# Patient Record
Sex: MALE | Race: WHITE | NOT HISPANIC OR LATINO | Employment: OTHER | URBAN - METROPOLITAN AREA
[De-identification: names, ages, dates, MRNs, and addresses within clinical notes are randomized per-mention and may not be internally consistent; named-entity substitution may affect disease eponyms.]

---

## 2017-01-31 ENCOUNTER — ALLSCRIPTS OFFICE VISIT (OUTPATIENT)
Dept: OTHER | Facility: OTHER | Age: 72
End: 2017-01-31

## 2017-01-31 DIAGNOSIS — I10 ESSENTIAL (PRIMARY) HYPERTENSION: ICD-10-CM

## 2017-01-31 DIAGNOSIS — R00.2 PALPITATIONS: ICD-10-CM

## 2017-02-01 ENCOUNTER — GENERIC CONVERSION - ENCOUNTER (OUTPATIENT)
Dept: OTHER | Facility: OTHER | Age: 72
End: 2017-02-01

## 2017-02-02 ENCOUNTER — APPOINTMENT (OUTPATIENT)
Dept: LAB | Facility: CLINIC | Age: 72
End: 2017-02-02
Payer: MEDICARE

## 2017-02-02 ENCOUNTER — ALLSCRIPTS OFFICE VISIT (OUTPATIENT)
Dept: OTHER | Facility: OTHER | Age: 72
End: 2017-02-02

## 2017-02-02 ENCOUNTER — TRANSCRIBE ORDERS (OUTPATIENT)
Dept: ADMINISTRATIVE | Facility: HOSPITAL | Age: 72
End: 2017-02-02

## 2017-02-02 DIAGNOSIS — R00.2 PALPITATIONS: ICD-10-CM

## 2017-02-02 DIAGNOSIS — I10 ESSENTIAL HYPERTENSION, MALIGNANT: Primary | ICD-10-CM

## 2017-02-02 DIAGNOSIS — I10 ESSENTIAL (PRIMARY) HYPERTENSION: ICD-10-CM

## 2017-02-02 LAB
ANION GAP SERPL CALCULATED.3IONS-SCNC: 9 MMOL/L (ref 4–13)
BUN SERPL-MCNC: 26 MG/DL (ref 5–25)
CALCIUM SERPL-MCNC: 9.2 MG/DL (ref 8.3–10.1)
CHLORIDE SERPL-SCNC: 106 MMOL/L (ref 100–108)
CO2 SERPL-SCNC: 25 MMOL/L (ref 21–32)
CREAT SERPL-MCNC: 1.05 MG/DL (ref 0.6–1.3)
GFR SERPL CREATININE-BSD FRML MDRD: >60 ML/MIN/1.73SQ M
GLUCOSE SERPL-MCNC: 83 MG/DL (ref 65–140)
POTASSIUM SERPL-SCNC: 3.7 MMOL/L (ref 3.5–5.3)
SODIUM SERPL-SCNC: 140 MMOL/L (ref 136–145)
TSH SERPL DL<=0.05 MIU/L-ACNC: 0.54 UIU/ML (ref 0.36–3.74)

## 2017-02-02 PROCEDURE — 84443 ASSAY THYROID STIM HORMONE: CPT

## 2017-02-02 PROCEDURE — 80048 BASIC METABOLIC PNL TOTAL CA: CPT

## 2017-02-02 PROCEDURE — 36415 COLL VENOUS BLD VENIPUNCTURE: CPT

## 2017-02-07 ENCOUNTER — HOSPITAL ENCOUNTER (OUTPATIENT)
Dept: NON INVASIVE DIAGNOSTICS | Facility: HOSPITAL | Age: 72
Discharge: HOME | End: 2017-02-07
Payer: MEDICARE

## 2017-02-07 DIAGNOSIS — I10 ESSENTIAL (PRIMARY) HYPERTENSION: ICD-10-CM

## 2017-02-07 PROCEDURE — 93226 XTRNL ECG REC<48 HR SCAN A/R: CPT

## 2017-02-07 PROCEDURE — 93225 XTRNL ECG REC<48 HRS REC: CPT

## 2017-02-17 ENCOUNTER — GENERIC CONVERSION - ENCOUNTER (OUTPATIENT)
Dept: OTHER | Facility: OTHER | Age: 72
End: 2017-02-17

## 2017-02-20 ENCOUNTER — ALLSCRIPTS OFFICE VISIT (OUTPATIENT)
Dept: OTHER | Facility: OTHER | Age: 72
End: 2017-02-20

## 2017-03-03 ENCOUNTER — ALLSCRIPTS OFFICE VISIT (OUTPATIENT)
Dept: OTHER | Facility: OTHER | Age: 72
End: 2017-03-03

## 2017-05-25 ENCOUNTER — ALLSCRIPTS OFFICE VISIT (OUTPATIENT)
Dept: OTHER | Facility: OTHER | Age: 72
End: 2017-05-25

## 2017-06-23 ENCOUNTER — ALLSCRIPTS OFFICE VISIT (OUTPATIENT)
Dept: OTHER | Facility: OTHER | Age: 72
End: 2017-06-23

## 2017-06-26 ENCOUNTER — ALLSCRIPTS OFFICE VISIT (OUTPATIENT)
Dept: OTHER | Facility: OTHER | Age: 72
End: 2017-06-26

## 2017-06-26 ENCOUNTER — GENERIC CONVERSION - ENCOUNTER (OUTPATIENT)
Dept: OTHER | Facility: OTHER | Age: 72
End: 2017-06-26

## 2017-06-26 ENCOUNTER — LAB REQUISITION (OUTPATIENT)
Dept: LAB | Facility: HOSPITAL | Age: 72
End: 2017-06-26
Payer: MEDICARE

## 2017-06-26 DIAGNOSIS — B07.9 VIRAL WART: ICD-10-CM

## 2017-06-26 PROCEDURE — 88305 TISSUE EXAM BY PATHOLOGIST: CPT | Performed by: SURGERY

## 2017-07-10 ENCOUNTER — ALLSCRIPTS OFFICE VISIT (OUTPATIENT)
Dept: OTHER | Facility: OTHER | Age: 72
End: 2017-07-10

## 2017-09-11 ENCOUNTER — GENERIC CONVERSION - ENCOUNTER (OUTPATIENT)
Dept: OTHER | Facility: OTHER | Age: 72
End: 2017-09-11

## 2017-09-25 ENCOUNTER — ALLSCRIPTS OFFICE VISIT (OUTPATIENT)
Dept: OTHER | Facility: OTHER | Age: 72
End: 2017-09-25

## 2017-09-25 ENCOUNTER — APPOINTMENT (OUTPATIENT)
Dept: RADIOLOGY | Facility: OTHER | Age: 72
End: 2017-09-25
Payer: MEDICARE

## 2017-09-25 DIAGNOSIS — M75.52 BURSITIS OF LEFT SHOULDER: ICD-10-CM

## 2017-09-25 DIAGNOSIS — M25.512 PAIN IN LEFT SHOULDER: ICD-10-CM

## 2017-09-25 PROCEDURE — 73030 X-RAY EXAM OF SHOULDER: CPT

## 2017-12-01 ENCOUNTER — GENERIC CONVERSION - ENCOUNTER (OUTPATIENT)
Dept: OTHER | Facility: OTHER | Age: 72
End: 2017-12-01

## 2017-12-04 ENCOUNTER — GENERIC CONVERSION - ENCOUNTER (OUTPATIENT)
Dept: OTHER | Facility: OTHER | Age: 72
End: 2017-12-04

## 2017-12-20 ENCOUNTER — GENERIC CONVERSION - ENCOUNTER (OUTPATIENT)
Dept: OTHER | Facility: OTHER | Age: 72
End: 2017-12-20

## 2017-12-20 ENCOUNTER — HOSPITAL ENCOUNTER (OUTPATIENT)
Dept: NON INVASIVE DIAGNOSTICS | Facility: CLINIC | Age: 72
Discharge: HOME | End: 2017-12-20
Payer: MEDICARE

## 2017-12-20 DIAGNOSIS — I71.4 ANEURYSM OF ABDOMINAL VESSEL (HCC): ICD-10-CM

## 2017-12-20 DIAGNOSIS — I74.09 AORTOILIAC OCCLUSIVE DISEASE (HCC): ICD-10-CM

## 2017-12-20 PROCEDURE — 93978 VASCULAR STUDY: CPT

## 2017-12-20 PROCEDURE — 93923 UPR/LXTR ART STDY 3+ LVLS: CPT

## 2018-01-10 NOTE — MISCELLANEOUS
Physical Exam    Wound #1 Assessment wound #1 Location:, right groin, started on unknown, Care for this wound started on 5/19/2014  Wound Status: not healed  Non Healing Surgical: Full Thickness  Length: 1 4cm x Width: 1cm x Depth: 0 2cm   Total: 1 4sq cm   Wound Volume: 0 28cm3           Tissue type: Granulation and Slough   Color of Wound: Red - 20%, Yellow - 10% and Pink - 70%   Exudate Amount: Scant   Exudate Type: Serosangiunous   Odor: None   Exudate Color: Yellow   Wound Edges: Intact   Periwound Skin Condition: Fungal rash        Physician/Provider Wound #1 Exam   I agree with the nursing assessment and documentation  Right groin wound is superficial with pink tissue present  Periwound is clean with no erythema  Wound Drsg  Orders/Instructions  Wound Identification Dressing Orders--Instructions:   Wound Identification and Instructions   Wound #1: right groin    Wound Care Instructions  Discussed with Patient/Caregiver  Dressing Type: Alginate  Wash with mild soap and water, normal saline, wound cleanser or as specified  Apply 4% Topical Lidocaine anesthetic solution PRN to wound/ulcer prior to debridement for pain control  Apply specified dressing to wound base/bed  Secondary dressing apply: Gauze, 4x4 folded  Secure with: medfix tape  Dressing change frequency: Daily      Wound Goals  Wound Goals:   Healing Goals:   Fair healing potential secondary to moderate comorbid conditions     Wound will be free of infection   Patient will achieve a maintenance program to promote optimum patient quality of life       Future Appointments    Date/Time Provider Specialty Site   02/08/2016 01:15 PM Ryley Pulido MD General Surgery [place] [address]   02/16/2016 10:10 AM Specialty Clinic, Podiatry  [address]   05/13/2016 09:45 AM Katharina Mata MD Cardiology [address]     Shonda Cespedes 1: Wound Nursing Care Plan   Impaired Tissue Integrity related to: right groin   Risk for Infection related to open wound: right groin   Goals   Patient will achieve 100% epithelialization:  Patient will demonstrate and verbalize knowledge of their disease process and management:  Patient will verbalize signs and symptoms of infection and when to notify physician or FIELD [facility]:    Patient will demonstrate and verbalize infection control and preventative measures:  Wound Nursing Care Interventions:   Provide moist wound healing:  Other:  Care plan reviewed and updated 3/23/15, 4/27/2015, 6/1/2015, 7/20/15, 8/3/15  Reviewed 10/5/15, Reviewed 12/14/15      Signatures   Electronically signed by : Namita Hernandez MD; Jan 11 2016  2:02PM EST                       (Author)

## 2018-01-12 NOTE — RESULT NOTES
Verified Results  (1) COMPREHENSIVE METABOLIC PANEL [id number] 39:48LC Fransisco Driscoll Order Number: [id number]      National Kidney Disease Education Program recommendations are as follows:  GFR calculation is accurate only with a steady state creatinine  Chronic Kidney disease less than 60 ml/min/1 73 sq  meters  Kidney failure less than 15 ml/min/1 73 sq  meters       Test Name Result Flag Reference   GLUCOSE,RANDM 109 mg/dL     SODIUM 140 mmol/L  136-145   POTASSIUM 4 0 mmol/L  3 5-5 3   CHLORIDE 108 mmol/L  100-108   CARBON DIOXIDE 28 mmol/L  21-32   ANION GAP (CALC) 4 mmol/L  4-13   BLOOD UREA NITROGEN 21 mg/dL  5-25   CREATININE 1 03 mg/dL  0 60-1 30   Standardized to IDMS reference method   CALCIUM 8 8 mg/dL  8 3-10 1   BILI, TOTAL 0 60 mg/dL  0 20-1 00   ALK PHOSPHATAS 75 U/L     ALT (SGPT) 25 U/L  12-78   AST(SGOT) 14 U/L  5-45   ALBUMIN 3 6 g/dL  3 5-5 0   TOTAL PROTEIN 7 4 g/dL  6 4-8 2   eGFR Non-African American      >60 0 ml/min/1 73sq m

## 2018-01-13 VITALS — TEMPERATURE: 97.7 F | DIASTOLIC BLOOD PRESSURE: 52 MMHG | HEART RATE: 60 BPM | SYSTOLIC BLOOD PRESSURE: 110 MMHG

## 2018-01-13 VITALS — TEMPERATURE: 97.1 F | DIASTOLIC BLOOD PRESSURE: 66 MMHG | SYSTOLIC BLOOD PRESSURE: 130 MMHG | HEART RATE: 60 BPM

## 2018-01-13 VITALS
BODY MASS INDEX: 24.75 KG/M2 | HEIGHT: 66 IN | WEIGHT: 154 LBS | DIASTOLIC BLOOD PRESSURE: 70 MMHG | HEART RATE: 69 BPM | SYSTOLIC BLOOD PRESSURE: 135 MMHG

## 2018-01-13 VITALS
BODY MASS INDEX: 28.6 KG/M2 | SYSTOLIC BLOOD PRESSURE: 136 MMHG | WEIGHT: 167.55 LBS | DIASTOLIC BLOOD PRESSURE: 60 MMHG | HEART RATE: 74 BPM | TEMPERATURE: 98.3 F | HEIGHT: 64 IN

## 2018-01-13 VITALS
TEMPERATURE: 97.2 F | HEART RATE: 72 BPM | DIASTOLIC BLOOD PRESSURE: 60 MMHG | HEIGHT: 64 IN | SYSTOLIC BLOOD PRESSURE: 110 MMHG | WEIGHT: 168.43 LBS | BODY MASS INDEX: 28.76 KG/M2

## 2018-01-13 VITALS — HEART RATE: 68 BPM | TEMPERATURE: 97.3 F | SYSTOLIC BLOOD PRESSURE: 142 MMHG | DIASTOLIC BLOOD PRESSURE: 78 MMHG

## 2018-01-13 VITALS
SYSTOLIC BLOOD PRESSURE: 122 MMHG | HEIGHT: 64 IN | BODY MASS INDEX: 28.76 KG/M2 | HEART RATE: 64 BPM | WEIGHT: 168.43 LBS | TEMPERATURE: 97.4 F | DIASTOLIC BLOOD PRESSURE: 62 MMHG

## 2018-01-14 VITALS — SYSTOLIC BLOOD PRESSURE: 110 MMHG | TEMPERATURE: 97.6 F | DIASTOLIC BLOOD PRESSURE: 60 MMHG | HEART RATE: 68 BPM

## 2018-01-15 NOTE — RESULT NOTES
Verified Results  HOLTER MONITOR - 24 HOUR [id number] 10:03AM Ganesh Hobbs Order Number: [id number]   Performing Comments: 48 hours Holter please   - Patient Instructions: To schedule this appointment, please contact Central Scheduling at [phone]  For Shyam Mujica, please call [phone]  Test Name Result Flag Reference   HOLTER MONITOR - 24 HOUR (Report)     PT NAME: Jovani Monroy   : 1945 AGE: 66 y o  GENDER: male   MRN: [id number]  PROCEDURE: Holter monitor - 24 hour         INDICATIONS: Palpitations       FINDINGS:     Holter monitoring revealed predominant sinus rhythm with an average heart rate of 64 BPM, a minimum heart rate of 41 BPM, and a maximum heart rate of 103 BPM      There were about 309 ventricular ectopic beats, all occurring as single PVC's with no evidence of ventricular tachycardia  There were about 116 supraventricular ectopic beats, mostly occurring as single PAC's and late beats with no evidence of supraventricular tachycardia, atrial fibrillation, or atrial flutter  There was no evidence of significant bradyarrhythmia or advanced heart block  The longest R-R interval was 1 8 seconds  The patient's symptom diary reported an episode of fludder, corresponding with sinus rhythm at 65 BPM without ectopy  Another episode of heart pounding, corresponded with sinu rhythm at 74 BPM without ectopy

## 2018-01-15 NOTE — PROGRESS NOTES
Chief Complaint  Chief Complaint Free Text Note Form: Follow up for right groin      History of Present Illness  Wound Identification HPI:   Wound Identification HPI   Wound #1: right groin        Visit Information:   The patient came to Wound Care and was ambulatory  The patient is being seen for follow-up with RN at the [facility]  The patient's identification was verified  A secondary verification process was completed  Orientation: oriented to person, oriented to place and oriented to time  Blood Glucose:  Not applicable  Drain intact proximal to groin wound with malodorous drainage  Dressing intact to groin wound  Patient having a colonoscopy April 16th, 2015 4/27/2015: Arrived with drawtex, 4x4, tape dressing intact  DILLON intact with large amount of malodorous drainage, reports drains about 50-60ml in 24 hours  Patient reports his colonoscopy has been rescheduled for 5/26/205  Patient reports 5/5/2015 he will return to his eye Dr to have his eye examine s/p a torn retina  6/1/2015: Arrived with dressing intact to right groin  7/20/2015 The patient arrived with dressing intact to right groin wound  Since last visit Dr Barbie Osman took the patient to the OR on July 2nd for groin exploration and removal of mesh  The patient has a new DILLON drain intact along with well approximated incision line with sutures from surgery  The patient relates that the home care nurses were concerned last week as a white portion of the DILLON tubing is now visible  The patient states that after surgery drainage was less with little malodor but has increased in the last week  Reola Hones tube and sutures removed today  Dry dressing applied to area  [facility]   [facility] to see patient this Wednesday and see if patient can do dressing change for possible discharge from  [facility]NA services  8/3/15: Patient arrived with dry gauze and tape to wounds   States he has not been using nystatin at home  Pt was discharged from [facility] services on 7/23/15  8/31/15: The patient arrived with dressing intact to wound, denies any issues since last visit  Follow up in 3 weeks  10/5/2015: Patient arrived with dressing intact to right groin  Denies changes since last visit  10/26/2015: Arrived with 4x4 intact to right groin  11/1615: Patient arrived with dressing intact  12/14/15: Patient arrived with 4x4 and tape intact to right groin  Patient reports on 12/3/15 he had cataract removed to left eye  On 12/1/15 he developed a cornea ulceration to his right eye which he is using eye drops and oral medication  1/11/2016: Arrived with alginate and tape intact to right groin  2/8/16: The patient arrived with dressing intact to wound  3/7/2016: Patient arrived with 2x2 gauze and tape intact to right groin  3/14/16: Patient arrived with mepilex xt to wound  Denies changes since last visit  4/4/16: Denies changes since last visit  Arrived with gauze to wound  At the last visit the [facility] ordered supplies, however the patient cancelled the order as he is obtaining his own supplies "at a better price"  4/11/16: Arrived with Calmoseptine and gauze intact  5/9/2016: Patient arrived with Calmoseptine and gauze to wound  Denies changes since last visit  5/16/16: Arrived with felt pad to yana wound, Calmoseptine, folded 2x2 intact to right groin  5/23/16: Patient arrived with felt pad and dressing intact to wound  Denies changes since last visit  5/31/16: The patient arrived with felt pad intact to groin  Wound is greatly improved  Carvajal Fall Scale:     History of Falling: No = 0   Secondary Diagnosis: Yes = 15   Ambulatory Aid None, Bedrest, Nurse Assist = 0   No = 0   Gait: Weak = 10 -- Short steps (may shuffle), stooped but able to lift head while walking, may seek support from furniture while walking, but with light touch (for reassurance)     Mental Status: Oriented to own ability = 0   Total Score: 25    25 - 45 = Moderate Risk        Fall, Nutrition, Mobility, Neuropsychological Assessment: The most recent fall occurred 2/6/16  Number of falls in the last year were 1  The fall resulted from slipping  Nutrition Assessment Screening: Food intake over the last 3 months due to the loss of appetite, digestive problems, chewing or swallowing difficulties is graded as: 2 = no decrease in food intake   Weight loss during the last 3 months: 3 = no weight loss   Mobility scored as: 2 = goes out  Psychological Stress and Acute Disease Scored as: 2 = The patient has not experienced psychological stress or acute disease in the last 3 months  Neuropsychological problems scored as: 2 = no psychological problems  Body Mass Index (BMI) scored as: 3 = BMI 23 or greater  Nutritional Assessment Screening Score: 12 - 14 points - Normal nutritional status  Hospital Based Practices Required Assessment:   Pain Assessment   the patient states they do not have pain  (on a scale of 0 to 10, the patient rates the pain at 0 )   Abuse And Domestic Violence Screen    Yes, the patient is safe at home  The patient states no one is hurting them  Depression And Suicide Screen  No, the patient has not had thoughts of hurting themself  No, the patient has not felt depressed in the past 7 days  Prefered Language is  [place]  Primary Language is  English  Readiness To Learn: Receptive  Barriers To Learning: none  Preferred Learning: demonstration   Education Completed: further treatment/follow-up and treatment/procedure   Teaching Method: verbal   Person Taught: patient   Evaluation Of Learning: verbalized/demonstrated understanding      Active Problems    1  Arteriosclerotic coronary artery disease (414 00) (I25 10)   2  Callus of foot (700) (L84)   3  Chest pain (786 50) (R07 9)   4  Chronic Reflux Esophagitis   5  Dizziness (780 4) (R42)   6  Foreign bodies   7  Hyperlipidemia (272 4) (E78 5)   8  Hypertension (401 9) (I10)   9   Need for pneumococcal vaccine (V03 82) (Z23)   10  Need for prophylactic vaccination and inoculation against influenza (V04 81) (Z23)   11  Non-healing open wound of right groin (879 5) (S31 103A)   12  Non-healing open wound of right groin, subsequent encounter (V58 89,879 5)    (S31 103D)   13  Onychomycosis (110 1) (B35 1)   14  Pain in eye, unspecified laterality (379 91) (H57 10)   15  Pain in limb (729 5) (M79 609)   16  Peripheral vascular disease (443 9) (I73 9)   17  Postoperative examination (V67 00) (Z09)   18  Preop general physical exam (V72 83) (Z01 818)   19  Screening for thyroid disorder (V77 0) (Z13 29)   20  Serrated adenoma of colon (211 3) (D12 6)   21  Tubular adenoma of colon (211 3) (D12 6)   22  Ventral hernia (553 20) (K43 9)   23  Zoster ophthalmicus (053 20) (B02 30)    Past Medical History    1  History of Abscess of groin (682 2) (L02 214)   2  History of Candidiasis, cutaneous (112 3) (B37 2)   3  History of Coronary Artery Disease (V12 59)   4  History of Dyslipidemia (272 4) (E78 5)   5  History of Esophageal ulcer without bleeding (530 20) (K22 10)   6  History of abdominal aortic aneurysm (V12 59) (Z86 79)   7  History of gastritis (V12 79) (Z87 19)   8  History of hiatal hernia (V12 79) (Z87 19)   9  History of left cataract surgery (V45 61) (Z98 42)   10  Need for prophylactic vaccination and inoculation against influenza (V04 81) (Z23)   11  Old myocardial infarction (412) (I25 2)   12  History of Recurrent Right Inguinal Hernia (550 91)   13  History of Recurrent Right Inguinal Hernia (550 91)   14  History of Right inguinal hernia (550 90) (K40 90)   15  History of Skin Wound In The Groin Right    Surgical History    1  History of Appendectomy   2  History of Cath Placement Of Stent 2   3  History of Diagnostic Cystoscopy   4  History of Exploratory Laparotomy   5  History of Hernia Repair Inguinal Unilateral   6  History of Hip Surgery Right   7  History of Orchiectomy Right   8  History of Surgery For Abdominal Aortic Aneurysm    Family History    1  Family history of cardiac disorder (V17 49) (Z82 49)   2  Family history of diabetes mellitus (V18 0) (Z83 3)    3  Family history of cardiac disorder (V17 49) (Z82 49)    Social History    · Former smoker (G11 99) (V57 607)    Current Meds   1  Aspirin 81 MG TABS; TAKE 1 TABLET DAILY; Therapy: 77Tdb2675 to (Michael Burgos)  Requested for: 07MKQ9785; Last   Rx:08Oct2015 Ordered   2  Atorvastatin Calcium 40 MG Oral Tablet; TAKE 1 TABLET Bedtime; Therapy: 11AYZ8453 to (Evaluate:12Mar2017)  Requested for: 30UPO0226; Last   Rx:17Mar2016 Ordered   3  Hydrochlorothiazide 12 5 MG Oral Capsule; take 1 capsule by mouth once daily; Therapy: 86EQZ4072 to (Evaluate:13Mar2017)  Requested for: 10XGB2344; Last   Rx:18Mar2016 Ordered   4  Hydrocodone-Acetaminophen 5-325 MG Oral Tablet; Therapy: (Madai Plunkett) to Recorded   5  Lidocaine HCl - 4 % External Solution; APPLY 2  [facility] External; To Be Done: 55BQZ9843;   Status: HOLD FOR - Administration Ordered   6  Lidocaine HCl (Local Anesth ) 4 % SOLN; Apply prn to wound prior to debridment for pain   control; Therapy: (Scottie Carey) to Recorded   7  Lisinopril 20 MG Oral Tablet; TAKE 1 TABLET DAILY AS DIRECTED; Therapy: 42HYV9665 to (26 105755)  Requested for: 09CQY7034; Last   Rx:04Mar2016 Ordered   8  Metoprolol Succinate ER 50 MG Oral Tablet Extended Release 24 Hour; TAKE 1 TABLET   DAILY; Therapy: 69UVZ8741 to (Evaluate:94Ovy0609)  Requested for: 60IIU3495; Last   Rx:04Mar2016 Ordered   9  Nystatin 058706 UNIT/GM External Powder; applied to affected area daily; Therapy: 63XDJ9832 to (Last Rx:27Oct2014)  Requested for: 27Oct2014 Ordered   10  Ofloxacin 0 3 % Ophthalmic Solution; Therapy: (Recorded:19Pno9535) to Recorded   11   PEG-3350/Electrolytes 236 GM Oral Solution Reconstituted; MIX AS DIRECTED AND    DRINK OVER 4 HOURS, START AT 4PM;    Therapy: 85QFO3454 to (Last Rx:14Jan2015)  Requested for: 91ARD3478 Ordered   12  PrednisoLONE Acetate 1 % Ophthalmic Suspension; Therapy: (Recorded:21Jul2014) to Recorded   13  Ranitidine HCl - 150 MG Oral Capsule; Therapy: (Recorded:07Mar2016) to Recorded   14  ValACYclovir HCl - 500 MG Oral Tablet; Therapy: (Recorded:07Mar2016) to Recorded   15  Valtrex 500 MG Oral Tablet; Therapy: 80WUT0951 to Recorded   16  Vicodin 5-300 MG Oral Tablet; 1-2 tabs by mouth every 4 hours when necessary pain; Therapy: 01PWI1108 to (Last Rx:28Hhh5680) Ordered    Allergies    1  No Known Drug Allergies    Vitals  Signs [Data Includes: Current Encounter]   Recorded: 19WJL0586 01:33PM   Temperature: 98 3 F, Tympanic  Heart Rate: 72  Respiration: 18  Systolic: 099  Diastolic: 76  Height: 5 ft 4 in  Weight: 171 lb   BMI Calculated: 29 35  BSA Calculated: 1 83  Pain Scale: 0    Physical Exam    Wound #1 Assessment wound #1 Location:, right groin, started on unknown, Care for this wound started on 5/19/2014  Wound Status: not healed  Non Healing Surgical: Full Thickness  Length: 0 3cm x Width: 0 2cm x Depth: 0 1cm   Total: 0 06sq cm   Wound Volume: 0 006cm3           Tissue type: Granulation and Slough   Color of Wound: Red - 99% and Yellow - 1%   Exudate Amount: Scant   Exudate Type: Serosangiunous   Odor: None   Wound Edges: Intact   Periwound Skin Condition: Intact, Erythematous, Fungal rash, slight erythema           Procedure      Wound #1: right groin     Nurse Dressing Change:   Wound #1 The wound located on the right groin  Wound care rendered as per Physician/Advanced Practitioner order/plan  Return to [address] [place] [address] 1 week  Comments:      Wound Drsg  Orders/Instructions  Wound Identification Dressing Orders--Instructions:   Wound Identification and Instructions   Wound #1: right groin    Wound Care Instructions  Discussed with Patient/Caregiver     Dressing Type: Plain gauze  Wash with mild soap and water, normal saline, wound cleanser  Apply specified dressing to wound base/bed  Secondary dressing apply: Gauze  Secure with: Tape  Offloading: Felt pad to periwoundto-- right groin  Comments/Other:   Felt pad to stay intact, patient to change gauze daily  Wound Goals  Wound Goals:   Healing Goals:   Fair healing potential secondary to moderate comorbid conditions  Wound will be free of infection   Patient will achieve a maintenance program to promote optimum patient quality of life       Impression  [address] [place] [address]: Wound Nursing Care Plan   Impaired Tissue Integrity related to: right groin   Risk for Infection related to open wound: right groin   Goals   Patient will achieve 100% epithelialization:  Patient will demonstrate and verbalize knowledge of their disease process and management:  Patient will verbalize signs and symptoms of infection and when to notify physician or FIELD [facility]:    Patient will demonstrate and verbalize infection control and preventative measures:  Wound Nursing Care Interventions:   Provide moist wound healing:  Other:  Care plan reviewed and updated 3/23/15, 4/27/2015, 6/1/2015, 7/20/15, 8/3/15  Reviewed 10/5/15, Reviewed 12/14/15, 2/8/16, 3/14/16, 4/4/16, 5/9/16      Future Appointments    Date/Time Provider Specialty Site   06/06/2016 01:30 PM Stephanie, [address] [place] [address]   06/17/2016 11:15 AM Ellery Bence, MD Cardiology [place] [address]   09/02/2016 02:00 PM Specialty Clinic, [address] [place] [address]     Signatures   Electronically signed by : Mariajose Jackson RN; May 31 2016  1:38PM EST                       (Author)

## 2018-01-16 NOTE — PROGRESS NOTES
Chief Complaint  PT  CAME INTO THE OFFICE TODAY FOR A NURSE VISIT FOR A SPIROMETRY WITHOUT BRONCHO ORDERED BY DR [address] ON 6/23/17  PT UNDERSTOOD INSTRUCTIONS BUT DID NOT GIVE GOOD EFFORT  I ADVISED PT  WE WILL CALL HIM WITH RESULTS      Active Problems    1  Arteriosclerotic coronary artery disease (414 00) (I25 10)   2  Callus of foot (700) (L84)   3  Chest pain (786 50) (R07 9)   4  Chronic Reflux Esophagitis   5  Degenerative arthritis of left knee (715 96) (M17 12)   6  Degenerative joint disease of left hip (715 95) (M16 12)   7  Dizziness (780 4) (R42)   8  Exertional shortness of breath (786 05) (R06 02)   9  Foreign bodies   10  Hyperlipidemia (272 4) (E78 5)   11  Hypertension (401 9) (I10)   12  Musculoskeletal thigh pain, left (729 5) (M79 652)   13  Need for pneumococcal vaccine (V03 82) (Z23)   14  Need for prophylactic vaccination and inoculation against influenza (V04 81) (Z23)   15  Non-healing open wound of right groin (879 5) (S31 103A)   16  Non-healing open wound of right groin, subsequent encounter (V58 89,879 5)    (S31 103D)   17  Onychomycosis (110 1) (B35 1)   18  Osteoarthritis (715 90) (M19 90)   19  Pain in eye, unspecified laterality (379 91) (H57 10)   20  Pain in limb (729 5) (M79 609)   21  Palpitations (785 1) (R00 2)   22  Peripheral vascular disease (443 9) (I73 9)   23  Postoperative examination (V67 00) (Z09)   24  Preop general physical exam (V72 83) (Z01 818)   25  Screening for thyroid disorder (V77 0) (Z13 29)   26  Serrated adenoma of colon (211 3) (D12 6)   27  Skin nodule (782 2) (R22 9)   28  Tubular adenoma of colon (211 3) (D12 6)   29  Ventral hernia (553 20) (K43 9)   30  Verrucous skin lesion (078 10) (B07 9)   31  Vertigo (780 4) (R42)   32  Zoster ophthalmicus (053 20) (B02 30)    Current Meds   1  Aspirin 81 MG Oral Tablet Delayed Release; TAKE 1 TABLET DAILY;    Therapy: 87Hfc7737 to (Evaluate:09Mxg6096)  Requested for: 52KQE0935; Last   ZV:71CXX2029 Ordered   2  Atorvastatin Calcium 40 MG Oral Tablet; TAKE 1 TABLET Bedtime; Therapy: 11WUR0089 to (Evaluate:18Jun2018)  Requested for: 26IYP6375; Last   PT:13EOT6309 Ordered   3  HydroCHLOROthiazide 12 5 MG Oral Capsule; take 2 capsule by mouth once daily; Therapy: 70YCZ2713 to (Evaluate:18Jun2018)  Requested for: 82GJZ5047; Last   FV:54TAZ0680 Ordered   4  Lisinopril 20 MG Oral Tablet; TAKE 1 TABLET DAILY AS DIRECTED; Therapy: 86SUO9638 to (Evaluate:18Jun2018)  Requested for: 64NCW0294; Last   WA:75YDD5946 Ordered   5  Metoprolol Succinate ER 50 MG Oral Tablet Extended Release 24 Hour; TAKE 1 TABLET   DAILY; Therapy: 61IPI5156 to (Evaluate:18Jun2018)  Requested for: 13DSY4187; Last   JF:19GBK3032 Ordered   6  RaNITidine HCl - 150 MG Oral Capsule; Therapy: (Recorded:07Mar2016) to Recorded    Allergies    1   No Known Drug Allergies    Future Appointments    Date/Time Provider Specialty Site   12/01/2017 11:00 AM Heaven Wick MD Cardiology [address]   09/11/2017 01:10 PM Specialty Clinic, [address] [place] [address]     Signatures   Electronically signed by : Ben Bartlett, ; Jul 10 2017 11:30AM EST                       (Author)    Electronically signed by : Winnie Brennan DO; Jul 10 2017  3:52PM EST                       (Author)    Electronically signed by : Luis Felipe Nicole DO; Jul 10 2017  3:59PM EST                       (Review)

## 2018-01-17 ENCOUNTER — GENERIC CONVERSION - ENCOUNTER (OUTPATIENT)
Dept: OTHER | Facility: OTHER | Age: 73
End: 2018-01-17

## 2018-01-18 NOTE — MISCELLANEOUS
Physical Exam    Wound #1 Assessment wound #1 Location:, right groin, started on unknown, Care for this wound started on 5/19/2014  Wound Status: not healed  Non Healing Surgical: Full Thickness  Length: 0 3cm x Width: 0 2cm x Depth: 0 1cm   Total: 0 06sq cm   Wound Volume: 0 006cm3           Tissue type: Granulation and Slough   Color of Wound: Red - 99% and Yellow - 1%   Exudate Amount: Scant   Exudate Type: Serosangiunous   Odor: None   Wound Edges: Intact   Periwound Skin Condition: Intact, Erythematous, Fungal rash, slight erythema           Wound Drsg  Orders/Instructions  Wound Identification Dressing Orders--Instructions:   Wound Identification and Instructions   Wound #1: right groin    Wound Care Instructions  Discussed with Patient/Caregiver  Dressing Type: Plain gauze  Wash with mild soap and water, normal saline, wound cleanser  Apply specified dressing to wound base/bed  Secondary dressing apply: Gauze  Secure with: Tape  Offloading: Felt pad to periwoundto-- right groin  Comments/Other:   Felt pad to stay intact, patient to change gauze daily  Future Appointments    Date/Time Provider Specialty Site   06/06/2016 01:30 PM [facility]   06/17/2016 11:15 AM Chacho Lenz MD Cardiology [facility]   09/02/2016 02:00 PM Specialty Clinic, [address] [place] [address] 656 Plan  Wound Nursing Care Plan [facility]: Wound Nursing Care Plan   Impaired Tissue Integrity related to: right groin   Risk for Infection related to open wound: right groin   Goals   Patient will achieve 100% epithelialization:  Patient will demonstrate and verbalize knowledge of their disease process and management:  Patient will verbalize signs and symptoms of infection and when to notify physician or FIELD [facility]:    Patient will demonstrate and verbalize infection control and preventative measures:     Wound Nursing Care Interventions:   Provide moist wound healing:  Other:  Care plan reviewed and updated 3/23/15, 4/27/2015, 6/1/2015, 7/20/15, 8/3/15  Reviewed 10/5/15, Reviewed 12/14/15, 2/8/16, 3/14/16, 4/4/16, 5/9/16      Signatures   Electronically signed by : Peggy Sims RN; May 31 2016  1:38PM EST                       (Author)

## 2018-01-22 VITALS
HEIGHT: 66 IN | DIASTOLIC BLOOD PRESSURE: 60 MMHG | TEMPERATURE: 97.9 F | HEART RATE: 64 BPM | BODY MASS INDEX: 26.5 KG/M2 | SYSTOLIC BLOOD PRESSURE: 120 MMHG | WEIGHT: 164.9 LBS

## 2018-01-22 VITALS — HEART RATE: 84 BPM | DIASTOLIC BLOOD PRESSURE: 76 MMHG | TEMPERATURE: 97.4 F | SYSTOLIC BLOOD PRESSURE: 120 MMHG

## 2018-01-23 NOTE — PROGRESS NOTES
Preliminary Nursing Report           Patient Information   Initial Encounter Entry Date:   2018 2:44 PM EST (Automated Transmission Automated Transmission)     Last Modified:   {Sg Villatoro}          Legal Name: Rae Chirinos      Social  Number:      YOB: 1945      Age (years): 67      Gender: M      Body Mass Index (BMI): 28 kg/m2      Height: 65 in  Weight: 167 lbs (76 kgs)        Address:   [address] [place] [address] [place]           Phone: [phone]   (consent to leave messages)      Email:      Ethnicity: Decline to State      [place]:      Marital Status:      Preferred Language: English      Race: Other Race              Patient Insurance Information      Primary Insurance InformationCarrier Name: {Primary  CarrierName}        Carrier Address:   {Primary  CarrierAddress}           Carrier Phone: {Primary  CarrierPhone}        Group Number: {Primary  GroupNumber}        Policy Number: {Primary  PolicyNumber}        Insured Name: {Primary  InsuredName}        Insured : {Primary  InsuredDOB}        Relationship to Insured: {Primary  RelationshiptoInsured}          Secondary Insurance InformationCarrier Name: {Secondary  CarrierName}        Carrier Address:   {Secondary  CarrierAddress}           Carrier Phone: {Secondary  CarrierPhone}        Group Number: {Secondary  GroupNumber}        Policy Number: {Secondary  PolicyNumber}        Insured Name: {Secondary  InsuredName}        Insured : {Secondary  InsuredDOB}        Relationship to Insured: {Secondary  RelationshiptoInsured}                Health Profile   Booking #:   Jonny Emery #: [id number]           DOS: 2018    Surgery :  HERNIA REPAIR W/MESH    Add'l Procedures/Notes:     Surgery  Risk: Intermediate       Precautions   Arteriosclerotic coronary artery disease     Chest pain     Palpitations     Peripheral vascular disease               Allergies   No Known Drug Allergies Medications   Aspirin 81 MG Oral Tablet Delayed Release     Atorvastatin Calcium 40 MG Oral Tablet     HydroCHLOROthiazide 12 5 MG Oral Capsule     Lisinopril 20 MG Oral Tablet     Metoprolol Succinate ER 50 MG Oral Tablet Extended Release 24 Hour     RaNITidine HCl - 150 MG Oral Capsule            Conditions   Arteriosclerotic coronary artery disease     Bursitis of left shoulder     Callus of foot     Chest pain     Chronic Reflux Esophagitis     Degenerative arthritis of left knee     Degenerative joint disease of left hip     Dizziness     Exertional shortness of breath     Foreign bodies     Hyperlipidemia     Hypertension     Musculoskeletal thigh pain, left     Need for pneumococcal vaccine     Need for prophylactic vaccination and inoculation against influenza     Non-healing open wound of right groin     Non-healing open wound of right groin, subsequent encounter     Onychomycosis     Osteoarthritis     Pain in eye, unspecified laterality     Pain in limb     Palpitations     Peripheral vascular disease     Postoperative examination     Preop general physical exam     Screening for thyroid disorder     Serrated adenoma of colon     Skin nodule     Tubular adenoma of colon     Ventral hernia     Verrucous skin lesion     Vertigo     Zoster ophthalmicus            Family History   None          Surgical History   None          Social History   Former smoker                       Patient Instructions     Medical Procedure Risk  NPO Instructions   The day before surgery it is recommended to have a light dinner at your usual time and you are allowed a light snack  early in the evening  Do not eat anything heavy or eat a big meal after 7pm  Do not eat or drink anything after midnight prior to your surgery  If you are supposed to take any of your medications, do so with a sip of water   Failure to follow these instructions  can lead to an increased risk of lung complications and may result in a delay or cancellation of your procedure  If you have any questions, contact your institution for further instructions  No candy, no gum, no mints, no chewing tobacco        Lisinopril 20 MG Oral Tablet  Medication Instruction (ACE/ARB - Blood Pressure Medication)  1  Please continue the following medications up to the evening before surgery, but do not take it on the day of surgery  Please restart your medications as soon as clinically feasible  Aspirin 81 MG Oral Tablet Delayed Release  Medication Instruction (Aspirin - Blood Thinners)  112, 104,  105, 114,  113, 103,  110, 107,  108, 109,  111, 106  Please continue to take this medication on your normal schedule  If this is an  oral medication and you take in the morning, you may do so with a sip of water  However, your surgeon may have you stop aspirin up to a week early if you are having intracranial, middle ear, posterior eye, spine surgery or prostate surgery  Metoprolol Succinate ER 50 MG Oral Tablet Extended Release 24 Hour  Medication Instruction (Beta Blocker)  3, 2,  c, 4,  6, 5  Please continue to take this medication on your normal schedule  If this is an oral  medication and you take in the morning, you may do so with a sip of water  HydroCHLOROthiazide 12 5 MG Oral Capsule  Medication Instruction (Diuretics - Water Pills)  28, 29  Please continue the following medications up to the evening before surgery, but do  not take it on the day of surgery  RaNITidine HCl - 150 MG Oral Capsule  Medication Instruction (Antacids)  34, 35  Please continue to take this medication on your normal schedule  If this is an oral  medication and you take in the morning, you may do so with a sip of water  Testing Considerations      Chest X-ray (CXR) t    Electronically signed Steve PANDEY    Jan 18 2018  9:37AM EST Review

## 2018-01-24 VITALS
WEIGHT: 167.55 LBS | SYSTOLIC BLOOD PRESSURE: 140 MMHG | DIASTOLIC BLOOD PRESSURE: 66 MMHG | HEART RATE: 88 BPM | TEMPERATURE: 97.4 F | HEIGHT: 65 IN | BODY MASS INDEX: 27.92 KG/M2

## 2018-01-24 VITALS — DIASTOLIC BLOOD PRESSURE: 66 MMHG | SYSTOLIC BLOOD PRESSURE: 130 MMHG | TEMPERATURE: 97.3 F | HEART RATE: 72 BPM

## 2018-01-24 NOTE — PROGRESS NOTES
Assessment    1  Degenerative joint disease of left hip (515 95) (M16 12)   2  Degenerative arthritis of left knee (927 96) (M17 9)    Plan    · Follow-up visit in 2 months Evaluation and Treatment  Follow-up  Status: Hold For -  Scheduling  Requested for: 35HNB7990    Discussion/Summary    Chronic left knee and hip pain with advanced degenerative joint disease  3 months ago patient had left knee joint and left hip joint steroid injections which were quite helpful  Patient tells me that the effect is still lasting and he does not need any repeat injections at this time  She will continue Aleve on as needed basis  He does not wish for any stronger medications at this time  Followup in 2 months for reevaluation, sooner if needed  Patient will call us sooner with any questions or concerns  History of Present Illness  HPI: Patient is here for followup of left knee and hip pain with significant degenerative arthritis  Due to last visit he received a left knee steroid injection and also was sent for x-ray guided left hip joint injection both of which worked quite well  He tells me that overall he notes much improvement and affect is still ongoing  He has difficulty with movement of the hip especially with hip flexion due to the arthritis  He denies any swelling or redness of the knee or hip  He denies any locking episodes  He has chronic right lower extremity deformity with leg length discrepancy and ambulates with a cane  He denies any right-sided leg pain  He takes 1 or 2 Aleve a day a few times a week on as needed basis  Hospital Based Practices Required Assessment:   Pain Assessment   the patient states they do not have pain  (on a scale of 0 to 10, the patient rates the pain at 0 )    Prefered Language is  english  Primary Language is  english  Review of Systems    Constitutional: No fever or chills, feels well, no tiredness, no recent weight loss or weight gain     Musculoskeletal: as noted in HPI    Endocrine: muscle weakness  ROS reviewed  Active Problems    1  Arteriosclerotic coronary artery disease (414 00) (I25 10)   2  Callus of foot (700) (L84)   3  Chest pain (786 50) (R07 9)   4  Chronic Reflux Esophagitis   5  Degenerative arthritis of left knee (715 96) (M17 9)   6  Degenerative joint disease of left hip (715 95) (M16 12)   7  Dizziness (780 4) (R42)   8  Foreign bodies   9  Hyperlipidemia (272 4) (E78 5)   10  Hypertension (401 9) (I10)   11  Musculoskeletal thigh pain, left (729 5) (M79 652)   12  Need for pneumococcal vaccine (V03 82) (Z23)   13  Need for prophylactic vaccination and inoculation against influenza (V04 81) (Z23)   14  Non-healing open wound of right groin (879 5) (S31 103A)   15  Non-healing open wound of right groin, subsequent encounter (V58 89,879 5)    (S31 103D)   16  Onychomycosis (110 1) (B35 1)   17  Osteoarthritis (715 90) (M19 90)   18  Pain in eye, unspecified laterality (379 91) (H57 10)   19  Pain in limb (729 5) (M79 609)   20  Peripheral vascular disease (443 9) (I73 9)   21  Postoperative examination (V67 00) (Z09)   22  Preop general physical exam (V72 83) (Z01 818)   23  Screening for thyroid disorder (V77 0) (Z13 29)   24  Serrated adenoma of colon (211 3) (D12 6)   25  Tubular adenoma of colon (211 3) (D12 6)   26  Ventral hernia (553 20) (K43 9)   27  Vertigo (780 4) (R42)   28   Zoster ophthalmicus (053 20) (B02 30)    Past Medical History    · History of Abscess of groin (682 2) (L02 214)   · History of Candidiasis, cutaneous (112 3) (B37 2)   · History of Coronary Artery Disease (V12 59)   · History of Dyslipidemia (272 4) (E78 5)   · History of Esophageal ulcer without bleeding (530 20) (K22 10)   · History of abdominal aortic aneurysm (V12 59) (Z86 79)   · History of gastritis (V12 79) (Z87 19)   · History of hiatal hernia (V12 79) (Z87 19)   · History of left cataract surgery (V45 61) (Z98 42)   · Need for prophylactic vaccination and inoculation against influenza (V04 81) (Z23)   · Old myocardial infarction (412) (I25 2)   · History of Recurrent Right Inguinal Hernia (550 91)   · History of Recurrent Right Inguinal Hernia (550 91)   · History of Right inguinal hernia (550 90) (K40 90)   · History of Skin Wound In The Groin Right    The active problems and past medical history were reviewed and updated today  Surgical History    · History of Appendectomy   · History of Cath Placement Of Stent 2   · History of Diagnostic Cystoscopy   · History of Exploratory Laparotomy   · History of Hernia Repair Inguinal Unilateral   · History of Hip Surgery Right   · History of Orchiectomy Right   · History of Surgery For Abdominal Aortic Aneurysm    Family History  Mother    · Family history of cardiac disorder (V17 49) (Z82 49)   · Family history of diabetes mellitus (V18 0) (Z83 3)  Father    · Family history of cardiac disorder (V17 49) (Z82 49)    Social History    · Former smoker (V15 82) (I21 233)    Current Meds   1  Aspirin 81 MG Oral Tablet Delayed Release; TAKE 1 TABLET DAILY; Therapy: 54Iwg8601 to (Evaluate:18Jan2017)  Requested for: 79Tgk6943; Last   Rx:36Ttr6331 Ordered   2  Atorvastatin Calcium 40 MG Oral Tablet; TAKE 1 TABLET Bedtime; Therapy: 85GCJ7374 to (Evaluate:12Mar2017)  Requested for: 45BDR8283; Last   Rx:17Mar2016 Ordered   3  HydroCHLOROthiazide 12 5 MG Oral Capsule; take 2 capsule by mouth once daily; Therapy: 72VNP1084 to (Evaluate:63Tnr3879)  Requested for: 63Ojw6202; Last   Rx:59Cit5561 Ordered   4  Lidocaine HCl - 4 % External Solution; APPLY 2  [facility] External; To Be Done:   66VFV8772; Status: HOLD FOR - Administration Ordered   5  Lidocaine HCl (Local Anesth ) 4 % SOLN; Apply prn to wound prior to debridment for   pain control; Therapy: (Ban Salas to Recorded   6  Lisinopril 20 MG Oral Tablet; TAKE 1 TABLET DAILY AS DIRECTED;    Therapy: 03QPR1385 to [phone])  Requested for: 60NAP5652; Last TR:69PIK3571 Ordered   7  Metoprolol Succinate ER 50 MG Oral Tablet Extended Release 24 Hour; TAKE 1 TABLET   DAILY; Therapy: 36ZWO5371 to (Evaluate:36Gfx9057)  Requested for: 76SUV1040; Last   Rx:04Mar2016 Ordered   8  Nystatin 866225 UNIT/GM External Powder; applied to affected area daily; Therapy: 99XNT6366 to (Last Rx:27Oct2014)  Requested for: 27Oct2014 Ordered   9  Ofloxacin 0 3 % Ophthalmic Solution; Therapy: (Recorded:08Uev7907) to Recorded   10  PEG-3350/Electrolytes 236 GM Oral Solution Reconstituted; MIX AS DIRECTED AND    DRINK OVER 4 HOURS, START AT 4PM;    Therapy: 28PDI4911 to (Last Rx:14Jan2015)  Requested for: 12NVR5745 Ordered   11  PrednisoLONE Acetate 1 % Ophthalmic Suspension; Therapy: (Yaz Chen) to Recorded   12  RaNITidine HCl - 150 MG Oral Capsule; Therapy: (Recorded:07Mar2016) to Recorded   13  Valtrex 500 MG Oral Tablet; Therapy: 14ZCX4372 to Recorded    The medication list was reviewed and updated today  Allergies    1  No Known Drug Allergies    Vitals   Recorded: 97MJQ7079 12:41PM   Temperature 97 2 F    Heart Rate 64    Systolic 025    Diastolic 76    Patient Refused Height No No   Patient Refused Weight No No   Height Unobtainable Yes    Weight Unobtainable Yes      Physical Exam    Left Knee: Appearance: no ecchymosis, no effusion, no erythema, no joint hypertrophy and no swelling  Tenderness: not the lateral femoral condyle, not the medial femoral condyle, not over the lateral joint line, not over the medial joint line, not the undersurface of the patella, not the popliteal fossa and no popliteal mass  Palpatory findings include crepitus and no warmth  No significant tenderness  Flexion: painless restricted AROM 110 degrees  Extension: painless normal AROM  Flexion was 5/5  Extension was 4/5   Special Test: equivocal patellar grind, but negative medial Zonia test, negative lateral Zonia test, negative Anterior Drawer sign, negative Posterior Drawer sign, no laxity on Valgus Stress and no laxity on Varus Stress  Left Hip: Tenderness: anterior aspect  Palpatory findings include crepitus, but no warmth  Flexion: painful restricted AROM 80 degrees  Internal rotation: painful restricted AROM  External rotation: painful restricted AROM  Motor: Normal except as noted: Flexion was 4/5  Special Tests: negative Straight Leg Raise  End of Encounter Meds    1  PEG-3350/Electrolytes 236 GM Oral Solution Reconstituted (Golytely); MIX AS   DIRECTED AND DRINK OVER 4 HOURS, START AT 4PM;   Therapy: 42GFZ5970 to (Last Rx:14Jan2015)  Requested for: 20JZK0789 Ordered    2  Atorvastatin Calcium 40 MG Oral Tablet; TAKE 1 TABLET Bedtime; Therapy: 78WVW5072 to (Evaluate:12Mar2017)  Requested for: 61BHK9833; Last   Rx:17Mar2016 Ordered    3  HydroCHLOROthiazide 12 5 MG Oral Capsule; take 2 capsule by mouth once daily; Therapy: 98KFS5316 to (Evaluate:30Jul2017)  Requested for: 60Mrz9329; Last   Rx:34Qyj5317 Ordered   4  Lisinopril 20 MG Oral Tablet; TAKE 1 TABLET DAILY AS DIRECTED; Therapy: 29ASW2366 to (Jb De Santiago)  Requested for: 89HTX3790; Last   Rx:04Mar2016 Ordered   5  Metoprolol Succinate ER 50 MG Oral Tablet Extended Release 24 Hour; TAKE 1 TABLET   DAILY; Therapy: 93NMJ5481 to (Evaluate:21Nzo8757)  Requested for: 10ZDB9710; Last   Rx:04Mar2016 Ordered    6  Lidocaine HCl - 4 % External Solution; APPLY 2  [facility] External; To Be Done:   50MKD3991; Status: HOLD FOR - Administration Ordered   7  Nystatin 703304 UNIT/GM External Powder; applied to affected area daily; Therapy: 47JAR5333 to (Last [place]:89QLT6436)  Requested for: 27Oct2014 Ordered    8  Aspirin 81 MG Oral Tablet Delayed Release; TAKE 1 TABLET DAILY; Therapy: 54Gro2334 to (Evaluate:18Jan2017)  Requested for: 04Toc6817; Last   Rx:61Oej0849 Ordered    9  Valtrex 500 MG Oral Tablet (ValACYclovir HCl); Therapy: 50QVF7080 to Recorded    10   Lidocaine HCl (Local Anesth ) 4 % SOLN; Apply prn to wound prior to debridment for    pain control; Therapy: (Bebo Holder) to Recorded   11  Ofloxacin 0 3 % Ophthalmic Solution; Therapy: (99 264119) to Recorded   12  PrednisoLONE Acetate 1 % Ophthalmic Suspension; Therapy: (Recorded:28Ldq0266) to Recorded   13  RaNITidine HCl - 150 MG Oral Capsule;     Therapy: (Recorded:14Dvt4551) to Recorded    Future Appointments    Date/Time Provider Specialty Site   01/31/2017 01:50 PM Demetris Huang MD Internal Medicine [address] PCP   02/02/2017 11:15 AM Esvin Cope MD Cardiology [address]     Signatures   Electronically signed by : Bella Quevedo MD; Dec 19 2016  1:36PM EST                       (Author)

## 2018-01-24 NOTE — CONSULTS
Assessment    1  Verrucous skin lesion (078 10) (B07 9)    71yoM with verrucous growth behind Right pinna  first noticed 3 months ago  not growing  no history of skin cancer  non melanoma appearing     Plan  Verrucous skin lesion    · (Q) TISSUE PATHOLOGY; Status:Active; Requested AED:[id number];    Perform:[facility]; LCU:[id number]; Ordered; For:Verrucous skin lesion; Ordered By:Lamberto Redmond;    1  f/u in 2 weeks  2  f/u results of shave biopsy     Chief Complaint  Chief Complaint Free Text Note Form: skin lesion      History of Present Illness  HPI: 70yoM with skin lesion behind R pinna for last 3 months  hasn't noticed growth  itchy  no history of skin cancer  Hospital Based Practices Required Assessment:   Pain Assessment   the patient states they do not have pain  (on a scale of 0 to 10, the patient rates the pain at 0 )    Prefered Language is  ENGLISH  Primary Language is  ENGLISH  Active Problems    1  Arteriosclerotic coronary artery disease (414 00) (I25 10)   2  Callus of foot (700) (L84)   3  Chest pain (786 50) (R07 9)   4  Chronic Reflux Esophagitis   5  Degenerative arthritis of left knee (715 96) (M17 12)   6  Degenerative joint disease of left hip (715 95) (M16 12)   7  Dizziness (780 4) (R42)   8  Exertional shortness of breath (786 05) (R06 02)   9  Foreign bodies   10  Hyperlipidemia (272 4) (E78 5)   11  Hypertension (401 9) (I10)   12  Musculoskeletal thigh pain, left (729 5) (M79 652)   13  Need for pneumococcal vaccine (V03 82) (Z23)   14  Need for prophylactic vaccination and inoculation against influenza (V04 81) (Z23)   15  Non-healing open wound of right groin (879 5) (S31 103A)   16  Non-healing open wound of right groin, subsequent encounter (V58 89,879 5)    (S31 103D)   17  Onychomycosis (110 1) (B35 1)   18  Osteoarthritis (715 90) (M19 90)   19  Pain in eye, unspecified laterality (379 91) (H57 10)   20  Pain in limb (729 5) (M79 609)   21   Palpitations (785 1) (R00 2) 22  Peripheral vascular disease (443 9) (I73 9)   23  Postoperative examination (V67 00) (Z09)   24  Preop general physical exam (V72 83) (Z01 818)   25  Screening for thyroid disorder (V77 0) (Z13 29)   26  Serrated adenoma of colon (211 3) (D12 6)   27  Skin nodule (782 2) (R22 9)   28  Tubular adenoma of colon (211 3) (D12 6)   29  Ventral hernia (553 20) (K43 9)   30  Vertigo (780 4) (R42)   31  Zoster ophthalmicus (053 20) (B02 30)    Past Medical History    1  History of Abscess of groin (682 2) (L02 214)   2  History of Candidiasis, cutaneous (112 3) (B37 2)   3  History of Coronary Artery Disease (V12 59)   4  History of Dyslipidemia (272 4) (E78 5)   5  History of Esophageal ulcer without bleeding (530 20) (K22 10)   6  History of abdominal aortic aneurysm (V12 59) (Z86 79)   7  History of gastritis (V12 79) (Z87 19)   8  History of hiatal hernia (V12 79) (Z87 19)   9  History of left cataract surgery (V45 61) (Z98 42)   10  Need for prophylactic vaccination and inoculation against influenza (V04 81) (Z23)   11  Old myocardial infarction (412) (I25 2)   12  History of Recurrent Right Inguinal Hernia (550 91)   13  History of Recurrent Right Inguinal Hernia (550 91)   14  History of Right inguinal hernia (550 90) (K40 90)   15  History of Skin Wound In The Groin Right  Active Problems And Past Medical History Reviewed: The active problems and past medical history were reviewed and updated today  Surgical History    1  History of Appendectomy   2  History of Cath Placement Of Stent 2   3  History of Diagnostic Cystoscopy   4  History of Exploratory Laparotomy   5  History of Hernia Repair Inguinal Unilateral   6  History of Hip Surgery Right   7  History of Orchiectomy Right   8  History of Surgery For Abdominal Aortic Aneurysm    Family History  Mother    1  Family history of cardiac disorder (V17 49) (Z82 49)   2  Family history of diabetes mellitus (V18 0) (Z83 3)  Father    3   Family history of cardiac disorder (V17 49) (Z82 49)    Social History    · Former smoker (A78 34) (L87 291)    Current Meds   1  Aspirin 81 MG Oral Tablet Delayed Release; TAKE 1 TABLET DAILY; Therapy: 30Dva7670 to (Evaluate:42Cec8873)  Requested for: 48SFU1758; Last   [id number] Ordered   2  Atorvastatin Calcium 40 MG Oral Tablet; TAKE 1 TABLET Bedtime; Therapy: 75PFR1478 to (Evaluate:18Jun2018)  Requested for: 34RHK5477; Last   [id number] Ordered   3  HydroCHLOROthiazide 12 5 MG Oral Capsule; take 2 capsule by mouth once daily; Therapy: 43LEA6829 to (Evaluate:18Jun2018)  Requested for: 42ZAC1254; Last   [id number] Ordered   4  Lisinopril 20 MG Oral Tablet; TAKE 1 TABLET DAILY AS DIRECTED; Therapy: 14DNR9010 to (Evaluate:18Jun2018)  Requested for: 66TBP8758; Last   [id number] Ordered   5  Metoprolol Succinate ER 50 MG Oral Tablet Extended Release 24 Hour; TAKE 1 TABLET   DAILY; Therapy: 31TKT5021 to (Evaluate:18Jun2018)  Requested for: 02XMN0782; Last   [id number] Ordered   6  RaNITidine HCl - 150 MG Oral Capsule; Therapy: (Recorded:07Mar2016) to Recorded    Allergies    1  No Known Drug Allergies    Vitals  Vital Signs    Recorded: 66DHA1925 09:30AM   Temperature 97 9 F   Heart Rate 64   Systolic 404   Diastolic 60   Height 5 ft 5 5 in   Weight 164 lb 14 45 oz   BMI Calculated 27 02   BSA Calculated 1 83     Physical Exam    Constitutional   General appearance: No acute distress, well appearing and well nourished  Abdomen hernia  Skin 7mm skin lesion bethind R pinna  flesh colored  verrucal in appearance  Procedure    Procedure: skin biopsy  Indications for the procedure include verrucous growth of skin  Risks, infection risk and bleeding risk were discussed with the patient  Procedure Note:   Anesthesia: 5ml  Of lidocaine 1% with epinephrine  The patient was prepped and draped in the usual sterile fashion using Betadine  a 4mm mm shave biopsy of the lesion was taken     Closure: simple sutures were used for the skin closure  The cutaneous layer was closed with 1, interrupted 4-0  Destruction Technique: monocryl  Specimen: the excised lesion was place in buffered formalin and sent for pathology and Right posterior scalp skin lesion  Post-Procedure:   Patient Status: the patient tolerated the procedure well  Complications: there were no complications  Follow-up in the office in 2 week(s)  Attending Note  Attending Note: Attending Note: I interviewed and examined the patient, the staff discussed the patient on the day of the visit, I discussed the case with the Resident and reviewed the Resident's note, I supervised the Resident, I supervised the procedure performed by the Resident and I agree with the Resident management plan as it was presented to me  Level of Participation: I was present in clinic and examined the patient  Patient's History: VERRUCOUS LESION RIGHT POST AURICULAR SCALP  Key Parts of the Exam: DIEGO  Diagnosis and Plan: SHAVE BIOPSY  Comments/Additional Findings: F/U AFTER PATH FOR COUNSELING    I WAS PRESENT AND SUPERVISED THE RESIDENT FOR THE ENTIRE PROCEDURE  I agree with the Resident's note  Future Appointments    Date/Time Provider Specialty Site   12/01/2017 11:00 AM Concetta Sarkar MD Cardiology [address]   07/10/2017 10:30 AM [facility], PCP Nurse Schedule  [address] PCP   07/10/2017 10:00 AM Specialty Clinic, General Surgery  [address]   09/11/2017 01:10 PM Specialty Clinic, Podiatry  [address]     Signatures   Electronically signed by :  Geraldine Huynh MD; Jun 26 2017 10:28AM EST                       (Author)    Electronically signed by : Yanely Haile MD; Jun 26 2017 10:34AM EST                       (Author)    Electronically signed by : Yanely Haile MD; Jun 26 2017 10:35AM EST                       (Author)

## 2018-02-08 RX ORDER — METOPROLOL SUCCINATE 50 MG/1
50 TABLET, EXTENDED RELEASE ORAL DAILY
COMMUNITY
Start: 2013-05-09 | End: 2019-02-19 | Stop reason: SDUPTHER

## 2018-02-08 RX ORDER — ATORVASTATIN CALCIUM 40 MG/1
40 TABLET, FILM COATED ORAL
COMMUNITY
Start: 2015-05-29 | End: 2019-02-19 | Stop reason: SDUPTHER

## 2018-02-08 RX ORDER — RANITIDINE 150 MG/1
150 CAPSULE ORAL AS NEEDED
COMMUNITY
End: 2020-03-03 | Stop reason: ALTCHOICE

## 2018-02-08 RX ORDER — HYDROCHLOROTHIAZIDE 12.5 MG/1
CAPSULE, GELATIN COATED ORAL EVERY MORNING
Status: ON HOLD | COMMUNITY
Start: 2015-10-08 | End: 2018-03-09 | Stop reason: CLARIF

## 2018-02-08 RX ORDER — LISINOPRIL 20 MG/1
1 TABLET ORAL DAILY
Status: ON HOLD | COMMUNITY
Start: 2011-11-08 | End: 2018-03-09

## 2018-02-09 ENCOUNTER — OFFICE VISIT (OUTPATIENT)
Dept: MULTI SPECIALTY CLINIC | Facility: CLINIC | Age: 73
End: 2018-02-09
Payer: MEDICARE

## 2018-02-09 VITALS — DIASTOLIC BLOOD PRESSURE: 60 MMHG | SYSTOLIC BLOOD PRESSURE: 122 MMHG | HEART RATE: 56 BPM | TEMPERATURE: 97.6 F

## 2018-02-09 DIAGNOSIS — Z01.818 PREOPERATIVE CLEARANCE: Primary | ICD-10-CM

## 2018-02-09 DIAGNOSIS — I25.10 CORONARY ARTERY DISEASE: ICD-10-CM

## 2018-02-09 DIAGNOSIS — I10 ESSENTIAL HYPERTENSION: ICD-10-CM

## 2018-02-09 PROCEDURE — 99213 OFFICE O/P EST LOW 20 MIN: CPT | Performed by: INTERNAL MEDICINE

## 2018-02-09 NOTE — PROGRESS NOTES
Cardiology Follow Up    Oumar Maharaj  1945  [id number]  [address] [facility] [address]    Discussion/Summary:  1  Preoperative clearance for a ventral hernia repair  2  History of CAD with PCI to RCA in 2000  3  HTN  4  HLD    This is a 67year old man with PMH of CAD s/p PCI in 2000, HTN, HLD, PVD who presents for preoperative risk assessment for a ventral hernia repair  He is doing well otherwise  Able to walk more than two blocks  METS > 4  Euvolemic on exam  No murmurs  No clinical sign of ACS, decompensated CHF, uncontrolled arrhythmia, or valvular heart disease  No history of CHF, renal problems or diabetes  Has a history of CAD  He also had a pharmacological stress test in Jan 2015 which was normal with artifact in inferior wall  Had a Holter monitor in 2/7/17 whoch showed no arrhythmias  Given his age, comorobidities and activity level, patient would be a moderate risk for perioperative cardiovascular events  Continue metoprolol succinate perioperatively  History of Present Illness: This is a 67year old man with history of CAD s/p PCI RCA in 2000, presents for preoperative risk assessment  Activity level: Can walk two blocks with no issues  No SOB, CP, lightheadedness, dizziness, lower limb edema, orthopnea or PND  Denies any angina  Had CP when he had his PCI  No events since  Social history: Ex smoker, quit 18 years ago  Denies excess alcohol intake or IVDA  Compliant with his medications  There is no problem list on file for this patient      Past Medical History:   Diagnosis Date    Abdominal aortic aneurysm (AAA) ([facility] [address] )     last assessed nov 6 2015    Abscess of groin     last assessed oct 6 2014    Candidiasis, cutaneous     last assessed march 19 2014    Coronary artery disease     Dyslipidemia     Esophageal ulcer without bleeding  Gastritis     Hiatal hernia     Hx of cataract surgery, left     last assessed july 21 2014    Old myocardial infarction     Recurrent right inguinal hernia     s/p right orchioectomy, mesh removal, and sinus trac removal patient being followed by surgery next appt 4/28/14 patient s/p keflex x 7 days for MSSA Cx repeat wound cx grew MSSA cx repeat wound cx grew MSSA and other aromnd (mixed) no MRSA identified conitunue close monitoring and local wound care, last assessed april 15 2014    Wound of skin     in the groin, right     Social History     Social History    Marital status: Single     Spouse name: N/A    Number of children: N/A    Years of education: N/A     Occupational History    Not on file       Social History Main Topics    Smoking status: Former Smoker    Smokeless tobacco: Not on file    Alcohol use Yes      Comment: occasionally    Drug use: No    Sexual activity: Not on file     Other Topics Concern    Not on file     Social History Narrative    No narrative on file      Family History   Problem Relation Age of Onset    Heart disease Mother     Diabetes Mother     Heart disease Father      Past Surgical History:   Procedure Laterality Date    ABDOMINAL AORTIC ANEURYSM REPAIR  2009    aortobi-iliac, dacron graft    APPENDECTOMY      open    CORONARY ANGIOPLASTY WITH STENT PLACEMENT      stent 2    CYSTOSCOPY      diagnostic onset nov 13 2014    EXPLORATORY LAPAROTOMY  12/09/2009    HIP SURGERY Right     INGUINAL HERNIA REPAIR Right     unilateral x2    ORCHIECTOMY Right        Current Outpatient Prescriptions:     aspirin 81 MG tablet, Take 1 tablet by mouth daily, Disp: , Rfl:     atorvastatin (LIPITOR) 40 mg tablet, Take 1 tablet by mouth, Disp: , Rfl:     hydrochlorothiazide (MICROZIDE) 12 5 mg capsule, Take by mouth, Disp: , Rfl:     lisinopril (ZESTRIL) 20 mg tablet, Take 1 tablet by mouth daily, Disp: , Rfl:     metoprolol succinate (TOPROL-XL) 50 mg 24 hr tablet, Take 1 tablet by mouth daily, Disp: , Rfl:     ranitidine (ZANTAC) 150 MG capsule, Take by mouth, Disp: , Rfl:   No Known Allergies    Labs:  Lab Results   Component Value Date    WBC 8 14 06/18/2015    HGB 12 7 06/18/2015    HCT 38 7 06/18/2015    MCV 83 06/18/2015     06/18/2015     Lab Results   Component Value Date    GLUCOSE 83 02/02/2017    CALCIUM 9 2 02/02/2017     02/02/2017    K 3 7 02/02/2017    CO2 25 02/02/2017     02/02/2017    BUN 26 (H) 02/02/2017    CREATININE 1 05 02/02/2017     No results found for: HGBA1C  Lab Results   Component Value Date    CHOL 119 06/28/2016     Lab Results   Component Value Date    HDL 34 (L) 06/28/2016     Lab Results   Component Value Date    LDLCALC 58 06/28/2016     Lab Results   Component Value Date    TRIG 135 06/28/2016     No components found for: CHOLHDL    Imaging:       Review of Systems:  Review of Systems   Constitutional: Negative  Negative for activity change, diaphoresis, fatigue and unexpected weight change  HENT: Negative  Eyes: Negative  Respiratory: Negative  Negative for cough, shortness of breath, wheezing and stridor  Cardiovascular: Negative  Negative for chest pain and palpitations  Gastrointestinal: Negative  Endocrine: Negative  Genitourinary: Negative  Musculoskeletal: Negative  Skin: Negative  Allergic/Immunologic: Negative  Neurological: Negative  Hematological: Negative  Psychiatric/Behavioral: Negative  Physical Exam:  Physical Exam   Constitutional: He is oriented to person, place, and time  He appears well-developed and well-nourished  HENT:   Head: Normocephalic  Eyes: Pupils are equal, round, and reactive to light  Neck: Normal range of motion  No JVD present  Cardiovascular: Normal rate and regular rhythm  No murmur heard  Pulmonary/Chest: Effort normal and breath sounds normal  No respiratory distress  He has no wheezes  He has no rales     Abdominal: Soft  He exhibits no distension  There is no tenderness  Musculoskeletal: Normal range of motion  He exhibits no edema  Neurological: He is alert and oriented to person, place, and time  No cranial nerve deficit  Skin: Skin is warm and dry  He is not diaphoretic  Psychiatric: He has a normal mood and affect   His behavior is normal

## 2018-02-16 NOTE — PRE-PROCEDURE INSTRUCTIONS
Pre-Surgery Instructions:   Medication Instructions    aspirin 81 MG tablet Instructed patient per Anesthesia Guidelines   atorvastatin (LIPITOR) 40 mg tablet Instructed patient per Anesthesia Guidelines   hydrochlorothiazide (MICROZIDE) 12 5 mg capsule Instructed patient per Anesthesia Guidelines   lisinopril (ZESTRIL) 20 mg tablet Instructed patient per Anesthesia Guidelines   metoprolol succinate (TOPROL-XL) 50 mg 24 hr tablet Instructed patient per Anesthesia Guidelines   ranitidine (ZANTAC) 150 MG capsule Instructed patient per Anesthesia Guidelines  Spoke to patient, medication list reviewed & instructed  As of 2 17 18 pt to stop aspirin  Instructed on tylenol only  Last dose of lisinopril: 2 21 18  On am DOS pt to take metoprolol  Pt reports he may have difficulty getting pill down with 1-2 sips of water  Instructed him to notify staff when he arrives at hospital if he was unable to take pill  Showering instructions reviewed at time of call  All questions answered, all instructions verbally understood by patient  Call back number given  ACE/ARB Med Class     Do not take this medication the day before and the morning of the day of surgery/procedure  Diuretic Med Class     Continue this medication up to the evening before surgery/procedure, but do not take the morning of the day of surgery  ASA Med Class: Aspirin     Should be discontinued at least one week prior to planned operation, unless specifically stated otherwise by surgical service  Your Surgeon may have patient stop taking aspirin up to a week before surgery if having intracranial, middle ear, posterior eye, spine surgery or prostate surgery  [Patients taking aspirin for coronary stents should be reviewed by an anesthesiologist in the optimization clinic    Please do not discontinue aspirin in patients with coronary stents unless given specific permission to do so by the cardiologist who prescribed medication ] If your surgeon approves please continue to take this medication on your normal schedule  You may take this medication on the morning of your surgery with a sip of water  Beta blocker Med Class     Continue to take this heart medication on your normal schedule  If this is an oral medication and you take it in the morning, then you may take this medicine with a sip of water  H2 Blocker Med Class     Continue to take this medication on your normal schedule  If this is an oral medication and you take it in the morning, then you may take this medicine with a sip of water  Statin Med Class     Continue to take this medication on your normal schedule  If this is an oral medication and you take it in the morning, then you may take this medicine with a sip of water

## 2018-02-23 ENCOUNTER — ANESTHESIA EVENT (OUTPATIENT)
Dept: PERIOP | Facility: HOSPITAL | Age: 73
DRG: 355 | End: 2018-02-23
Payer: MEDICARE

## 2018-02-23 ENCOUNTER — ANESTHESIA (OUTPATIENT)
Dept: PERIOP | Facility: HOSPITAL | Age: 73
DRG: 355 | End: 2018-02-23
Payer: MEDICARE

## 2018-02-23 ENCOUNTER — HOSPITAL ENCOUNTER (INPATIENT)
Facility: HOSPITAL | Age: 73
LOS: 4 days | DRG: 355 | End: 2018-02-28
Attending: SURGERY | Admitting: SURGERY
Payer: MEDICARE

## 2018-02-23 PROCEDURE — 49568 PR IMPLANT MESH HERNIA REPAIR/DEBRIDEMENT CLOSURE: CPT | Performed by: SURGERY

## 2018-02-23 PROCEDURE — C1781 MESH (IMPLANTABLE): HCPCS | Performed by: SURGERY

## 2018-02-23 PROCEDURE — 99024 POSTOP FOLLOW-UP VISIT: CPT | Performed by: SURGERY

## 2018-02-23 PROCEDURE — 49560 PR REPAIR INCISIONAL HERNIA,REDUCIBLE: CPT | Performed by: SURGERY

## 2018-02-23 PROCEDURE — 0WUF0JZ SUPPLEMENT ABDOMINAL WALL WITH SYNTHETIC SUBSTITUTE, OPEN APPROACH: ICD-10-PCS | Performed by: SURGERY

## 2018-02-23 DEVICE — VENTRIO HERNIA PATCH OVAL
Type: IMPLANTABLE DEVICE | Site: ABDOMEN | Status: FUNCTIONAL
Brand: VENTRIO HERNIA PATCH

## 2018-02-23 RX ORDER — GLYCOPYRROLATE 0.2 MG/ML
INJECTION INTRAMUSCULAR; INTRAVENOUS AS NEEDED
Status: DISCONTINUED | OUTPATIENT
Start: 2018-02-23 | End: 2018-02-23 | Stop reason: SURG

## 2018-02-23 RX ORDER — SODIUM CHLORIDE, SODIUM LACTATE, POTASSIUM CHLORIDE, CALCIUM CHLORIDE 600; 310; 30; 20 MG/100ML; MG/100ML; MG/100ML; MG/100ML
125 INJECTION, SOLUTION INTRAVENOUS CONTINUOUS
Status: DISCONTINUED | OUTPATIENT
Start: 2018-02-23 | End: 2018-02-25

## 2018-02-23 RX ORDER — FAMOTIDINE 20 MG/1
20 TABLET, FILM COATED ORAL DAILY
Status: DISCONTINUED | OUTPATIENT
Start: 2018-02-24 | End: 2018-02-28 | Stop reason: HOSPADM

## 2018-02-23 RX ORDER — MAGNESIUM HYDROXIDE 1200 MG/15ML
LIQUID ORAL AS NEEDED
Status: DISCONTINUED | OUTPATIENT
Start: 2018-02-23 | End: 2018-02-23 | Stop reason: HOSPADM

## 2018-02-23 RX ORDER — METOPROLOL SUCCINATE 50 MG/1
50 TABLET, EXTENDED RELEASE ORAL DAILY
Status: DISCONTINUED | OUTPATIENT
Start: 2018-02-24 | End: 2018-02-28 | Stop reason: HOSPADM

## 2018-02-23 RX ORDER — ONDANSETRON 2 MG/ML
INJECTION INTRAMUSCULAR; INTRAVENOUS AS NEEDED
Status: DISCONTINUED | OUTPATIENT
Start: 2018-02-23 | End: 2018-02-23 | Stop reason: SURG

## 2018-02-23 RX ORDER — OXYCODONE HYDROCHLORIDE AND ACETAMINOPHEN 5; 325 MG/1; MG/1
1 TABLET ORAL EVERY 4 HOURS PRN
Status: DISCONTINUED | OUTPATIENT
Start: 2018-02-23 | End: 2018-02-27

## 2018-02-23 RX ORDER — OXYCODONE HYDROCHLORIDE 10 MG/1
10 TABLET ORAL EVERY 4 HOURS PRN
Status: DISCONTINUED | OUTPATIENT
Start: 2018-02-23 | End: 2018-02-28 | Stop reason: HOSPADM

## 2018-02-23 RX ORDER — PROPOFOL 10 MG/ML
INJECTION, EMULSION INTRAVENOUS AS NEEDED
Status: DISCONTINUED | OUTPATIENT
Start: 2018-02-23 | End: 2018-02-23 | Stop reason: SURG

## 2018-02-23 RX ORDER — SENNOSIDES 8.6 MG
1 TABLET ORAL DAILY
Status: DISCONTINUED | OUTPATIENT
Start: 2018-02-24 | End: 2018-02-28 | Stop reason: HOSPADM

## 2018-02-23 RX ORDER — FENTANYL CITRATE 50 UG/ML
INJECTION, SOLUTION INTRAMUSCULAR; INTRAVENOUS AS NEEDED
Status: DISCONTINUED | OUTPATIENT
Start: 2018-02-23 | End: 2018-02-23 | Stop reason: SURG

## 2018-02-23 RX ORDER — ONDANSETRON 2 MG/ML
4 INJECTION INTRAMUSCULAR; INTRAVENOUS ONCE AS NEEDED
Status: DISCONTINUED | OUTPATIENT
Start: 2018-02-23 | End: 2018-02-23 | Stop reason: HOSPADM

## 2018-02-23 RX ORDER — ACETAMINOPHEN 325 MG/1
650 TABLET ORAL EVERY 6 HOURS PRN
Status: DISCONTINUED | OUTPATIENT
Start: 2018-02-23 | End: 2018-02-28 | Stop reason: HOSPADM

## 2018-02-23 RX ORDER — BUPIVACAINE HYDROCHLORIDE 5 MG/ML
INJECTION, SOLUTION EPIDURAL; INTRACAUDAL AS NEEDED
Status: DISCONTINUED | OUTPATIENT
Start: 2018-02-23 | End: 2018-02-23 | Stop reason: HOSPADM

## 2018-02-23 RX ORDER — HYDROCHLOROTHIAZIDE 12.5 MG/1
12.5 TABLET ORAL DAILY
Status: DISCONTINUED | OUTPATIENT
Start: 2018-02-24 | End: 2018-02-28 | Stop reason: HOSPADM

## 2018-02-23 RX ORDER — METOPROLOL TARTRATE 5 MG/5ML
INJECTION INTRAVENOUS AS NEEDED
Status: DISCONTINUED | OUTPATIENT
Start: 2018-02-23 | End: 2018-02-23 | Stop reason: SURG

## 2018-02-23 RX ORDER — ROCURONIUM BROMIDE 10 MG/ML
INJECTION, SOLUTION INTRAVENOUS AS NEEDED
Status: DISCONTINUED | OUTPATIENT
Start: 2018-02-23 | End: 2018-02-23 | Stop reason: SURG

## 2018-02-23 RX ORDER — ATORVASTATIN CALCIUM 40 MG/1
40 TABLET, FILM COATED ORAL EVERY EVENING
Status: DISCONTINUED | OUTPATIENT
Start: 2018-02-23 | End: 2018-02-28 | Stop reason: HOSPADM

## 2018-02-23 RX ORDER — LIDOCAINE HYDROCHLORIDE 10 MG/ML
INJECTION, SOLUTION INFILTRATION; PERINEURAL AS NEEDED
Status: DISCONTINUED | OUTPATIENT
Start: 2018-02-23 | End: 2018-02-23 | Stop reason: SURG

## 2018-02-23 RX ORDER — ONDANSETRON 2 MG/ML
4 INJECTION INTRAMUSCULAR; INTRAVENOUS EVERY 6 HOURS PRN
Status: DISCONTINUED | OUTPATIENT
Start: 2018-02-23 | End: 2018-02-28 | Stop reason: HOSPADM

## 2018-02-23 RX ORDER — DEXMEDETOMIDINE HYDROCHLORIDE 100 UG/ML
INJECTION, SOLUTION INTRAVENOUS AS NEEDED
Status: DISCONTINUED | OUTPATIENT
Start: 2018-02-23 | End: 2018-02-23 | Stop reason: SURG

## 2018-02-23 RX ORDER — LISINOPRIL 20 MG/1
20 TABLET ORAL DAILY
Status: DISCONTINUED | OUTPATIENT
Start: 2018-02-24 | End: 2018-02-28 | Stop reason: HOSPADM

## 2018-02-23 RX ORDER — HEPARIN SODIUM 5000 [USP'U]/ML
5000 INJECTION, SOLUTION INTRAVENOUS; SUBCUTANEOUS ONCE
Status: COMPLETED | OUTPATIENT
Start: 2018-02-23 | End: 2018-02-23

## 2018-02-23 RX ORDER — ASPIRIN 81 MG/1
81 TABLET, CHEWABLE ORAL DAILY
Status: DISCONTINUED | OUTPATIENT
Start: 2018-02-24 | End: 2018-02-28 | Stop reason: HOSPADM

## 2018-02-23 RX ORDER — SODIUM CHLORIDE 9 MG/ML
100 INJECTION, SOLUTION INTRAVENOUS CONTINUOUS
Status: DISCONTINUED | OUTPATIENT
Start: 2018-02-23 | End: 2018-02-25

## 2018-02-23 RX ORDER — ALBUMIN, HUMAN INJ 5% 5 %
SOLUTION INTRAVENOUS CONTINUOUS PRN
Status: DISCONTINUED | OUTPATIENT
Start: 2018-02-23 | End: 2018-02-23 | Stop reason: SURG

## 2018-02-23 RX ORDER — HYDROMORPHONE HYDROCHLORIDE 2 MG/ML
INJECTION, SOLUTION INTRAMUSCULAR; INTRAVENOUS; SUBCUTANEOUS AS NEEDED
Status: DISCONTINUED | OUTPATIENT
Start: 2018-02-23 | End: 2018-02-23 | Stop reason: SURG

## 2018-02-23 RX ORDER — DOCUSATE SODIUM 100 MG/1
100 CAPSULE, LIQUID FILLED ORAL 2 TIMES DAILY
Status: DISCONTINUED | OUTPATIENT
Start: 2018-02-23 | End: 2018-02-28 | Stop reason: HOSPADM

## 2018-02-23 RX ORDER — SODIUM CHLORIDE, SODIUM LACTATE, POTASSIUM CHLORIDE, CALCIUM CHLORIDE 600; 310; 30; 20 MG/100ML; MG/100ML; MG/100ML; MG/100ML
50 INJECTION, SOLUTION INTRAVENOUS CONTINUOUS
Status: DISCONTINUED | OUTPATIENT
Start: 2018-02-23 | End: 2018-02-23

## 2018-02-23 RX ADMIN — ROCURONIUM BROMIDE 10 MG: 10 INJECTION INTRAVENOUS at 14:03

## 2018-02-23 RX ADMIN — HYDROMORPHONE HYDROCHLORIDE 0.5 MG: 2 INJECTION, SOLUTION INTRAMUSCULAR; INTRAVENOUS; SUBCUTANEOUS at 13:06

## 2018-02-23 RX ADMIN — NEOSTIGMINE METHYLSULFATE 3 MG: 1 INJECTION, SOLUTION INTRAMUSCULAR; INTRAVENOUS; SUBCUTANEOUS at 15:01

## 2018-02-23 RX ADMIN — SODIUM CHLORIDE, POTASSIUM CHLORIDE, SODIUM LACTATE AND CALCIUM CHLORIDE 125 ML/HR: 600; 310; 30; 20 INJECTION, SOLUTION INTRAVENOUS at 11:50

## 2018-02-23 RX ADMIN — CEFAZOLIN SODIUM 1000 MG: 1 SOLUTION INTRAVENOUS at 12:40

## 2018-02-23 RX ADMIN — DEXMEDETOMIDINE HYDROCHLORIDE 8 MCG: 100 INJECTION, SOLUTION INTRAVENOUS at 13:59

## 2018-02-23 RX ADMIN — HYDROMORPHONE HYDROCHLORIDE 0.5 MG: 2 INJECTION, SOLUTION INTRAMUSCULAR; INTRAVENOUS; SUBCUTANEOUS at 14:51

## 2018-02-23 RX ADMIN — SODIUM CHLORIDE 100 ML/HR: 0.9 INJECTION, SOLUTION INTRAVENOUS at 17:37

## 2018-02-23 RX ADMIN — HYDROMORPHONE HYDROCHLORIDE 0.2 MG: 1 INJECTION, SOLUTION INTRAMUSCULAR; INTRAVENOUS; SUBCUTANEOUS at 16:13

## 2018-02-23 RX ADMIN — ONDANSETRON 4 MG: 2 INJECTION INTRAMUSCULAR; INTRAVENOUS at 14:04

## 2018-02-23 RX ADMIN — METOPROLOL TARTRATE 1 MG: 5 INJECTION INTRAVENOUS at 14:07

## 2018-02-23 RX ADMIN — FENTANYL CITRATE 50 MCG: 50 INJECTION, SOLUTION INTRAMUSCULAR; INTRAVENOUS at 13:01

## 2018-02-23 RX ADMIN — DEXMEDETOMIDINE HYDROCHLORIDE 8 MCG: 100 INJECTION, SOLUTION INTRAVENOUS at 13:47

## 2018-02-23 RX ADMIN — HYDROMORPHONE HYDROCHLORIDE 0.2 MG: 1 INJECTION, SOLUTION INTRAMUSCULAR; INTRAVENOUS; SUBCUTANEOUS at 16:21

## 2018-02-23 RX ADMIN — DEXMEDETOMIDINE HYDROCHLORIDE 8 MCG: 100 INJECTION, SOLUTION INTRAVENOUS at 13:53

## 2018-02-23 RX ADMIN — METOPROLOL TARTRATE 1 MG: 5 INJECTION INTRAVENOUS at 14:04

## 2018-02-23 RX ADMIN — HYDROMORPHONE HYDROCHLORIDE 0.5 MG: 2 INJECTION, SOLUTION INTRAMUSCULAR; INTRAVENOUS; SUBCUTANEOUS at 15:07

## 2018-02-23 RX ADMIN — DOCUSATE SODIUM 100 MG: 100 CAPSULE, LIQUID FILLED ORAL at 20:43

## 2018-02-23 RX ADMIN — LIDOCAINE HYDROCHLORIDE 50 MG: 10 INJECTION, SOLUTION INFILTRATION; PERINEURAL at 12:37

## 2018-02-23 RX ADMIN — SODIUM CHLORIDE, POTASSIUM CHLORIDE, SODIUM LACTATE AND CALCIUM CHLORIDE: 600; 310; 30; 20 INJECTION, SOLUTION INTRAVENOUS at 12:33

## 2018-02-23 RX ADMIN — DEXAMETHASONE SODIUM PHOSPHATE 10 MG: 10 INJECTION INTRAMUSCULAR; INTRAVENOUS at 12:55

## 2018-02-23 RX ADMIN — FENTANYL CITRATE 50 MCG: 50 INJECTION, SOLUTION INTRAMUSCULAR; INTRAVENOUS at 12:37

## 2018-02-23 RX ADMIN — HYDROMORPHONE HYDROCHLORIDE 0.4 MG: 1 INJECTION, SOLUTION INTRAMUSCULAR; INTRAVENOUS; SUBCUTANEOUS at 17:28

## 2018-02-23 RX ADMIN — PROPOFOL 150 MG: 10 INJECTION, EMULSION INTRAVENOUS at 12:37

## 2018-02-23 RX ADMIN — GLYCOPYRROLATE 0.5 MG: 0.2 INJECTION, SOLUTION INTRAMUSCULAR; INTRAVENOUS at 15:01

## 2018-02-23 RX ADMIN — GLYCOPYRROLATE 0.2 MG: 0.2 INJECTION, SOLUTION INTRAMUSCULAR; INTRAVENOUS at 13:28

## 2018-02-23 RX ADMIN — HYDROMORPHONE HYDROCHLORIDE 0.5 MG: 2 INJECTION, SOLUTION INTRAMUSCULAR; INTRAVENOUS; SUBCUTANEOUS at 15:03

## 2018-02-23 RX ADMIN — ROCURONIUM BROMIDE 40 MG: 10 INJECTION INTRAVENOUS at 12:37

## 2018-02-23 RX ADMIN — HYDROMORPHONE HYDROCHLORIDE 0.4 MG: 1 INJECTION, SOLUTION INTRAMUSCULAR; INTRAVENOUS; SUBCUTANEOUS at 17:50

## 2018-02-23 RX ADMIN — ALBUMIN HUMAN: 0.05 INJECTION, SOLUTION INTRAVENOUS at 13:24

## 2018-02-23 RX ADMIN — HYDROMORPHONE HYDROCHLORIDE 0.2 MG: 1 INJECTION, SOLUTION INTRAMUSCULAR; INTRAVENOUS; SUBCUTANEOUS at 17:00

## 2018-02-23 RX ADMIN — DEXMEDETOMIDINE HYDROCHLORIDE 8 MCG: 100 INJECTION, SOLUTION INTRAVENOUS at 14:03

## 2018-02-23 RX ADMIN — SODIUM CHLORIDE, SODIUM LACTATE, POTASSIUM CHLORIDE, AND CALCIUM CHLORIDE 50 ML/HR: .6; .31; .03; .02 INJECTION, SOLUTION INTRAVENOUS at 16:55

## 2018-02-23 RX ADMIN — HYDROMORPHONE HYDROCHLORIDE 0.2 MG: 1 INJECTION, SOLUTION INTRAMUSCULAR; INTRAVENOUS; SUBCUTANEOUS at 15:58

## 2018-02-23 RX ADMIN — ATORVASTATIN CALCIUM 40 MG: 40 TABLET, FILM COATED ORAL at 20:43

## 2018-02-23 RX ADMIN — HEPARIN SODIUM 5000 UNITS: 5000 INJECTION, SOLUTION INTRAVENOUS; SUBCUTANEOUS at 12:40

## 2018-02-23 RX ADMIN — ROCURONIUM BROMIDE 10 MG: 10 INJECTION INTRAVENOUS at 13:26

## 2018-02-23 RX ADMIN — FENTANYL CITRATE 50 MCG: 50 INJECTION, SOLUTION INTRAMUSCULAR; INTRAVENOUS at 15:29

## 2018-02-23 NOTE — OP NOTE
OPERATIVE REPORT  PATIENT NAME: Sondra Chandler    :  1987  MRN: [id number]  Pt Location: [facility] OR ROOM 10    SURGERY DATE: 2018    Surgeon(s) and Role:     * Shelley Lama MD - Primary     * Mi Marroquin MD - Assisting     * Niles Lawson MD - Assisting    Preop Diagnosis:  Ventral hernia [K43 9]    Post-Op Diagnosis Codes: * Ventral hernia [K43 9]    Procedure(s) (LRB):  lysis of adhesions; INCISIONAL HERNIA REPAIR WITH MESH (N/A)    Specimen(s):  * No specimens in log *    Estimated Blood Loss:   Minimal    Drains: DILLON x1 above fascia       Anesthesia Type:   General    Operative Indications:  Ventral hernia [K43 9]      Operative Findings:  Multiple incisional herniae along midline; "swiss cheese" fascia  Total hernia size measuring 18 cm x 15 cm  Complications:   None    Procedure and Technique: This is a 67year old male with a history of multiple abdominal surgeries with a large, symptomatic incisional hernia who presents for open repair  All risks, benefits and alternatives were explained to the pt and she agreed with the plan of care  Pt was identified by armband and verbal communication and brought back to the OR  He was induced by general anesthesia and intubated via endotracheal intubation  His abdomen was prepped with chloroprep and draped in the usual sterile fashion  A timeout was called to review the procedure and details  Antibiotics were administered  A vertical midline incision was made with a 15 blade scalpel in the upper abdomen  The subcutaneous tissues were care fully divided sharply with a knife  Small bowel was noted just below the skin and carefully taken down sharply with Metzenbaum scissors  There were multiple loose adhesions between the small bowel and the fascia  These were taken down using combination sharp dissection with Metzenbaum scissors and Bovie electric cautery  The fascia was inspected and there were multiple herniae throughout    The fascia was freed up 360° all the way around  The intra-abdominal contents were freed up off the overlying fascia using combination of sharp and electrocautery methods  Once the intra-abdominal contents were freed up off the fascia below, we proceeded to free the fascia off of the subcutaneous tissues  This is performed using Bovie electrocautery  At this point, the hernia was measured including the smaller hernias above and below for a total distance of 18 cm x 15 cm  The abdomen was irrigated  Hemostasis was achieved  At this point, decision was made to place a mesh  A Ventrio hernia patch measuring 19 6 x 24 6 cm was used  This was sutured into place using interrupted 0 Prolene sutures U stitches  Once the hernia mesh was sutured into place, the overlying fascia was closed over the patch  This was performed using 0 Prolene sutures interrupted figure-of-eight fashion  A 10 Mongolian flat Osvaldo-Evans drain was then placed in the subcutaneous space coming out on the left side  The skin was then cut back and subsequently closed with staples  Pt was then awoken from general anesthesia and brought to PACU in stable condition  All instrument and sponge counts were correct at the conclusion of the case  RF scanning was negative  Dr Melissa Mesa was present for the entire operation         Patient Disposition:  PACU     SIGNATURE: Mino Ledbetter MD  DATE: February 23, 2018  TIME: 3:10 PM

## 2018-02-23 NOTE — ANESTHESIA PREPROCEDURE EVALUATION
Review of Systems/Medical History          Cardiovascular  Hyperlipidemia, Hypertension , Past MI , CAD, , Cardiac stents    Comment: MI in 2000 with follow up cath in 2005 which showed a patent stent with nonobstructive disease  He also had a pharmacological stress test in Jan 2015 which was normal with artifact in inferior wall      Palpitations- Resolved  Normal Holter  symptoms of heart flutter correlated with sinus rhythm      Hx AAA repair          ,  Pulmonary  Smoker ex-smoker  ,        GI/Hepatic    GERD ,  Hiatal hernia,   Comment:   Large ventral hernia , hiatal hernia          Endo/Other     GYN       Hematology   Musculoskeletal       Neurology      Comment: congenital limb defect Psychology           Physical Exam    Airway    Mallampati score: II         Dental   No notable dental hx     Cardiovascular      Pulmonary      Other Findings        Anesthesia Plan  ASA Score- 3     Anesthesia Type- general with ASA Monitors  Additional Monitors:   Airway Plan: ETT  Comment: I, Dr Marlin Qiu, the attending physician, have personally seen and evaluated the patient prior to anesthetic care  I have reviewed the pre-anesthetic record, and other medical records if appropriate to the anesthetic care  If a CRNA is involved in the case, I have reviewed the CRNA assessment, if present, and agree  The patient is in a suitable condition to proceed with my formulated anesthetic plan        Plan Factors-    Induction- intravenous  Postoperative Plan-     Informed Consent- Anesthetic plan and risks discussed with patient  I personally reviewed this patient with the CRNA  Discussed and agreed on the Anesthesia Plan with the CRNA  Rj Treviño

## 2018-02-23 NOTE — H&P
[facility]      Katie Santana MD   General Surgery    Consults        Assessment     1  Ventral hernia (863 20) (K43 9)     Plan  Ventral hernia    · General Surgery Follow Up Evaluation and Treatment  Follow-up  Status: Hold For -Scheduling  Requested for: [id number]   Ordered;: Ventral hernia; Ordered By: Linda Sterling Performed:  Due: [id number]     Discussion/Summary  Discussion Summary:   68 y/o M with a hx of a AAA roughly 8 years ago complicated by an abdominal fascial dehiscence  Has had a chronic ventral hernia for several years which never bothered him much however lately his hernia has been causing pain and discomfort an negatively impacting his life  He is presenting for ventral hernia repair  He understands the risks and benefits of surgery and would like to proceed with surgical repair of his ventral hernia  Chief Complaint  Chief Complaint Free Text Note Form: I am here for my hernia my baby      History of Present Illness  HPI: Pt has a hx of a AAA roughly 8 years ago preformed by Dr Blaine Costello, post operatively he developed an abdominal dehiscence with subsequent return to the operating room for repair  Since then he has been doing well  He has had an abd ventral hernia for several years which never bothered him much  Lately however he has been experiencing abd pain at his hernia site  In addition he is unable to completely bend over due to the pain, discomfort and physical hindrance  In the past the hernia would completely reduce into his abdomen however now when he lies on his back the hernia no longer completely goes back into his abd  He denies any skin changes or ulceration  he is presenting today inquiring about his options for repair  He was recently seen by his cardiologist and was told there are no active issues but he has a family hx of cardiac disease  Hospital Based Practices Required Assessment:  Pain Assessment  the patient states they do not have pain   (on a scale of 0 to 10, the patient rates the pain at 0 )   Prefered Language is  english  Primary Language is  english  Review of Systems  ROS Reviewed:   ROS reviewed  Active Problems     1  Arteriosclerotic coronary artery disease (414 00) (I25 10)   2  Bursitis of left shoulder (726 10) (M75 52)   3  Callus of foot (700) (L84)   4  Chest pain (786 50) (R07 9)   5  Chronic Reflux Esophagitis   6  Degenerative arthritis of left knee (715 96) (M17 12)   7  Degenerative joint disease of left hip (715 95) (M16 12)   8  Dizziness (780 4) (R42)   9  Exertional shortness of breath (786 05) (R06 02)   10  Foreign bodies   6  Hyperlipidemia (272 4) (E78 5)   12  Hypertension (401 9) (I10)   13  Musculoskeletal thigh pain, left (729 5) (M79 652)   14  Need for pneumococcal vaccine (V03 82) (Z23)   15  Need for prophylactic vaccination and inoculation against influenza (V04 81) (Z23)   16  Non-healing open wound of right groin (879 5) (S31 103A)   17  Non-healing open wound of right groin, subsequent encounter (V58 89,879 5)  (S31 103D)   18  Onychomycosis (110 1) (B35 1)   19  Osteoarthritis (715 90) (M19 90)   20  Pain in eye, unspecified laterality (379 91) (H57 10)   21  Pain in limb (729 5) (M79 609)   22  Palpitations (785 1) (R00 2)   23  Peripheral vascular disease (443 9) (I73 9)   24  Postoperative examination (V67 00) (Z09)   25  Preop general physical exam (V72 83) (Z01 818)   26  Screening for thyroid disorder (V77 0) (Z13 29)   27  Serrated adenoma of colon (211 3) (D12 6)   28  Skin nodule (782 2) (R22 9)   29  Tubular adenoma of colon (211 3) (D12 6)   30  Ventral hernia (553 20) (K43 9)   31  Verrucous skin lesion (078 10) (B07 9)   32  Vertigo (780 4) (R42)   33  Zoster ophthalmicus (053 20) (B02 30)     Past Medical History  1  History of Abscess of groin (682 2) (L02 214)   2  History of Candidiasis, cutaneous (112 3) (B37 2)   3  History of Coronary Artery Disease (V12 59)   4   History of Dyslipidemia (272 4) (E78 5)   5  History of Esophageal ulcer without bleeding (530 20) (K22 10)   6  History of abdominal aortic aneurysm (V12 59) (Z86 79)   7  History of gastritis (V12 79) (Z87 19)   8  History of hiatal hernia (V12 79) (Z87 19)   9  History of left cataract surgery (V45 61) (Z98 42)   10  Need for prophylactic vaccination and inoculation against influenza (V04 81) (Z23)   11  Old myocardial infarction (412) (I25 2)   12  History of Recurrent Right Inguinal Hernia (550 91)   13  History of Recurrent Right Inguinal Hernia (550 91)   14  History of Right inguinal hernia (550 90) (K40 90)   15  History of Skin Wound In The Groin Right     Surgical History     1  History of Appendectomy   2  History of Cath Placement Of Stent 2   3  History of Diagnostic Cystoscopy   4  History of Exploratory Laparotomy   5  History of Hernia Repair Inguinal Unilateral   6  History of Hip Surgery Right   7  History of Orchiectomy Right   8  History of Surgery For Abdominal Aortic Aneurysm  Surgical History Reviewed: The surgical history was reviewed and updated today  Family History  Mother    1  Family history of cardiac disorder (V17 49) (Z82 49)   2  Family history of diabetes mellitus (V18 0) (Z83 3)  Father    3  Family history of cardiac disorder (V17 49) (Z82 49)  Family History Reviewed: The family history was reviewed and updated today  Social History      · Former smoker (V05 65) (O13 206)  Social History Reviewed: The social history was reviewed and updated today  The social history was reviewed and is unchanged  Current Meds   1  Aspirin 81 MG Oral Tablet Delayed Release; TAKE 1 TABLET DAILY; Therapy: 09Suf0522 to (Evaluate:60Men5914)  Requested for: 80DLY7116; Last MX:29WZH4677 Ordered   2  Atorvastatin Calcium 40 MG Oral Tablet; TAKE 1 TABLET Bedtime; Therapy: 44NAO0699 to (Evaluate:00Cyo4064)  Requested for: 14LOJ6322; Last LH:58TZG3922 Ordered   3   HydroCHLOROthiazide 12 5 MG Oral Capsule; take 2 capsule by mouth once daily; Therapy: 66AYS1512 to (Evaluate:18Jun2018)  Requested for: 43FJE2090; Last RV:82BLN7382 Ordered   4  Lisinopril 20 MG Oral Tablet; TAKE 1 TABLET DAILY AS DIRECTED; Therapy: 47YCR0007 to (Evaluate:18Jun2018)  Requested for: 01UMT0170; Last QI:28XFV7054 Ordered   5  Metoprolol Succinate ER 50 MG Oral Tablet Extended Release 24 Hour; TAKE 1 TABLET DAILY; Therapy: 59XSA3577 to (Evaluate:18Jun2018)  Requested for: 40PFU6500; Last DQ:93JJW7969 Ordered   6  RaNITidine HCl - 150 MG Oral Capsule; Therapy: (Recorded:07Mar2016) to Recorded  Medication List Reviewed: The medication list was reviewed and updated today  Allergies  1  No Known Drug Allergies     Vitals  Vital Signs         Physical Exam   Constitutional  General appearance: No acute distress, well appearing and well nourished  Pulmonary  Respiratory effort: No increased work of breathing or signs of respiratory distress  Auscultation of lungs: Clear to auscultation, equal breath sounds bilaterally, no wheezes, no rales, no rhonci  Cardiovascular  Auscultation of heart: Normal rate and rhythm, normal S1 and S2, without murmurs  Examination of extremities for edema and/or varicosities: Normal    Abdomen Very large ventral hernia, fully reduced at bedside  There is no overlying skin changes, no ulceration  Defect borders are palpable  Skin  Skin and subcutaneous tissue: Normal without rashes or lesions

## 2018-02-23 NOTE — ANESTHESIA POSTPROCEDURE EVALUATION
Post-Op Assessment Note      CV Status:  Stable    Mental Status:  Alert and awake    Hydration Status:  Euvolemic    PONV Controlled:  Controlled    Airway Patency:  Patent    Post Op Vitals Reviewed: Yes          Staff: CRNA, Anesthesiologist           BP (!) 171/68 (02/23/18 1527)    Temp (!) 97 3 °F (36 3 °C) (02/23/18 1527)    Pulse 85 (02/23/18 1527)   Resp 14 (02/23/18 1527)    SpO2 97 % (02/23/18 1527)

## 2018-02-23 NOTE — PROGRESS NOTES
Progress Note - General Surgey  Rc Nolan 67 y o  male MRN: [id number]  Unit/Bed#: PACU 05 Encounter: [id number]    Assessment:  67y o -year-old male with incisional hernia    Plan:  1  Incisional hernia repair of abdomen repair w/ mesh and lysis of adhesions 2/23 ([place])   - clear liquids   - abdominal binder   - pain control   - nausea control   - IS   - OOB/ambulate   - shower in AM OK    2  Stage 1 sacral decubitus ulcer   - present on admission   - OOB/ambulate early   - offload as able    3  Coronary artery disease   - continue home aspirin    4  Hyperlipidemia   - continue home statin    5  Hypertension   - continue home HCTZ, lisinopril, metoprolol    6  GERD    - continue home ranitidine as pepcid    7  DVT Prophylaxis   - Lovenox tomorrow AM   - SCDs    8  Disposition   - this operation was much more extensive than predicted as the abdominal hernia defect turned out to be much larger than estimated pre-operatively; this defect ultimately required a large midline abdominal incision with a large mesh    - given the extensive nature of this operation and high likelihood for postoperative pain, will have patent stay in hospital as an outpatient to observe for adequate pain control and to advance diet slowly   - PT/OT   - CM    Mikala Siddiqui MD PGY-4  3:33 PM  02/23/18      Subjective:  Patient s/p repair of abdominal midline incisional hernia  No complaints      Objective:  Patient Vitals for the past 24 hrs:   BP Temp Temp src Pulse Resp SpO2 Height Weight   02/23/18 1527 (!) 171/68 (!) 97 3 °F (36 3 °C) - 85 14 97 % - -   02/23/18 1155 - - - - - - 5' 5" (1 651 m) 70 3 kg (155 lb)   02/23/18 1136 121/65 99 5 °F (37 5 °C) Tymp Core 61 16 95 % - -     Intake/Output Summary (Last 24 hours) at 02/23/18 1533  Last data filed at 02/23/18 1509   Gross per 24 hour   Intake             1300 ml   Output                0 ml   Net             1300 ml        Physical Exam:  General: NAD  Cardiovascular: RRR  Respiratory: breath sounds b/l  Abdomen: soft, NT, midline incision w/ mepilex  Extremities: no edema, stage 1 sacral decubitus ulcer    Medications:    Current Facility-Administered Medications:  HYDROmorphone 0 2 mg Intravenous Q5 Min PRN Elex Denisa, CRNA    lactated ringers 125 mL/hr Intravenous Continuous Adrienne Cazares MD Last Rate: 125 mL/hr (02/23/18 1150)   lactated ringers 50 mL/hr Intravenous Continuous Radha Donna [place], CRNA    ondansetron 4 mg Intravenous Once PRN Elex Dneisa, CRNA      Facility-Administered Medications Ordered in Other Encounters:  albumin human   Continuous PRN Elex Denisa, CRNA Last Rate: Stopped (02/23/18 1351)   dexamethasone   PRN Elex Readlyn, CRNA    dexmedetomidine   PRN Elex Readlyn, CRNA    fentanyl citrate (PF)  Intravenous PRN Elex Readlyn, CRNA    glycopyrrolate  Intravenous PRN Elex Denisa, CRNA    HYDROmorphone (PF)   PRN Elex Readlyn, CRNA    lidocaine   PRN Elex Readlyn, CRNA    metoprolol   PRN Elex Denisa, CRNA    neostigmine  Intravenous PRN Elex Readlyn, CRNA    ondansetron   PRN Elex Readlyn, CRNA    propofol  Intravenous PRN Elex Denisa, CRNA    rocuronium   PRN Elex Readlyn, CRNA      lactated ringers 125 mL/hr Last Rate: 125 mL/hr (02/23/18 1150)   lactated ringers 50 mL/hr      HYDROmorphone 0 2 mg Q5 Min PRN   ondansetron 4 mg Once PRN     Laboratory results:   CBC: No results found for: WBC, HGB, HCT, MCV, PLT, ADJUSTEDWBC, MCH, MCHC, RDW, MPV, NRBC, CMP: No results found for: NA, K, CL, CO2, ANIONGAP, BUN, CREATININE, GLUCOSE, CALCIUM, AST, ALT, ALKPHOS, PROT, ALBUMIN, BILITOT, EGFR, Coagulation: No results found for: PT, INR, APTT, Urinalysis: No results found for: Sherrie Cheese, SPECGRAV, PHUR, LEUKOCYTESUR, NITRITE, PROTEINUA, GLUCOSEU, KETONESU, BILIRUBINUR, BLOODU, Amylase: No results found for: AMYLASE, Lipase: No results found for: LIPASE    VTE Pharmacologic Prophylaxis: Enoxaparin (Lovenox)  VTE Mechanical Prophylaxis: sequential compression device

## 2018-02-24 LAB
ANION GAP SERPL CALCULATED.3IONS-SCNC: 9 MMOL/L (ref 4–13)
BASOPHILS # BLD MANUAL: 0 THOUSAND/UL (ref 0–0.1)
BASOPHILS NFR MAR MANUAL: 0 % (ref 0–1)
BUN SERPL-MCNC: 30 MG/DL (ref 5–25)
CALCIUM SERPL-MCNC: 8.4 MG/DL (ref 8.3–10.1)
CHLORIDE SERPL-SCNC: 103 MMOL/L (ref 100–108)
CO2 SERPL-SCNC: 26 MMOL/L (ref 21–32)
CREAT SERPL-MCNC: 1.04 MG/DL (ref 0.6–1.3)
EOSINOPHIL # BLD MANUAL: 0 THOUSAND/UL (ref 0–0.4)
EOSINOPHIL NFR BLD MANUAL: 0 % (ref 0–6)
ERYTHROCYTE [DISTWIDTH] IN BLOOD BY AUTOMATED COUNT: 13.1 % (ref 11.6–15.1)
GFR SERPL CREATININE-BSD FRML MDRD: 71 ML/MIN/1.73SQ M
GLUCOSE SERPL-MCNC: 127 MG/DL (ref 65–140)
GLUCOSE SERPL-MCNC: 97 MG/DL (ref 65–140)
HCT VFR BLD AUTO: 37.5 % (ref 36.5–49.3)
HGB BLD-MCNC: 12.9 G/DL (ref 12–17)
LYMPHOCYTES # BLD AUTO: 0.23 THOUSAND/UL (ref 0.6–4.47)
LYMPHOCYTES # BLD AUTO: 2 % (ref 14–44)
MCH RBC QN AUTO: 30 PG (ref 26.8–34.3)
MCHC RBC AUTO-ENTMCNC: 34.4 G/DL (ref 31.4–37.4)
MCV RBC AUTO: 87 FL (ref 82–98)
MONOCYTES # BLD AUTO: 0.35 THOUSAND/UL (ref 0–1.22)
MONOCYTES NFR BLD: 3 % (ref 4–12)
NEUTROPHILS # BLD MANUAL: 11.03 THOUSAND/UL (ref 1.85–7.62)
NEUTS BAND NFR BLD MANUAL: 1 % (ref 0–8)
NEUTS SEG NFR BLD AUTO: 94 % (ref 43–75)
NRBC BLD AUTO-RTO: 0 /100 WBCS
PLATELET # BLD AUTO: 216 THOUSANDS/UL (ref 149–390)
PLATELET BLD QL SMEAR: ADEQUATE
PMV BLD AUTO: 9.7 FL (ref 8.9–12.7)
POTASSIUM SERPL-SCNC: 3.7 MMOL/L (ref 3.5–5.3)
RBC # BLD AUTO: 4.3 MILLION/UL (ref 3.88–5.62)
SODIUM SERPL-SCNC: 138 MMOL/L (ref 136–145)
WBC # BLD AUTO: 11.61 THOUSAND/UL (ref 4.31–10.16)

## 2018-02-24 PROCEDURE — 85027 COMPLETE CBC AUTOMATED: CPT | Performed by: SURGERY

## 2018-02-24 PROCEDURE — 99024 POSTOP FOLLOW-UP VISIT: CPT | Performed by: SURGERY

## 2018-02-24 PROCEDURE — 82948 REAGENT STRIP/BLOOD GLUCOSE: CPT

## 2018-02-24 PROCEDURE — 85007 BL SMEAR W/DIFF WBC COUNT: CPT | Performed by: SURGERY

## 2018-02-24 PROCEDURE — 80048 BASIC METABOLIC PNL TOTAL CA: CPT | Performed by: SURGERY

## 2018-02-24 RX ADMIN — DOCUSATE SODIUM 100 MG: 100 CAPSULE, LIQUID FILLED ORAL at 08:40

## 2018-02-24 RX ADMIN — SODIUM CHLORIDE, SODIUM LACTATE, POTASSIUM CHLORIDE, AND CALCIUM CHLORIDE 1000 ML: .6; .31; .03; .02 INJECTION, SOLUTION INTRAVENOUS at 08:55

## 2018-02-24 RX ADMIN — LISINOPRIL 20 MG: 20 TABLET ORAL at 08:41

## 2018-02-24 RX ADMIN — SENNOSIDES 8.6 MG: 8.6 TABLET, FILM COATED ORAL at 08:41

## 2018-02-24 RX ADMIN — OXYCODONE HYDROCHLORIDE 10 MG: 10 TABLET ORAL at 04:08

## 2018-02-24 RX ADMIN — HYDROCHLOROTHIAZIDE 12.5 MG: 12.5 TABLET ORAL at 08:40

## 2018-02-24 RX ADMIN — SODIUM CHLORIDE 100 ML/HR: 0.9 INJECTION, SOLUTION INTRAVENOUS at 17:53

## 2018-02-24 RX ADMIN — ENOXAPARIN SODIUM 40 MG: 40 INJECTION SUBCUTANEOUS at 12:58

## 2018-02-24 RX ADMIN — OXYCODONE HYDROCHLORIDE AND ACETAMINOPHEN 1 TABLET: 5; 325 TABLET ORAL at 20:22

## 2018-02-24 RX ADMIN — SODIUM CHLORIDE 100 ML/HR: 0.9 INJECTION, SOLUTION INTRAVENOUS at 04:47

## 2018-02-24 RX ADMIN — OXYCODONE HYDROCHLORIDE 10 MG: 10 TABLET ORAL at 14:43

## 2018-02-24 RX ADMIN — DOCUSATE SODIUM 100 MG: 100 CAPSULE, LIQUID FILLED ORAL at 17:53

## 2018-02-24 RX ADMIN — ASPIRIN 81 MG 81 MG: 81 TABLET ORAL at 08:41

## 2018-02-24 RX ADMIN — ATORVASTATIN CALCIUM 40 MG: 40 TABLET, FILM COATED ORAL at 17:53

## 2018-02-24 RX ADMIN — METOPROLOL SUCCINATE 50 MG: 50 TABLET, EXTENDED RELEASE ORAL at 08:40

## 2018-02-24 RX ADMIN — FAMOTIDINE 20 MG: 20 TABLET, FILM COATED ORAL at 08:41

## 2018-02-24 NOTE — PROGRESS NOTES
[facility]   Post-Op Check Progress Note   Sylvester Pruitt 67 y o  male MRN: [id number]  Unit/Bed#: [facility] 459-30 Encounter: [id number]    Assessment and Plan:    16-year-old M with a ventral hernia status post ventral hernia repair with mesh on 2/23/18  Plan:   Clear liquid diet   Eupolemus@[email or web address] ml/hr   Document accurate I&Os   Oxycodone 5mg / 10mg & dilaudid P R N  for Analgesia  Encourage deep breathing and incentive spirometry use  PT/OT - out of bed / ambulate   SQH and Venodynes for DVT prophylaxis       Subjective/Objective     Subjective: Pt feels ok - c/o bursitis pain  Not passing gas yet and no BMs  Tolerating clears and stating he is very hungry  Currently on 3L nasal canula Pulling >1250 on IS     Objective:     Blood pressure 91/55, pulse 82, temperature 97 6 °F (36 4 °C), resp  rate 12, height 5' 5" (1 651 m), weight 70 3 kg (155 lb), SpO2 94 %  ,Body mass index is 25 79 kg/m²  Intake/Output Summary (Last 24 hours) at 02/1944  Last data filed at 02/23/18 1737   Gross per 24 hour   Intake             1550 ml   Output              150 ml   Net             1400 ml       Invasive Devices     Peripheral Intravenous Line            Peripheral IV 02/23/18 Left Hand less than 1 day          Drain            Closed/Suction Drain Anterior LLQ Bulb less than 1 day                Physical Exam:    GENERAL APPEARANCE: Patient in no acute distress  HEENT: NCAT; PERRL, EOMs intact; Mucous membranes moist  CV: Regular rate and rhythm; + S1, S2; no murmur/gallops/rubs appreciated  LUNGS: Clear to auscultation; no wheezes/rales/rhonci  On 3 L nasal canula   ABD: NABS; soft; non-distended; approprately -tender  Incision sites are CDI   DILLON drain with sanguinous fluid   SKIN: Warm, dry and well perfused; no rash; no jaundice       Goodell  2/23/2018

## 2018-02-24 NOTE — CASE MANAGEMENT
Initial Clinical Review    Admission: Date/Time/Statement: OP ELECTIVE SURGERY -- 2/23   CURRENTLY IN OP / NO CHARGE BED    History of Illness: Pt has a hx of a AAA roughly 8 years ago preformed by Dr Blaine Costello, post operatively he developed an abdominal dehiscence with subsequent return to the operating room for repair  Since then he has been doing well  He has had an abd ventral hernia for several years which never bothered him much  Lately however he has been experiencing abd pain at his hernia site  In addition he is unable to completely bend over due to the pain, discomfort and physical hindrance  In the past the hernia would completely reduce into his abdomen however now when he lies on his back the hernia no longer completely goes back into his abd  He denies any skin changes or ulceration  he is presenting today inquiring about his options for repair  He was recently seen by his cardiologist and was told there are no active issues but he has a family hx of cardiac disease         OPERATIVE REPORT     SURGERY DATE: 2/23/2018    Procedure(s) (LRB):  lysis of adhesions; INCISIONAL HERNIA REPAIR WITH MESH (N/A)      Drains: DILLON x1 above fascia      Anesthesia Type:   General     Operative Indications:  Ventral hernia [K43 9]      Operative Findings:  Multiple incisional herniae along midline; "swiss cheese" fascia  Total hernia size measuring 18 cm x 15 cm      Complications:   None      Vital Signs:  02/23 0701  02/24 0700 02/24 0701  02/24 1243  Most Recent     Temperature (°F) 97 399 5    98 2 (36 8)    Pulse 6192    92    Respirations 1220    20    Blood Pressure 91/55179/72    137/60    SpO2 (%) 9198    91        Abnormal Labs/Diagnostic Test Results:  2/24  Lab Units 02/24/18  0558   WBC Thousand/uL 11 61*   HEMOGLOBIN g/dL 12 9   HEMATOCRIT % 37 5   PLATELETS Thousands/uL 216         Past Medical/Surgical History:    Active Ambulatory Problems     Diagnosis Date Noted    Preoperative clearance 02/09/2018    Coronary artery disease 02/09/2018    Hypertension 02/09/2018     Past Medical History:   Diagnosis Date    Abdominal aortic aneurysm (AAA) (HCC)     Abscess of groin     Candidiasis, cutaneous     Coronary artery disease     Dyslipidemia     Esophageal ulcer without bleeding     Gastritis     Hiatal hernia     Hx of cataract surgery, left     Hyperlipidemia     Hypertension     Old myocardial infarction     Recurrent right inguinal hernia     Wound of skin        Admitting Diagnosis: Ventral hernia [K43 9]    Age/Sex: 67 y o  male    Assessment/Plan:   Assessment:  67y o -year-old male with incisional hernia     Plan:  1  Incisional hernia repair of abdomen repair w/ mesh and lysis of adhesions 2/23 ([place])              - clear liquids              - abdominal binder              - pain control              - nausea control              - IS              - OOB/ambulate              - shower in AM OK     2  Stage 1 sacral decubitus ulcer              - present on admission              - OOB/ambulate early              - offload as able     3  Coronary artery disease              - continue home aspirin     4  Hyperlipidemia              - continue home statin     5  Hypertension              - continue home HCTZ, lisinopril, metoprolol     6  GERD              - continue home ranitidine as pepcid     7  DVT Prophylaxis              - Lovenox tomorrow AM              - SCDs     8   Disposition              - this operation was much more extensive than predicted as the abdominal hernia defect turned out to be much larger than estimated pre-operatively; this defect ultimately required a large midline abdominal incision with a large mesh                       - given the extensive nature of this operation and high likelihood for postoperative pain, will have patent stay in hospital as an outpatient to observe for adequate pain control and to advance diet slowly              - PT/OT - CM    Admission Orders:  M/S unit  Up as tolerated  venodynes b/l le  Monitor I&O's  Incentive spirometry q1h wa  WBAT   NPO  PT/OT evals      Scheduled Meds:   Current Facility-Administered Medications:  acetaminophen 650 mg Oral Q6H PRN Miguelina Johnson MD    aspirin 81 mg Oral Daily Miguelina Johnson MD    atorvastatin 40 mg Oral QPM Miguelina Johnson MD    docusate sodium 100 mg Oral BID Miguelina Johnson MD    enoxaparin 40 mg Subcutaneous Daily Miguelina Johnson MD    famotidine 20 mg Oral Daily Miguelina Johnson MD    hydrochlorothiazide 12 5 mg Oral Daily Miguelina Johnson MD    HYDROmorphone 0 5 mg Intravenous Q3H PRN Miguelina Johnson MD    lactated ringers 125 mL/hr Intravenous Continuous Jones Soto MD Last Rate: Stopped (02/23/18 1653)   lisinopril 20 mg Oral Daily Miguelina Johnson MD    metoprolol succinate 50 mg Oral Daily Miguelina Johnson MD    ondansetron 4 mg Intravenous Q6H PRN Miguelina Johnson MD    oxyCODONE 10 mg Oral Q4H PRN Miguelina Johnson MD    oxyCODONE-acetaminophen 1 tablet Oral Q4H PRN Miguelina Johnson MD    senna 1 tablet Oral Daily Miguelina Johnson MD    sodium chloride 100 mL/hr Intravenous Continuous Miguelina Johnson MD Last Rate: 100 mL/hr (02/24/18 0447)     Continuous Infusions:   lactated ringers 125 mL/hr Last Rate: Stopped (02/23/18 1653)   sodium chloride 100 mL/hr Last Rate: 100 mL/hr (02/24/18 0447)     PRN Meds:   acetaminophen    HYDROmorphone    ondansetron    oxyCODONE    oxyCODONE-acetaminophen      2/24 --   Subjective: Pt states he is very hungry but he is belching  Has not passes any flatus  Didn't sleep much last night  Denies N/V but is distended   DILOLN drain with 424cc of sanguinous output over past 24 hours     Assessment and Plan:      Patient Active Problem List   Diagnosis    Preoperative clearance    Coronary artery disease    Hypertension         Assessment:   18-year-old M with a ventral hernia status post ventral hernia repair with mesh on 2/23/18      Plan:   Back diet down to sips of clears  due to distension  1L bolus for low urine output    Continue Gregg@[employer] ml/hr   Document accurate I&Os   Keep DILLON drain to bulb suction   Oxycodone 5mg / 10mg & dilaudid P R N  for Analgesia    Encourage deep breathing and incentive spirometry use  PT/OT - out of bed / ambulate   Continue Abd Anna Rideau for DVT prophylaxis     Scheduled Meds:  Current Facility-Administered Medications:  acetaminophen 650 mg Oral Q6H PRN Juan Miguel Koehler MD    aspirin 81 mg Oral Daily Juan Miguel Koehler MD    atorvastatin 40 mg Oral QPM Juan Miguel Koehler MD    docusate sodium 100 mg Oral BID Juan Miguel Koehler MD    enoxaparin 40 mg Subcutaneous Daily Juan Migule Koehler MD    famotidine 20 mg Oral Daily Juan Miguel Koehler MD    hydrochlorothiazide 12 5 mg Oral Daily Juan Miguel Koehler MD    HYDROmorphone 0 5 mg Intravenous Q3H PRN Juan Miguel Koehler MD    lactated ringers 125 mL/hr Intravenous Continuous Luis Fernando Glasgow MD Last Rate: Stopped (02/23/18 1653)   lisinopril 20 mg Oral Daily Juan Miguel Koehler MD    metoprolol succinate 50 mg Oral Daily Juan Miguel Koehler MD    ondansetron 4 mg Intravenous Q6H PRN Juan Miguel Koehler MD    oxyCODONE 10 mg Oral Q4H PRN Juan Miguel Koehler MD    oxyCODONE-acetaminophen 1 tablet Oral Q4H PRN Juan Miguel Koehler MD    senna 1 tablet Oral Daily Juan Miguel Koehler MD    sodium chloride 100 mL/hr Intravenous Continuous Juan Miguel Koehler MD Last Rate: 100 mL/hr (02/24/18 0447)     Continuous Infusions:  lactated ringers 125 mL/hr Last Rate: Stopped (02/23/18 1653)   sodium chloride 100 mL/hr Last Rate: 100 mL/hr (02/24/18 0447)     PRN Meds:   acetaminophen    HYDROmorphone    ondansetron    oxyCODONE 10 mg po x1    oxyCODONE-acetaminophen

## 2018-02-24 NOTE — PROGRESS NOTES
Progress Note - General Surgery   [facility] 67 y o  male MRN: [id number]  Unit/Bed#: [facility] 616-01 Encounter: [id number]    Assessment and Plan:  Patient Active Problem List   Diagnosis    Preoperative clearance    Coronary artery disease    Hypertension       Assessment:     69-year-old M with a ventral hernia status post ventral hernia repair with mesh on 2/23/18      Plan:   Back diet down to sips of clears  due to distension  1L bolus for low urine output    Continue [email or web address] ml/hr   Document accurate I&Os   Keep DILLON drain to bulb suction   Oxycodone 5mg / 10mg & dilaudid P R N  for Analgesia  Encourage deep breathing and incentive spirometry use  PT/OT - out of bed / ambulate   Continue Abd Binder   Lovenox and Venodynes for DVT prophylaxis     Subjective: Pt states he is very hungry but he is belching  Has not passes any flatus  Didn't sleep much last night  Denies N/V but is distended  DILLON drain with 424cc of sanguinous output over past 24 hours     Objective:       Vitals   Vitals:    02/23/18 1815 02/23/18 1952 02/23/18 2300 02/24/18 0641   BP: 91/55 140/63 167/81 137/60   BP Location:  Right arm  Right arm   Pulse: 82 90 90 92   Resp: 12 18 20 20   Temp:  97 5 °F (36 4 °C) 97 5 °F (36 4 °C) 98 2 °F (36 8 °C)   TempSrc:  Oral Oral Oral   SpO2: 94% 93% 93% 91%   Weight:  73 8 kg (162 lb 11 2 oz)     Height:  5' 8" (1 727 m)       Temp  Min: 97 3 °F (36 3 °C)  Max: 99 5 °F (37 5 °C)  IBW: 68 4 kg   Body mass index is 24 74 kg/m²      I/O   I/O       02/22 0701 - 02/23 0700 02/23 0701 - 02/24 0700 02/24 0701 - 02/25 0700    I V  (mL/kg)  2050 (27 8)     IV Piggyback  500     Total Intake(mL/kg)  2550 (34 6)     Urine (mL/kg/hr)  450     Drains  242     Total Output   692      Net   +1858                   Intake/Output Summary (Last 24 hours) at 02/24/18 0721  Last data filed at 02/24/18 0501   Gross per 24 hour   Intake             2550 ml   Output              692 ml   Net             1858 ml Invasive  Devices  Invasive Devices     Peripheral Intravenous Line            Peripheral IV 02/23/18 Left Hand less than 1 day          Drain            Closed/Suction Drain Anterior LLQ Bulb less than 1 day                Active medications  Scheduled Meds:    Current Facility-Administered Medications:  acetaminophen 650 mg Oral Q6H PRN Juan Miguel Koehler MD    aspirin 81 mg Oral Daily Juan Miguel Koehler MD    atorvastatin 40 mg Oral QPM Juan Miguel Koehler MD    docusate sodium 100 mg Oral BID Juan Miguel Koehler MD    enoxaparin 40 mg Subcutaneous Daily Juan Miguel Koehler MD    famotidine 20 mg Oral Daily Juan Miguel Koehler MD    hydrochlorothiazide 12 5 mg Oral Daily Juan Miguel Koehler MD    HYDROmorphone 0 5 mg Intravenous Q3H PRN Juan Miguel Koehler MD    lactated ringers 125 mL/hr Intravenous Continuous Luis Fernando Glasgow MD Last Rate: Stopped (02/23/18 1653)   lisinopril 20 mg Oral Daily Juan Miguel Koehler MD    metoprolol succinate 50 mg Oral Daily Juan Miguel Koehler MD    ondansetron 4 mg Intravenous Q6H PRN Juan Miguel Koehler MD    oxyCODONE 10 mg Oral Q4H PRN Juan Miguel Koehler MD    oxyCODONE-acetaminophen 1 tablet Oral Q4H PRN Juan Miguel Koehler MD    senna 1 tablet Oral Daily Juan Miguel Koehler MD    sodium chloride 100 mL/hr Intravenous Continuous Juan Miguel Koehler MD Last Rate: 100 mL/hr (02/24/18 0447)     Continuous Infusions:    lactated ringers 125 mL/hr Last Rate: Stopped (02/23/18 1653)   sodium chloride 100 mL/hr Last Rate: 100 mL/hr (02/24/18 0447)     PRN Meds:     acetaminophen 650 mg Q6H PRN   HYDROmorphone 0 5 mg Q3H PRN   ondansetron 4 mg Q6H PRN   oxyCODONE 10 mg Q4H PRN   oxyCODONE-acetaminophen 1 tablet Q4H PRN       Physical Exam: /60 (BP Location: Right arm)   Pulse 92   Temp 98 2 °F (36 8 °C) (Oral)   Resp 20   Ht 5' 8" (1 727 m)   Wt 73 8 kg (162 lb 11 2 oz)   SpO2 91%   BMI 24 74 kg/m²     Physical Exam:  General Appearance: Alert, cooperative, no distress, appears stated age  Lungs: Clear to auscultation bilaterally, respirations unlablored   Heart: Regular rate and rhythm, S1 and S2 normal, no murmur, rub or gallop  Abdomen: Soft, distended; approprately -tender  Incision sites are CDI   DILLON drain with sanguinous fluid     Laboratory and Diagnostics:    Results from last 7 days  Lab Units 02/24/18  0558   WBC Thousand/uL 11 61*   HEMOGLOBIN g/dL 12 9   HEMATOCRIT % 37 5   PLATELETS Thousands/uL 216           Invalid input(s): LABALBU      No results found for: PHOS       No results found for: TROPONINT  ABG:No results found for: PHART, BXY4IRS, PO2ART, BEM6UIH, Y0IQFSHV, BEART, SOURCE    Blood Culture: No results found for: BLOODCX  Urine Culture: No results found for: URINECX  Sputum Culture: No components found for: SPUTUMCX    Imaging: I have personally reviewed pertinent reports     and I have personally reviewed pertinent films in PACS    VTE Pharmacologic Prophylaxis: Enoxaparin (Lovenox)  VTE Mechanical Prophylaxis: sequential compression device

## 2018-02-25 ENCOUNTER — APPOINTMENT (INPATIENT)
Dept: RADIOLOGY | Facility: HOSPITAL | Age: 73
DRG: 355 | End: 2018-02-25
Payer: MEDICARE

## 2018-02-25 LAB
ANION GAP SERPL CALCULATED.3IONS-SCNC: 8 MMOL/L (ref 4–13)
BASOPHILS # BLD AUTO: 0.01 THOUSANDS/ΜL (ref 0–0.1)
BASOPHILS NFR BLD AUTO: 0 % (ref 0–1)
BUN SERPL-MCNC: 25 MG/DL (ref 5–25)
CALCIUM SERPL-MCNC: 8.7 MG/DL (ref 8.3–10.1)
CHLORIDE SERPL-SCNC: 108 MMOL/L (ref 100–108)
CO2 SERPL-SCNC: 24 MMOL/L (ref 21–32)
CREAT SERPL-MCNC: 0.78 MG/DL (ref 0.6–1.3)
EOSINOPHIL # BLD AUTO: 0.01 THOUSAND/ΜL (ref 0–0.61)
EOSINOPHIL NFR BLD AUTO: 0 % (ref 0–6)
ERYTHROCYTE [DISTWIDTH] IN BLOOD BY AUTOMATED COUNT: 13.2 % (ref 11.6–15.1)
GFR SERPL CREATININE-BSD FRML MDRD: 90 ML/MIN/1.73SQ M
GLUCOSE SERPL-MCNC: 107 MG/DL (ref 65–140)
GLUCOSE SERPL-MCNC: 79 MG/DL (ref 65–140)
GLUCOSE SERPL-MCNC: 94 MG/DL (ref 65–140)
HCT VFR BLD AUTO: 33.1 % (ref 36.5–49.3)
HGB BLD-MCNC: 11.2 G/DL (ref 12–17)
LYMPHOCYTES # BLD AUTO: 0.85 THOUSANDS/ΜL (ref 0.6–4.47)
LYMPHOCYTES NFR BLD AUTO: 9 % (ref 14–44)
MAGNESIUM SERPL-MCNC: 2 MG/DL (ref 1.6–2.6)
MCH RBC QN AUTO: 29.9 PG (ref 26.8–34.3)
MCHC RBC AUTO-ENTMCNC: 33.8 G/DL (ref 31.4–37.4)
MCV RBC AUTO: 88 FL (ref 82–98)
MONOCYTES # BLD AUTO: 0.71 THOUSAND/ΜL (ref 0.17–1.22)
MONOCYTES NFR BLD AUTO: 7 % (ref 4–12)
NEUTROPHILS # BLD AUTO: 8.11 THOUSANDS/ΜL (ref 1.85–7.62)
NEUTS SEG NFR BLD AUTO: 84 % (ref 43–75)
NRBC BLD AUTO-RTO: 0 /100 WBCS
PLATELET # BLD AUTO: 194 THOUSANDS/UL (ref 149–390)
PMV BLD AUTO: 9.6 FL (ref 8.9–12.7)
POTASSIUM SERPL-SCNC: 3.3 MMOL/L (ref 3.5–5.3)
RBC # BLD AUTO: 3.75 MILLION/UL (ref 3.88–5.62)
SODIUM SERPL-SCNC: 140 MMOL/L (ref 136–145)
WBC # BLD AUTO: 9.72 THOUSAND/UL (ref 4.31–10.16)

## 2018-02-25 PROCEDURE — 85025 COMPLETE CBC W/AUTO DIFF WBC: CPT | Performed by: STUDENT IN AN ORGANIZED HEALTH CARE EDUCATION/TRAINING PROGRAM

## 2018-02-25 PROCEDURE — 82948 REAGENT STRIP/BLOOD GLUCOSE: CPT

## 2018-02-25 PROCEDURE — 83735 ASSAY OF MAGNESIUM: CPT | Performed by: STUDENT IN AN ORGANIZED HEALTH CARE EDUCATION/TRAINING PROGRAM

## 2018-02-25 PROCEDURE — 80048 BASIC METABOLIC PNL TOTAL CA: CPT | Performed by: STUDENT IN AN ORGANIZED HEALTH CARE EDUCATION/TRAINING PROGRAM

## 2018-02-25 PROCEDURE — 74018 RADEX ABDOMEN 1 VIEW: CPT

## 2018-02-25 RX ORDER — DEXTROSE, SODIUM CHLORIDE, AND POTASSIUM CHLORIDE 5; .45; .15 G/100ML; G/100ML; G/100ML
115 INJECTION INTRAVENOUS CONTINUOUS
Status: DISCONTINUED | OUTPATIENT
Start: 2018-02-25 | End: 2018-02-27

## 2018-02-25 RX ORDER — POTASSIUM CHLORIDE 14.9 MG/ML
60 INJECTION INTRAVENOUS ONCE
Status: DISCONTINUED | OUTPATIENT
Start: 2018-02-25 | End: 2018-02-25

## 2018-02-25 RX ADMIN — METOPROLOL SUCCINATE 50 MG: 50 TABLET, EXTENDED RELEASE ORAL at 09:40

## 2018-02-25 RX ADMIN — SODIUM CHLORIDE 100 ML/HR: 0.9 INJECTION, SOLUTION INTRAVENOUS at 07:06

## 2018-02-25 RX ADMIN — SODIUM CHLORIDE, POTASSIUM CHLORIDE, SODIUM LACTATE AND CALCIUM CHLORIDE 125 ML/HR: 600; 310; 30; 20 INJECTION, SOLUTION INTRAVENOUS at 05:04

## 2018-02-25 RX ADMIN — DOCUSATE SODIUM 100 MG: 100 CAPSULE, LIQUID FILLED ORAL at 17:13

## 2018-02-25 RX ADMIN — POTASSIUM CHLORIDE 20 MEQ: 2 INJECTION, SOLUTION, CONCENTRATE INTRAVENOUS at 11:43

## 2018-02-25 RX ADMIN — POTASSIUM CHLORIDE 20 MEQ: 2 INJECTION, SOLUTION, CONCENTRATE INTRAVENOUS at 09:39

## 2018-02-25 RX ADMIN — LISINOPRIL 20 MG: 20 TABLET ORAL at 09:40

## 2018-02-25 RX ADMIN — DOCUSATE SODIUM 100 MG: 100 CAPSULE, LIQUID FILLED ORAL at 09:40

## 2018-02-25 RX ADMIN — ATORVASTATIN CALCIUM 40 MG: 40 TABLET, FILM COATED ORAL at 17:14

## 2018-02-25 RX ADMIN — ASPIRIN 81 MG 81 MG: 81 TABLET ORAL at 09:40

## 2018-02-25 RX ADMIN — SENNOSIDES 8.6 MG: 8.6 TABLET, FILM COATED ORAL at 09:40

## 2018-02-25 RX ADMIN — HYDROCHLOROTHIAZIDE 12.5 MG: 12.5 TABLET ORAL at 09:40

## 2018-02-25 RX ADMIN — DEXTROSE, SODIUM CHLORIDE, AND POTASSIUM CHLORIDE 115 ML/HR: 5; .45; .15 INJECTION INTRAVENOUS at 09:39

## 2018-02-25 RX ADMIN — FAMOTIDINE 20 MG: 20 TABLET, FILM COATED ORAL at 09:40

## 2018-02-25 RX ADMIN — DEXTROSE, SODIUM CHLORIDE, AND POTASSIUM CHLORIDE 115 ML/HR: 5; .45; .15 INJECTION INTRAVENOUS at 17:14

## 2018-02-25 RX ADMIN — OXYCODONE HYDROCHLORIDE AND ACETAMINOPHEN 1 TABLET: 5; 325 TABLET ORAL at 03:27

## 2018-02-25 RX ADMIN — ENOXAPARIN SODIUM 40 MG: 40 INJECTION SUBCUTANEOUS at 09:41

## 2018-02-25 NOTE — PROGRESS NOTES
Progress Note - General Surgery   Camilla Harris 67 y o  male MRN: [id number]  Unit/Bed#: [facility] 616-01 Encounter: [id number]    Assessment:     72yM s/p IHR w/ mesh (2/23)    Distended, burping, hiccuping, w/o any bowel function     Plan:   Cont NPO  Obtain KUB  Awaiting return of bowel function  F/u AM labs  Needs assistance - needs to walk  Requiring 2L nasal cannula  Out of bed, IS  Subjective:   Remains hungry  Distended, burping, hiccuping, w/o any bowel function    Objective:       Vitals   Vitals:    02/24/18 0641 02/24/18 1536 02/24/18 2251 02/25/18 0646   BP: 137/60 132/58 127/68 145/57   BP Location: Right arm   Left arm   Pulse: 92 87 80 89   Resp: 20 20 21 22   Temp: 98 2 °F (36 8 °C) 97 9 °F (36 6 °C) 98 °F (36 7 °C) 97 9 °F (36 6 °C)   TempSrc: Oral Oral Oral Oral   SpO2: 91% 93% 93% 92%   Weight:       Height:         Temp  Min: 97 3 °F (36 3 °C)  Max: 99 5 °F (37 5 °C)  IBW: 68 4 kg   Body mass index is 24 74 kg/m²      I/O   I/O       02/22 0701 - 02/23 0700 02/23 0701 - 02/24 0700 02/24 0701 - 02/25 0700    I V  (mL/kg)  2050 (27 8)     IV Piggyback  500     Total Intake(mL/kg)  2550 (34 6)     Urine (mL/kg/hr)  450     Drains  242     Total Output   692      Net   +1858                   Intake/Output Summary (Last 24 hours) at 02/25/18 0756  Last data filed at 02/25/18 1391   Gross per 24 hour   Intake           252 08 ml   Output             2040 ml   Net         -1787 92 ml       Invasive  Devices  Invasive Devices     Peripheral Intravenous Line            Peripheral IV 02/23/18 Left Hand 1 day          Drain            Closed/Suction Drain Anterior LLQ Bulb 1 day                Active medications  Scheduled Meds:    Current Facility-Administered Medications:  acetaminophen 650 mg Oral Q6H PRN Micah Soto MD   aspirin 81 mg Oral Daily Micah Soto MD   atorvastatin 40 mg Oral QPM Micah Soto MD   dextrose 5 % and sodium chloride 0 45 % with KCl 20 mEq/L 115 mL/hr Intravenous Continuous Viktor Sun MD   docusate sodium 100 mg Oral BID Bernie Casanova MD   enoxaparin 40 mg Subcutaneous Daily Bernie Casanova MD   famotidine 20 mg Oral Daily Bernie Casanova MD   hydrochlorothiazide 12 5 mg Oral Daily Bernie Casanova MD   HYDROmorphone 0 5 mg Intravenous Q3H PRN Bernie Casanova MD   lisinopril 20 mg Oral Daily Bernie Casanova MD   metoprolol succinate 50 mg Oral Daily Bernie Casanova MD   ondansetron 4 mg Intravenous Q6H PRN Bernie Casanova MD   oxyCODONE 10 mg Oral Q4H PRN Bernie Casanova MD   oxyCODONE-acetaminophen 1 tablet Oral Q4H PRN Bernie Casanova MD   senna 1 tablet Oral Daily Bernie Casanova MD     Continuous Infusions:    dextrose 5 % and sodium chloride 0 45 % with KCl 20 mEq/L 115 mL/hr     PRN Meds:     acetaminophen 650 mg Q6H PRN   HYDROmorphone 0 5 mg Q3H PRN   ondansetron 4 mg Q6H PRN   oxyCODONE 10 mg Q4H PRN   oxyCODONE-acetaminophen 1 tablet Q4H PRN       Physical Exam: /57 (BP Location: Left arm)   Pulse 89   Temp 97 9 °F (36 6 °C) (Oral)   Resp 22   Ht 5' 8" (1 727 m)   Wt 73 8 kg (162 lb 11 2 oz)   SpO2 92%   BMI 24 74 kg/m²     Physical Exam:  Gen: NAD, AAOx3  CV: RRR  Pulm: no resp distress on 2L  Abd: Soft, distended, appropriately tender      Laboratory and Diagnostics:    Results from last 7 days  Lab Units 02/25/18  0620 02/24/18  0558   WBC Thousand/uL 9 72 11 61*   HEMOGLOBIN g/dL 11 2* 12 9   HEMATOCRIT % 33 1* 37 5   PLATELETS Thousands/uL 194 216   NEUTROS PCT % 84*  --    MONOS PCT % 7  --    MONO PCT MAN %  --  3*       Results from last 7 days  Lab Units 02/25/18  0620 02/24/18  0558   SODIUM mmol/L 140 138   POTASSIUM mmol/L 3 3* 3 7   CHLORIDE mmol/L 108 103   CO2 mmol/L 24 26   BUN mg/dL 25 30*   CREATININE mg/dL 0 78 1 04   CALCIUM mg/dL 8 7 8 4   GLUCOSE RANDOM mg/dL 79 127       Results from last 7 days  Lab Units 02/25/18  0620   MAGNESIUM mg/dL 2 0     No results found for: PHOS       No results found for: TROPONINT  ABG:No results found for: PHART, LOA1YVQ, PO2ART, FDN4MCD, L4NGLYSO, BEART, SOURCE    Blood Culture: No results found for: BLOODCX  Urine Culture: No results found for: URINECX  Sputum Culture: No components found for: SPUTUMCX    Imaging: I have personally reviewed pertinent reports     and I have personally reviewed pertinent films in PACS    VTE Pharmacologic Prophylaxis: Enoxaparin (Lovenox)  VTE Mechanical Prophylaxis: sequential compression device

## 2018-02-25 NOTE — PHYSICIAN ADVISOR
Current patient class: Outpatient Procedure  The patient is currently on Hospital Day: 2      The patient was admitted to the hospital at N/A on N/A for the following diagnosis:  Ventral hernia [K43 9]       There is documentation in the medical record of an expected length of stay of at least 2 midnights  The patient is therefore expected to satisfy the 2 midnight benchmark and given the 2 midnight presumption is appropriate for INPATIENT ADMISSION  Given this expectation of a satisfying stay, CMS instructs us that the patient is most often appropriate for inpatient admission under part A provided medical necessity is documented in the chart  After review of the relevant documentation, labs, vital signs and test results, the patient is appropriate for INPATIENT ADMISSION  Admission to the hospital as an inpatient is a complex decision making process which requires the practitioner to consider the patients presenting complaint, history and physical examination and all relevant testing  With this in mind, in this case, the patient was deemed appropriate for INPATIENT ADMISSION  After review of the documentation and testing available at the time of the admission I concur with this clinical determination of medical necessity  Rationale is as follows: The patient is a 67 yrs old Male who presented to the ED at 2/23/2018 10:35 AM with a chief complaint of ventral hernia repair  Pt now with abdominal distension preventing advancement of diet and will require a 2nd midnight in the hospital  The patient now is appropriate for IP admission given satisfaction of the 2MN benchmark      The patients vitals on arrival were ED Triage Vitals   Temperature Pulse Respirations Blood Pressure SpO2   02/23/18 1136 02/23/18 1136 02/23/18 1136 02/23/18 1136 02/23/18 1136   99 5 °F (37 5 °C) 61 16 121/65 95 %      Temp Source Heart Rate Source Patient Position - Orthostatic VS BP Location FiO2 (%)   02/23/18 1136 02/23/18 1136 02/23/18 1952 02/23/18 1952 --   Tymp Core Monitor Lying Right arm       Pain Score       02/23/18 1558       8           Past Medical History:   Diagnosis Date    Abdominal aortic aneurysm (AAA) ([address] )     last assessed nov 6 2015    Abscess of groin     last assessed oct 6 2014    Candidiasis, cutaneous     last assessed march 19 2014    Coronary artery disease     Dyslipidemia     Esophageal ulcer without bleeding     Gastritis     Hiatal hernia     Hx of cataract surgery, left     last assessed july 21 2014    Hyperlipidemia     Hypertension     Old myocardial infarction     Recurrent right inguinal hernia     s/p right orchioectomy, mesh removal, and sinus trac removal patient being followed by surgery next appt 4/28/14 patient s/p keflex x 7 days for MSSA Cx repeat wound cx grew MSSA cx repeat wound cx grew MSSA and other armond (mixed) no MRSA identified conitunue close monitoring and local wound care, last assessed april 15 2014    Wound of skin     in the groin, right     Past Surgical History:   Procedure Laterality Date    ABDOMINAL AORTIC ANEURYSM REPAIR  2009    aortobi-iliac, dacron graft    APPENDECTOMY      open    CORONARY ANGIOPLASTY WITH STENT PLACEMENT      stent 2    CYSTOSCOPY      diagnostic onset nov 13 2014    EXPLORATORY LAPAROTOMY  12/09/2009    HIP SURGERY Right     INGUINAL HERNIA REPAIR Right     unilateral x2    ORCHIECTOMY Right            Consults have been placed to:   IP CONSULT TO CASE MANAGEMENT    Vitals:    02/23/18 2300 02/24/18 0641 02/24/18 1536 02/24/18 2251   BP: 167/81 137/60 132/58 127/68   BP Location:  Right arm     Pulse: 90 92 87 80   Resp: 20 20 20 21   Temp: 97 5 °F (36 4 °C) 98 2 °F (36 8 °C) 97 9 °F (36 6 °C) 98 °F (36 7 °C)   TempSrc: Oral Oral Oral Oral   SpO2: 93% 91% 93% 93%   Weight:       Height:           Most recent labs:    Recent Labs      02/24/18   0558   WBC  11 61*   HGB  12 9   HCT  37 5   PLT  216   K  3 7   NA 138   CALCIUM  8 4   BUN  30*   CREATININE  1 04       Scheduled Meds:  Current Facility-Administered Medications:  acetaminophen 650 mg Oral Q6H PRN Juan Miguel Koehler MD    aspirin 81 mg Oral Daily Juan Miguel Koehler MD    atorvastatin 40 mg Oral QPM Juan Miguel Koehler MD    docusate sodium 100 mg Oral BID Juan Miguel Koehler MD    enoxaparin 40 mg Subcutaneous Daily Juan Miguel Koehler MD    famotidine 20 mg Oral Daily Juan Miguel Koehler MD    hydrochlorothiazide 12 5 mg Oral Daily Juan Miguel Koehler MD    HYDROmorphone 0 5 mg Intravenous Q3H PRN Juan Miguel Koehler MD    lactated ringers 125 mL/hr Intravenous Continuous Luis Fernando Glasgow MD Last Rate: Stopped (02/23/18 1653)   lisinopril 20 mg Oral Daily Juan Miguel Koehler MD    metoprolol succinate 50 mg Oral Daily Juan Miguel Koehler MD    ondansetron 4 mg Intravenous Q6H PRN Juan Miguel Koehler MD    oxyCODONE 10 mg Oral Q4H PRN Juan Miguel Koehler MD    oxyCODONE-acetaminophen 1 tablet Oral Q4H PRN Juan Miguel Koehler MD    senna 1 tablet Oral Daily Juan Miguel Koehler MD    sodium chloride 100 mL/hr Intravenous Continuous Juan Miguel Koehler MD Last Rate: 100 mL/hr (02/24/18 1753)     Continuous Infusions:  lactated ringers 125 mL/hr Last Rate: Stopped (02/23/18 1653)   sodium chloride 100 mL/hr Last Rate: 100 mL/hr (02/24/18 1753)     PRN Meds:   acetaminophen    HYDROmorphone    ondansetron    oxyCODONE    oxyCODONE-acetaminophen    Surgical procedures (if appropriate):  Procedure(s):  lysis of adhesions; INCISIONAL HERNIA REPAIR WITH MESH

## 2018-02-26 LAB
ANION GAP SERPL CALCULATED.3IONS-SCNC: 7 MMOL/L (ref 4–13)
BASOPHILS # BLD AUTO: 0.02 THOUSANDS/ΜL (ref 0–0.1)
BASOPHILS NFR BLD AUTO: 0 % (ref 0–1)
BUN SERPL-MCNC: 13 MG/DL (ref 5–25)
CALCIUM SERPL-MCNC: 8.7 MG/DL (ref 8.3–10.1)
CHLORIDE SERPL-SCNC: 112 MMOL/L (ref 100–108)
CO2 SERPL-SCNC: 23 MMOL/L (ref 21–32)
CREAT SERPL-MCNC: 0.71 MG/DL (ref 0.6–1.3)
EOSINOPHIL # BLD AUTO: 0.09 THOUSAND/ΜL (ref 0–0.61)
EOSINOPHIL NFR BLD AUTO: 1 % (ref 0–6)
ERYTHROCYTE [DISTWIDTH] IN BLOOD BY AUTOMATED COUNT: 13 % (ref 11.6–15.1)
GFR SERPL CREATININE-BSD FRML MDRD: 93 ML/MIN/1.73SQ M
GLUCOSE SERPL-MCNC: 111 MG/DL (ref 65–140)
HCT VFR BLD AUTO: 31.1 % (ref 36.5–49.3)
HGB BLD-MCNC: 10.8 G/DL (ref 12–17)
LYMPHOCYTES # BLD AUTO: 1.11 THOUSANDS/ΜL (ref 0.6–4.47)
LYMPHOCYTES NFR BLD AUTO: 16 % (ref 14–44)
MCH RBC QN AUTO: 30.2 PG (ref 26.8–34.3)
MCHC RBC AUTO-ENTMCNC: 34.7 G/DL (ref 31.4–37.4)
MCV RBC AUTO: 87 FL (ref 82–98)
MONOCYTES # BLD AUTO: 0.47 THOUSAND/ΜL (ref 0.17–1.22)
MONOCYTES NFR BLD AUTO: 7 % (ref 4–12)
NEUTROPHILS # BLD AUTO: 5.4 THOUSANDS/ΜL (ref 1.85–7.62)
NEUTS SEG NFR BLD AUTO: 76 % (ref 43–75)
NRBC BLD AUTO-RTO: 0 /100 WBCS
PLATELET # BLD AUTO: 207 THOUSANDS/UL (ref 149–390)
PMV BLD AUTO: 9.4 FL (ref 8.9–12.7)
POTASSIUM SERPL-SCNC: 3.8 MMOL/L (ref 3.5–5.3)
RBC # BLD AUTO: 3.58 MILLION/UL (ref 3.88–5.62)
SODIUM SERPL-SCNC: 142 MMOL/L (ref 136–145)
WBC # BLD AUTO: 7.11 THOUSAND/UL (ref 4.31–10.16)

## 2018-02-26 PROCEDURE — 80048 BASIC METABOLIC PNL TOTAL CA: CPT | Performed by: SURGERY

## 2018-02-26 PROCEDURE — 97163 PT EVAL HIGH COMPLEX 45 MIN: CPT

## 2018-02-26 PROCEDURE — 99024 POSTOP FOLLOW-UP VISIT: CPT | Performed by: SURGERY

## 2018-02-26 PROCEDURE — G8987 SELF CARE CURRENT STATUS: HCPCS

## 2018-02-26 PROCEDURE — G8988 SELF CARE GOAL STATUS: HCPCS

## 2018-02-26 PROCEDURE — G8978 MOBILITY CURRENT STATUS: HCPCS

## 2018-02-26 PROCEDURE — 97167 OT EVAL HIGH COMPLEX 60 MIN: CPT

## 2018-02-26 PROCEDURE — 85025 COMPLETE CBC W/AUTO DIFF WBC: CPT | Performed by: SURGERY

## 2018-02-26 PROCEDURE — G8979 MOBILITY GOAL STATUS: HCPCS

## 2018-02-26 RX ADMIN — ATORVASTATIN CALCIUM 40 MG: 40 TABLET, FILM COATED ORAL at 18:13

## 2018-02-26 RX ADMIN — METOPROLOL SUCCINATE 50 MG: 50 TABLET, EXTENDED RELEASE ORAL at 08:28

## 2018-02-26 RX ADMIN — ENOXAPARIN SODIUM 40 MG: 40 INJECTION SUBCUTANEOUS at 08:29

## 2018-02-26 RX ADMIN — SENNOSIDES 8.6 MG: 8.6 TABLET, FILM COATED ORAL at 08:28

## 2018-02-26 RX ADMIN — FAMOTIDINE 20 MG: 20 TABLET, FILM COATED ORAL at 08:29

## 2018-02-26 RX ADMIN — DEXTROSE, SODIUM CHLORIDE, AND POTASSIUM CHLORIDE 115 ML/HR: 5; .45; .15 INJECTION INTRAVENOUS at 01:31

## 2018-02-26 RX ADMIN — ASPIRIN 81 MG 81 MG: 81 TABLET ORAL at 08:28

## 2018-02-26 RX ADMIN — DOCUSATE SODIUM 100 MG: 100 CAPSULE, LIQUID FILLED ORAL at 18:13

## 2018-02-26 RX ADMIN — DEXTROSE, SODIUM CHLORIDE, AND POTASSIUM CHLORIDE 115 ML/HR: 5; .45; .15 INJECTION INTRAVENOUS at 19:40

## 2018-02-26 RX ADMIN — LISINOPRIL 20 MG: 20 TABLET ORAL at 08:29

## 2018-02-26 RX ADMIN — DOCUSATE SODIUM 100 MG: 100 CAPSULE, LIQUID FILLED ORAL at 08:28

## 2018-02-26 RX ADMIN — HYDROCHLOROTHIAZIDE 12.5 MG: 12.5 TABLET ORAL at 08:28

## 2018-02-26 NOTE — PLAN OF CARE
Problem: OCCUPATIONAL THERAPY ADULT  Goal: Performs self-care activities at highest level of function for planned discharge setting  See evaluation for individualized goals  Treatment Interventions: ADL retraining, Functional transfer training, UE strengthening/ROM, Endurance training, Patient/family training, Equipment evaluation/education, Compensatory technique education, Energy conservation          See flowsheet documentation for full assessment, interventions and recommendations  Limitation: Decreased ADL status, Decreased Safe judgement during ADL, Decreased endurance, Decreased UE ROM, Decreased self-care trans, Decreased high-level ADLs  Prognosis: Good  Assessment: Pt is a 67 y o  male who was admitted to [address] [place] [address] on 2/23/2018 with ventral hernia  Pt has an extensive active problem list including but not limited to coronary artery disease, bursitis of L shoulder, callus foot, chest pain, degenerative arthritis of L knee, SOB, hyperlipidemia, and hypertension  Pt's problem list also includes PMH of abdominal aortic aneurysm, hx of gastritis, and hx of hernia  See H&P for more extensive list  At baseline pt reports I with ADLs and uses a cane at baseline  Pt lives alone in one level apartment  Currently pt requires min A for UB ADLs, max A for LB ADLs, and mod A x1 for functional mobility/transfers w/ SPC  Pt currently presents with impairments in the following categories -limited home support, difficulty performing ADLS, difficulty performing IADLS  and limited insight into deficits activity tolerance, endurance, standing balance/tolerance, sitting balance/tolerance and UE ROM   These impairments, as well as pt's fatigue, pain, decreased caregiver support, risk for falls and weakness  limit pt's ability to safely engage in all baseline areas of occupation, includinggrooming, bathing, dressing, toileting, functional mobility/transfers and leisure activities  From OT standpoint, recommend inpatient rehab upon D/C  OT will continue to follow to address the below stated goals        OT Discharge Recommendation: Short Term Rehab  OT - OK to Discharge: Yes (when medically clear to STR)

## 2018-02-26 NOTE — PHYSICAL THERAPY NOTE
Physical Therapy Evaluation:    2 forms of pt ID verified:name,birthdate and pt ID cassandra    Patient's Name: Talisha Harris    Admitting Diagnosis  Ventral hernia [K43 9]    Problem List  Patient Active Problem List   Diagnosis    Preoperative clearance    Coronary artery disease    Hypertension       Past Medical History  Past Medical History:   Diagnosis Date    Abdominal aortic aneurysm (AAA) ([address] )     last assessed nov 6 2015    Abscess of groin     last assessed oct 6 2014    Candidiasis, cutaneous     last assessed march 19 2014    Coronary artery disease     Dyslipidemia     Esophageal ulcer without bleeding     Gastritis     Hiatal hernia     Hx of cataract surgery, left     last assessed july 21 2014    Hyperlipidemia     Hypertension     Old myocardial infarction     Recurrent right inguinal hernia     s/p right orchioectomy, mesh removal, and sinus trac removal patient being followed by surgery next appt 4/28/14 patient s/p keflex x 7 days for MSSA Cx repeat wound cx grew MSSA cx repeat wound cx grew MSSA and other armond (mixed) no MRSA identified conitunue close monitoring and local wound care, last assessed april 15 2014    Wound of skin     in the groin, right       Past Surgical History  Past Surgical History:   Procedure Laterality Date    ABDOMINAL AORTIC ANEURYSM REPAIR  2009    aortobi-iliac, dacron graft    APPENDECTOMY      open    CORONARY ANGIOPLASTY WITH STENT PLACEMENT      stent 2    CYSTOSCOPY      diagnostic onset nov 13 2014    EXPLORATORY LAPAROTOMY  12/09/2009    HIP SURGERY Right     INGUINAL HERNIA REPAIR Right     unilateral x2    ORCHIECTOMY Right     NC REPAIR INCISIONAL HERNIA,REDUCIBLE N/A 2/23/2018    Procedure: lysis of adhesions; INCISIONAL HERNIA REPAIR WITH MESH;  Surgeon: Salena Coello MD;  [facility];  Service: General        02/26/18 0940   Note Type   Note type Eval only   Pain Assessment   Pain Assessment 0-10   Pain Score 6   Pain Type Acute pain;Surgical pain   Pain Location Abdomen   Pain Orientation Bilateral   Hospital Pain Intervention(s) Repositioned; Ambulation/increased activity; Emotional support; Environmental changes; Rest   Home Living   Type of Home Apartment  (pt reports "sunken basement apartment")   Home Layout One level  (2 AIDE)   Home Equipment Cane  (use of [address] for community mobility)   Additional Comments pt reports being completely I PTA,use of [facility] for community mobility,no recent falls;GF lives close by;pt lives alone   Prior Function   Level of [place] Independent with ADLs and functional mobility  (per pt PTA)   Lives With Alone   Receives Help From Friend(s)  (GF as needed)   ADL Assistance Independent   IADLs Needs assistance   Falls in the last 6 months 0   Restrictions/Precautions   Other Precautions Impulsive;Multiple lines; Fall Risk;Pain;Hard of hearing   General   Additional Pertinent History ventral hernia;s/p incisional hernia repair with MESH and MARK (02/23/18)   Family/Caregiver Present No   Cognition   Overall Cognitive Status Impaired   Arousal/Participation Alert   Orientation Level Oriented to person;Oriented to place;Oriented to time;Oriented to situation   Following Commands Follows one step commands with increased time or repetition  (2* inc pain,impulsive at times,dec safety awareness)   RLE Assessment   RLE Assessment (3/5 grossly throughout)   LLE Assessment   LLE Assessment (4/5 grossly throughout)   Coordination   Movements are Fluid and Coordinated 0   Coordination and Movement Description ataxic and unsteady gait pattern,dec BLE step length,slow lorena and mobility   Sensation WFL   Light Touch   RLE Light Touch Grossly intact   LLE Light Touch Grossly intact   Bed Mobility   Supine to Sit 2  Maximal assistance   Additional items Assist x 1;HOB elevated; Bedrails; Impulsive;Verbal cues;LE management  (2*previous PMH->pt reports bursitis L shoulder)   Transfers   Sit to Stand 3 Moderate assistance   Additional items Assist x 1;Bedrails; Impulsive;Verbal cues  (B special shoes donned dependently at EOB prior to mobility)   Stand to Sit 3  Moderate assistance   Additional items Assist x 1; Armrests; Impulsive;Verbal cues  (for safety,education and control descent)   Stand pivot 3  Moderate assistance   Additional items Assist x 1; Impulsive;Verbal cues   Ambulation/Elevation   Gait pattern Poor UE support; Improper Weight shift; Antalgic;Narrow CHANDLER; Inconsistent lorena; Short stride; Ataxia; Excessively slow  (pt reports SOB following)   Gait Assistance 3  Moderate assist   Additional items Assist x 1;Verbal cues; Tactile cues   Assistive Device Rolling walker   Distance 5 steps bed->recliner with B special shoes and RW on tile surface;limited mobility and gait distance 2* pt reports fatigue and SOB throughout,labored breathing   Balance   Static Sitting Fair  (in chair and EOB)   Dynamic Sitting Poor   Static Standing Poor +   Dynamic Standing Poor   Ambulatory Poor   Endurance Deficit   Endurance Deficit Yes   Endurance Deficit Description reports of fatigue and SOB following mobility,deconditioning,recent surgical procedure   Activity Tolerance   Activity Tolerance Patient limited by fatigue;Patient limited by pain  (fair)   Medical Staff Made Aware SALUD Quarles and Terri Lee)   Nurse Made Aware yes   Assessment   Prognosis Good   Problem List Decreased strength;Decreased endurance; Impaired balance;Decreased mobility; Decreased coordination;Decreased safety awareness;Decreased skin integrity;Pain   Assessment Pt is a 68 y/o male admitted to [facility] 2* ventral hernia with surgical repair  Pt lives alone in 1 story apt and 2 AIDE,use of [facility] for mobility and reports being completely I PTA  Pt reports having a GF who provided A PTA,no recent falls   Pt currently is not at functional mobility baseline,reports ABD pain throughout,dec safety awareness,use of new DME (RW),dependent for B shoe donning,IV medicine management,ongoing medical care and needs Ax1 for mobility  Pt demonstrates moderate deficits during functional mobility and gait including dec endurance,dec balance,dec BLE strength,inc ABD pain,ataxic and unsteady gait,dec safety awareness and needs modAX1 for transfers,BM and gait with RW  Pt would cont to benefit from skilled inpt PT services to maximize functional independence   Barriers to Discharge Decreased caregiver support; Inaccessible home environment  (AIDE and lives alone)   Goals   Patient Goals to go to rehab   STG Expiration Date 03/08/18   Short Term Goal #1 in 7-10 days:pt will be able to ambulate >150 feet with use of RW and special B shoes on various surfaces minAx1->S level of A,activity tolerance:45mins/45mins,inc balance 1 grade,BM and transfers  minAx1 to and from various surfaces consistently,minAx1 for BLE ther ex   Treatment Day 0   Plan   Treatment/Interventions Functional transfer training; Endurance training;Patient/family training;Equipment eval/education; Bed mobility;Gait training;Spoke to nursing;Spoke to case management   PT Frequency 5x/wk   Recommendation   Recommendation Other (Comment)  (inpt rhb)   Equipment Recommended Walker  (RW)   Barthel Index   Feeding 10   Bathing 0   Grooming Score 0   Dressing Score 0   Bladder Score 10   Bowels Score 10   Toilet Use Score 5   Transfers (Bed/Chair) Score 5   Mobility (Level Surface) Score 0   Stairs Score 0   Barthel Index Score 40   Skilled PT recommended while in hospital and upon DC to progress pt toward treatment goals         Emigdio Westbrook, PT, DPT@

## 2018-02-26 NOTE — PLAN OF CARE
DISCHARGE PLANNING     Discharge to home or other facility with appropriate resources Progressing        DISCHARGE PLANNING - CARE MANAGEMENT     Discharge to post-acute care or home with appropriate resources Progressing        INFECTION - ADULT     Absence or prevention of progression during hospitalization Progressing        Knowledge Deficit     Patient/family/caregiver demonstrates understanding of disease process, treatment plan, medications, and discharge instructions Progressing        Potential for Falls     Patient will remain free of falls Progressing        Prexisting or High Potential for Compromised Skin Integrity     Skin integrity is maintained or improved Progressing

## 2018-02-26 NOTE — PROGRESS NOTES
Progress Note - Acute Care Surgery   Marcia Conrad 67 y o  male MRN: [id number]  Unit/Bed#: [facility] 814-31 Encounter: [id number]    Assessment:  67 M with incisional hernia s/p repair with mesh    Plan:  Continue sips, awaiting ROBF  May advance to clears later  IV fluid hydration  KUB yesterday unremarkable  OOB and ambulate  Incentive spirometry  Continue abdominal binder  PT/OT to evaluate  Lovenox    Subjective/Objective     Subjective: No acute events  Tolerating sips, no nausea or vomiting  Feels hungry  No flatus or bowel movements yet  Complaining of some midline abdominal pain  Objective:    Blood pressure 140/76, pulse 81, temperature 98 3 °F (36 8 °C), temperature source Oral, resp  rate 20, height 5' 8" (1 727 m), weight 73 8 kg (162 lb 11 2 oz), SpO2 94 %  ,Body mass index is 24 74 kg/m²  Intake/Output Summary (Last 24 hours) at 02/26/18 0601  Last data filed at 02/26/18 0130   Gross per 24 hour   Intake          5019 08 ml   Output             1680 ml   Net          3339 08 ml       Invasive Devices     Peripheral Intravenous Line            Peripheral IV 02/23/18 Left Hand 2 days          Drain            Closed/Suction Drain Anterior LLQ Bulb 2 days                Physical Exam:   General: NAD, AAOx3  CV: RRR +S1/S2  Chest: breath sounds bilaterally  Abdomen: soft but distended, moderately tender   Incision c/d/i with staples  Extremities: atraumatic, no edema        Results from last 7 days  Lab Units 02/25/18  0620 02/24/18  0558   WBC Thousand/uL 9 72 11 61*   HEMOGLOBIN g/dL 11 2* 12 9   HEMATOCRIT % 33 1* 37 5   PLATELETS Thousands/uL 194 216       Results from last 7 days  Lab Units 02/25/18  0620 02/24/18  0558   SODIUM mmol/L 140 138   POTASSIUM mmol/L 3 3* 3 7   CHLORIDE mmol/L 108 103   CO2 mmol/L 24 26   BUN mg/dL 25 30*   CREATININE mg/dL 0 78 1 04   GLUCOSE RANDOM mg/dL 79 127   CALCIUM mg/dL 8 7 8 4

## 2018-02-26 NOTE — PLAN OF CARE
Problem: DISCHARGE PLANNING - CARE MANAGEMENT  Goal: Discharge to post-acute care or home with appropriate resources  INTERVENTIONS:  - Conduct assessment to determine patient/family and health care team treatment goals, and need for post-acute services based on payer coverage, community resources, and patient preferences, and barriers to discharge  - Address psychosocial, clinical, and financial barriers to discharge as identified in assessment in conjunction with the patient/family and health care team  - Arrange appropriate level of post-acute services according to patient's   needs and preference and payer coverage in collaboration with the physician and health care team  - Communicate with and update the patient/family, physician, and health care team regarding progress on the discharge plan  - Arrange appropriate transportation to post-acute venues  -Anticipate d/c to inpt rehab  Outcome: Progressing

## 2018-02-26 NOTE — OCCUPATIONAL THERAPY NOTE
[address]  Evaluation     Patient Name: Camilla Harris  HOKWR'C Date: 2/26/2018  Problem List  Patient Active Problem List   Diagnosis    Preoperative clearance    Coronary artery disease    Hypertension     Past Medical History  Past Medical History:   Diagnosis Date    Abdominal aortic aneurysm (AAA) ([address] )     last assessed nov 6 2015    Abscess of groin     last assessed oct 6 2014    Candidiasis, cutaneous     last assessed march 19 2014    Coronary artery disease     Dyslipidemia     Esophageal ulcer without bleeding     Gastritis     Hiatal hernia     Hx of cataract surgery, left     last assessed july 21 2014    Hyperlipidemia     Hypertension     Old myocardial infarction     Recurrent right inguinal hernia     s/p right orchioectomy, mesh removal, and sinus trac removal patient being followed by surgery next appt 4/28/14 patient s/p keflex x 7 days for MSSA Cx repeat wound cx grew MSSA cx repeat wound cx grew MSSA and other armond (mixed) no MRSA identified conitunue close monitoring and local wound care, last assessed april 15 2014    Wound of skin     in the groin, right     Past Surgical History  Past Surgical History:   Procedure Laterality Date    ABDOMINAL AORTIC ANEURYSM REPAIR  2009    aortobi-iliac, dacron graft    APPENDECTOMY      open    CORONARY ANGIOPLASTY WITH STENT PLACEMENT      stent 2    CYSTOSCOPY      diagnostic onset nov 13 2014    EXPLORATORY LAPAROTOMY  12/09/2009    HIP SURGERY Right     INGUINAL HERNIA REPAIR Right     unilateral x2    ORCHIECTOMY Right     MD REPAIR INCISIONAL HERNIA,REDUCIBLE N/A 2/23/2018    Procedure: lysis of adhesions; INCISIONAL HERNIA REPAIR WITH MESH;  Surgeon: Susan Shook MD;  [facility];  Service: General      02/26/18 1600   Note Type   Note type Eval/Treat   Restrictions/Precautions   Weight Bearing Precautions Per Order No   Braces or Orthoses (Platform shoe (R))   Other Precautions Hard of hearing;Multiple lines; Fall Risk   Pain Assessment   Pain Assessment 0-10   Pain Score 6   Pain Type Acute pain;Surgical pain   Pain Location Abdomen   Hospital Pain Intervention(s) Repositioned; Emotional support   Response to Interventions tolerated    Home Living   Type of [address] One level   Bathroom Shower/Tub Tub/shower unit   Bathroom Toilet Standard   Bathroom Accessibility Accessible   Home Equipment Cane;Long-handled shoehorn   Prior Function   Level of [place] Independent with ADLs and functional mobility   Lives With Alone   Receives Help From Friend(s)   ADL Assistance Independent   IADLs Needs assistance   Falls in the last 6 months 0   Vocational Retired   Lifestyle   Autonomy Pt reports being I with ADLs at baseline   Uses a cane   Reciprocal Relationships Pt lives alone but has family and friends nearby   Service to Others Retired   Intrinsic Gratification Waking up    Psychosocial   Psychosocial (WDL) WDL   ADL   Where Assessed Edge of bed   Eating Assistance 7  Independent   Grooming Assistance 4  Minimal Assistance   [address] 4  Minimal Assistance   [facility] [address] 2  Maximal Assistance   [address] 4  Minimal Assistance    [facility] 2  Maximal [address]  3  Moderate Assistance   Functional Assistance 3  Moderate Assistance   Bed Mobility   Sit to Supine 3  Moderate assistance   Additional items Assist x 1;LE management   Transfers   Sit to Stand 3  Moderate assistance   Additional items Assist x 1   Stand to Sit 3  Moderate assistance   Additional items Assist x 1   Stand pivot 3  Moderate assistance   Additional items Assist x 1   Functional Mobility   Additional items SPC   Balance   Static Sitting Fair   Dynamic Sitting Fair -   Static Standing Poor +   Dynamic Standing Poor   Activity Tolerance   Activity Tolerance Patient limited by fatigue   Nurse Made Aware Okay to see per RN   VITA Assessment   RUE Assessment WFL   LUE Assessment   LUE Assessment X  (1/2 AROM)   Hand Function   Gross Motor Coordination Functional   Fine Motor Coordination Functional   Cognition   Overall Cognitive Status WFL   Arousal/Participation Alert; Responsive; Cooperative   Attention Attends with cues to redirect   Orientation Level Oriented X4   Memory Within functional limits   Following Commands Follows one step commands without difficulty   Comments Pt is pleasant and cooperative  has decreased safety awareness at times  Assessment   Limitation Decreased ADL status; Decreased Safe judgement during ADL;Decreased endurance;Decreased UE ROM; Decreased self-care trans;Decreased high-level ADLs   Prognosis Good   Assessment Pt is a 67 y o  male who was admitted to [facility] on 2/23/2018 with ventral hernia  Pt has an extensive active problem list including but not limited to coronary artery disease, bursitis of L shoulder, callus foot, chest pain, degenerative arthritis of L knee, SOB, hyperlipidemia, and hypertension  Pt's problem list also includes PMH of abdominal aortic aneurysm, hx of gastritis, and hx of hernia  See H&P for more extensive list  At baseline pt reports I with ADLs and uses a cane at baseline  Pt lives alone in one level apartment  Currently pt requires min A for UB ADLs, max A for LB ADLs, and mod A x1 for functional mobility/transfers w/ SPC  Pt currently presents with impairments in the following categories -limited home support, difficulty performing ADLS, difficulty performing IADLS, limited insight into deficits, activity tolerance, endurance, standing balance/tolerance, sitting balance/tolerance and LUE ROM  These impairments, as well as pt's fatigue, pain, decreased caregiver support, risk for falls, and weakness limit pt's ability to safely engage in all baseline areas of occupation, including grooming, bathing, dressing, toileting, functional mobility/transfers and leisure activities    From OT standpoint, recommend inpatient rehab upon D/C  OT will continue to follow to address the below stated goals  Goals   Patient Goals to go to rehab    LTG Time Frame 7-10   Long Term Goal see below goals    Plan   Treatment Interventions ADL retraining;Functional transfer training;UE strengthening/ROM; Endurance training;Patient/family training;Equipment evaluation/education; Compensatory technique education; Energy conservation   Goal Expiration Date 03/08/18   OT Frequency 3-5x/wk   Recommendation   OT Discharge Recommendation Short Term Rehab   OT - OK to Discharge Yes  (when medically clear to STR)   Barthel Index   Feeding 10   Bathing 0   Grooming Score 0   Dressing Score 5   Bladder Score 10   Bowels Score 10   Toilet Use Score 5   Transfers (Bed/Chair) Score 5   Mobility (Level Surface) Score 0   Stairs Score 0   Barthel Index Score 45   Modified Summit Scale   Modified [place] Scale 4      Pt will perform all ADL's at mod I level with G balance and DME/AE as needed      Pt will perform functional transfers, including toilet transfer, at mod I level w/ use of DME/AE as needed       Pt will perform functional mobility at a mod I level w/ use of DME and G balance      Pt will demonstrate good carry over of DME safety and energy conservation techniques      Pt will demonstrate good carry over of pt/family education and training w/ 100% attention to task to assist w/ safe d/c planning      Pt will improve activity tolerance to G for 30 min treatment session      Pt will tolerate standing at sink for 8-10 minutes w/ G balance and endurance for grooming activity        Patel Hernandez OTR/L

## 2018-02-26 NOTE — SOCIAL WORK
Met with pt and discussed role of CM  Pt lives alone in a basement apt with 2 steps at entrance  Pt is independent in ADLs  Pt has DME including: cane  Pt has hx [facility]  Preference for pharmacy is [facility] on [address]  Pt denies having hx MH/D&A tx  Pt denies having POA  CM discussed inpt rehab recommendations with pt  Pt requesting referral to [facility]  Referral made via [facility]  SNF list provided to pt for additional choices  Main contact: Nadia Samaniego ([phone])

## 2018-02-26 NOTE — PLAN OF CARE
Problem: PHYSICAL THERAPY ADULT  Goal: Performs mobility at highest level of function for planned discharge setting  See evaluation for individualized goals  Treatment/Interventions: Functional transfer training, Endurance training, Patient/family training, Equipment eval/education, Bed mobility, Gait training, Spoke to nursing, Spoke to case management  Equipment Recommended: Wilmer Canales (RW)       See flowsheet documentation for full assessment, interventions and recommendations  Prognosis: Good  Problem List: Decreased strength, Decreased endurance, Impaired balance, Decreased mobility, Decreased coordination, Decreased safety awareness, Decreased skin integrity, Pain  Assessment: Pt is a 68 y/o male admitted to [facility] 2* ventral hernia with surgical repair  Pt lives alone in 1 story apt and 2 AIDE,use of [facility] for mobility and reports being completely I PTA  Pt reports having a GF who provided A PTA,no recent falls  Pt currently is not at functional mobility baseline,reports ABD pain throughout,dec safety awareness,use of new DME (RW),dependent for B shoe donning,IV medicine management,ongoing medical care and needs Ax1 for mobility  Pt demonstrates moderate deficits during functional mobility and gait including dec endurance,dec balance,dec BLE strength,inc ABD pain,ataxic and unsteady gait,dec safety awareness and needs modAX1 for transfers,BM and gait with RW  Pt would cont to benefit from skilled inpt PT services to maximize functional independence  Barriers to Discharge: Decreased caregiver support, Inaccessible home environment (AIDE and lives alone)     Recommendation: Other (Comment) (inpt rhb)          See flowsheet documentation for full assessment

## 2018-02-26 NOTE — RESTORATIVE TECHNICIAN NOTE
Restorative Specialist Mobility Note       Activity: Ambulate in cortes, Ambulate in room, Bathroom privileges, Chair, Dangle, Stand at bedside (Educated/encouraged pt to ambulate with assistance 3-4 x's/day   Pt callbell, phone/tray within reach )     Assistive Device: Cane (Shoes on from home)          Jose PELAYO, Restorative Technician, [employer]

## 2018-02-27 PROBLEM — K43.2 INCISIONAL HERNIA: Status: ACTIVE | Noted: 2018-02-27

## 2018-02-27 PROCEDURE — 97530 THERAPEUTIC ACTIVITIES: CPT

## 2018-02-27 PROCEDURE — 97116 GAIT TRAINING THERAPY: CPT

## 2018-02-27 PROCEDURE — 97110 THERAPEUTIC EXERCISES: CPT

## 2018-02-27 PROCEDURE — 99024 POSTOP FOLLOW-UP VISIT: CPT | Performed by: SURGERY

## 2018-02-27 RX ORDER — OXYCODONE HYDROCHLORIDE AND ACETAMINOPHEN 5; 325 MG/1; MG/1
TABLET ORAL
Status: COMPLETED
Start: 2018-02-27 | End: 2018-02-27

## 2018-02-27 RX ORDER — OXYCODONE HYDROCHLORIDE 5 MG/1
5 TABLET ORAL EVERY 4 HOURS PRN
Status: DISCONTINUED | OUTPATIENT
Start: 2018-02-27 | End: 2018-02-28 | Stop reason: HOSPADM

## 2018-02-27 RX ADMIN — FAMOTIDINE 20 MG: 20 TABLET, FILM COATED ORAL at 08:22

## 2018-02-27 RX ADMIN — METOPROLOL SUCCINATE 50 MG: 50 TABLET, EXTENDED RELEASE ORAL at 08:22

## 2018-02-27 RX ADMIN — ENOXAPARIN SODIUM 40 MG: 40 INJECTION SUBCUTANEOUS at 08:22

## 2018-02-27 RX ADMIN — LISINOPRIL 20 MG: 20 TABLET ORAL at 08:22

## 2018-02-27 RX ADMIN — DOCUSATE SODIUM 100 MG: 100 CAPSULE, LIQUID FILLED ORAL at 08:22

## 2018-02-27 RX ADMIN — OXYCODONE HYDROCHLORIDE AND ACETAMINOPHEN 1 TABLET: 5; 325 TABLET ORAL at 01:42

## 2018-02-27 RX ADMIN — OXYCODONE HYDROCHLORIDE 10 MG: 10 TABLET ORAL at 22:47

## 2018-02-27 RX ADMIN — ATORVASTATIN CALCIUM 40 MG: 40 TABLET, FILM COATED ORAL at 17:29

## 2018-02-27 RX ADMIN — ASPIRIN 81 MG 81 MG: 81 TABLET ORAL at 08:22

## 2018-02-27 RX ADMIN — HYDROCHLOROTHIAZIDE 12.5 MG: 12.5 TABLET ORAL at 08:23

## 2018-02-27 RX ADMIN — SENNOSIDES 8.6 MG: 8.6 TABLET, FILM COATED ORAL at 08:22

## 2018-02-27 RX ADMIN — DOCUSATE SODIUM 100 MG: 100 CAPSULE, LIQUID FILLED ORAL at 17:29

## 2018-02-27 NOTE — PROGRESS NOTES
Progress Note - General Surgery   Lawence Kasi 67 y o  male MRN: [id number]  Unit/Bed#: [facility] 616-01 Encounter: [id number]    Assessment:  67 M with incisional hernia s/p repair with mesh    Plan:  Continue clears, awaiting ROBF  Continue maintenance IVF  OOB and ambulate  Incentive spirometry  Continue abdominal binder  Lovenox  CM - referral made to [facility]    Subjective/Objective     Subjective:   Passing gas but no BMs  Tolerating clears  Objective:    Blood pressure 136/72, pulse 72, temperature 98 9 °F (37 2 °C), temperature source Oral, resp  rate 20, height 5' 8" (1 727 m), weight 73 8 kg (162 lb 11 2 oz), SpO2 99 %  ,Body mass index is 24 74 kg/m²  Intake/Output Summary (Last 24 hours) at 02/27/18 0556  Last data filed at 02/27/18 0247   Gross per 24 hour   Intake          3188 42 ml   Output             2595 ml   Net           593 42 ml       Invasive Devices     Peripheral Intravenous Line            Peripheral IV 02/23/18 Left Hand 3 days          Drain            Closed/Suction Drain Anterior LLQ Bulb 3 days                Physical Exam:   Gen: NAD, AAOx3  CV: RRR  Pulm: no resp distress  Abd: Soft, mildly distended, non-tender  Incision w/ staples c/d/i  Abdominal binder in place         Results from last 7 days  Lab Units 02/26/18  0626 02/25/18  0620 02/24/18  0558   WBC Thousand/uL 7 11 9 72 11 61*   HEMOGLOBIN g/dL 10 8* 11 2* 12 9   HEMATOCRIT % 31 1* 33 1* 37 5   PLATELETS Thousands/uL 207 194 216       Results from last 7 days  Lab Units 02/26/18  0626 02/25/18  0620 02/24/18  0558   SODIUM mmol/L 142 140 138   POTASSIUM mmol/L 3 8 3 3* 3 7   CHLORIDE mmol/L 112* 108 103   CO2 mmol/L 23 24 26   BUN mg/dL 13 25 30*   CREATININE mg/dL 0 71 0 78 1 04   GLUCOSE RANDOM mg/dL 111 79 127   CALCIUM mg/dL 8 7 8 7 8 4

## 2018-02-27 NOTE — PROGRESS NOTES
Patient care rounds were completed with the patient's nurse today, Ruth Cook  We discussed the plan is to advance him to a regular diet and saline lock his IV  We are awaiting skilled rehab facility placement  We reviewed all of the invasive devices/lines/telemetry orders   - None  Pain Assessment / Plan:  - Continue current analgesic regimen  Mobility Assessment / Plan:  - Activity as tolerated with PT and OT evaluation and treatment as indicated  Goals / Barriers for discharge:  - Toleration of regular diet and rehab placement  - Case management following  All questions and concerns were addressed  I spent greater than 15 minutes reviewing the plan with the patient and the nurse, and coordinating his care for the day      Biju Elena PA-C  2/27/2018 1:30 PM

## 2018-02-27 NOTE — SOCIAL WORK
Met with pt to f/u for additional SNF choices  Pt requesting additional referrals to [facility] and [facility]  Referrals made via [facility]

## 2018-02-27 NOTE — PHYSICAL THERAPY NOTE
Physical Therapy Progress Note     02/27/18 1440   Pain Assessment   Pain Assessment No/denies pain   Pain Score No Pain   Restrictions/Precautions   Weight Bearing Precautions Per Order No   Other Precautions Hard of hearing; Fall Risk;Bed Alarm   General   Family/Caregiver Present No   Cognition   Overall Cognitive Status WFL   Subjective   Subjective "I'm ready to take a walk "   Bed Mobility   Sit to Supine 4  Minimal assistance   Additional items Assist x 1   Transfers   Sit to Stand 4  Minimal assistance   Additional items Assist x 1; Armrests; Verbal cues   Stand to Sit 5  Supervision   Additional items Assist x 1; Armrests; Verbal cues   Stand pivot 4  Minimal assistance   Additional items Assist x 1;Verbal cues   Ambulation/Elevation   Gait pattern Improper Weight shift;Narrow CHANDLER; Antalgic; Short stride; Excessively slow   Gait Assistance 4  Minimal assist   Additional items Assist x 1;Verbal cues   Assistive Device SPC   Distance 180 feet and 320 feet   Balance   Static Sitting Fair +   Dynamic Sitting Fair   Static Standing Fair -   Ambulatory Fair -   Endurance Deficit   Endurance Deficit Yes   Endurance Deficit Description (SOB)   Activity Tolerance   Activity Tolerance Patient limited by fatigue   Nurse [address] to see per YARI Ponce   Exercises   THR Sitting;Left;AROM;15 reps; Supine   Assessment   Prognosis Good   Problem List Decreased strength;Decreased range of motion;Decreased endurance; Impaired balance;Decreased skin integrity   Assessment Pt is progressing well with use of straight cane  Mild loss of balance requiring min assist for correction  SOB today however O2 room air 93%  Educated to take frequent rest breaks when appropriate  Pt demos improvements with transfers as well  Pt would benefit from continued physical therapy to maximize functional mobility and safety     Goals   Patient Goals Pt is hopeful to go to rehab   STG Expiration Date 03/08/18   Treatment Day 1   Plan Treatment/Interventions Functional transfer training;LE strengthening/ROM; Therapeutic exercise; Endurance training;Patient/family training;Bed mobility;Gait training   Progress Progressing toward goals   PT Frequency 5x/wk   Recommendation   Recommendation Other (Comment)  (inpt rehab)   Equipment Recommended Walker;Maco Elliott, PTA

## 2018-02-27 NOTE — PLAN OF CARE
Problem: PHYSICAL THERAPY ADULT  Goal: Performs mobility at highest level of function for planned discharge setting  See evaluation for individualized goals  Treatment/Interventions: Functional transfer training, Endurance training, Patient/family training, Equipment eval/education, Bed mobility, Gait training, Spoke to nursing, Spoke to case management  Equipment Recommended: Wilmer Canales (RW)       See flowsheet documentation for full assessment, interventions and recommendations  Outcome: Progressing  Prognosis: Good  Problem List: Decreased strength, Decreased range of motion, Decreased endurance, Impaired balance, Decreased skin integrity  Assessment: Pt is progressing well with use of straight cane  Mild loss of balance requiring min assist for correction  SOB today however O2 room air 93%  Educated to take frequent rest breaks when appropriate  Pt demos improvements with transfers as well  Pt would benefit from continued physical therapy to maximize functional mobility and safety  Barriers to Discharge: Decreased caregiver support, Inaccessible home environment (AIDE and lives alone)     Recommendation: Other (Comment) (inpt rehab)          See flowsheet documentation for full assessment

## 2018-02-28 ENCOUNTER — HOSPITAL ENCOUNTER (INPATIENT)
Facility: HOSPITAL | Age: 73
LOS: 10 days | Discharge: HOME | DRG: 948 | End: 2018-03-10
Attending: PHYSICAL MEDICINE & REHABILITATION | Admitting: PHYSICAL MEDICINE & REHABILITATION
Payer: MEDICARE

## 2018-02-28 VITALS
HEART RATE: 80 BPM | HEIGHT: 68 IN | BODY MASS INDEX: 24.66 KG/M2 | TEMPERATURE: 98.2 F | DIASTOLIC BLOOD PRESSURE: 70 MMHG | SYSTOLIC BLOOD PRESSURE: 142 MMHG | OXYGEN SATURATION: 93 % | RESPIRATION RATE: 18 BRPM | WEIGHT: 162.7 LBS

## 2018-02-28 DIAGNOSIS — D64.9 ANEMIA: ICD-10-CM

## 2018-02-28 DIAGNOSIS — K43.2 INCISIONAL HERNIA: ICD-10-CM

## 2018-02-28 DIAGNOSIS — I10 HYPERTENSION: Primary | ICD-10-CM

## 2018-02-28 PROCEDURE — 97535 SELF CARE MNGMENT TRAINING: CPT

## 2018-02-28 PROCEDURE — 99024 POSTOP FOLLOW-UP VISIT: CPT | Performed by: SURGERY

## 2018-02-28 PROCEDURE — 99223 1ST HOSP IP/OBS HIGH 75: CPT | Performed by: PHYSICAL MEDICINE & REHABILITATION

## 2018-02-28 RX ORDER — ASPIRIN 81 MG/1
81 TABLET, CHEWABLE ORAL DAILY
Status: DISCONTINUED | OUTPATIENT
Start: 2018-03-01 | End: 2018-03-10 | Stop reason: HOSPADM

## 2018-02-28 RX ORDER — ONDANSETRON 2 MG/ML
4 INJECTION INTRAMUSCULAR; INTRAVENOUS EVERY 6 HOURS PRN
Status: CANCELLED | OUTPATIENT
Start: 2018-02-28

## 2018-02-28 RX ORDER — ACETAMINOPHEN 325 MG/1
650 TABLET ORAL EVERY 6 HOURS PRN
Status: DISCONTINUED | OUTPATIENT
Start: 2018-02-28 | End: 2018-03-05

## 2018-02-28 RX ORDER — OXYCODONE HYDROCHLORIDE 10 MG/1
10 TABLET ORAL EVERY 4 HOURS PRN
Status: DISCONTINUED | OUTPATIENT
Start: 2018-02-28 | End: 2018-03-09

## 2018-02-28 RX ORDER — ATORVASTATIN CALCIUM 40 MG/1
40 TABLET, FILM COATED ORAL
Status: DISCONTINUED | OUTPATIENT
Start: 2018-02-28 | End: 2018-03-10 | Stop reason: HOSPADM

## 2018-02-28 RX ORDER — OXYCODONE HYDROCHLORIDE 10 MG/1
10 TABLET ORAL EVERY 4 HOURS PRN
Status: CANCELLED | OUTPATIENT
Start: 2018-02-28

## 2018-02-28 RX ORDER — SENNOSIDES 8.6 MG
1 TABLET ORAL DAILY
Status: CANCELLED | OUTPATIENT
Start: 2018-03-01

## 2018-02-28 RX ORDER — OXYCODONE HYDROCHLORIDE 5 MG/1
5 TABLET ORAL EVERY 4 HOURS PRN
Status: DISCONTINUED | OUTPATIENT
Start: 2018-02-28 | End: 2018-03-09

## 2018-02-28 RX ORDER — METOPROLOL SUCCINATE 50 MG/1
50 TABLET, EXTENDED RELEASE ORAL DAILY
Status: CANCELLED | OUTPATIENT
Start: 2018-03-01

## 2018-02-28 RX ORDER — METOPROLOL SUCCINATE 50 MG/1
50 TABLET, EXTENDED RELEASE ORAL DAILY
Status: DISCONTINUED | OUTPATIENT
Start: 2018-03-01 | End: 2018-03-10 | Stop reason: HOSPADM

## 2018-02-28 RX ORDER — DOCUSATE SODIUM 100 MG/1
100 CAPSULE, LIQUID FILLED ORAL 2 TIMES DAILY
Status: CANCELLED | OUTPATIENT
Start: 2018-02-28

## 2018-02-28 RX ORDER — MINERAL OIL 100 G/100G
1 OIL RECTAL ONCE AS NEEDED
Status: DISCONTINUED | OUTPATIENT
Start: 2018-02-28 | End: 2018-02-28

## 2018-02-28 RX ORDER — BISACODYL 10 MG
10 SUPPOSITORY, RECTAL RECTAL DAILY PRN
Status: DISCONTINUED | OUTPATIENT
Start: 2018-02-28 | End: 2018-02-28 | Stop reason: HOSPADM

## 2018-02-28 RX ORDER — ACETAMINOPHEN 325 MG/1
650 TABLET ORAL EVERY 6 HOURS PRN
Status: CANCELLED | OUTPATIENT
Start: 2018-02-28

## 2018-02-28 RX ORDER — HYDROCHLOROTHIAZIDE 12.5 MG/1
12.5 TABLET ORAL DAILY
Status: DISCONTINUED | OUTPATIENT
Start: 2018-03-01 | End: 2018-03-01

## 2018-02-28 RX ORDER — ATORVASTATIN CALCIUM 40 MG/1
40 TABLET, FILM COATED ORAL EVERY EVENING
Status: CANCELLED | OUTPATIENT
Start: 2018-02-28

## 2018-02-28 RX ORDER — MINERAL OIL 100 G/100G
1 OIL RECTAL ONCE AS NEEDED
Status: DISCONTINUED | OUTPATIENT
Start: 2018-03-01 | End: 2018-03-09

## 2018-02-28 RX ORDER — HYDROCHLOROTHIAZIDE 12.5 MG/1
12.5 TABLET ORAL DAILY
Status: CANCELLED | OUTPATIENT
Start: 2018-03-01

## 2018-02-28 RX ORDER — LISINOPRIL 20 MG/1
20 TABLET ORAL DAILY
Status: DISCONTINUED | OUTPATIENT
Start: 2018-03-01 | End: 2018-03-01

## 2018-02-28 RX ORDER — LISINOPRIL 20 MG/1
20 TABLET ORAL DAILY
Status: CANCELLED | OUTPATIENT
Start: 2018-03-01

## 2018-02-28 RX ORDER — LACTULOSE 20 G/30ML
20 SOLUTION ORAL 2 TIMES DAILY
Status: DISCONTINUED | OUTPATIENT
Start: 2018-02-28 | End: 2018-03-01

## 2018-02-28 RX ORDER — OXYCODONE HYDROCHLORIDE 5 MG/1
5 TABLET ORAL EVERY 4 HOURS PRN
Status: CANCELLED | OUTPATIENT
Start: 2018-02-28

## 2018-02-28 RX ORDER — FAMOTIDINE 20 MG/1
20 TABLET, FILM COATED ORAL DAILY
Status: CANCELLED | OUTPATIENT
Start: 2018-03-01

## 2018-02-28 RX ORDER — ASPIRIN 81 MG/1
81 TABLET, CHEWABLE ORAL DAILY
Status: CANCELLED | OUTPATIENT
Start: 2018-03-01

## 2018-02-28 RX ORDER — ONDANSETRON 4 MG/1
4 TABLET, ORALLY DISINTEGRATING ORAL EVERY 8 HOURS PRN
Status: DISCONTINUED | OUTPATIENT
Start: 2018-02-28 | End: 2018-03-01

## 2018-02-28 RX ORDER — BISACODYL 10 MG
10 SUPPOSITORY, RECTAL RECTAL DAILY PRN
Status: DISCONTINUED | OUTPATIENT
Start: 2018-03-01 | End: 2018-03-09

## 2018-02-28 RX ORDER — BISACODYL 10 MG
10 SUPPOSITORY, RECTAL RECTAL DAILY PRN
Status: CANCELLED | OUTPATIENT
Start: 2018-02-28

## 2018-02-28 RX ORDER — FAMOTIDINE 20 MG/1
20 TABLET, FILM COATED ORAL DAILY
Status: DISCONTINUED | OUTPATIENT
Start: 2018-03-01 | End: 2018-03-01

## 2018-02-28 RX ADMIN — LACTULOSE 20 G: 20 SOLUTION ORAL at 18:24

## 2018-02-28 RX ADMIN — HYDROCHLOROTHIAZIDE 12.5 MG: 12.5 TABLET ORAL at 08:46

## 2018-02-28 RX ADMIN — OXYCODONE HYDROCHLORIDE 10 MG: 10 TABLET ORAL at 19:24

## 2018-02-28 RX ADMIN — OXYCODONE HYDROCHLORIDE 10 MG: 10 TABLET ORAL at 12:02

## 2018-02-28 RX ADMIN — DOCUSATE SODIUM 100 MG: 100 CAPSULE, LIQUID FILLED ORAL at 08:46

## 2018-02-28 RX ADMIN — SENNOSIDES 8.6 MG: 8.6 TABLET, FILM COATED ORAL at 08:46

## 2018-02-28 RX ADMIN — BISACODYL 10 MG: 10 SUPPOSITORY RECTAL at 10:30

## 2018-02-28 RX ADMIN — METOPROLOL SUCCINATE 50 MG: 50 TABLET, EXTENDED RELEASE ORAL at 08:46

## 2018-02-28 RX ADMIN — ASPIRIN 81 MG 81 MG: 81 TABLET ORAL at 08:46

## 2018-02-28 RX ADMIN — FAMOTIDINE 20 MG: 20 TABLET, FILM COATED ORAL at 08:46

## 2018-02-28 RX ADMIN — ONDANSETRON 4 MG: 4 TABLET, ORALLY DISINTEGRATING ORAL at 23:33

## 2018-02-28 RX ADMIN — LISINOPRIL 20 MG: 20 TABLET ORAL at 08:46

## 2018-02-28 RX ADMIN — ENOXAPARIN SODIUM 40 MG: 40 INJECTION SUBCUTANEOUS at 08:46

## 2018-02-28 RX ADMIN — ATORVASTATIN CALCIUM 40 MG: 40 TABLET, FILM COATED ORAL at 17:05

## 2018-02-28 NOTE — PROGRESS NOTES
Progress Note - Acute Care Surgery   Miguel Gucci 67 y o  male MRN: [id number]  Unit/Bed#: [facility] 616-01 Encounter: [id number]    Assessment:  67 M with incisional hernia s/p mesh repair POD 5    Plan:  Regular diet as tolerated  OOB and ambulate  Placement pending  Will discontinue DILLON prior to discharge  Lovenox    Subjective/Objective     Subjective: No acute events  Tolerating diet  Passing gas, no bowel movement yet  Pain is controlled  Objective:    Blood pressure 135/74, pulse 78, temperature 98 2 °F (36 8 °C), resp  rate 20, height 5' 8" (1 727 m), weight 73 8 kg (162 lb 11 2 oz), SpO2 94 %  ,Body mass index is 24 74 kg/m²  Intake/Output Summary (Last 24 hours) at 02/28/18 7223  Last data filed at 02/27/18 2144   Gross per 24 hour   Intake          2505 75 ml   Output              910 ml   Net          1595 75 ml       Invasive Devices     Drain            Closed/Suction Drain Anterior LLQ Bulb 4 days                Physical Exam:   General: NAD, AAOx3  CV: RRR +S1/S2  Chest: breath sounds bilaterally  Abdomen: soft, non-distended, mildly tender   Incision c/d/i with staples  Extremities: atraumatic, no edema        Results from last 7 days  Lab Units 02/26/18  0626 02/25/18  0620 02/24/18  0558   WBC Thousand/uL 7 11 9 72 11 61*   HEMOGLOBIN g/dL 10 8* 11 2* 12 9   HEMATOCRIT % 31 1* 33 1* 37 5   PLATELETS Thousands/uL 207 194 216       Results from last 7 days  Lab Units 02/26/18  0626 02/25/18  0620 02/24/18  0558   SODIUM mmol/L 142 140 138   POTASSIUM mmol/L 3 8 3 3* 3 7   CHLORIDE mmol/L 112* 108 103   CO2 mmol/L 23 24 26   BUN mg/dL 13 25 30*   CREATININE mg/dL 0 71 0 78 1 04   GLUCOSE RANDOM mg/dL 111 79 127   CALCIUM mg/dL 8 7 8 7 8 4

## 2018-02-28 NOTE — CONSULTS
Consultation - Fiorella Burger 67 y o  male MRN: [id number]    Unit/Bed#: -01 Encounter: [id number]        History of Present Illness     HPI: Fiorella Burger is a 67y o  year old male with a history of HTN, esophageal ulcer/GERD, CAD/stent RCA, [id number] BPG, HLD, AAA repair with subsequent abdominal fascial dehiscence who underwent an elective incisional hernia repair with MESH/MARK with Dr Martin Found on 2/23/18  He experienced some mild acute blood loss anemia but did not require transfusion  He had some slow return of bowel function which eventually resolved  Currently, patient is not complaining of CP, dizziness, N/V/D  Says incisional pain is controlled  Has some mild DC when first up and moving he says  Last BM was today      ROS:  As in HPI, otherwise negative 12 point ROS       Historical Information   Past Medical History:   Diagnosis Date    Abdominal aortic aneurysm (AAA) ([facility] [address] )     last assessed nov 6 2015    Abscess of groin     last assessed oct 6 2014    Candidiasis, cutaneous     last assessed march 19 2014    Coronary artery disease     Dyslipidemia     Esophageal ulcer without bleeding     Gastritis     Hiatal hernia     Hx of cataract surgery, left     last assessed july 21 2014    Hyperlipidemia     Hypertension     Old myocardial infarction     Recurrent right inguinal hernia     s/p right orchioectomy, mesh removal, and sinus trac removal patient being followed by surgery next appt 4/28/14 patient s/p keflex x 7 days for MSSA Cx repeat wound cx grew MSSA cx repeat wound cx grew MSSA and other armond (mixed) no MRSA identified conitunue close monitoring and local wound care, last assessed april 15 2014    Wound of skin     in the groin, right     Past Surgical History:   Procedure Laterality Date    ABDOMINAL AORTIC ANEURYSM REPAIR  2009    aortobi-iliac, dacron graft    APPENDECTOMY      open    CORONARY ANGIOPLASTY WITH STENT PLACEMENT      stent 2    CYSTOSCOPY      diagnostic onset nov 13 2014    EXPLORATORY LAPAROTOMY  12/09/2009    HIP SURGERY Right     INGUINAL HERNIA REPAIR Right     unilateral x2    ORCHIECTOMY Right     NH REPAIR INCISIONAL HERNIA,REDUCIBLE N/A 2/23/2018    Procedure: lysis of adhesions; INCISIONAL HERNIA REPAIR WITH MESH;  Surgeon: Damaris Palacios MD;  Location: [facility];  Service: General     Social History   History   Alcohol Use    Yes     Comment: occasionally     History   Drug Use No     History   Smoking Status    Former Smoker   Smokeless Tobacco    Never Used     Family History   Problem Relation Age of Onset    Heart disease Mother     Diabetes Mother     Heart disease Father        Meds/Allergies   current meds:  Current Facility-Administered Medications   Medication Dose Route Frequency    acetaminophen (TYLENOL) tablet 650 mg  650 mg Oral Q6H PRN    [START ON 3/1/2018] aspirin chewable tablet 81 mg  81 mg Oral Daily    atorvastatin (LIPITOR) tablet 40 mg  40 mg Oral Daily With Dinner    [START ON 3/1/2018] bisacodyl (DULCOLAX) rectal suppository 10 mg  10 mg Rectal Daily PRN    [START ON 3/1/2018] enoxaparin (LOVENOX) subcutaneous injection 40 mg  40 mg Subcutaneous Daily    [START ON 3/1/2018] famotidine (PEPCID) tablet 20 mg  20 mg Oral Daily    [START ON 3/1/2018] hydrochlorothiazide (HYDRODIURIL) tablet 12 5 mg  12 5 mg Oral Daily    lactulose 20 g/30 mL oral solution 20 g  20 g Oral BID    [START ON 3/1/2018] lisinopril (ZESTRIL) tablet 20 mg  20 mg Oral Daily    [START ON 3/1/2018] metoprolol succinate (TOPROL-XL) 24 hr tablet 50 mg  50 mg Oral Daily    [START ON 3/1/2018] mineral oil enema 1 enema  1 enema Rectal Once PRN    oxyCODONE (ROXICODONE) immediate release tablet 10 mg  10 mg Oral Q4H PRN    oxyCODONE (ROXICODONE) IR tablet 5 mg  5 mg Oral Q4H PRN       PTA meds:   Prescriptions Prior to Admission   Medication    aspirin 81 MG tablet    atorvastatin (LIPITOR) 40 mg tablet    hydrochlorothiazide (MICROZIDE) 12 5 mg capsule    lisinopril (ZESTRIL) 20 mg tablet    metoprolol succinate (TOPROL-XL) 50 mg 24 hr tablet    ranitidine (ZANTAC) 150 MG capsule     No Known Allergies    Objective   Vitals: Blood pressure 115/59, pulse 93, temperature 97 7 °F (36 5 °C), temperature source Oral, resp  rate 22, SpO2 93 %  Physical Exam     Constitutional:  NAD; pleasant; nontoxic  HEENT:  AT/NC; oropharynx negative for thrush  Neck: negative for JVD/lymphadenopathy   CV:  +S1, S2;  RRR; no rub/murmur  Pulmonary:  BBS without crackles/wheeze/rhonci; resp are unlabored  Abdominal:  soft, +BS, ND/NT; no mass; Left DILLON draining light bloody drainage  Abd incision is covered by Mepilex dressing  Musculoskeletal:  trace edema of RLE and none of LLE  :  no centeno  Skin:  no rashes  Neuro AAO; SHEPHERD 5/5      Lab Results:   Results from last 7 days  Lab Units 02/26/18  0626 02/25/18  0620   WBC Thousand/uL 7 11 9 72   HEMOGLOBIN g/dL 10 8* 11 2*   HEMATOCRIT % 31 1* 33 1*   PLATELETS Thousands/uL 207 194       Results from last 7 days  Lab Units 02/26/18  0626 02/25/18  0620   SODIUM mmol/L 142 140   POTASSIUM mmol/L 3 8 3 3*   CHLORIDE mmol/L 112* 108   CO2 mmol/L 23 24   BUN mg/dL 13 25   CREATININE mg/dL 0 71 0 78   GLUCOSE RANDOM mg/dL 111 79   CALCIUM mg/dL 8 7 8 7               Glucose (mg/dL)   Date Value   02/26/2018 111   02/25/2018 79   02/24/2018 127   02/02/2017 83   12/10/2015 93   06/18/2015 103   05/07/2014 102   11/25/2013 87       Labs reviewed    Imaging: reviewed  EKG, Pathology, and Other Studies: I have personally reviewed pertinent reports  VTE Prophylaxis: Enoxaparin (Lovenox)    Code Status: Level 1 - Full Code     Assessment/Plan      1  Incisional hernia repair with MESH and MARK with Dr Basilio Samuel 2/23/18:  will watch incision  Will check with surgery to see when removing the DILLON and when can remove Mepilex dressing  2   ABLA:  mild, not requiring transfusion    Will watch    3  HTN: stable  At home he takes Lisinopril 20mg qd, HCTZ 25mg qd and Toprol 50mg qd  Currently, he is on HCTZ 12 5mg qd, Lisinopril 20mg qd and Toprol 50mg qd  Will watch    4  HLD:  continue Lipitor    5  CAD with hx IWMI/BMS RCA 00:  last stress test 2015  Continue ASA/Lipitor    6  Hx PAD with [id number] BPG    7  Hx GERD:  continue Pepcid = at home he takes Zantac prn      Counseling / Coordination of Care  Total floor / unit time spent today 60 minutes  Greater than 50% of total time was spent with the patient and / or family counseling and / or coordination of care        ROOSEVELT Coleman

## 2018-02-28 NOTE — PLAN OF CARE
Problem: OCCUPATIONAL THERAPY ADULT  Goal: Performs self-care activities at highest level of function for planned discharge setting  See evaluation for individualized goals  Treatment Interventions: ADL retraining, Functional transfer training, UE strengthening/ROM, Endurance training, Patient/family training, Equipment evaluation/education, Compensatory technique education, Energy conservation          See flowsheet documentation for full assessment, interventions and recommendations  Outcome: Progressing  Limitation: Decreased ADL status, Decreased endurance, Decreased self-care trans, Decreased high-level ADLs, Decreased UE ROM  Prognosis: Good  Assessment: Pt seen for OT treatment session focusing on self care  Pt sat in chair for bathing ADL  Pt was able to perform UB washing w/ S for safety and s/u, requiring assist to wash/dry back  Pt has limited ROM in LUE 2* bursitis, however he had enough range to wash under his arm  Pt demonstrated good insight and safety awareness while washing around incision area  Pt stood to wash perineal area with L sided support from bed  Pt dependent to wash lower legs and feet 2* decreased forward flexion and LE ROM 2* incision/surgical pain  Pt amb to closet w/ cane and close S to retrieve clothing items for dressing  Pt able to don underwear and pants while seated EOB and required steadying assist during pull up  Pt dependent to don socks and shoes  Pt requires min A for sit <->stand transfers from chair and bed w/ vc for proper and safe technique  Pt required rest breaks during both bathing and dressing 2* SOB  Pt demonstrated good insight into knowing when to take rest breaks  Educated pt on breathing techniques for SOB  Pt amb within room w/ cane and close S for safety w/ balance to retrieve items to take with him upon d/c  Pt overall tolerated treatment well with increased time to complete tasks and rest breaks between activity   Pt is progressing towards therapy goals but still presents w/ limitations from his baseline  Rec inpatient rehab upon d/c  Will cont to follow to address previously determined goals        OT Discharge Recommendation: Short Term Rehab  OT - OK to Discharge: Yes (when medically clear to STR)

## 2018-02-28 NOTE — H&P
H&P Exam - Skinny Bach 67 y o  male MRN: [id number]    Unit/Bed#: -01 Encounter: [id number]      Primary Rehab dx:  Debility     History of Present Illness   Pt is 68 yo admitted for elective incisional hernia repair and MARK by Dr Mayank Kenney on 2/23/18  Issue #1: per patient had BM today   Issute #2: per patient pain currently controlled         PMHx:  HTN, HL, AAA s/p repair, PVD s/p bypass graft, CAD s/p PCI, GERD/hiatal hernia, constipation, anemia    Incidental findings of CT AP of 9/19/14: cholelithiasis, diverticulosis       Family History: Non contributory    ROS:  Constitutional: denies fevers, chills  HEENT: denies tinnitus  Pulm: denies SOB  CV: denies CP  GI: denies abdominal pain  Neuro: denies headache  Skin: denies rashes  Psych: denies depression  Extremities: denies edema    No Known Allergies      Funtional status on admission: min-sup tx, min amb  Functional status prior to admission: mod I PTA  Social history: lives alone in apt, 2STE    Objective     Current Vitals:   T: 97 7  HR: 93  BP: 115/59  POx: 93%  RR: 16 (not 22)    Gen: NAD   HEENT: No swelling of the tongue  Pulm: respirations unlabored  Card: +S1/S2   Abd: + inguinal drain   Neuro: CN grossly intact, lt touch grossly intact, finger to nose without gross dysmetria b/l  MSK: 5/5 throughout  Skin: no rashes  Psych: mood/affect stable               Comorbidities:   HTN, HL, AAA s/p repair, PVD s/p bypass graft, CAD s/p PCI, GERD/hiatal hernia, constipation, anemia    Incidental findings of CT AP of 9/19/14: cholelithiasis, diverticulosis     Assessment:  Pt is 68 yo admitted for elective incisional hernia repair and MARK by Dr Mayank Kenney on 2/23/18    Plan:    Rehabilitation Necessity:   Medical impact on function as a result of impaired functional mobility, bed mobility, transfers, self-care/ADL's, endurance, range of motion/flexibility, and impaired coordination   Treatment plan will include a comprehensive rehabilitation program with intense therapies for 3 hours/day, 5 days/week  1  24-hour availability of a physician specializing in rehabilitation who will coordinate the rehabilitation disciplines, manage the comorbid conditions, monitor the patient's functional improvement, and maximize the rehabilitation outcome  2  Physical therapy to address bed mobidility, car and mat transfer, functional mobility with use of least restrictive assistive device, truncal strengthening, coordination, range of motion/flexibility, durable medical equipment evaluation, patient and family instruction and endurance training  3  Occupational therapy to address feeding, grooming, upper and lower body dressing and bathing, toileting, tub/toilet/bed transfers, durable medical equipment evaluation, range of motion/flexibility, dexterity, coordination and patient and family instruction  4  Rehabilitation nursing 24 hours per day to monitor bowel and bladder function, work on bowel routine, assess falls risk upon admission and periodically thereafter, implement and revise falls prevention strategies, maintain skin integrity through initial and daily pressure sore risk assessment (Paramjit scale), implement and revise pressure sore prevention strategies, educate patient and family members regarding medication administration, ADL's, transfers, and mobility and continue therapy carryover with ADL's, transfers, and mobility  5  Social work and case management consults for discharge planning/disposition issues, as well as coordination and communication of patient progress between family and providers    6  Rehab psychology- as needed for adjustment, coping      s/p elective incisional hernia repair and MARK :  Dr Gurpreet Snow on 2/23/18, general surgery consulted for possible removal of drain      HTN: on home toprol XL 50 mg qd, lisinopril 20 mg qd, however home HCTZ reduced from 25 mg to 12 5 QD     HL: on home atorvastatin 40 mg qpm     AAA: s/p repair, by   Paul     PVD: s/p bypass graft     CAD: s/p PCI, stress test of 1/8/2015 w/o perfusion abnormality; on home ASA 81 mg qd, atorvastatin 40 mg qpm, toprol XL 50 mg qd (additionally holter monitor of 2/15/17 revealed PAC/PVCs but no VT/SVT/A-fib/A-flutter and was without bradyarrhythmia or advanced heart block); OP FU with Dr Nicole Magana    GERD/hiatal hernia: home zantac 150 mg qd non-formulary, therapeutic substitution       Constipation: abd XR of 2/25 negative for obstruction, per patient had BM today (2/28)     Anemia: ABLA, Hg currently 10 8, transfuse for Hg<8 0 rather than 7 0 due to CAD history    Incidental findings of CT AP of 9/19/14: cholelithiasis, diverticulosis     DVT ppx: scd's, lovenox       III  Estimated length of stay:  Weeks: 1-2    IV  Goals  Maximize functional transfers, ADLs, and mobility to decrease caregiver burden  V  Anticipated discharge setting  Home    VI  Prognosis:  Fair     CMS Required Post-Admission Physician Evaluation Elements  History and Physical, including medical history, functional history and active comorbidities as in above text  Post -Admission Physician Evaluation:  The patient has the potential to make improvement and is in need of at least 2 of the following multidisciplinary therapies including, but not limited to physical, occupational, speech and respiratory  The patient may also need nutritional services, wound care and prosthetics/orthotics prescription  Given the patient's complex medical condition and risk of further medical complications, rehabilitative services cannot be safely provided at a lower level of care, such as a skilled nursing facility  I have reviewed the patient's functional and medical status at the time of the preadmission screening and they are the same as on the day of this admission  I acknowledge that I have personally performed a full physical examination on this patient within 24 hours of admission   I have determined that the patient is able to tolerate the above course of treatment, at the appropriate level of intensity, for a reasonable period of time  The patient demonstrated understanding the rehabilitation program and the discharge process after we discussed them    Agree in entirety: yes  Minor adaptions: none   Major changes: none    Note:    In addition to rehab needs this patient requires acute inpatient rehabilitation for monitoring of Hg and transfuse if needed, monitor surgical drain output

## 2018-02-28 NOTE — CASE MANAGEMENT
Continued Stay Review    Date: 2/28/2018    Vital Signs: /70 (BP Location: Right arm)   Pulse 80   Temp 98 2 °F (36 8 °C) (Oral)   Resp 18   Ht 5' 8" (1 727 m)   Wt 73 8 kg (162 lb 11 2 oz)   SpO2 93%   BMI 24 74 kg/m²     Medications:   Scheduled Meds:   Current Facility-Administered Medications:  aspirin 81 mg Oral Daily   atorvastatin 40 mg Oral QPM   docusate sodium 100 mg Oral BID   enoxaparin 40 mg Subcutaneous Daily   famotidine 20 mg Oral Daily   hydrochlorothiazide 12 5 mg Oral Daily   lisinopril 20 mg Oral Daily   metoprolol succinate 50 mg Oral Daily   senna 1 tablet Oral Daily     Continuous Infusions:   D5 0 45NS w/ 20 meq KCL @ 115 ml/hr - d/c'd on 2/27 @ 14:41  PRN Meds:   acetaminophen    Bisacodyl supp - 2/28 x 1    HYDROmorphone    ondansetron    oxyCODONE 10 mg po 2/27 x 1    oxyCODONE   Percocet po 2/27 x 1    Nursing orders - VS q 4 - Up as tolerated - I & O q shift - Incentive spirometry - SCD's to le's- Weight bearing as tolerated - PT/OT treatment - Diet regular House     Abnormal Labs/Diagnostic Results:   Lab Units 02/26/18  0626 02/25/18  0620 02/24/18  0558   WBC Thousand/uL 7 11 9 72 11 61*   HEMOGLOBIN g/dL 10 8* 11 2* 12 9   HEMATOCRIT % 31 1* 33 1* 37 5   PLATELETS Thousands/uL 207 194 216      Lab Units 02/26/18  0626 02/25/18  0620 02/24/18  0558   SODIUM mmol/L 142 140 138   POTASSIUM mmol/L 3 8 3 3* 3 7   CHLORIDE mmol/L 112* 108 103   CO2 mmol/L 23 24 26   BUN mg/dL 13 25 30*   CREATININE mg/dL 0 71 0 78 1 04   GLUCOSE RANDOM mg/dL 111 79 127   CALCIUM mg/dL 8 7 8 7 8 4         Age/Sex: 67 y o  male     Assessment/Plan:  Progress note  2/28  Assessment:  67 M with incisional hernia s/p mesh repair POD 5     Plan:  Regular diet as tolerated  OOB and ambulate  Placement pending  Will discontinue DILLON prior to discharge  Lovenox       Discharge Plan:  Referral to [facility]  On 2/26 - SNF referrals made 2/27

## 2018-02-28 NOTE — SOCIAL WORK
Spoke to Ya from [facility] and they will be able to take pt today around 230 as long as he tolerates his lunch  Page to [facility] surgery and spoke to Dr Ishan Campos and she is aware

## 2018-02-28 NOTE — OCCUPATIONAL THERAPY NOTE
[address] Progress Note     Patient Name: Ayla Heard  [id number]'[id number] Date: 2/28/2018  Problem List  Patient Active Problem List   Diagnosis    Preoperative clearance    Coronary artery disease    Hypertension    Incisional hernia           02/28/18 0813   Note Type   Note type Progress   Restrictions/Precautions   Weight Bearing Precautions Per Order No   Braces or Orthoses (Platform shoe (R))   Other Precautions Fall Risk   Pain Assessment   Pain Assessment No/denies pain   Pain Score No Pain   ADL   Where Assessed Chair   UB Bathing Assistance 5  Supervision/Setup   UB Bathing Deficit Increased time to complete   LB Bathing Assistance 3  Moderate Assistance   LB Bathing Deficit Right lower leg including foot; Left lower leg including foot   UB Dressing Assistance 5  Supervision/Setup   UB Dressing Deficit Increased time to complete;Supervision/safety   LB Dressing Assistance 2  Maximal Assistance   LB Dressing Deficit Don/doff R sock; Don/doff L sock; Don/doff R shoe;Don/doff L shoe;Steadying   Bed Mobility   Supine to Sit 4  Minimal assistance   Additional items Assist x 1   Transfers   Sit to Stand 4  Minimal assistance   Additional items Assist x 1; Increased time required   Stand to Sit 4  Minimal assistance   Additional items Assist x 1; Increased time required   Functional Mobility   Functional Mobility 5  Supervision  (CGA)   Additional items SPC   Balance   Static Sitting Fair +   Dynamic Sitting Fair   Static Standing Poor +   Dynamic Standing Poor +   Activity Tolerance   Activity Tolerance Patient tolerated treatment well   Nurse Made Aware Okay to see per RNSulma   Overall Cognitive Status [facility]   Arousal/Participation Alert; Responsive; Cooperative   Attention Within functional limits   Orientation Level Oriented X4   Memory Within functional limits   Following Commands Follows one step commands without difficulty   Comments Pt is pleasant and has a good sense of humor   He requires min vc for safety w/ transfers and activity modifications, however he is motivated to participate in therapy   Assessment   Limitation Decreased ADL status; Decreased endurance;Decreased self-care trans;Decreased high-level ADLs; Decreased UE ROM   Prognosis Good   Assessment Pt seen for OT treatment session focusing on self care  Pt sat in chair for bathing ADL  Pt was able to perform UB washing w/ S for safety and s/u, requiring assist to wash/dry back  Pt has limited ROM in LUE 2* bursitis, however he had enough range to wash under his arm  Pt demonstrated good insight and safety awareness while washing around incision area  Pt stood to wash perineal area with L sided support from bed  Pt dependent to wash lower legs and feet 2* decreased forward flexion and LE ROM 2* incision/surgical pain  Pt amb to closet w/ cane and close S to retrieve clothing items for dressing  Pt able to don underwear and pants while seated EOB and required steadying assist during pull up  Pt dependent to don socks and shoes  Pt requires min A for sit <->stand transfers from chair and bed w/ vc for proper and safe technique  Pt required rest breaks during both bathing and dressing 2* SOB  Pt demonstrated good insight into knowing when to take rest breaks  Educated pt on breathing techniques for SOB  Pt amb within room w/ cane and close S for safety w/ balance to retrieve items to take with him upon d/c  Pt overall tolerated treatment well with increased time to complete tasks and rest breaks between activity  Pt is progressing towards therapy goals but still presents w/ limitations from his baseline  Rec inpatient rehab upon d/c  Will cont to follow to address previously determined goals  Goals   Patient Goals to go to rehab   Plan   Treatment Interventions ADL retraining;Functional transfer training;UE strengthening/ROM; Endurance training;Patient/family training;Equipment evaluation/education; Compensatory technique education; Energy conservation   Goal Expiration Date 03/08/18   Treatment Day 1   OT Frequency 3-5x/wk   Recommendation   OT Discharge Recommendation Short Term Rehab   OT - OK to Discharge Yes  (when medically clear to STR)   Barthel Index   Feeding 10   Bathing 0   Grooming Score 5   Dressing Score 5   Bladder Score 10   Bowels Score 10   Toilet Use Score 5   Transfers (Bed/Chair) Score 10   Mobility (Level Surface) Score 0   Stairs Score 0   Barthel Index Score 55   Modified [place] Scale   Modified Baylee Scale 4     Beatriz Merida, OTR/L

## 2018-02-28 NOTE — PROGRESS NOTES
PHYSICAL MEDICINE AND REHABILITATION   PREADMISSION ASSESSMENT     Projected [facility] Diagnoses:  Impairment of mobility, safety and Activities of Daily Living (ADLs) due to Debility:  16  Debility (Non-cardiac/Non-pulmonary)   Etiology: Symptomatic Incisional Hernia s/p Repair  Date of Onset:2/23/18   Date of surgery: 2/23/18: lysis of adhesions; incisional hernia repair with mesh       PATIENT INFORMATION  Name: Kathia Washburn Phone #: [phone] (home)   Address: [place] [address]  YOB: 1945 Age: 67 y o  SS#   Marital Status: Single  Ethnicity:   Employment Status: retired  Extended Emergency Contact Information  Primary Emergency Contact: Shonda Cespedes [address] [place] [address] Phone: [phone]  Relation: Son  Advance Directive: Level 1 Full Code, AD Unknown    INSURANCE/COVERAGE:     Primary Payor: MEDICARE / Plan: MEDICARE A AND B / Product Type: Medicare A & B Fee for Service /   Secondary Payer: Private Pay   Payer Contact:  Payer Contact:   Contact Phone:  Contact Phone:   Authorization #:   Coverage Dates:  LCD:   Medicare Days:  Medicare ID #: [id number]  Medical Record #: [id number]    REFERRAL SOURCE:   Referring provider: Leanne Delgado MD  Referring facility: [address] [place] [address]  Room: [place] [address]/[place] [address]  PCP: Augustine oMrris DO PCP phone number: [phone]    MEDICAL INFORMATION  HPI:Patient is a 67year old male with a history of AAA with repair complicated by abdominal fascial dehiscence, as well as multiple other abdominal surgeries  Patient had a chronic ventral hernia that was asymptomatic until recently  He was experiencing abdominal pain at hernia site causing inability to bed over as well as hernia not reducing back inside his abdomen  Patient presented to [facility] on [facility]/23/18 for a scheduled ventral hernia repair    During his procedure it was found that the hernia was much larger than anticipated with multiple incisional herniae along  midline with "swiss cheese facia" requiring a large midline incision with a large mesh and DILLON drain insertion  Patient was admitted under general surgery's service to be monitored for pain and to advance his diet slowly  On 2/25/18 patient was distended and did not have return of bowel function  Abdominal x-ray was completed that was negative for obstruction or ileus  He was made NPO at that time but since has progressed to a regular diet that he is tolerating without complications  He is passing flatus and his pain is controlled  DILLON drain to be discharged before transfer  Patient is medically cleared by general surgery for transfer and therapy is recommending rehab  Past Medical History:   Past Surgical History:    Allergies:     Past Medical History:   Diagnosis Date    Abdominal aortic aneurysm (AAA) ([facility] [address] [address] )     last assessed nov 6 2015    Abscess of groin     last assessed oct 6 2014    Candidiasis, cutaneous     last assessed march 19 2014    Coronary artery disease     Dyslipidemia     Esophageal ulcer without bleeding     Gastritis     Hiatal hernia     Hx of cataract surgery, left     last assessed july 21 2014    Hyperlipidemia     Hypertension     Old myocardial infarction     Recurrent right inguinal hernia     s/p right orchioectomy, mesh removal, and sinus trac removal patient being followed by surgery next appt 4/28/14 patient s/p keflex x 7 days for MSSA Cx repeat wound cx grew MSSA cx repeat wound cx grew MSSA and other armond (mixed) no MRSA identified conitunue close monitoring and local wound care, last assessed april 15 2014    Wound of skin     in the groin, right    Past Surgical History:   Procedure Laterality Date    ABDOMINAL AORTIC ANEURYSM REPAIR  2009    aortobi-iliac, dacron graft    APPENDECTOMY      open    CORONARY ANGIOPLASTY WITH STENT PLACEMENT      stent 2    CYSTOSCOPY      diagnostic onset nov 13 2014    EXPLORATORY LAPAROTOMY  12/09/2009    HIP SURGERY Right     INGUINAL HERNIA REPAIR Right     unilateral x2    ORCHIECTOMY Right     FL REPAIR INCISIONAL HERNIA,REDUCIBLE N/A 2/23/2018    Procedure: lysis of adhesions; INCISIONAL HERNIA REPAIR WITH MESH;  Surgeon: Patsy Napier MD;  Location: [facility];  Service: General     No Known Allergies      Comorbidities: Stage I Sacral Decubitus Ulcer (present on admission), CAD, HLD, HTN, Hard of Hearing and GERD    CURRENT VITAL SIGNS:   Temp:  [97 7 °F (36 5 °C)-98 2 °F (36 8 °C)] 98 2 °F (36 8 °C)  HR:  [78-80] 80  Resp:  [18-20] 18  BP: (131-142)/(59-74) 142/70   Intake/Output Summary (Last 24 hours) at 02/28/18 1104  Last data filed at 02/28/18 1031   Gross per 24 hour   Intake          2385 75 ml   Output             1260 ml   Net          1125 75 ml        LABORATORY RESULTS:      Lab Results   Component Value Date    HGB 10 8 (L) 02/26/2018    HGB 12 7 06/18/2015    HCT 31 1 (L) 02/26/2018    HCT 38 7 06/18/2015    WBC 7 11 02/26/2018    WBC 8 14 06/18/2015     Lab Results   Component Value Date    BUN 13 02/26/2018    BUN 16 12/10/2015     02/26/2018     12/10/2015    K 3 8 02/26/2018    K 3 8 12/10/2015     (H) 02/26/2018     12/10/2015    GLUCOSE 111 02/26/2018    GLUCOSE 93 12/10/2015    CREATININE 0 71 02/26/2018    CREATININE 1 08 12/10/2015     Lab Results   Component Value Date    PROTIME 13 5 11/26/2014    INR 1 05 11/26/2014        DIAGNOSTIC STUDIES:  Xr Abdomen 1 View Kub    Result Date: 2/25/2018  Impression: 1  Status post abdominal surgery with unremarkable bowel gas pattern  No obstruction or ileus  No gross evidence of free air  2   Stable right hip effusion   Workstation performed: [id number]       PRECAUTIONS/SPECIAL NEEDS:   Abdominal binder, Hard of Hearing, Anticoagulation: Lovenox 40mg Daily, Safety Concerns, Pain Management, Regular Diet with Ensure at meals, Right Platform Shoe and Fall Precautions     MEDICATIONS:     Current Facility-Administered Medications:     acetaminophen (TYLENOL) tablet 650 mg, 650 mg, Oral, Q6H PRN, Nakul John MD    aspirin chewable tablet 81 mg, 81 mg, Oral, Daily, Nakul John MD, 81 mg at 02/28/18 0846    atorvastatin (LIPITOR) tablet 40 mg, 40 mg, Oral, QPM, Nakul John MD, 40 mg at 02/27/18 1729    bisacodyl (DULCOLAX) rectal suppository 10 mg, 10 mg, Rectal, Daily PRN, Kusum Tang MD, 10 mg at 02/28/18 1030    docusate sodium (COLACE) capsule 100 mg, 100 mg, Oral, BID, Nakul John MD, 100 mg at 02/28/18 0846    enoxaparin (LOVENOX) subcutaneous injection 40 mg, 40 mg, Subcutaneous, Daily, Nakul John MD, 40 mg at 02/28/18 0846    famotidine (PEPCID) tablet 20 mg, 20 mg, Oral, Daily, Nakul John MD, 20 mg at 02/28/18 0846    hydrochlorothiazide (HYDRODIURIL) tablet 12 5 mg, 12 5 mg, Oral, Daily, Nakul John MD, 12 5 mg at 02/28/18 0846    HYDROmorphone (DILAUDID) injection 0 5 mg, 0 5 mg, Intravenous, Q3H PRN, Nakul John MD    lisinopril (ZESTRIL) tablet 20 mg, 20 mg, Oral, Daily, Nakul John MD, 20 mg at 02/28/18 0846    metoprolol succinate (TOPROL-XL) 24 hr tablet 50 mg, 50 mg, Oral, Daily, Nakul John MD, 50 mg at 02/28/18 0846    ondansetron [facility]) injection 4 mg, 4 mg, Intravenous, Q6H PRN, Nakul John MD    oxyCODONE (ROXICODONE) immediate release tablet 10 mg, 10 mg, Oral, Q4H PRN, Nakul John MD, 10 mg at 02/27/18 2247    oxyCODONE (ROXICODONE) IR tablet 5 mg, 5 mg, Oral, Q4H PRN, Gaby [place], PA-C    senna (SENOKOT) tablet 8 6 mg, 1 tablet, Oral, Daily, Nakul John MD, 8 6 mg at 02/28/18 0846    SKIN INTEGRITY:   Abdominal incision with staples and dry dressing C/D/I ,stage I sacral decubitus ulcer     PRIOR LEVEL OF FUNCTION:  He lives in [facility] [place] apartment  Toby Burkitt is single and lives alone  Self Care:  Independent, Indoor Mobility: Independent, Stairs (in/outdoor): Mod I with single point cane and Cognition: Independent    HOME ENVIRONMENT:  The living area: can live on one level  There 2 steps to enter the home  The patient will not have 24 hour supervision/physical assistance available upon discharge  Patient has a significant other and family who can assist as needed  PREVIOUS DME:  Equipment in home (previous DME): [employer] and [employer]    FUNCTIONAL STATUS:  Physical Therapy Occupational Therapy Speech Therapy   Per PTA:    02/27/18 1440   Pain Assessment   Pain Assessment No/denies pain   Pain Score No Pain   Restrictions/Precautions   Weight Bearing Precautions Per Order No   Other Precautions Hard of hearing; Fall Risk;Bed Alarm   General   Family/Caregiver Present No   Cognition   Overall Cognitive Status WFL   Subjective   Subjective "I'm ready to take a walk "   Bed Mobility   Sit to Supine 4  Minimal assistance   Additional items Assist x 1   Transfers   Sit to Stand 4  Minimal assistance   Additional items Assist x 1; Armrests; Verbal cues   Stand to Sit 5  Supervision   Additional items Assist x 1; Armrests; Verbal cues   Stand pivot 4  Minimal assistance   Additional items Assist x 1;Verbal cues   Ambulation/Elevation   Gait pattern Improper Weight shift;Narrow CHANDLER; Antalgic; Short stride; Excessively slow   Gait Assistance 4  Minimal assist   Additional items Assist x 1;Verbal cues   Assistive Device SPC   Distance 180 feet and 320 feet   Balance   Static Sitting Fair +   Dynamic Sitting Fair   Static Standing Fair -   Ambulatory Fair -   Endurance Deficit   Endurance Deficit Yes   Endurance Deficit Description (SOB)   Activity Tolerance   Activity Tolerance Patient limited by fatigue   Nurse [address] to see per RN Los jessie   Exercises   THR Sitting;Left;AROM;15 reps; Supine   Assessment   Prognosis Good   Problem List Decreased strength;Decreased range of motion;Decreased endurance; Impaired balance;Decreased skin integrity   Assessment Pt is progressing well with use of straight cane  Mild loss of balance requiring min assist for correction  SOB today however O2 room air 93%  Educated to take frequent rest breaks when appropriate  Pt demos improvements with transfers as well  Pt would benefit from continued physical therapy to maximize functional mobility and safety  As per verbal report from [facility] with Ot:     Patient is supervision with upper body ADL's and a mod-max with lower body ADLs  N/A     CURRENT GAP IN FUNCTION  Prior to admission patient was independent with transfers and home ambulation with no assistive device and mod I with SPC for community distances  He was independent with ADLs aside from donning shoes which he was Mod I with a long handled shoe horn  Patient did receive help with IADLs from his family and significant other  At this time he is min assist with transfers and ambulation with SPC needing frequent rest breaks due to balance and SOB  With ADLs patient is supervision with upper body ADLs and mod-max with lower body ADLs  Estimated length of stay: 7 to 10 days    Anticipated Post-Discharge Disposition/Treatment  Disposition: Return to previous home/apartment  Outpatient Services: Physical Therapy (PT) and Occupational Therapy (OT)    BARRIERS TO DISCHARGE  Weakness, Balance Difficulty, Fatigue, Home Accessibility, Caregiver Accessibility, Financial Resources, Equipment Needs, Resource Availability and Lives Alone    INTERVENTIONS FOR DISCHARGE  Adaptive equipment, Patient/Family/Caregiver Education, Community Resources, Financial Assistance, Arrange DME needs, Medication Changes as per MD and Therapy exercises    REQUIRED THERAPY:  Patient will require PT and OT 90 minutes each per day, five days per week to achieve rehab goals       REQUIRED FUNCTIONAL AND MEDICAL MANAGEMENT FOR INPATIENT REHABILITATION:  Skin:  Abdominal incision with staples and dry dressing and a Stage I decubitis ulcer (present on admission), Deep Vein Thrombosis (DVT) Prophylaxis:  SCD's while in bed and lovenox 40mg daily, nursing management, internal medicine, PMR, PT/OT intervention, patient and family education and training, and any additional consults as needed  RECOMMENDED LEVEL OF CARE:  Patient presented to [facility] for a scheduled ventral hernia repair  Intraoperatively it was found that patient had multiple herniae that had to be repaired with a large incision and mesh placement  Post operatively patient had abdominal distention and slow return of bowel function  Abdominal x-ray was completed that showed no ileus or obstruction  Patient is passing flatus and his diet has been advanced to regular  Prior to admission patient was independent with transfers and home ambulation with no assistive device and mod I with SPC for community distances  He was independent with ADLs aside from donning shoes which he was Mod I with a long handled shoe horn  Patient did receive help with IADLs from his family and significant other  At this time he is min assist with transfers and ambulation with SPC needing frequent rest breaks due to balance and SOB  With ADLs patient is supervision with upper body ADLs and mod-max assist with lower body ADLs  At this time nursing management is being recommended for education on medication changes, Internal Medicine to monitor and manage medical conditions,  PMR management to maximize overall functional abilities, and inpatient rehab to maximize overall strength, endurance and self care upon discharge to home at a Mod I level with the support of his significant other

## 2018-02-28 NOTE — DISCHARGE INSTRUCTIONS
Activity:    Do not lift more than 10 pounds (a gallon of milk) for 1-2 weeks post-operatively                 Walking is encouraged  Normal daily activities including climbing steps are okay  Do not engage in strenuous activity or contact sports for 4-6 weeks post-operatively    Return to work:   Return to work to be discussed at first post-operative visit    Diet:   You may return to your normal heart healthy diet    Wound Care: Your wound is closed with staples which will be removed in the office or in the hospital in two weeks if you are still here  It is okay to shower  Wash incision gently with soap and water and pat dry  Do not soak incisions in bath water or swim for two weeks  Do not apply any creams or ointments  Pain Medication:   Please take as directed  No driving while taking narcotic pain medications    Other:  If you have questions after discharge please call the office      If you have increased pain, fever >101 5, increased drainage, redness or a bad smell at your surgery site, please call us immediately or come directly to the Emergency Room

## 2018-03-01 ENCOUNTER — TELEPHONE (OUTPATIENT)
Dept: INTERNAL MEDICINE CLINIC | Facility: CLINIC | Age: 73
End: 2018-03-01

## 2018-03-01 ENCOUNTER — TRANSITIONAL CARE MANAGEMENT (OUTPATIENT)
Dept: INTERNAL MEDICINE CLINIC | Facility: CLINIC | Age: 73
End: 2018-03-01

## 2018-03-01 LAB
ANION GAP SERPL CALCULATED.3IONS-SCNC: 10 MMOL/L (ref 4–13)
BASOPHILS # BLD AUTO: 0.04 THOUSANDS/ΜL (ref 0–0.1)
BASOPHILS NFR BLD AUTO: 0 % (ref 0–1)
BUN SERPL-MCNC: 35 MG/DL (ref 5–25)
CALCIUM SERPL-MCNC: 10.1 MG/DL (ref 8.3–10.1)
CHLORIDE SERPL-SCNC: 103 MMOL/L (ref 100–108)
CO2 SERPL-SCNC: 25 MMOL/L (ref 21–32)
CREAT SERPL-MCNC: 1.61 MG/DL (ref 0.6–1.3)
EOSINOPHIL # BLD AUTO: 0.12 THOUSAND/ΜL (ref 0–0.61)
EOSINOPHIL NFR BLD AUTO: 1 % (ref 0–6)
ERYTHROCYTE [DISTWIDTH] IN BLOOD BY AUTOMATED COUNT: 13.3 % (ref 11.6–15.1)
GFR SERPL CREATININE-BSD FRML MDRD: 42 ML/MIN/1.73SQ M
GLUCOSE SERPL-MCNC: 106 MG/DL (ref 65–140)
HCT VFR BLD AUTO: 36.8 % (ref 36.5–49.3)
HGB BLD-MCNC: 12.5 G/DL (ref 12–17)
LYMPHOCYTES # BLD AUTO: 1.01 THOUSANDS/ΜL (ref 0.6–4.47)
LYMPHOCYTES NFR BLD AUTO: 7 % (ref 14–44)
MCH RBC QN AUTO: 29.8 PG (ref 26.8–34.3)
MCHC RBC AUTO-ENTMCNC: 34 G/DL (ref 31.4–37.4)
MCV RBC AUTO: 88 FL (ref 82–98)
MONOCYTES # BLD AUTO: 1.06 THOUSAND/ΜL (ref 0.17–1.22)
MONOCYTES NFR BLD AUTO: 7 % (ref 4–12)
NEUTROPHILS # BLD AUTO: 13.18 THOUSANDS/ΜL (ref 1.85–7.62)
NEUTS SEG NFR BLD AUTO: 85 % (ref 43–75)
NRBC BLD AUTO-RTO: 0 /100 WBCS
PLATELET # BLD AUTO: 367 THOUSANDS/UL (ref 149–390)
PMV BLD AUTO: 9.2 FL (ref 8.9–12.7)
POTASSIUM SERPL-SCNC: 3.5 MMOL/L (ref 3.5–5.3)
RBC # BLD AUTO: 4.19 MILLION/UL (ref 3.88–5.62)
SODIUM SERPL-SCNC: 138 MMOL/L (ref 136–145)
WBC # BLD AUTO: 15.46 THOUSAND/UL (ref 4.31–10.16)

## 2018-03-01 PROCEDURE — 85025 COMPLETE CBC W/AUTO DIFF WBC: CPT | Performed by: PHYSICAL MEDICINE & REHABILITATION

## 2018-03-01 PROCEDURE — 97163 PT EVAL HIGH COMPLEX 45 MIN: CPT

## 2018-03-01 PROCEDURE — TCMNV

## 2018-03-01 PROCEDURE — 97530 THERAPEUTIC ACTIVITIES: CPT

## 2018-03-01 PROCEDURE — 97110 THERAPEUTIC EXERCISES: CPT

## 2018-03-01 PROCEDURE — 80048 BASIC METABOLIC PNL TOTAL CA: CPT | Performed by: PHYSICAL MEDICINE & REHABILITATION

## 2018-03-01 PROCEDURE — 99232 SBSQ HOSP IP/OBS MODERATE 35: CPT | Performed by: PHYSICAL MEDICINE & REHABILITATION

## 2018-03-01 PROCEDURE — 97535 SELF CARE MNGMENT TRAINING: CPT

## 2018-03-01 PROCEDURE — 97166 OT EVAL MOD COMPLEX 45 MIN: CPT

## 2018-03-01 RX ORDER — ONDANSETRON 4 MG/1
4 TABLET, ORALLY DISINTEGRATING ORAL EVERY 6 HOURS SCHEDULED
Status: DISCONTINUED | OUTPATIENT
Start: 2018-03-01 | End: 2018-03-03

## 2018-03-01 RX ORDER — HYDRALAZINE HYDROCHLORIDE 25 MG/1
25 TABLET, FILM COATED ORAL EVERY 8 HOURS PRN
Status: DISCONTINUED | OUTPATIENT
Start: 2018-03-01 | End: 2018-03-10 | Stop reason: HOSPADM

## 2018-03-01 RX ORDER — LACTULOSE 20 G/30ML
20 SOLUTION ORAL DAILY PRN
Status: DISCONTINUED | OUTPATIENT
Start: 2018-03-01 | End: 2018-03-09

## 2018-03-01 RX ORDER — SODIUM CHLORIDE 9 MG/ML
70 INJECTION, SOLUTION INTRAVENOUS ONCE
Status: COMPLETED | OUTPATIENT
Start: 2018-03-01 | End: 2018-03-02

## 2018-03-01 RX ORDER — PANTOPRAZOLE SODIUM 40 MG/1
40 TABLET, DELAYED RELEASE ORAL
Status: DISCONTINUED | OUTPATIENT
Start: 2018-03-01 | End: 2018-03-10 | Stop reason: HOSPADM

## 2018-03-01 RX ADMIN — PANTOPRAZOLE SODIUM 40 MG: 40 TABLET, DELAYED RELEASE ORAL at 16:50

## 2018-03-01 RX ADMIN — ENOXAPARIN SODIUM 40 MG: 40 INJECTION SUBCUTANEOUS at 09:59

## 2018-03-01 RX ADMIN — ATORVASTATIN CALCIUM 40 MG: 40 TABLET, FILM COATED ORAL at 16:50

## 2018-03-01 RX ADMIN — ASPIRIN 81 MG 81 MG: 81 TABLET ORAL at 10:03

## 2018-03-01 RX ADMIN — METOPROLOL SUCCINATE 50 MG: 50 TABLET, EXTENDED RELEASE ORAL at 10:03

## 2018-03-01 RX ADMIN — ONDANSETRON 4 MG: 4 TABLET, ORALLY DISINTEGRATING ORAL at 07:07

## 2018-03-01 RX ADMIN — ONDANSETRON 4 MG: 4 TABLET, ORALLY DISINTEGRATING ORAL at 23:57

## 2018-03-01 RX ADMIN — SODIUM CHLORIDE 70 ML/HR: 0.9 INJECTION, SOLUTION INTRAVENOUS at 10:34

## 2018-03-01 RX ADMIN — PANTOPRAZOLE SODIUM 40 MG: 40 TABLET, DELAYED RELEASE ORAL at 10:03

## 2018-03-01 RX ADMIN — ONDANSETRON 4 MG: 4 TABLET, ORALLY DISINTEGRATING ORAL at 18:00

## 2018-03-01 NOTE — TREATMENT PLAN
Individualized Plan of [address] 67 y o  male MRN: [id number]  Unit/Bed#: -01 Encounter: [id number]     PATIENT INFORMATION  ADMISSION DATE: 2/28/2018  3:29 PM NEENA CATEGORY: debility    ADMISSION DIAGNOSIS: Ventral hernia without obstruction or gangrene [K43 9]  EXPECTED LOS: 1-2 wks     MEDICAL/FUNCTIONAL PROGNOSIS  Based on my assessment of the patient's medical conditions and current functional status, the prognosis for attaining medical and functional goals or the IRF stay is:  Fair     Medical Goals: pain control     ANTICIPATED DISCHARGE DISPOSITION AND SERVICES  COMMUNITY SETTING: home       ANTICIPATED FOLLOW-UP SERVICE:   Outpatient Therapy Services: PT/OT    DISCIPLINE SPECIFIC PLANS:  Required Disciplines & Services: PT/OT    REQUIRED THERAPY:  Therapy Hours per Day Days per Week Total Days   Physical Therapy 1 5 5 10   Occupational Therapy 1 5 5 10   Speech/Language Therapy 0 0 0   NOTE: Additional therapy time(s) may be added as appropriate to meet patient needs and to achieve functional goals        ANTICIPATED FUNCTIONAL OUTCOMES:  ADL: Patient will be independent with ADLs with least restrictive device upon completion of rehab program   Bladder/Bowel: Patient will be independent with bladder/bowel management with least restrictive device upon completion of rehab program   Transfers:   Patient will be independent with transfers with least restrictive device upon completion of rehab program   Locomotion:   Patient will be independent with locomotion with least restrictive device upon completion of rehab program   Cognitive: Patient will be independent for basic and complex tasks upon completion of rehab program     DISCHARGE PLANNING NEEDS  Equipment needs: tbd       REHAB ANTICIPATED PARTICIPATION RESTRICTIONS:  None

## 2018-03-01 NOTE — PROGRESS NOTES
Internal Medicine Progress Note  Patient: Beth Flores  Age/sex: 67 y o  male  Medical Record #: [id number]      ASSESSMENT/PLAN:  Beth Flores is seen and examined and mangement for following issues:    1  Incisional hernia repair with MESH and MARK with Dr Melissa Mesa 2/23/18:  will watch incision  Checked with surgery today to see when removing the DILLON and they will get back to me today  They said can remove Mepilex dressing  Abd binder to be worn, acc to surgery, when OOB/activity and can be removed when in bed unless uncomfortable w/o it  The current binder is way too tight and causing gastric reflux and he is regurgitating bile, mostly when in bed  Will d/c Pepcid and start Protonix 40mg BID for the time being and give ATC Zofran since occ nausea  Will wean Zofran back     2  ABLA:  mild, not requiring transfusion  Will watch     3  HTN: stable  At home he takes Lisinopril 20mg qd, HCTZ 25mg qd and Toprol 50mg qd  Currently, he is on HCTZ 12 5mg qd, Lisinopril 20mg qd and Toprol 50mg qd  Will stop the HCTZ for the time being since dry  Has prn Hydralazine     4  HLD:  continue Lipitor     5  CAD with hx IWMI/BMS RCA 00:  last stress test 2015  Continue ASA/Lipitor     6  Hx PAD with ihphp-tv-wldhd BPG     7   Diarrhea stools:  will change Lactulose to prn    8  Leukocytosis:  Jump to 15 WBC = Tmax was 99 7; chest is clear; pt very dry so some may be hemoconcentration;  incision is w/o erythema/drainage; no c/o dysuria/cough or any other sx to explain white count jump  Will watch for now and recheck CBC in AM    9  JOVANNI: ?etio meds vs volume depletion:  Will give 1 liter NSS at 70ml/hr and recheck in AM   Stopping HCTZ and Lisinopril for now (did not get this AM)      Subjective: Patient seen and examined  Spontaneous regurgitation of bile/emesis during night w/o nausea although he says he is getting occ nausea    Had diarrhea this AM    ROS:   GI: denies abdominal pain, change bowel habits or reflux symptoms  Neuro: No new neurologic changes  Respiratory: No Cough, SOB  Cardiovascular: No CP, palpitations     Scheduled Meds:    Current Facility-Administered Medications:  acetaminophen 650 mg Oral Q6H PRN Erick Guadarrama MD   aspirin 81 mg Oral Daily Erick Guadarrama MD   atorvastatin 40 mg Oral Daily With Catie Thomson MD   bisacodyl 10 mg Rectal Daily PRN Erick Guadarrama MD   enoxaparin 40 mg Subcutaneous Daily Erick Guadarrama MD   hydrochlorothiazide 12 5 mg Oral Daily Erick Guadarrama MD   lactulose 20 g Oral Daily PRN ROOSEVELT Morse   lisinopril 20 mg Oral Daily Erick Guadarrama MD   metoprolol succinate 50 mg Oral Daily Erick Guadarrama MD   mineral oil 1 enema Rectal Once PRN Erick Guadarrama MD   ondansetron 4 mg Oral Q6H [address] ROOSEVELT Garcia   oxyCODONE 10 mg Oral Q4H PRN Erick Guadarrama MD   oxyCODONE 5 mg Oral Q4H PRN Erick Guadarrama MD   pantoprazole 40 mg Oral BID AC ROOSEVELT Morse       Labs:       Results from last 7 days  Lab Units 03/01/18  0514 02/26/18  0626   WBC Thousand/uL 15 46* 7 11   HEMOGLOBIN g/dL 12 5 10 8*   HEMATOCRIT % 36 8 31 1*   PLATELETS Thousands/uL 367 207       Results from last 7 days  Lab Units 03/01/18  0514 02/26/18  0626   SODIUM mmol/L 138 142   POTASSIUM mmol/L 3 5 3 8   CHLORIDE mmol/L 103 112*   CO2 mmol/L 25 23   BUN mg/dL 35* 13   CREATININE mg/dL 1 61* 0 71   GLUCOSE RANDOM mg/dL 106 111   CALCIUM mg/dL 10 1 8 7                Glucose (mg/dL)   Date Value   03/01/2018 106   02/26/2018 111   02/25/2018 79   02/24/2018 127   12/10/2015 93   06/18/2015 103   05/07/2014 102   11/25/2013 87       Labs reviewed    Physical Examination:  Vitals:   Vitals:    02/28/18 1530 02/28/18 2043 02/28/18 2045 03/01/18 0504   BP: 115/59 125/58  107/51   BP Location: Right arm Right arm  Right arm   Pulse: 93 88  93   Resp: 22 20  20   Temp: 97 7 °F (36 5 °C) 99 7 °F (37 6 °C)  97 9 °F (36 6 °C)   TempSrc: Oral Tympanic  Oral   SpO2: 93% 90% 93% 92% Weight:  71 kg (156 lb 8 4 oz)     Height:  5' 8" (1 727 m)         GEN: NAD  HEENT: NC/AT  RESP: BBS w/o crackles/wheeze/rhonci; resp unlabored  CV: +S1 S2, regular rate, no rubs/murmurs  ABD:   + BS; abd is large but soft; midline abdominal incision and DILLON site is w/o erythema/drainage; DILLON with tiny amt bloody drainage  : no centeno  EXT: no edema  Skin: no rashes  Neuro: AAO; SHEPHERD 5/5      [x ] Total time spent: 30 Mins and greater than 50% of this time was spent counseling/coordinating care        Jose Castillo, [address]   Internal Medicine

## 2018-03-01 NOTE — PROGRESS NOTES
Physical Therapy Initial Evaluation   03/01/18 1000   Patient Data   Rehab Impairment Decline in functional Mobility due to Debility   Etiologic Diagnosis Hernia repair   Date of Onset 02/23/18   Home Setup   Type of Home Apartment   Method of Entry Stairs   Number of Stairs 0   Number of Stairs in Home 2   Home Modifications Necessary? No   Available Equipment Single [employer]   Prior Level of Function   Self-Care 3  Independent - Patient completed the activities by him/herself, with or without an assistive device, with no assistance from a helper  Indoor-Mobility (Ambulation) 3  Independent - Patient completed the activities by him/herself, with or without an assistive device, with no assistance from a helper  Stairs 3  Independent - Patient completed the activities by him/herself, with or without an assistive device, with no assistance from a helper  Functional Cognition 3  Independent - Patient completed the activities by him/herself, with or without an assistive device, with no assistance from a helper  Psychosocial   Psychosocial (WDL) WDL   Restrictions/Precautions   Precautions Fall Risk;Multiple lines  (IV, Abdominal drain)   Braces or Orthoses Other (Comment)  (Abdominal Binder)   Pain Assessment   Pain Assessment 0-10   Pain Score No Pain   Pain Type Surgical pain   Pain Location Abdomen   Pain Orientation Bilateral   Effect of Pain on Daily Activities increased pain with movement   Hospital Pain Intervention(s) Distraction;Repositioned   Eating Assessment   Eating (FIM) 6 - Patient requires assistive device or dentures   Toileting   Toileting (FIM) 4 - Patient requires steadying assist or light touching   Bowel/Bladder Management   Bladder Management (FIM) 7 - No helper, safely, timely and completes all tasks independently   Bowel Management (FIM) 5 - Lewis empties any equipment   QI: Roll Left and Right   Assistance Needed Physical assistance;Verbal cues; Adaptive equipment   Assistance Provided by [place] 25%-49%   Comment Log Roll to right, attempting to minimize useof abdominals and L UE  Pt reporting Left shoulder bursitis from chronic overuse   Roll Left and Right CARE Score 3   Bed Mobility   Findings Mod A   QI: Sit to Lying   Assistance Needed Physical assistance;Verbal cues; Adaptive equipment   Assistance Provided by [place] 25%-49%   Comment sergio OLIVA bnefit form continued practice of log rolling   Sit to Lying CARE Score 3   QI: Lying to Sitting on Side of Bed   Assistance Needed Physical assistance;Verbal cues; Adaptive equipment   Assistance Provided by [place] 50%-74%   Comment using bedrail   Lying to Sitting on Side of Bed CARE Score 2   QI: Sit to Stand   Assistance Needed Physical assistance   Assistance Provided by [place] Less than 25%   Sit to Stand CARE Score 3   QI: Chair/Bed-to-Chair Transfer   Assistance Needed Incidental touching; Adaptive equipment   Assistance Provided by [place] Less than 25%   Comment using SPC   Chair/Bed-to-Chair Transfer CARE Score 3   QI: Car Transfer   Assistance Needed Physical assistance;Verbal cues; Adaptive equipment   Assistance Provided by [place] 50%-74%   Car Transfer CARE Score 2   Transfer Bed/Chair/Wheelchair   Limitations Noted In Other  (limited by abdominal pain and ability to wt shift fwd)   Adaptive Equipment Cane   Stand Pivot Minimal Assist   Sit to Stand Minimal   Stand to Sit Supervision   Supine to Sit Maximum Assist   Sit to Supine Moderate Assist   Bed, Chair, Wheelchair Transfer (FIM) 2 - [place] needs to lift or boost to rise AND assist to sit   Toilet Transfer   Toilet Transfer (FIM) 4 - Patient requires steadying assist or light touching   QI: Walk 10 Feet   Assistance Needed Incidental touching; Adaptive equipment   Assistance Provided by [place] No physical assistance   Walk 10 Feet CARE Score 4   QI: Walk 50 Feet with Two Turns   Assistance Needed Incidental touching; Adaptive equipment   Assistance Provided by [place] Less than 25%   Walk 50 Feet with Two Turns CARE Score 3   QI: Walk 150 Feet   Assistance Needed Incidental touching; Adaptive equipment   Assistance Provided by [place] Less than 25%   Walk 150 Feet CARE Score 3   QI: Walking 10 Feet on Uneven Surfaces   Reason if not Attempted Activity not applicable   Walking 10 Feet on Uneven Surfaces CARE Score 9   Ambulation   Does the patient walk? 2  Yes   Primary Discharge Mode of Locomotion Walk   Walk Assist Level Contact Guard   Gait Pattern Slow Darlene   Assist Device Cane   Distance Walked (feet) 150 ft   Limitations Noted In Strength;Speed; Endurance;Balance   Findings Pt fatigues and becomes SOB, reporting abdomen affecting his ability to take deep breathes at this time   Wheelchair mobility   QI: Does the patient use a wheelchair? 0   No   QI: 1 Step (Curb)   Comment TBA   Reason if not Attempted Activity not applicable   1 Step (Curb) CARE Score 9   QI: 4 Steps   Comment TBA   Reason if not Attempted Activity not applicable   4 Steps CARE Score 9   QI: 12 Steps   Reason if not Attempted Activity not applicable   12 Steps CARE Score 9   Stairs   Findings Not attempted on Eval at pt request, would like to get his legs stronger first and improve his activity tolerance   Comprehension   Comprehension (FIM) 6 - Understands complex/abstract but requires more time   Expression   Expression (FIM) 6 - Expresses complex/abstract but requires:  more time   Social Interaction   Social Interaction (FIM) 6 - Interacts appropriately with others BUT requires extra  time   Problem Solving   Problem solving (FIM) 4 - Solves basic problems 75-89% of time   Memory   Memory (FIM) 5 - Needs cueing reminders <10%   RLE Assessment   RLE Assessment X  (Shoe Lift, Limited hip/Knee flex(chronic ))   LLE Assessment   LLE Assessment WFL   Cognition   Overall Cognitive Status WFL   Orientation Level Oriented X4   Objective Measure   PT Measure(s) Practiced bed mobility using log roll technique and step by step cueing  Supine<> sit transfers x 5 getting out of bed to the right trying to avoi ahving to overuse L shoulder  Worked on fnctional transfers from w/c, arm chair and recliner, repeated short distance ambulation witrh standing or seated rest breaks  Abdominal binder donned with cutout for JPdrain on left side of abdomen   Discharge Information   Patient's Discharge Plan To return home alone   Patient's Rehab Expectations To get stronger, have less abdominal pain and be Independent   Barriers to Discharge Home Limited Family Support;Decreased Strength;Decreased Endurance;Pain   Impressions Pt is a 68 y/o male s/p Abdominal surgery for Hernia repair that presnets with Good rehab potential to return home at Mod I level usinG SPC (PLOF)  Pt hax Hx of R LE problems since childhood  Has limited hip & knee function, shoe lift in place and has learned to compensate and function Independently  Pt has relied heavily on L UE for support with transfers an now is dealing with Left shoulder Bursitis per his report  Will benefit form continued practice of new bed mobility and transfer techniques to minimize strain on L UE and abdomen      PT Therapy Minutes   PT Time In 1000   PT Time Out 1130   PT Total Time (minutes) 90   PT Mode of treatment - Individual (minutes) 90   PT Mode of treatment - Concurrent (minutes) 0   PT Mode of treatment - Group (minutes) 0   PT Mode of treatment - Co-treat (minutes) 0   PT Mode of Teatment - Total time(minutes) 90 minutes

## 2018-03-01 NOTE — PROGRESS NOTES
Pt with frequent regurgitation of green, bile-like liquid  No nausea or retching, Min relief from zofran

## 2018-03-01 NOTE — PROGRESS NOTES
Progress Note - Rai Keith 67 y o  male MRN: [id number]    Unit/Bed#: -01 Encounter: [id number]            Subjective:   Per patient had 2 large BMs last night/this morning     Objective:     ROS  Gen: denies recent wt loss   Psych: denies mood change    Vitals: Blood pressure 122/55, pulse 90, temperature 97 9 °F (36 6 °C), temperature source Oral, resp  rate 20, height 5' 8" (1 727 m), weight 71 kg (156 lb 8 4 oz), SpO2 92 %      Physical Exam:     Gen:        NAD   Neck:   trachea midline  Lungs:  respirations unlabored   Heart:    + S1 and S2   Abdomen:  + surgical drain in place  Psych: mood/affect appropriate  Neurologic: awake, alert, appropriately answering questions     Functional :  Mobility: PENDING  Tx: PENDING  ADLs: PENDING         Current Facility-Administered Medications:  acetaminophen 650 mg Oral Q6H PRN Ivy Hein MD   aspirin 81 mg Oral Daily Ivy Hein MD   atorvastatin 40 mg Oral Daily With David Farley MD   bisacodyl 10 mg Rectal Daily PRN Ivy Hein MD   enoxaparin 40 mg Subcutaneous Daily Ivy Hein MD   hydrALAZINE 25 mg Oral Q8H PRN ROOSEVELT Ledbetter   lactulose 20 g Oral Daily PRN ROOSEVELT Ledbetter   metoprolol succinate 50 mg Oral Daily Ivy Hein MD   mineral oil 1 enema Rectal Once PRN Ivy Hein MD   ondansetron 4 mg Oral Q6H [address] ROOSEVELT Garcia   oxyCODONE 10 mg Oral Q4H PRN Ivy Hein MD   oxyCODONE 5 mg Oral Q4H PRN Ivy Hein MD   pantoprazole 40 mg Oral BID AC ROOSEVELT Ledbetter         acetaminophen    bisacodyl    hydrALAZINE    lactulose    mineral oil    oxyCODONE    oxyCODONE      Assessment:  Pt is 68 yo admitted for elective incisional hernia repair and MARK by Dr Oli Dick on 2/23/18    Plan:    Rehabilitation: cont PT/OT for ambulatory/ADL dysfxn    s/p elective incisional hernia repair and MARK :  Dr Oli Dick on 2/23/18, general surgery consulted for possible removal of drain        HTN: on home toprol XL 50 mg qd, lisinopril 20 mg qd, however home HCTZ reduced from 25 mg to 12 5 QD      HL: on home atorvastatin 40 mg qpm      AAA: s/p repair, by Dr Idalia Botello      PVD: s/p bypass graft      CAD: s/p PCI, stress test of 1/8/2015 w/o perfusion abnormality; on home ASA 81 mg qd, atorvastatin 40 mg qpm, toprol XL 50 mg qd (additionally holter monitor of 2/15/17 revealed PAC/PVCs but no VT/SVT/A-fib/A-flutter and was without bradyarrhythmia or advanced heart block); OP FU with Dr Raymundo Paul     GERD/hiatal hernia: home zantac 150 mg qd non-formulary, therapeutic substitution        Constipation: abd XR of 2/25 negative for obstruction, pt now having regular BMs     Anemia: ABLA, currently WNL (may be due to hemoconcentration as BUN is also elevated)     Leukocytosis: may be due to hemoconcentration as BUN is also elevated)     JOVANNI: likely due to poor PO as patient was not taking much PO in acute care; started on IVF     Incidental findings of CT AP of 9/19/14: cholelithiasis, diverticulosis      DVT ppx: scd's, lovenox

## 2018-03-01 NOTE — PROGRESS NOTES
OT EVALUATION   03/01/18 0821   Patient Data   Rehab Impairment Debility (Non cardiac/ non-pulmonary)   Etiologic Diagnosis Symptomatic Inscisional Hernia s/p Repair   Date of Onset 02/23/18   Home Setup   Type of Home Apartment   Method of Entry Stairs   Number of Stairs 2   First Floor Bathroom Full;Combo;Curtain;Grab Bars   First Floor Bathroom Accessibility Grab bars in tub/shower   First Floor Setup Available Yes   Home Modifications Necessary? Yes   Home Modification Comment Recommend Commode, shower chair with back, and grab bar in shower   [facility])   Baseline Information   Vocation Other  (Retired)   Transportation    Prior Device(s) [address]   Prior IADL Participation   Money Management Identify Money;Estimate Costs;Estimate Change;Combine Bills;Manage Checkbook   Meal Preparation Full Participation   Laundry Full Participation   Home Cleaning Full Participation   Prior Level of Function   Self-Care 3  Independent - Patient completed the activities by him/herself, with or without an assistive device, with no assistance from a helper  Indoor-Mobility (Ambulation) 3  Independent - Patient completed the activities by him/herself, with or without an assistive device, with no assistance from a helper  Stairs 3  Independent - Patient completed the activities by him/herself, with or without an assistive device, with no assistance from a helper  Functional Cognition 3  Independent - Patient completed the activities by him/herself, with or without an assistive device, with no assistance from a helper  Prior Device Used Other (comments)  [facility])   Psychosocial   Psychosocial (WDL) WDL   Restrictions/Precautions   Precautions Bed/chair alarms; Fall Risk  (DILLON drain, R shoe lift, abdominal binder)   Weight Bearing Restrictions No   ROM Restrictions No   Braces or Orthoses Other (Comment)  (Platform shoe RLE)   Pain Assessment   Pain Assessment 0-10   Pain Score 7   Pain Type Surgical pain   Pain Location Abdomen   Effect of Pain on Daily Activities increased pain with movement    Hospital Pain Intervention(s) Repositioned;Distraction   Response to Interventions tolerated repositioning   QI: Eating   Reason if not Attempted Activity not applicable   Eating CARE Score 9   Eating Assessment   Eating (FIM) (Not applicable)   QI: Oral Hygiene   Reason if not Attempted Activity not applicable   Oral Hygiene CARE Score 9   Grooming   Able To Initiate Tasks; Wash/Dry Hands; Wash/Dry Face   Limitation Noted In Safety;Strength;Timeliness   Findings Pt completed grooming tasks while seated in recliner and while standing at bathroom sink with use of SPC  Pt demo ability to wash face while seated in recliner with S and s/u  Pt demo ability to wash hands with CGA while standing at sink with use of SPC and sink for support  Grooming (FIM) 4 - Patient requires steadying assist or light touching   QI: Shower/Bathe Self   Assistance Needed Physical assistance   Assistance Provided by [place] 25%-49%   Shower/Bathe Self CARE Score 3   Bathing   Assessed Bath Style Sponge Bath   Anticipated D/C Bath Style Tub   Able to Gather/Transport No   Able to [employer] Temperature No   Able to Wash/Rinse/Dry (body part) Left Arm;Right Arm;L Upper Leg;R Upper Leg;Chest;Abdomen;Perineal Area; Buttocks   Limitations Noted in Balance; Endurance; Safety;Strength;Timeliness   Positioning Seated;Standing   Findings  Pt participated in bathing while seated in recliner, requiring s/u and S for safety  Pt demo ability to wash 6/10 parts, requiring A for B lower leg/foot 2* limited ROM LUE and  Increased abdominal pain with movement/bending  Pt bathed UB/ upper LE, and yana area while seated in recliner  Pt bathed buttocks while standing with use of SPC, requiring CGA 2* L lateral leaning, deconditioning, LE weakness     Bathing (FIM) 3 - Patient completes 5/10  6/10 or 7/10 parts   QI: Upper Body Dressing   Assistance Needed Physical assistance   Assistance Provided by [place] 25%-49%   Upper Body Dressing CARE Score 3   QI: Lower Body Dressing   Assistance Needed Physical assistance   Assistance Provided by [place] Less than 25%   Lower Body Dressing CARE Score 3   QI: Putting On/Taking Off Footwear   Assistance Needed Physical assistance   Assistance Provided by [place] Total assistance   Putting On/Taking Off Footwear CARE Score 1   QI: Picking Up Object   Comment due to adominal pain   Reason if not Attempted Refused to perform   Picking Up Object CARE Score 7   Dressing/Undressing Clothing   Remove UB Clothes Jocelyne Bush)   Citlaly Everest [facility] LB Clothes Undergarment;Socks; Shoes;Pants   Limitations Noted In Balance; Endurance; Safety;Strength;ROM; Timeliness  (pain)   Adaptive Equipment LH [place]   Positioning Supported Sit;Standing   Findings Pt participated in dressing while seated in recliner  Pt demo ability to [facility] gown over head, but required A to remove gown from arms 2* bursitis and limited ROM of LUE  Pt demo ability to dress Ub, requiring a to thread L arm 2* bursitis  Pt presents with decreased sitting balance and  L lateral lean leading to decreased safety during thread LB clothes while seated, standing with use of SPC and CGA to don pants/undergarments over hips  Pt required total A to don/doff socks and shoes 2* increasedabdominal pain with forward bending   UB Dressing (FIM) 4 - Patient completes 75% of all tasks   LB Dressing (FIM) 2 - Patient completes 25-49% of all tasks   QI: [address] Needed Incidental touching   Assistance Provided by Thurmond Less than 25%   Roel Ryan 83 Score 3   Toileting   Able to [address] down yes, up yes  Able to Manage Clothing Closures Yes   Manage Hygiene Bowel   Limitations Noted In Balance; Safety;UE Strength;LE Strength   Adaptive Equipment Grab Bar   Findings Pt participated in toileting with CGA  Pt had a BM during session  Pt prefers to squat over toilet 2* uneven LE and increased discomfort when sitting calvin toilet  Pt presents with impulsivity and decreased safety awareness  2* to urgency of Bm  Pt demo ability to complete CM with CGA  Pt demo good hygiene with CGA for balance  Toileting (FIM) 4 - Patient requires steadying assist or light touching   QI: Roll Left and Right   Reason if not Attempted Activity not applicable   Roll Left and Right CARE Score 9   QI: Sit to Lying   Reason if not Attempted Activity not applicable   Sit to Lying CARE Score 9   QI: Lying to Sitting on Side of Bed   Comment pt seated in recliner upon arrival   Reason if not Attempted Activity not applicable   Lying to Sitting on Side of Bed CARE Score 9   QI: Sit to Stand   Assistance Needed Incidental touching   Assistance Provided by [place] No physical assistance   Sit to Stand CARE Score 4   QI: Chair/Bed-to-Chair Transfer   Assistance Needed Incidental touching   Assistance Provided by [place] No physical assistance   Chair/Bed-to-Chair Transfer CARE Score 4   Transfer Bed/Chair/Wheelchair   Limitations Noted In Balance; Endurance;Pain Management;UE Strength;LE Strength   Adaptive Equipment Cane  (SPC)   Sit to Stand Other  (CGA)   Stand to Sit Other  (CGA)   Findings Pt completed multiple sit to stands during bathing, dressing, and toileting tasks requiring CGA  Bed, Chair, Wheelchair Transfer (FIM) 4 - Patient requires steadying assist or light touching   QI: Toilet Transfer   Assistance Needed Incidental touching   Assistance Provided by [place] No physical assistance   Toilet Transfer CARE Score 4   Toilet Transfer   Surface Assessed Raised Toilet   Transfer Technique Standard   Limitations Noted In Balance; Endurance;ROM;Safety;UE Strength;LE Strength   Adaptive Equipment Grab Bar   Positioning Concerns Safety   Findings Pt completed toilet transfer with CGA with use of SPC     Toilet Transfer (FIM) 4 - Patient requires steadying assist or light touching   Tub/Shower Transfer   Not Assessed Medical  (abdominal dressing to remain in place for 7 days)   Shower Transfer (FIM) 0 - Activity does not occur   Comprehension   Auditory Complex   Visual Complex   QI: Comprehension 4  Undestands: Clear comprehension without cues or repetitions   Comprehension (FIM) 6 - Understands complex/abstract but requires more time   Expression   Verbal Complex   Non-Verbal Complex   Intelligibility Sentence   QI: Expression 4  Express complex messages without difficulty and with speech that is clear and easy to Corinna   Expression (FIM) 6 - Expresses complex/abstract but requires:  more time   Social Interaction   Cooperation with staff   Participation Individual   Medications needed to control mood/behavior? No   Behaviors observed Appropriate   Social Interaction (FIM) 6 - Interacts appropriately with others BUT requires extra  time   Problem Solving   Complex Manages discharge planning;Manages finances;Manages medications   Routine Manages call bell;Manges precautions;Manages ADL   Problem solving (FIM) 4 - Solves basic problems 75-89% of time   Memory   Recognize People Yes   Remember Routine Yes   Initiates Tasks Yes   Short-Term Intact   Long Term Intact   Recalls Precaution Yes   Memory (FIM) 5 - Needs cueing reminders <10%   RUE Assessment   RUE Assessment WFL   LUE Assessment   LUE Assessment X   AROM - LUE   L Shoulder Flexion Limited 3/4 ROM 2* to bursitis   Coordination   Movements are Fluid and Coordinated 0   Coordination and Movement Description Pt presents with ataxic movements during functional mobility  Cognition   Overall Cognitive Status WFL   Arousal/Participation Alert; Responsive; Cooperative   Attention Within functional limits   Orientation Level Oriented X4   Memory Within functional limits   Following Commands Follows multistep commands without difficulty   Comments Pt presents with decreased safety awareness and impulsivity   Discharge Information   Vocational Plan Retired/not working   Patient's Discharge Plan To Return to home    Patient's Rehab Expectations Get better to return home   Barriers to Discharge Home Limited Family Support;Decreased Strength;Decreased Endurance;Pain; Safety Considerations   Impressions Pt is a 67year old, male, admitted with debility, etiology symptomatic incisional hernia s/p repair  With a PMH of AAA, hiatal hernia, HTN, old myocardial infarction, Recurrent R inguinal hernia, and GERD  Pt reported PLOF was I for all ADLs and IADLs, mod I with SPC for community mobility, no device in the house  Pt reports he has a SPC, [address], and LH sponge at home  Pt lives alone in a single level apartment, and son checks on patient daily  Pt reports he has help from son, significant other, and neighbor  Pt presents with decreased endurance, decreased activity tolerance, decreased standing balance, L lateral lean, decreased ROM of L Ue, decreased safety awareness, impulsivity  and pain   Pt would benefit from skilled Ot services per POC, with an ELOS of 2 weeks with LTG for mod I     OT Therapy Minutes   OT Time In 0730   OT Time Out 0900   OT Total Time (minutes) 90   OT Mode of treatment - Individual (minutes) 90   OT Mode of treatment - Concurrent (minutes) 0   OT Mode of treatment - Group (minutes) 0   OT Mode of treatment - Co-treat (minutes) 0   OT Mode of Teatment - Total time(minutes) 90 minutes

## 2018-03-02 LAB
ANION GAP SERPL CALCULATED.3IONS-SCNC: 8 MMOL/L (ref 4–13)
BASOPHILS # BLD AUTO: 0.04 THOUSANDS/ΜL (ref 0–0.1)
BASOPHILS NFR BLD AUTO: 1 % (ref 0–1)
BUN SERPL-MCNC: 45 MG/DL (ref 5–25)
CALCIUM SERPL-MCNC: 8.8 MG/DL (ref 8.3–10.1)
CHLORIDE SERPL-SCNC: 107 MMOL/L (ref 100–108)
CO2 SERPL-SCNC: 25 MMOL/L (ref 21–32)
CREAT SERPL-MCNC: 1.2 MG/DL (ref 0.6–1.3)
EOSINOPHIL # BLD AUTO: 0.31 THOUSAND/ΜL (ref 0–0.61)
EOSINOPHIL NFR BLD AUTO: 4 % (ref 0–6)
ERYTHROCYTE [DISTWIDTH] IN BLOOD BY AUTOMATED COUNT: 13.1 % (ref 11.6–15.1)
GFR SERPL CREATININE-BSD FRML MDRD: 60 ML/MIN/1.73SQ M
GLUCOSE SERPL-MCNC: 72 MG/DL (ref 65–140)
HCT VFR BLD AUTO: 31.7 % (ref 36.5–49.3)
HGB BLD-MCNC: 10.7 G/DL (ref 12–17)
LYMPHOCYTES # BLD AUTO: 1.62 THOUSANDS/ΜL (ref 0.6–4.47)
LYMPHOCYTES NFR BLD AUTO: 22 % (ref 14–44)
MCH RBC QN AUTO: 29.8 PG (ref 26.8–34.3)
MCHC RBC AUTO-ENTMCNC: 33.8 G/DL (ref 31.4–37.4)
MCV RBC AUTO: 88 FL (ref 82–98)
MONOCYTES # BLD AUTO: 0.45 THOUSAND/ΜL (ref 0.17–1.22)
MONOCYTES NFR BLD AUTO: 6 % (ref 4–12)
NEUTROPHILS # BLD AUTO: 4.82 THOUSANDS/ΜL (ref 1.85–7.62)
NEUTS SEG NFR BLD AUTO: 67 % (ref 43–75)
NRBC BLD AUTO-RTO: 0 /100 WBCS
PLATELET # BLD AUTO: 269 THOUSANDS/UL (ref 149–390)
PMV BLD AUTO: 9 FL (ref 8.9–12.7)
POTASSIUM SERPL-SCNC: 3.8 MMOL/L (ref 3.5–5.3)
RBC # BLD AUTO: 3.59 MILLION/UL (ref 3.88–5.62)
SODIUM SERPL-SCNC: 140 MMOL/L (ref 136–145)
WBC # BLD AUTO: 7.26 THOUSAND/UL (ref 4.31–10.16)

## 2018-03-02 PROCEDURE — 97110 THERAPEUTIC EXERCISES: CPT

## 2018-03-02 PROCEDURE — 97530 THERAPEUTIC ACTIVITIES: CPT

## 2018-03-02 PROCEDURE — 97535 SELF CARE MNGMENT TRAINING: CPT

## 2018-03-02 PROCEDURE — 99232 SBSQ HOSP IP/OBS MODERATE 35: CPT | Performed by: PHYSICAL MEDICINE & REHABILITATION

## 2018-03-02 PROCEDURE — 80048 BASIC METABOLIC PNL TOTAL CA: CPT | Performed by: NURSE PRACTITIONER

## 2018-03-02 PROCEDURE — 85025 COMPLETE CBC W/AUTO DIFF WBC: CPT | Performed by: NURSE PRACTITIONER

## 2018-03-02 PROCEDURE — 97116 GAIT TRAINING THERAPY: CPT

## 2018-03-02 RX ADMIN — ONDANSETRON 4 MG: 4 TABLET, ORALLY DISINTEGRATING ORAL at 11:39

## 2018-03-02 RX ADMIN — OXYCODONE HYDROCHLORIDE 10 MG: 10 TABLET ORAL at 20:51

## 2018-03-02 RX ADMIN — ONDANSETRON 4 MG: 4 TABLET, ORALLY DISINTEGRATING ORAL at 05:35

## 2018-03-02 RX ADMIN — ASPIRIN 81 MG 81 MG: 81 TABLET ORAL at 09:47

## 2018-03-02 RX ADMIN — METOPROLOL SUCCINATE 50 MG: 50 TABLET, EXTENDED RELEASE ORAL at 09:52

## 2018-03-02 RX ADMIN — PANTOPRAZOLE SODIUM 40 MG: 40 TABLET, DELAYED RELEASE ORAL at 17:39

## 2018-03-02 RX ADMIN — ATORVASTATIN CALCIUM 40 MG: 40 TABLET, FILM COATED ORAL at 17:39

## 2018-03-02 RX ADMIN — OXYCODONE HYDROCHLORIDE 10 MG: 10 TABLET ORAL at 09:46

## 2018-03-02 RX ADMIN — PANTOPRAZOLE SODIUM 40 MG: 40 TABLET, DELAYED RELEASE ORAL at 05:35

## 2018-03-02 RX ADMIN — ONDANSETRON 4 MG: 4 TABLET, ORALLY DISINTEGRATING ORAL at 17:39

## 2018-03-02 RX ADMIN — ENOXAPARIN SODIUM 40 MG: 40 INJECTION SUBCUTANEOUS at 09:47

## 2018-03-02 NOTE — PROGRESS NOTES
Internal Medicine Progress Note  Patient: Semaj Escobar  Age/sex: 67 y o  male  Medical Record #: [id number]      ASSESSMENT/PLAN:  Semaj Escobar is seen and examined and mangement for following issues:    1  Incisional hernia repair with MESH and MARK with Dr Ezekiel Aggarwal 2/23/18:  will watch incision  Checked with surgery yesterday to see when removing the DILLON and they will get back to me today  They said ok remove Mepilex dressing  Abd binder to be worn, acc to surgery, when OOB/activity and can be removed when in bed unless uncomfortable w/o it  Yesterday, binder was way too tight and causing gastric reflux with regurgitation of bile, mostly when in bed (gave larger binder)  D/C'd Pepcid and started Protonix 40mg BID for the time being and started ATC Zofran since occ nausea  Will wean Zofran back when able      2  ABLA:  mild, not requiring transfusion  Will watch     3  HTN: stable  At home he takes Lisinopril 20mg qd, HCTZ 25mg qd and Toprol 50mg qd  Yesterday, was on HCTZ 12 5mg qd, Lisinopril 20mg qd and Toprol 50mg qd and stopped the HCTZ for the time being since dry and stopped the Lisinopril  Has prn Hydralazine     4  HLD:  continue Lipitor     5  CAD with hx IWMI/BMS RCA 00:  last stress test 2015  Continue ASA/Lipitor     6  Hx PAD with [id number] BPG     7   Diarrhea stools:  changed Lactulose to prn    8  Leukocytosis:  Yesterday jumped to 15 WBC = Tmax was 99 7; chest is clear; pt very dry so some may be hemoconcentration;  incision is w/o erythema/drainage; no c/o dysuria/cough or any other sx to explain white count jump  Back to normal now  9   JOVANNI: ?etio meds vs volume depletion:  Gave 1 liter NSS yesterday and stopped HCTZ and Lisinopril  Today improved creat down to 1 21 = no further IVF but will recheck in AM to make sure still dropping      Subjective: Patient seen and examined       ROS:   GI: denies abdominal pain, change bowel habits or reflux symptoms  Neuro: No new neurologic changes  Respiratory: No Cough, SOB  Cardiovascular: No CP, palpitations     Scheduled Meds:    Current Facility-Administered Medications:  acetaminophen 650 mg Oral Q6H PRN Alanna Ortega MD   aspirin 81 mg Oral Daily Alanna Ortega MD   atorvastatin 40 mg Oral Daily With Damir Cr MD   bisacodyl 10 mg Rectal Daily PRN Alanna Ortega MD   enoxaparin 40 mg Subcutaneous Daily Alanna Ortega MD   hydrALAZINE 25 mg Oral Q8H PRN ROOSEVELT May   lactulose 20 g Oral Daily PRN ROOSEVELT May   metoprolol succinate 50 mg Oral Daily Alanna Ortega MD   mineral oil 1 enema Rectal Once PRN Alanna Ortega MD   ondansetron 4 mg Oral Q6H [facility] ROOSEVELT May   oxyCODONE 10 mg Oral Q4H PRN Alanna Ortega MD   oxyCODONE 5 mg Oral Q4H PRN Alanna Ortega MD   pantoprazole 40 mg Oral BID  ROOSEVELT May       Labs:       Results from last 7 days  Lab Units 03/02/18  0535 03/01/18  0514   WBC Thousand/uL 7 26 15 46*   HEMOGLOBIN g/dL 10 7* 12 5   HEMATOCRIT % 31 7* 36 8   PLATELETS Thousands/uL 269 367       Results from last 7 days  Lab Units 03/02/18  0535 03/01/18  0514   SODIUM mmol/L 140 138   POTASSIUM mmol/L 3 8 3 5   CHLORIDE mmol/L 107 103   CO2 mmol/L 25 25   BUN mg/dL 45* 35*   CREATININE mg/dL 1 20 1 61*   GLUCOSE RANDOM mg/dL 72 106   CALCIUM mg/dL 8 8 10 1                  Glucose (mg/dL)   Date Value   03/02/2018 72   03/01/2018 106   02/26/2018 111   02/25/2018 79   12/10/2015 93   06/18/2015 103   05/07/2014 102   11/25/2013 87       Labs reviewed    Physical Examination:  Vitals:   Vitals:    03/01/18 1326 03/01/18 2020 03/02/18 0525 03/02/18 0952   BP: 155/68 118/56 128/58 135/60   BP Location: Left arm Right arm Right arm    Pulse: 73 84 87 89   Resp: 21 16 16    Temp: 98 °F (36 7 °C) 98 1 °F (36 7 °C) 97 9 °F (36 6 °C)    TempSrc: Oral Oral Oral    SpO2: 94% 90% 93%    Weight:       Height:           GEN: NAD  HEENT: NC/AT  RESP: BBS w/o crackles/wheeze/rhonci; resp unlabored  CV: +S1 S2, regular rate, no rubs/murmurs  ABD:   + BS; abd is large but soft; midline abdominal incision and DILLON site is w/o erythema/drainage; DILLON with tiny amt bloody drainage  : no centeno  EXT: no edema  Skin: no rashes  Neuro: AAO; SHEPHERD 5/5      [x ] Total time spent: 30 Mins and greater than 50% of this time was spent counseling/coordinating care        Adilia Rubi, [address] [place] [address]  Internal Medicine

## 2018-03-02 NOTE — PROGRESS NOTES
03/02/18 1430   Pain Assessment   Pain Assessment 0-10   Pain Score 6   Pain Type Acute pain   Pain Location Abdomen   QI: Oral Hygiene   Assistance Needed Supervision   Oral Hygiene CARE Score 4   Grooming   Able To Initiate Tasks; Acquire Items; Shave;Wash/Dry Face;Brush/Clean Teeth;Wash/Dry Hands   Limitation Noted In Safety   Findings CS in stance at sink  pt completes all grooming tasks  Pt requires incidental set up for items  Pt able to complete static stance at sink w/ no UE support and intermittant UE support on sink for 25 minutes      Grooming (FIM) 5 - Patient requires supervision/monitoring   Transfer Bed/Chair/Wheelchair   Stand Pivot Contact Guard   Sit to Stand Minimal   Stand to Sit Supervision   Bed, Chair, Wheelchair Transfer (FIM) 3 - Ephraim needs to lift, boost or assist to stand OR sit   Cognition   Overall Cognitive Status WFL   Arousal/Participation Alert   Attention Within functional limits   Orientation Level Oriented X4   Memory Within functional limits   Following Commands Follows all commands and directions without difficulty   Assessment   Prognosis Good   Problem List Decreased strength;Decreased range of motion   Plan   Treatment/Interventions ADL retraining;Functional transfer training   Recommendation   OT Discharge Recommendation Home with family support   OT Therapy Minutes   OT Time In 1430   OT Time Out 1500   OT Total Time (minutes) 30   OT Mode of treatment - Individual (minutes) 30   OT Mode of treatment - Concurrent (minutes) 0   OT Mode of treatment - Group (minutes) 0   OT Mode of treatment - Co-treat (minutes) 0   OT Mode of Teatment - Total time(minutes) 30 minutes

## 2018-03-02 NOTE — SOCIAL WORK
CM Evaluation  CM met with patient to review rehab routine and CM role  Patient lives alone in 1st floor apartment with 2 AIDE down and then has 1 floor living with full bath and bedroom  He has 2 very supportive sons, one living in the area that checks in on him frequently and assists when needed  He is [facility] to [facility] from the past  He has a SPC  He uses [address] [employer] Informed of weekly team meeting and potential dc needs  Discussed insurance coverage and LOS  IMM explained and signed with copy to patient and chart  CM will follow to assist with dc needs

## 2018-03-02 NOTE — PROGRESS NOTES
03/02/18 0830   Pain Assessment   Pain Assessment 0-10   Pain Score 5   Pain Type Surgical pain   Pain Location Abdomen   Restrictions/Precautions   Precautions Bed/chair alarms; Fall Risk;Multiple lines;Pain;Supervision on toilet/commode   Weight Bearing Restrictions No   ROM Restrictions No   Braces or Orthoses Other (Comment)  (Abdominal binder; R shoe lift)   Cognition   Overall Cognitive Status WFL   Arousal/Participation Alert; Responsive; Cooperative   Attention Within functional limits   Orientation Level Oriented X4   Memory Within functional limits   Following Commands Follows multistep commands without difficulty   Subjective   Subjective States he slept well, no difficulties overnight   QI: Roll Left and Right   Assistance Needed Physical assistance   Assistance Provided by [place] 25%-49%   Comment Log roll supine > R   Roll Left and Right CARE Score 3   QI: Sit to Lying   Assistance Needed Physical assistance   Assistance Provided by [place] 50%-74%   Sit to Lying CARE Score 2   QI: Lying to Sitting on Side of Bed   Assistance Needed Physical assistance;Verbal cues   Assistance Provided by [place] 50%-74%   Lying to Sitting on Side of Bed CARE Score 2   QI: Sit to Stand   Assistance Needed Verbal cues; Supervision   Assistance Provided by [place] No physical assistance   Sit to Stand CARE Score 4   Bed Mobility   Able to Roll Left to Right;Scoot Up   Adaptive Equipment Side Rails   Positioning Concerns Other  (DILLON drain L side)   Findings Bedmobility trials x 3 using HOB elevated to get into bed due to pt unable to suffienctly lay on R side due to 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; pt able to perfrom log roll to R side however required mod A to get OOB   QI: Chair/Bed-to-Chair Transfer   Assistance Needed Supervision   Assistance Provided by [place] No physical assistance   Chair/Bed-to-Chair Transfer CARE Score 4   Transfer Bed/Chair/Wheelchair   Limitations Noted In Balance;Confidence; Endurance;LE Strength   Adaptive Equipment Cane;None   Stand Pivot Supervision   Sit to Stand Supervision   Stand to Sit Supervision   Supine to Sit Moderate Assist   Sit to Supine Moderate Assist   Bed, Chair, Wheelchair Transfer (FIM) 2 - [place] needs to lift or boost to rise AND assist to sit   QI: Car Transfer   Reason if not Attempted Activity not applicable   Car Transfer CARE Score 9   QI: Walk 10 [address] Provided by [place] No physical assistance   Walk 10 Feet CARE Score 4   QI: Walk 50 Feet with Two [address] Provided by [place] No physical assistance   Walk 50 Feet with Two Turns CARE Score 4   QI: Walk 150 [address] Provided by [place] No physical assistance   Walk 150 Feet CARE Score 4   QI: Walking 10 Feet on Uneven Surfaces   Reason if not Attempted Activity not applicable   Walking 10 Feet on Uneven Surfaces CARE Score 9   Ambulation   Does the patient walk? 2  Yes   Primary Discharge Mode of Locomotion Walk   Walk Assist Level Close Supervision   Gait Pattern Inconsistant Darlene; Slow Darlene; Lateral deviation; Improper weight shift   Assist Device [address] Walked (feet) 150 ft  (x 2; x 350 x 1; 75 'x 1 w/o AD)   Limitations Noted In Balance;Device Management; Endurance;Speed;Strength   Findings CS for ambulation trials   Walking (FIM) 5 - Patient requires supervision/monitoring AND distance 150 feet or more, no rest   QI: Wheel 50 Feet with Two Turns   Reason if not Attempted Activity not applicable   Wheel 50 Feet with Two Turns CARE Score 9   QI: Wheel 150 Feet   Reason if not Attempted Activity not applicable   Wheel 094 Feet CARE Score 9   Wheelchair mobility   QI: Does the patient use a wheelchair? 0   No   QI: 1 Step (Curb)   Assistance Needed Incidental touching;Verbal cues   Assistance Provided by [place] Less than 25%   1 Step (Curb) CARE Score 3 QI: 4 Steps   Assistance Needed Incidental touching;Verbal cues   Assistance Provided by [place] Less than 25%   4 Steps CARE Score 3   QI: 12 Steps   Reason if not Attempted Activity not applicable   12 Steps CARE Score 9   Stairs   Type Stairs  ( 6")   # of Steps 6  (x 3 trials)   Weight Bearing Precautions Fall Risk   Assist Devices Cane;Single Rail   Findings pt performed 3 trials w simulating in doorway to mock at home environment w pt only using cane CGA   Stairs (FIM) 2 - Patient goes up and down 4 - 11 stairs regardless of assist/device/setup   QI: Picking Up Object   Reason if not Attempted Activity not applicable   Picking Up Object CARE Score 9   QI: Toilet Transfer   Reason if not Attempted Activity not applicable   Toilet Transfer CARE Score 9   Therapeutic Interventions   Strengthening L LE 2# LAQs, marching seated x 20ea; heel curls green band x 20   Flexibility gentle HS, hip Add x 2-3 ea bedside treatment   Other Comments   Comments Pt able to don/doff socks using personal device   Assessment   Treatment Assessment Endurance, balance and coordination focused on through ambulation trials, bed mobility and ther ex   Pt able to perform transitions S level with cues for evenly distributing between B UEs  Pt biggest barrier is bed mobility with trials log rolling from R<>L  Pt presents w difficulty getting intio bed and requires HOB elevated w mod A into/OOB  Pt was able to scoot himself up to [facility] w R siderail; and L LE as well as R logroll however is unable to perform lateral trunk flexion to sitting due to priemorbid anatomical anomalies and surgical incisions  Pt will cont to benefit from PT services for contd strengtheing, inc actvity tolerance and balance coordination to meet functional goals  Problem List Decreased strength;Decreased range of motion;Decreased endurance; Impaired balance;Decreased skin integrity   Barriers to Discharge Decreased caregiver support; Inaccessible home environment   PT Barriers   Physical Impairment Decreased strength;Decreased range of motion;Decreased endurance; Impaired balance;Decreased coordination   Functional Limitation Walking  (bed mobility)   Plan   Treatment/Interventions Functional transfer training;LE strengthening/ROM; Elevations; Therapeutic exercise; Endurance training;Bed mobility;Gait training   Progress Progressing toward goals   Recommendation   Recommendation Home PT; Home with family support   PT Therapy Minutes   PT Time In 0830   PT Time Out 1000   PT Total Time (minutes) 90   PT Mode of treatment - Individual (minutes) 90   PT Mode of treatment - Concurrent (minutes) 0   PT Mode of treatment - Group (minutes) 0   PT Mode of treatment - Co-treat (minutes) 0   PT Mode of Teatment - Total time(minutes) 90 minutes   Therapy Time missed   Time missed?  No

## 2018-03-02 NOTE — PROGRESS NOTES
Progress Note - Magda Dixon 67 y o  male MRN: [id number]    Unit/Bed#: -01 Encounter: [id number]            Subjective:   Pt w/o complaint currently     Objective:     ROS  Gen: denies recent wt loss   Psych: denies mood change    Vitals: Blood pressure 121/57, pulse 77, temperature 98 2 °F (36 8 °C), temperature source Oral, resp  rate 16, height 5' 8" (1 727 m), weight 71 kg (156 lb 8 4 oz), SpO2 92 %      Physical Exam:     Gen:        NAD   Neck:   trachea midline  Lungs:  respirations unlabored   Heart:    + S1 and S2   Abdomen:  + surgical drain in place  Psych: mood/affect appropriate  Neurologic: awake, alert, appropriately answering questions     Functional :  Mobility: sup  Tx: min  ADLs: mod         Current Facility-Administered Medications:  acetaminophen 650 mg Oral Q6H PRN Erick Guadarrama MD   aspirin 81 mg Oral Daily Erick Guadarrama MD   atorvastatin 40 mg Oral Daily With Catie Thomson MD   bisacodyl 10 mg Rectal Daily PRN Erick Guadarrama MD   enoxaparin 40 mg Subcutaneous Daily Erick Guadarrama MD   hydrALAZINE 25 mg Oral Q8H PRN ROOSEVELT Morse   lactulose 20 g Oral Daily PRN ROOSEVELT Morse   metoprolol succinate 50 mg Oral Daily Erick Guadarrama MD   mineral oil 1 enema Rectal Once PRN Erick Guadarrama MD   ondansetron 4 mg Oral Q6H [address] ROOSEVELT Garcia   oxyCODONE 10 mg Oral Q4H PRN Erick Guadarrama MD   oxyCODONE 5 mg Oral Q4H PRN Erick Guadarrama MD   pantoprazole 40 mg Oral BID AC ROOSEVELT Morse         acetaminophen    bisacodyl    hydrALAZINE    lactulose    mineral oil    oxyCODONE    oxyCODONE      Assessment:  Pt is 66 yo admitted for elective incisional hernia repair and MARK by Dr Chandra Ortiz on 2/23/18    Plan:    Rehabilitation: cont PT/OT for ambulatory/ADL dysfxn    s/p elective incisional hernia repair and MARK :  Dr Chandra Ortiz on 2/23/18, general surgery consulted for possible removal of drain        HTN: on home toprol XL 50 mg qd, lisinopril 20 mg qd, however home HCTZ reduced from 25 mg to 12 5 QD      HL: on home atorvastatin 40 mg qpm      AAA: s/p repair, by Dr Edna Couch      PVD: s/p bypass graft      CAD: s/p PCI, stress test of 1/8/2015 w/o perfusion abnormality; on home ASA 81 mg qd, atorvastatin 40 mg qpm, toprol XL 50 mg qd (additionally holter monitor of 2/15/17 revealed PAC/PVCs but no VT/SVT/A-fib/A-flutter and was without bradyarrhythmia or advanced heart block); OP FU with Dr Charles Koch     GERD/hiatal hernia: home zantac 150 mg qd non-formulary, therapeutic substitution          Anemia: ABLA, Hg currently 10 7, IM monitoring     Leukocytosis: currently WNL     JOVANNI: Cr now WNL      Incidental findings of CT AP of 9/19/14: cholelithiasis, diverticulosis      DVT ppx: scd's, lovenox

## 2018-03-03 LAB
ANION GAP SERPL CALCULATED.3IONS-SCNC: 4 MMOL/L (ref 4–13)
BUN SERPL-MCNC: 33 MG/DL (ref 5–25)
CALCIUM SERPL-MCNC: 8.9 MG/DL (ref 8.3–10.1)
CHLORIDE SERPL-SCNC: 105 MMOL/L (ref 100–108)
CO2 SERPL-SCNC: 29 MMOL/L (ref 21–32)
CREAT SERPL-MCNC: 0.99 MG/DL (ref 0.6–1.3)
GFR SERPL CREATININE-BSD FRML MDRD: 75 ML/MIN/1.73SQ M
GLUCOSE SERPL-MCNC: 91 MG/DL (ref 65–140)
POTASSIUM SERPL-SCNC: 3.6 MMOL/L (ref 3.5–5.3)
SODIUM SERPL-SCNC: 138 MMOL/L (ref 136–145)

## 2018-03-03 PROCEDURE — 97116 GAIT TRAINING THERAPY: CPT

## 2018-03-03 PROCEDURE — 80048 BASIC METABOLIC PNL TOTAL CA: CPT | Performed by: NURSE PRACTITIONER

## 2018-03-03 PROCEDURE — 97530 THERAPEUTIC ACTIVITIES: CPT

## 2018-03-03 PROCEDURE — 97110 THERAPEUTIC EXERCISES: CPT

## 2018-03-03 RX ORDER — ONDANSETRON 4 MG/1
4 TABLET, ORALLY DISINTEGRATING ORAL EVERY 8 HOURS SCHEDULED
Status: DISCONTINUED | OUTPATIENT
Start: 2018-03-03 | End: 2018-03-05

## 2018-03-03 RX ADMIN — ASPIRIN 81 MG 81 MG: 81 TABLET ORAL at 09:27

## 2018-03-03 RX ADMIN — ATORVASTATIN CALCIUM 40 MG: 40 TABLET, FILM COATED ORAL at 16:58

## 2018-03-03 RX ADMIN — ONDANSETRON 4 MG: 4 TABLET, ORALLY DISINTEGRATING ORAL at 00:32

## 2018-03-03 RX ADMIN — ONDANSETRON 4 MG: 4 TABLET, ORALLY DISINTEGRATING ORAL at 11:28

## 2018-03-03 RX ADMIN — ONDANSETRON 4 MG: 4 TABLET, ORALLY DISINTEGRATING ORAL at 06:13

## 2018-03-03 RX ADMIN — ENOXAPARIN SODIUM 40 MG: 40 INJECTION SUBCUTANEOUS at 09:27

## 2018-03-03 RX ADMIN — OXYCODONE HYDROCHLORIDE 5 MG: 5 TABLET ORAL at 00:33

## 2018-03-03 RX ADMIN — PANTOPRAZOLE SODIUM 40 MG: 40 TABLET, DELAYED RELEASE ORAL at 16:58

## 2018-03-03 RX ADMIN — METOPROLOL SUCCINATE 50 MG: 50 TABLET, EXTENDED RELEASE ORAL at 09:27

## 2018-03-03 RX ADMIN — ONDANSETRON 4 MG: 4 TABLET, ORALLY DISINTEGRATING ORAL at 22:06

## 2018-03-03 RX ADMIN — PANTOPRAZOLE SODIUM 40 MG: 40 TABLET, DELAYED RELEASE ORAL at 06:13

## 2018-03-03 RX ADMIN — OXYCODONE HYDROCHLORIDE 5 MG: 5 TABLET ORAL at 20:21

## 2018-03-03 NOTE — PROGRESS NOTES
03/03/18 0900   Pain Assessment   Pain Assessment 0-10   Pain Score 3   Pain Type Acute pain   Pain Location Abdomen   Pain Orientation Mid   QI: Chair/Bed-to-Chair Transfer   Assistance Needed Supervision   Chair/Bed-to-Chair Transfer CARE Score 4   Transfer Bed/Chair/Wheelchair   Adaptive Equipment Cane   Stand Pivot Supervision   Sit to Stand Supervision   Stand to Sit Supervision   Bed, Chair, Wheelchair Transfer (FIM) 5 - Patient requires supervision/monitoring   Cognition   Overall Cognitive Status [facility]   Arousal/Participation Alert; Cooperative   Attention Within functional limits   Orientation Level Oriented X4   Following Commands Follows one step commands without difficulty   Comments Pt  had difficulty carrying over new learned information  with repetition increased carryover  Assessment   Treatment Assessment Pt  Participated in OT tx session focusing on endurance, standing balance, BUE ROM/strengthening  Pt  Tolerated 2 sets 10 LUE isometric strengthening (abd, and add) Trialed isometric shoulder flexion, but discontinued 2* pt  c/o discomfort with shoulder flexion  Pt  Reports he has had bursitis L shoulder since last year  Pt  Also tolerated 2 sets 10 RUE 1-2#'s shoulder flexion/ext, abd/add, int  Ext  Rotation  Pt  Requesting information to modify ability to complete sit to stand from recliner to prevent further pain/irritation LUE and 2* abdomen pain since surgery  Pt  Son to f/u with bathroom measurement  Pt  Engaged in standing activity of guess again game  Pt  Required A to carryover rules/procedures of game  Pt  Was able to tolerate standing up to 2 minutes at a time  Pt  Limited by abdomen discomfort  Discontinued game after 1 round 2* disinterest in activity  Pt  Also tolerated 4 reps of standing balance activity involving weight-shifting laterally to increase endurance and standing balance   To continue OT services per POC with a focus on endurance, joint protection techniques, standing balance, RUE strengthening, isometrics LUE  Prognosis Good   Problem List Decreased strength;Decreased range of motion;Decreased endurance; Impaired balance;Decreased mobility   Plan   Treatment/Interventions ADL retraining;Functional transfer training; Therapeutic exercise;Patient/family training;Equipment eval/education   Progress Progressing toward goals   OT Therapy Minutes   OT Time In 0900   OT Time Out 1000   OT Total Time (minutes) 90   OT Mode of treatment - Individual (minutes) 30   OT Mode of treatment - Concurrent (minutes) 60   OT Mode of treatment - Group (minutes) 0   OT Mode of treatment - Co-treat (minutes) 0   OT Mode of Teatment - Total time(minutes) 90 minutes   Therapy Time missed   Time missed?  No

## 2018-03-03 NOTE — PROGRESS NOTES
Internal Medicine Progress Note  Patient: Fiorella Burger  Age/sex: 67 y o  male  Medical Record #: [id number]      ASSESSMENT/PLAN:  Fiorella Burger is seen and examined and mangement for following issues:    1  Incisional hernia repair with MESH and MARK with Dr Martin Found 2/23/18:  will watch incision  Checked with surgery yesterday to see when removing the DILLON and they will get back to me today  They said ok remove Mepilex dressing  Abd binder to be worn, acc to surgery, when OOB/activity and can be removed when in bed unless uncomfortable w/o it  On 3/1/18, binder was way too tight and causing gastric reflux with regurgitation of bile, mostly when in bed (gave larger binder) = resolved  D/C'd Pepcid and started Protonix 40mg BID for the time being and started ATC Zofran since occ nausea  Will wean Zofran back today to 4mg q8hrs      2  ABLA:  mild, not requiring transfusion  Will watch     3  HTN: stable  At home he takes Lisinopril 20mg qd, HCTZ 25mg qd and Toprol 50mg qd  Was on HCTZ 12 5mg qd, Lisinopril 20mg qd and Toprol 50mg qd and stopped the HCTZ and Lisinopril 2/2 JOVANNI  Has prn Hydralazine  BP stable     4  HLD:  continue Lipitor     5  CAD with hx IWMI/BMS RCA 00:  last stress test 2015  Continue ASA/Lipitor     6  Hx PAD with [id number] BPG     7   Diarrhea stools:  changed Lactulose to prn    8  Leukocytosis:  On 3/1/18 jumped to 15 WBC = Tmax was 99 7; normal now  9   JOVANNI: ?etio meds vs volume depletion:  Gave 1 liter NSS on 3/1/18 and stopped the HCTZ and Lisinopril  Today is creat is back to 0 99    Subjective: Patient seen and examined   Offers no complaints    ROS:   GI: denies abdominal pain, change bowel habits or reflux symptoms  Neuro: No new neurologic changes  Respiratory: No Cough, SOB  Cardiovascular: No CP, palpitations     Scheduled Meds:    Current Facility-Administered Medications:  acetaminophen 650 mg Oral Q6H PRN Vish Og MD   aspirin 81 mg Oral Daily Romy Aragon MD   atorvastatin 40 mg Oral Daily With Cecelia Vergara MD   bisacodyl 10 mg Rectal Daily PRN Romy Aragon MD   enoxaparin 40 mg Subcutaneous Daily Romy Aragon MD   hydrALAZINE 25 mg Oral Q8H PRN ROOSEVELT Vidal   lactulose 20 g Oral Daily PRN ROOSEVELT Vidal   metoprolol succinate 50 mg Oral Daily Romy Aragon MD   mineral oil 1 enema Rectal Once PRN Romy Aragon MD   ondansetron 4 mg Oral Q6H [facility] ROOSEVELT Vidal   oxyCODONE 10 mg Oral Q4H PRN Romy Aragon MD   oxyCODONE 5 mg Oral Q4H PRN Romy Aragon MD   pantoprazole 40 mg Oral BID AC ROOSEVELT Vidal       Labs:       Results from last 7 days  Lab Units 03/02/18  0535 03/01/18  0514   WBC [place]/uL 7 26 15 46*   HEMOGLOBIN g/dL 10 7* 12 5   HEMATOCRIT % 31 7* 36 8   PLATELETS [place]/uL 269 367       Results from last 7 days  Lab Units 03/03/18  0642 03/02/18  0535   SODIUM mmol/L 138 140   POTASSIUM mmol/L 3 6 3 8   CHLORIDE mmol/L 105 107   CO2 mmol/L 29 25   BUN mg/dL 33* 45*   CREATININE mg/dL 0 99 1 20   GLUCOSE RANDOM mg/dL 91 72   CALCIUM mg/dL 8 9 8 8                  Glucose (mg/dL)   Date Value   03/03/2018 91   03/02/2018 72   03/01/2018 106   02/26/2018 111   12/10/2015 93   06/18/2015 103   05/07/2014 102   11/25/2013 87       Labs reviewed    Physical Examination:  Vitals:   Vitals:    03/02/18 0952 03/02/18 1341 03/02/18 2034 03/03/18 0635   BP: 135/60 121/57 114/58 122/61   BP Location:  Right arm Right arm Right arm   Pulse: 89 77 76 69   Resp:  16 16 20   Temp:  98 2 °F (36 8 °C) 98 3 °F (36 8 °C) 98 1 °F (36 7 °C)   TempSrc:  Oral Oral Oral   SpO2:  92% 91% 94%   Weight:       Height:           GEN: NAD  HEENT: NC/AT  RESP: BBS w/o crackles/wheeze/rhonci; resp unlabored  CV: +S1 S2, regular rate, no rubs/murmurs  ABD:   + BS; abd is large but soft; midline abdominal incision and DILLON site is w/o erythema/drainage; DILLON with tiny amt bloody drainage    : no centeno  EXT: no edema  Skin: no rashes  Neuro: AAO; JOAO 5/5      [x ] Total time spent: 30 Mins and greater than 50% of this time was spent counseling/coordinating care        Anuj Jessie, [address]   Internal Medicine

## 2018-03-03 NOTE — PROGRESS NOTES
03/03/18 1330   Pain Assessment   Pain Assessment No/denies pain   Restrictions/Precautions   Precautions Bed/chair alarms; Fall Risk;Pain   Subjective   Subjective pt ready for therapy; no complaitns   QI: Roll Left and Right   Reason if not Attempted Activity not applicable   Roll Left and Right CARE Score 9   QI: Sit to Lying   Reason if not Attempted Activity not applicable   Sit to Lying CARE Score 9   QI: Lying to Sitting on Side of Bed   Reason if not Attempted Activity not applicable   Lying to Sitting on Side of Bed CARE Score 9   QI: Sit to Stand   Assistance Needed Supervision   Sit to Stand CARE Score 4   QI: Chair/Bed-to-Chair Transfer   Assistance Needed Supervision; Adaptive equipment   Chair/Bed-to-Chair Transfer CARE Score 4   Transfer Bed/Chair/Wheelchair   Limitations Noted In Endurance;UE Strength;LE Strength   Adaptive Equipment Cane   Stand Pivot Supervision   Sit to Stand Supervision   Stand to Sit Supervision   Bed, Chair, Wheelchair Transfer (FIM) 5 - Patient requires supervision/monitoring   QI: Car Transfer   Reason if not Attempted Activity not applicable   Car Transfer CARE Score 9   QI: Walk 10 Feet   Assistance Needed Supervision   Walk 10 Feet CARE Score 4   QI: Walk 50 Feet with Two Turns   Assistance Needed Supervision   Walk 50 Feet with Two Turns CARE Score 4   QI: Walk 150 Feet   Assistance Needed Supervision   Walk 150 Feet CARE Score 4   QI: Walking 10 Feet on Uneven Surfaces   Reason if not Attempted Activity not applicable   Walking 10 Feet on Uneven Surfaces CARE Score 9   Ambulation   Does the patient walk? 2  Yes   Primary Discharge Mode of Locomotion Walk   Walk Assist Level Close Supervision   Gait Pattern Inconsistant Darlene; Slow Darlene;Decreased foot clearance; Improper weight shift   Assist Device [address] Walked (feet) 200 ft  (x2)   Limitations Noted In Balance; Endurance;Speed;Strength   Findings decrease endurance and SOB   Walking (FIM) 5 - Patient requires supervision/monitoring AND distance 150 feet or more, no rest   Wheelchair mobility   QI: Does the patient use a wheelchair? 0  No   QI: 1 Step (Curb)   Reason if not Attempted Activity not applicable   1 Step (Curb) CARE Score 9   QI: 4 Steps   Reason if not Attempted Activity not applicable   4 Steps CARE Score 9   QI: 12 Steps   Reason if not Attempted Activity not applicable   12 Steps CARE Score 9   QI: Picking Up Object   Reason if not Attempted Activity not applicable   Picking Up Object CARE Score 9   QI: Toilet Transfer   Reason if not Attempted Activity not applicable   Toilet Transfer CARE Score 9   Therapeutic Interventions   Balance moving objects one end of counter to shelf; no AD used, counter to balance if neeeded   Assessment   Treatment Assessment pt cont to improve gait distance but limited by fatigue and decrease endurance  pt with no LOB while performing tasks at counter and has objects at home at eye level or sligthly above  pt to cont focus on improving endurance and overall safety to progress with functional mobility and Ind    PT Therapy Minutes   PT Time In 1330   PT Time Out 1400   PT Total Time (minutes) 30   PT Mode of treatment - Individual (minutes) 30   PT Mode of treatment - Concurrent (minutes) 0   PT Mode of treatment - Group (minutes) 0   PT Mode of treatment - Co-treat (minutes) 0   PT Mode of Teatment - Total time(minutes) 30 minutes   Therapy Time missed   Time missed?  No

## 2018-03-03 NOTE — PROGRESS NOTES
03/03/18 1030   Pain Assessment   Pain Assessment No/denies pain   Pain Descriptors Discomfort  (with bed mobility)   Restrictions/Precautions   Precautions Fall Risk;Bed/chair alarms   Braces or Orthoses (abdominal binder)   Subjective   Subjective no complaints this morning; pt ready for therapy   QI: Roll Left and Right   Assistance Needed Physical assistance   Assistance Provided by [place] 50%-74%   Comment practiced log rolling and getting in and out on R and L   Roll Left and Right CARE Score 2   QI: Sit to Lying   Assistance Needed Physical assistance   Assistance Provided by [place] 50%-74%   Comment A with B LEs; has difficulty with Log rolling; trialed leg  but pt has difficulty due to pain   Sit to Lying CARE Score 2   QI: Lying to Sitting on Side of Bed   Assistance Needed Physical assistance   Assistance Provided by [place] 50%-74%   Lying to Sitting on Side of Bed CARE Score 2   QI: Sit to Stand   Assistance Needed Supervision   Sit to Stand CARE Score 4   Bed Mobility   Findings min/modA poor ability to log roll   QI: Chair/Bed-to-Chair Transfer   Assistance Needed Supervision; Adaptive equipment   Comment SPC   Chair/Bed-to-Chair Transfer CARE Score 4   Transfer Bed/Chair/Wheelchair   Limitations Noted In Endurance;LE Strength   Adaptive Equipment Cane   Stand Pivot Supervision   Sit to Stand Supervision   Stand to Sit Supervision   Supine to Sit Moderate Assist   Findings see above for details   Bed, Chair, Wheelchair Transfer (FIM) 5 - Patient requires supervision/monitoring   QI: Car Transfer   Reason if not Attempted Activity not applicable   Car Transfer CARE Score 9   QI: Walk 10 Feet   Assistance Needed Supervision   Walk 10 Feet CARE Score 4   QI: Walk 50 Feet with Two Turns   Assistance Needed Supervision   Walk 50 Feet with Two Turns CARE Score 4   QI: Walk 150 Feet   Assistance Needed Supervision   Walk 150 Feet CARE Score 4   QI: Walking 10 Feet on Uneven Surfaces   Reason if not Attempted Activity not applicable   Walking 10 Feet on Uneven Surfaces CARE Score 9   Ambulation   Does the patient walk? 2  Yes   Primary Discharge Mode of Locomotion Walk   Walk Assist Level Close Supervision   Gait Pattern Inconsistant [place]; Slow Lorena;Decreased foot clearance; Improper weight shift   Assist Device [address] Walked (feet) 150 ft  (x3 )   Limitations Noted In Balance; Endurance;Speed;Strength   Findings no LOB   Walking (FIM) 5 - Patient requires supervision/monitoring AND distance 150 feet or more, no rest   QI: Wheel 50 Feet with Two Turns   Reason if not Attempted Activity not applicable   Wheel 50 Feet with Two Turns CARE Score 9   QI: Wheel 150 Feet   Reason if not Attempted Activity not applicable   Wheel 823 Feet CARE Score 9   Wheelchair mobility   QI: Does the patient use a wheelchair? 0  No   QI: 1 Step (Curb)   Reason if not Attempted Activity not applicable   1 Step (Curb) CARE Score 9   QI: 4 Steps   Assistance Needed Supervision; Adaptive equipment   Assistance Provided by [place] No physical assistance   Comment Cane and HR; uses door frame at home due to no handrail   4 Steps CARE Score 4   QI: 12 Steps   Comment completed 10   Stairs   Type Stairs   # of Steps 10   Weight Bearing Precautions Fall Risk   Assist Devices Cane;Single Rail   Findings see above for details   Stairs (FIM) 2 - Patient goes up and down 4 - 11 stairs regardless of assist/device/setup   QI: Picking Up Object   Reason if not Attempted Activity not applicable   Picking Up Object CARE Score 9   QI: Toilet Transfer   Reason if not Attempted Activity not applicable   Toilet Transfer CARE Score 9   Assessment   Treatment Assessment pt cont to have difficulty with bed mobility and practiced log rolling and trialed leg , but pt cont to have increase pain and needs A with LEs and does not roll well to log roll    pt with no decrease balance or safety concerns with amb with SPC; pt's lorena inconsistent due to shoe lift and decrease hip flex on R due to PMH  will cont to practice bed txs and overall endurance and activity tolerance to progress with functional mobility and Ind  Problem List Decreased strength;Decreased endurance;Decreased mobility; Impaired balance;Pain   Barriers to Discharge Decreased caregiver support   PT Barriers   Physical Impairment Decreased strength;Decreased endurance; Impaired balance;Decreased mobility; Decreased safety awareness   Functional Limitation Walking;Stair negotiation   Plan   Treatment/Interventions Functional transfer training;LE strengthening/ROM; Endurance training;Gait training;Patient/family training   Progress Progressing toward goals   Recommendation   Recommendation Home with family support;Home PT   PT Therapy Minutes   PT Time In 1030   PT Time Out 1130   PT Total Time (minutes) 60   PT Mode of treatment - Individual (minutes) 60   PT Mode of treatment - Concurrent (minutes) 0   PT Mode of treatment - Group (minutes) 0   PT Mode of treatment - Co-treat (minutes) 0   PT Mode of Teatment - Total time(minutes) 60 minutes   Therapy Time missed   Time missed?  No

## 2018-03-04 PROCEDURE — 97110 THERAPEUTIC EXERCISES: CPT | Performed by: STUDENT IN AN ORGANIZED HEALTH CARE EDUCATION/TRAINING PROGRAM

## 2018-03-04 PROCEDURE — 97116 GAIT TRAINING THERAPY: CPT

## 2018-03-04 PROCEDURE — 97110 THERAPEUTIC EXERCISES: CPT

## 2018-03-04 PROCEDURE — 97530 THERAPEUTIC ACTIVITIES: CPT | Performed by: STUDENT IN AN ORGANIZED HEALTH CARE EDUCATION/TRAINING PROGRAM

## 2018-03-04 RX ORDER — LACTULOSE 20 G/30ML
20 SOLUTION ORAL ONCE
Status: COMPLETED | OUTPATIENT
Start: 2018-03-04 | End: 2018-03-04

## 2018-03-04 RX ADMIN — ASPIRIN 81 MG 81 MG: 81 TABLET ORAL at 09:03

## 2018-03-04 RX ADMIN — ONDANSETRON 4 MG: 4 TABLET, ORALLY DISINTEGRATING ORAL at 05:34

## 2018-03-04 RX ADMIN — ATORVASTATIN CALCIUM 40 MG: 40 TABLET, FILM COATED ORAL at 15:51

## 2018-03-04 RX ADMIN — ONDANSETRON 4 MG: 4 TABLET, ORALLY DISINTEGRATING ORAL at 21:43

## 2018-03-04 RX ADMIN — OXYCODONE HYDROCHLORIDE 10 MG: 10 TABLET ORAL at 02:32

## 2018-03-04 RX ADMIN — PANTOPRAZOLE SODIUM 40 MG: 40 TABLET, DELAYED RELEASE ORAL at 15:51

## 2018-03-04 RX ADMIN — METOPROLOL SUCCINATE 50 MG: 50 TABLET, EXTENDED RELEASE ORAL at 09:02

## 2018-03-04 RX ADMIN — ENOXAPARIN SODIUM 40 MG: 40 INJECTION SUBCUTANEOUS at 09:03

## 2018-03-04 RX ADMIN — LACTULOSE 20 G: 20 SOLUTION ORAL at 13:36

## 2018-03-04 RX ADMIN — ONDANSETRON 4 MG: 4 TABLET, ORALLY DISINTEGRATING ORAL at 13:36

## 2018-03-04 RX ADMIN — PANTOPRAZOLE SODIUM 40 MG: 40 TABLET, DELAYED RELEASE ORAL at 06:05

## 2018-03-04 NOTE — PROGRESS NOTES
Physical Therapy Progress Note   03/04/18 1230   Pain Assessment   Pain Assessment No/denies pain   Pain Score No Pain   Restrictions/Precautions   Precautions Bed/chair alarms; Fall Risk   Weight Bearing Restrictions No   ROM Restrictions No   Cognition   Overall Cognitive Status WFL   Arousal/Participation Alert; Cooperative   Attention Within functional limits   Orientation Level Oriented X4   Memory Within functional limits   Following Commands Follows one step commands without difficulty   Subjective   Subjective denies pain; no c/o   QI: Roll Left and Right   Assistance Needed Physical assistance   Assistance Provided by [place] 25%-49%   Roll Left and Right CARE Score 3   QI: Sit to Lying   Assistance Needed Physical assistance   Assistance Provided by [place] 25%-49%   Sit to Lying CARE Score 3   QI: Lying to Sitting on Side of Bed   Assistance Needed Physical assistance   Assistance Provided by [place] 50%-74%   Lying to Sitting on Side of Bed CARE Score 2   QI: Sit to [address] Provided by [place] No physical assistance   Sit to Stand CARE Score 4   Bed Mobility   Able to Roll Left to Right;Right to Left;Scoot Up   Findings min/modA   QI: Chair/Bed-to-Chair Transfer   Assistance Needed Supervision   Assistance Provided by [place] No physical assistance   Chair/Bed-to-Chair Transfer CARE Score 4   Transfer Bed/Chair/Wheelchair   Limitations Noted In Endurance;UE Strength;LE Strength   Adaptive Equipment Cane   Stand Pivot Supervision   Sit to Stand Supervision   Stand to Sit Supervision   Supine to Sit Moderate Assist   Sit to Supine Moderate Assist   Car Transfer Supervision   Bed, Chair, Wheelchair Transfer (FIM) 2 - [place] needs to lift or boost to rise AND assist to sit   QI: [address] Provided by [place] No physical assistance   Car Transfer CARE Score 4   QI: [address] [facility] Provided by [place] No physical assistance   Walk 10 Feet CARE Score 4   QI: Walk 50 Feet with Two [address] Provided by [place] No physical assistance   Walk 50 Feet with Two Turns CARE Score 4   QI: [address] [facility] Provided by [place] No physical assistance   Walk 150 Feet CARE Score 4   QI: Walking 10 Feet on Uneven Surfaces   Reason if not Attempted Activity not applicable   Walking 10 Feet on Uneven Surfaces CARE Score 9   Ambulation   Does the patient walk? 2  Yes   Primary Discharge Mode of Locomotion Walk   Walk Assist Level Close Supervision   Gait Pattern Inconsistant Darlene   Assist Device Cane   Distance Walked (feet) 150 ft   Limitations Noted In Balance; Endurance   Findings decreased endurance   Walking (FIM) 5 - Patient requires supervision/monitoring AND distance 150 feet or more, no rest   QI: Wheel 50 Feet with Two Turns   Reason if not Attempted Activity not applicable   Wheel 50 Feet with Two Turns CARE Score 9   QI: Wheel 150 Feet   Reason if not Attempted Activity not applicable   Wheel 085 Feet CARE Score 9   Wheelchair mobility   QI: Does the patient use a wheelchair? 0   No   Findings NT   Wheelchair (FIM) 0 - Activity does not occur   QI: 1 Step (Curb)   Assistance Needed Supervision   Assistance Provided by [place] No physical assistance   1 Step (Curb) CARE Score 4   QI: 4 Steps   Assistance Needed Supervision   Assistance Provided by [place] No physical assistance   4 Steps CARE Score 4   QI: 12 Steps   Reason if not Attempted Activity not applicable   12 Steps CARE Score 9   Stairs   Findings assessed in AM   Stairs (FIM) 2 - Patient goes up and down 4 - 11 stairs regardless of assist/device/setup   QI: Picking Up Object   Reason if not Attempted Activity not applicable   Picking Up Object CARE Score 9   QI: Toilet Transfer   Reason if not Attempted Activity not applicable   Toilet Transfer CARE Score 9 Toilet Transfer   Positioning Concerns Safety   Toilet Transfer (FIM) 5 - Patient requires supervision/monitoring   Therapeutic Interventions   Strengthening standing and seated TE  in all planes, with alterations made to accomodate lack of R hip ROM (marches, LAQ, standing hip flex/ext/abd/add, HS curls, mini squats, calf raises) reps until fatigued   Flexibility manual stretch B HS and gastroc; Assessment   Treatment Assessment Pt seated in bedside recliner prior to session; able to STS/SPT with S and cane; amb 150' (x2) with CS and SPC  to/from PT gym and pt room; pt instructed in TE (as above) in various positions (sitting/standing) to accomodate lack of R hip mobility/ROM; finished session in room with alarms active and all needs in reacgh; recommend cont PT POC;    Problem List Decreased strength;Decreased endurance;Decreased mobility; Impaired balance;Pain   Barriers to Discharge Decreased caregiver support   PT Barriers   Physical Impairment Decreased strength;Decreased endurance; Impaired balance;Decreased mobility; Decreased safety awareness   Functional Limitation Walking;Stair negotiation   Plan   Treatment/Interventions ADL retraining;Functional transfer training;LE strengthening/ROM; Elevations; Therapeutic exercise; Endurance training;Cognitive reorientation;Patient/family training;Equipment eval/education; Bed mobility;Gait training   Progress Progressing toward goals   Recommendation   Recommendation Home with family support;Home PT   Equipment Recommended Cane   PT Therapy Minutes   PT Time In 1230   PT Time Out 1330   PT Total Time (minutes) 60   PT Mode of treatment - Individual (minutes) 0   PT Mode of treatment - Concurrent (minutes) 60   PT Mode of treatment - Group (minutes) 0   PT Mode of treatment - Co-treat (minutes) 0   PT Mode of Teatment - Total time(minutes) 60 minutes   Therapy Time missed   Time missed?  No     Carlus Dancer, PTA

## 2018-03-04 NOTE — PROGRESS NOTES
Physical Therapy Progress Note   03/04/18 1000   Pain Assessment   Pain Assessment No/denies pain   Pain Score No Pain   Restrictions/Precautions   Precautions Bed/chair alarms; Fall Risk;Pain   Weight Bearing Restrictions No   ROM Restrictions No   Cognition   Overall Cognitive Status WFL   Arousal/Participation Alert; Cooperative   Attention Within functional limits   Orientation Level Oriented X4   Memory Within functional limits   Following Commands Follows one step commands without difficulty   Subjective   Subjective denies pain at this time but stated he was in intense pain that kept him awake most of the night; no other c/o   QI: Roll Left and Right   Assistance Needed Physical assistance   Assistance Provided by [place] 25%-49%   Roll Left and Right CARE Score 3   QI: Sit to Lying   Assistance Needed Physical assistance   Assistance Provided by [place] 25%-49%   Sit to Lying CARE Score 3   QI: Lying to Sitting on Side of Bed   Assistance Needed Physical assistance   Assistance Provided by [place] 50%-74%   Lying to Sitting on Side of Bed CARE Score 2   QI: Sit to Stand   Assistance Needed Physical assistance   Assistance Provided by [place] 50%-74%   Sit to Stand CARE Score 2   Bed Mobility   Able to Roll Left to Right;Right to Left;Scoot Up   Findings min/modA   QI: Chair/Bed-to-Chair Transfer   Assistance Needed Supervision   Assistance Provided by [place] No physical assistance   Chair/Bed-to-Chair Transfer CARE Score 4   Transfer Bed/Chair/Wheelchair   Limitations Noted In Endurance;UE Strength;LE Strength   Adaptive Equipment Cane   Stand Pivot Supervision   Sit to Stand Supervision   Stand to Sit Supervision   Supine to Sit Moderate Assist   Sit to Supine Moderate Assist   Car Transfer Supervision   Bed, Chair, Wheelchair Transfer (FIM) 2 - [place] needs to lift or boost to rise AND assist to sit   QI: [address] Provided by [place] No physical assistance   Car Transfer CARE Score 4   QI: Walk 10 Feet   Assistance Needed Supervision   Assistance Provided by [place] No physical assistance   Walk 10 Feet CARE Score 4   QI: Walk 50 Feet with Two [address] Provided by [place] No physical assistance   Walk 50 Feet with Two Turns CARE Score 4   QI: [address] Provided by [place] No physical assistance   Walk 150 Feet CARE Score 4   QI: Walking 10 Feet on Uneven Surfaces   Reason if not Attempted Activity not applicable   Walking 10 Feet on Uneven Surfaces CARE Score 9   Ambulation   Does the patient walk? 2  Yes   Primary Discharge Mode of Locomotion Walk   Walk Assist Level Close Supervision   Gait Pattern Inconsistant Darlene   Assist Device Cane   Distance Walked (feet) 150 ft  (x2)   Limitations Noted In Balance; Endurance   Walking (FIM) 5 - Patient requires verbal cues AND distance 150 feet or more, no rest   QI: Wheel 50 Feet with Two Turns   Reason if not Attempted Activity not applicable   Wheel 50 Feet with Two Turns CARE Score 9   QI: Wheel 150 Feet   Reason if not Attempted Activity not applicable   Wheel 466 Feet CARE Score 9   Wheelchair mobility   QI: Does the patient use a wheelchair? 0   No   Findings NT   Wheelchair (FIM) 0 - Activity does not occur   QI: 1 Step (Curb)   Assistance Needed Supervision   Assistance Provided by [place] No physical assistance   1 Step (Curb) CARE Score 4   QI: 4 Steps   Assistance Needed Supervision   Assistance Provided by [place] No physical assistance   4 Steps CARE Score 4   QI: 12 Steps   Reason if not Attempted Activity not applicable   12 Steps CARE Score 9   Stairs   Type Curb;Ramp;Stairs   # of Steps 4   Weight Bearing Precautions Fall Risk   Assist Devices Cane   Findings stairs x4 SPC and CS; curb and ramp with cane and CS   Stairs (FIM) 2 - Patient goes up and down 4 - 11 stairs regardless of assist/device/setup   QI: Picking Up Object Reason if not Attempted Activity not applicable   Picking Up Object CARE Score 9   QI: Toilet Transfer   Reason if not Attempted Activity not applicable   Toilet Transfer CARE Score 9   Toilet Transfer   Positioning Concerns Safety   Toilet Transfer (FIM) 5 - Patient requires supervision/monitoring   Assessment   Treatment Assessment Pt supine in bed prior to session; assistance ([place]) to roll L/R and modA for supine <> sit EOB; STS/SPT with S and cane; able to amb 150' (x2) with CS and cane; assessed in stair climbing (x4) with SPC only; curb and ramp with CS and cane only; amb 150' with SPC And CS to OT gym in prep for OT session; finished session seated in chair at OT table with all needs in reach and supervision of therapist and rehab aide; recommend cont PT POC;    Problem List Decreased strength;Decreased endurance;Decreased mobility; Impaired balance;Pain   Barriers to Discharge Decreased caregiver support   PT Barriers   Physical Impairment Decreased strength;Decreased endurance; Impaired balance;Decreased mobility; Decreased safety awareness   Functional Limitation Walking;Stair negotiation   Plan   Treatment/Interventions ADL retraining;Functional transfer training;LE strengthening/ROM; Elevations; Therapeutic exercise; Endurance training;Cognitive reorientation;Patient/family training;Equipment eval/education; Bed mobility;Gait training   Progress Progressing toward goals   Recommendation   Recommendation Home with family support;Home PT   Equipment Recommended Cane   PT Therapy Minutes   PT Time In 1000   PT Time Out 1030   PT Total Time (minutes) 30   PT Mode of treatment - Individual (minutes) 25   PT Mode of treatment - Concurrent (minutes) 5   PT Mode of treatment - Group (minutes) 0   PT Mode of treatment - Co-treat (minutes) 0   PT Mode of Teatment - Total time(minutes) 30 minutes   Therapy Time missed   Time missed?  No     David Dong, PTA

## 2018-03-04 NOTE — PROGRESS NOTES
03/04/18 1030   Pain Assessment   Pain Assessment No/denies pain   Pain Score No Pain   Restrictions/Precautions   Precautions Bed/chair alarms; Fall Risk;Pain   Weight Bearing Restrictions No   ROM Restrictions No   QI: Sit to Stand   Assistance Needed Supervision   Assistance Provided by [place] No physical assistance   Sit to Stand CARE Score 4   QI: Chair/Bed-to-Chair Transfer   Assistance Needed Supervision   Assistance Provided by [place] No physical assistance   Chair/Bed-to-Chair Transfer CARE Score 4   Transfer Bed/Chair/Wheelchair   Limitations Noted In Balance; Endurance;UE Strength;LE Strength   Adaptive Equipment Cane   Stand Pivot Supervision   Sit to Stand Supervision   Stand to [address]  [place], Chair, Wheelchair Transfer (FIM) 5 - Patient requires supervision/monitoring   Functional Standing Tolerance   Time 5 min; 2 min   Activity static standing at table   Therapeutic Excerise-Strength   UE Strength Yes   Right Upper Extremity- Strength   R Shoulder Flexion;ABduction   R Elbow Elbow flexion;Elbow extension   R Weight/Reps/Sets 3 sets 10 resp with 2# dumbell for various arm exercises such as bicep curls, shoulder raises, rotations, and shoulder flexion   Cognition   Overall Cognitive Status WFL   Arousal/Participation Alert   Attention Within functional limits   Orientation Level Oriented X4   Memory Within functional limits   Following Commands Follows one step commands without difficulty   Activity Tolerance   Activity Tolerance Patient tolerated treatment well   Assessment   Treatment Assessment Pt participates in OT session with focus on UE strengthening, activity tolerance, standing tolerance, and transfers to increase I with adls  Pt engaged in UE strengthening with 2# dumbell for RUE for bicep curls, rotations, shoulder raises, and chest presses to increase UE strength  Pt engaged in isometric exercise for LUE with prayer pose and self arm exercise for 3x10   Pt engaged in static standing at the table for parquetry block activity for 2 separate designs and stood for times stated above  Pt supervision functional mobility with cane from chair to room  Pt will continue to benefit from activity tolerance, UE strengthening, and adls  Prognosis Good   Problem List Decreased endurance; Impaired balance;Decreased mobility; Decreased strength;Pain   Plan   Treatment/Interventions Functional transfer training;LE strengthening/ROM; Therapeutic exercise; Endurance training   Progress Progressing toward goals   OT Therapy Minutes   OT Time In 1030   OT Time Out 1130   OT Total Time (minutes) 60   OT Mode of treatment - Individual (minutes) 60   OT Mode of treatment - Concurrent (minutes) 0   OT Mode of treatment - Group (minutes) 0   OT Mode of treatment - Co-treat (minutes) 0   OT Mode of Teatment - Total time(minutes) 60 minutes   Therapy Time missed   Time missed?  No

## 2018-03-04 NOTE — PROGRESS NOTES
Internal Medicine Progress Note  Patient: Toby Burkitt  Age/sex: 67 y o  male  Medical Record #: [id number]      ASSESSMENT/PLAN:  Toby Burkitt is seen and examined and mangement for following issues:    1  Incisional hernia repair with MESH and MARK with Dr Hu Wilson 2/23/18:  will watch incision  Checked with surgery yesterday to see when removing the DILLON and they will get back to me today  They said ok remove Mepilex dressing  Abd binder to be worn, acc to surgery, when OOB/activity and can be removed when in bed unless uncomfortable w/o it  On 3/1/18, binder was way too tight and causing gastric reflux with regurgitation of bile, mostly when in bed (gave larger binder) = resolved  D/C'd Pepcid and started Protonix 40mg BID for the time being and started ATC Zofran since occ nausea  Yesterday, weaned Zofran back to 4mg q8hrs      2  ABLA:  mild, not requiring transfusion  Will watch     3  HTN: stable  At home he takes Lisinopril 20mg qd, HCTZ 25mg qd and Toprol 50mg qd  Was on HCTZ 12 5mg qd, Lisinopril 20mg qd and Toprol 50mg qd and stopped the HCTZ and Lisinopril 2/2 JOVANNI  Has prn Hydralazine  BP stable     4  HLD:  continue Lipitor     5  CAD with hx IWMI/BMS RCA 00:  last stress test 2015  Continue ASA/Lipitor     6  Hx PAD with [id number] BPG     7   Diarrhea stools:  changed Lactulose to prn but now says he thinks he is having some abd pain at night 2/2 constipation so will give dose lactulose now    8  Leukocytosis:  On 3/1/18 jumped to 15 WBC = Tmax was 99 7; normal now  9   JOVANNI: ?etio meds vs volume depletion or combo  Gave 1 liter NSS on 3/1/18 and stopped the HCTZ and Lisinopril  Yesterday creat was back to 0 99 from a high of 1 61      Subjective: Patient seen and examined   Says abd pain last 2 nights when supine he feels 2/2 constipation = none currently when OOB    ROS:   Neuro: No new neurologic changes  Respiratory: No Cough, SOB  Cardiovascular: No CP, palpitations     Scheduled Meds:    Current Facility-Administered Medications:  acetaminophen 650 mg Oral Q6H PRN Griselda Jewell MD   aspirin 81 mg Oral Daily Griselda Jewell MD   atorvastatin 40 mg Oral Daily With Mary Flavors, MD   bisacodyl 10 mg Rectal Daily PRN Griselda Jewell MD   enoxaparin 40 mg Subcutaneous Daily Griselda Jewell MD   hydrALAZINE 25 mg Oral Q8H PRN ROOSEVELT Mccray   lactulose 20 g Oral Daily PRN ROOSEVELT Mccray   metoprolol succinate 50 mg Oral Daily Griselda Jewell MD   mineral oil 1 enema Rectal Once PRN Griselda Jewell MD   ondansetron 4 mg Oral [facility] ROOSEVELT Mccray   oxyCODONE 10 mg Oral Q4H PRN Griselda Jewell MD   oxyCODONE 5 mg Oral Q4H PRN Griselda Jewell MD   pantoprazole 40 mg Oral BID AC ROOSEVELT Mccray       Labs:       Results from last 7 days  Lab Units 03/02/18  0535 03/01/18  0514   WBC Thousand/uL 7 26 15 46*   HEMOGLOBIN g/dL 10 7* 12 5   HEMATOCRIT % 31 7* 36 8   PLATELETS Thousands/uL 269 367       Results from last 7 days  Lab Units 03/03/18  0642 03/02/18  0535   SODIUM mmol/L 138 140   POTASSIUM mmol/L 3 6 3 8   CHLORIDE mmol/L 105 107   CO2 mmol/L 29 25   BUN mg/dL 33* 45*   CREATININE mg/dL 0 99 1 20   GLUCOSE RANDOM mg/dL 91 72   CALCIUM mg/dL 8 9 8 8                  Glucose (mg/dL)   Date Value   03/03/2018 91   03/02/2018 72   03/01/2018 106   02/26/2018 111   12/10/2015 93   06/18/2015 103   05/07/2014 102   11/25/2013 87       Labs reviewed    Physical Examination:  Vitals:   Vitals:    03/03/18 0635 03/03/18 1309 03/03/18 2017 03/04/18 0524   BP: 122/61 126/61 133/63 151/69   BP Location: Right arm Right arm Right arm Left arm   Pulse: 69 69 79 76   Resp: 20 20 16 16   Temp: 98 1 °F (36 7 °C) 97 8 °F (36 6 °C) 98 °F (36 7 °C) 98 2 °F (36 8 °C)   TempSrc: Oral Oral Oral Oral   SpO2: 94% 98% 92% 91%   Weight:       Height:           GEN: NAD  HEENT: NC/AT  RESP: BBS w/o crackles/wheeze/rhonci; resp unlabored  CV: +S1 S2, regular rate, no rubs/murmurs  ABD:   + BS; abd is large but soft; midline abdominal incision and DILLON site is reportedly w/o erythema/drainage; DILLON with tiny amt bloody drainage  : no centeno  EXT: no edema  Skin: no rashes  Neuro: AAO; SHEPHERD 5/5      [x ] Total time spent: 30 Mins and greater than 50% of this time was spent counseling/coordinating care        Nissa Mortensen, [address] [facility]  Internal Medicine

## 2018-03-05 LAB
ANION GAP SERPL CALCULATED.3IONS-SCNC: 6 MMOL/L (ref 4–13)
BASOPHILS # BLD AUTO: 0.03 THOUSANDS/ΜL (ref 0–0.1)
BASOPHILS NFR BLD AUTO: 0 % (ref 0–1)
BUN SERPL-MCNC: 23 MG/DL (ref 5–25)
CALCIUM SERPL-MCNC: 8.1 MG/DL (ref 8.3–10.1)
CHLORIDE SERPL-SCNC: 107 MMOL/L (ref 100–108)
CO2 SERPL-SCNC: 27 MMOL/L (ref 21–32)
CREAT SERPL-MCNC: 0.93 MG/DL (ref 0.6–1.3)
EOSINOPHIL # BLD AUTO: 0.17 THOUSAND/ΜL (ref 0–0.61)
EOSINOPHIL NFR BLD AUTO: 3 % (ref 0–6)
ERYTHROCYTE [DISTWIDTH] IN BLOOD BY AUTOMATED COUNT: 13.5 % (ref 11.6–15.1)
GFR SERPL CREATININE-BSD FRML MDRD: 81 ML/MIN/1.73SQ M
GLUCOSE SERPL-MCNC: 74 MG/DL (ref 65–140)
HCT VFR BLD AUTO: 31.4 % (ref 36.5–49.3)
HGB BLD-MCNC: 10.6 G/DL (ref 12–17)
LYMPHOCYTES # BLD AUTO: 1.71 THOUSANDS/ΜL (ref 0.6–4.47)
LYMPHOCYTES NFR BLD AUTO: 25 % (ref 14–44)
MCH RBC QN AUTO: 29.8 PG (ref 26.8–34.3)
MCHC RBC AUTO-ENTMCNC: 33.8 G/DL (ref 31.4–37.4)
MCV RBC AUTO: 88 FL (ref 82–98)
MONOCYTES # BLD AUTO: 0.43 THOUSAND/ΜL (ref 0.17–1.22)
MONOCYTES NFR BLD AUTO: 6 % (ref 4–12)
NEUTROPHILS # BLD AUTO: 4.51 THOUSANDS/ΜL (ref 1.85–7.62)
NEUTS SEG NFR BLD AUTO: 66 % (ref 43–75)
NRBC BLD AUTO-RTO: 0 /100 WBCS
PLATELET # BLD AUTO: 309 THOUSANDS/UL (ref 149–390)
PMV BLD AUTO: 9 FL (ref 8.9–12.7)
POTASSIUM SERPL-SCNC: 3.4 MMOL/L (ref 3.5–5.3)
RBC # BLD AUTO: 3.56 MILLION/UL (ref 3.88–5.62)
SODIUM SERPL-SCNC: 140 MMOL/L (ref 136–145)
WBC # BLD AUTO: 6.87 THOUSAND/UL (ref 4.31–10.16)

## 2018-03-05 PROCEDURE — 99024 POSTOP FOLLOW-UP VISIT: CPT | Performed by: SURGERY

## 2018-03-05 PROCEDURE — 97535 SELF CARE MNGMENT TRAINING: CPT

## 2018-03-05 PROCEDURE — 97110 THERAPEUTIC EXERCISES: CPT

## 2018-03-05 PROCEDURE — 97530 THERAPEUTIC ACTIVITIES: CPT

## 2018-03-05 PROCEDURE — 99232 SBSQ HOSP IP/OBS MODERATE 35: CPT | Performed by: PHYSICAL MEDICINE & REHABILITATION

## 2018-03-05 PROCEDURE — 85025 COMPLETE CBC W/AUTO DIFF WBC: CPT | Performed by: NURSE PRACTITIONER

## 2018-03-05 PROCEDURE — 80048 BASIC METABOLIC PNL TOTAL CA: CPT | Performed by: NURSE PRACTITIONER

## 2018-03-05 RX ORDER — ACETAMINOPHEN 325 MG/1
975 TABLET ORAL EVERY 8 HOURS SCHEDULED
Status: DISCONTINUED | OUTPATIENT
Start: 2018-03-05 | End: 2018-03-10 | Stop reason: HOSPADM

## 2018-03-05 RX ORDER — ONDANSETRON 4 MG/1
4 TABLET, ORALLY DISINTEGRATING ORAL EVERY 12 HOURS
Status: DISCONTINUED | OUTPATIENT
Start: 2018-03-05 | End: 2018-03-07

## 2018-03-05 RX ORDER — POTASSIUM CHLORIDE 20 MEQ/1
40 TABLET, EXTENDED RELEASE ORAL ONCE
Status: COMPLETED | OUTPATIENT
Start: 2018-03-05 | End: 2018-03-05

## 2018-03-05 RX ADMIN — ASPIRIN 81 MG 81 MG: 81 TABLET ORAL at 09:13

## 2018-03-05 RX ADMIN — PANTOPRAZOLE SODIUM 40 MG: 40 TABLET, DELAYED RELEASE ORAL at 16:24

## 2018-03-05 RX ADMIN — PANTOPRAZOLE SODIUM 40 MG: 40 TABLET, DELAYED RELEASE ORAL at 06:21

## 2018-03-05 RX ADMIN — ACETAMINOPHEN 975 MG: 325 TABLET, FILM COATED ORAL at 21:18

## 2018-03-05 RX ADMIN — ONDANSETRON 4 MG: 4 TABLET, ORALLY DISINTEGRATING ORAL at 17:48

## 2018-03-05 RX ADMIN — ATORVASTATIN CALCIUM 40 MG: 40 TABLET, FILM COATED ORAL at 16:24

## 2018-03-05 RX ADMIN — ONDANSETRON 4 MG: 4 TABLET, ORALLY DISINTEGRATING ORAL at 06:18

## 2018-03-05 RX ADMIN — ENOXAPARIN SODIUM 40 MG: 40 INJECTION SUBCUTANEOUS at 09:13

## 2018-03-05 RX ADMIN — METOPROLOL SUCCINATE 50 MG: 50 TABLET, EXTENDED RELEASE ORAL at 09:13

## 2018-03-05 RX ADMIN — POTASSIUM CHLORIDE 40 MEQ: 1500 TABLET, EXTENDED RELEASE ORAL at 10:10

## 2018-03-05 NOTE — PROGRESS NOTES
03/05/18 1101   Pain Assessment   Pain Assessment 0-10   Pain Score No Pain   Restrictions/Precautions   Precautions Bed/chair alarms; Fall Risk   Cognition   Overall Cognitive Status Impaired   Arousal/Participation Alert; Cooperative   Attention Within functional limits   Orientation Level Oriented X4   Memory Decreased recall of recent events   Following Commands Follows one step commands without difficulty   Subjective   Subjective pt sates he only wants to use a cane and not a walker   QI: Sit to Stand   Assistance Needed Supervision   Sit to Stand CARE Score 4   QI: Chair/Bed-to-Chair Transfer   Assistance Needed Supervision   Comment [address]   Chair/Bed-to-Chair Transfer CARE Score 4   Transfer Bed/Chair/Wheelchair   Limitations Noted In Endurance;UE Strength;LE Strength   Adaptive Equipment Cane   Stand Pivot Supervision   Sit to Stand Supervision   Stand to Sit Supervision   Bed, Chair, Wheelchair Transfer (FIM) 5 - Patient requires supervision/monitoring   QI: Walk 10 Feet   Assistance Needed Supervision   Comment CS   Walk 10 Feet CARE Score 4   QI: Walk 50 Feet with Two Turns   Assistance Needed Supervision   Comment CS   Walk 50 Feet with Two Turns CARE Score 4   QI: Walk 150 Feet   Assistance Needed Supervision   Comment CS   Walk 150 Feet CARE Score 4   Ambulation   Does the patient walk? 2  Yes   Primary Discharge Mode of Locomotion Walk   Walk Assist Level Close Supervision   Gait Pattern Inconsistant Darlene; Slow Darlene;Narrow CHANDLER;Step through; Improper weight shift   Assist Device [address] Walked (feet) 150 ft   Limitations Noted In Endurance; Heel Strike;Posture;Speed;Strength;Swing   Walking (FIM) 5 - Patient requires supervision/monitoring AND distance 150 feet or more, no rest   Therapeutic Interventions   Strengthening harjit SCHROEDER 10x2   Flexibility hamstring and gastroc 60"x2   Neuromuscular Re-Education amb with cane in cortes   Assessment   Treatment Assessment session focused on increasing strength and functional mobility with SPC; pt has poor posture during amb but has no LOB and requires no assistance; pt has good foot clearance and obstacle negotiation; pt does not want to use a RW and states he only uses a cane when he leaves his home, not while inside; pt has decreased ROM in RLE which limits lorena and affects posture during amb; pt educated about safety at home and that a RW may be safer for balance for fucntional mobility; continue POC as per PT   Problem List Decreased strength;Decreased range of motion;Decreased endurance; Impaired balance;Decreased safety awareness   Barriers to Discharge Decreased caregiver support   PT Barriers   Physical Impairment Decreased strength;Decreased range of motion;Decreased endurance; Impaired balance;Decreased safety awareness   Plan   Treatment/Interventions ADL retraining;Functional transfer training;LE strengthening/ROM; Elevations; Therapeutic exercise; Endurance training;Equipment eval/education; Bed mobility;Gait training   Progress Progressing toward goals   Recommendation   Recommendation Home with family support;Home PT   PT Therapy Minutes   PT Time In 1101   PT Time Out 1131   PT Total Time (minutes) 30   PT Mode of treatment - Individual (minutes) 30   PT Mode of treatment - Concurrent (minutes) 0   PT Mode of treatment - Group (minutes) 0   PT Mode of treatment - Co-treat (minutes) 0   PT Mode of Teatment - Total time(minutes) 30 minutes   Therapy Time missed   Time missed?  No

## 2018-03-05 NOTE — PROGRESS NOTES
Progress Note - Skinny Bach 67 y o  male MRN: [id number]    Unit/Bed#: -01 Encounter: [id number]            Subjective:   Pt is pleased that surgical drain has been removed      Objective:     ROS  Gen: denies recent wt loss   Psych: denies mood change    Vitals: Blood pressure 133/64, pulse 71, temperature 97 5 °F (36 4 °C), temperature source Oral, resp  rate 16, height 5' 8" (1 727 m), weight 71 kg (156 lb 8 4 oz), SpO2 96 %      Physical Exam:     Gen:        NAD   Neck:   trachea midline  Lungs:  respirations unlabored   Heart:    + S1 and S2   Abdomen:  Surgical drain removed   Psych: mood/affect appropriate  Neurologic: awake, alert, appropriately answering questions     Functional :  Mobility: sup  Tx: min  ADLs: mod         Current Facility-Administered Medications:  acetaminophen 975 mg Oral Q8H Viviane Kern MD   aspirin 81 mg Oral Daily Maria M Verdin MD   atorvastatin 40 mg Oral Daily With Royce Mckeon MD   bisacodyl 10 mg Rectal Daily PRN Maria M Verdin MD   enoxaparin 40 mg Subcutaneous Daily Maria M Verdin MD   hydrALAZINE 25 mg Oral Q8H PRN [place], CRNP   lactulose 20 g Oral Daily PRN [place], CRNP   metoprolol succinate 50 mg Oral Daily Maria M Verdin MD   mineral oil 1 enema Rectal Once PRN Maria M Verdin MD   ondansetron 4 mg Oral Q12H [place], CRNP   oxyCODONE 10 mg Oral Q4H PRN Maria M Verdin MD   oxyCODONE 5 mg Oral Q4H PRN Maria M Verdin MD   pantoprazole 40 mg Oral BID AC ROOSEVELT Garcia       bisacodyl    hydrALAZINE    lactulose    mineral oil    oxyCODONE    oxyCODONE      Assessment:  Pt is 66 yo admitted for elective incisional hernia repair and MARK by Dr Mayank Kenney on 2/23/18    Plan:    Rehabilitation: cont PT/OT for ambulatory/ADL dysfxn    s/p elective incisional hernia repair and MARK :  Dr Mayank Kenney on 2/23/18, surgical drain removed, per surgery service note of 3/5/18 no lifting greater than 20 lbs or strenuous core exercises for 4 weeks         HTN: on home toprol XL 50 mg qd, lisinopril 20 mg qd, however home HCTZ reduced from 25 mg to 12 5 QD      HL: on home atorvastatin 40 mg qpm      AAA: s/p repair, by Dr Queta Durham      PVD: s/p bypass graft      CAD: s/p PCI, stress test of 1/8/2015 w/o perfusion abnormality; on home ASA 81 mg qd, atorvastatin 40 mg qpm, toprol XL 50 mg qd (additionally holter monitor of 2/15/17 revealed PAC/PVCs but no VT/SVT/A-fib/A-flutter and was without bradyarrhythmia or advanced heart block); OP FU with Dr Nicole Magana     GERD/hiatal hernia: home zantac 150 mg qd non-formulary, therapeutic substitution          Anemia: ABLA, Hg currently stable at 10 6     Hypokalemia: s/p repletion          Incidental findings of CT AP of 9/19/14: cholelithiasis, diverticulosis      DVT ppx: scd's, lovenox

## 2018-03-05 NOTE — PROGRESS NOTES
Internal Medicine Progress Note  Patient: Inna Dawkins  Age/sex: 67 y o  male  Medical Record #: [id number]      ASSESSMENT/PLAN:  Inna Dawkins is seen and examined and mangement for following issues:    1  Incisional hernia repair with MESH and MARK with Dr Ventura Lane 2/23/18:  will watch incision  Checked with surgery yesterday to see when removing the DILLON and they will get back to me today  They said ok remove Mepilex dressing  Abd binder to be worn, acc to surgery, when OOB/activity and can be removed when in bed unless uncomfortable w/o it  On 3/1/18, binder was way too tight and causing gastric reflux with regurgitation of bile, mostly when in bed (gave larger binder) = resolved  D/C'd Pepcid and started Protonix 40mg BID for the time being and started ATC Zofran since occ nausea  On 3/3/18, weaned Zofran back to 4mg q8hrs = today will wean back to 4mg q12hrs  Surgery in today and d/c'd staples/DILLON drain     2  ABLA:  mild, not requiring transfusion  Will watch     3  HTN: stable  At home he takes Lisinopril 20mg qd, HCTZ 25mg qd and Toprol 50mg qd  Was on HCTZ 12 5mg qd, Lisinopril 20mg qd and Toprol 50mg qd and stopped the HCTZ and Lisinopril 2/2 JOVANNI  Has prn Hydralazine  BP stable     4  HLD:  continue Lipitor     5  CAD with hx IWMI/BMS RCA 00:  last stress test 2015  Continue ASA/Lipitor     6  Hx PAD with [id number] BPG     7   Constipation:  changed Lactulose to prn but yesterday said he thought he was having some abd pain at night 2/2 constipation so gave dose lactulose = + 2 BMs    8   Leukocytosis:  resolved  9   JOVANNI: ?etio meds vs volume depletion or combo  Gave 1 liter NSS on 3/1/18 and stopped the HCTZ and Lisinopril  Creat was back to 0 93 from a high of 1 61    10  Hypokalemia:  Will give KCL 40 meq x 1 now    11  Mild bilateral ankle edema: Will add TEDS      Subjective: Patient seen and examined   Says night time abd discomfort gone since had 2 BMs yesterday    ROS:   Neuro: No new neurologic changes  Respiratory: No Cough, SOB  Cardiovascular: No CP, palpitations     Scheduled Meds:    Current Facility-Administered Medications:  acetaminophen 650 mg Oral Q6H PRN Kusum Mann MD   aspirin 81 mg Oral Daily Kusum Mann MD   atorvastatin 40 mg Oral Daily With Rama Culver MD   bisacodyl 10 mg Rectal Daily PRN Kusum Mann MD   enoxaparin 40 mg Subcutaneous Daily Kusum Mann MD   hydrALAZINE 25 mg Oral Q8H PRN ROOSEVELT Nick   lactulose 20 g Oral Daily PRN ROOSEVELT Nick   metoprolol succinate 50 mg Oral Daily Kusum Mann MD   mineral oil 1 enema Rectal Once PRN Kusum Mann MD   ondansetron 4 mg Oral [facility] ROOSEVELT Nick   oxyCODONE 10 mg Oral Q4H PRN Kusum Mann MD   oxyCODONE 5 mg Oral Q4H PRN Kusum Mann MD   pantoprazole 40 mg Oral BID AC ROOSEVELT Nick       Labs:       Results from last 7 days  Lab Units 03/05/18  0615 03/02/18  0535   WBC Thousand/uL 6 87 7 26   HEMOGLOBIN g/dL 10 6* 10 7*   HEMATOCRIT % 31 4* 31 7*   PLATELETS Thousands/uL 309 269       Results from last 7 days  Lab Units 03/05/18  0615 03/03/18  0642   SODIUM mmol/L 140 138   POTASSIUM mmol/L 3 4* 3 6   CHLORIDE mmol/L 107 105   CO2 mmol/L 27 29   BUN mg/dL 23 33*   CREATININE mg/dL 0 93 0 99   GLUCOSE RANDOM mg/dL 74 91   CALCIUM mg/dL 8 1* 8 9                  Glucose (mg/dL)   Date Value   03/05/2018 74   03/03/2018 91   03/02/2018 72   03/01/2018 106   12/10/2015 93   06/18/2015 103   05/07/2014 102   11/25/2013 87       Labs reviewed    Physical Examination:  Vitals:   Vitals:    03/04/18 0524 03/04/18 1344 03/04/18 2120 03/05/18 0612   BP: 151/69 143/63 126/57 128/60   BP Location: Left arm Right arm Left arm Right arm   Pulse: 76 75 69 71   Resp: 16 20 20 20   Temp: 98 2 °F (36 8 °C) 98 6 °F (37 °C) 98 3 °F (36 8 °C) 97 8 °F (36 6 °C)   TempSrc: Oral Oral Oral Oral   SpO2: 91% 92% 95% 94%   Weight:       Height: GEN: NAD  HEENT: NC/AT  RESP: BBS w/o crackles/wheeze/rhonci; resp unlabored  CV: +S1 S2, regular rate, no rubs/murmurs  ABD:   + BS; abd is soft; midline abdominal incision healing well = DILLON/staples are out  : no centeno  EXT: mild ankle edema  Skin: no rashes  Neuro: AAO; SHEPHERD 5/5      [x ] Total time spent: 30 Mins and greater than 50% of this time was spent counseling/coordinating care        Venessa Meng, [facility]  Internal Medicine

## 2018-03-05 NOTE — ASSESSMENT & PLAN NOTE
- Status post incisional hernia repair with mesh on 2/23/2018   - Continue diet as tolerated  - Recommend initiating scheduled Tylenol at 975mg every 8 hours and Ibuprofen 600mg every 6 hours as needed  Attempt to wean off of narcotics once above pain medication regimen initiated  - Bowel regimen as needed  - DILLON drain and staples discontinued  - Outpatient follow-up with the [facility] as needed  - Activity as tolerated with the following restrictions: No lifting greater than 20 pounds or strenuous core activities for additional 4 weeks  - Please call with any questions or concerns

## 2018-03-05 NOTE — PROGRESS NOTES
03/05/18 1300   Pain Assessment   Pain Assessment 0-10   Pain Score No Pain   Restrictions/Precautions   Precautions Bed/chair alarms; Fall Risk   Cognition   Overall Cognitive Status Impaired   Arousal/Participation Alert; Cooperative   Attention Within functional limits   Orientation Level Oriented X4   Memory Decreased recall of recent events   Following Commands Follows one step commands without difficulty   Subjective   Subjective states he is ready to get moving and no pain   QI: Roll Left and Right   Assistance Needed Supervision   Roll Left and Right CARE Score 4   QI: Sit to Lying   Assistance Needed Physical assistance   Assistance Provided by [place] 50%-74%   Comment lift BLE   Sit to Lying CARE Score 2   QI: Sit to Stand   Assistance Needed Supervision   Sit to Stand CARE Score 4   QI: Chair/Bed-to-Chair Transfer   Assistance Needed Supervision   Comment [facility]   Chair/Bed-to-Chair Transfer CARE Score 4   Transfer Bed/Chair/Wheelchair   Limitations Noted In Endurance;UE Strength;LE Strength   Adaptive Equipment Cane   Stand Pivot Supervision   Sit to Stand Supervision   Stand to Sit Supervision   Sit to Supine Moderate Assist   Car Transfer Supervision   Bed, Chair, Wheelchair Transfer (FIM) 3 - [place] lifts two limbs during transfer   QI: Car Transfer   Assistance Needed Supervision   Car Transfer CARE Score 4   QI: Walk 10 Feet   Assistance Needed Supervision   Walk 10 Feet CARE Score 4   QI: Walk 50 Feet with Two Turns   Assistance Needed Supervision   Walk 50 Feet with Two Turns CARE Score 4   QI: Walk 150 Feet   Assistance Needed Supervision   Walk 150 Feet CARE Score 4   Ambulation   Does the patient walk? 2  Yes   Primary Discharge Mode of Locomotion Walk   Walk Assist Level Supervision   Gait Pattern Inconsistant Darlene; Slow Darlene;Narrow CHANDLER;WNL; Step through   Assist Device [employer] Walked (feet) 150 ft  (x2)   Limitations Noted In Endurance; Heel Strike;Posture;Speed;Strength;Swing Walking (FIM) 5 - Patient requires supervision/monitoring AND distance 150 feet or more, no rest   QI: 1 Step (Curb)   Assistance Needed Supervision   1 Step (Curb) CARE Score 4   QI: 4 Steps   Assistance Needed Supervision   4 Steps CARE Score 4   QI: 12 Steps   Assistance Needed Supervision   12 Steps CARE Score 4   Stairs   Type Stairs;Curb;Ramp   # of Steps 4   Weight Bearing Precautions Fall Risk   Assist Devices Cane   Stairs (FIM) 2 - Patient goes up and down 4 - 11 stairs regardless of assist/device/setup   Toilet Transfer   Toilet Transfer (FIM) 5 - Patient requires supervision/monitoring   Therapeutic Interventions   Strengthening adduction yellow ball 10x2; abduction green TB 10x2; STS x10   Other Comments   Comments UBE for 4 mins forward and 4 mins backward   Assessment   Treatment Assessment session focused on increasing strength, mobility, and safety during functional activities; pt has good sequencing for xfers and during amb, RLE limits increased lorena and affects posture during amb; no LOB noted during session; pt needs to lean across car seate to get his RLE in due to decreased ROM; up and down one stair at a time with SPC and S; pt has good sequencign and balance for curb and ramp; pt needs BLE lifted into bed at end of session; continue POC as per PT   Problem List Decreased strength;Decreased range of motion;Decreased endurance; Impaired balance;Decreased safety awareness   Barriers to Discharge Decreased caregiver support   PT Barriers   Physical Impairment Decreased strength;Decreased range of motion;Decreased endurance; Impaired balance;Decreased safety awareness   Functional Limitation Walking   Plan   Treatment/Interventions ADL retraining;Functional transfer training;LE strengthening/ROM; Elevations; Therapeutic exercise; Endurance training;Bed mobility;Gait training   Progress Progressing toward goals   Recommendation   Recommendation Home with family support;Home PT   PT Therapy Minutes PT Time In 1300   PT Time Out 1400   PT Total Time (minutes) 60   PT Mode of treatment - Individual (minutes) 60   PT Mode of treatment - Concurrent (minutes) 0   PT Mode of treatment - Group (minutes) 0   PT Mode of treatment - Co-treat (minutes) 0   PT Mode of Teatment - Total time(minutes) 60 minutes   Therapy Time missed   Time missed?  No

## 2018-03-05 NOTE — PROGRESS NOTES
Acute Care Surgery Progress Note - Toby Burkitt 1372, 67 y o  male MRN: [id number]    Unit/Bed#: -01 Encounter: [id number]    Primary Care Provider: Preet Villavicencio DO   Date and time admitted to hospital: 2018  3:29 PM        * Incisional hernia   Assessment & Plan    - Status post incisional hernia repair with mesh on 2018   - Continue diet as tolerated  - Recommend initiating scheduled Tylenol at 975mg every 8 hours and Ibuprofen 600mg every 6 hours as needed  Attempt to wean off of narcotics once above pain medication regimen initiated  - Bowel regimen as needed  - DILLON drain and staples discontinued  - Outpatient follow-up with the [facility] as needed  - Activity as tolerated with the following restrictions: No lifting greater than 20 pounds or strenuous core activities for additional 4 weeks  - Please call with any questions or concerns  Subjective/Objective     Subjective: Patient feeling much better today than over the weekend  He had been having continued abdominal pain and some nausea, but these issues have resolved after having a BM  No fever, chills  Objective:     Blood pressure 128/60, pulse 71, temperature 97 8 °F (36 6 °C), temperature source Oral, resp  rate 20, height 5' 8" (1 727 m), weight 71 kg (156 lb 8 4 oz), SpO2 94 %  ,Body mass index is 23 8 kg/m²      Temp (24hrs), Av 2 °F (36 8 °C), Min:97 8 °F (36 6 °C), Max:98 6 °F (37 °C)  Current: Temperature: 97 8 °F (36 6 °C)      Intake/Output Summary (Last 24 hours) at 18 0943  Last data filed at 18 0700   Gross per 24 hour   Intake              360 ml   Output              600 ml   Net             -240 ml       Invasive Devices     Peripheral Intravenous Line            Peripheral IV 18 Left Wrist 3 days          Drain            Closed/Suction Drain Anterior LLQ Bulb 9 days                Physical Exam:    GENERAL APPEARANCE: Patient in no acute distress  HEENT: NCAT; PERRL, EOMs intact; Mucous membranes moist  CV: Regular rate and rhythm; + S1, S2; no murmur/gallops/rubs appreciated  LUNGS: Clear to auscultation; no wheezes/rales/rhonci  ABD: NABS; soft; non-distended; minimal tenderness; incision is clean, dry, and intact; minimal serosanguinous output from DILLON  EXT: +2 pulses bilaterally upper & lower extremities; no clubbing/cyanosis/edema  NEURO: GCS 15; no focal neurologic deficits; neurovascularly intact  SKIN: Warm, dry and well perfused; no rash; no jaundice  Lab, Imaging and other studies:  I have personally reviewed pertinent lab results    , CBC:   Lab Results   Component Value Date    WBC 6 87 03/05/2018    HGB 10 6 (L) 03/05/2018    HCT 31 4 (L) 03/05/2018    MCV 88 03/05/2018     03/05/2018    MCH 29 8 03/05/2018    MCHC 33 8 03/05/2018    RDW 13 5 03/05/2018    MPV 9 0 03/05/2018    NRBC 0 03/05/2018   , CMP:   Lab Results   Component Value Date     03/05/2018    K 3 4 (L) 03/05/2018     03/05/2018    CO2 27 03/05/2018    ANIONGAP 6 03/05/2018    BUN 23 03/05/2018    CREATININE 0 93 03/05/2018    GLUCOSE 74 03/05/2018    CALCIUM 8 1 (L) 03/05/2018    EGFR 81 03/05/2018     VTE Pharmacologic Prophylaxis: Sequential compression device (Venodyne)   VTE Mechanical Prophylaxis: sequential compression device    Aureliano Sweet PA-C  3/5/2018 10:03 AM

## 2018-03-06 PROCEDURE — 97530 THERAPEUTIC ACTIVITIES: CPT

## 2018-03-06 PROCEDURE — 97116 GAIT TRAINING THERAPY: CPT

## 2018-03-06 PROCEDURE — 97110 THERAPEUTIC EXERCISES: CPT | Performed by: OCCUPATIONAL THERAPY ASSISTANT

## 2018-03-06 PROCEDURE — 99232 SBSQ HOSP IP/OBS MODERATE 35: CPT | Performed by: PHYSICAL MEDICINE & REHABILITATION

## 2018-03-06 PROCEDURE — 97530 THERAPEUTIC ACTIVITIES: CPT | Performed by: OCCUPATIONAL THERAPY ASSISTANT

## 2018-03-06 RX ADMIN — ENOXAPARIN SODIUM 40 MG: 40 INJECTION SUBCUTANEOUS at 07:45

## 2018-03-06 RX ADMIN — METOPROLOL SUCCINATE 50 MG: 50 TABLET, EXTENDED RELEASE ORAL at 07:44

## 2018-03-06 RX ADMIN — PANTOPRAZOLE SODIUM 40 MG: 40 TABLET, DELAYED RELEASE ORAL at 06:05

## 2018-03-06 RX ADMIN — ATORVASTATIN CALCIUM 40 MG: 40 TABLET, FILM COATED ORAL at 16:55

## 2018-03-06 RX ADMIN — ONDANSETRON 4 MG: 4 TABLET, ORALLY DISINTEGRATING ORAL at 18:01

## 2018-03-06 RX ADMIN — ASPIRIN 81 MG 81 MG: 81 TABLET ORAL at 07:44

## 2018-03-06 RX ADMIN — PANTOPRAZOLE SODIUM 40 MG: 40 TABLET, DELAYED RELEASE ORAL at 16:55

## 2018-03-06 NOTE — PCC PHYSICAL THERAPY
Pt functioning at Sup level with SPC at this time  Pt with decrease balance at time due to R hip locked in ext, but pt adapts well  Pt uses shoe lift as well with occasional drag but no decrease balance or safety noted  Pt limited with bed mobility due to increase pain and pressure in abdomen  Pt will  Use recliner upon d/c but will benefit from cont practice    Pt will benefit from cont skilled PT to improve strength, balance and overall safety awareness to progress with functional mobility and Ind

## 2018-03-06 NOTE — PROGRESS NOTES
Internal Medicine Progress Note  Patient: Eduar Villaseñor  Age/sex: 67 y o  male  Medical Record #: [id number]      ASSESSMENT/PLAN:  Eduar Villaseñor is seen and examined and mangement for following issues:    1  Incisional hernia repair with MESH and MARK with Dr Basilio Samuel 2/23/18:  will watch incision  Checked with surgery yesterday to see when removing the DILLON and they will get back to me today  They said ok remove Mepilex dressing  Abd binder to be worn, acc to surgery, when OOB/activity and can be removed when in bed unless uncomfortable w/o it  On 3/1/18, binder was way too tight and causing gastric reflux with regurgitation of bile, mostly when in bed (gave larger binder) = resolved  D/C'd Pepcid and started Protonix 40mg BID for the time being and started ATC Zofran since occ nausea  On 3/3/18, weaned Zofran back to 4mg q8hrs = today will wean back to 4mg q12hrs  Surgery in yesterday and d/c'd staples/DILLON drain     2  ABLA:  mild, not requiring transfusion  Will watch     3  HTN: stable  At home he takes Lisinopril 20mg qd, HCTZ 25mg qd and Toprol 50mg qd  Was on HCTZ 12 5mg qd, Lisinopril 20mg qd and Toprol 50mg qd and stopped the HCTZ and Lisinopril 2/2 JOVANNI  Has prn Hydralazine  BP stable     4  HLD:  continue Lipitor     5  CAD with hx IWMI/BMS RCA 00:  last stress test 2015  Continue ASA/Lipitor     6  Hx PAD with [id number] BPG     7   Constipation:  changed Lactulose to prn but 3/4/18 said he thought he was having some abd pain at night 2/2 constipation so gave dose lactulose = + 2 BMs 3/5    8  Leukocytosis:  resolved  9   JOVANNI: ?etio meds vs volume depletion or combo  Gave 1 liter NSS on 3/1/18 and stopped the HCTZ and Lisinopril  Creat was back to 0 93 from a high of 1 61    10  Hypokalemia: gave KCL 40 meq x 1 yesterday    11  Bilateral ankle edema:  added TEDS yesterday = resolved      Subjective: Patient seen and examined       ROS:   Neuro: No new neurologic changes  Respiratory: No Cough, SOB  Cardiovascular: No CP, palpitations     Scheduled Meds:    Current Facility-Administered Medications:  acetaminophen 975 mg Oral Q8H [address] Ivy Hein MD   aspirin 81 mg Oral Daily Ivy Hein MD   atorvastatin 40 mg Oral Daily With David Farley MD   bisacodyl 10 mg Rectal Daily PRN Ivy Hein MD   enoxaparin 40 mg Subcutaneous Daily Ivy Hein MD   hydrALAZINE 25 mg Oral Q8H PRN ROOSEVELT Ledbetter   lactulose 20 g Oral Daily PRN ROOSEVELT Ledbetter   metoprolol succinate 50 mg Oral Daily Ivy Hein MD   mineral oil 1 enema Rectal Once PRN Ivy Hein MD   ondansetron 4 mg Oral Q12H ROOSEVELT Ledbetter   oxyCODONE 10 mg Oral Q4H PRN Ivy Hein MD   oxyCODONE 5 mg Oral Q4H PRN Ivy Hein MD   pantoprazole 40 mg Oral BID AC ROOSEVELT Ledbetter       Labs:       Results from last 7 days  Lab Units 03/05/18  0615 03/02/18  0535   WBC Thousand/uL 6 87 7 26   HEMOGLOBIN g/dL 10 6* 10 7*   HEMATOCRIT % 31 4* 31 7*   PLATELETS Thousands/uL 309 269       Results from last 7 days  Lab Units 03/05/18  0615 03/03/18  0642   SODIUM mmol/L 140 138   POTASSIUM mmol/L 3 4* 3 6   CHLORIDE mmol/L 107 105   CO2 mmol/L 27 29   BUN mg/dL 23 33*   CREATININE mg/dL 0 93 0 99   GLUCOSE RANDOM mg/dL 74 91   CALCIUM mg/dL 8 1* 8 9                  Glucose (mg/dL)   Date Value   03/05/2018 74   03/03/2018 91   03/02/2018 72   03/01/2018 106   12/10/2015 93   06/18/2015 103   05/07/2014 102   11/25/2013 87       Labs reviewed    Physical Examination:  Vitals:   Vitals:    03/05/18 0612 03/05/18 1319 03/05/18 2044 03/06/18 0610   BP: 128/60 133/64 112/55 156/69   BP Location: Right arm Left arm Right arm Right arm   Pulse: 71 71 69 73   Resp: 20 16 18 20   Temp: 97 8 °F (36 6 °C) 97 5 °F (36 4 °C) 98 6 °F (37 °C) 97 6 °F (36 4 °C)   TempSrc: Oral Oral Oral Oral   SpO2: 94% 96% 92% 93%   Weight:       Height:           GEN: NAD  HEENT: NC/AT  RESP: BBS w/o crackles/wheeze/rhonci; resp unlabored  CV: +S1 S2, regular rate, no rubs/murmurs  ABD:   + BS; abd is soft; midline abdominal incision healing well = DILLON/staples are out  : no centeno  EXT: no ankle edema  Skin: no rashes  Neuro: AAO; SHEPHERD 5/5      [x ] Total time spent: 30 Mins and greater than 50% of this time was spent counseling/coordinating care        Susanne Mcallister, [address] [place] [address]  Internal Medicine

## 2018-03-06 NOTE — PROGRESS NOTES
03/06/18 1000   Pain Assessment   Pain Assessment No/denies pain   Pain Score No Pain   QI: Sit to Stand   Assistance Needed Supervision   Assistance Provided by [place] No physical assistance   Comment DS   Sit to Stand CARE Score 4   QI: Chair/Bed-to-Chair Transfer   Assistance Needed Supervision   Assistance Provided by [place] No physical assistance   Comment DS with [facility]   Chair/Bed-to-Chair Transfer CARE Score 4   Transfer Bed/Chair/Wheelchair   Limitations Noted In Balance; Endurance;Pain Management;UE Strength;LE Strength   Adaptive Equipment Cane   Findings DS   Bed, Chair, Wheelchair Transfer (FIM) 5 - Patient requires supervision/monitoring   QI: [address] Provided by [place] No physical assistance   Comment DS   Toileting Hygiene CARE Score 4   Toileting   Able to [address] down yes, up yes  Able to Manage Clothing Closures Yes   Manage Hygiene Bladder   Limitations Noted In Balance; Coordination; Safety;LE Strength   Adaptive Equipment Grab Bar   Findings DS   Toileting (FIM) 5 - Patient requires supervision/monitoring   QI: Toilet Transfer   Assistance Needed Supervision   Assistance Provided by [place] No physical assistance   Comment DS using [facility]   Toilet Transfer CARE Score 4   Toilet Transfer   Surface Assessed Raised Toilet   Transfer Technique Standard   Limitations Noted In Balance; Endurance;ROM;UE Strength;LE Strength   Adaptive Equipment Grab Bar   Findings DS   Toilet Transfer (FIM) 5 - Patient requires supervision/monitoring   Therapeutic Excerise-Strength   UE Strength Yes   Right Upper Extremity- Strength   R Shoulder Flexion;ABduction; Extension   R Elbow Elbow flexion;Elbow extension   R Position Seated   Equipment Theraband   R Weight/Reps/Sets 10x3 reps    Left Upper Extremity-Strength   L Shoulder Flexion;ABduction; Extension   L Elbow Elbow flexion;Elbow extension   L Position Seated   Equipment Theraband   L Weights/Reps/Sets 10x3 reps    Cognition   Overall Cognitive Status Impaired   Arousal/Participation Alert; Cooperative   Attention Within functional limits   Orientation Level Oriented X4   Memory Within functional limits   Following Commands Follows one step commands with increased time or repetition   Activity Tolerance   Activity Tolerance Patient tolerated treatment well   Assessment   Treatment Assessment Pt session focused on standing tolerance and endurance with static standing at tabletop including functional reaching to maximize overall I and safety  Pt toelrated standing lasting 15min with one seated rest break  Pt completed seated UE exericses using orange theraband with light resistance  Pt was educated on safety with distand supervision using SPC  Pt was progressed to distant supervision with demonstration of good safety with functional mobility  Pt toelrated session well without any LOB and proper hand positioning  Pt would benefit from continued skilled OT with focus on maximizing functional abilities  Prognosis Good   Problem List Decreased strength;Decreased endurance; Impaired balance;Decreased safety awareness;Pain   Barriers to Discharge Decreased caregiver support   Plan   Treatment/Interventions ADL retraining;Functional transfer training;LE strengthening/ROM; Therapeutic exercise; Endurance training;Bed mobility   Progress Progressing toward goals   Recommendation   OT Discharge Recommendation Home OT   OT Therapy Minutes   OT Time In 1000   OT Time Out 1130   OT Total Time (minutes) 90   OT Mode of treatment - Individual (minutes) 90   OT Mode of treatment - Concurrent (minutes) 0   OT Mode of treatment - Group (minutes) 0   OT Mode of treatment - Co-treat (minutes) 0   OT Mode of Teatment - Total time(minutes) 90 minutes   Therapy Time missed   Time missed?  No

## 2018-03-06 NOTE — PROGRESS NOTES
03/06/18 1230   Pain Assessment   Pain Assessment No/denies pain   Restrictions/Precautions   Precautions Pain   General   Change In Medical/Functional Status pt progressed to DS    Subjective   Subjective no complaints or changes from previous session   QI: Roll Left and Right   Reason if not Attempted Activity not applicable   Roll Left and Right CARE Score 9   QI: Sit to Lying   Reason if not Attempted Activity not applicable   Sit to Lying CARE Score 9   QI: Lying to Sitting on Side of Bed   Reason if not Attempted Activity not applicable   Lying to Sitting on Side of Bed CARE Score 9   QI: Sit to Stand   Assistance Needed Set-up / clean-up   Sit to Stand CARE Score 5   QI: Chair/Bed-to-Chair Transfer   Assistance Needed Set-up / clean-up; Adaptive equipment   Comment DS with [facility]   Chair/Bed-to-Chair Transfer CARE Score 5   Transfer Bed/Chair/Wheelchair   Limitations Noted In Endurance   Findings DS with [facility]   Bed, Chair, Wheelchair Transfer (FIM) 5 - Patient requires supervision/monitoring   QI: Car Transfer   Assistance Needed Set-up / clean-up   Car Transfer CARE Score 5   QI: Walk 10 Feet   Assistance Needed Supervision   Walk 10 Feet CARE Score 4   QI: Walk 50 Feet with Two Turns   Assistance Needed Supervision   Walk 50 Feet with Two Turns CARE Score 4   QI: Walk 150 Feet   Assistance Needed Supervision   Walk 150 Feet CARE Score 4   QI: Walking 10 Feet on Uneven Surfaces   Reason if not Attempted Activity not applicable   Walking 10 Feet on Uneven Surfaces CARE Score 9   Ambulation   Does the patient walk? 2  Yes   Primary Discharge Mode of Locomotion Walk   Walk Assist Level Supervision   Gait Pattern Inconsistant Darlene;Decreased foot clearance; Improper weight shift   Assist Device [employer] Walked (feet) 200 ft   Limitations Noted In Endurance;Balance;Strength   Walking (FIM) 5 - Patient requires supervision/monitoring AND distance 150 feet or more, no rest   QI: Wheel 50 Feet with Two Turns Reason if not Attempted Activity not applicable   Wheel 50 Feet with Two Turns CARE Score 9   QI: Wheel 150 Feet   Reason if not Attempted Activity not applicable   Wheel 618 Feet CARE Score 9   Wheelchair mobility   QI: Does the patient use a wheelchair? 0  No   QI: 1 Step (Curb)   Reason if not Attempted Activity not applicable   1 Step (Curb) CARE Score 9   QI: 4 Steps   Reason if not Attempted Activity not applicable   4 Steps CARE Score 9   QI: 12 Steps   Reason if not Attempted Activity not applicable   12 Steps CARE Score 9   QI: Picking Up Object   Reason if not Attempted Activity not applicable   Picking Up Object CARE Score 9   QI: Toilet Transfer   Reason if not Attempted Activity not applicable   Toilet Transfer CARE Score 9   Therapeutic Interventions   Other worked on functional tasks in kitchen, carrying full glass of water, a plate and bowl and then a heavier serving plate  also had pt carrying laundry as well with and without [facility]   Assessment   Treatment Assessment session focused on functional tasks carrying kitchen items with and without SPC  pt had no LOB while carrying objects and only has to amb up to 10-15' in home from kitchen to table to eat  pt was progressed to DS with OT this morning and able to reiterate privledges in this session  pt to cont focus on functional txs, balance nad overall activity tolerance to progress with functional mobility and safety for d/c home  Problem List Decreased strength;Decreased endurance; Impaired balance;Decreased safety awareness;Pain   Barriers to Discharge Inaccessible home environment;Decreased caregiver support   PT Barriers   Physical Impairment Decreased strength;Decreased endurance; Impaired balance;Decreased safety awareness;Pain   Functional Limitation Walking   Plan   Treatment/Interventions Functional transfer training;LE strengthening/ROM; Endurance training;Gait training   Progress Progressing toward goals   Recommendation Recommendation Home PT; Home with family support   PT Therapy Minutes   PT Time In 1230   PT Time Out 1300   PT Total Time (minutes) 30   PT Mode of treatment - Individual (minutes) 30   PT Mode of treatment - Concurrent (minutes) 0   PT Mode of treatment - Group (minutes) 0   PT Mode of treatment - Co-treat (minutes) 0   PT Mode of Teatment - Total time(minutes) 30 minutes   Therapy Time missed   Time missed?  No

## 2018-03-06 NOTE — PCC CARE MANAGEMENT
Pt is participating well with therapy and expects to return home  Pt has been educated on potential dc needs  Following to assist w/dc planning

## 2018-03-06 NOTE — PROGRESS NOTES
03/06/18 0830   Pain Assessment   Pain Assessment No/denies pain   Restrictions/Precautions   Precautions Pain   Braces or Orthoses (abdominal binder)   Subjective   Subjective pt states doing ok today, no c/o this morning   QI: Roll Left and Right   Assistance Needed Supervision   Roll Left and Right CARE Score 4   QI: Sit to Lying   Assistance Needed Verbal cues; Physical assistance   Assistance Provided by [place] 50%-74%   Comment start of session pt needed A with B/L LEs, by end of session pt Sup with leg  and with bed rails in room at Sup   Sit to Lying CARE Score 2   QI: Lying to Sitting on Side of Bed   Assistance Needed Supervision   Lying to Sitting on Side of Bed CARE Score 4   QI: Sit to Stand   Assistance Needed Supervision   Sit to Stand CARE Score 4   Bed Mobility   Findings Sup   QI: Chair/Bed-to-Chair Transfer   Assistance Needed Supervision; Adaptive equipment   Comment SPC   Chair/Bed-to-Chair Transfer CARE Score 4   Transfer Bed/Chair/Wheelchair   Limitations Noted In Endurance;LE Strength   Adaptive Equipment Cane   Stand Pivot Supervision   Sit to Stand Supervision   Stand to Sit Supervision   Supine to Sit Supervision   Sit to Supine Moderate Assist   Car Transfer Supervision   Findings by end of session pt Sup for bed txs, practiced with leg  and to R side in room    pt stated he will be sleeping in recliner upon d/c for a few days or weeks until incision heals   Bed, Chair, Wheelchair Transfer (FIM) 5 - Patient requires supervision/monitoring   QI: Car Transfer   Reason if not Attempted Activity not applicable   Car Transfer CARE Score 9   QI: Walk 10 Feet   Assistance Needed Supervision   Walk 10 Feet CARE Score 4   QI: Walk 50 Feet with Two [address] 50 Feet with Two Turns CARE Score 4   QI: Walk 150 Feet   Assistance Needed Supervision   Walk 150 Feet CARE Score 4   QI: Walking 10 Feet on Uneven Surfaces   Reason if not Attempted Activity not applicable   Walking 10 Feet on Uneven Surfaces CARE Score 9   Ambulation   Does the patient walk? 2  Yes   Primary Discharge Mode of Locomotion Walk   Walk Assist Level Supervision   Gait Pattern Inconsistant Darlene; Slow Darlene; Improper weight shift   Assist Device [employer] Walked (feet) 300 ft   Limitations Noted In Endurance;Strength   Walking (FIM) 5 - Patient requires supervision/monitoring AND distance 150 feet or more, no rest   QI: Wheel 50 Feet with Two Turns   Reason if not Attempted Activity not applicable   Wheel 50 Feet with Two Turns CARE Score 9   QI: Wheel 150 Feet   Reason if not Attempted Activity not applicable   Wheel 478 Feet CARE Score 9   Wheelchair mobility   QI: Does the patient use a wheelchair? 0  No   Wheelchair (FIM) 0 - Activity does not occur   QI: 1 Step (Curb)   Reason if not Attempted Activity not applicable   1 Step (Curb) CARE Score 9   QI: 4 Steps   Assistance Needed Supervision   4 Steps CARE Score 4   QI: 12 Steps   Assistance Needed Supervision   12 Steps CARE Score 4   Stairs   Type Stairs   # of Steps 12   Weight Bearing Precautions Fall Risk   Assist Devices Cane   Stairs (FIM) 5 - Patient requires supervision/monitoring AND goes up and down full flight (12- 14 stairs)   QI: Picking Up Object   Reason if not Attempted Activity not applicable   Picking Up Object CARE Score 9   QI: Toilet Transfer   Assistance Needed Supervision   Toilet Transfer CARE Score 4   Toilet Transfer   Toilet Transfer (FIM) 5 - Patient requires supervision/monitoring   Therapeutic Interventions   Balance looking through dresser drawers and reaching down to last drawer; no LOB noted   Assessment   Treatment Assessment reviewed d/c planning with pt start of session discussing support at home and concerns with bed txs due to increase pain  pt able to demonstrate bed txs to R and L by end of session and back in room at Sup level and practiced with leg     pt stated he will sleep in recliner upon d/c   pt trialed for DS in room with review of safety concerns with tray placement from recliner, but able to perform safely from recliner and from the bed   spoke to OT to review clothing management and txs again to see carryover of safety to progress  pt will benefit from cont therapy to improve balance, safety and overall activity tolerance to progress with functional mobility and Ind  Problem List Decreased strength;Decreased endurance; Impaired balance;Decreased safety awareness;Pain   Barriers to Discharge Decreased caregiver support   PT Barriers   Physical Impairment Decreased strength;Decreased endurance; Impaired balance;Decreased safety awareness;Pain   Functional Limitation Walking   Plan   Treatment/Interventions Functional transfer training;LE strengthening/ROM; Gait training   Progress Progressing toward goals   Recommendation   Recommendation Home with family support   Equipment Recommended Cane   PT Therapy Minutes   PT Time In 0830   PT Time Out 0930   PT Total Time (minutes) 60   PT Mode of treatment - Individual (minutes) 60   PT Mode of treatment - Concurrent (minutes) 0   PT Mode of treatment - Group (minutes) 0   PT Mode of treatment - Co-treat (minutes) 0   PT Mode of Teatment - Total time(minutes) 60 minutes   Therapy Time missed   Time missed?  No

## 2018-03-07 PROCEDURE — 97110 THERAPEUTIC EXERCISES: CPT

## 2018-03-07 PROCEDURE — 97530 THERAPEUTIC ACTIVITIES: CPT

## 2018-03-07 PROCEDURE — 97535 SELF CARE MNGMENT TRAINING: CPT

## 2018-03-07 PROCEDURE — 99233 SBSQ HOSP IP/OBS HIGH 50: CPT | Performed by: PHYSICAL MEDICINE & REHABILITATION

## 2018-03-07 PROCEDURE — 97116 GAIT TRAINING THERAPY: CPT

## 2018-03-07 RX ORDER — ONDANSETRON 4 MG/1
4 TABLET, ORALLY DISINTEGRATING ORAL EVERY 6 HOURS PRN
Status: DISCONTINUED | OUTPATIENT
Start: 2018-03-07 | End: 2018-03-09

## 2018-03-07 RX ADMIN — ENOXAPARIN SODIUM 40 MG: 40 INJECTION SUBCUTANEOUS at 08:41

## 2018-03-07 RX ADMIN — METOPROLOL SUCCINATE 50 MG: 50 TABLET, EXTENDED RELEASE ORAL at 08:41

## 2018-03-07 RX ADMIN — PANTOPRAZOLE SODIUM 40 MG: 40 TABLET, DELAYED RELEASE ORAL at 05:48

## 2018-03-07 RX ADMIN — ONDANSETRON 4 MG: 4 TABLET, ORALLY DISINTEGRATING ORAL at 05:48

## 2018-03-07 RX ADMIN — ACETAMINOPHEN 975 MG: 325 TABLET, FILM COATED ORAL at 21:07

## 2018-03-07 RX ADMIN — OXYCODONE HYDROCHLORIDE 5 MG: 5 TABLET ORAL at 15:58

## 2018-03-07 RX ADMIN — ATORVASTATIN CALCIUM 40 MG: 40 TABLET, FILM COATED ORAL at 15:58

## 2018-03-07 RX ADMIN — PANTOPRAZOLE SODIUM 40 MG: 40 TABLET, DELAYED RELEASE ORAL at 15:58

## 2018-03-07 RX ADMIN — ASPIRIN 81 MG 81 MG: 81 TABLET ORAL at 08:41

## 2018-03-07 NOTE — PROGRESS NOTES
03/07/18 0800   Pain Assessment   Pain Assessment No/denies pain   Pain Score No Pain   Restrictions/Precautions   Precautions Fall Risk   QI: [address] Provided by [place] No physical assistance   Comment pt utlized reacher and retrieved items from ground during fxnl mobility with SPC  Pt with supervision for safety but no LOB noted   Picking Up Object CARE Score 4   QI: Sit to Stand   Assistance Needed Supervision   Assistance Provided by [place] No physical assistance   Comment ds   Sit to Stand CARE Score 4   Bed Mobility   Supine to Sit 5  Supervision   QI: Chair/Bed-to-Chair Transfer   Assistance Needed Supervision   Assistance Provided by [place] No physical assistance   Comment ds   Chair/Bed-to-Chair Transfer CARE Score 4   Transfer Bed/Chair/Wheelchair   Stand Pivot Supervision   Supine to Sit Supervision   Findings ds   Bed, Chair, Wheelchair Transfer (FIM) 5 - Patient requires supervision/monitoring   Kitchen Mobility   Kitchen-Mobility Level Cane   Kitchen Activity Retrieve items;Transport items   Kitchen Mobility Comments pt retrieved coffee cup from cabinet and accessed refriegerator to get creamer  Pt able to pour coffee and transport coffee back to table at DS level with use of SPC  Pt with Good safety awareness and balance during task  Health Management   Health Management Level of Assistance Minimum assistance   Health Management pt engaged in medication management task  Pt required verbal cueing 25% of time due to confusion with medications in evening vs at bedtime  Pt utlizes a pill box at home and only demonstrated difficulty with new medications  Pt wolencho beenfit from reeducation on new medications in order to progress to mod I level  pT was able to identify how to re-order medication and reports he orders 3 month supply and reorders when he ahs about 2 weeks left of medication   Pt able to problem solve medication for "every 8 hours" correctly but reuqired assist on where to place in pill box  Right Upper Extremity- Strength   R Shoulder Flexion;ABduction   R Elbow Elbow flexion;Elbow extension   RUE Strength Comment pt engaged in free weight exercise 2# dumbbell for bicep curl tricep press, shouuler press and lateral raises with 3 second hold for 2 sets 15 reps to improve fxnl endurance and UE strengthening  Left Upper Extremity-Strength   L Shoulder ABduction   L Elbow Elbow flexion;Elbow extension   Equipment Theraband   L Weights/Reps/Sets 2 sets 15 reps    LUE Strength Comment for chest press, bicep curl and tricep press to imrpove UE endurance and fxnl endurance for ADL tasks  Pt with no OH due to h/o rotator cuff injury   Cognition   Overall Cognitive Status Impaired   Arousal/Participation Alert; Cooperative   Attention Within functional limits   Orientation Level Oriented X4   Memory Within functional limits   Following Commands Follows multistep commands with increased time or repetition   Assessment   Treatment Assessment Pt engaged in 90 minute OT session wtih focus on UE strengthening, item retrieval and medication management  Pt is DS with fxnl mobility with SPC  Pt educated on using SPC during kitchen mobility and retrieval tasks as, pt did not use the device prior  Pt with overall fair safety during task  See above for details on medication managemnet and kitchen mobility  Pt tolerated session well  Recommend repeating medication management task for improved carryover with new medication  Prognosis Good   Problem List Decreased endurance; Impaired balance;Decreased mobility   Plan   Treatment/Interventions ADL retraining;Functional transfer training;LE strengthening/ROM; Patient/family training; Compensatory technique education   Progress Progressing toward goals   OT Therapy Minutes   OT Time In 0800   OT Time Out 0930   OT Total Time (minutes) 90   OT Mode of treatment - Individual (minutes) 0   OT Mode of treatment - Concurrent (minutes) 90   OT Mode of treatment - Group (minutes) 0   OT Mode of treatment - Co-treat (minutes) 0   OT Mode of Teatment - Total time(minutes) 90 minutes   Therapy Time missed   Time missed?  No

## 2018-03-07 NOTE — TEAM CONFERENCE
Acute RehabilitationTeam Conference Note  Date: 3/7/2018   Time: 10:38 AM       Patient Name:  Sondra Chandler       Medical Record Number: [id number]   YOB: 1945  Sex: Male          Room/Bed:  [facility] 971/[facility]1-01  Payor Info:  Payor: MEDICARE / Plan: MEDICARE A AND B / Product Type: Medicare A & B Fee for Service /      Admitting Diagnosis: Ventral hernia without obstruction or gangrene [K43 9]   Admit Date/Time:  2/28/2018  3:29 PM  Admission Comments: No comment available     Primary Diagnosis:  Incisional hernia  Principal Problem: Incisional hernia    Patient Active Problem List    Diagnosis Date Noted    Incisional hernia 02/27/2018    Preoperative clearance 02/09/2018    Coronary artery disease 02/09/2018    Hypertension 02/09/2018       Physical Therapy:    Weight Bearing Status: Full Weight Bearing  Transfers: Supervision  Bed Mobility: Supervision  Amulation Distance (ft): 300 feet  Ambulation: Supervision  Assistive Device for Ambulation: [place]  Number of Stairs: 12  Assistive Device for Stairs: [address] Assistance: Supervision  Discharge Recommendations: Home with:  [address] [facility] with[de-identified] Family Support, Home Physical Therapy    Pt functioning at Sup level with SPC at this time  Pt with decrease balance at time due to R hip locked in ext, but pt adapts well  Pt uses shoe lift as well with occasional drag but no decrease balance or safety noted  Pt limited with bed mobility due to increase pain and pressure in abdomen  Pt will  Use recliner upon d/c but will benefit from cont practice  Pt will benefit from cont skilled PT to improve strength, balance and overall safety awareness to progress with functional mobility and Ind      Occupational Therapy:          No notes on file    Speech Therapy:           No notes on file    Nursing Notes:  Appetite: Fair  Diet Type: Regular/House                      Diet Patient/Family Education Complete: Yes    Type of Wound (LDA): Incision           Incision 02/23/18 Abdomen Other (Comment) (Active)   Incision Description Clean;Dry; Intact 3/7/2018 12:00 AM   Bailee-wound Assessment Clean;Dry; Intact 3/7/2018 12:00 AM   Closure Adhesive closure strips 3/7/2018 12:00 AM   Drainage Amount None 3/7/2018 12:00 AM   Drainage Description Serosanguineous 3/2/2018  9:45 AM   Dressing Open to air 3/7/2018 12:00 AM   Dressing Changed Changed 3/4/2018  8:10 PM   Patient Tolerance Tolerated well 3/6/2018  3:33 PM   Dressing Status Other (Comment) 3/7/2018 12:00 AM           Type of Wound Patient/Family Education: Yes  Bladder: 7 - Complete [place]     Bladder Patient/Family Education: Yes  Bowel: 6 - Modified [place]     Bowel Patient/Family Education: Yes  Pain Location: Abdomen  Pain Orientation: Lower, Mid  Pain Score: 0                       Hospital Pain Intervention(s): Medication (See MAR)  Pain Patient/Family Education: Yes  Medication Management/Safety  Injectable: Lovenox  Safe Administration: Yes  Medication Patient/Family Education Complete: Yes    Pt s/p hernia repair with mesh 2/23  Nausea and vomiting post op- resolved  Gastric reflux improved with changing to larger abd binder, protonix and zofran scheduled  Tapering doses of zofran  Abd binder with activity and for comfort  Pt prefers to wear at all times  Staples removed and DILLON drain removed  H/O HTN--adjusting meds and BP stable  Constipation--lactulose decreased to daily prn for mult BMs  Pain control w tylenol atc and oxy prn (which he has not used)  Teds during the day for mild peripheral edema  Pt uses a shoe with lift since R leg is shorter than L  This week we will cont to encourage independence with ADL's  We will monitor vital signs and lab results  We will monitor for GI symptoms and monitor for adequate pain control  We will increase safety awareness with transfers and keep pt free from falls        Case Management:     Discharge Planning  Goal Length of Stay: 5  Living Arrangements: Alone  Support Systems: Children  Assistance Needed: To be determined  Type of Current Residence: Private residence  [address]: No  Pt is participating well with therapy and expects to return home  Pt has been educated on potential dc needs  Following to assist w/dc planning  Is the patient actively participating in therapies? yes  List any modifications to the treatment plan:     Barriers Interventions   balance PT/OT exercise   recall OT exercise   Lives alone Minimal support             Is the patient making expected progress toward goals? yes  List any update or changes to goals:     Medical Goals: Patient will be medically stable for discharge to [facility] upon completion of rehab program and Patient will be able to manage medical conditions and comorbid conditions with medications and follow up upon completion of rehab program    Weekly Team Goals:   Rehab Team Goals  ADL Team Goal: Patient will be independent with ADLs with least restrictive device upon completion of rehab program  Bowel/Bladder Team Goal: Patient will be independent with bladder/bowel management with least restrictive device upon completion of rehab program  Transfer Team Goal: Patient will be independent with transfers with least restrictive device upon completion of rehab program  Locomotion Team Goal: Patient will be independent with locomotion with least restrictive device upon completion of rehab program  Cognitive Team Goal: Patient will be independent for basic and complex tasks upon completion of rehab program    Discussion: In attendance MD, RN PT OT Functioning at [facility]  Making good rehab progress  Benefiting from cont'd PT/OT    Anticipated Discharge Date:  3/10 with outpatient PT

## 2018-03-07 NOTE — PCC NURSING
Pt s/p hernia repair with mesh 2/23  Nausea and vomiting post op- resolved  Gastric reflux improved with changing to larger abd binder, protonix and zofran scheduled  Tapering doses of zofran  Abd binder with activity and for comfort  Pt prefers to wear at all times  Staples removed and DILLON drain removed  H/O HTN--adjusting meds and BP stable  Constipation--lactulose decreased to daily prn for mult BMs  Pain control w tylenol atc and oxy prn (which he has not used)  Teds during the day for mild peripheral edema  Pt uses a shoe with lift since R leg is shorter than L  This week we will cont to encourage independence with ADL's  We will monitor vital signs and lab results  We will monitor for GI symptoms and monitor for adequate pain control  We will increase safety awareness with transfers and keep pt free from falls

## 2018-03-07 NOTE — PROGRESS NOTES
Progress Note - Beth Flores 67 y o  male MRN: [id number]    Unit/Bed#: -01 Encounter: [id number]            Subjective:   Pt requesting to have SCDs removed, d/w patient risks/benefits of doing this (patient ambulating adequate distances) and patient would like to proceed with dc of SCDs    Objective:     ROS  Gen: denies recent wt loss   Psych: denies mood change    Vitals:    03/07/18 0839   BP: 133/62   Pulse: 64   Resp:    Temp:    SpO2:    T: 98 6  RR: 18  POx: 93%       Physical Exam:     Gen:        NAD   Neck:   trachea midline  Lungs:  respirations unlabored   Heart:    + S1 and S2   Abdomen:  Surgical drain removed   Psych: mood/affect appropriate  Neurologic: awake, alert, appropriately answering questions     Functional :  Mobility: sup  Tx: sup  ADLs: sup         Current Facility-Administered Medications:  acetaminophen 975 mg Oral Q8H Jarrod Stoll MD   aspirin 81 mg Oral Daily Romy Aragon MD   atorvastatin 40 mg Oral Daily With Cecelia Vergara MD   bisacodyl 10 mg Rectal Daily PRN Romy Aragon MD   enoxaparin 40 mg Subcutaneous Daily Romy Aragon MD   hydrALAZINE 25 mg Oral Q8H PRN RUBY VidalNP   lactulose 20 g Oral Daily PRN ROOSEVELT Vidal   metoprolol succinate 50 mg Oral Daily Romy Aragon MD   mineral oil 1 enema Rectal Once PRN Romy Aragon MD   ondansetron 4 mg Oral Q6H PRN ROOSEVELT Vidal   oxyCODONE 10 mg Oral Q4H PRN Romy Aragon MD   oxyCODONE 5 mg Oral Q4H PRN Romy Aragon MD   pantoprazole 40 mg Oral BID AC ROOSEVELT Garcia       bisacodyl    hydrALAZINE    lactulose    mineral oil    ondansetron    oxyCODONE    oxyCODONE      Assessment:  Pt is 68 yo admitted for elective incisional hernia repair and MARK by Dr Melissa Mesa on 2/23/18    Plan:    Rehabilitation: cont PT/OT for ambulatory/ADL dysfxn    s/p elective incisional hernia repair and MARK :  Dr Melissa Mesa on 2/23/18, surgical drain removed, per surgery service note of 3/5/18 no lifting greater than 20 lbs or strenuous core exercises for 4 weeks         HTN: on home toprol XL 50 mg qd, lisinopril 20 mg qd, however home HCTZ reduced from 25 mg to 12 5 QD      HL: on home atorvastatin 40 mg qpm      AAA: s/p repair, by Dr Willa Gibbs      PVD: s/p bypass graft      CAD: s/p PCI, stress test of 1/8/2015 w/o perfusion abnormality; on home ASA 81 mg qd, atorvastatin 40 mg qpm, toprol XL 50 mg qd (additionally holter monitor of 2/15/17 revealed PAC/PVCs but no VT/SVT/A-fib/A-flutter and was without bradyarrhythmia or advanced heart block); OP FU with Dr Sreedhar Tripathi     GERD/hiatal hernia: home zantac 150 mg qd non-formulary, therapeutic substitution          Anemia: ABLA, Hg currently stable at 10 6     Hypokalemia: s/p repletion          Incidental findings of CT AP of 9/19/14: cholelithiasis, diverticulosis      DVT ppx: Pt requesting to have SCDs removed, d/w patient risks/benefits of doing this (patient ambulating adequate distances) and patient would like to proceed with dc of SCDs; lovenox   Dispo: 3/10, OP PT     This patient was discussed by the Interdisciplinary Team in weekly case conference today  The care of the patient was extensively discussed with all care providers and an appropriate rehabilitation plan was formulated unique for this patient  Barriers were identified preventing progression of therapy and appropriate interventions were discussed with each discipline  Please see the team note for input from all disciplines regarding barriers, intervention, and discharge planning  [ x ] Total time spent: 45 Mins, and greater than 50% of this time was spent counseling/coordinating care

## 2018-03-07 NOTE — PROGRESS NOTES
Internal Medicine Progress Note  Patient: Hayden Aparicio  Age/sex: 67 y o  male  Medical Record #: [id number]      ASSESSMENT/PLAN:  Hayden Aparicio is seen and examined and mangement for following issues:    1  Incisional hernia repair with MESH and MARK with Dr Sharath Jj 2/23/18:  will watch incision  Abd binder to be worn, acc to surgery, when OOB/activity and can be removed when in bed unless uncomfortable w/o it  Continue Protonix 40mg BID for the time being and started ATC Zofran since occ nausea  On 3/3/18, weaned Zofran back to 4mg q8hrs = yesterday weaned back to 4mg q12hrs and will change to prn today  Surgery d/c'd staples/DILLON drain     2  ABLA:  mild, not requiring transfusion  Will watch     3  HTN: stable  At home he takes Lisinopril 20mg qd, HCTZ 25mg qd and Toprol 50mg qd  Was on HCTZ 12 5mg qd, Lisinopril 20mg qd and Toprol 50mg qd and stopped the HCTZ and Lisinopril 2/2 JOVANNI  Has prn Hydralazine  BP stable     4  HLD:  continue Lipitor     5  CAD with hx IWMI/BMS RCA 00:  last stress test 2015  Continue ASA/Lipitor     6  Hx PAD with [id number] BPG     7   Constipation:  changed Lactulose to prn but 3/4/18 said he thought he was having some abd pain at night 2/2 constipation so gave dose lactulose = + 2 BMs 3/5 and resolved the abd pain  Last BM was 3/6/18    8  Leukocytosis:  resolved  9   JOVANNI: ?etio meds vs volume depletion or combo  Gave 1 liter NSS on 3/1/18 and stopped the HCTZ and Lisinopril  Creat was back to 0 93 from a high of 1 61    10  Hypokalemia: gave KCL 40 meq x 1 3/5  BMP in AM    11  Bilateral ankle edema:  added TEDS = resolved      Subjective: Patient seen and examined       ROS:   Neuro: No new neurologic changes  Respiratory: No Cough, SOB  Cardiovascular: No CP, palpitations     Scheduled Meds:    Current Facility-Administered Medications:  acetaminophen 975 mg Oral Q8H [facility] Puja Damon MD   aspirin 81 mg Oral Daily Puja Damon MD   atorvastatin 40 mg Oral Daily With Mason Alford MD   bisacodyl 10 mg Rectal Daily PRN Vish Og, MD   enoxaparin 40 mg Subcutaneous Daily Vish Og, MD   hydrALAZINE 25 mg Oral Q8H PRN Adilia Player, ROOSEVELT   lactulose 20 g Oral Daily PRN Adilia Rubi, ROOSEVELT   metoprolol succinate 50 mg Oral Daily Vish Og, MD   mineral oil 1 enema Rectal Once PRN Vish Og, MD   ondansetron 4 mg Oral Q12H Adilia Rubi, ROOSEVELT   oxyCODONE 10 mg Oral Q4H PRN Vish Og, MD   oxyCODONE 5 mg Oral Q4H PRN Vish Og, MD   pantoprazole 40 mg Oral BID AC Adilia Rubi, ROOSEVELT       Labs:       Results from last 7 days  Lab Units 03/05/18  0615 03/02/18  0535   WBC Thousand/uL 6 87 7 26   HEMOGLOBIN g/dL 10 6* 10 7*   HEMATOCRIT % 31 4* 31 7*   PLATELETS Thousands/uL 309 269       Results from last 7 days  Lab Units 03/05/18  0615 03/03/18  0642   SODIUM mmol/L 140 138   POTASSIUM mmol/L 3 4* 3 6   CHLORIDE mmol/L 107 105   CO2 mmol/L 27 29   BUN mg/dL 23 33*   CREATININE mg/dL 0 93 0 99   GLUCOSE RANDOM mg/dL 74 91   CALCIUM mg/dL 8 1* 8 9                  Glucose (mg/dL)   Date Value   03/05/2018 74   03/03/2018 91   03/02/2018 72   03/01/2018 106   12/10/2015 93   06/18/2015 103   05/07/2014 102   11/25/2013 87       Labs reviewed    Physical Examination:  Vitals:   Vitals:    03/06/18 2038 03/07/18 0518 03/07/18 0527 03/07/18 0839   BP: 119/58 142/64  133/62   BP Location: Left arm Left arm  Right arm   Pulse: 66 70  64   Resp: 18 18     Temp: 98 4 °F (36 9 °C) 98 6 °F (37 °C)     TempSrc: Oral Oral     SpO2: 93% 93%     Weight:   72 kg (158 lb 11 7 oz)    Height:           GEN: NAD  HEENT: NC/AT  RESP: BBS w/o crackles/wheeze/rhonci; resp unlabored  CV: +S1 S2, regular rate, no rubs/murmurs  ABD:   + BS; abd is soft; midline abdominal incision healing well = DILLON/staples are out    : no centeno  EXT: no ankle edema  Skin: no rashes  Neuro: AAO; SHEPHERD 5/5      [x ] Total time spent: 30 Mins and greater than 50% of this time was spent counseling/coordinating care        Michelle Moon, [address]   Internal Medicine

## 2018-03-08 LAB
ANION GAP SERPL CALCULATED.3IONS-SCNC: 6 MMOL/L (ref 4–13)
BASOPHILS # BLD AUTO: 0.02 THOUSANDS/ΜL (ref 0–0.1)
BASOPHILS NFR BLD AUTO: 0 % (ref 0–1)
BUN SERPL-MCNC: 15 MG/DL (ref 5–25)
CALCIUM SERPL-MCNC: 7.7 MG/DL (ref 8.3–10.1)
CHLORIDE SERPL-SCNC: 112 MMOL/L (ref 100–108)
CO2 SERPL-SCNC: 26 MMOL/L (ref 21–32)
CREAT SERPL-MCNC: 0.78 MG/DL (ref 0.6–1.3)
EOSINOPHIL # BLD AUTO: 0.14 THOUSAND/ΜL (ref 0–0.61)
EOSINOPHIL NFR BLD AUTO: 2 % (ref 0–6)
ERYTHROCYTE [DISTWIDTH] IN BLOOD BY AUTOMATED COUNT: 13.7 % (ref 11.6–15.1)
GFR SERPL CREATININE-BSD FRML MDRD: 90 ML/MIN/1.73SQ M
GLUCOSE SERPL-MCNC: 76 MG/DL (ref 65–140)
HCT VFR BLD AUTO: 30.4 % (ref 36.5–49.3)
HGB BLD-MCNC: 10.2 G/DL (ref 12–17)
LYMPHOCYTES # BLD AUTO: 1.01 THOUSANDS/ΜL (ref 0.6–4.47)
LYMPHOCYTES NFR BLD AUTO: 16 % (ref 14–44)
MCH RBC QN AUTO: 29.7 PG (ref 26.8–34.3)
MCHC RBC AUTO-ENTMCNC: 33.6 G/DL (ref 31.4–37.4)
MCV RBC AUTO: 89 FL (ref 82–98)
MONOCYTES # BLD AUTO: 0.43 THOUSAND/ΜL (ref 0.17–1.22)
MONOCYTES NFR BLD AUTO: 7 % (ref 4–12)
NEUTROPHILS # BLD AUTO: 4.91 THOUSANDS/ΜL (ref 1.85–7.62)
NEUTS SEG NFR BLD AUTO: 75 % (ref 43–75)
NRBC BLD AUTO-RTO: 0 /100 WBCS
PLATELET # BLD AUTO: 314 THOUSANDS/UL (ref 149–390)
PMV BLD AUTO: 8.7 FL (ref 8.9–12.7)
POTASSIUM SERPL-SCNC: 3.5 MMOL/L (ref 3.5–5.3)
RBC # BLD AUTO: 3.43 MILLION/UL (ref 3.88–5.62)
SODIUM SERPL-SCNC: 144 MMOL/L (ref 136–145)
WBC # BLD AUTO: 6.52 THOUSAND/UL (ref 4.31–10.16)

## 2018-03-08 PROCEDURE — 97535 SELF CARE MNGMENT TRAINING: CPT

## 2018-03-08 PROCEDURE — 97110 THERAPEUTIC EXERCISES: CPT

## 2018-03-08 PROCEDURE — 97530 THERAPEUTIC ACTIVITIES: CPT

## 2018-03-08 PROCEDURE — 80048 BASIC METABOLIC PNL TOTAL CA: CPT | Performed by: NURSE PRACTITIONER

## 2018-03-08 PROCEDURE — 99232 SBSQ HOSP IP/OBS MODERATE 35: CPT | Performed by: PHYSICAL MEDICINE & REHABILITATION

## 2018-03-08 PROCEDURE — 85025 COMPLETE CBC W/AUTO DIFF WBC: CPT | Performed by: NURSE PRACTITIONER

## 2018-03-08 PROCEDURE — 97116 GAIT TRAINING THERAPY: CPT

## 2018-03-08 RX ORDER — LISINOPRIL 10 MG/1
10 TABLET ORAL DAILY
Status: DISCONTINUED | OUTPATIENT
Start: 2018-03-08 | End: 2018-03-10 | Stop reason: HOSPADM

## 2018-03-08 RX ORDER — HYDROCHLOROTHIAZIDE 12.5 MG/1
12.5 TABLET ORAL DAILY
Status: DISCONTINUED | OUTPATIENT
Start: 2018-03-08 | End: 2018-03-10 | Stop reason: HOSPADM

## 2018-03-08 RX ORDER — POTASSIUM CHLORIDE 20 MEQ/1
40 TABLET, EXTENDED RELEASE ORAL ONCE
Status: COMPLETED | OUTPATIENT
Start: 2018-03-08 | End: 2018-03-08

## 2018-03-08 RX ADMIN — ENOXAPARIN SODIUM 40 MG: 40 INJECTION SUBCUTANEOUS at 09:50

## 2018-03-08 RX ADMIN — PANTOPRAZOLE SODIUM 40 MG: 40 TABLET, DELAYED RELEASE ORAL at 05:55

## 2018-03-08 RX ADMIN — HYDROCHLOROTHIAZIDE 12.5 MG: 12.5 TABLET ORAL at 14:26

## 2018-03-08 RX ADMIN — LISINOPRIL 10 MG: 10 TABLET ORAL at 14:26

## 2018-03-08 RX ADMIN — METOPROLOL SUCCINATE 50 MG: 50 TABLET, EXTENDED RELEASE ORAL at 09:50

## 2018-03-08 RX ADMIN — ASPIRIN 81 MG 81 MG: 81 TABLET ORAL at 09:50

## 2018-03-08 RX ADMIN — POTASSIUM CHLORIDE 40 MEQ: 1500 TABLET, EXTENDED RELEASE ORAL at 14:26

## 2018-03-08 RX ADMIN — ATORVASTATIN CALCIUM 40 MG: 40 TABLET, FILM COATED ORAL at 16:16

## 2018-03-08 RX ADMIN — PANTOPRAZOLE SODIUM 40 MG: 40 TABLET, DELAYED RELEASE ORAL at 16:16

## 2018-03-08 NOTE — PROGRESS NOTES
Physical Therapy Progress Note   03/08/18 0900   Pain Assessment   Pain Assessment No/denies pain   Pain Score No Pain   Restrictions/Precautions   Precautions Fall Risk   Weight Bearing Restrictions No   ROM Restrictions No   Braces or Orthoses Other (Comment)  (platform shoe RLE)   General   Change In Medical/Functional Status BRP   Cognition   Overall Cognitive Status Impaired   Arousal/Participation Alert; Cooperative   Attention Within functional limits   Orientation Level Oriented X4   Memory Within functional limits   Following Commands Follows multistep commands with increased time or repetition   Subjective   Subjective denies pain; no c/o   QI: Roll Left and Right   Assistance Needed Independent   Assistance Provided by [place] No physical assistance   Roll Left and Right CARE Score 6   QI: Sit to [address] Provided by [place] No physical assistance   Sit to Lying CARE Score 6   QI: Lying to Sitting on Side of Bed   Assistance Needed Independent   Assistance Provided by [place] No physical assistance   Lying to Sitting on Side of Bed CARE Score 6   QI: Sit to [address] Provided by [place] No physical assistance   Sit to Stand CARE Score 6   Bed Mobility   Able to Roll Left to Right;Right to Left;Scoot Up   Findings MI   QI: Chair/Bed-to-Chair Transfer   Assistance Needed Independent   Assistance Provided by [place] No physical assistance   Comment BRP with [facility]   Chair/Bed-to-Chair Transfer CARE Score 6   Transfer Bed/Chair/Wheelchair   Limitations Noted In UE Strength;LE Strength   Adaptive Equipment Cane   Stand Pivot Modified Independent   Sit to Stand Modified Independent   Stand to Sit Modified Independent   Supine to Sit Modified Independent   Sit to Supine Modified Independent   Car Transfer Modified Independent   All Transfer Modified Independent   Findings MI with [facility]   Bed, Chair, Wheelchair Transfer (FIM) 6 - Patient requires assistive device/extra time/safety concerns but completes independently   QI: [address] Provided by [place] No physical assistance   Comment SPC   Car Transfer CARE Score 6   QI: [address] Provided by [place] No physical assistance   Comment BRP with SPC   Walk 10 Feet CARE Score 6   QI: Walk 50 Feet with Two [address] Provided by [place] No physical assistance   Comment SPC   Walk 50 Feet with Two Turns CARE Score 6   QI: Walk 150 [address] Provided by [place] No physical assistance   Comment [facility]   Walk 150 Feet CARE Score 6   QI: Walking 10 Feet on Uneven Surfaces   Reason if not Attempted Activity not applicable   Walking 10 Feet on Uneven Surfaces CARE Score 9   Ambulation   Does the patient walk? 2  Yes   Primary Discharge Mode of Locomotion Walk   Walk Assist Level Supervision;Modified Independent   Gait Pattern Slow Darlene   Assist Device Cane   Distance Walked (feet) 500 ft   Limitations Noted In Heel Strike   Walking (FIM) 5 - Patient requires supervision/monitoring AND distance 150 feet or more, no rest   QI: Wheel 50 Feet with Two Turns   Reason if not Attempted Activity not applicable   Wheel 50 Feet with Two Turns CARE Score 9   QI: Wheel 150 Feet   Reason if not Attempted Activity not applicable   Wheel 187 Feet CARE Score 9   Wheelchair mobility   QI: Does the patient use a wheelchair? 0   No   Findings NT   Wheelchair (FIM) 0 - Activity does not occur   QI: 1 Step (Curb)   Assistance Needed Supervision   Assistance Provided by [place] No physical assistance   1 Step (Curb) CARE Score 4   QI: 4 Steps   Assistance Needed Supervision   Assistance Provided by [place] No physical assistance   Comment cane   4 Steps CARE Score 4   QI: 12 Steps   Assistance Needed Supervision   Assistance Provided by [place] No physical assistance Comment cane   12 Steps CARE Score 4   Stairs   Type Stairs   # of Steps 12   Weight Bearing Precautions Fall Risk   Assist Devices Cane   Stairs (FIM) 5 - Patient requires supervision/monitoring AND goes up and down full flight (12- 14 stairs)   QI: [address] Provided by [place] No physical assistance   Picking Up Object CARE Score 4   QI: Toilet Transfer   Assistance Needed Independent   Assistance Provided by [place] No physical assistance   Comment BRP   Toilet Transfer CARE Score 6   Toilet Transfer   Positioning Concerns Safety   Toilet Transfer (FIM) 6 - Patient requires assistive device/extra time/safety concerns but completes independently   Therapeutic Interventions   Strengthening standing TE (hip flex/ext/abd/add, HS curls, mini squats) seated TE (hip abd with TB, iso hip add with ball, w/c pushups) supine TE (heel slides, SAQ, hip abd) reps until fatigued   Flexibility manual stretch B HS and gastroc;    Balance standing ball toss to L/R and high/low   Assessment   Treatment Assessment Pt BRP this session with SPC; able to xfer on/off mat table sit EOB <> supine wit1h no assist; instructed in supine TE (as above) amb 150' (x5 during session) seated TE and standing TE (as above); curb, ramp and stairs with SPC and S/MI; finished session seated in bedside recliner with all needs in reach; recommend cont PT POC; Family/Caregiver Present no   Problem List Decreased strength;Decreased range of motion   Barriers to Discharge Decreased caregiver support   PT Barriers   Physical Impairment Decreased strength;Decreased range of motion   Plan   Treatment/Interventions ADL retraining;Functional transfer training;LE strengthening/ROM; Elevations; Therapeutic exercise; Endurance training;Cognitive reorientation;Patient/family training;Equipment eval/education; Bed mobility;Gait training   Progress Progressing toward goals   Recommendation   Recommendation Home with family support;Home PT   Equipment Recommended Cane   PT Therapy Minutes   PT Time In 0900   PT Time Out 1030   PT Total Time (minutes) 90   PT Mode of treatment - Individual (minutes) 60   PT Mode of treatment - Concurrent (minutes) 30   PT Mode of treatment - Group (minutes) 0   PT Mode of treatment - Co-treat (minutes) 0   PT Mode of Teatment - Total time(minutes) 90 minutes   Therapy Time missed   Time missed?  No     Elizabeth Gutierrez, PTA

## 2018-03-08 NOTE — PROGRESS NOTES
Progress Note - Brandon Sic 67 y o  male MRN: [id number]    Unit/Bed#: -01 Encounter: [id number]            Subjective:   Pt without complaint currently     Objective:     ROS  Gen: denies recent wt loss   Psych: denies mood change    Vitals:    03/08/18 0947   BP: 132/58   Pulse: 76   Resp:    Temp:    SpO2:    T: 98 0  RR: 18  POx: 92% (not 90%)      Physical Exam:     Gen:        NAD   Neck:   trachea midline  Lungs:  respirations unlabored   Heart:    + S1 and S2   Abdomen:  Surgical drain removed   Psych: mood/affect appropriate  Neurologic: awake, alert, appropriately answering questions     Functional :  Mobility: sup  Tx: sup  ADLs: sup         Current Facility-Administered Medications:  acetaminophen 975 mg Oral Q8H Urmila Sandoval MD   aspirin 81 mg Oral Daily Daisy Jay MD   atorvastatin 40 mg Oral Daily With Lauri Rios MD   bisacodyl 10 mg Rectal Daily PRN Daisy Jay MD   enoxaparin 40 mg Subcutaneous Daily Daisy Jay MD   hydrALAZINE 25 mg Oral Q8H PRN ROOSEVELT Mustafa   lactulose 20 g Oral Daily PRN RUBY MustafaNP   metoprolol succinate 50 mg Oral Daily Daisy Jay MD   mineral oil 1 enema Rectal Once PRN Daisy Jay MD   ondansetron 4 mg Oral Q6H PRN RUBY MustafaNP   oxyCODONE 10 mg Oral Q4H PRN Daisy Jay MD   oxyCODONE 5 mg Oral Q4H PRN Daisy Jay MD   pantoprazole 40 mg Oral BID AC ROOSEVELT Garcia       bisacodyl    hydrALAZINE    lactulose    mineral oil    ondansetron    oxyCODONE    oxyCODONE      Assessment:  Pt is 68 yo admitted for elective incisional hernia repair and MARK by Dr Hollis Tan on 2/23/18    Plan:    Rehabilitation: cont PT/OT for ambulatory/ADL dysfxn    s/p elective incisional hernia repair and MARK :  Dr Hollis Tan on 2/23/18, surgical drain removed, per surgery service note of 3/5/18 no lifting greater than 20 lbs or strenuous core exercises for 4 weeks         HTN: on home toprol XL 50 mg qd, lisinopril 20 mg qd, however home HCTZ reduced from 25 mg to 12 5 QD      HL: on home atorvastatin 40 mg qpm      AAA: s/p repair, by Dr Ruben Harris      PVD: s/p bypass graft      CAD: s/p PCI, stress test of 1/8/2015 w/o perfusion abnormality; on home ASA 81 mg qd, atorvastatin 40 mg qpm, toprol XL 50 mg qd (additionally holter monitor of 2/15/17 revealed PAC/PVCs but no VT/SVT/A-fib/A-flutter and was without bradyarrhythmia or advanced heart block); OP FU with Dr Marily Ramirez     GERD/hiatal hernia: home zantac 150 mg qd non-formulary, therapeutic substitution          Anemia: ABLA, Hg currently stable at 10 2    Hypokalemia: currently WNL          Incidental findings of CT AP of 9/19/14: cholelithiasis, diverticulosis      DVT ppx: Pt requesting to have SCDs removed, d/w patient risks/benefits of doing this (patient ambulating adequate distances) and patient would like to proceed with dc of SCDs; lovenox   Dispo: 3/10, OP PT

## 2018-03-08 NOTE — PROGRESS NOTES
03/08/18 1230   Pain Assessment   Pain Assessment No/denies pain   Pain Score No Pain   Restrictions/Precautions   Precautions Fall Risk  (abdominal binder)   Weight Bearing Restrictions No   ROM Restrictions No   Braces or Orthoses Other (Comment)  (Platform shoe R)   QI: Shower/Bathe Self   Assistance Needed Adaptive equipment   Assistance Provided by [employer] No physical assistance   Shower/Bathe Self CARE Score 6   Bathing   Assessed Bath Style Sponge Bath;Tub   Anticipated D/C Bath Style Tub   Able to Gather/Transport Yes   Able to Adjust Water Temperature Yes   Able to Wash/Rinse/Dry (body part) Left Arm;Right Arm;L Upper Leg;R Upper Leg;L Lower Leg/Foot;R Lower Leg/Foot;Chest;Abdomen;Perineal Area; Buttocks   Limitations Noted in Balance; Endurance; Safety;Strength;Timeliness   Positioning Standing;Seated   Adaptive Equipment Tub Bench; Shower Bars   Findings  Pt participated in sponge bath in wet tub 2* abdominal staples recently removed and pt fearful to get wet at this time  Plan to SB upon d/c until abdominal incision heals  Pt demo ability to bathe all body parts while seated on tub bench, with use of unilateral weight shifting to bath yana and buttocks areas  Pt demo ability to wash Le with use of simulated LH sponge (reacher with washcloth)  Pt has LH sponge at home he used PTA  Bathing (FIM) 6 - Patient requires assistive device/extra time/safety concerns but completes independently   Tub/Shower Transfer   Limitations Noted In Balance; Endurance; Safety;UE Strength;LE Strength   Adaptive Equipment Grab Bars;Seat with Back   Assessed Tub/shower combo   Findings Trialed tub transfer to simulate pt home set up with out shower chair  Pt unable to complete transfer safely  Recommend tub bench at this time to increase independence and safety with tub transfers  Pt participated in tub transfer with use of SPC, grab bars and tub bench   Pt demo ability to ambulate up to tub bench with use of SPC, sit on tub bench, and then sit and unilaterally lift each leg into tub  Recommended tub bench for d/c 2* decreased balance, decreased safety awareness, and decreased standing tolerance  Tub Transfer (FIM) 5 - Patient requires supervision/monitoring   QI: Upper Body [address] Provided by [place] No physical assistance   Upper Body Dressing CARE Score 6   QI: Lower Body Dressing   Assistance Needed Adaptive equipment   Assistance Provided by [place] No physical assistance   Lower Body Dressing CARE Score 6   QI: Putting On/Taking Off [address] [facility] Provided by [place] No physical assistance   Putting On/Taking Off Footwear CARE Score 4   QI: Picking Up Object   Reason if not Attempted Refused to perform   Picking Up Object CARE Score 7   Dressing/Undressing Clothing   Able to  Obtain Clothing   Remove UB Clothes Button Shirt   Remove LB Clothes Pants;TEDs;Socks; Shoes   Don UB Clothes Button Shirt   Don LB Clothes Pants;TEDs; Shoes;Socks   Limitations Noted In Balance; Endurance; Safety;Strength;Timeliness; Other  (pain)   Adaptive Equipment LH Shoehorn;Sock Aide;Reacher   Positioning Sit Edge Of Bed   Findings Pt participated dressing seated at EOB  Pt demo ability to don UB clothing while seated at EOB for safety  Pt may require VC to sit to don UB shirt for safety  Pt demo ability to don pants while seated, with use of SPC to stand to don pants over hips  Pt demo ability to son socks with use of sock aid  Pt demo ability to don L shoe while seated with use of LH shoe horn  Pt demo ability to don R platform shoe with use of furniture for support to simulate home set up, pt hovers r foot over shoe and uses LH shoe horn to don while in stance  Pt leaves elastic laces tied      UB Dressing (FIM) 7 - No helper, safely, timely and completes all tasks independently   LB Dressing (FIM) 5 - Patient requires supervision/monitoring   QI: Sit to Stand   Assistance Needed Adaptive equipment   Assistance Provided by [place] No physical assistance   Sit to Stand CARE Score 6   QI: Chair/Bed-to-Chair Transfer   Assistance Needed Adaptive equipment   Assistance Provided by [place] No physical assistance   Chair/Bed-to-Chair Transfer CARE Score 6   Transfer Bed/Chair/Wheelchair   Limitations Noted In Balance; Endurance;UE Strength;LE Strength   Adaptive Equipment Cane   Stand Pivot Modified Independent   Sit to Stand Other  (Mod I)   Stand to Sit Other  (Mod I)   Findings Pt participated in multiple sit to stands through out ADL tasks requiring use of SPC  Bed, Chair, Wheelchair Transfer (FIM) 6 - Patient requires assistive device/extra time/safety concerns but completes independently   Cognition   Overall Cognitive Status Impaired   Arousal/Participation Alert; Cooperative   Attention Within functional limits   Orientation Level Oriented X4   Memory Within functional limits   Following Commands Follows multistep commands with increased time or repetition   Assessment   Treatment Assessment Pt participated in skilled OT session with focus on equipment evaluation and  ADLs ( bathing and dressing)  Pt tolerated session well  Pt demo ability to complete bathing and dressing task at mod I level  To simulate bedroom home set up with carpet  pt participated in ADLs in ADL suite  Pt demo ability to don shoes with use of furniture for proximal support while in stance  Recommend pt don L shoe first for safety  Recommend pt complete UB dressing while seated upon d/c 2* decreased balance and safety  Assessed if commode would fit in pt bathroom at home based on measurements provided by pt  Recommend use of commode over toilet to provide UE support while hovering over toilet 2* leg length discrepancy and environmental barriers upon d/c 2* fall risk and safety  Upgraded pt to BRP at end of session  Prognosis Good   Problem List Decreased endurance; Impaired balance;Decreased mobility   Barriers to Discharge Inaccessible home environment;Decreased caregiver support   Plan   Treatment/Interventions ADL retraining;Functional transfer training;LE strengthening/ROM; Therapeutic exercise; Endurance training;Patient/family training   Progress Progressing toward goals   Recommendation   OT Discharge Recommendation Home OT   Equipment Recommended Bedside commode;Tub seat with back   OT Equipment ordered Commode   Date ordered 03/08/18   OT Therapy Minutes   OT Time In 1230   OT Time Out 1400   OT Total Time (minutes) 90   OT Mode of treatment - Individual (minutes) 90   OT Mode of treatment - Concurrent (minutes) 0   OT Mode of treatment - Group (minutes) 0   OT Mode of treatment - Co-treat (minutes) 0   OT Mode of Teatment - Total time(minutes) 90 minutes   Therapy Time missed   Time missed?  No

## 2018-03-08 NOTE — SOCIAL WORK
In preparation for dc cm received order from therapy for a commode  [facility]   Following for assist w/dc planning needs

## 2018-03-08 NOTE — PROGRESS NOTES
03/08/18 1530   Pain Assessment   Pain Assessment 0-10   Pain Score No Pain   Restrictions/Precautions   Precautions Fall Risk   Braces or Orthoses Other (Comment)  (platform shoe RLE)   Cognition   Overall Cognitive Status Impaired   Arousal/Participation Alert; Cooperative   Attention Within functional limits   Orientation Level Oriented X4   Memory Within functional limits   Following Commands Follows multistep commands with increased time or repetition   QI: Lying to Sitting on Side of Bed   Assistance Needed Supervision   Lying to Sitting on Side of Bed CARE Score 4   QI: Sit to Stand   Assistance Needed Supervision   Sit to Stand CARE Score 4   QI: Chair/Bed-to-Chair Transfer   Assistance Needed Supervision   Chair/Bed-to-Chair Transfer CARE Score 4   Transfer Bed/Chair/Wheelchair   Limitations Noted In UE Strength;LE Strength   Adaptive Equipment [facility] [employer] Pivot Supervision   Sit to Stand Supervision   Stand to Sit Supervision   Supine to Sit Supervision   Bed, Chair, Wheelchair Transfer (FIM) 5 - Patient requires supervision/monitoring   QI: Walk 10 Feet   Assistance Needed Supervision   Walk 10 Feet CARE Score 4   QI: Walk 50 Feet with Two Turns   Assistance Needed Supervision   Walk 50 Feet with Two Turns CARE Score 4   QI: Walk 150 Feet   Assistance Needed Supervision   Walk 150 Feet CARE Score 4   Ambulation   Does the patient walk? 2  Yes   Primary Discharge Mode of Locomotion Walk   Walk Assist Level Supervision   Gait Pattern Slow Darlene; Step through   Assist Device [employer] Walked (feet) 500 ft   Limitations Noted In Heel Strike;Posture;Speed;Strength   Walking (FIM) 5 - Patient requires supervision/monitoring AND distance 150 feet or more, no rest   Therapeutic Interventions   Strengthening tricep ext blue TB and cone 10x2; bicep curls 3# dowel 10x2; shoulder flexion 3# dowel 10x2   Assessment   Treatment Assessment session focused on UE strengthening and amb in cortes with and without AD; pt has increased endurance for amb with SPC with no breaks, good obstacle negotiation and sequencing; trialed no AD for amb and pt demonstrated increased gait speed and less lateral side bend to the L where he uses the [facility]; reccommend he still uses cane for long distances as he reports he does not use the cane in his home; continue POC as per PT   Problem List Decreased strength;Decreased range of motion   Barriers to Discharge Decreased caregiver support   PT Barriers   Physical Impairment Decreased strength;Decreased range of motion   Plan   Treatment/Interventions LE strengthening/ROM; Elevations; Therapeutic exercise; Endurance training;Gait training   Progress Progressing toward goals   Recommendation   Recommendation Home PT; Home with family support   PT Therapy Minutes   PT Time In 1530   PT Time Out 1600   PT Total Time (minutes) 30   PT Mode of treatment - Individual (minutes) 30   PT Mode of treatment - Concurrent (minutes) 0   PT Mode of treatment - Group (minutes) 0   PT Mode of treatment - Co-treat (minutes) 0   PT Mode of Teatment - Total time(minutes) 30 minutes   Therapy Time missed   Time missed?  No

## 2018-03-08 NOTE — SOCIAL WORK
Met w/pt and reviewed team update and confirmed dc for 3/10  Pt in agreeemnw to go outpt for contd physical therapy but wishes to make his own appmt due to discussing with friends about transportation  Pt will go to [facility]  Address and phone number provided  Cm confirmed order for a commode   Following for additional dc needs

## 2018-03-08 NOTE — PROGRESS NOTES
Physical Therapy Progress Note     03/07/18 1000   Pain Assessment   Pain Assessment No/denies pain   Pain Score No Pain   Restrictions/Precautions   Precautions Fall Risk   Weight Bearing Restrictions No   ROM Restrictions No   Braces or Orthoses Other (Comment)  (platform shoe RLE)   Cognition   Overall Cognitive Status Impaired   Arousal/Participation Alert; Cooperative   Attention Within functional limits   Orientation Level Oriented X4   Memory Within functional limits   Following Commands Follows multistep commands with increased time or repetition   Subjective   Subjective denies pain; no c/o   Bed Mobility   Able to Roll Left to Right;Right to Left;Scoot Up   Findings S   Transfer Bed/Chair/Wheelchair   Limitations Noted In UE Strength;LE Strength   Adaptive Equipment [employer] Pivot Supervision   Sit to Stand Supervision   Stand to Sit Supervision   Supine to Sit Supervision   Sit to Supine Moderate Assist   Car Transfer Supervision   Findings BRP   Bed, Chair, Wheelchair Transfer (FIM) 6 - Patient requires assistive device/extra time/safety concerns but completes independently   Ambulation   Primary Discharge Mode of Locomotion Walk   Walk Assist Level Supervision;Modified Independent   Gait Pattern Slow Darlene   Assist Device Cane   Distance Walked (feet) 500 ft   Limitations Noted In Heel Strike   Findings BRP with SPC;   Walking (FIM) 6 - Patient requires assistive device/extra time/safety concerns but completes independently AND distance 150 feet or more, no rest   Wheelchair mobility   Findings NT   Wheelchair (FIM) 0 - Activity does not occur   Stairs   Type Stairs   # of Steps 12   Weight Bearing Precautions Fall Risk   Assist Devices Cane   Findings x6 with SPC and no HR   Stairs (FIM) 5 - Patient requires supervision/monitoring AND goes up and down full flight (12- 14 stairs)   Toilet Transfer   Toilet Transfer (FIM) 6 - Patient requires assistive device/extra time/safety concerns but completes independently   Therapeutic Interventions   Strengthening standing TE (hip flex/ext/abd/add, HS curls, mini squats) seated TE (hip abd with TB, iso hip add with ball, w/c pushups) supine TE (heel slides, SAQ, hip abd) reps until fatigued   Flexibility manual stretch B HS and gastroc stretch    Balance ball toss    Assessment   Treatment Assessment BRP this session; Pt seated in bedside recliner prior to session; BRP in ro0om; S/MI for bed mobility and supine <> sit xfer and roll L/R; BRP for amb in room with [facility]; S/MI for amb in hallways up to 350' with DS/MI and SPC; stairs x6 with cane only, no HR; curb and ramp with cane and S/MI; good balance with SPC, no LOB this session; pt continues to be limited by L hip lack of ROM; instructed in stand/sit/supine TE (as above); recommend cont PT POC; Family/Caregiver Present no   Problem List Decreased strength;Decreased range of motion   Barriers to Discharge Decreased caregiver support   PT Barriers   Physical Impairment Decreased strength;Decreased range of motion   Plan   Treatment/Interventions ADL retraining;Functional transfer training;LE strengthening/ROM; Elevations; Therapeutic exercise; Endurance training;Cognitive reorientation;Patient/family training;Equipment eval/education; Bed mobility;Gait training   Progress Progressing toward goals   Recommendation   Recommendation Home PT; Home with family support   Equipment Recommended Cane   PT Therapy Minutes   PT Time In 1000   PT Time Out 1130   PT Total Time (minutes) 90   PT Mode of treatment - Individual (minutes) 60   PT Mode of treatment - Concurrent (minutes) 30   PT Mode of treatment - Group (minutes) 0   PT Mode of treatment - Co-treat (minutes) 0   PT Mode of Teatment - Total time(minutes) 90 minutes   Therapy Time missed   Time missed?  No     Munir Walker, PTA

## 2018-03-08 NOTE — PROGRESS NOTES
Internal Medicine Progress Note  Patient: Semaj Escobar  Age/sex: 67 y o  male  Medical Record #: [id number]      ASSESSMENT/PLAN:  Semaj Escobar is seen and examined and mangement for following issues:    1  Incisional hernia repair with MESH and MARK with Dr Ezekiel Aggarwal 2/23/18:  will watch incision  Abd binder to be worn, acc to surgery, when OOB/activity and can be removed when in bed unless uncomfortable w/o it  Continue Protonix 40mg BID for the time being and started ATC Zofran since occ nausea = changed to prn yesterday  Surgery d/c'd staples/DILLON drain     2  ABLA:  mild, not requiring transfusion  Will watch     3  HTN: stable  At home he takes Lisinopril 20mg qd, HCTZ 25mg qd and Toprol 50mg qd  Was on HCTZ 12 5mg qd, Lisinopril 20mg qd and Toprol 50mg qd and stopped the HCTZ and Lisinopril 2/2 JOVANNI  Has prn Hydralazine  BP stable but he wants the HCTZ restarted 2/2 mild intermitt edema of legs = will restart 12 5mg qd and not 25mg qd for now; can start back on some Lisinopril since BP likely will tolerate but will order 10mg daily instead of 20mg qd     4  HLD:  continue Lipitor     5  CAD with hx IWMI/BMS RCA 00:  last stress test 2015  Continue ASA/Lipitor     6  Hx PAD with [id number] BPG     7   Constipation:  changed Lactulose to prn but 3/4/18 said he thought he was having some abd pain at night 2/2 constipation so gave dose lactulose = + 2 BMs 3/5 and resolved the abd pain  Last BM was 3/7/18    8  Leukocytosis:  resolved  9   JOVANNI: ?etio meds vs volume depletion or combo  Gave 1 liter NSS on 3/1/18 and stopped the HCTZ and Lisinopril  Creat was back to 0 93 from a high of 1 61    10  Hypokalemia: gave KCL 40 meq x 1 3/5  Will give 40 meq x 1 today since restarting HCTZ    11  Bilateral ankle edema:  added TEDS = resolved although he thinks feet feel somewhat swollen today and wants to restart HCTZ      Subjective: Patient seen and examined       ROS:   Neuro: No new neurologic changes  Respiratory: No Cough, SOB  Cardiovascular: No CP, palpitations     Scheduled Meds:    Current Facility-Administered Medications:  acetaminophen 975 mg Oral Q8H [address] Bea Tineo MD   aspirin 81 mg Oral Daily Bea Tineo MD   atorvastatin 40 mg Oral Daily With Saurabh Joe MD   bisacodyl 10 mg Rectal Daily PRN Bea Tineo MD   enoxaparin 40 mg Subcutaneous Daily Bea Tineo MD   hydrALAZINE 25 mg Oral Q8H PRN ROOSEVELT Damian   lactulose 20 g Oral Daily PRN ROOSEVELT Damian   metoprolol succinate 50 mg Oral Daily Bea Tineo MD   mineral oil 1 enema Rectal Once PRN Bea Tineo MD   ondansetron 4 mg Oral Q6H PRN ROOSEVELT Damian   oxyCODONE 10 mg Oral Q4H PRN Bea Tineo MD   oxyCODONE 5 mg Oral Q4H PRN Bea Tineo MD   pantoprazole 40 mg Oral BID AC ROOSEVELT Damian       Labs:       Results from last 7 days  Lab Units 03/08/18  0634 03/05/18  0615   WBC Thousand/uL 6 52 6 87   HEMOGLOBIN g/dL 10 2* 10 6*   HEMATOCRIT % 30 4* 31 4*   PLATELETS Thousands/uL 314 309       Results from last 7 days  Lab Units 03/08/18  0634 03/05/18  0615   SODIUM mmol/L 144 140   POTASSIUM mmol/L 3 5 3 4*   CHLORIDE mmol/L 112* 107   CO2 mmol/L 26 27   BUN mg/dL 15 23   CREATININE mg/dL 0 78 0 93   GLUCOSE RANDOM mg/dL 76 74   CALCIUM mg/dL 7 7* 8 1*                  Glucose (mg/dL)   Date Value   03/08/2018 76   03/05/2018 74   03/03/2018 91   03/02/2018 72   12/10/2015 93   06/18/2015 103   05/07/2014 102   11/25/2013 87       Labs reviewed    Physical Examination:  Vitals:   Vitals:    03/07/18 1326 03/07/18 2017 03/08/18 0629 03/08/18 0947   BP: 118/58 129/57 133/70 132/58   BP Location: Left arm Left arm Right arm Right arm   Pulse: 69 69 70 76   Resp: 18 16 18    Temp: 97 7 °F (36 5 °C) 98 °F (36 7 °C) 98 °F (36 7 °C)    TempSrc: Oral Oral Oral    SpO2: 93% 90% 90%    Weight:       Height:           GEN: NAD  HEENT: NC/AT  RESP: ROSE MARIE w/o crackles/wheeze/rhonci; resp unlabored  CV: +S1 S2, regular rate, no rubs/murmurs  ABD:   + BS; abd is soft; midline abdominal incision healing well = DILLON/staples are out  : no centeno  EXT: no ankle edema  Skin: no rashes  Neuro: AAO; SHEPHERD 5/5      [x ] Total time spent: 30 Mins and greater than 50% of this time was spent counseling/coordinating care        Brenda King, [facility] ([place], [place])  Internal Medicine

## 2018-03-09 PROCEDURE — 97530 THERAPEUTIC ACTIVITIES: CPT

## 2018-03-09 PROCEDURE — G0515 COGNITIVE SKILLS DEVELOPMENT: HCPCS

## 2018-03-09 PROCEDURE — 99232 SBSQ HOSP IP/OBS MODERATE 35: CPT | Performed by: PHYSICAL MEDICINE & REHABILITATION

## 2018-03-09 PROCEDURE — 97116 GAIT TRAINING THERAPY: CPT

## 2018-03-09 PROCEDURE — 97535 SELF CARE MNGMENT TRAINING: CPT

## 2018-03-09 RX ORDER — HYDROCHLOROTHIAZIDE 25 MG/1
25 TABLET ORAL DAILY
Status: ON HOLD | COMMUNITY
End: 2018-03-09

## 2018-03-09 RX ORDER — HYDROCHLOROTHIAZIDE 12.5 MG/1
12.5 TABLET ORAL DAILY
Qty: 30 TABLET | Refills: 0 | Status: SHIPPED | OUTPATIENT
Start: 2018-03-11 | End: 2019-02-19 | Stop reason: SDUPTHER

## 2018-03-09 RX ORDER — LISINOPRIL 10 MG/1
10 TABLET ORAL DAILY
Qty: 30 TABLET | Refills: 0 | Status: ON HOLD | OUTPATIENT
Start: 2018-03-11 | End: 2018-05-22 | Stop reason: SDUPTHER

## 2018-03-09 RX ADMIN — PANTOPRAZOLE SODIUM 40 MG: 40 TABLET, DELAYED RELEASE ORAL at 16:50

## 2018-03-09 RX ADMIN — HYDROCHLOROTHIAZIDE 12.5 MG: 12.5 TABLET ORAL at 08:47

## 2018-03-09 RX ADMIN — ASPIRIN 81 MG 81 MG: 81 TABLET ORAL at 08:47

## 2018-03-09 RX ADMIN — ATORVASTATIN CALCIUM 40 MG: 40 TABLET, FILM COATED ORAL at 16:50

## 2018-03-09 RX ADMIN — PANTOPRAZOLE SODIUM 40 MG: 40 TABLET, DELAYED RELEASE ORAL at 07:17

## 2018-03-09 RX ADMIN — ACETAMINOPHEN 975 MG: 325 TABLET, FILM COATED ORAL at 21:36

## 2018-03-09 RX ADMIN — METOPROLOL SUCCINATE 50 MG: 50 TABLET, EXTENDED RELEASE ORAL at 08:47

## 2018-03-09 RX ADMIN — LISINOPRIL 10 MG: 10 TABLET ORAL at 08:47

## 2018-03-09 RX ADMIN — ENOXAPARIN SODIUM 40 MG: 40 INJECTION SUBCUTANEOUS at 08:47

## 2018-03-09 NOTE — INCIDENTAL FINDINGS
1) low hemoglobin: please have your blood work drawn to monitor your hemoglobin level the results will be sent to your family doctor    2) gallstones: Please follow up your family doctor for any further testing/treatment and/or specialist referral as they deem necessary     3) diverticulosis: Please follow up your family doctor for any further testing/treatment and/or specialist referral as they deem necessary

## 2018-03-09 NOTE — PROGRESS NOTES
03/09/18 0700   Pain Assessment   Pain Assessment No/denies pain   Pain Score No Pain   QI: Eating   Assistance Needed Independent   Eating CARE Score 6   Eating Assessment   Eating (FIM) 6 - Patient requires increased time or safety concern   QI: Oral Hygiene   Assistance Needed Independent   Oral Hygiene CARE Score 6   Grooming   Able To Initiate Tasks; Acquire Items;Comb/Brush Hair;Wash/Dry Face;Brush/Clean Teeth;Wash/Dry Hands   Limitation Noted In Safety   Grooming (FIM) 7 - No helper, safely, timely and completes all tasks independently   QI: Shower/Bathe Self   Assistance Needed Independent   Shower/Bathe Self CARE Score 6   [address]; Tub   Anticipated D/C Bath Style Tub   Able to Gather/Transport Yes   Able to Adjust Water Temperature Yes   Able to Wash/Rinse/Dry (body part) Left Arm;Right Arm;L Upper Leg;R Upper Leg;L Lower Leg/Foot;R Lower Leg/Foot;Chest;Abdomen;Perineal Area; Buttocks   Limitations Noted in Balance; Endurance;ROM;Safety   Positioning Seated;Standing   Adaptive Equipment Tub Bench; Shower Seat   Bathing (FIM) 6 - Patient requires assistive device/extra time/safety concerns but completes independently   Tub/Shower Transfer   Limitations Noted In Balance; Endurance   Adaptive Equipment Transfer Bench   Assessed Tub   Tub Transfer (FIM) 6 - Patient requires assistive device/extra time/safety concerns but completes independently   QI: Upper Body Dressing   Assistance Needed Independent   Upper Body Dressing CARE Score 6   QI: Lower Body Dressing   Assistance Needed Independent   Lower Body Dressing CARE Score 6   QI: Putting On/Taking Off Footwear   Assistance Needed Independent   Putting On/Taking Off Footwear CARE Score 6   QI: Picking Up Object   Assistance Needed Independent   Comment reacher   Picking Up Object CARE Score 6   Dressing/Undressing Clothing   Remove UB Clothes (gown)   Remove LB Clothes [facility]  [facility] Pants;Undergarment;Socks; Shoes;TEDs   Limitations Noted In Balance; Endurance   Adaptive Equipment [employer] Shoehorn   Positioning Supported Sit;Standing   Findings pt utilized LH pliers at home for LB dressing  simulated with reacher   UB Dressing (FIM) 7 - No helper, safely, timely and completes all tasks independently   LB Dressing (FIM) 6 - Patient requires assistive device/extra time/safety concerns but completes independently   Transfer Bed/Chair/Wheelchair   Limitations Noted In Balance; Endurance   Adaptive Equipment Cane   Stand Pivot Modified Independent   Sit to Stand (mod I)   Bed, Chair, Wheelchair Transfer (FIM) 6 - Patient requires assistive device/extra time/safety concerns but completes independently   Cognition   Overall Cognitive Status Impaired   Arousal/Participation Alert; Cooperative   Attention Difficulty attending to directions   Orientation Level Oriented X4   Memory Within functional limits   Comments pt  requires increased time for processing/comprehension   Assessment   Treatment Assessment Pt  Participated in OT tx session focusing d/c ADL with tub shower  Edu  Pt  Recommendation purchase of tub bench to complete safe tub txfer at mod I level  Pt  Receptive  Measured BSC and it should fit over pt  Toilet at home  Order placed for [facility] yesterday  Pt  Damien NOLASCO carryover safety awareness with tub shower txfers and donning B shoes  Pt  Functioning at mod I /I level  MoCA completed as screen for cognition  Pt  Had overall score of 20/30 indicating cogn impairment  Pt  Noted increased difficulty with delayed recall, visuospatial, attention  Pt  Kiran Pearce noted to have difficulty with new learning tasks  Recommended pt  Have son review medications with him upon d/c in case of new prescription or dosage  Also recommend A with finances  Pt  Receptive  No further skilled OT services indicated at this time  Prognosis Good   Problem List Decreased strength;Decreased range of motion;Decreased endurance; Impaired balance;Decreased mobility; Decreased safety awareness   Plan   Treatment/Interventions ADL retraining;Functional transfer training; Therapeutic exercise; Endurance training;Patient/family training;Equipment eval/education   Progress Progressing toward goals   OT Therapy Minutes   OT Time In 0700   OT Time Out 0830   OT Total Time (minutes) 90   OT Mode of treatment - Individual (minutes) 90   OT Mode of treatment - Concurrent (minutes) 0   OT Mode of treatment - Group (minutes) 0   OT Mode of treatment - Co-treat (minutes) 0   OT Mode of Teatment - Total time(minutes) 90 minutes   Therapy Time missed   Time missed?  No

## 2018-03-09 NOTE — PROGRESS NOTES
Progress Note - Skinny Bach 67 y o  male MRN: [id number]    Unit/Bed#: -01 Encounter: [id number]            Subjective:   Pt states he feels well and is looking forward to dc tomorrow     Objective:     ROS  Gen: denies recent wt loss   Psych: denies mood change    Vitals:    03/09/18 1403   BP: 130/55   Pulse: 65   Resp: 20   Temp: 98 5 °F (36 9 °C)   SpO2: 95%         Physical Exam:     Gen:        NAD   Neck:   trachea midline  Lungs:  respirations unlabored   Heart:    + S1 and S2   Abdomen:  Surgical drain removed   Psych: mood/affect appropriate  Neurologic: awake, alert, appropriately answering questions     Functional :  Mobility: mod I   Tx: mod I   ADLs: mod I          Current Facility-Administered Medications:  acetaminophen 975 mg Oral Q8H Urmila Sandoval MD   aspirin 81 mg Oral Daily Maria M Verdin MD   atorvastatin 40 mg Oral Daily With Royce Mckeon MD   bisacodyl 10 mg Rectal Daily PRN Maria M Verdin MD   enoxaparin 40 mg Subcutaneous Daily Maria M Verdin MD   hydrALAZINE 25 mg Oral Q8H PRN [place] Pop[place], CRNP   hydrochlorothiazide 12 5 mg Oral Daily Dorina Bailey, CRNP   lactulose 20 g Oral Daily PRN [place] Pop[place], CRNP   lisinopril 10 mg Oral Daily [place] Pop[place], CRNP   metoprolol succinate 50 mg Oral Daily Maria M Verdin MD   mineral oil 1 enema Rectal Once PRN Maria M Verdin MD   ondansetron 4 mg Oral Q6H PRN [place] Pop[place], CRNP   oxyCODONE 10 mg Oral Q4H PRN Maria M Verdin MD   oxyCODONE 5 mg Oral Q4H PRN Maria M Verdin MD   pantoprazole 40 mg Oral BID AC Dorina Bailey, ROOSEVELT       bisacodyl    hydrALAZINE    lactulose    mineral oil    ondansetron    oxyCODONE    oxyCODONE      Assessment:  Pt is 66 yo admitted for elective incisional hernia repair and MARK by Dr Mayank Kenney on 2/23/18    Plan:    Rehabilitation: cont PT/OT for ambulatory/ADL dysfxn    s/p elective incisional hernia repair and MARK :  Dr Mayank Kenney on 2/23/18, surgical drain removed, per surgery service note of 3/5/18 no lifting greater than 20 lbs or strenuous core exercises for 4 weeks         HTN: at home on toprol XL 50 mg qd, lisinopril 20 mg qd, and HCTZ 25 mg qd, toprol XL continued at 50 mg QD however home HCTZ reduced to 12 5 QD and home lisinopril reduced to 10 mg qd     HL: on home atorvastatin 40 mg qpm      AAA: s/p repair, by Dr Bernie Vivar      PVD: s/p bypass graft      CAD: s/p PCI, stress test of 1/8/2015 w/o perfusion abnormality; on home ASA 81 mg qd, atorvastatin 40 mg qpm, toprol XL 50 mg qd (additionally holter monitor of 2/15/17 revealed PAC/PVCs but no VT/SVT/A-fib/A-flutter and was without bradyarrhythmia or advanced heart block); OP FU with Dr Gio Gerber     GERD/hiatal hernia: home zantac 150 mg qd non-formulary, therapeutic substitution          Anemia: ABLA, Hg currently stable at 10 2; scrip upon dc for CBC with results to PCP        Incidental findings of CT AP of 9/19/14: cholelithiasis, diverticulosis      DVT ppx: Pt requesting to have SCDs removed, d/w patient risks/benefits of doing this (patient ambulating adequate distances) and patient would like to proceed with dc of SCDs; lovenox   Dispo: 3/10, OP PT

## 2018-03-09 NOTE — PROGRESS NOTES
Internal Medicine Progress Note  Patient: Norma Mullen  Age/sex: 67 y o  male  Medical Record #: [id number]      ASSESSMENT/PLAN:  Norma Mullen is seen and examined and mangement for following issues:    1  Incisional hernia repair with MESH and MARK with Dr Frank Snow 2/23/18:  will watch incision  Abd binder to be worn, acc to surgery, when OOB/activity and can be removed when in bed unless uncomfortable w/o it  Surgery d/c'd staples/DILLON drain     2  ABLA:  mild, not requiring transfusion  Will watch     3  HTN: stable  At home he takes Lisinopril 20mg qd, HCTZ 25mg qd and Toprol 50mg qd  Was on HCTZ 12 5mg qd, Lisinopril 20mg qd and Toprol 50mg qd and stopped the HCTZ and Lisinopril 2/2 JOVANNI  Has prn Hydralazine  BP stable but he wants the HCTZ restarted 2/2 mild intermitt edema of legs = yesterday restarted 12 5mg qd and not 25mg qd for now; and restarted Lisinopril but 10mg daily instead of 20mg qd ---> I reviewed these changes with him today      4  HLD:  continue Lipitor     5  CAD with hx IWMI/BMS RCA 00:  last stress test 2015  Continue ASA/Lipitor     6  Hx PAD with [id number] BPG     7   Constipation:  changed Lactulose to prn but 3/4/18 said he thought he was having some abd pain at night 2/2 constipation so gave dose lactulose = + 2 BMs 3/5 and resolved the abd pain  Last BM was 3/7/18  8   Leukocytosis:  resolved  9   JOVANNI: ?etio meds vs volume depletion or combo  Gave 1 liter NSS on 3/1/18 and stopped the HCTZ and Lisinopril  Creat was back to 0 93 from a high of 1 61    10  Hypokalemia: gave KCL 40 meq x 1 3/5/18 and yesterday 40 meq x 1 for K+ of 3 5 since restarting HCTZ  11  Bilateral ankle edema:  added TEDS = resolved although he thought his feet felt a little puffy yesterday and wanted to restart some HCTZ      Subjective: Patient seen and examined   Offers no complaints    ROS:   Neuro: No new neurologic changes  Respiratory: No Cough, SOB  Cardiovascular: No CP, palpitations     Scheduled Meds:    Current Facility-Administered Medications:  acetaminophen 975 mg Oral Q8H [address] Dorothe Blizzard, MD   aspirin 81 mg Oral Daily Dorothe Blizzard, MD   atorvastatin 40 mg Oral Daily With Elsie Benton MD   bisacodyl 10 mg Rectal Daily PRN Dorothe Blizzard, MD   enoxaparin 40 mg Subcutaneous Daily Dorothe Blizzard, MD   hydrALAZINE 25 mg Oral Q8H PRN ROOSEVELT Clarke   hydrochlorothiazide 12 5 mg Oral Daily ROOSEVELT Clarke   lactulose 20 g Oral Daily PRN ROOSEVELT Clarke   lisinopril 10 mg Oral Daily ROOSEVELT Clarke   metoprolol succinate 50 mg Oral Daily Dorothe Blizzard, MD   mineral oil 1 enema Rectal Once PRN Dorothe Blizzard, MD   ondansetron 4 mg Oral Q6H PRN ROOSEVELT Clarke   oxyCODONE 10 mg Oral Q4H PRN Dorothe Blizzard, MD   oxyCODONE 5 mg Oral Q4H PRN Dorothe Blizzard, MD   pantoprazole 40 mg Oral BID AC ROOSEVELT Clarke       Labs:       Results from last 7 days  Lab Units 03/08/18  0634 03/05/18  0615   WBC Thousand/uL 6 52 6 87   HEMOGLOBIN g/dL 10 2* 10 6*   HEMATOCRIT % 30 4* 31 4*   PLATELETS Thousands/uL 314 309       Results from last 7 days  Lab Units 03/08/18  0634 03/05/18  0615   SODIUM mmol/L 144 140   POTASSIUM mmol/L 3 5 3 4*   CHLORIDE mmol/L 112* 107   CO2 mmol/L 26 27   BUN mg/dL 15 23   CREATININE mg/dL 0 78 0 93   GLUCOSE RANDOM mg/dL 76 74   CALCIUM mg/dL 7 7* 8 1*                  Glucose (mg/dL)   Date Value   03/08/2018 76   03/05/2018 74   03/03/2018 91   03/02/2018 72   12/10/2015 93   06/18/2015 103   05/07/2014 102   11/25/2013 87       Labs reviewed    Physical Examination:  Vitals:   Vitals:    03/08/18 0947 03/08/18 1345 03/08/18 2025 03/09/18 0500   BP: 132/58 127/61 129/89 135/65   BP Location: Right arm Right arm Left arm    Pulse: 76 68 72 64   Resp:  18 16 20   Temp:  98 2 °F (36 8 °C) 98 °F (36 7 °C) 98 8 °F (37 1 °C)   TempSrc:  Oral Oral Oral   SpO2:  94% 93% 92%   Weight:       Height: GEN: NAD  HEENT: NC/AT  RESP: BBS w/o crackles/wheeze/rhonci; resp unlabored  CV: +S1 S2, regular rate, no rubs/murmurs  ABD:   + BS; abd is soft; midline abdominal incision healing well = DILLON/staples are out  : no centeno  EXT: no ankle edema  Skin: no rashes  Neuro: AAO; SHEPHERD 5/5      [x ] Total time spent: 30 Mins and greater than 50% of this time was spent counseling/coordinating care        Moris Gooden, [facility]  Internal Medicine

## 2018-03-09 NOTE — DISCHARGE INSTRUCTIONS
Please have your blood work drawn the results will be sent to your family doctor    Please note you are restricted from driving/operating a motorized vehicle/operating heavy machinery/etc until you are cleared through a formal driving evaluation  This service is offered through [facility] driving evaluation program on [address] however this evaluation can be done at a site of your choosing  Please contact your family doctor for a referral when your family doctor clears you to perform this evaluation once your neurologic/physical deficits have stabilized       Please see your doctors listed in the follow up providers section of your discharge paperwork, and take the discharge paperwork with you to your appointments    Please note changes may have been made to your medications please refer to your discharge paperwork for your current medications and take this list with you to all your doctors appointments for your doctors to review    Please do not resume a home medication unless the medication reconciliation sheet indicates to do so, please do not assume that a medication that you were given a prescription for is the same as a medication you have at home based on both medications having the same name as dosages and frequency may have changed      Please check your blood pressure & heart rate/pulse prior to taking your blood pressure/heart rate medications and 1 hour after, please contact your family doctor or cardiologist immediately for a blood pressure below 100/60 and do not take the medications until speaking with them, please contact your family doctor or cardiologist immediately for blood pressure greater than 160/100; please contact your family doctor or cardiologist immediately for a heart rate/pulse lower than 60 and do not take the medications until speaking with them, please contact your family doctor or cardiologist immediately for heart rate/pulse greater than 100     Please note the following incidental findings were found during your recent hospitalization please discuss them with your doctors so that they may arrange any tests/referrals as they deem necessary:       1) low hemoglobin: please have your blood work drawn to monitor your hemoglobin level the results will be sent to your family doctor    2) gallstones: Please follow up your family doctor for any further testing/treatment and/or specialist referral as they deem necessary     3) diverticulosis: Please follow up your family doctor for any further testing/treatment and/or specialist referral as they deem necessary     Please avoid NSAID (including but not limited to advil, aleve, motrin, naproxen, ibuprofen, mobic, meloxicam, etc) medications, anti platelet medications (including but not limited to plavix and plavix containing products), and any prescription blood thinners due to your already being on aspirin and when combined with these other medications they can increase your risk of bleeding; you may be cleared to use these medications in the future but do not do so unless advised to do so by your doctors    Unless specifically noted in your medication list provided to you in your discharge paper work, please discuss with your family doctor prior to resuming any vitamins/supplements you may have been taking prior to your hospitalization     Please note a summary of your hospital stay with relevant information for your doctors has been sent to them, please confirm with your doctors at your follow up visits that they have received this summary and have them contact [address] if they have not received them along with any other medical records they may require

## 2018-03-09 NOTE — DISCHARGE SUMMARY
Discharge Summary - PMR       Admission Date:    2/28/18  Discharge Date:   3/10/18  Diagnosis:   Incisional hernia    Comorbidities:   See below for medical details     Hospital Course: The patient had an unremarkable rehab course with significant functional gains made, please see below for medical details:    Pt is 68 yo admitted for elective incisional hernia repair and MARK by Dr Melissa Msea on 2/23/18          s/p elective incisional hernia repair and MARK :  Dr Melissa Mesa on 2/23/18, surgical drain removed, per surgery service note of 3/5/18 no lifting greater than 20 lbs or strenuous core exercises for 4 weeks         HTN: at home on toprol XL 50 mg qd, lisinopril 20 mg qd, and HCTZ 25 mg qd, toprol XL continued at 50 mg QD however home HCTZ reduced to 12 5 QD and home lisinopril reduced to 10 mg qd     HL: on home atorvastatin 40 mg qpm      AAA: s/p repair, by Dr Bo Cotton      PVD: s/p bypass graft      CAD: s/p PCI, stress test of 1/8/2015 w/o perfusion abnormality; on home ASA 81 mg qd, atorvastatin 40 mg qpm, toprol XL 50 mg qd (additionally holter monitor of 2/15/17 revealed PAC/PVCs but no VT/SVT/A-fib/A-flutter and was without bradyarrhythmia or advanced heart block); OP FU with Dr Mariam Dueñas     GERD/hiatal hernia: home zantac 150 mg qd non-formulary, therapeutic substitution           Anemia: ABLA, Hg currently stable at 10 2; scrip upon dc for CBC with results to PCP         Incidental findings of CT AP of 9/19/14: cholelithiasis, diverticulosis      DVT ppx: Pt requesting to have SCDs removed, d/w patient risks/benefits of doing this (patient ambulating adequate distances) and patient would like to proceed with dc of SCDs; lovenox   Dispo: 3/10, OP PT           Functional Status Upon Discharge: Mod I amb/tx/ADLs      Condition at Discharge: stable    Discharge instructions/Information to patient and family:   See after visit summary for information provided to patient and family       Provisions for Follow-Up Care:  See after visit summary for information related to follow-up care and any pertinent home health orders  Disposition: home     Planned Readmission: no        Discharge Medications:  See after visit summary for reconciled discharge medications provided to patient and family

## 2018-03-09 NOTE — PROGRESS NOTES
03/09/18 1300   Pain Assessment   Pain Assessment No/denies pain   Restrictions/Precautions   Precautions Fall Risk   General   Change In Medical/Functional Status mod I with SPC   Subjective   Subjective pt resting start of session; pt ready for therapy, but would like a cup of coffee   QI: Roll Left and Right   Assistance Needed Independent   Roll Left and Right CARE Score 6   QI: Sit to Lying   Assistance Needed Independent   Sit to Lying CARE Score 6   QI: Lying to Sitting on Side of Bed   Assistance Needed Independent   Lying to Sitting on Side of Bed CARE Score 6   QI: Sit to Stand   Assistance Needed Independent   Sit to Stand CARE Score 6   QI: Chair/Bed-to-Chair Transfer   Assistance Needed Independent; Adaptive equipment   Chair/Bed-to-Chair Transfer CARE Score 6   Transfer Bed/Chair/Wheelchair   Limitations Noted In LE Strength;Balance   Adaptive Equipment Cane   All Transfer Modified Independent   Bed, Chair, Wheelchair Transfer (FIM) 6 - Patient requires assistive device/extra time/safety concerns but completes independently   QI: Car Transfer   Assistance Needed Independent   Car Transfer CARE Score 6   QI: [address]; Adaptive equipment   Walk 10 Feet CARE Score 6   QI: Walk 50 Feet with Two [address]; Adaptive equipment   Walk 50 Feet with Two Turns CARE Score 6   QI: Walk 150 Feet   Assistance Needed Independent; Adaptive equipment   Walk 150 Feet CARE Score 6   QI: Walking 10 Feet on Uneven Surfaces   Assistance Needed Independent; Adaptive equipment   Walking 10 Feet on Uneven Surfaces CARE Score 6   Ambulation   Does the patient walk? 2   Yes   Primary Discharge Mode of Locomotion Walk   Walk Assist Level Modified Independent   Gait Pattern Inconsistant Darlene   Assist Device Cane   Distance Walked (feet) 350 ft   Limitations Noted In Heel Strike;Balance   Findings mod I    Walking (FIM) 6 - Patient requires assistive device/extra time/safety concerns but completes independently AND distance 150 feet or more, no rest   QI: Wheel 50 Feet with Two Turns   Reason if not Attempted Activity not applicable   Wheel 50 Feet with Two Turns CARE Score 9   QI: Wheel 150 Feet   Reason if not Attempted Activity not applicable   Wheel 218 Feet CARE Score 9   Wheelchair mobility   QI: Does the patient use a wheelchair? 0  No   QI: 1 Step (Curb)   Assistance Needed Georges Mason / Negrita Wood; Adaptive equipment   1 Step (Curb) CARE Score 5   QI: 4 Steps   Assistance Needed Supervision; Adaptive equipment   4 Steps CARE Score 4   QI: 12 Steps   Assistance Needed Supervision; Adaptive equipment   12 Steps CARE Score 4   Stairs   Type Stairs;Curb;Ramp   # of Steps 12   Weight Bearing Precautions Fall Risk   Assist Devices Cane   Stairs (FIM) 5 - Patient requires supervision/monitoring AND goes up and down full flight (12- 14 stairs)   QI: Picking Up Object   Reason if not Attempted Activity not applicable   Picking Up Object CARE Score 9   QI: Toilet Transfer   Assistance Needed Independent; Adaptive equipment   Toilet Transfer CARE Score 6   Assessment   Treatment Assessment pt mod I for all functional txs and amb  pt cont to become SOB due to decrease endurance and activity tolerance  no decrease safety awareness noted  pt to d/c home with cont services to improve strength, endurance and overall activity tolerance to progress with functional mobility and safety   Problem List Decreased strength;Decreased endurance;Decreased mobility   PT Barriers   Physical Impairment Decreased strength;Decreased endurance;Decreased mobility   Functional Limitation Walking   Plan   Treatment/Interventions Functional transfer training;LE strengthening/ROM; Gait training   Progress Progressing toward goals   Recommendation   Recommendation Home PT; Home with family support   PT Therapy Minutes   PT Time In 1300   PT Time Out 1430   PT Total Time (minutes) 90   PT Mode of treatment - Individual (minutes) 60   PT Mode of treatment - Concurrent (minutes) 30   PT Mode of treatment - Group (minutes) 0   PT Mode of treatment - Co-treat (minutes) 0   PT Mode of Teatment - Total time(minutes) 90 minutes   Therapy Time missed   Time missed?  No

## 2018-03-09 NOTE — PCC OCCUPATIONAL THERAPY
Pt with noted progression toward's LTG's with current barriers including functional transfers using a spc, dynamic balance, LB dressing, limited ROM RLE, and pain  Pt requires increased time for processing information and verbal cues for standing with hand and foot placement  Pt is DS with functional transfers to bathroom using SPC  Pt will return home with local support from son and significant other  Pt would benefit from skilled OT with focus on safety and maximizing functional abilities

## 2018-03-10 VITALS
BODY MASS INDEX: 24.06 KG/M2 | DIASTOLIC BLOOD PRESSURE: 56 MMHG | OXYGEN SATURATION: 93 % | HEART RATE: 101 BPM | HEIGHT: 68 IN | SYSTOLIC BLOOD PRESSURE: 116 MMHG | RESPIRATION RATE: 20 BRPM | WEIGHT: 158.73 LBS | TEMPERATURE: 97.5 F

## 2018-03-10 PROCEDURE — 97116 GAIT TRAINING THERAPY: CPT

## 2018-03-10 PROCEDURE — 97530 THERAPEUTIC ACTIVITIES: CPT

## 2018-03-10 RX ADMIN — PANTOPRAZOLE SODIUM 40 MG: 40 TABLET, DELAYED RELEASE ORAL at 07:49

## 2018-03-10 RX ADMIN — METOPROLOL SUCCINATE 50 MG: 50 TABLET, EXTENDED RELEASE ORAL at 09:25

## 2018-03-10 RX ADMIN — HYDROCHLOROTHIAZIDE 12.5 MG: 12.5 TABLET ORAL at 09:24

## 2018-03-10 RX ADMIN — LISINOPRIL 10 MG: 10 TABLET ORAL at 09:25

## 2018-03-10 RX ADMIN — ENOXAPARIN SODIUM 40 MG: 40 INJECTION SUBCUTANEOUS at 09:24

## 2018-03-10 RX ADMIN — ASPIRIN 81 MG 81 MG: 81 TABLET ORAL at 09:25

## 2018-03-10 NOTE — PROGRESS NOTES
03/10/18 1030   Pain Assessment   Pain Assessment No/denies pain   QI: Roll Left and Right   Assistance Needed Independent   Assistance Provided by [place] No physical assistance   Roll Left and Right CARE Score 6   QI: Sit to [address] Provided by [place] No physical assistance   Sit to Lying CARE Score 6   QI: Lying to Sitting on Side of Bed   Assistance Needed Independent   Assistance Provided by [place] No physical assistance   Lying to Sitting on Side of Bed CARE Score 6   QI: Sit to [address] Provided by [place] No physical assistance   Sit to Stand CARE Score 6   QI: Chair/Bed-to-Chair Transfer   Assistance Needed Independent   Assistance Provided by [place] No physical assistance   Chair/Bed-to-Chair Transfer CARE Score 6   Transfer Bed/Chair/Wheelchair   Stand Pivot Independent   Sit to Stand Independent   Stand to Sit Independent   Supine to Sit (Plan is to sleep in a recliner upon discharge per pt  )   Sit to Supine (Plan is to sleep in a recliner upon discharge per pt  )   Bed, Chair, Wheelchair Transfer (FIM) 7 - No helper, safely, timely and completes all tasks independently   QI: Walk 150 [address] Provided by [place] No physical assistance   Comment support of SPC   Walk 150 Feet CARE Score 6   Ambulation   Does the patient walk? 2   Yes   Primary Discharge Mode of Locomotion Walk   Distance Walked (feet) 150 ft   Walking (FIM) 7 - No helper, safely, timely and completes all tasks independently AND distance 150 feet or more, no rest   QI: 1 Step (Curb)   Assistance Needed Independent   Assistance Provided by [place] No physical assistance   Comment support of SPC + unilateral rail   (2 steps into the apartment )   1 Step (Curb) CARE Score 6   Stairs   Type Stairs   # of Steps 12   Assist Devices Single Rail;Cane  (SPC)   Stairs (FIM) 5 - Patient requires supervision/monitoring AND goes up and down full flight (12- 14 stairs)   Assessment   Treatment Assessment Demonstrates independent functional mobility including basic functional transfers and ambulation including steps with support of SPC per pt's  current functional status  Presents with good safety awareness / judgment without significant signs of postural instability noted in both standing and ambulation  Further skilled PT services currently not indicated at this time  Safe for d/c to previous home setting when medically appropriate  Attending nurse made aware regarding pt's  readiness for discharge  Plan   Treatment/Interventions Functional transfer training;Elevations; Patient/family training;Equipment eval/education;Gait training; Compensatory technique education   PT Therapy Minutes   PT Time In 1030   PT Time Out 1100   PT Total Time (minutes) 30   PT Mode of treatment - Individual (minutes) 30   PT Mode of treatment - Concurrent (minutes) 0   PT Mode of treatment - Group (minutes) 0   PT Mode of treatment - Co-treat (minutes) 0   PT Mode of Teatment - Total time(minutes) 30 minutes   Therapy Time missed   Time missed?  No

## 2018-03-10 NOTE — NURSING NOTE
D/C instructions discussed with pt and son  All questions answered  Prescriptions handed to pt  Pts skin is intact  Abd incison with steri strips and dry dressing  No drainage  Pt remained free of falls during rehab stay  No c/o pain  All belongings packed and sent with pt

## 2018-03-10 NOTE — PROGRESS NOTES
03/10/18 1030   Pain Assessment   Pain Assessment No/denies pain   QI: Roll Left and Right   Assistance Needed Independent   Assistance Provided by McGrath No physical assistance   Roll Left and Right CARE Score 6   QI: Sit to [address] Provided by McGrath No physical assistance   Sit to Lying CARE Score 6   QI: Lying to Sitting on Side of Bed   Assistance Needed Independent   Assistance Provided by McGrath No physical assistance   Lying to Sitting on Side of Bed CARE Score 6   QI: Sit to [address] Provided by McGrath No physical assistance   Sit to Stand CARE Score 6   QI: Chair/Bed-to-Chair Transfer   Assistance Needed Independent   Assistance Provided by McGrath No physical assistance   Chair/Bed-to-Chair Transfer CARE Score 6   Transfer Bed/Chair/Wheelchair   Stand Pivot Independent   Sit to Stand Independent   Stand to Sit Independent   Supine to Sit (Plan is to sleep in a recliner upon discharge per pt  )   Sit to Supine (Plan is to sleep in a recliner upon discharge per pt  )   Bed, Chair, Wheelchair Transfer (FIM) 7 - No helper, safely, timely and completes all tasks independently   QI: Walk 150 [address] Provided by McGrath No physical assistance   Comment support of SPC   Walk 150 Feet CARE Score 6   Ambulation   Does the patient walk? 2   Yes   Primary Discharge Mode of Locomotion Walk   Distance Walked (feet) 150 ft   Walking (FIM) 7 - No helper, safely, timely and completes all tasks independently AND distance 150 feet or more, no rest   QI: 1 Step (Curb)   Assistance Needed Independent   Assistance Provided by McGrath No physical assistance   Comment support of SPC + unilateral rail   (2 steps into the apartment )   1 Step (Curb) CARE Score 6   Assessment   Treatment Assessment Demonstrates independent functional mobility including basic functional transfers and ambulation including steps with support of SPC per pt's  current functional status  Presents with good safety awareness / judgment without significant signs of postural instability noted in both standing and ambulation  Further skilled PT services currently not indicated at this time  Safe for d/c to previous home setting when medically appropriate  Attending nurse made aware regarding pt's  readiness for discharge  Plan   Treatment/Interventions Functional transfer training;Elevations; Patient/family training;Equipment eval/education;Gait training; Compensatory technique education   PT Therapy Minutes   PT Time In 1030   PT Time Out 1100   PT Total Time (minutes) 30   PT Mode of treatment - Individual (minutes) 30   PT Mode of treatment - Concurrent (minutes) 0   PT Mode of treatment - Group (minutes) 0   PT Mode of treatment - Co-treat (minutes) 0   PT Mode of Teatment - Total time(minutes) 30 minutes   Therapy Time missed   Time missed?  No

## 2018-03-12 NOTE — CASE MANAGEMENT
Team dc summary - pt made good progress and returned home w/contd outpt physcial therapy services  Pt wished to go to [facility] and schedule his own appmt  Pt received a commode through [facility]  Pt's friend present for dc instructions

## 2018-03-14 NOTE — OCCUPATIONAL THERAPY NOTE
OT DISCHARGE SUMMARY    Pt  D/c home with local family support on 3/10 functioning at mod I/I level with support of SPC  Recommend pt  Utilize BSC over toilet to increase I and prevent falls  Also recommend purchase of tub bench to maximize I and safety with tub txfer  Pt  Unsafe to step over tup lip and is fall risk  Pt  Receptive and plans to purchase upon d/c  Edu  Pt  Energy conservation and modification to daily routine task to maximize I and safety and prevent falls  Pt  [place] Dense  G carryover safety precautions of falls with leg length discrepancy  Pt  Has LH equipment at home to maximize I with LB dressing and object retrieval from floor  No further skilled OT services indicated upon d/c

## 2018-03-26 ENCOUNTER — OFFICE VISIT (OUTPATIENT)
Dept: INTERNAL MEDICINE CLINIC | Facility: CLINIC | Age: 73
End: 2018-03-26
Payer: MEDICARE

## 2018-03-26 ENCOUNTER — OFFICE VISIT (OUTPATIENT)
Dept: MULTI SPECIALTY CLINIC | Facility: CLINIC | Age: 73
End: 2018-03-26
Payer: MEDICARE

## 2018-03-26 ENCOUNTER — APPOINTMENT (OUTPATIENT)
Dept: LAB | Facility: CLINIC | Age: 73
End: 2018-03-26
Payer: MEDICARE

## 2018-03-26 VITALS — HEART RATE: 72 BPM | SYSTOLIC BLOOD PRESSURE: 110 MMHG | DIASTOLIC BLOOD PRESSURE: 58 MMHG | TEMPERATURE: 97.8 F

## 2018-03-26 VITALS — SYSTOLIC BLOOD PRESSURE: 100 MMHG | TEMPERATURE: 98.2 F | HEART RATE: 72 BPM | DIASTOLIC BLOOD PRESSURE: 60 MMHG

## 2018-03-26 DIAGNOSIS — Z87.19 HISTORY OF INCISIONAL HERNIA REPAIR: ICD-10-CM

## 2018-03-26 DIAGNOSIS — I10 ESSENTIAL HYPERTENSION: ICD-10-CM

## 2018-03-26 DIAGNOSIS — Z87.19 HISTORY OF INCISIONAL HERNIA REPAIR: Primary | ICD-10-CM

## 2018-03-26 DIAGNOSIS — L84 CALLUS OF FOOT: ICD-10-CM

## 2018-03-26 DIAGNOSIS — E78.5 HYPERLIPIDEMIA, UNSPECIFIED HYPERLIPIDEMIA TYPE: ICD-10-CM

## 2018-03-26 DIAGNOSIS — Z98.890 HISTORY OF INCISIONAL HERNIA REPAIR: ICD-10-CM

## 2018-03-26 DIAGNOSIS — D64.9 ANEMIA: ICD-10-CM

## 2018-03-26 DIAGNOSIS — Z98.890 HISTORY OF INCISIONAL HERNIA REPAIR: Primary | ICD-10-CM

## 2018-03-26 DIAGNOSIS — D12.6 SERRATED ADENOMA OF COLON: ICD-10-CM

## 2018-03-26 DIAGNOSIS — I25.10 CORONARY ARTERY DISEASE INVOLVING NATIVE HEART WITHOUT ANGINA PECTORIS, UNSPECIFIED VESSEL OR LESION TYPE: ICD-10-CM

## 2018-03-26 DIAGNOSIS — Z23 NEED FOR INFLUENZA VACCINATION: Primary | ICD-10-CM

## 2018-03-26 DIAGNOSIS — B35.1 ONYCHOMYCOSIS: Primary | ICD-10-CM

## 2018-03-26 PROBLEM — I73.9 PVD (PERIPHERAL VASCULAR DISEASE) (HCC): Status: ACTIVE | Noted: 2018-03-26

## 2018-03-26 PROBLEM — M75.52 BURSITIS OF LEFT SHOULDER: Status: ACTIVE | Noted: 2017-09-25

## 2018-03-26 LAB
BASOPHILS # BLD AUTO: 0.07 THOUSANDS/ΜL (ref 0–0.1)
BASOPHILS NFR BLD AUTO: 1 % (ref 0–1)
EOSINOPHIL # BLD AUTO: 0.14 THOUSAND/ΜL (ref 0–0.61)
EOSINOPHIL NFR BLD AUTO: 2 % (ref 0–6)
ERYTHROCYTE [DISTWIDTH] IN BLOOD BY AUTOMATED COUNT: 13.4 % (ref 11.6–15.1)
HCT VFR BLD AUTO: 36.3 % (ref 36.5–49.3)
HGB BLD-MCNC: 11.7 G/DL (ref 12–17)
LYMPHOCYTES # BLD AUTO: 1.78 THOUSANDS/ΜL (ref 0.6–4.47)
LYMPHOCYTES NFR BLD AUTO: 24 % (ref 14–44)
MCH RBC QN AUTO: 28.3 PG (ref 26.8–34.3)
MCHC RBC AUTO-ENTMCNC: 32.2 G/DL (ref 31.4–37.4)
MCV RBC AUTO: 88 FL (ref 82–98)
MONOCYTES # BLD AUTO: 0.47 THOUSAND/ΜL (ref 0.17–1.22)
MONOCYTES NFR BLD AUTO: 6 % (ref 4–12)
NEUTROPHILS # BLD AUTO: 4.86 THOUSANDS/ΜL (ref 1.85–7.62)
NEUTS SEG NFR BLD AUTO: 67 % (ref 43–75)
NRBC BLD AUTO-RTO: 0 /100 WBCS
PLATELET # BLD AUTO: 335 THOUSANDS/UL (ref 149–390)
PMV BLD AUTO: 9.9 FL (ref 8.9–12.7)
RBC # BLD AUTO: 4.13 MILLION/UL (ref 3.88–5.62)
WBC # BLD AUTO: 7.32 THOUSAND/UL (ref 4.31–10.16)

## 2018-03-26 PROCEDURE — 36415 COLL VENOUS BLD VENIPUNCTURE: CPT

## 2018-03-26 PROCEDURE — 85025 COMPLETE CBC W/AUTO DIFF WBC: CPT

## 2018-03-26 PROCEDURE — 99212 OFFICE O/P EST SF 10 MIN: CPT | Performed by: SURGERY

## 2018-03-26 PROCEDURE — 99213 OFFICE O/P EST LOW 20 MIN: CPT | Performed by: INTERNAL MEDICINE

## 2018-03-26 PROCEDURE — 99213 OFFICE O/P EST LOW 20 MIN: CPT | Performed by: PODIATRIST

## 2018-03-26 NOTE — PROGRESS NOTES
ASSESSMENT/PLAN:  Diagnoses and all orders for this visit:    Need for influenza vaccination    Coronary artery disease involving native heart without angina pectoris, unspecified vessel or lesion type    Essential hypertension    History of incisional hernia repair    Hyperlipidemia, unspecified hyperlipidemia type    Serrated adenoma of colon    Plan:    HTN- C/t toprol XL 50 mg, lisinopril 10 mg, hctz 12 5 mg (recently reduced)  HL- Atorvastatin  AAA- s/p repair by Dr Emily Groves  Follows with vascular surgery  PVD- s/p bypass graft- follows annual with vascular  CAD- s/p PCI in 2000, stress test 1/8/2015 was normal, on ASA, statin, toprol, holter monitor 2/2017- PVC/PACs, saw cardiology dec 2017- f/u in 6 months from then  GERD- on zantac  History of gallstones, diverticulosis- stable  Health Maintenance:  Up to date on Tdap (2012), PCV 23 (2011) and PCV 13 (2016), C-scope May 2015 serrated adenoma- due for repeat- will request scheduling, last saw Dr Maureen Dailey 6/2017  Follow-up:  6 months    CHIEF COMPLAINT: Follow-up    HISTORY OF PRESENT ILLNESS:  Was admitted under PM&R service from 2/28-3/10 after elective repair of incisional hernia by Dr Simone Henning 2/23/2018  His surgical scar is healing well, he saw surgery clinic earlier today  He does suffer from bursitis of the left shoulder with left shoulder pain intermittently  He has no complaints today  He is doing well post surgery  Review of Systems  Constitutional: Negative for appetite change  Negative for activity change, fatigue and unexpected weight change  Respiratory: Negative for shortness of breath, wheezing, +cough productive of clear sputum  Cardiovascular: Negative for chest pain and palpitations  Gastrointestinal: Negative for abdominal pain, nausea and vomiting  Negative for diarrhea or constipation  Negative for blood in stool  Musculoskeletal: Negative for arthralgias     Neurological: Negative for dizziness, light-headedness and headaches  OBJECTIVE:  Vitals:    03/26/18 1356   BP: 110/58   BP Location: Right arm   Patient Position: Sitting   Cuff Size: Adult   Pulse: 72   Temp: 97 8 °F (36 6 °C)   TempSrc: Oral       Physical Exam  GEN: AAOx3, NAD  HEENT: PEERLA, EOMI, MM moist  No scleral icterus  CV: RRR, nml S1/S2, no murmur appreciated  Lungs: CTA bilaterally, no w/r/r  Abd: Soft, mildly tender at incision site, incision site c/d/i  +NABS   No rebound/guarding  Skin: No rashes or lesions noted  Psych: Normal mood and affect      Current Outpatient Prescriptions:     aspirin 81 MG tablet, Take 81 mg by mouth daily Resume on 3/11/18 , Disp: , Rfl:     atorvastatin (LIPITOR) 40 mg tablet, Take 40 mg by mouth daily with dinner Resume next scheduled dose upon discharge from the hospital , Disp: , Rfl:     hydrochlorothiazide (HYDRODIURIL) 12 5 mg tablet, Take 1 tablet (12 5 mg total) by mouth daily, Disp: 30 tablet, Rfl: 0    lisinopril (ZESTRIL) 10 mg tablet, Take 1 tablet (10 mg total) by mouth daily, Disp: 30 tablet, Rfl: 0    metoprolol succinate (TOPROL-XL) 50 mg 24 hr tablet, Take 50 mg by mouth daily Resume on 3/11/18 , Disp: , Rfl:     ranitidine (ZANTAC) 150 MG capsule, Take 150 mg by mouth Resume on 3/11/18 as prior to hospitalization , Disp: , Rfl:     Past Medical History:   Diagnosis Date    Abdominal aortic aneurysm (AAA) ([facility] [address] )     last assessed nov 6 2015    Abscess of groin     last assessed oct 6 2014    Candidiasis, cutaneous     last assessed march 19 2014    Coronary artery disease     Dyslipidemia     Esophageal ulcer without bleeding     Gastritis     Hiatal hernia     Hx of cataract surgery, left     last assessed july 21 2014    Hyperlipidemia     Hypertension     Old myocardial infarction     Recurrent right inguinal hernia     s/p right orchioectomy, mesh removal, and sinus trac removal patient being followed by surgery next appt 4/28/14 patient s/p keflex x 7 days for MSSA Cx repeat wound cx grew MSSA cx repeat wound cx grew MSSA and other armond (mixed) no MRSA identified conitunue close monitoring and local wound care, last assessed april 15 2014    Wound of skin     in the groin, right     Past Surgical History:   Procedure Laterality Date    ABDOMINAL AORTIC ANEURYSM REPAIR  2009    aortobi-iliac, dacron graft    APPENDECTOMY      open    CORONARY ANGIOPLASTY WITH STENT PLACEMENT      stent 2    CYSTOSCOPY      diagnostic onset nov 13 2014    EXPLORATORY LAPAROTOMY  12/09/2009    HIP SURGERY Right     INGUINAL HERNIA REPAIR Right     unilateral x2    ORCHIECTOMY Right     [place] REPAIR INCISIONAL HERNIA,REDUCIBLE N/A 2/23/2018    Procedure: lysis of adhesions; INCISIONAL HERNIA REPAIR WITH MESH;  Surgeon: Nena De Santiago MD;  [facility];  Service: General     Social History     Social History    Marital status: Single     Spouse name: N/A    Number of children: N/A    Years of education: N/A     Occupational History    Not on file  Social History Main Topics    Smoking status: Former Smoker    Smokeless tobacco: Never Used    Alcohol use Yes      Comment: occasionally    Drug use: No    Sexual activity: Not on file     Other Topics Concern    Not on file     Social History Narrative    No narrative on file     Family History   Problem Relation Age of Onset    Heart disease Mother     Diabetes Mother     Heart disease Father        DO Madhu Forbes 72 Internal Medicine PGY-2    [facility]  [address]  [address] [facility]  [phone]

## 2018-03-26 NOTE — PROGRESS NOTES
Routine Foot Care- Podiatry   Oumar Maharaj 67 y o  male MRN: [id number]  Encounter: [id number]    Assessment/Plan     Assessment:  1  Onychomycosis  2  callus    Plan:  - Patient was seen/examined  All questions and concerns addressed  - Nails x10 were sharply trimmed to normal length and thickness with a large nail nipper without incident  -calluses trimmed using tissue nipper ad 15 blade without inciden  - RTC in 6 months      History of Present Illness     HPI:  Oumar Maharaj is a 67 y o  male who presents with elongated toenails and calluses  States that their nails are painful, elongated  They have difficulty applying their socks and shoes  The pressure within their shoe gear is painful and they have been unable to cut their nails adequately  He ambulates in custom-molded shoes with R shoe lift and a cane  The patient denies any nausea, vomiting, fever, chills, shortness of breath, or chest pains  Consults  Review of Systems   Constitutional: Negative  HENT: Negative  Eyes: Negative  Respiratory: Negative  Cardiovascular: Negative  Gastrointestinal: Negative  Musculoskeletal: Negative   Skin: elongated thickened toenails and calluses   Neurological: Negative          Historical Information   Past Medical History:   Diagnosis Date    Abdominal aortic aneurysm (AAA) ([facility] [address] )     last assessed nov 6 2015    Abscess of groin     last assessed oct 6 2014    Candidiasis, cutaneous     last assessed march 19 2014    Coronary artery disease     Dyslipidemia     Esophageal ulcer without bleeding     Gastritis     Hiatal hernia     Hx of cataract surgery, left     last assessed july 21 2014    Hyperlipidemia     Hypertension     Old myocardial infarction     Recurrent right inguinal hernia     s/p right orchioectomy, mesh removal, and sinus trac removal patient being followed by surgery next appt 4/28/14 patient s/p keflex x 7 days for MSSA Cx repeat wound cx grew MSSA cx repeat wound cx grew MSSA and other armond (mixed) no MRSA identified conitunue close monitoring and local wound care, last assessed april 15 2014    Wound of skin     in the groin, right     Past Surgical History:   Procedure Laterality Date    ABDOMINAL AORTIC ANEURYSM REPAIR  2009    aortobi-iliac, dacron graft    APPENDECTOMY      open    CORONARY ANGIOPLASTY WITH STENT PLACEMENT      stent 2    CYSTOSCOPY      diagnostic onset nov 13 2014    EXPLORATORY LAPAROTOMY  12/09/2009    HIP SURGERY Right     INGUINAL HERNIA REPAIR Right     unilateral x2    ORCHIECTOMY Right     [place] REPAIR INCISIONAL HERNIA,REDUCIBLE N/A 2/23/2018    Procedure: lysis of adhesions; INCISIONAL HERNIA REPAIR WITH MESH;  Surgeon: Jennifer Mann MD;  [facility];  Service: General     Social History   History   Alcohol Use    Yes     Comment: occasionally     History   Drug Use No     History   Smoking Status    Former Smoker   Smokeless Tobacco    Never Used     Family History:   Family History   Problem Relation Age of Onset    Heart disease Mother     Diabetes Mother     Heart disease Father        Meds/Allergies     (Not in a hospital admission)  No Known Allergies    Objective     Current Vitals:   Blood Pressure: 100/60 (03/26/18 1306)  Pulse: 72 (03/26/18 1306)  Temperature: 98 2 °F (36 8 °C) (03/26/18 1306)  Temp Source: Oral (03/26/18 1306)        /60 (BP Location: Right arm, Patient Position: Sitting, Cuff Size: Standard)   Pulse 72   Temp 98 2 °F (36 8 °C) (Oral)       Lower Extremity Exam:    Musculoskeletal:  MMT is 5/5 to all compartments of the LE, +0/4 edema B/L, Digital ROM is intact,      Pulses:   R DP is +1/4, R PT is +1/4, L DP is +1/4, L PT is +1/4, CFT< 3sec to all digits  Pedal hair is Absent     Skin:   Nails are thickened, elongated, TTP with notable subungual debris  No open Lesions  Skin of the LE is normal texture, turgor   Callus present R dorsal 2nd and 5th toes, right plantar 2nd MPJ, left medial 1st MPJ, dorsal left 5th toe  Neurologic:  Gross sensation is intact   Protective sensation is Intact

## 2018-03-26 NOTE — PROGRESS NOTES
Office Visit - General Surgery  Kinga Fan MRN: [id number]  Encounter: [id number]    Assessment and Plan    Problem List Items Addressed This Visit        Other    History of incisional hernia repair - Primary     S/p incisional hernia repair and MARK 2/23/2018  Diet as tolerated  Abdominal binder for comfort  May drive as long as not taking narcotic pain medications               Chief Complaint:  Kinga Fan is a 67 y o  male who presents for Post-op (Hernia repair)    Subjective  66 yo M with hx open AAA with fascial dehiscence 2010 s/p open ventral hernia repair with mesh and extensive MARK 2/23/2018 presents for follow up appointment  Patient reports that pain is well controlled without narcotics  He is tolerating a diet and having bowel function  He was recently discharged from [facility] and has follow up appointment with PCP  He has been ambulating and performing ADLs without difficulty  Patient has maintained abdominal binder  Neno and DILLON were d/c'd while that patient was in rehab      Past Medical History  Past Medical History:   Diagnosis Date    Abdominal aortic aneurysm (AAA) ([address] )     last assessed nov 6 2015    Abscess of groin     last assessed oct 6 2014    Candidiasis, cutaneous     last assessed march 19 2014    Coronary artery disease     Dyslipidemia     Esophageal ulcer without bleeding     Gastritis     Hiatal hernia     Hx of cataract surgery, left     last assessed july 21 2014    Hyperlipidemia     Hypertension     Old myocardial infarction     Recurrent right inguinal hernia     s/p right orchioectomy, mesh removal, and sinus trac removal patient being followed by surgery next appt 4/28/14 patient s/p keflex x 7 days for MSSA Cx repeat wound cx grew MSSA cx repeat wound cx grew MSSA and other armond (mixed) no MRSA identified conitunue close monitoring and local wound care, last assessed april 15 2014    Wound of skin     in the groin, right       Past Surgical History  Past Surgical History:   Procedure Laterality Date    ABDOMINAL AORTIC ANEURYSM REPAIR  2009    aortobi-iliac, dacron graft    APPENDECTOMY      open    CORONARY ANGIOPLASTY WITH STENT PLACEMENT      stent 2    CYSTOSCOPY      diagnostic onset nov 13 2014    EXPLORATORY LAPAROTOMY  12/09/2009    HIP SURGERY Right     INGUINAL HERNIA REPAIR Right     unilateral x2    ORCHIECTOMY Right     [place] REPAIR INCISIONAL HERNIA,REDUCIBLE N/A 2/23/2018    Procedure: lysis of adhesions; INCISIONAL HERNIA REPAIR WITH MESH;  Surgeon: Allen Gross MD;  [facility];  Service: General       Family History  Family History   Problem Relation Age of Onset    Heart disease Mother     Diabetes Mother     Heart disease Father        Medications  Current Outpatient Prescriptions on File Prior to Visit   Medication Sig Dispense Refill    aspirin 81 MG tablet Take 81 mg by mouth daily Resume on 3/11/18       atorvastatin (LIPITOR) 40 mg tablet Take 40 mg by mouth daily with dinner Resume next scheduled dose upon discharge from the hospital       hydrochlorothiazide (HYDRODIURIL) 12 5 mg tablet Take 1 tablet (12 5 mg total) by mouth daily 30 tablet 0    lisinopril (ZESTRIL) 10 mg tablet Take 1 tablet (10 mg total) by mouth daily 30 tablet 0    metoprolol succinate (TOPROL-XL) 50 mg 24 hr tablet Take 50 mg by mouth daily Resume on 3/11/18       ranitidine (ZANTAC) 150 MG capsule Take 150 mg by mouth Resume on 3/11/18 as prior to hospitalization        No current facility-administered medications on file prior to visit  Allergies  No Known Allergies    Review of Systems   Constitutional: Negative  HENT: Negative  Eyes: Negative  Respiratory: Negative  Negative for apnea  Cardiovascular: Negative  Gastrointestinal: Positive for abdominal pain (transient R sided pain)  Endocrine: Negative  Genitourinary: Negative  Musculoskeletal: Negative  Skin: Negative  Allergic/Immunologic: Negative  Neurological: Negative  Hematological: Negative  Psychiatric/Behavioral: Negative  Objective  Vitals:    03/26/18 1129   BP: 100/60   Pulse: 72   Temp: 98 2 °F (36 8 °C)       Physical Exam   Constitutional: He is oriented to person, place, and time  He appears well-developed and well-nourished  HENT:   Head: Normocephalic and atraumatic  Eyes: Pupils are equal, round, and reactive to light  Neck: Normal range of motion  Cardiovascular: Normal rate and regular rhythm  Pulmonary/Chest: Effort normal    Abdominal: Soft  He exhibits no distension and no mass  There is no tenderness  There is no rebound and no guarding  Incision C/D/I without drainage, LLQ DILLON site healed   Musculoskeletal: Normal range of motion  Neurological: He is alert and oriented to person, place, and time  Skin: Skin is warm and dry

## 2018-03-26 NOTE — ASSESSMENT & PLAN NOTE
S/p incisional hernia repair and MARK 2/23/2018  Diet as tolerated  Abdominal binder for comfort  May drive as long as not taking narcotic pain medications

## 2018-04-09 PROBLEM — D12.6 BENIGN NEOPLASM OF COLON: Status: ACTIVE | Noted: 2018-04-09

## 2018-04-18 ENCOUNTER — TELEPHONE (OUTPATIENT)
Dept: INTERNAL MEDICINE CLINIC | Facility: CLINIC | Age: 73
End: 2018-04-18

## 2018-04-18 DIAGNOSIS — D12.6 SERRATED ADENOMA OF COLON: Primary | ICD-10-CM

## 2018-04-18 NOTE — TELEPHONE ENCOUNTER
This patient is scheduled on 05/22/2018 for their Colonoscopy procedure  Please send prep to the pharmacy  Does this patient need to hold any medications? I spoke with the patient and made him/her aware of their appointment date, I mailed out the instructions

## 2018-05-10 ENCOUNTER — ANESTHESIA EVENT (OUTPATIENT)
Dept: PERIOP | Facility: AMBULARY SURGERY CENTER | Age: 73
End: 2018-05-10
Payer: MEDICARE

## 2018-05-11 ENCOUNTER — APPOINTMENT (OUTPATIENT)
Dept: LAB | Facility: CLINIC | Age: 73
End: 2018-05-11
Payer: MEDICARE

## 2018-05-11 ENCOUNTER — OFFICE VISIT (OUTPATIENT)
Dept: MULTI SPECIALTY CLINIC | Facility: CLINIC | Age: 73
End: 2018-05-11
Payer: MEDICARE

## 2018-05-11 VITALS — TEMPERATURE: 97.4 F | HEART RATE: 60 BPM | DIASTOLIC BLOOD PRESSURE: 52 MMHG | SYSTOLIC BLOOD PRESSURE: 108 MMHG

## 2018-05-11 DIAGNOSIS — I25.10 CORONARY ARTERY DISEASE INVOLVING NATIVE HEART WITHOUT ANGINA PECTORIS, UNSPECIFIED VESSEL OR LESION TYPE: Primary | ICD-10-CM

## 2018-05-11 DIAGNOSIS — E78.5 HYPERLIPIDEMIA, UNSPECIFIED HYPERLIPIDEMIA TYPE: ICD-10-CM

## 2018-05-11 DIAGNOSIS — I10 ESSENTIAL HYPERTENSION: ICD-10-CM

## 2018-05-11 PROBLEM — Z01.818 PREOPERATIVE CLEARANCE: Status: RESOLVED | Noted: 2018-02-09 | Resolved: 2018-05-11

## 2018-05-11 LAB
CHOLEST SERPL-MCNC: 97 MG/DL (ref 50–200)
HDLC SERPL-MCNC: 33 MG/DL (ref 40–60)
LDLC SERPL CALC-MCNC: 43 MG/DL (ref 0–100)
NONHDLC SERPL-MCNC: 64 MG/DL
TRIGL SERPL-MCNC: 105 MG/DL

## 2018-05-11 PROCEDURE — 36415 COLL VENOUS BLD VENIPUNCTURE: CPT

## 2018-05-11 PROCEDURE — 99212 OFFICE O/P EST SF 10 MIN: CPT | Performed by: INTERNAL MEDICINE

## 2018-05-11 PROCEDURE — 80061 LIPID PANEL: CPT

## 2018-05-11 RX ORDER — LISINOPRIL 20 MG/1
TABLET ORAL
COMMUNITY
Start: 2018-04-02 | End: 2019-02-19 | Stop reason: SDUPTHER

## 2018-05-11 RX ORDER — HYDROCHLOROTHIAZIDE 12.5 MG/1
CAPSULE, GELATIN COATED ORAL
Status: ON HOLD | COMMUNITY
Start: 2018-04-02 | End: 2018-05-22 | Stop reason: SDUPTHER

## 2018-05-11 NOTE — PROGRESS NOTES
Cardiology Follow Up    Reno Bro  1945  [id number]  [facility]  [address] [address] [phone]    Discussion/Summary:  1  1  Preoperative clearance for a ventral hernia repair  2  History of CAD with PCI to RCA in 2000  3  HTN  4  HLD    This is a 67year old man with PMH of CAD s/p PCI in 2000, HTN, HLD, PVD who presented for a outpatient follow up  He is doing well  Euvolemic on exam       History of Present Illness: This is a 67year old man with history of CAD s/p PCI RCA in 2000, presents for outpatient follow up  No SOB, CP, lightheadedness, dizziness, lower limb edema, orthopnea or PND  Denies any angina  Had CP when he had his PCI  No events since  Social history: Ex smoker, quit 18 years ago  Denies excess alcohol intake or IVDA  Compliant with his medications  He also had a pharmacological stress test in Jan 2015 which was normal with artifact in inferior wall  Had a Holter monitor in 2/7/17 whoch showed no arrhythmias         Patient Active Problem List   Diagnosis    Preoperative clearance    Coronary artery disease    Hypertension    Incisional hernia    History of incisional hernia repair    GERD (gastroesophageal reflux disease)    Bursitis of left shoulder    Hyperlipidemia    PVD (peripheral vascular disease) ([address] )    Serrated adenoma of colon    Benign neoplasm of colon     Past Medical History:   Diagnosis Date    Abdominal aortic aneurysm (AAA) ([facility] [address]     last assessed nov 6 2015    Abscess of groin     last assessed oct 6 2014    Candidiasis, cutaneous     last assessed march 19 2014    Coronary artery disease     Dyslipidemia     Esophageal ulcer without bleeding     Gastritis     Hiatal hernia     Hx of cataract surgery, left     last assessed july 21 2014    Hyperlipidemia     Hypertension     Old myocardial infarction     Recurrent right inguinal hernia     s/p right orchioectomy, mesh removal, and sinus trac removal patient being followed by surgery next appt 4/28/14 patient s/p keflex x 7 days for MSSA Cx repeat wound cx grew MSSA cx repeat wound cx grew MSSA and other armond (mixed) no MRSA identified conitunue close monitoring and local wound care, last assessed april 15 2014    Wound of skin     in the groin, right     Social History     Social History    Marital status: Single     Spouse name: N/A    Number of children: N/A    Years of education: N/A     Occupational History    Not on file       Social History Main Topics    Smoking status: Former Smoker    Smokeless tobacco: Never Used    Alcohol use Yes      Comment: occasionally    Drug use: No    Sexual activity: Not on file     Other Topics Concern    Not on file     Social History Narrative    No narrative on file      Family History   Problem Relation Age of Onset    Heart disease Mother     Diabetes Mother     Heart disease Father      Past Surgical History:   Procedure Laterality Date    ABDOMINAL AORTIC ANEURYSM REPAIR  2009    aortobi-iliac, dacron graft    APPENDECTOMY      open    CORONARY ANGIOPLASTY WITH STENT PLACEMENT      stent 2    CYSTOSCOPY      diagnostic onset nov 13 2014    EXPLORATORY LAPAROTOMY  12/09/2009    HIP SURGERY Right     INGUINAL HERNIA REPAIR Right     unilateral x2    ORCHIECTOMY Right     IN REPAIR INCISIONAL HERNIA,REDUCIBLE N/A 2/23/2018    Procedure: lysis of adhesions; INCISIONAL HERNIA REPAIR WITH MESH;  Surgeon: Josué Alexandre MD;  [facility];  Service: General       Current Outpatient Prescriptions:     aspirin 81 MG tablet, Take 81 mg by mouth daily Resume on 3/11/18 , Disp: , Rfl:     atorvastatin (LIPITOR) 40 mg tablet, Take 40 mg by mouth daily with dinner Resume next scheduled dose upon discharge from the hospital , Disp: , Rfl:     hydrochlorothiazide (HYDRODIURIL) 12 5 mg tablet, Take 1 tablet (12 5 mg total) by mouth daily, Disp: 30 tablet, Rfl: 0    metoprolol succinate (TOPROL-XL) 50 mg 24 hr tablet, Take 50 mg by mouth daily Resume on 3/11/18 , Disp: , Rfl:     ranitidine (ZANTAC) 150 MG capsule, Take 150 mg by mouth Resume on 3/11/18 as prior to hospitalization , Disp: , Rfl:     bisacodyl (DULCOLAX) 5 mg EC tablet, Take 2 tablets as directed the day prior to colonoscopy, Disp: 2 tablet, Rfl: 0    hydrochlorothiazide (MICROZIDE) 12 5 mg capsule, , Disp: , Rfl:     lisinopril (ZESTRIL) 10 mg tablet, Take 1 tablet (10 mg total) by mouth daily, Disp: 30 tablet, Rfl: 0    lisinopril (ZESTRIL) 20 mg tablet, , Disp: , Rfl:     polyethylene glycol (COLYTE) 4000 mL solution, Take as directed prior to colonoscopy, Disp: 4000 mL, Rfl: 0  No Known Allergies    Labs:  Lab Results   Component Value Date    WBC 7 32 03/26/2018    HGB 11 7 (L) 03/26/2018    HCT 36 3 (L) 03/26/2018    MCV 88 03/26/2018     03/26/2018     Lab Results   Component Value Date    GLUCOSE 76 03/08/2018    CALCIUM 7 7 (L) 03/08/2018     03/08/2018    K 3 5 03/08/2018    CO2 26 03/08/2018     (H) 03/08/2018    BUN 15 03/08/2018    CREATININE 0 78 03/08/2018     No results found for: HGBA1C  Lab Results   Component Value Date    CHOL 119 06/28/2016     Lab Results   Component Value Date    HDL 34 (L) 06/28/2016     Lab Results   Component Value Date    LDLCALC 58 06/28/2016     Lab Results   Component Value Date    TRIG 135 06/28/2016     No components found for: CHOLHDL    Imaging:       Review of Systems:  Review of Systems   Constitutional: Negative  Negative for fatigue and fever  HENT: Negative  Eyes: Negative  Respiratory: Negative  Negative for apnea, shortness of breath and wheezing  Cardiovascular: Negative  Negative for chest pain, palpitations and leg swelling  Gastrointestinal: Negative  Endocrine: Negative  Genitourinary: Negative      Musculoskeletal: Negative  Skin: Negative  Allergic/Immunologic: Negative  Neurological: Negative  Hematological: Negative  Psychiatric/Behavioral: Negative  Physical Exam:  Physical Exam   Constitutional: He is oriented to person, place, and time  He appears well-developed and well-nourished  No distress  HENT:   Head: Normocephalic  Eyes: Pupils are equal, round, and reactive to light  Neck: Normal range of motion  No JVD present  Cardiovascular: Normal rate and regular rhythm  No murmur heard  Pulmonary/Chest: Effort normal and breath sounds normal  No respiratory distress  He has no wheezes  Abdominal: Soft  He exhibits no distension  There is no tenderness  Musculoskeletal: Normal range of motion  He exhibits no edema  Neurological: He is alert and oriented to person, place, and time  Skin: Skin is warm and dry  Psychiatric: He has a normal mood and affect   His behavior is normal

## 2018-05-15 ENCOUNTER — TELEPHONE (OUTPATIENT)
Dept: INTERNAL MEDICINE CLINIC | Facility: CLINIC | Age: 73
End: 2018-05-15

## 2018-05-22 ENCOUNTER — HOSPITAL ENCOUNTER (OUTPATIENT)
Facility: AMBULARY SURGERY CENTER | Age: 73
Setting detail: OUTPATIENT SURGERY
Discharge: HOME | End: 2018-05-22
Attending: INTERNAL MEDICINE | Admitting: INTERNAL MEDICINE
Payer: MEDICARE

## 2018-05-22 ENCOUNTER — ANESTHESIA (OUTPATIENT)
Dept: PERIOP | Facility: AMBULARY SURGERY CENTER | Age: 73
End: 2018-05-22
Payer: MEDICARE

## 2018-05-22 VITALS
RESPIRATION RATE: 16 BRPM | WEIGHT: 142 LBS | HEIGHT: 68 IN | HEART RATE: 24 BPM | SYSTOLIC BLOOD PRESSURE: 111 MMHG | DIASTOLIC BLOOD PRESSURE: 52 MMHG | OXYGEN SATURATION: 97 % | TEMPERATURE: 97.5 F | BODY MASS INDEX: 21.52 KG/M2

## 2018-05-22 DIAGNOSIS — D12.6 BENIGN NEOPLASM OF COLON, UNSPECIFIED PART OF COLON: ICD-10-CM

## 2018-05-22 PROCEDURE — 88305 TISSUE EXAM BY PATHOLOGIST: CPT | Performed by: PATHOLOGY

## 2018-05-22 PROCEDURE — 45380 COLONOSCOPY AND BIOPSY: CPT | Performed by: INTERNAL MEDICINE

## 2018-05-22 RX ORDER — PROPOFOL 10 MG/ML
INJECTION, EMULSION INTRAVENOUS AS NEEDED
Status: DISCONTINUED | OUTPATIENT
Start: 2018-05-22 | End: 2018-05-22 | Stop reason: SURG

## 2018-05-22 RX ORDER — SODIUM CHLORIDE 9 MG/ML
100 INJECTION, SOLUTION INTRAVENOUS CONTINUOUS
Status: DISCONTINUED | OUTPATIENT
Start: 2018-05-22 | End: 2018-05-22 | Stop reason: HOSPADM

## 2018-05-22 RX ADMIN — SODIUM CHLORIDE 100 ML/HR: 0.9 INJECTION, SOLUTION INTRAVENOUS at 10:20

## 2018-05-22 RX ADMIN — PROPOFOL 120 MG: 10 INJECTION, EMULSION INTRAVENOUS at 10:50

## 2018-05-22 RX ADMIN — PROPOFOL 20 MG: 10 INJECTION, EMULSION INTRAVENOUS at 11:00

## 2018-05-22 RX ADMIN — PROPOFOL 20 MG: 10 INJECTION, EMULSION INTRAVENOUS at 11:02

## 2018-05-22 RX ADMIN — PROPOFOL 20 MG: 10 INJECTION, EMULSION INTRAVENOUS at 10:55

## 2018-05-22 NOTE — ANESTHESIA POSTPROCEDURE EVALUATION
Post-Op Assessment Note      CV Status:  Stable    Mental Status:  Alert and awake    Hydration Status:  Stable    PONV Controlled:  None    Airway Patency:  Patent    Post Op Vitals Reviewed: Yes          Staff: CRNA         VSS post procedure, 95% O2 sats on RA

## 2018-05-22 NOTE — ANESTHESIA PREPROCEDURE EVALUATION
Review of Systems/Medical History  Patient summary reviewed  Chart reviewed      Cardiovascular  Hyperlipidemia, Hypertension controlled, Past MI Unspecified site/ episode of care, CAD ,    Pulmonary  Smoker ex-smoker  ,        GI/Hepatic    Bowel prep       Negative  ROS        Endo/Other  Negative endo/other ROS      GYN       Hematology  Negative hematology ROS      Musculoskeletal  Negative musculoskeletal ROS        Neurology  Negative neurology ROS      Psychology   Negative psychology ROS              Physical Exam    Airway    Mallampati score: II  TM Distance: >3 FB  Neck ROM: full     Dental   lower dentures and upper dentures,     Cardiovascular  Rhythm: regular, Rate: normal,     Pulmonary  Breath sounds clear to auscultation,     Other Findings        Anesthesia Plan  ASA Score- 3     Anesthesia Type- IV sedation with anesthesia with ASA Monitors  Additional Monitors:   Airway Plan:         Plan Factors-    Induction- intravenous  Postoperative Plan-     Informed Consent- Anesthetic plan and risks discussed with patient  I personally reviewed this patient with the CRNA  Discussed and agreed on the Anesthesia Plan with the CRNA  Mendy Sin

## 2018-05-22 NOTE — OP NOTE
OPERATIVE REPORT  PATIENT NAME: Radha Barrientos    :  [id number]  MRN: [id number]  Pt Location: [facility]  GI ROOM 01    SURGERY DATE: 2018    Surgeon(s) and Role:     * Cathy Ennis DO - Primary    Preop Diagnosis:  Benign neoplasm of colon, unspecified part of colon [D12 6]    Post-Op Diagnosis Codes: * Benign neoplasm of colon, unspecified part of colon [D12 6]    Procedure(s) (LRB):  COLONOSCOPY (N/A)    Specimen(s):  ID Type Source Tests Collected by Time Destination   1 : descending colon polyp cold forcep Tissue Polyp, Colorectal TISSUE EXAM Cathy Ennis DO 2018 1100        Estimated Blood Loss:   Minimal    Drains:       Anesthesia Type:   IV Sedation with Anesthesia    Operative Indications:  Benign neoplasm of colon, unspecified part of colon [D12 6]    Colonoscopy Procedure Note    Procedure: Colonoscopy    Sedation: Monitored anesthesia care, check anesthesia records      ASA Class: 2    INDICATIONS:  Personal history of polyps    POST-OP DIAGNOSIS: See the impression below    Procedure Details     Prior colonoscopy: 3 years ago  Informed consent was obtained for the procedure, including sedation  Risks of perforation, hemorrhage, adverse drug reaction and aspiration were discussed  The patient was placed in the left lateral decubitus position  Based on the pre-procedure assessment, including review of the patient's medical history, medications, allergies, and review of systems, he had been deemed to be an appropriate candidate for conscious sedation; he was therefore sedated with the medications listed below  The patient was monitored continuously with telemetry, pulse oximetry, blood pressure monitoring, and direct observations  A rectal examination was performed  The colonoscope was inserted into the rectum and advanced under direct vision to the cecum, which was identified by the ileocecal valve and appendiceal orifice  The quality of the colonic preparation was good  A careful inspection was made as the colonoscope was withdrawn, including a retroflexed view of the rectum; findings and interventions are described below  Findings:  One less than 5 mm sessile polyp found in the descending colon  Removed with cold biopsy forceps  Sigmoid diverticulosis  Otherwise normal colon  Complications:  None; patient tolerated the procedure well  Impression:    One less than 5 mm sessile polyp found in the descending colon  Removed with cold biopsy forceps  Sigmoid diverticulosis  Otherwise normal colon  Recommendations:Await polypectomy results  Repeat colonoscopy based on pathology but likely 5 years        Bubba Hopper DO  DATE: May 22, 2018  TIME: 11:12 AM

## 2018-09-19 ENCOUNTER — OFFICE VISIT (OUTPATIENT)
Dept: OBGYN CLINIC | Facility: OTHER | Age: 73
End: 2018-09-19
Payer: MEDICARE

## 2018-09-19 ENCOUNTER — APPOINTMENT (OUTPATIENT)
Dept: RADIOLOGY | Facility: OTHER | Age: 73
End: 2018-09-19
Payer: MEDICARE

## 2018-09-19 VITALS
DIASTOLIC BLOOD PRESSURE: 61 MMHG | WEIGHT: 152 LBS | BODY MASS INDEX: 23.11 KG/M2 | HEART RATE: 54 BPM | SYSTOLIC BLOOD PRESSURE: 145 MMHG

## 2018-09-19 DIAGNOSIS — M25.561 ACUTE PAIN OF RIGHT KNEE: ICD-10-CM

## 2018-09-19 DIAGNOSIS — M25.561 ACUTE PAIN OF RIGHT KNEE: Primary | ICD-10-CM

## 2018-09-19 PROBLEM — M17.11 PRIMARY OSTEOARTHRITIS OF RIGHT KNEE: Status: ACTIVE | Noted: 2018-09-19

## 2018-09-19 PROCEDURE — 99214 OFFICE O/P EST MOD 30 MIN: CPT | Performed by: ORTHOPAEDIC SURGERY

## 2018-09-19 PROCEDURE — 73562 X-RAY EXAM OF KNEE 3: CPT

## 2018-09-19 PROCEDURE — 20610 DRAIN/INJ JOINT/BURSA W/O US: CPT | Performed by: PHYSICIAN ASSISTANT

## 2018-09-19 PROCEDURE — 73564 X-RAY EXAM KNEE 4 OR MORE: CPT

## 2018-09-19 RX ORDER — BUPIVACAINE HYDROCHLORIDE 2.5 MG/ML
4 INJECTION, SOLUTION INFILTRATION; PERINEURAL
Status: COMPLETED | OUTPATIENT
Start: 2018-09-19 | End: 2018-09-19

## 2018-09-19 RX ORDER — METHYLPREDNISOLONE ACETATE 40 MG/ML
1 INJECTION, SUSPENSION INTRA-ARTICULAR; INTRALESIONAL; INTRAMUSCULAR; SOFT TISSUE
Status: COMPLETED | OUTPATIENT
Start: 2018-09-19 | End: 2018-09-19

## 2018-09-19 RX ORDER — HYDROCHLOROTHIAZIDE 12.5 MG/1
CAPSULE, GELATIN COATED ORAL
COMMUNITY
Start: 2018-06-18 | End: 2019-03-01 | Stop reason: SDUPTHER

## 2018-09-19 RX ADMIN — METHYLPREDNISOLONE ACETATE 1 ML: 40 INJECTION, SUSPENSION INTRA-ARTICULAR; INTRALESIONAL; INTRAMUSCULAR; SOFT TISSUE at 13:14

## 2018-09-19 RX ADMIN — BUPIVACAINE HYDROCHLORIDE 4 ML: 2.5 INJECTION, SOLUTION INFILTRATION; PERINEURAL at 13:14

## 2018-09-19 NOTE — PROGRESS NOTES
Orthopaedic Surgery - Office Note  Jannie Hogue (86 y o  male)   : 1945   MRN: [id number]  Encounter Date: 2018    Chief Complaint   Patient presents with    Right Knee - Pain       Assessment / Plan  Osteoarthritis right knee with large leg length discrepancy    · After discussion of risks and benefits the patient did agree to proceed with a steroid injection in his right knee this was prepared and injected sterilely and tolerated well  · Patient can use ice and analgesics as needed  · Patient was given a list of home exercises though he will be limited in what he can do due to his other orthopedic issues in his legs  Return if symptoms worsen or fail to improve  History of Present Illness  Jannie Hogue is a 75 y o  male who presents for evaluation of right knee pain which has been a gradual onset over the last 3 weeks without any specific cause  He does have a history of multiple surgeries on both legs as a child which she cannot recall what it was for because he was too young but did state he was in the hospital for 7 years with all the surgeries  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 and he has visit very limited range of motion in his right leg  He states that he really has not had any issues with the right knee much in the past but since he was even a child has been using crutches or canes at all times for ambulation  He states that he did have his left knee and left shoulder injected in the past with good relief and would like to try that if it is an option  He has been taking Advil for his knee which he states helps a little bit but the pain just comes back later  Review of Systems  Pertinent items are noted in HPI  All other systems were reviewed and are negative  Physical Exam  /61 (BP Location: Right arm, Patient Position: Sitting, Cuff Size: Adult)   Pulse (!) 54   Wt 68 9 kg (152 lb)   BMI 23 11 kg/m²   Cons: Appears well  No apparent distress  Psych: Alert  Oriented x3  Mood and affect normal   Eyes: PERRLA, EOMI  Resp: Normal effort  No audible wheezing or stridor  CV: Palpable pulse  No discernable arrhythmia  No LE edema  Lymph:  No palpable cervical, axillary, or inguinal lymphadenopathy  Skin: Warm  No palpable masses  No visible lesions  Neuro: Normal muscle tone  Normal and symmetric DTR's  Right Knee Exam  Alignment:  Leg length discrepancy Of at least 6 inches on the right  The patient is compensating with a large heel lift on the right and cane    Normal knee alignment  Inspection:  Mild right knee swelling  No erythema  No ecchymosis  Palpation:  Moderate tenderness at The medial joint line of the knee  ROM:  Knee Extension 5 degrees  Knee Flexion 70 degrees  The patient's right hip is fused and has no rotation or flexion  Strength:  Not tested  Stability:  No objective knee instability  Stable Varus / Valgus stress, Lachman, and Posterior drawer  Tests:  No pertinent positive or negative tests  Patella:  Decreased patellar mobility  Neurovascular:  Sensation intact in DP/SP/Boyer/Sa/T nerve distributions  Sensation intact in all digital nerve distributions  Toes warm and perfused  Gait:  Antalgic  With cane at this time    Studies Reviewed  XR of right knee - Shows patella baja with severe arthritic changes joint line narrowing in the patellofemoral joint  There is also moderate to severe medial joint line narrowing with spurring and subchondral sclerosis noted  There is mild narrowing in the lateral joint line  In comparison to the left knee which has mild spurring and mild narrowing in all 3 compartments  XR of right hip - From 2016 show a fused right hip joint with retained hardware      Large joint arthrocentesis  Date/Time: 9/19/2018 1:14 PM  Consent given by: patient  Supporting Documentation  Indications: pain   Procedure Details  Location: knee - R knee  Needle size: 22 G  Ultrasound guidance: no  Approach: anterolateral  Medications administered: 4 mL bupivacaine 0 25 %; 1 mL methylPREDNISolone acetate 40 mg/mL    Patient tolerance: patient tolerated the procedure well with no immediate complications  Dressing:  Sterile dressing applied           Medical, Surgical, Family, and Social History  The patient's medical history, family history, and social history, were reviewed and updated as appropriate  Past Medical History:   Diagnosis Date    Abdominal aortic aneurysm (AAA) ([address] )     last assessed nov 6 2015    Abscess of groin     last assessed oct 6 2014    Candidiasis, cutaneous     last assessed march 19 2014    Coronary artery disease     Dyslipidemia     Esophageal ulcer without bleeding     Gastritis     Hiatal hernia     Hx of cataract surgery, left     last assessed july 21 2014    Hyperlipidemia     Hypertension     Old myocardial infarction     Recurrent right inguinal hernia     s/p right orchioectomy, mesh removal, and sinus trac removal patient being followed by surgery next appt 4/28/14 patient s/p keflex x 7 days for MSSA Cx repeat wound cx grew MSSA cx repeat wound cx grew MSSA and other armond (mixed) no MRSA identified conitunue close monitoring and local wound care, last assessed april 15 2014    Wound of skin     in the groin, right       Past Surgical History:   Procedure Laterality Date    ABDOMINAL AORTIC ANEURYSM REPAIR  2009    aortobi-iliac, dacron graft    APPENDECTOMY      open    CORONARY ANGIOPLASTY WITH STENT PLACEMENT      stent 2    CYSTOSCOPY      diagnostic onset nov 13 2014    EXPLORATORY LAPAROTOMY  12/09/2009    HIP SURGERY Right     INGUINAL HERNIA REPAIR Right     unilateral x2    ORCHIECTOMY Right     MS COLONOSCOPY FLX DX W/COLLJ SPEC WHEN PFRMD N/A 5/22/2018    Procedure: COLONOSCOPY;  Surgeon: Chris Atwood DO;  [facility]  GI LAB;   Service: Gastroenterology    MS REPAIR INCISIONAL HERNIA,REDUCIBLE N/A 2/23/2018    Procedure: lysis of adhesions; INCISIONAL HERNIA REPAIR WITH MESH;  Surgeon: Aspen Morales MD;  [facility];  Service: General       Family History   Problem Relation Age of Onset    Heart disease Mother     Diabetes Mother     Heart disease Father        Social History     Occupational History    Not on file       Social History Main Topics    Smoking status: Former Smoker    Smokeless tobacco: Never Used    Alcohol use Yes      Comment: occasionally    Drug use: No    Sexual activity: Not on file       No Known Allergies      Current Outpatient Prescriptions:     aspirin 81 MG tablet, Take 81 mg by mouth daily Resume on 3/11/18 , Disp: , Rfl:     atorvastatin (LIPITOR) 40 mg tablet, Take 40 mg by mouth daily with dinner Resume next scheduled dose upon discharge from the hospital , Disp: , Rfl:     hydrochlorothiazide (HYDRODIURIL) 12 5 mg tablet, Take 1 tablet (12 5 mg total) by mouth daily, Disp: 30 tablet, Rfl: 0    hydrochlorothiazide (MICROZIDE) 12 5 mg capsule, , Disp: , Rfl:     lisinopril (ZESTRIL) 20 mg tablet, , Disp: , Rfl:     metoprolol succinate (TOPROL-XL) 50 mg 24 hr tablet, Take 50 mg by mouth daily Resume on 3/11/18 , Disp: , Rfl:     ranitidine (ZANTAC) 150 MG capsule, Take 150 mg by mouth Resume on 3/11/18 as prior to hospitalization , Disp: , Rfl:       Khang Hampton PA-C    Scribe Attestation    I,:    am acting as a scribe while in the presence of the attending physician :        I,:    personally performed the services described in this documentation    as scribed in my presence :

## 2018-09-20 ENCOUNTER — TELEPHONE (OUTPATIENT)
Dept: INTERNAL MEDICINE CLINIC | Facility: CLINIC | Age: 73
End: 2018-09-20

## 2018-09-20 DIAGNOSIS — B35.1 ONYCHOMYCOSIS OF TOENAIL: Primary | ICD-10-CM

## 2018-09-24 ENCOUNTER — OFFICE VISIT (OUTPATIENT)
Dept: MULTI SPECIALTY CLINIC | Facility: CLINIC | Age: 73
End: 2018-09-24
Payer: MEDICARE

## 2018-09-24 VITALS
HEIGHT: 68 IN | SYSTOLIC BLOOD PRESSURE: 108 MMHG | BODY MASS INDEX: 23.02 KG/M2 | TEMPERATURE: 97.6 F | DIASTOLIC BLOOD PRESSURE: 58 MMHG | HEART RATE: 72 BPM | WEIGHT: 151.9 LBS

## 2018-09-24 DIAGNOSIS — L84 CALLUS OF FOOT: Primary | ICD-10-CM

## 2018-09-24 DIAGNOSIS — B35.1 ONYCHOMYCOSIS OF TOENAIL: ICD-10-CM

## 2018-09-24 PROCEDURE — 99213 OFFICE O/P EST LOW 20 MIN: CPT | Performed by: PODIATRIST

## 2018-09-24 NOTE — PROGRESS NOTES
Podiatry Clinic Visit  Ray Wing 75 y o  male MRN: [id number]  Encounter: [id number]    Assessment/Plan     Assessment:  1  Onychomycosis  2  Callus     Plan:  - Patient was seen/examined  All questions and concerns addressed  - Nails x10 were sharply trimmed to normal length and thickness with a large nail nipper without incident  - Calluses trimmed using #15 blade without incident  - RTC in 6 months      History of Present Illness     HPI:  Ray Wing is a 75 y o  male who presents with elongated toenails x10 and calluses  Patient states that his toenails and calluses are painful with ambulation and he cannot reach them to trim and cut  He ambulates in custom moldered shoes with heel lift on right and a cane  The patient denies any nausea, vomiting, fever, chills, shortness of breath, or chest pains  Review of Systems   Constitutional: Negative  HENT: Negative  Eyes: Negative  Respiratory: Negative  Cardiovascular: Negative  Gastrointestinal: Negative  Musculoskeletal: Negative   Skin: elongated nails and calluses  Neurological: Negative          Historical Information   Past Medical History:   Diagnosis Date    Abdominal aortic aneurysm (AAA) ([address] )     last assessed nov 6 2015    Abscess of groin     last assessed oct 6 2014    Candidiasis, cutaneous     last assessed march 19 2014    Coronary artery disease     Dyslipidemia     Esophageal ulcer without bleeding     Gastritis     Hiatal hernia     Hx of cataract surgery, left     last assessed july 21 2014    Hyperlipidemia     Hypertension     Old myocardial infarction     Recurrent right inguinal hernia     s/p right orchioectomy, mesh removal, and sinus trac removal patient being followed by surgery next appt 4/28/14 patient s/p keflex x 7 days for MSSA Cx repeat wound cx grew MSSA cx repeat wound cx grew MSSA and other armond (mixed) no MRSA identified conitunue close monitoring and local wound care, last assessed april 15 2014    Wound of skin     in the groin, right     Past Surgical History:   Procedure Laterality Date    ABDOMINAL AORTIC ANEURYSM REPAIR  2009    aortobi-iliac, dacron graft    APPENDECTOMY      open    CORONARY ANGIOPLASTY WITH STENT PLACEMENT      stent 2    CYSTOSCOPY      diagnostic onset nov 13 2014    EXPLORATORY LAPAROTOMY  12/09/2009    HIP SURGERY Right     INGUINAL HERNIA REPAIR Right     unilateral x2    ORCHIECTOMY Right     RI COLONOSCOPY FLX DX W/COLLJ SPEC WHEN PFRMD N/A 5/22/2018    Procedure: COLONOSCOPY;  Surgeon: Samina Catalan DO;  Location: [facility]  GI [facility]; Service: Gastroenterology    RI REPAIR INCISIONAL HERNIA,REDUCIBLE N/A 2/23/2018    Procedure: lysis of adhesions; INCISIONAL HERNIA REPAIR WITH MESH;  Surgeon: Acosta Mckinley MD;  [facility];  Service: General     Social History   History   Alcohol Use    Yes     Comment: occasionally     History   Drug Use No     History   Smoking Status    Former Smoker   Smokeless Tobacco    Never Used     Family History:   Family History   Problem Relation Age of Onset    Heart disease Mother     Diabetes Mother     Heart disease Father        Meds/Allergies     (Not in a hospital admission)  No Known Allergies    Objective     Current Vitals:   Blood Pressure: 108/58 (09/24/18 1330)  Pulse: 72 (09/24/18 1330)  Temperature: 97 6 °F (36 4 °C) (09/24/18 1330)  Temp Source: Oral (09/24/18 1330)  Height: 5' 8" (172 7 cm) (09/24/18 1330)  Weight - Scale: 68 9 kg (151 lb 14 4 oz) (09/24/18 1330)        /58 (BP Location: Right arm, Patient Position: Sitting, Cuff Size: Adult)   Pulse 72   Temp 97 6 °F (36 4 °C) (Oral)   Ht 5' 8" (1 727 m)   Wt 68 9 kg (151 lb 14 4 oz)   BMI 23 10 kg/m²       Lower Extremity Exam:    Musculoskeletal:  MMT is 4/5 to all compartments of the LE, +0/4 edema B/L, Digital ROM is intact,      Pulses:   R DP is +1/4, R PT is +1/4, L DP is +1/4, L PT is +1/4, CFT< 3sec to all digits   Pedal hair is Absent     Skin:  Elongated, dystrophic, fungal toenails x10 with notable subungual debris and tenderness with palpation  Calluses present at; R dorsal 4, 5 toes, right plantar 2nd MTPJ, Left medial 1st MTPJ and left dorsal 5th toe  No open Lesions  Skin of the LE is normal texture, turgor  Neurologic:  Gross sensation is intact

## 2018-10-19 ENCOUNTER — OFFICE VISIT (OUTPATIENT)
Dept: INTERNAL MEDICINE CLINIC | Facility: CLINIC | Age: 73
End: 2018-10-19
Payer: MEDICARE

## 2018-10-19 VITALS — HEART RATE: 68 BPM | DIASTOLIC BLOOD PRESSURE: 60 MMHG | SYSTOLIC BLOOD PRESSURE: 100 MMHG | TEMPERATURE: 97.4 F

## 2018-10-19 DIAGNOSIS — R13.10 ODYNOPHAGIA: ICD-10-CM

## 2018-10-19 DIAGNOSIS — Z23 NEED FOR PROPHYLACTIC VACCINATION AND INOCULATION AGAINST INFLUENZA: ICD-10-CM

## 2018-10-19 DIAGNOSIS — K21.9 GASTROESOPHAGEAL REFLUX DISEASE, ESOPHAGITIS PRESENCE NOT SPECIFIED: Primary | ICD-10-CM

## 2018-10-19 DIAGNOSIS — I10 ESSENTIAL HYPERTENSION: ICD-10-CM

## 2018-10-19 PROBLEM — R13.19 ESOPHAGEAL DYSPHAGIA: Status: ACTIVE | Noted: 2018-10-19

## 2018-10-19 PROCEDURE — 90662 IIV NO PRSV INCREASED AG IM: CPT | Performed by: INTERNAL MEDICINE

## 2018-10-19 PROCEDURE — G0008 ADMIN INFLUENZA VIRUS VAC: HCPCS | Performed by: INTERNAL MEDICINE

## 2018-10-19 PROCEDURE — 99213 OFFICE O/P EST LOW 20 MIN: CPT | Performed by: INTERNAL MEDICINE

## 2018-10-19 NOTE — PROGRESS NOTES
Assessment/Plan:    No problem-specific Assessment & Plan notes found for this encounter  Diagnoses and all orders for this visit:    Gastroesophageal reflux disease, esophagitis presence not specified  -     Ambulatory Referral to GI Endoscopy; Future    Refer for EGD given history of GERD, distal esophageal ulcers in 2009 (last EGD), newer onset of pain/tight sensation with swallowing  Denies "stuck" sensation or difficulty getting food down  Need for prophylactic vaccination and inoculation against influenza  -     influenza vaccine, 7292-3703, high-dose, PF 0 5 mL, for patients 65 yr+ (FLUZONE HIGH-DOSE)    Essential hypertension  Blood pressure borderline low today with systolic 874  Can consider discontinuing HCTZ  Pt follows with cardiology  Subjective:      Patient ID: Casey Coleman is a 75 y o  male  Here for follow up chronic conditions  Reports that he is doing well  Occasionally feels "flutter" in his chest  No associated symptoms, no dizziness  Lasts a few minutes then goes away  Takes aleve as needed for arthritis  Had a cortisone shot in R knee that has helped "90%"     Occasionally has regurgitation/coughing up "white or clear" material after eating  Notices his voice gets raspy before this happens  Takes ranitidine and occasionally TUMS for this, thinks this has been an issue for at least 10-15 years  Also notices feeling of tightness and some pain with swallowing  Denies sensation of food getting stuck  Reports history of EGD  Chart review demonstrates that this was in 2009 - he was hospitalized for abdominal wound dehiscence after AAA repair and had coffee ground emesis, was found to have distal esophageal ulcers  Occasionally gets skin dryness on his face which resolves with vaseline  History of incisional hernia repair Feb 2018  Former smoker - quit in 2000, started in late teens           The following portions of the patient's history were reviewed and updated as appropriate: allergies, current medications, past family history, past medical history, past social history, past surgical history and problem list     Review of Systems   Constitutional: Negative for appetite change, chills, fatigue and fever  HENT: Negative for trouble swallowing  Eyes: Negative for visual disturbance  Respiratory: Negative for cough, chest tightness and shortness of breath  Cardiovascular: Positive for palpitations  Negative for chest pain  Gastrointestinal: Negative for abdominal pain, constipation and diarrhea  Musculoskeletal: Positive for arthralgias  Skin: Negative for color change and rash  Allergic/Immunologic: Negative  Neurological: Negative for dizziness, weakness and headaches  Hematological: Negative  Psychiatric/Behavioral: Negative  Objective:      /60   Pulse 68   Temp (!) 97 4 °F (36 3 °C)          Physical Exam   Constitutional: He is oriented to person, place, and time  He appears well-developed and well-nourished  No distress  HENT:   Head: Normocephalic  Mouth/Throat: Oropharynx is clear and moist    Eyes: Pupils are equal, round, and reactive to light  Conjunctivae and EOM are normal    Neck: Neck supple  Cardiovascular: Normal rate and regular rhythm  Pulmonary/Chest: Effort normal  He has no wheezes  Mild bibasilar crackles   Abdominal: Soft  Bowel sounds are normal  He exhibits no distension  There is no tenderness  Musculoskeletal: Normal range of motion  Neurological: He is alert and oriented to person, place, and time  Skin: Skin is warm and dry  He is not diaphoretic  Psychiatric: He has a normal mood and affect

## 2018-11-21 ENCOUNTER — OFFICE VISIT (OUTPATIENT)
Dept: GASTROENTEROLOGY | Facility: CLINIC | Age: 73
End: 2018-11-21
Payer: MEDICARE

## 2018-11-21 VITALS
HEIGHT: 68 IN | SYSTOLIC BLOOD PRESSURE: 122 MMHG | HEART RATE: 82 BPM | BODY MASS INDEX: 22.58 KG/M2 | DIASTOLIC BLOOD PRESSURE: 60 MMHG | WEIGHT: 149 LBS | TEMPERATURE: 98.9 F

## 2018-11-21 DIAGNOSIS — R13.10 ODYNOPHAGIA: ICD-10-CM

## 2018-11-21 DIAGNOSIS — K21.9 GASTROESOPHAGEAL REFLUX DISEASE, ESOPHAGITIS PRESENCE NOT SPECIFIED: Primary | ICD-10-CM

## 2018-11-21 PROCEDURE — 99214 OFFICE O/P EST MOD 30 MIN: CPT | Performed by: INTERNAL MEDICINE

## 2018-11-21 NOTE — LETTER
November 21, 2018     Rylee Mmcahon, DO  [address] [address]    Patient: eJvon Chambers   YOB: 1945   Date of Visit: 11/21/2018       Dear Dr Willson Sat:    Thank you for referring Prieto Given to me for evaluation  Below are my notes for this consultation  If you have questions, please do not hesitate to call me  I look forward to following your patient along with you  Sincerely,        Bryan Garcia MD        CC: No Recipients  Bryan Garcia MD  11/21/2018  2:30 PM  Attested  [facility] - Outpatient Consultation  Jevon Chambers 75 y o  male MRN: [id number]  Encounter: [id number]          ASSESSMENT AND PLAN:      1  Gastroesophageal reflux disease, esophagitis presence not specified  2  Odynophagia  3  History of PUD  -patient with alarm symptoms of odynophagia the setting of longstanding GERD and history of PUD  -will proceed with EGD to evaluate for Gonzalez's esophagus, stricture, ulcer, gastritis or esophagitis, malignancy  -off to escalate his acid suppressive medication but he was content with this current regimen will determine appropriate acid suppressive regimen post EGD  -will check biopsies for H pylori    4  History of tubular adenoma  -last colonoscopy 5/2018, next due in 2023  ______________________________________________________________________    HPI:  78-year-old male seen in consultation for odynophagia in the setting of GERD with history of PUD  Patient has significant past medical history for CAD, AAA repair, PVD, hypertension, hyperlipidemia, history of incisional hernia repair  Patient states he has been having painful swallowing to solids over the past 8-10 months  Denies nausea or vomiting no change in bowel habits, denies constipation or diarrhea no blood in bowel movements, unintentional weight loss    He also reports history of reflux symptoms almost on a daily basis under control with daily Tums and as needed ranitidine  He denies NSAID use, quit tobacco decades ago and only consumes alcohol socially  Patient had a previous endoscopy in 2009 which revealed history of ulcer disease  Patient previously had colonoscopy in 5/2018 which revealed tubular adenoma  Earlier this year in February he underwent incisional hernia repair  REVIEW OF SYSTEMS:    CONSTITUTIONAL: Denies any fever, chills, rigors, and weight loss  HEENT: No earache or tinnitus  Denies hearing loss or visual disturbances  CARDIOVASCULAR: No chest pain or palpitations  RESPIRATORY: Denies any cough, hemoptysis, shortness of breath or dyspnea on exertion  GASTROINTESTINAL: As noted in the History of Present Illness  GENITOURINARY: No problems with urination  Denies any hematuria or dysuria  NEUROLOGIC: No dizziness or vertigo, denies headaches  MUSCULOSKELETAL: Denies any muscle or joint pain  SKIN: Denies skin rashes or itching  ENDOCRINE: Denies excessive thirst  Denies intolerance to heat or cold  PSYCHOSOCIAL: Denies depression or anxiety  Denies any recent memory loss         Historical Information   Past Medical History:   Diagnosis Date    Abdominal aortic aneurysm (AAA) ([facility] [address] )     last assessed nov 6 2015    Abscess of groin     last assessed oct 6 2014    Candidiasis, cutaneous     last assessed march 19 2014    Coronary artery disease     Dyslipidemia     Esophageal ulcer without bleeding     Gastritis     Hiatal hernia     Hx of cataract surgery, left     last assessed july 21 2014    Hyperlipidemia     Hypertension     Old myocardial infarction     Recurrent right inguinal hernia     s/p right orchioectomy, mesh removal, and sinus trac removal patient being followed by surgery next appt 4/28/14 patient s/p keflex x 7 days for MSSA Cx repeat wound cx grew MSSA cx repeat wound cx grew MSSA and other armond (mixed) no MRSA identified conitunue close monitoring and local wound care, last assessed april 15 2014    Wound of skin     in the groin, right     Past Surgical History:   Procedure Laterality Date    ABDOMINAL AORTIC ANEURYSM REPAIR  2009    aortobi-iliac, dacron graft    APPENDECTOMY      open    COLONOSCOPY      CORONARY ANGIOPLASTY WITH STENT PLACEMENT      stent 2    CYSTOSCOPY      diagnostic onset nov 13 2014    EXPLORATORY LAPAROTOMY  12/09/2009    HIP SURGERY Right     INGUINAL HERNIA REPAIR Right     unilateral x2    ORCHIECTOMY Right     [place] COLONOSCOPY FLX DX W/COLLJ SPEC WHEN PFRMD N/A 5/22/2018    Procedure: COLONOSCOPY;  Surgeon: Geovanna Mitchell DO;  [facility]; Service: Gastroenterology    [place] REPAIR INCISIONAL HERNIA,REDUCIBLE N/A 2/23/2018    Procedure: lysis of adhesions; INCISIONAL HERNIA REPAIR WITH MESH;  Surgeon: Meggan Niño MD;  [facility];  Service: General    UPPER GASTROINTESTINAL ENDOSCOPY       Social History   History   Alcohol Use    Yes     Comment: occasionally     History   Drug Use No     History   Smoking Status    Former Smoker   Smokeless Tobacco    Never Used     Family History   Problem Relation Age of Onset    Heart disease Mother     Diabetes Mother     Heart disease Father     Diabetes Sister     Cancer Brother        Meds/Allergies       Current Outpatient Prescriptions:     aspirin 81 MG tablet    atorvastatin (LIPITOR) 40 mg tablet    hydrochlorothiazide (HYDRODIURIL) 12 5 mg tablet    hydrochlorothiazide (MICROZIDE) 12 5 mg capsule    lisinopril (ZESTRIL) 20 mg tablet    metoprolol succinate (TOPROL-XL) 50 mg 24 hr tablet    ranitidine (ZANTAC) 150 MG capsule    No Known Allergies        Objective     Blood pressure 122/60, pulse 82, temperature 98 9 °F (37 2 °C), temperature source Tympanic, height 5' 8" (1 727 m), weight 67 6 kg (149 lb)  Body mass index is 22 66 kg/m²          PHYSICAL EXAM:      General Appearance:   Alert, cooperative, no distress   HEENT:   Normocephalic, atraumatic, anicteric      Neck:  Supple, symmetrical, trachea midline   Lungs:   Clear to auscultation bilaterally; no rales, rhonchi or wheezing; respirations unlabored    Heart[de-identified]   Regular rate and rhythm; no murmur, rub, or gallop  Abdomen:   Soft, non-tender, non-distended; normal bowel sounds; no masses, no organomegaly    Genitalia:   Deferred    Rectal:   Deferred    Extremities:  No cyanosis, clubbing or edema    Pulses:  2+ and symmetric    Skin:  No jaundice, rashes, or lesions    Lymph nodes:  No palpable cervical lymphadenopathy        Lab Results:   No visits with results within 1 Day(s) from this visit  Latest known visit with results is:   Admission on 05/22/2018, Discharged on 05/22/2018   Component Date Value    Case Report 05/22/2018                      Value:Surgical Pathology Report                         Case: [id number]                                   Authorizing Provider:  Yvon Peoples DO        Collected:           05/22/2018 1100              Ordering Location:     [facility]        Received:            05/22/2018 [address]                                                      Pathologist:           Danitza Mistry MD                                                         Specimen:    Polyp, Colorectal, descending colon polyp cold forcep                                      Final Diagnosis 05/22/2018                      Value: This result contains rich text formatting which cannot be displayed here   Additional Information 05/22/2018                      Value: This result contains rich text formatting which cannot be displayed here  [place] Description 05/22/2018                      Value: This result contains rich text formatting which cannot be displayed here  Radiology Results:   No results found    Attestation signed by Celina Hatfield MD at 11/21/2018  4:54 PM:  I reviewed the care furnished by the Gastroenterology fellow Dr Jamison Abraham during the visit  I was present in the office and personally saw and examined the patient and agree with his assessment and plan  We will schedule him for an upper endoscopy given his history of peptic ulcer disease and his new odynophagia  He is due for his next colonoscopy because of his history of a tubular adenoma in 5 years      Candace Trujillo MD

## 2018-11-21 NOTE — PROGRESS NOTES
[facility] - Outpatient Consultation  Carlos Gallego 75 y o  male MRN: [id number]  Encounter: [id number]          ASSESSMENT AND PLAN:      1  Gastroesophageal reflux disease, esophagitis presence not specified  2  Odynophagia  3  History of PUD  -patient with alarm symptoms of odynophagia the setting of longstanding GERD and history of PUD  -will proceed with EGD to evaluate for Gonzalez's esophagus, stricture, ulcer, gastritis or esophagitis, malignancy  -off to escalate his acid suppressive medication but he was content with this current regimen will determine appropriate acid suppressive regimen post EGD  -will check biopsies for H pylori    4  History of tubular adenoma  -last colonoscopy 5/2018, next due in 2023  ______________________________________________________________________    HPI:  48-year-old male seen in consultation for odynophagia in the setting of GERD with history of PUD  Patient has significant past medical history for CAD, AAA repair, PVD, hypertension, hyperlipidemia, history of incisional hernia repair  Patient states he has been having painful swallowing to solids over the past 8-10 months  Denies nausea or vomiting no change in bowel habits, denies constipation or diarrhea no blood in bowel movements, unintentional weight loss  He also reports history of reflux symptoms almost on a daily basis under control with daily Tums and as needed ranitidine  He denies NSAID use, quit tobacco decades ago and only consumes alcohol socially  Patient had a previous endoscopy in 2009 which revealed history of ulcer disease  Patient previously had colonoscopy in 5/2018 which revealed tubular adenoma  Earlier this year in February he underwent incisional hernia repair  REVIEW OF SYSTEMS:    CONSTITUTIONAL: Denies any fever, chills, rigors, and weight loss  HEENT: No earache or tinnitus  Denies hearing loss or visual disturbances    CARDIOVASCULAR: No chest pain or palpitations  RESPIRATORY: Denies any cough, hemoptysis, shortness of breath or dyspnea on exertion  GASTROINTESTINAL: As noted in the History of Present Illness  GENITOURINARY: No problems with urination  Denies any hematuria or dysuria  NEUROLOGIC: No dizziness or vertigo, denies headaches  MUSCULOSKELETAL: Denies any muscle or joint pain  SKIN: Denies skin rashes or itching  ENDOCRINE: Denies excessive thirst  Denies intolerance to heat or cold  PSYCHOSOCIAL: Denies depression or anxiety  Denies any recent memory loss         Historical Information   Past Medical History:   Diagnosis Date    Abdominal aortic aneurysm (AAA) ([facility] [address] )     last assessed nov 6 2015    Abscess of groin     last assessed oct 6 2014    Candidiasis, cutaneous     last assessed march 19 2014    Coronary artery disease     Dyslipidemia     Esophageal ulcer without bleeding     Gastritis     Hiatal hernia     Hx of cataract surgery, left     last assessed july 21 2014    Hyperlipidemia     Hypertension     Old myocardial infarction     Recurrent right inguinal hernia     s/p right orchioectomy, mesh removal, and sinus trac removal patient being followed by surgery next appt 4/28/14 patient s/p keflex x 7 days for MSSA Cx repeat wound cx grew MSSA cx repeat wound cx grew MSSA and other armond (mixed) no MRSA identified conitunue close monitoring and local wound care, last assessed april 15 2014    Wound of skin     in the groin, right     Past Surgical History:   Procedure Laterality Date    ABDOMINAL AORTIC ANEURYSM REPAIR  2009    aortobi-iliac, dacron graft    APPENDECTOMY      open    COLONOSCOPY      CORONARY ANGIOPLASTY WITH STENT PLACEMENT      stent 2    CYSTOSCOPY      diagnostic onset nov 13 2014    EXPLORATORY LAPAROTOMY  12/09/2009    HIP SURGERY Right     INGUINAL HERNIA REPAIR Right     unilateral x2    ORCHIECTOMY Right     [place] COLONOSCOPY FLX DX W/COLLJ SPEC WHEN PFRMD N/A 5/22/2018 Procedure: COLONOSCOPY;  Surgeon: Suad Zamora DO;  [facility]; Service: Gastroenterology    [place] REPAIR INCISIONAL HERNIA,REDUCIBLE N/A 2/23/2018    Procedure: lysis of adhesions; INCISIONAL HERNIA REPAIR WITH MESH;  Surgeon: Fernando Dang MD;  [facility];  Service: General    UPPER GASTROINTESTINAL ENDOSCOPY       Social History   History   Alcohol Use    Yes     Comment: occasionally     History   Drug Use No     History   Smoking Status    Former Smoker   Smokeless Tobacco    Never Used     Family History   Problem Relation Age of Onset    Heart disease Mother     Diabetes Mother     Heart disease Father     Diabetes Sister     Cancer Brother        Meds/Allergies       Current Outpatient Prescriptions:     aspirin 81 MG tablet    atorvastatin (LIPITOR) 40 mg tablet    hydrochlorothiazide (HYDRODIURIL) 12 5 mg tablet    hydrochlorothiazide (MICROZIDE) 12 5 mg capsule    lisinopril (ZESTRIL) 20 mg tablet    metoprolol succinate (TOPROL-XL) 50 mg 24 hr tablet    ranitidine (ZANTAC) 150 MG capsule    No Known Allergies        Objective     Blood pressure 122/60, pulse 82, temperature 98 9 °F (37 2 °C), temperature source Tympanic, height 5' 8" (1 727 m), weight 67 6 kg (149 lb)  Body mass index is 22 66 kg/m²  PHYSICAL EXAM:      General Appearance:   Alert, cooperative, no distress   HEENT:   Normocephalic, atraumatic, anicteric      Neck:  Supple, symmetrical, trachea midline   Lungs:   Clear to auscultation bilaterally; no rales, rhonchi or wheezing; respirations unlabored    Heart[de-identified]   Regular rate and rhythm; no murmur, rub, or gallop     Abdomen:   Soft, non-tender, non-distended; normal bowel sounds; no masses, no organomegaly    Genitalia:   Deferred    Rectal:   Deferred    Extremities:  No cyanosis, clubbing or edema    Pulses:  2+ and symmetric    Skin:  No jaundice, rashes, or lesions    Lymph nodes:  No palpable cervical lymphadenopathy        Lab Results:   No visits with results within 1 Day(s) from this visit  Latest known visit with results is:   Admission on 05/22/2018, Discharged on 05/22/2018   Component Date Value    Case Report 05/22/2018                      Value:Surgical Pathology Report                         Case: [id number]                                   Authorizing Provider:  Jean Tirado DO        Collected:           05/22/2018 1100              Ordering [facility]     [facility]        Received:            05/22/2018 [address]                                                      Pathologist:           Yimi Aleman MD                                                         Specimen:    Polyp, Colorectal, descending colon polyp cold forcep                                      Final Diagnosis 05/22/2018                      Value: This result contains rich text formatting which cannot be displayed here   Additional Information 05/22/2018                      Value: This result contains rich text formatting which cannot be displayed here  [place] Description 05/22/2018                      Value: This result contains rich text formatting which cannot be displayed here  Radiology Results:   No results found

## 2018-11-21 NOTE — PATIENT INSTRUCTIONS
EGD scheduled with Dr Mann at [facility] on 2/14/2018  Elenora Elodia gave pt verbal instructions/paper work given

## 2018-12-21 ENCOUNTER — HOSPITAL ENCOUNTER (OUTPATIENT)
Dept: NON INVASIVE DIAGNOSTICS | Facility: CLINIC | Age: 73
Discharge: HOME | End: 2018-12-21
Payer: MEDICARE

## 2018-12-21 DIAGNOSIS — I74.09 AORTOILIAC OCCLUSIVE DISEASE (HCC): ICD-10-CM

## 2018-12-21 DIAGNOSIS — I71.4 ANEURYSM OF ABDOMINAL VESSEL (HCC): ICD-10-CM

## 2018-12-21 PROCEDURE — 93978 VASCULAR STUDY: CPT

## 2018-12-21 PROCEDURE — 93923 UPR/LXTR ART STDY 3+ LVLS: CPT

## 2018-12-26 PROCEDURE — 93978 VASCULAR STUDY: CPT | Performed by: SURGERY

## 2018-12-26 PROCEDURE — 93923 UPR/LXTR ART STDY 3+ LVLS: CPT | Performed by: SURGERY

## 2019-02-14 ENCOUNTER — ANESTHESIA (OUTPATIENT)
Dept: GASTROENTEROLOGY | Facility: HOSPITAL | Age: 74
End: 2019-02-14
Payer: MEDICARE

## 2019-02-14 ENCOUNTER — ANESTHESIA EVENT (OUTPATIENT)
Dept: GASTROENTEROLOGY | Facility: HOSPITAL | Age: 74
End: 2019-02-14
Payer: MEDICARE

## 2019-02-14 ENCOUNTER — HOSPITAL ENCOUNTER (OUTPATIENT)
Facility: HOSPITAL | Age: 74
Setting detail: OUTPATIENT SURGERY
Discharge: HOME | End: 2019-02-14
Attending: INTERNAL MEDICINE | Admitting: INTERNAL MEDICINE
Payer: MEDICARE

## 2019-02-14 VITALS
HEART RATE: 66 BPM | BODY MASS INDEX: 22.73 KG/M2 | SYSTOLIC BLOOD PRESSURE: 112 MMHG | RESPIRATION RATE: 16 BRPM | DIASTOLIC BLOOD PRESSURE: 52 MMHG | WEIGHT: 150 LBS | OXYGEN SATURATION: 95 % | TEMPERATURE: 97.4 F | HEIGHT: 68 IN

## 2019-02-14 DIAGNOSIS — K21.00 GASTROESOPHAGEAL REFLUX DISEASE WITH ESOPHAGITIS: Primary | ICD-10-CM

## 2019-02-14 DIAGNOSIS — R13.10 ODYNOPHAGIA: ICD-10-CM

## 2019-02-14 DIAGNOSIS — K21.9 GASTROESOPHAGEAL REFLUX DISEASE, ESOPHAGITIS PRESENCE NOT SPECIFIED: ICD-10-CM

## 2019-02-14 PROCEDURE — 88305 TISSUE EXAM BY PATHOLOGIST: CPT | Performed by: PATHOLOGY

## 2019-02-14 PROCEDURE — 43239 EGD BIOPSY SINGLE/MULTIPLE: CPT | Performed by: INTERNAL MEDICINE

## 2019-02-14 RX ORDER — PANTOPRAZOLE SODIUM 40 MG/1
40 TABLET, DELAYED RELEASE ORAL DAILY
Qty: 30 TABLET | Refills: 5 | Status: SHIPPED | OUTPATIENT
Start: 2019-02-14 | End: 2019-06-13 | Stop reason: SDUPTHER

## 2019-02-14 RX ORDER — SODIUM CHLORIDE 9 MG/ML
125 INJECTION, SOLUTION INTRAVENOUS CONTINUOUS
Status: DISCONTINUED | OUTPATIENT
Start: 2019-02-14 | End: 2019-02-14 | Stop reason: HOSPADM

## 2019-02-14 RX ORDER — PROPOFOL 10 MG/ML
INJECTION, EMULSION INTRAVENOUS AS NEEDED
Status: DISCONTINUED | OUTPATIENT
Start: 2019-02-14 | End: 2019-02-14 | Stop reason: SURG

## 2019-02-14 RX ORDER — PROPOFOL 10 MG/ML
INJECTION, EMULSION INTRAVENOUS CONTINUOUS PRN
Status: DISCONTINUED | OUTPATIENT
Start: 2019-02-14 | End: 2019-02-14 | Stop reason: SURG

## 2019-02-14 RX ADMIN — PROPOFOL 40 MG: 10 INJECTION, EMULSION INTRAVENOUS at 10:28

## 2019-02-14 RX ADMIN — PROPOFOL 100 MG: 10 INJECTION, EMULSION INTRAVENOUS at 10:24

## 2019-02-14 RX ADMIN — PROPOFOL 120 MCG/KG/MIN: 10 INJECTION, EMULSION INTRAVENOUS at 10:28

## 2019-02-14 RX ADMIN — PROPOFOL 40 MG: 10 INJECTION, EMULSION INTRAVENOUS at 10:26

## 2019-02-14 RX ADMIN — PROPOFOL 40 MG: 10 INJECTION, EMULSION INTRAVENOUS at 10:27

## 2019-02-14 RX ADMIN — SODIUM CHLORIDE 125 ML/HR: 0.9 INJECTION, SOLUTION INTRAVENOUS at 10:11

## 2019-02-14 RX ADMIN — SODIUM CHLORIDE: 0.9 INJECTION, SOLUTION INTRAVENOUS at 10:19

## 2019-02-14 NOTE — ANESTHESIA PREPROCEDURE EVALUATION
Review of Systems/Medical History  Patient summary reviewed  Chart reviewed      Cardiovascular  Hyperlipidemia, Hypertension , Past MI > 6 months, CAD , CAD status: 2VD, Cardiac stents bare metal   Comment: H/om AAA repaired 2015,  Pulmonary  Negative pulmonary ROS        GI/Hepatic  Dysphagia,   GERD ,  Hiatal hernia,        Negative  ROS        Endo/Other  Negative endo/other ROS      GYN  Negative gynecology ROS          Hematology   Musculoskeletal    Arthritis     Neurology  Negative neurology ROS      Psychology   Negative psychology ROS              Physical Exam    Airway    Mallampati score: III  TM Distance: <3 FB  Neck ROM: limited     Dental   upper dentures and lower dentures,     Cardiovascular  Rhythm: regular, Rate: normal,     Pulmonary  Breath sounds clear to auscultation, Decreased breath sounds,     Other Findings        Anesthesia Plan  ASA Score- 3     Anesthesia Type- IV sedation with anesthesia with ASA Monitors  Additional Monitors:   Airway Plan:         Plan Factors-    Induction- intravenous  Postoperative Plan- Plan for postoperative opioid use  Informed Consent- Anesthetic plan and risks discussed with patient  I personally reviewed this patient with the CRNA  Discussed and agreed on the Anesthesia Plan with the CRNA  Ben Hall

## 2019-02-14 NOTE — H&P
History and Physical - [facility]  Tru Mota 75 y o  male MRN: [id number]    HPI: Tru Mota is a 75y o  year old male who presents for evaluation of odynophagia and personal history of peptic ulcer disease      Review of Systems    Historical Information   Past Medical History:   Diagnosis Date    Abdominal aortic aneurysm (AAA) ([facility] [address] )     last assessed nov 6 2015    Abscess of groin     last assessed oct 6 2014    Candidiasis, cutaneous     last assessed march 19 2014    Coronary artery disease     Dyslipidemia     Esophageal ulcer without bleeding     Gastritis     Hiatal hernia     Hx of cataract surgery, left     last assessed july 21 2014    Hyperlipidemia     Hypertension     Old myocardial infarction     Recurrent right inguinal hernia     s/p right orchioectomy, mesh removal, and sinus trac removal patient being followed by surgery next appt 4/28/14 patient s/p keflex x 7 days for MSSA Cx repeat wound cx grew MSSA cx repeat wound cx grew MSSA and other armond (mixed) no MRSA identified conitunue close monitoring and local wound care, last assessed april 15 2014    Wound of skin     in the groin, right     Past Surgical History:   Procedure Laterality Date    ABDOMINAL AORTIC ANEURYSM REPAIR  2009    aortobi-iliac, dacron graft    APPENDECTOMY      open    COLONOSCOPY      CORONARY ANGIOPLASTY WITH STENT PLACEMENT      stent 2    CYSTOSCOPY      diagnostic onset nov 13 2014    EXPLORATORY LAPAROTOMY  12/09/2009    HIP SURGERY Right     INGUINAL HERNIA REPAIR Right     unilateral x2    ORCHIECTOMY Right     [place] COLONOSCOPY FLX DX W/COLLJ SPEC WHEN PFRMD N/A 5/22/2018    Procedure: COLONOSCOPY;  Surgeon: Evie Ramsey DO;  [facility];   Service: Gastroenterology    [place] REPAIR INCISIONAL HERNIA,REDUCIBLE N/A 2/23/2018    Procedure: lysis of adhesions; INCISIONAL HERNIA REPAIR WITH MESH;  Surgeon: Werner Hill MD;  [facility];  Service: General    UPPER GASTROINTESTINAL ENDOSCOPY       Social History   Social History     Substance and Sexual Activity   Alcohol Use Yes    Comment: occasionally     Social History     Substance and Sexual Activity   Drug Use No     Social History     Tobacco Use   Smoking Status Former Smoker   Smokeless Tobacco Never Used     Family History   Problem Relation Age of Onset    Heart disease Mother     Diabetes Mother     Heart disease Father     Diabetes Sister     Cancer Brother        Meds/Allergies     Medications Prior to Admission   Medication    aspirin 81 MG tablet    atorvastatin (LIPITOR) 40 mg tablet    hydrochlorothiazide (HYDRODIURIL) 12 5 mg tablet    lisinopril (ZESTRIL) 20 mg tablet    metoprolol succinate (TOPROL-XL) 50 mg 24 hr tablet    ranitidine (ZANTAC) 150 MG capsule    hydrochlorothiazide (MICROZIDE) 12 5 mg capsule       No Known Allergies    Objective     Blood pressure 151/64, pulse 65, temperature (!) 97 4 °F (36 3 °C), temperature source Tympanic, resp  rate 18, height 5' 8" (1 727 m), weight 68 kg (150 lb), SpO2 96 %        PHYSICAL EXAM    Gen: NAD  CV: RRR  CHEST: Clear  ABD: soft, NT/ND  EXT: no edema  Neuro: AAO      ASSESSMENT/PLAN:  This is a 75y o  year old male here for evaluation of odynophagia and personal history of peptic ulcer disease    PLAN:   Procedure:  EGD with biopsy

## 2019-02-14 NOTE — DISCHARGE INSTRUCTIONS
OPERATIVE REPORT  PATIENT NAME: Aarti Gamino    :  [id number]  MRN: [id number]  Pt Location: [facility]    SURGERY DATE: 2019    Surgeon(s) and Role:     * Carina Bob MD - Primary    ESOPHAGOGASTRODUODENOSCOPY    PROCEDURE: EGD    SEDATION: Monitored anesthesia care, check anesthesia records    ASA Class: 2    INDICATIONS:  Odynophagia and history of peptic ulcer disease    CONSENT:  Informed consent was obtained for the procedure, including sedation after explaining the risks and benefits of the procedure  Risks including but not limited to bleeding, perforation, infection, and missed lesion  PREPARATION:   Telemetry, pulse oximetry, blood pressure were monitored throughout the procedure  Patient was identified by myself both verbally and by visual inspection of ID band  DESCRIPTION:   Patient was placed in the left lateral decubitus position and was sedated with the above medication  The gastroscope was introduced in to the oropharynx and the esophagus was intubated under direct visualization  Scope was passed down the esophagus up to 2nd part of the duodenum  A careful inspection was made as the gastroscope was withdrawn, including a retroflexed view of the stomach; findings and interventions are described below  FINDINGS:    #1  Esophagus- LA grade D esophagitis with multiple linear erosions in the distal esophagus  This is likely secondary to uncontrolled reflux  A 5 mm nodule noted at the GE junction  The polyp was removed using cold biopsy forceps  Large mixed sliding and paraesophageal hernia noted  #2Loretta Railing cluster of polyps noted in the proximal esophagus predominantly and cardia  Multiple cold biopsies were taken from this area  Mild nonerosive gastritis noted in the antrum  Biopsy taken using cold biopsy forceps    #3  Duodenum- normal bulb and 2nd portion of the duodenum         IMPRESSIONS:      Severe esophagitis in the distal esophagus    A small nodule removed from GE junction  Cluster of polyps at the GE junction  Biopsies taken from these polyps  Mild nonerosive gastritis cold biopsies were taken to rule out H pylori infection  Normal duodenum  Large hiatal hernia noted  RECOMMENDATIONS:     Reflux precautions  I recommend starting pantoprazole 40 mg once daily  Follow-up biopsy result  Follow-up in the office  COMPLICATIONS:  None; patient tolerated the procedure well      SPECIMENS:    ID Type Source Tests Collected by Time Destination   1 : gastro esophageal junction nodule Tissue Esophagogastric junction TISSUE EXAM Mtat Martinez MD 2/14/2019 1036    2 : polyp in the cardia Tissue Polyp, Stomach/Small Intestine TISSUE EXAM Matt Martinez MD 2/14/2019 1043    3 : r/o h pylori Tissue Stomach TISSUE EXAM Matt Martinez MD 2/14/2019 1047        ESTIMATED BLOOD LOSS:  Minimal    SIGNATURE: Matt Martinez MD  DATE: February 14, 2019  TIME: 11:02 AM

## 2019-02-14 NOTE — ANESTHESIA POSTPROCEDURE EVALUATION
Post-Op Assessment Note    CV Status:  Stable    Pain management: adequate     Mental Status:  Sleepy   Hydration Status:  Euvolemic   PONV Controlled:  Controlled   Airway Patency:  Patent   Post Op Vitals Reviewed: Yes      Staff: CRNA, Anesthesiologist           BP (!) 79/45 (02/14/19 1058)    Temp     Pulse 66 (02/14/19 1058)   Resp 16 (02/14/19 1058)    SpO2 96 % (02/14/19 1058)

## 2019-02-19 DIAGNOSIS — I10 HYPERTENSION: ICD-10-CM

## 2019-02-19 NOTE — TELEPHONE ENCOUNTER
Patient called in requesting refills  Has an appt with you on 04/08/2019  Patient is requesting a 90 day supply with 3 refills

## 2019-02-20 RX ORDER — HYDROCHLOROTHIAZIDE 12.5 MG/1
12.5 TABLET ORAL DAILY
Qty: 90 TABLET | Refills: 3 | Status: SHIPPED | OUTPATIENT
Start: 2019-02-20 | End: 2019-02-26 | Stop reason: SDUPTHER

## 2019-02-20 RX ORDER — LISINOPRIL 20 MG/1
20 TABLET ORAL DAILY
Qty: 90 TABLET | Refills: 3 | Status: SHIPPED | OUTPATIENT
Start: 2019-02-20 | End: 2019-02-26 | Stop reason: SDUPTHER

## 2019-02-20 RX ORDER — ATORVASTATIN CALCIUM 40 MG/1
40 TABLET, FILM COATED ORAL
Qty: 90 TABLET | Refills: 3 | Status: SHIPPED | OUTPATIENT
Start: 2019-02-20 | End: 2019-02-26 | Stop reason: SDUPTHER

## 2019-02-20 RX ORDER — METOPROLOL SUCCINATE 50 MG/1
50 TABLET, EXTENDED RELEASE ORAL DAILY
Qty: 90 TABLET | Refills: 3 | Status: SHIPPED | OUTPATIENT
Start: 2019-02-20 | End: 2019-02-26 | Stop reason: SDUPTHER

## 2019-02-21 ENCOUNTER — TELEPHONE (OUTPATIENT)
Dept: GASTROENTEROLOGY | Facility: CLINIC | Age: 74
End: 2019-02-21

## 2019-02-21 ENCOUNTER — TELEPHONE (OUTPATIENT)
Dept: INTERNAL MEDICINE CLINIC | Facility: CLINIC | Age: 74
End: 2019-02-21

## 2019-02-21 NOTE — TELEPHONE ENCOUNTER
You prescribed the following medication to pt: hydrochlorothiazide (HYDRODIURIL) 12 5 mg tablet yesterday 02/20/19 but it was not sent to pharmacy because it was printed instead  Please send electronically to pharmacy  Thank you

## 2019-02-21 NOTE — TELEPHONE ENCOUNTER
Called and spoke to patient to relay results  Patient demonstrated understanding  Recall set for EGD in 6 months  Patient has follow up 2/27/19 with Dr Ruchi Ortiz

## 2019-02-21 NOTE — TELEPHONE ENCOUNTER
----- Message from Rosmery Espino MD sent at 2/19/2019 11:56 AM EST -----  Please let him know biopsies negative for malignancy  He should continue taking pantoprazole 40 mg daily  Follow-up in the office  Repeat EGD in 6-12 months

## 2019-02-22 NOTE — TELEPHONE ENCOUNTER
THESE SCRIPTS WERE SENT TO [facility]  LOCAL PHARMACY AND HE WOULD LIKE THEM SENT TO MAIL ORDER  CAN YOU PLEASE RESEND TO [facility]  IT IS LISTED IN HIS CHART  [facility] MAIL ORDER

## 2019-02-26 DIAGNOSIS — I10 HYPERTENSION: ICD-10-CM

## 2019-02-26 RX ORDER — METOPROLOL SUCCINATE 50 MG/1
50 TABLET, EXTENDED RELEASE ORAL DAILY
Qty: 90 TABLET | Refills: 3 | Status: SHIPPED | OUTPATIENT
Start: 2019-02-26 | End: 2019-04-08 | Stop reason: SDUPTHER

## 2019-02-26 RX ORDER — LISINOPRIL 20 MG/1
20 TABLET ORAL DAILY
Qty: 90 TABLET | Refills: 3 | Status: SHIPPED | OUTPATIENT
Start: 2019-02-26 | End: 2019-04-08 | Stop reason: SDUPTHER

## 2019-02-26 RX ORDER — HYDROCHLOROTHIAZIDE 12.5 MG/1
12.5 TABLET ORAL DAILY
Qty: 90 TABLET | Refills: 3 | Status: SHIPPED | OUTPATIENT
Start: 2019-02-26 | End: 2019-03-04 | Stop reason: SDUPTHER

## 2019-02-26 RX ORDER — ATORVASTATIN CALCIUM 40 MG/1
40 TABLET, FILM COATED ORAL
Qty: 90 TABLET | Refills: 3 | Status: SHIPPED | OUTPATIENT
Start: 2019-02-26 | End: 2019-02-26 | Stop reason: SDUPTHER

## 2019-02-26 RX ORDER — METOPROLOL SUCCINATE 50 MG/1
50 TABLET, EXTENDED RELEASE ORAL DAILY
Qty: 90 TABLET | Refills: 3 | Status: SHIPPED | OUTPATIENT
Start: 2019-02-26 | End: 2019-02-26 | Stop reason: SDUPTHER

## 2019-02-26 RX ORDER — ATORVASTATIN CALCIUM 40 MG/1
40 TABLET, FILM COATED ORAL
Qty: 90 TABLET | Refills: 3 | Status: SHIPPED | OUTPATIENT
Start: 2019-02-26 | End: 2019-04-08 | Stop reason: SDUPTHER

## 2019-02-26 RX ORDER — LISINOPRIL 20 MG/1
20 TABLET ORAL DAILY
Qty: 90 TABLET | Refills: 3 | Status: SHIPPED | OUTPATIENT
Start: 2019-02-26 | End: 2019-02-26 | Stop reason: SDUPTHER

## 2019-02-26 RX ORDER — HYDROCHLOROTHIAZIDE 12.5 MG/1
12.5 TABLET ORAL DAILY
Qty: 90 TABLET | Refills: 3 | Status: SHIPPED | OUTPATIENT
Start: 2019-02-26 | End: 2019-02-26 | Stop reason: SDUPTHER

## 2019-02-26 NOTE — TELEPHONE ENCOUNTER
Patient needs meds sent electronically  This was not sent out   The 4 medications you sent out on 02/20

## 2019-02-26 NOTE — TELEPHONE ENCOUNTER
CAN YOU PLEASE REFILL THESE ON BEHALF OF DR Mark Porter  PT  CALLED AGAIN     SEND TO [facility], NEED TO CHANGE IT IN CHART BEFORE YOU SEND

## 2019-02-27 ENCOUNTER — OFFICE VISIT (OUTPATIENT)
Dept: GASTROENTEROLOGY | Facility: CLINIC | Age: 74
End: 2019-02-27
Payer: MEDICARE

## 2019-02-27 ENCOUNTER — TELEPHONE (OUTPATIENT)
Dept: INTERNAL MEDICINE CLINIC | Facility: CLINIC | Age: 74
End: 2019-02-27

## 2019-02-27 VITALS
DIASTOLIC BLOOD PRESSURE: 72 MMHG | SYSTOLIC BLOOD PRESSURE: 145 MMHG | HEART RATE: 76 BPM | BODY MASS INDEX: 22.88 KG/M2 | TEMPERATURE: 97.6 F | WEIGHT: 151 LBS | HEIGHT: 68 IN

## 2019-02-27 DIAGNOSIS — K20.80 ESOPHAGITIS, LOS ANGELES GRADE D: ICD-10-CM

## 2019-02-27 DIAGNOSIS — I10 ESSENTIAL HYPERTENSION: Primary | ICD-10-CM

## 2019-02-27 DIAGNOSIS — R13.10 ODYNOPHAGIA: ICD-10-CM

## 2019-02-27 DIAGNOSIS — K21.00 GASTROESOPHAGEAL REFLUX DISEASE WITH ESOPHAGITIS: Primary | ICD-10-CM

## 2019-02-27 PROCEDURE — 99214 OFFICE O/P EST MOD 30 MIN: CPT | Performed by: INTERNAL MEDICINE

## 2019-02-27 NOTE — PATIENT INSTRUCTIONS
Take Omeprazole 40mg 30 minutes before eating or drinking in the morning  Take Ranitidine 150mg at bedtime for 7 days then as needed  Do not eat 2-3 hours prior to lying down  Continue to use wedge pillow    EGD scheduled 6/6/19 with [facility] at [address]   Instructions given in office

## 2019-02-27 NOTE — PROGRESS NOTES
[facility] - Outpatient Follow-up Note  Jevon Chambers 75 y o  male MRN: [id number]  Encounter: [id number]          ASSESSMENT AND PLAN:      1  Gastroesophageal reflux disease with esophagitis  2  Odynophagia  3  Esophagitis, [place] grade D  -status post EGD 2/14/19 which revealed LA grade D esophagitis with 5 mm nodule noted at GE junction which was removed and found to be a benign; the stomach contained a cluster of polyps in the proximal esophagus predominately and cardia multiple cold forceps biopsies were taken from this area which also revealed benign ideology  -at that time he was placed on omeprazole 40 mg once daily  -symptoms of dysphagia/odynophagia have improved, however still has intermittent symptoms of heartburn, regurgitation worse at night  -advised on omeprazole 40 mg once daily 30 minutes before breakfast, ranitidine 150 mg at bedtime for 7 days then as needed, continue with usage of wedge pillow and avoid eating for 2-3 hours prior to lying supine  -will plan on repeat EGD in the next 3-6 months to document healing and evaluate for underlying Gonzalez's esophagus  -return to clinic in 2-3 months for reassessment of symptom control    4  Tubular adenoma-descending colon  -last colonoscopy 5/22/18, repeat recommended in 2023    ______________________________________________________________________    SUBJECTIVE:  71-year-old male seen in follow-up for evaluation of GERD symptoms  Patient underwent EGD on 02/14 which revealed LA grade D esophagitis as well as distal esophageal nodule with esophageal/cardiac polyps which were benign on histology  Patient reports improvement in odynophagia and dysphagia on omeprazole 40 mg daily  He states he still gets intermittent symptoms of heartburn and regurgitation particularly at night so he started to take omeprazole in the evening  He also states that he takes Tums on average twice per day    Denies unintentional weight loss, abdominal pain, change in bowel habits, blood in stool  REVIEW OF SYSTEMS IS OTHERWISE NEGATIVE  Historical Information   Past Medical History:   Diagnosis Date    Abdominal aortic aneurysm (AAA) ([address] )     last assessed nov 6 2015    Abscess of groin     last assessed oct 6 2014    Candidiasis, cutaneous     last assessed march 19 2014    Coronary artery disease     Dyslipidemia     Esophageal ulcer without bleeding     Gastritis     Hiatal hernia     Hx of cataract surgery, left     last assessed july 21 2014    Hyperlipidemia     Hypertension     Old myocardial infarction     Recurrent right inguinal hernia     s/p right orchioectomy, mesh removal, and sinus trac removal patient being followed by surgery next appt 4/28/14 patient s/p keflex x 7 days for MSSA Cx repeat wound cx grew MSSA cx repeat wound cx grew MSSA and other armond (mixed) no MRSA identified conitunue close monitoring and local wound care, last assessed april 15 2014    Wound of skin     in the groin, right     Past Surgical History:   Procedure Laterality Date    ABDOMINAL AORTIC ANEURYSM REPAIR  2009    aortobi-iliac, dacron graft    APPENDECTOMY      open    COLONOSCOPY      CORONARY ANGIOPLASTY WITH STENT PLACEMENT      stent 2    CYSTOSCOPY      diagnostic onset nov 13 2014    EXPLORATORY LAPAROTOMY  12/09/2009    HIP SURGERY Right     INGUINAL HERNIA REPAIR Right     unilateral x2    ORCHIECTOMY Right     SD COLONOSCOPY FLX DX W/COLLJ SPEC WHEN PFRMD N/A 5/22/2018    Procedure: COLONOSCOPY;  Surgeon: Rohit Macias DO;  [facility]; Service: Gastroenterology    SD ESOPHAGOGASTRODUODENOSCOPY TRANSORAL DIAGNOSTIC N/A 2/14/2019    Procedure: ESOPHAGOGASTRODUODENOSCOPY (EGD); Surgeon: Abimael George MD;  Location[facility];   Service: Gastroenterology    SD REPAIR INCISIONAL HERNIA,REDUCIBLE N/A 2/23/2018    Procedure: lysis of adhesions; INCISIONAL HERNIA REPAIR WITH MESH;  Surgeon: Jorge Alberto Avina Jackie Kent MD;  Location: [facility];  Service: General    UPPER GASTROINTESTINAL ENDOSCOPY       Social History   Social History     Substance and Sexual Activity   Alcohol Use Yes    Comment: occasionally     Social History     Substance and Sexual Activity   Drug Use No     Social History     Tobacco Use   Smoking Status Former Smoker   Smokeless Tobacco Never Used     Family History   Problem Relation Age of Onset    Heart disease Mother     Diabetes Mother     Heart disease Father     Diabetes Sister     Cancer Brother        Meds/Allergies       Current Outpatient Medications:     aspirin 81 MG tablet    atorvastatin (LIPITOR) 40 mg tablet    hydrochlorothiazide (HYDRODIURIL) 12 5 mg tablet    hydrochlorothiazide (MICROZIDE) 12 5 mg capsule    lisinopril (ZESTRIL) 20 mg tablet    metoprolol succinate (TOPROL-XL) 50 mg 24 hr tablet    pantoprazole (PROTONIX) 40 mg tablet    ranitidine (ZANTAC) 150 MG capsule    No Known Allergies        Objective     Blood pressure 145/72, pulse 76, temperature 97 6 °F (36 4 °C), temperature source Tympanic, height 5' 8" (1 727 m), weight 68 5 kg (151 lb)  Body mass index is 22 96 kg/m²  PHYSICAL EXAM:      General Appearance:   Alert, cooperative, no distress   HEENT:   Normocephalic, atraumatic, anicteric      Neck:  Supple, symmetrical, trachea midline   Lungs:   Clear to auscultation bilaterally; no rales, rhonchi or wheezing; respirations unlabored    Heart[de-identified]   Regular rate and rhythm; no murmur, rub, or gallop  Abdomen:   Soft, non-tender, non-distended; normal bowel sounds; no masses, no organomegaly    Genitalia:   Deferred    Rectal:   Deferred    Extremities:  No cyanosis, clubbing or edema    Pulses:  2+ and symmetric    Skin:  No jaundice, rashes, or lesions    Lymph nodes:  No palpable cervical lymphadenopathy        Lab Results:   No visits with results within 1 Day(s) from this visit     Latest known visit with results is:   Admission on 02/14/2019, Discharged on 02/14/2019   Component Date Value    Case Report 02/14/2019                      Value:Surgical Pathology Report                         Case: [id number]                                   Authorizing Provider:  Thao Carr MD           Collected:           02/14/2019 1036              Ordering Location:     [address] [place] [address]      Received:            02/14/2019 [address] Endoscopy                                                           Pathologist:           Buddy Deng MD                                                          Specimens:   A) - Esophagogastric junction, gastro esophageal junction nodule                                    B) - Polyp, Stomach/Small Intestine, polyp in the cardia                                            C) - Stomach, r/o h pylori                                                                 Final Diagnosis 02/14/2019                      Value: This result contains rich text formatting which cannot be displayed here   Additional Information 02/14/2019                      Value: This result contains rich text formatting which cannot be displayed here  [employer] Description 02/14/2019                      Value: This result contains rich text formatting which cannot be displayed here  Radiology Results:   No results found

## 2019-02-27 NOTE — LETTER
February 27, 2019     Ina July, DO  [facility] [place] [address]    Patient: Aria Padron   YOB: 1945   Date of Visit: 2/27/2019       Dear Dr Virginia Runner:    Thank you for referring Kirti Daniel to me for evaluation  Below are my notes for this consultation  If you have questions, please do not hesitate to call me  I look forward to following your patient along with you  Sincerely,        Allan Rondon MD        CC: No Recipients  Allan Rondon MD  2/27/2019  1:49 PM  Attested  [facility] - Outpatient Follow-up Note  Aria Padron 75 y o  male MRN: [id number]  Encounter: [id number]          ASSESSMENT AND PLAN:      1  Gastroesophageal reflux disease with esophagitis  2  Odynophagia  3  Esophagitis, [place] grade D  -status post EGD 2/14/19 which revealed LA grade D esophagitis with 5 mm nodule noted at GE junction which was removed and found to be a benign; the stomach contained a cluster of polyps in the proximal esophagus predominately and cardia multiple cold forceps biopsies were taken from this area which also revealed benign ideology  -at that time he was placed on omeprazole 40 mg once daily  -symptoms of dysphagia/odynophagia have improved, however still has intermittent symptoms of heartburn, regurgitation worse at night  -advised on omeprazole 40 mg once daily 30 minutes before breakfast, ranitidine 150 mg at bedtime for 7 days then as needed, continue with usage of wedge pillow and avoid eating for 2-3 hours prior to lying supine  -will plan on repeat EGD in the next 3-6 months to document healing and evaluate for underlying Gonzalez's esophagus  -return to clinic in 2-3 months for reassessment of symptom control    4   Tubular adenoma-descending colon  -last colonoscopy 5/22/18, repeat recommended in 2023    ______________________________________________________________________    SUBJECTIVE:  70-year-old male seen in follow-up for evaluation of GERD symptoms  Patient underwent EGD on 02/14 which revealed LA grade D esophagitis as well as distal esophageal nodule with esophageal/cardiac polyps which were benign on histology  Patient reports improvement in odynophagia and dysphagia on omeprazole 40 mg daily  He states he still gets intermittent symptoms of heartburn and regurgitation particularly at night so he started to take omeprazole in the evening  He also states that he takes Tums on average twice per day  Denies unintentional weight loss, abdominal pain, change in bowel habits, blood in stool  REVIEW OF SYSTEMS IS OTHERWISE NEGATIVE        Historical Information   Past Medical History:   Diagnosis Date    Abdominal aortic aneurysm (AAA) [address] [address] )     last assessed nov 6 2015    Abscess of groin     last assessed oct 6 2014    Candidiasis, cutaneous     last assessed march 19 2014    Coronary artery disease     Dyslipidemia     Esophageal ulcer without bleeding     Gastritis     Hiatal hernia     Hx of cataract surgery, left     last assessed july 21 2014    Hyperlipidemia     Hypertension     Old myocardial infarction     Recurrent right inguinal hernia     s/p right orchioectomy, mesh removal, and sinus trac removal patient being followed by surgery next appt 4/28/14 patient s/p keflex x 7 days for MSSA Cx repeat wound cx grew MSSA cx repeat wound cx grew MSSA and other armond (mixed) no MRSA identified conitunue close monitoring and local wound care, last assessed april 15 2014    Wound of skin     in the groin, right     Past Surgical History:   Procedure Laterality Date    ABDOMINAL AORTIC ANEURYSM REPAIR  2009    aortobi-iliac, dacron graft    APPENDECTOMY      open    COLONOSCOPY      CORONARY ANGIOPLASTY WITH STENT PLACEMENT      stent 2    CYSTOSCOPY      diagnostic onset nov 13 2014    EXPLORATORY LAPAROTOMY  12/09/2009    HIP SURGERY Right     INGUINAL HERNIA REPAIR Right     unilateral x2    ORCHIECTOMY Right     IN COLONOSCOPY FLX DX W/COLLJ SPEC WHEN PFRMD N/A 5/22/2018    Procedure: COLONOSCOPY;  Surgeon: Jacob Huntley DO;  [facility]; Service: Gastroenterology    IN ESOPHAGOGASTRODUODENOSCOPY TRANSORAL DIAGNOSTIC N/A 2/14/2019    Procedure: ESOPHAGOGASTRODUODENOSCOPY (EGD); Surgeon: Catie Chang MD;  [facility]; Service: Gastroenterology    IN REPAIR INCISIONAL HERNIA,REDUCIBLE N/A 2/23/2018    Procedure: lysis of adhesions; INCISIONAL HERNIA REPAIR WITH MESH;  Surgeon: Álvaro Inman MD;  [facility];  Service: General    UPPER GASTROINTESTINAL ENDOSCOPY       Social History   Social History     Substance and Sexual Activity   Alcohol Use Yes    Comment: occasionally     Social History     Substance and Sexual Activity   Drug Use No     Social History     Tobacco Use   Smoking Status Former Smoker   Smokeless Tobacco Never Used     Family History   Problem Relation Age of Onset    Heart disease Mother     Diabetes Mother     Heart disease Father     Diabetes Sister     Cancer Brother        Meds/Allergies       Current Outpatient Medications:     aspirin 81 MG tablet    atorvastatin (LIPITOR) 40 mg tablet    hydrochlorothiazide (HYDRODIURIL) 12 5 mg tablet    hydrochlorothiazide (MICROZIDE) 12 5 mg capsule    lisinopril (ZESTRIL) 20 mg tablet    metoprolol succinate (TOPROL-XL) 50 mg 24 hr tablet    pantoprazole (PROTONIX) 40 mg tablet    ranitidine (ZANTAC) 150 MG capsule    No Known Allergies        Objective     Blood pressure 145/72, pulse 76, temperature 97 6 °F (36 4 °C), temperature source Tympanic, height 5' 8" (1 727 m), weight 68 5 kg (151 lb)  Body mass index is 22 96 kg/m²        PHYSICAL EXAM:      General Appearance:   Alert, cooperative, no distress   HEENT:   Normocephalic, atraumatic, anicteric      Neck:  Supple, symmetrical, trachea midline   Lungs:   Clear to auscultation bilaterally; no rales, rhonchi or wheezing; respirations unlabored    Heart[de-identified]   Regular rate and rhythm; no murmur, rub, or gallop  Abdomen:   Soft, non-tender, non-distended; normal bowel sounds; no masses, no organomegaly    Genitalia:   Deferred    Rectal:   Deferred    Extremities:  No cyanosis, clubbing or edema    Pulses:  2+ and symmetric    Skin:  No jaundice, rashes, or lesions    Lymph nodes:  No palpable cervical lymphadenopathy        Lab Results:   No visits with results within 1 Day(s) from this visit  Latest known visit with results is:   Admission on 02/14/2019, Discharged on 02/14/2019   Component Date Value    Case Report 02/14/2019                      Value:Surgical Pathology Report                         Case: [id number]                                   Authorizing Provider:  Gaby Betancur MD           Collected:           02/14/2019 1036              Ordering Location:     [address] [place] [address]      Received:            02/14/2019 [address] [facility]                                                           Pathologist:           Vinayak Betancur MD                                                          Specimens:   A) - Esophagogastric junction, gastro esophageal junction nodule                                    B) - Polyp, Stomach/Small Intestine, polyp in the cardia                                            C) - Stomach, r/o h pylori                                                                 Final Diagnosis 02/14/2019                      Value: This result contains rich text formatting which cannot be displayed here   Additional Information 02/14/2019                      Value: This result contains rich text formatting which cannot be displayed here  [employer] Description 02/14/2019                      Value: This result contains rich text formatting which cannot be displayed here  Radiology Results:   No results found      Attestation signed by Belinda Cheng MD at 2/27/2019 10:48 PM:    I reviewed the care furnished by the Gastroenterology fellow Dr Farzana Lemus during the visit  I was present in the office and personally saw and examined the patient and agree with his assessment and plan  We will repeat his upper endoscopy in 3 to 6 months to document healing of his severe reflux esophagitis on high-dose omeprazole and make sure he does not have underlying Gonzalez's esophagus  He is due for his next colonoscopy in 2023 because of a history of an adenomatous colon polyp      Kimber Cordova MD

## 2019-02-27 NOTE — TELEPHONE ENCOUNTER
Patient is being seen at our office for a follow up appt with cardiology on 3/1/19  can you please add a new order?

## 2019-03-01 ENCOUNTER — OFFICE VISIT (OUTPATIENT)
Dept: MULTI SPECIALTY CLINIC | Facility: CLINIC | Age: 74
End: 2019-03-01

## 2019-03-01 VITALS
SYSTOLIC BLOOD PRESSURE: 102 MMHG | BODY MASS INDEX: 24.86 KG/M2 | DIASTOLIC BLOOD PRESSURE: 80 MMHG | HEART RATE: 60 BPM | HEIGHT: 68 IN | WEIGHT: 164.02 LBS | TEMPERATURE: 97.3 F

## 2019-03-01 DIAGNOSIS — I25.10 CORONARY ARTERY DISEASE: Primary | ICD-10-CM

## 2019-03-01 DIAGNOSIS — I10 ESSENTIAL HYPERTENSION: ICD-10-CM

## 2019-03-01 PROCEDURE — 99213 OFFICE O/P EST LOW 20 MIN: CPT | Performed by: INTERNAL MEDICINE

## 2019-03-01 NOTE — PROGRESS NOTES
Cardiology Follow Up    Wendy Santiago  1945  [id number]  [facility] [phone]    1  Coronary artery disease     2  Essential hypertension  Ambulatory referral to Cardiology     Discussion/Summary: This is a 75year old man with PMH of CAD s/p PCI in 2000, HTN, HLD, PVD who presented for a outpatient follow up  He is doing well  Euvolemic on exam       1  History of CAD with PCI to RCA in 2000  2  HTN  3  HLD    - Euvolemic, Asymptomatic   - c/w aspirin, metoprolol, statin  - BP controlled  C/w lisinopril, HCTZ  - f/u in 6 months    Interval History: This is a 75year old man with history of CAD s/p PCI RCA in 2000, presents for outpatient follow up  No SOB, CP, lightheadedness, dizziness, lower limb edema, orthopnea or PND  Denies any angina  Had CP when he had his PCI  No events since  He has occasional flutter like sensation in his chest (once a month)  He had a Holter monitor in 2/7/17 for that which showed no arrhythmias  Rare PVCs       Social history: Ex smoker, quit 18 years ago  Denies excess alcohol intake or IVDA  Walks with a cane  Compliant with his medications       He also had a pharmacological stress test in Jan 2015 which was normal with artifact in inferior wall    He is being followed by GI for esophagitis and colon polyps        Patient Active Problem List   Diagnosis    Coronary artery disease    Hypertension    Incisional hernia    History of incisional hernia repair    Bursitis of left shoulder    Hyperlipidemia    [address] [facility]peripheral [facility]) [facility] [address]    Serrated adenoma of colon    Benign neoplasm of colon    Primary osteoarthritis of right knee    Odynophagia    Gastroesophageal reflux disease    Esophagitis, Emanuel grade D    Gastroesophageal reflux disease with esophagitis     Past Medical History:   Diagnosis Date    Abdominal aortic aneurysm (AAA) ([facility] [address] )     last assessed nov 6 2015    Abscess of groin     last assessed oct 6 2014    Candidiasis, cutaneous     last assessed march 19 2014    Coronary artery disease     Dyslipidemia     Esophageal ulcer without bleeding     Gastritis     Hiatal hernia     Hx of cataract surgery, left     last assessed july 21 2014    Hyperlipidemia     Hypertension     Old myocardial infarction     Recurrent right inguinal hernia     s/p right orchioectomy, mesh removal, and sinus trac removal patient being followed by surgery next appt 4/28/14 patient s/p keflex x 7 days for MSSA Cx repeat wound cx grew MSSA cx repeat wound cx grew MSSA and other armond (mixed) no MRSA identified conitunue close monitoring and local wound care, last assessed april 15 2014    Wound of skin     in the groin, right     Social History     Socioeconomic History    Marital status:      Spouse name: Not on file    Number of children: Not on file    Years of education: Not on file    Highest education level: Not on file   Occupational History    Not on file   Social Needs    Financial resource strain: Not on file    Food insecurity:     Worry: Not on file     Inability: Not on file    Transportation needs:     Medical: Not on file     Non-medical: Not on file   Tobacco Use    Smoking status: Former Smoker    Smokeless tobacco: Never Used   Substance and Sexual Activity    Alcohol use: Yes     Comment: occasionally    Drug use: No    Sexual activity: Not on file   Lifestyle    Physical activity:     Days per week: Not on file     Minutes per session: Not on file    Stress: Not on file   Relationships    Social connections:     Talks on phone: Not on file     Gets together: Not on file     Attends Spiritism service: Not on file     Active member of club or organization: Not on file     Attends meetings of clubs or organizations: Not on file     Relationship status: Not on file    Intimate partner violence:     Fear of current or ex partner: Not on file     Emotionally abused: Not on file     Physically abused: Not on file     Forced sexual activity: Not on file   Other Topics Concern    Not on file   Social History Narrative    Not on file      Family History   Problem Relation Age of Onset    Heart disease Mother     Diabetes Mother     Heart disease Father     Diabetes Sister     Cancer Brother      Past Surgical History:   Procedure Laterality Date    ABDOMINAL AORTIC ANEURYSM REPAIR  2009    aortobi-iliac, dacron graft    APPENDECTOMY      open    COLONOSCOPY      CORONARY ANGIOPLASTY WITH STENT PLACEMENT      stent 2    CYSTOSCOPY      diagnostic onset nov 13 2014    EXPLORATORY LAPAROTOMY  12/09/2009    HIP SURGERY Right     INGUINAL HERNIA REPAIR Right     unilateral x2    ORCHIECTOMY Right     CA COLONOSCOPY FLX DX W/COLLJ SPEC WHEN PFRMD N/A 5/22/2018    Procedure: COLONOSCOPY;  Surgeon: [employer], DO;  [facility]; Service: Gastroenterology    CA ESOPHAGOGASTRODUODENOSCOPY TRANSORAL DIAGNOSTIC N/A 2/14/2019    Procedure: ESOPHAGOGASTRODUODENOSCOPY (EGD); Surgeon: Molly Gonzales MD;  [facility];   Service: Gastroenterology    CA REPAIR INCISIONAL HERNIA,REDUCIBLE N/A 2/23/2018    Procedure: lysis of adhesions; INCISIONAL HERNIA REPAIR WITH MESH;  Surgeon: Joe Damon MD;  [facility];  Service: General    UPPER GASTROINTESTINAL ENDOSCOPY         Current Outpatient Medications:     aspirin 81 MG tablet, Take 81 mg by mouth daily Resume on 3/11/18 , Disp: , Rfl:     atorvastatin (LIPITOR) 40 mg tablet, Take 1 tablet (40 mg total) by mouth daily with dinner Resume next scheduled dose upon discharge from the hospital, Disp: 90 tablet, Rfl: 3    hydrochlorothiazide (HYDRODIURIL) 12 5 mg tablet, Take 1 tablet (12 5 mg total) by mouth daily, Disp: 90 tablet, Rfl: 3    lisinopril (ZESTRIL) 20 mg tablet, Take 1 tablet (20 mg total) by mouth daily, Disp: 90 tablet, Rfl: 3    metoprolol succinate (TOPROL-XL) 50 mg 24 hr tablet, Take 1 tablet (50 mg total) by mouth daily Resume on 3/11/18, Disp: 90 tablet, Rfl: 3    pantoprazole (PROTONIX) 40 mg tablet, Take 1 tablet (40 mg total) by mouth daily, Disp: 30 tablet, Rfl: 5    ranitidine (ZANTAC) 150 MG capsule, Take 150 mg by mouth Resume on 3/11/18 as prior to hospitalization , Disp: , Rfl:   No Known Allergies    Imaging: No results found  Review of Systems:  Review of Systems   Constitutional: Negative for chills and fatigue  Respiratory: Negative for chest tightness and shortness of breath  Cardiovascular: Negative for chest pain and leg swelling  Gastrointestinal: Negative for abdominal pain  Neurological: Negative for dizziness and syncope  Physical Exam:  Physical Exam   Constitutional: He appears well-developed  No distress  HENT:   Head: Normocephalic and atraumatic  Eyes: Pupils are equal, round, and reactive to light  EOM are normal    Neck: No JVD present  No thyromegaly present  Cardiovascular: Normal rate and regular rhythm  No murmur heard  Pulmonary/Chest: He has no wheezes  He has no rales  Abdominal: Soft  There is no tenderness  Musculoskeletal: He exhibits no edema or tenderness  Skin: He is not diaphoretic          /80 (BP Location: Right arm, Patient Position: Sitting, Cuff Size: Adult)   Pulse 60   Temp (!) 97 3 °F (36 3 °C) (Oral)   Ht 5' 8" (1 727 m)   Wt 74 4 kg (164 lb 0 4 oz) Comment: with shoed  BMI 24 94 kg/m²

## 2019-03-04 ENCOUNTER — TELEPHONE (OUTPATIENT)
Dept: INTERNAL MEDICINE CLINIC | Facility: CLINIC | Age: 74
End: 2019-03-04

## 2019-03-04 DIAGNOSIS — I10 HYPERTENSION: ICD-10-CM

## 2019-03-04 RX ORDER — HYDROCHLOROTHIAZIDE 12.5 MG/1
12.5 TABLET ORAL 2 TIMES DAILY
Qty: 180 TABLET | Refills: 3 | Status: SHIPPED | OUTPATIENT
Start: 2019-03-04 | End: 2019-04-08 | Stop reason: CLARIF

## 2019-03-04 NOTE — TELEPHONE ENCOUNTER
Patient called in requesting that his HCTZ be fixed , states that it is ordered incorrectly  States that he takes it twice daily not once daily, it is supposed to be a QTY of 180 tablets along with 3 refills not 90 tablets   Please inform me   Please send to [address]

## 2019-03-25 ENCOUNTER — OFFICE VISIT (OUTPATIENT)
Dept: MULTI SPECIALTY CLINIC | Facility: CLINIC | Age: 74
End: 2019-03-25

## 2019-03-25 VITALS
HEART RATE: 64 BPM | DIASTOLIC BLOOD PRESSURE: 58 MMHG | WEIGHT: 164.46 LBS | BODY MASS INDEX: 24.93 KG/M2 | TEMPERATURE: 97.6 F | HEIGHT: 68 IN | SYSTOLIC BLOOD PRESSURE: 114 MMHG

## 2019-03-25 DIAGNOSIS — M21.70 LOWER LIMB LENGTH DIFFERENCE: ICD-10-CM

## 2019-03-25 DIAGNOSIS — B35.1 ONYCHOMYCOSIS: Primary | ICD-10-CM

## 2019-03-25 DIAGNOSIS — L84 CALLUS OF FOOT: ICD-10-CM

## 2019-03-25 PROCEDURE — 11719 TRIM NAIL(S) ANY NUMBER: CPT | Performed by: PODIATRIST

## 2019-03-25 PROCEDURE — 99213 OFFICE O/P EST LOW 20 MIN: CPT | Performed by: PODIATRIST

## 2019-03-25 NOTE — PROGRESS NOTES
OPodiatry Clinic Visit  Carlos Gallego 75 y o  male MRN: [id number]  Encounter: [id number]    Assessment/Plan     Assessment:  1  Onychomycosis   2  B/l foot hyperkeratotic lesions   3  Limb length discrepancy RLE shorter       Plan:  - Patient was seen/examined  All questions and concerns addressed  - Debrided x10 nails with large nail nipper without any incidents   - Trimmed calluses to healthy epidermal layer using 10 blade without any incidents    - Recommended to clean, dry in between toes b/l after shower with dry towel   - RTC in 6 months    Nail removal  Date/Time: 3/25/2019 1:56 PM  Performed by: Luis Alfredo Francis DPM  Authorized by: Luis Alfredo Francis DPM     Patient location:  Clinic  Nails trimmed:     Number of nails trimmed:  10        History of Present Illness     HPI:  Carlos Gallego is a 75 y o  male who presents with elongated, thickened toenails and calluses b/l foot  Patient states length of the nails is painful within the shoes  He has difficulty applying socks and shoes  Patient states he is unable to cut his toenails due to back and hip issues  Patient has a right lower extremity limb discrepancy on the right  Patient states he had major surgeries done as a child on the right  The patient denies any nausea, vomiting, fever, chills, shortness of breath, or chest pains  Review of Systems   Constitutional: Negative  HENT: Negative  Eyes: Negative  Respiratory: Negative  Cardiovascular: Negative  Gastrointestinal: Negative  Musculoskeletal: Negative   Skin: elongated, thickened toenails, hyperkeratotic lesion    Neurological: Negative          Historical Information   Past Medical History:   Diagnosis Date    Abdominal aortic aneurysm (AAA) ([facility] [address] )     last assessed nov 6 2015    Abscess of groin     last assessed oct 6 2014    Candidiasis, cutaneous     last assessed march 19 2014    Coronary artery disease     Dyslipidemia     Esophageal ulcer without bleeding     Gastritis     Hiatal hernia     Hx of cataract surgery, left     last assessed july 21 2014    Hyperlipidemia     Hypertension     Old myocardial infarction     Recurrent right inguinal hernia     s/p right orchioectomy, mesh removal, and sinus trac removal patient being followed by surgery next appt 4/28/14 patient s/p keflex x 7 days for MSSA Cx repeat wound cx grew MSSA cx repeat wound cx grew MSSA and other armond (mixed) no MRSA identified conitunue close monitoring and local wound care, last assessed april 15 2014    Wound of skin     in the groin, right     Past Surgical History:   Procedure Laterality Date    ABDOMINAL AORTIC ANEURYSM REPAIR  2009    aortobi-iliac, dacron graft    APPENDECTOMY      open    COLONOSCOPY      CORONARY ANGIOPLASTY WITH STENT PLACEMENT      stent 2    CYSTOSCOPY      diagnostic onset nov 13 2014    EXPLORATORY LAPAROTOMY  12/09/2009    HIP SURGERY Right     INGUINAL HERNIA REPAIR Right     unilateral x2    ORCHIECTOMY Right     OK COLONOSCOPY FLX DX W/COLLJ SPEC WHEN PFRMD N/A 5/22/2018    Procedure: COLONOSCOPY;  Surgeon: Barry Toledo DO;  [facility]; Service: Gastroenterology    OK ESOPHAGOGASTRODUODENOSCOPY TRANSORAL DIAGNOSTIC N/A 2/14/2019    Procedure: ESOPHAGOGASTRODUODENOSCOPY (EGD); Surgeon: Levar Monahan MD;  [facility];   Service: Gastroenterology    OK REPAIR INCISIONAL HERNIA,REDUCIBLE N/A 2/23/2018    Procedure: lysis of adhesions; INCISIONAL HERNIA REPAIR WITH MESH;  Surgeon: Viridiana Dent MD;  [facility];  Service: General    UPPER GASTROINTESTINAL ENDOSCOPY       Social History   Social History     Substance and Sexual Activity   Alcohol Use Yes    Comment: occasionally     Social History     Substance and Sexual Activity   Drug Use No     Social History     Tobacco Use   Smoking Status Former Smoker   Smokeless Tobacco Never Used     Family History:   Family History   Problem Relation Age of Onset    Heart disease Mother  Diabetes Mother     Heart disease Father     Diabetes Sister     Cancer Brother        Meds/Allergies     (Not in a hospital admission)  No Known Allergies    Objective     Current Vitals:   Blood Pressure: 114/58 (03/25/19 1316)  Pulse: 64 (03/25/19 1316)  Temperature: 97 6 °F (36 4 °C) (03/25/19 1316)  Temp Source: Oral (03/25/19 1316)  Height: 5' 8" (172 7 cm) (03/25/19 1316)  Weight - Scale: 74 6 kg (164 lb 7 4 oz)(with shoes) (03/25/19 1316)        /58 (BP Location: Right arm, Patient Position: Sitting, Cuff Size: Adult)   Pulse 64   Temp 97 6 °F (36 4 °C) (Oral)   Ht 5' 8" (1 727 m)   Wt 74 6 kg (164 lb 7 4 oz) Comment: with shoes  BMI 25 01 kg/m²       Lower Extremity Exam:    Musculoskeletal:  MMT is 5/5 to all compartments of the LE, +0/4 edema B/L, Digital ROM is intact, Severe HAV b/l with hammertoes deformity noted  Pulses:   R DP is +1/4, R PT is +0/4, L DP is +1/4, L PT is +0/4, CFT< 3sec to all digits  Pedal hair is Absent  Dopplerable pedal pulses  Skin:  Thickened, elongated, discolored toenails with subungual debris noted  Right dorsal 4th and 5th digits hyperkeratotic lesions noted  Left distal tip of the 2nd toes hyperkeratotic lesion  Left medial pinch callue noted  Mild interdigital maceration noted  Moderate xerosis present b/l lower extremity  Neurologic:  Gross sensation is intact   Protective sensation is Intact    Biomechanical Exam of LE:  Hip ROM WNL Bilateral, external and internal rotation about equal at 45° b/l  Knee ROM WNL Bilateral, no hyperextension noted b/l, no genu varum/valgum noted b/l  Ankle dorsiflexion with knee extended is limited and unable to get pass vertical on right and limited and unable to get pass vertical on left  Ankle dorsiflexion with knee flexed is limited and unable to get pass vertical on right and limited and unable to get pass vertical on left  Malleolar positioning is WNL b/l  STJ ROM WNL b/l, heel inversion is approximately 15° on right and 25° on left; heel eversion is approximately 10° on right and 10° on left; neutral calcaneal stance position is valgus°   1st ray ROM WNL b/l, unable to dorsiflex/plantarflex b/l  Tibial varum is 0° b/l, Resting calcaneal stance position is valgus and approximately 5° on right and valgus and approximately 5° on left  Gait analysis: restricted ambulation  Gross deformity noted: limb length discrepancy  and pes planus, HAV b/l with hammertoes deformity 2-5 b/l

## 2019-03-27 ENCOUNTER — OFFICE VISIT (OUTPATIENT)
Dept: OBGYN CLINIC | Facility: OTHER | Age: 74
End: 2019-03-27
Payer: MEDICARE

## 2019-03-27 ENCOUNTER — APPOINTMENT (OUTPATIENT)
Dept: RADIOLOGY | Facility: OTHER | Age: 74
End: 2019-03-27
Payer: MEDICARE

## 2019-03-27 VITALS
BODY MASS INDEX: 23.34 KG/M2 | SYSTOLIC BLOOD PRESSURE: 130 MMHG | DIASTOLIC BLOOD PRESSURE: 70 MMHG | HEIGHT: 68 IN | WEIGHT: 154 LBS

## 2019-03-27 DIAGNOSIS — M25.552 PAIN IN LEFT HIP: Primary | ICD-10-CM

## 2019-03-27 DIAGNOSIS — M25.552 PAIN IN LEFT HIP: ICD-10-CM

## 2019-03-27 DIAGNOSIS — M16.12 PRIMARY OSTEOARTHRITIS OF ONE HIP, LEFT: ICD-10-CM

## 2019-03-27 PROCEDURE — 73502 X-RAY EXAM HIP UNI 2-3 VIEWS: CPT

## 2019-03-27 PROCEDURE — 99214 OFFICE O/P EST MOD 30 MIN: CPT | Performed by: ORTHOPAEDIC SURGERY

## 2019-03-27 NOTE — PROGRESS NOTES
Orthopaedic Surgery - Office Note  Lizabeth Arredondo (86 y o  male)   : 1945   MRN: [id number]  Encounter Date: 3/27/2019    Chief Complaint   Patient presents with    Left Hip - Pain       Assessment / Plan  Left hip osteoarthritis    · HEP for hip and thigh strengthening was reviewed  · Referral to radiology for a left hip CSI  Return in about 2 months (around 2019)  History of Present Illness  Lizabeth Arredondo is a 75 y o  male who presents for evaluation of left hip pain for about 3 weeks  He denies injury  The pain is anterior in the hip and radiates to the anterior thigh  It is worse with activity and also sleeping at night  He has not had any therapy or injections  He has been using NSAIDs without relief  He does have a remote history of right hip surgery with a significant LLD (L>R)  He ambulates in the community with a cane for the past 3 years  Review of Systems  Pertinent items are noted in HPI  All other systems were reviewed and are negative  Physical Exam  /70 Comment: manual  Ht 5' 8" (1 727 m)   Wt 69 9 kg (154 lb)   BMI 23 42 kg/m²   Cons: Appears well  No apparent distress  Psych: Alert  Oriented x3  Mood and affect normal   Eyes: PERRLA, EOMI  Resp: Normal effort  No audible wheezing or stridor  CV: Palpable pulse  No discernable arrhythmia  No LE edema  Lymph:  No palpable cervical, axillary, or inguinal lymphadenopathy  Skin: Warm  No palpable masses  No visible lesions  Neuro: Normal muscle tone  Normal and symmetric DTR's  Left Hip Exam  Alignment / Posture:  Normal resting hip posture  Inspection:  No swelling  Palpation:  moderate groin tenderness  ROM:  Hip Flexion 100  Hip ER 20  Hip IR 0  Strength:  Hip Flexors 4/5  Hip Abductors 4/5  Stability:  Not tested  Tests:  (+) [facility]  Neurovascular:  Sensation intact in DP/SP/Boyer/Sa/T nerve distributions  2+ DP & PT pulses  Gait:  Antalgic        Studies Reviewed  I have personally reviewed pertinent films in PACS  XR of left hip - moderate to severe dengeerative changes with joint space narrowing and osteophytes    Procedures  No procedures today  Medical, Surgical, Family, and Social History  The patient's medical history, family history, and social history, were reviewed and updated as appropriate  Past Medical History:   Diagnosis Date    Abdominal aortic aneurysm (AAA) ([address] )     last assessed nov 6 2015    Abscess of groin     last assessed oct 6 2014    Candidiasis, cutaneous     last assessed march 19 2014    Coronary artery disease     Dyslipidemia     Esophageal ulcer without bleeding     Gastritis     Hiatal hernia     Hx of cataract surgery, left     last assessed july 21 2014    Hyperlipidemia     Hypertension     Old myocardial infarction     Recurrent right inguinal hernia     s/p right orchioectomy, mesh removal, and sinus trac removal patient being followed by surgery next appt 4/28/14 patient s/p keflex x 7 days for MSSA Cx repeat wound cx grew MSSA cx repeat wound cx grew MSSA and other armond (mixed) no MRSA identified conitunue close monitoring and local wound care, last assessed april 15 2014    Wound of skin     in the groin, right       Past Surgical History:   Procedure Laterality Date    ABDOMINAL AORTIC ANEURYSM REPAIR  2009    aortobi-iliac, dacron graft    APPENDECTOMY      open    COLONOSCOPY      CORONARY ANGIOPLASTY WITH STENT PLACEMENT      stent 2    CYSTOSCOPY      diagnostic onset nov 13 2014    EXPLORATORY LAPAROTOMY  12/09/2009    HIP SURGERY Right     INGUINAL HERNIA REPAIR Right     unilateral x2    ORCHIECTOMY Right     MD COLONOSCOPY FLX DX W/COLLJ SPEC WHEN PFRMD N/A 5/22/2018    Procedure: COLONOSCOPY;  Surgeon: Eddy Justin DO;  [facility]; Service: Gastroenterology    MD ESOPHAGOGASTRODUODENOSCOPY TRANSORAL DIAGNOSTIC N/A 2/14/2019    Procedure: ESOPHAGOGASTRODUODENOSCOPY (EGD);   Surgeon: Elvira De Anda MD;  [facility];   Service: Gastroenterology    SD REPAIR INCISIONAL HERNIA,REDUCIBLE N/A 2/23/2018    Procedure: lysis of adhesions; INCISIONAL HERNIA REPAIR WITH MESH;  Surgeon: Werner Hill MD;  [facility];  Service: General    UPPER GASTROINTESTINAL ENDOSCOPY         Family History   Problem Relation Age of Onset    Heart disease Mother     Diabetes Mother     Heart disease Father     Diabetes Sister     Cancer Brother        Social History     Occupational History    Not on file   Tobacco Use    Smoking status: Former Smoker    Smokeless tobacco: Never Used   Substance and Sexual Activity    Alcohol use: Yes     Comment: occasionally    Drug use: No    Sexual activity: Not on file       No Known Allergies      Current Outpatient Medications:     aspirin 81 MG tablet, Take 81 mg by mouth daily Resume on 3/11/18 , Disp: , Rfl:     atorvastatin (LIPITOR) 40 mg tablet, Take 1 tablet (40 mg total) by mouth daily with dinner Resume next scheduled dose upon discharge from the hospital, Disp: 90 tablet, Rfl: 3    hydrochlorothiazide (HYDRODIURIL) 12 5 mg tablet, Take 1 tablet (12 5 mg total) by mouth 2 (two) times a day, Disp: 180 tablet, Rfl: 3    lisinopril (ZESTRIL) 20 mg tablet, Take 1 tablet (20 mg total) by mouth daily, Disp: 90 tablet, Rfl: 3    metoprolol succinate (TOPROL-XL) 50 mg 24 hr tablet, Take 1 tablet (50 mg total) by mouth daily Resume on 3/11/18, Disp: 90 tablet, Rfl: 3    pantoprazole (PROTONIX) 40 mg tablet, Take 1 tablet (40 mg total) by mouth daily, Disp: 30 tablet, Rfl: 5    ranitidine (ZANTAC) 150 MG capsule, Take 150 mg by mouth Resume on 3/11/18 as prior to hospitalization , Disp: , Rfl:       Terri Renteria MD    Scribe Attestation    I,:    am acting as a scribe while in the presence of the attending physician :        I,:    personally performed the services described in this documentation    as scribed in my presence :

## 2019-04-02 ENCOUNTER — TRANSCRIBE ORDERS (OUTPATIENT)
Dept: RADIOLOGY | Facility: HOSPITAL | Age: 74
End: 2019-04-02

## 2019-04-02 ENCOUNTER — HOSPITAL ENCOUNTER (OUTPATIENT)
Dept: RADIOLOGY | Facility: HOSPITAL | Age: 74
Discharge: HOME | End: 2019-04-02
Attending: ORTHOPAEDIC SURGERY
Payer: MEDICARE

## 2019-04-02 DIAGNOSIS — M16.12 PRIMARY OSTEOARTHRITIS OF ONE HIP, LEFT: ICD-10-CM

## 2019-04-02 PROCEDURE — 20610 DRAIN/INJ JOINT/BURSA W/O US: CPT

## 2019-04-02 PROCEDURE — 77002 NEEDLE LOCALIZATION BY XRAY: CPT

## 2019-04-02 RX ORDER — METHYLPREDNISOLONE ACETATE 80 MG/ML
80 INJECTION, SUSPENSION INTRA-ARTICULAR; INTRALESIONAL; INTRAMUSCULAR; SOFT TISSUE
Status: COMPLETED | OUTPATIENT
Start: 2019-04-02 | End: 2019-04-02

## 2019-04-02 RX ORDER — LIDOCAINE HYDROCHLORIDE 10 MG/ML
10 INJECTION, SOLUTION INFILTRATION; PERINEURAL
Status: COMPLETED | OUTPATIENT
Start: 2019-04-02 | End: 2019-04-02

## 2019-04-02 RX ORDER — BUPIVACAINE HYDROCHLORIDE 2.5 MG/ML
30 INJECTION, SOLUTION EPIDURAL; INFILTRATION; INTRACAUDAL
Status: COMPLETED | OUTPATIENT
Start: 2019-04-02 | End: 2019-04-02

## 2019-04-02 RX ADMIN — BUPIVACAINE HYDROCHLORIDE 2 ML: 2.5 INJECTION, SOLUTION EPIDURAL; INFILTRATION; INTRACAUDAL at 12:30

## 2019-04-02 RX ADMIN — LIDOCAINE HYDROCHLORIDE 3 ML: 10 INJECTION, SOLUTION INFILTRATION; PERINEURAL at 12:30

## 2019-04-02 RX ADMIN — IOHEXOL 3 ML: 300 INJECTION, SOLUTION INTRAVENOUS at 12:30

## 2019-04-02 RX ADMIN — METHYLPREDNISOLONE ACETATE 80 MG: 80 INJECTION, SUSPENSION INTRA-ARTICULAR; INTRALESIONAL; INTRAMUSCULAR; SOFT TISSUE at 12:41

## 2019-04-08 ENCOUNTER — OFFICE VISIT (OUTPATIENT)
Dept: INTERNAL MEDICINE CLINIC | Facility: CLINIC | Age: 74
End: 2019-04-08

## 2019-04-08 VITALS
BODY MASS INDEX: 24.73 KG/M2 | HEART RATE: 72 BPM | WEIGHT: 163.14 LBS | DIASTOLIC BLOOD PRESSURE: 60 MMHG | SYSTOLIC BLOOD PRESSURE: 128 MMHG | HEIGHT: 68 IN | TEMPERATURE: 97.2 F

## 2019-04-08 DIAGNOSIS — I10 HYPERTENSION: ICD-10-CM

## 2019-04-08 PROCEDURE — G0438 PPPS, INITIAL VISIT: HCPCS | Performed by: INTERNAL MEDICINE

## 2019-04-08 RX ORDER — LISINOPRIL 20 MG/1
20 TABLET ORAL DAILY
Qty: 90 TABLET | Refills: 3 | Status: SHIPPED | OUTPATIENT
Start: 2019-04-08 | End: 2020-01-10 | Stop reason: SDUPTHER

## 2019-04-08 RX ORDER — ATORVASTATIN CALCIUM 40 MG/1
40 TABLET, FILM COATED ORAL
Qty: 90 TABLET | Refills: 3 | Status: SHIPPED | OUTPATIENT
Start: 2019-04-08 | End: 2020-01-10 | Stop reason: SDUPTHER

## 2019-04-08 RX ORDER — METOPROLOL SUCCINATE 50 MG/1
50 TABLET, EXTENDED RELEASE ORAL DAILY
Qty: 90 TABLET | Refills: 3 | Status: SHIPPED | OUTPATIENT
Start: 2019-04-08 | End: 2020-01-10 | Stop reason: SDUPTHER

## 2019-04-08 RX ORDER — HYDROCHLOROTHIAZIDE 12.5 MG/1
25 TABLET ORAL DAILY
Qty: 180 TABLET | Refills: 3 | Status: SHIPPED | OUTPATIENT
Start: 2019-04-08 | End: 2019-09-04 | Stop reason: DRUGHIGH

## 2019-05-08 ENCOUNTER — OFFICE VISIT (OUTPATIENT)
Dept: GASTROENTEROLOGY | Facility: CLINIC | Age: 74
End: 2019-05-08
Payer: MEDICARE

## 2019-05-08 VITALS
DIASTOLIC BLOOD PRESSURE: 66 MMHG | TEMPERATURE: 98.2 F | SYSTOLIC BLOOD PRESSURE: 115 MMHG | BODY MASS INDEX: 22.88 KG/M2 | WEIGHT: 151 LBS | HEIGHT: 68 IN | HEART RATE: 77 BPM

## 2019-05-08 DIAGNOSIS — K21.00 GASTROESOPHAGEAL REFLUX DISEASE WITH ESOPHAGITIS: ICD-10-CM

## 2019-05-08 DIAGNOSIS — R13.10 ODYNOPHAGIA: ICD-10-CM

## 2019-05-08 DIAGNOSIS — K20.80 ESOPHAGITIS, LOS ANGELES GRADE D: Primary | ICD-10-CM

## 2019-05-08 DIAGNOSIS — D12.6 TUBULAR ADENOMA OF COLON: ICD-10-CM

## 2019-05-08 PROCEDURE — 99214 OFFICE O/P EST MOD 30 MIN: CPT | Performed by: INTERNAL MEDICINE

## 2019-05-15 ENCOUNTER — OFFICE VISIT (OUTPATIENT)
Dept: OBGYN CLINIC | Facility: OTHER | Age: 74
End: 2019-05-15
Payer: MEDICARE

## 2019-05-15 VITALS
BODY MASS INDEX: 23.34 KG/M2 | HEIGHT: 68 IN | DIASTOLIC BLOOD PRESSURE: 71 MMHG | HEART RATE: 74 BPM | WEIGHT: 154 LBS | SYSTOLIC BLOOD PRESSURE: 114 MMHG

## 2019-05-15 DIAGNOSIS — M16.12 PRIMARY OSTEOARTHRITIS OF ONE HIP, LEFT: Primary | ICD-10-CM

## 2019-05-15 PROCEDURE — 1160F RVW MEDS BY RX/DR IN RCRD: CPT | Performed by: ORTHOPAEDIC SURGERY

## 2019-05-15 PROCEDURE — 99213 OFFICE O/P EST LOW 20 MIN: CPT | Performed by: ORTHOPAEDIC SURGERY

## 2019-06-06 ENCOUNTER — HOSPITAL ENCOUNTER (OUTPATIENT)
Dept: GASTROENTEROLOGY | Facility: HOSPITAL | Age: 74
Setting detail: OUTPATIENT SURGERY
Discharge: HOME | End: 2019-06-06
Attending: INTERNAL MEDICINE | Admitting: INTERNAL MEDICINE
Payer: MEDICARE

## 2019-06-06 ENCOUNTER — ANESTHESIA (OUTPATIENT)
Dept: GASTROENTEROLOGY | Facility: HOSPITAL | Age: 74
End: 2019-06-06

## 2019-06-06 ENCOUNTER — ANESTHESIA EVENT (OUTPATIENT)
Dept: GASTROENTEROLOGY | Facility: HOSPITAL | Age: 74
End: 2019-06-06

## 2019-06-06 VITALS
RESPIRATION RATE: 16 BRPM | DIASTOLIC BLOOD PRESSURE: 59 MMHG | HEART RATE: 86 BPM | SYSTOLIC BLOOD PRESSURE: 118 MMHG | WEIGHT: 150 LBS | BODY MASS INDEX: 22.73 KG/M2 | HEIGHT: 68 IN | OXYGEN SATURATION: 96 % | TEMPERATURE: 97.6 F

## 2019-06-06 DIAGNOSIS — K21.00 GASTRO-ESOPHAGEAL REFLUX DISEASE WITH ESOPHAGITIS: ICD-10-CM

## 2019-06-06 DIAGNOSIS — R13.10 DYSPHAGIA: ICD-10-CM

## 2019-06-06 PROCEDURE — 88305 TISSUE EXAM BY PATHOLOGIST: CPT | Performed by: PATHOLOGY

## 2019-06-06 PROCEDURE — 43239 EGD BIOPSY SINGLE/MULTIPLE: CPT | Performed by: INTERNAL MEDICINE

## 2019-06-06 RX ORDER — GLYCOPYRROLATE 0.2 MG/ML
INJECTION INTRAMUSCULAR; INTRAVENOUS AS NEEDED
Status: DISCONTINUED | OUTPATIENT
Start: 2019-06-06 | End: 2019-06-06 | Stop reason: SURG

## 2019-06-06 RX ORDER — LIDOCAINE HYDROCHLORIDE 20 MG/ML
INJECTION, SOLUTION EPIDURAL; INFILTRATION; INTRACAUDAL; PERINEURAL AS NEEDED
Status: DISCONTINUED | OUTPATIENT
Start: 2019-06-06 | End: 2019-06-06 | Stop reason: SURG

## 2019-06-06 RX ORDER — SODIUM CHLORIDE 9 MG/ML
INJECTION, SOLUTION INTRAVENOUS CONTINUOUS PRN
Status: DISCONTINUED | OUTPATIENT
Start: 2019-06-06 | End: 2019-06-06 | Stop reason: SURG

## 2019-06-06 RX ORDER — PROPOFOL 10 MG/ML
INJECTION, EMULSION INTRAVENOUS AS NEEDED
Status: DISCONTINUED | OUTPATIENT
Start: 2019-06-06 | End: 2019-06-06 | Stop reason: SURG

## 2019-06-06 RX ORDER — EPHEDRINE SULFATE 50 MG/ML
INJECTION INTRAVENOUS AS NEEDED
Status: DISCONTINUED | OUTPATIENT
Start: 2019-06-06 | End: 2019-06-06 | Stop reason: SURG

## 2019-06-06 RX ORDER — PROPOFOL 10 MG/ML
INJECTION, EMULSION INTRAVENOUS CONTINUOUS PRN
Status: DISCONTINUED | OUTPATIENT
Start: 2019-06-06 | End: 2019-06-06 | Stop reason: SURG

## 2019-06-06 RX ADMIN — EPHEDRINE SULFATE 10 MG: 50 INJECTION, SOLUTION INTRAVENOUS at 09:16

## 2019-06-06 RX ADMIN — PROPOFOL 80 MCG/KG/MIN: 10 INJECTION, EMULSION INTRAVENOUS at 09:12

## 2019-06-06 RX ADMIN — EPHEDRINE SULFATE 10 MG: 50 INJECTION, SOLUTION INTRAVENOUS at 09:14

## 2019-06-06 RX ADMIN — GLYCOPYRROLATE 0.2 MG: 0.2 INJECTION, SOLUTION INTRAMUSCULAR; INTRAVENOUS at 09:09

## 2019-06-06 RX ADMIN — PROPOFOL 50 MG: 10 INJECTION, EMULSION INTRAVENOUS at 09:10

## 2019-06-06 RX ADMIN — LIDOCAINE HYDROCHLORIDE 100 MG: 20 INJECTION, SOLUTION EPIDURAL; INFILTRATION; INTRACAUDAL; PERINEURAL at 09:10

## 2019-06-06 RX ADMIN — SODIUM CHLORIDE: 0.9 INJECTION, SOLUTION INTRAVENOUS at 09:01

## 2019-06-06 RX ADMIN — PROPOFOL 40 MG: 10 INJECTION, EMULSION INTRAVENOUS at 09:12

## 2019-06-07 ENCOUNTER — TELEPHONE (OUTPATIENT)
Dept: GASTROENTEROLOGY | Facility: CLINIC | Age: 74
End: 2019-06-07

## 2019-06-13 ENCOUNTER — TELEPHONE (OUTPATIENT)
Dept: GASTROENTEROLOGY | Facility: CLINIC | Age: 74
End: 2019-06-13

## 2019-06-13 DIAGNOSIS — K21.00 GASTROESOPHAGEAL REFLUX DISEASE WITH ESOPHAGITIS: ICD-10-CM

## 2019-06-13 DIAGNOSIS — K21.9 GASTROESOPHAGEAL REFLUX DISEASE, ESOPHAGITIS PRESENCE NOT SPECIFIED: ICD-10-CM

## 2019-06-13 RX ORDER — PANTOPRAZOLE SODIUM 40 MG/1
40 TABLET, DELAYED RELEASE ORAL DAILY
Qty: 90 TABLET | Refills: 0 | Status: SHIPPED | OUTPATIENT
Start: 2019-06-13 | End: 2019-06-18 | Stop reason: SDUPTHER

## 2019-06-14 ENCOUNTER — TRANSCRIBE ORDERS (OUTPATIENT)
Dept: RADIOLOGY | Facility: HOSPITAL | Age: 74
End: 2019-06-14

## 2019-06-14 ENCOUNTER — HOSPITAL ENCOUNTER (OUTPATIENT)
Dept: RADIOLOGY | Facility: HOSPITAL | Age: 74
Discharge: HOME | End: 2019-06-14
Attending: INTERNAL MEDICINE
Payer: MEDICARE

## 2019-06-14 DIAGNOSIS — R13.10 DYSPHAGIA: ICD-10-CM

## 2019-06-14 PROCEDURE — G8998 SWALLOW D/C STATUS: HCPCS

## 2019-06-14 PROCEDURE — 74230 X-RAY XM SWLNG FUNCJ C+: CPT

## 2019-06-14 PROCEDURE — G8997 SWALLOW GOAL STATUS: HCPCS

## 2019-06-14 PROCEDURE — 92611 MOTION FLUOROSCOPY/SWALLOW: CPT

## 2019-06-14 PROCEDURE — G8996 SWALLOW CURRENT STATUS: HCPCS

## 2019-06-14 PROCEDURE — 74220 X-RAY XM ESOPHAGUS 1CNTRST: CPT

## 2019-06-18 DIAGNOSIS — K21.9 GASTROESOPHAGEAL REFLUX DISEASE, ESOPHAGITIS PRESENCE NOT SPECIFIED: ICD-10-CM

## 2019-06-18 DIAGNOSIS — K21.00 GASTROESOPHAGEAL REFLUX DISEASE WITH ESOPHAGITIS: ICD-10-CM

## 2019-06-18 RX ORDER — PANTOPRAZOLE SODIUM 40 MG/1
40 TABLET, DELAYED RELEASE ORAL DAILY
Qty: 90 TABLET | Refills: 3 | Status: SHIPPED | OUTPATIENT
Start: 2019-06-18 | End: 2019-09-03

## 2019-06-18 RX ORDER — PANTOPRAZOLE SODIUM 40 MG/1
40 TABLET, DELAYED RELEASE ORAL DAILY
Qty: 90 TABLET | Refills: 1 | Status: CANCELLED | OUTPATIENT
Start: 2019-06-18 | End: 2019-12-15

## 2019-09-03 ENCOUNTER — OFFICE VISIT (OUTPATIENT)
Dept: GASTROENTEROLOGY | Facility: CLINIC | Age: 74
End: 2019-09-03
Payer: MEDICARE

## 2019-09-03 VITALS
DIASTOLIC BLOOD PRESSURE: 60 MMHG | BODY MASS INDEX: 22.88 KG/M2 | TEMPERATURE: 98.8 F | WEIGHT: 151 LBS | HEIGHT: 68 IN | SYSTOLIC BLOOD PRESSURE: 130 MMHG | HEART RATE: 58 BPM

## 2019-09-03 DIAGNOSIS — K21.00 GASTROESOPHAGEAL REFLUX DISEASE WITH ESOPHAGITIS: Primary | ICD-10-CM

## 2019-09-03 DIAGNOSIS — D12.6 TUBULAR ADENOMA OF COLON: ICD-10-CM

## 2019-09-03 DIAGNOSIS — R13.12 OROPHARYNGEAL DYSPHAGIA: ICD-10-CM

## 2019-09-03 PROCEDURE — 99214 OFFICE O/P EST MOD 30 MIN: CPT | Performed by: INTERNAL MEDICINE

## 2019-09-03 RX ORDER — OMEPRAZOLE 40 MG/1
40 CAPSULE, DELAYED RELEASE ORAL DAILY
Qty: 90 CAPSULE | Refills: 3 | Status: SHIPPED | OUTPATIENT
Start: 2019-09-03 | End: 2020-07-21

## 2019-09-03 NOTE — PROGRESS NOTES
[facility]patient Follow-up [facility]  Jovani Monroy 75 y o  male MRN: [id number]  Encounter: [id number]          ASSESSMENT AND PLAN:      1  Gastroesophageal reflux disease with esophagitis  2  Dysphagia  -last EGD on 6/6/19 which showed resolved class D esophagitis  -patient was asked to taper off PPI but unfortunately symptoms returned  -advised on taking omeprazole once daily indefinitely and H2 blocker as needed at night  -advised on dietary and lifestyle modifications  -patient with known large hiatal hernia so this is likely contributing to his symptoms however given his advanced age and comorbid conditions he remains a high risk surgical candidate and his symptoms appear to be mild  -patient had the VBS showing mild oropharyngeal dysphagia  -barium esophagram with mild laryngeal penetration/tracheal aspiration  -maximize lifestyle changes and medical therapy  -recommend follow-up with PCP and consider continue speech pathology therapy  -return to clinic in 6 months  - omeprazole (PriLOSEC) 40 MG capsule; Take 1 capsule (40 mg total) by mouth daily  Dispense: 90 capsule; Refill: 3    3  Tubular adenoma of colon  -due for repeat colonoscopy in 2024-may be deferred due to advanced age    ______________________________________________________________________    SUBJECTIVE:  Patient seen in follow-up for his symptoms of GERD and dysphagia  Patient was asked to taper off PPI therapy after last endoscopy in June due to resolution of esophagitis  In the interim he states he has been bringing up more phlegm and occasionally having some discomfort with eating but denies any blood in stool, unintentional weight loss  He continues to take H2 blocker at night as needed      REVIEW OF SYSTEMS IS OTHERWISE NEGATIVE        Historical Information   Past Medical History:   Diagnosis Date    Abdominal aortic aneurysm (AAA) ([facility] [address] )     last assessed nov 6 2015    Abscess of groin     last assessed oct 6 2014    Candidiasis, cutaneous     last assessed march 19 2014    Coronary artery disease     Dyslipidemia     Esophageal ulcer without bleeding     Gastritis     Hiatal hernia     Hx of cataract surgery, left     last assessed july 21 2014    Hyperlipidemia     Hypertension     Old myocardial infarction 2000    Recurrent right inguinal hernia     s/p right orchioectomy, mesh removal, and sinus trac removal patient being followed by surgery next appt 4/28/14 patient s/p keflex x 7 days for MSSA Cx repeat wound cx grew MSSA cx repeat wound cx grew MSSA and other armond (mixed) no MRSA identified conitunue close monitoring and local wound care, last assessed april 15 2014    Wound of skin     in the groin, right     Past Surgical History:   Procedure Laterality Date    ABDOMINAL AORTIC ANEURYSM REPAIR  2009    aortobi-iliac, dacron graft    APPENDECTOMY      open    CATARACT EXTRACTION Bilateral     COLONOSCOPY      CORONARY ANGIOPLASTY WITH STENT PLACEMENT      stent 2    CYSTOSCOPY      diagnostic onset nov 13 2014    EXPLORATORY LAPAROTOMY  12/09/2009    FL INJECTION LEFT HIP (NON ARTHROGRAM)  4/2/2019    HIP SURGERY Right     INGUINAL HERNIA REPAIR Right     unilateral x2    ORCHIECTOMY Right     NJ COLONOSCOPY FLX DX W/COLLJ SPEC WHEN PFRMD N/A 5/22/2018    Procedure: COLONOSCOPY;  Surgeon: Cathie Gonzalez DO;  [facility]; Service: Gastroenterology    NJ ESOPHAGOGASTRODUODENOSCOPY TRANSORAL DIAGNOSTIC N/A 2/14/2019    Procedure: ESOPHAGOGASTRODUODENOSCOPY (EGD); Surgeon: Radha Leon MD;  [facility];   Service: Gastroenterology    NJ REPAIR INCISIONAL HERNIA,REDUCIBLE N/A 2/23/2018    Procedure: lysis of adhesions; INCISIONAL HERNIA REPAIR WITH MESH;  Surgeon: Savita Norton MD;  [facility];  Service: General    UPPER GASTROINTESTINAL ENDOSCOPY       Social History   Social History     Substance and Sexual Activity   Alcohol Use Yes Comment: occasionally     Social History     Substance and Sexual Activity   Drug Use No     Social History     Tobacco Use   Smoking Status Former Smoker   Smokeless Tobacco Never Used     Family History   Problem Relation Age of Onset    Heart disease Mother     Diabetes Mother     Heart disease Father     Diabetes Sister     Cancer Brother        Meds/Allergies       Current Outpatient Medications:     aspirin 81 MG tablet    atorvastatin (LIPITOR) 40 mg tablet    hydrochlorothiazide (HYDRODIURIL) 12 5 mg tablet    lisinopril (ZESTRIL) 20 mg tablet    metoprolol succinate (TOPROL-XL) 50 mg 24 hr tablet    ranitidine (ZANTAC) 150 MG capsule    omeprazole (PriLOSEC) 40 MG capsule    No Known Allergies        Objective     Blood pressure 130/60, pulse 58, temperature 98 8 °F (37 1 °C), temperature source Tympanic, height 5' 8" (1 727 m), weight 68 5 kg (151 lb)  Body mass index is 22 96 kg/m²  PHYSICAL EXAM:      General Appearance:   Alert, cooperative, no distress   HEENT:   Normocephalic, atraumatic, anicteric      Neck:  Supple, symmetrical, trachea midline   Lungs:   Clear to auscultation bilaterally; no rales, rhonchi or wheezing; respirations unlabored    Heart[de-identified]   Regular rate and rhythm; no murmur, rub, or gallop  Abdomen:   Soft, non-tender, non-distended; normal bowel sounds; no masses, no organomegaly    Genitalia:   Deferred    Rectal:   Deferred    Extremities:  No cyanosis, clubbing or edema    Pulses:  2+ and symmetric    Skin:  No jaundice, rashes, or lesions    Lymph nodes:  No palpable cervical lymphadenopathy        Lab Results:   No visits with results within 1 Day(s) from this visit     Latest known visit with results is:   Hospital Outpatient Visit on 06/06/2019   Component Date Value    Case Report 06/06/2019                      Value:Surgical Pathology Report                         Case: [id number]                                   Authorizing Provider:  Ravindra Lyle DO        Collected:           06/06/2019 7733              Ordering Location:     [address] [place] [address]      Received:            06/06/2019 0401 [facility]                                                           Pathologist:           Farzad Montanez MD                                                         Specimens:   A) - Esophagus, Distal Esophagus bx-cold bx                                                         B) - Esophagus, Proximal esophagus bx-cold bx                                              Final Diagnosis 06/06/2019                      Value: This result contains rich text formatting which cannot be displayed here   Additional Information 06/06/2019                      Value: This result contains rich text formatting which cannot be displayed here  Varsha Parrish Gross Description 06/06/2019                      Value: This result contains rich text formatting which cannot be displayed here   Clinical Information 06/06/2019                      Value:R/O EOE         Radiology Results:   No results found

## 2019-09-03 NOTE — PATIENT INSTRUCTIONS
Gastroesophageal Reflux Disease   WHAT YOU NEED TO KNOW:   Gastroesophageal reflux occurs when acid and food in the stomach back up into the esophagus  Gastroesophageal reflux disease (GERD) is reflux that occurs more than twice a week for a few weeks  It usually causes heartburn and other symptoms  GERD can cause other health problems over time if it is not treated  DISCHARGE INSTRUCTIONS:   Return to the emergency department if:   · You feel full and cannot burp or vomit  · You have severe chest pain and sudden trouble breathing  · Your bowel movements are black, bloody, or tarry-looking  · Your vomit looks like coffee grounds or has blood in it  Contact your healthcare provider if:   · You vomit large amounts, or you vomit often  · You have trouble breathing after you vomit  · You have trouble swallowing, or pain with swallowing  · You are losing weight without trying  · Your symptoms get worse or do not improve with treatment  · You have questions or concerns about your condition or care  Medicines:   · Medicines  are used to decrease stomach acid  Medicine may also be used to help your lower esophageal sphincter and stomach contract (tighten) more  · Take your medicine as directed  Contact your healthcare provider if you think your medicine is not helping or if you have side effects  Tell him of her if you are allergic to any medicine  Keep a list of the medicines, vitamins, and herbs you take  Include the amounts, and when and why you take them  Bring the list or the pill bottles to follow-up visits  Carry your medicine list with you in case of an emergency  Manage GERD:   · Do not have foods or drinks that may increase heartburn  These include chocolate, peppermint, fried or fatty foods, drinks that contain caffeine, or carbonated drinks (soda)  Other foods include spicy foods, onions, tomatoes, and tomato-based foods   Do not have foods or drinks that can irritate your esophagus, such as citrus fruits, juices, and alcohol  · Do not eat large meals  When you eat a lot of food at one time, your stomach needs more acid to digest it  Eat 6 small meals each day instead of 3 large ones, and eat slowly  Do not eat meals 2 to 3 hours before bedtime  · Elevate the head of your bed  Place 6-inch blocks under the head of your bed frame  You may also use more than one pillow under your head and shoulders while you sleep  · Maintain a healthy weight  If you are overweight, weight loss may help relieve symptoms of GERD  · Do not smoke  Smoking weakens the lower esophageal sphincter and increases the risk of GERD  Ask your healthcare provider for information if you currently smoke and need help to quit  E-cigarettes or smokeless tobacco still contain nicotine  Talk to your healthcare provider before you use these products  · Do not wear clothing that is tight around your waist   Tight clothing can put pressure on your stomach and cause or worsen GERD symptoms  Follow up with your healthcare provider as directed:  Write down your questions so you remember to ask them during your visits  © 2017 [facility] Information is for End User's use only and may not be sold, redistributed or otherwise used for commercial purposes  All illustrations and images included in CareNotes® are the copyrighted property of [employer]  or Celso Cervantes  The above information is an  only  It is not intended as medical advice for individual conditions or treatments  Talk to your doctor, nurse or pharmacist before following any medical regimen to see if it is safe and effective for you

## 2019-09-04 ENCOUNTER — OFFICE VISIT (OUTPATIENT)
Dept: CARDIOLOGY CLINIC | Facility: CLINIC | Age: 74
End: 2019-09-04
Payer: MEDICARE

## 2019-09-04 VITALS
WEIGHT: 151 LBS | DIASTOLIC BLOOD PRESSURE: 40 MMHG | SYSTOLIC BLOOD PRESSURE: 108 MMHG | BODY MASS INDEX: 22.88 KG/M2 | HEIGHT: 68 IN | HEART RATE: 63 BPM

## 2019-09-04 DIAGNOSIS — R00.2 PALPITATIONS: Primary | ICD-10-CM

## 2019-09-04 DIAGNOSIS — I25.10 CORONARY ARTERY DISEASE INVOLVING NATIVE HEART WITHOUT ANGINA PECTORIS, UNSPECIFIED VESSEL OR LESION TYPE: ICD-10-CM

## 2019-09-04 DIAGNOSIS — E78.5 HYPERLIPIDEMIA, UNSPECIFIED HYPERLIPIDEMIA TYPE: ICD-10-CM

## 2019-09-04 DIAGNOSIS — I10 ESSENTIAL HYPERTENSION: ICD-10-CM

## 2019-09-04 PROCEDURE — 93000 ELECTROCARDIOGRAM COMPLETE: CPT | Performed by: INTERNAL MEDICINE

## 2019-09-04 PROCEDURE — 99213 OFFICE O/P EST LOW 20 MIN: CPT | Performed by: INTERNAL MEDICINE

## 2019-09-04 NOTE — PROGRESS NOTES
Cardiology Follow up    Shawn Lamb  [id number]  1945  HEART & VASCULAR [facility]  [address] [facility] [address]      1  Palpitations  POCT ECG   2  Coronary artery disease involving native heart without angina pectoris, unspecified vessel or lesion type     3  Essential hypertension     4  Hyperlipidemia, unspecified hyperlipidemia type         Discussion/Summary:  1  Coronary artery disease status post PCI in 2000  2  Palpitations  3  Hypertension  4  Hyperlipidemia  5  Abdominal aortic aneurysm status post repair    -orthostatic vital signs in office negative  -EKG performed in the office shows normal sinus rhythm heart rate 63 beats per minute  -blood pressure very well controlled in the office today at 108/40mmHg  -in the setting of patient's dizziness however we will have patient discontinue hydrochlorothiazide at this time and instead he will monitor his home blood pressure readings and will be seen in the office in 1 week for nurse visit for blood pressure checks were he will bring his home blood pressure cuff   -if patient maintains blood pressures below 140/90 will continue to hold hydrochlorothiazide however if lower extremity edema worsens or blood pressure is uncontrolled can then consider restarting hydrochlorothiazide at 12 5 mg instead of 25 which is what he is currently taking verses different diuretic regimen potentially furosemide low-dose   -will check BMP to monitor renal function electrolytes on hydrochlorothiazide as well as lipid panel and transthoracic echocardiogram  -will monitor for palpitations with 48 hour Holter and will have patient decrease caffeine use in the afternoon/evenings which is when he seems to get his palpitations  -patient will monitor his blood pressures at home and bring with him to nurse visit in 1 week for blood pressure check while off hydrochlorothiazide    Patient was also informed to call the office if his blood pressure was elevated at home prior to his nursing visit while off hydrochlorothiazide   -will see patient in approximately 3 months or sooner if necessary to go over results of testing and to monitor overall up symptomatology   -counseled patient that if he were to have any warning or alarm type symptoms he should seek emergency medical care immediately      History of Present Illness:  Palpitations and dizziness  -patient is a 69-year-old male past medical history significant for hypertension hyperlipidemia coronary artery disease status post stenting in 2000 and history of abdominal aortic aneurysm status post repair approximately 9 years ago with a history of palpitations and dizziness who presents to the office today for scheduled follow-up  He states that his palpitations are still intermittent happening once every couple weeks  He states that he does not get dizzy, lightheaded, short of breath, chest pain or other sensations with these palpitations  He states that he has had them intermittently for the past couple months however he had had an issue with this in the past back in 2017 and underwent Holter monitoring for however he states at that time this sensation went away on its own  He is unsure if these palpitations are similar to the feeling was having back in 2017 but states they were on the left side of his chest wall most out toward his armpit and did not radiate and lasted approximately 15-30 minutes in duration however he states daily, on at rest and seemed to go away on their own      -in regards to his intermittent dizziness  The patient states that his dizziness only comes on when he is lying in bed and has a change in position by moving his head left to right  He denies any dizziness when he stands from a seated position but states sometimes it does come on when he bends over to tie his shoes  He denies any issues with loss of consciousness from this    He states that if he lies still there for about a minute this dizziness goes away on its own  He states this is been happening quite frequently however again denies any chest pain or shortness of breath with it      Patient Active Problem List   Diagnosis    Coronary artery disease    Hypertension    Incisional hernia    History of incisional hernia repair    Bursitis of left shoulder    Hyperlipidemia    PVD [facility]peripheral vascular disease) ([facility] )    Serrated adenoma of colon    Primary osteoarthritis of right knee    Odynophagia    Gastroesophageal reflux disease    Esophagitis, [place] grade D    Gastroesophageal reflux disease with esophagitis    Primary osteoarthritis of one hip, left    Tubular adenoma of colon     Past Medical History:   Diagnosis Date    Abdominal aortic aneurysm (AAA) ([facility] )     last assessed nov 6 2015    Abscess of groin     last assessed oct 6 2014    Candidiasis, cutaneous     last assessed march 19 2014    Coronary artery disease     Dyslipidemia     Esophageal ulcer without bleeding     Gastritis     Hiatal hernia     Hx of cataract surgery, left     last assessed july 21 2014    Hyperlipidemia     Hypertension     Old myocardial infarction 2000    Recurrent right inguinal hernia     s/p right orchioectomy, mesh removal, and sinus trac removal patient being followed by surgery next appt 4/28/14 patient s/p keflex x 7 days for MSSA Cx repeat wound cx grew MSSA cx repeat wound cx grew MSSA and other armond (mixed) no MRSA identified conitunue close monitoring and local wound care, last assessed april 15 2014    Wound of skin     in the groin, right     Social History     Socioeconomic History    Marital status:      Spouse name: Not on file    Number of children: Not on file    Years of education: Not on file    Highest education level: Not on file   Occupational History    Not on file   Social Needs    Financial resource strain: Not on file    Food insecurity:     Worry: Not on file     Inability: Not on file    Transportation needs:     Medical: Not on file     Non-medical: Not on file   Tobacco Use    Smoking status: Former Smoker    Smokeless tobacco: Never Used   Substance and Sexual Activity    Alcohol use: Yes     Comment: occasionally    Drug use: No    Sexual activity: Not on file   Lifestyle    Physical activity:     Days per week: Not on file     Minutes per session: Not on file    Stress: Not on file   Relationships    Social connections:     Talks on phone: Not on file     Gets together: Not on file     Attends Denominational service: Not on file     Active member of club or organization: Not on file     Attends meetings of clubs or organizations: Not on file     Relationship status: Not on file    Intimate partner violence:     Fear of current or ex partner: Not on file     Emotionally abused: Not on file     Physically abused: Not on file     Forced sexual activity: Not on file   Other Topics Concern    Not on file   Social History Narrative    Not on file      Family History   Problem Relation Age of Onset    Heart disease Mother     Diabetes Mother     Heart disease Father     Diabetes Sister     Cancer Brother      Past Surgical History:   Procedure Laterality Date    ABDOMINAL AORTIC ANEURYSM REPAIR  2009    aortobi-iliac, dacron graft    APPENDECTOMY      open    CATARACT EXTRACTION Bilateral     COLONOSCOPY      CORONARY ANGIOPLASTY WITH STENT PLACEMENT      stent 2    CYSTOSCOPY      diagnostic onset nov 13 2014    EXPLORATORY LAPAROTOMY  12/09/2009    FL INJECTION LEFT HIP (NON ARTHROGRAM)  4/2/2019    HIP SURGERY Right     INGUINAL HERNIA REPAIR Right     unilateral x2    ORCHIECTOMY Right     SC COLONOSCOPY FLX DX W/COLLJ SPEC WHEN PFRMD N/A 5/22/2018    Procedure: COLONOSCOPY;  Surgeon: Maine Merritt DO;  [facility];   Service: Gastroenterology    SC ESOPHAGOGASTRODUODENOSCOPY TRANSORAL DIAGNOSTIC N/A 2/14/2019 Procedure: ESOPHAGOGASTRODUODENOSCOPY (EGD); Surgeon: Diana Gonzalez MD;  [facility]; Service: Gastroenterology    MS REPAIR INCISIONAL HERNIA,REDUCIBLE N/A 2/23/2018    Procedure: lysis of adhesions; INCISIONAL HERNIA REPAIR WITH MESH;  Surgeon: Kimmy Lennon MD;  Location:  MAIN OR;  Service: General    UPPER GASTROINTESTINAL ENDOSCOPY         Current Outpatient Medications:     aspirin 81 MG tablet, Take 81 mg by mouth daily Resume on 3/11/18 , Disp: , Rfl:     atorvastatin (LIPITOR) 40 mg tablet, Take 1 tablet (40 mg total) by mouth daily with dinner Resume next scheduled dose upon discharge from the hospital, Disp: 90 tablet, Rfl: 3    hydrochlorothiazide (HYDRODIURIL) 12 5 mg tablet, Take 2 tablets (25 mg total) by mouth daily, Disp: 180 tablet, Rfl: 3    lisinopril (ZESTRIL) 20 mg tablet, Take 1 tablet (20 mg total) by mouth daily, Disp: 90 tablet, Rfl: 3    metoprolol succinate (TOPROL-XL) 50 mg 24 hr tablet, Take 1 tablet (50 mg total) by mouth daily Resume on 3/11/18, Disp: 90 tablet, Rfl: 3    ranitidine (ZANTAC) 150 MG capsule, Take 150 mg by mouth as needed Resume on 3/11/18 as prior to hospitalization, Disp: , Rfl:     omeprazole (PriLOSEC) 40 MG capsule, Take 1 capsule (40 mg total) by mouth daily (Patient not taking: Reported on 9/4/2019), Disp: 90 capsule, Rfl: 3  No Known Allergies      Labs:  No visits with results within 2 Month(s) from this visit     Latest known visit with results is:   Hospital Outpatient Visit on 06/06/2019   Component Date Value    Case Report 06/06/2019                      Value:Surgical Pathology Report                         Case: [id number]                                   Authorizing Provider:  Jaimie Durand DO        Collected:           06/06/2019 [id number]              Ordering Location:     [address] [place] [address]      Received:            06/06/2019 [place] [address] Endoscopy Pathologist:           Christine Brown MD                                                         Specimens:   A) - Esophagus, Distal Esophagus bx-cold bx                                                         B) - Esophagus, Proximal esophagus bx-cold bx                                              Final Diagnosis 06/06/2019                      Value: This result contains rich text formatting which cannot be displayed here   Additional Information 06/06/2019                      Value: This result contains rich text formatting which cannot be displayed here  Ketty Del Angel Gross Description 06/06/2019                      Value: This result contains rich text formatting which cannot be displayed here   Clinical Information 06/06/2019                      Value:R/O EOE        Imaging: No results found  ECG:  EKG shows normal sinus rhythm heart rate 63 beats per minute    Review of Systems:  Review of Systems   Constitutional: Negative for fatigue and fever  HENT: Negative for trouble swallowing and voice change  Eyes: Negative for pain and redness  Respiratory: Negative for shortness of breath and wheezing  Cardiovascular: Negative for chest pain, palpitations and leg swelling  Gastrointestinal: Negative for abdominal pain, nausea and vomiting  Genitourinary: Negative for dysuria  Musculoskeletal: Negative for neck pain and neck stiffness  Skin: Negative for rash  Neurological: Negative for dizziness, syncope and headaches  Psychiatric/Behavioral: Negative for agitation and confusion  All other systems reviewed and are negative          Vitals:    09/04/19 1449   BP: (!) 108/40   BP Location: Right arm   Patient Position: Sitting   Cuff Size: Standard   Pulse: 63   Weight: 68 5 kg (151 lb)   Height: 5' 8" (1 727 m)     Vitals:    09/04/19 1449   Weight: 68 5 kg (151 lb)     Height: 5' 8" (172 7 cm)     Physical Exam:  Physical Exam   Constitutional: He is oriented to person, place, and time  No distress  HENT:   Head: Normocephalic and atraumatic  Eyes: Right eye exhibits no discharge  Left eye exhibits no discharge  Neck: No JVD present  No tracheal deviation present  Cardiovascular: Normal rate and regular rhythm  Exam reveals no friction rub  Pulmonary/Chest: Effort normal and breath sounds normal  No respiratory distress  Abdominal: Soft  Bowel sounds are normal  There is no tenderness  Musculoskeletal: He exhibits no edema or tenderness  Neurological: He is alert and oriented to person, place, and time  Skin: Skin is warm and dry  He is not diaphoretic  Psychiatric: He has a normal mood and affect

## 2019-09-11 ENCOUNTER — APPOINTMENT (OUTPATIENT)
Dept: LAB | Facility: CLINIC | Age: 74
End: 2019-09-11
Payer: MEDICARE

## 2019-09-11 ENCOUNTER — CLINICAL SUPPORT (OUTPATIENT)
Dept: CARDIOLOGY CLINIC | Facility: CLINIC | Age: 74
End: 2019-09-11
Payer: MEDICARE

## 2019-09-11 VITALS
BODY MASS INDEX: 23.19 KG/M2 | WEIGHT: 153 LBS | SYSTOLIC BLOOD PRESSURE: 140 MMHG | DIASTOLIC BLOOD PRESSURE: 70 MMHG | HEIGHT: 68 IN | HEART RATE: 66 BPM

## 2019-09-11 DIAGNOSIS — R42 DIZZINESS: Primary | ICD-10-CM

## 2019-09-11 DIAGNOSIS — I10 ESSENTIAL HYPERTENSION: ICD-10-CM

## 2019-09-11 DIAGNOSIS — E78.5 HYPERLIPIDEMIA, UNSPECIFIED HYPERLIPIDEMIA TYPE: ICD-10-CM

## 2019-09-11 LAB
ANION GAP SERPL CALCULATED.3IONS-SCNC: 4 MMOL/L (ref 4–13)
BUN SERPL-MCNC: 19 MG/DL (ref 5–25)
CALCIUM SERPL-MCNC: 9.1 MG/DL (ref 8.3–10.1)
CHLORIDE SERPL-SCNC: 112 MMOL/L (ref 100–108)
CHOLEST SERPL-MCNC: 105 MG/DL (ref 50–200)
CO2 SERPL-SCNC: 26 MMOL/L (ref 21–32)
CREAT SERPL-MCNC: 1.05 MG/DL (ref 0.6–1.3)
GFR SERPL CREATININE-BSD FRML MDRD: 69 ML/MIN/1.73SQ M
GLUCOSE P FAST SERPL-MCNC: 75 MG/DL (ref 65–99)
HDLC SERPL-MCNC: 39 MG/DL (ref 40–60)
LDLC SERPL CALC-MCNC: 45 MG/DL (ref 0–100)
NONHDLC SERPL-MCNC: 66 MG/DL
POTASSIUM SERPL-SCNC: 3.9 MMOL/L (ref 3.5–5.3)
SODIUM SERPL-SCNC: 142 MMOL/L (ref 136–145)
TRIGL SERPL-MCNC: 106 MG/DL

## 2019-09-11 PROCEDURE — 99211 OFF/OP EST MAY X REQ PHY/QHP: CPT | Performed by: INTERNAL MEDICINE

## 2019-09-11 PROCEDURE — 80048 BASIC METABOLIC PNL TOTAL CA: CPT

## 2019-09-11 PROCEDURE — 80061 LIPID PANEL: CPT

## 2019-09-11 PROCEDURE — 36415 COLL VENOUS BLD VENIPUNCTURE: CPT

## 2019-09-11 NOTE — PROGRESS NOTES
Pt seen under direction of Dr Dhiraj Lee for BP ck-   BP-140/70  p-66  Pt's monitor 148/57  p-57  Meds reviewed, pt states dizziness slightly better off HCTZ  Pt's BP readings from home reviewed  Dr Nargis Cifuentes reviewed, no changes made

## 2019-09-12 ENCOUNTER — TELEPHONE (OUTPATIENT)
Dept: CARDIOLOGY CLINIC | Facility: CLINIC | Age: 74
End: 2019-09-12

## 2019-09-12 NOTE — TELEPHONE ENCOUNTER
Called patient and made him aware of BMP and Lipid results  Patient verbalized understanding and requested a copy be sent to him in the mail  Verbalized understanding and placed in outgoing mail bin

## 2019-09-12 NOTE — TELEPHONE ENCOUNTER
----- Message from Carlos Sánchez DO sent at 9/12/2019  2:20 PM EDT -----  Please call the patient and let him know that his recent lab work was unchanged from a year ago and everything looks good

## 2019-09-25 ENCOUNTER — TELEPHONE (OUTPATIENT)
Dept: INTERNAL MEDICINE CLINIC | Facility: CLINIC | Age: 74
End: 2019-09-25

## 2019-09-25 DIAGNOSIS — B35.1 ONYCHOMYCOSIS: Primary | ICD-10-CM

## 2019-09-30 ENCOUNTER — OFFICE VISIT (OUTPATIENT)
Dept: MULTI SPECIALTY CLINIC | Facility: CLINIC | Age: 74
End: 2019-09-30

## 2019-09-30 ENCOUNTER — CLINICAL SUPPORT (OUTPATIENT)
Dept: INTERNAL MEDICINE CLINIC | Facility: CLINIC | Age: 74
End: 2019-09-30

## 2019-09-30 VITALS — HEART RATE: 78 BPM | DIASTOLIC BLOOD PRESSURE: 62 MMHG | SYSTOLIC BLOOD PRESSURE: 138 MMHG | TEMPERATURE: 97.5 F

## 2019-09-30 DIAGNOSIS — L84 CALLUS: ICD-10-CM

## 2019-09-30 DIAGNOSIS — L60.3 ONYCHODYSTROPHY: Primary | ICD-10-CM

## 2019-09-30 DIAGNOSIS — M21.70 LOWER LIMB LENGTH DIFFERENCE: ICD-10-CM

## 2019-09-30 DIAGNOSIS — Z23 NEED FOR INFLUENZA VACCINATION: Primary | ICD-10-CM

## 2019-09-30 PROCEDURE — 11721 DEBRIDE NAIL 6 OR MORE: CPT | Performed by: PODIATRIST

## 2019-09-30 PROCEDURE — 90662 IIV NO PRSV INCREASED AG IM: CPT | Performed by: INTERNAL MEDICINE

## 2019-09-30 PROCEDURE — 99213 OFFICE O/P EST LOW 20 MIN: CPT | Performed by: PODIATRIST

## 2019-09-30 PROCEDURE — G0008 ADMIN INFLUENZA VIRUS VAC: HCPCS | Performed by: INTERNAL MEDICINE

## 2019-09-30 PROCEDURE — 11042 DBRDMT SUBQ TIS 1ST 20SQCM/<: CPT | Performed by: PODIATRIST

## 2019-09-30 NOTE — PROGRESS NOTES
Assessment/Plan:     Diagnoses and all orders for this visit:    Onychodystrophy  - Excisionally debrided toenails x10 using a large nipper to normal length and thickness without incident  Callus x1  - Sharply debrided plantar callus x1 using a sterile 15 blade to normal epithelium  Debridement was well tolerated and was without incident   - Advised patient to use lotion (ex  Amlactin) to plantar feet daily  Avoid interdigital spaces  Lower Limb Length Difference  - Continue use of custom shoes with cane for assistance with ambulation       - RTC in 6 months     - Dr Alley Jaimes was present for entirety of patient encounter  Subjective:      Patient ID: Ivette Ugalde is a 75 y o  male  Mr Sherryle Rand is 74 y/o who presents for palliative nail care  Patient states that toenails are elongated and make is difficult to engage in normal daily activities  He states that they become painful at times, but denies pain today  Denies diabetes  Denies n/v/f/chills/sob/cp  The following portions of the patient's history were reviewed and updated as appropriate: allergies, current medications, past family history, past medical history, past social history, past surgical history and problem list     Review of Systems   Constitutional: Negative  Respiratory: Negative  Cardiovascular: Negative  Musculoskeletal:        Digital contractures, limb length discrepancy   Skin:        Elongated thickened nails   Neurological: Negative  Objective:      /62 (BP Location: Right arm, Patient Position: Sitting, Cuff Size: Adult)   Pulse 78   Temp 97 5 °F (36 4 °C) (Oral)          Physical Exam   Cardiovascular:   Pulses:       Dorsalis pedis pulses are 2+ on the right side, and 2+ on the left side  Posterior tibial pulses are 1+ on the right side, and 1+ on the left side  No lower extremity edema noted bl  Pedal hair absent  CRT < 3 secs at the digits  Telangiectasias on bilateral feet  Musculoskeletal:   Limb length discrepancy on right  Digital contractures  Decreased ROM at ankles BL  apropulsive and Circumvent on right during ambulation  Biomechanical Exam of LE:  Ankle dorsiflexion with knee extended is unable to get pass vertical and able to get pass vertical on right and unable to get pass vertical and able to get pass vertical on left  Ankle dorsiflexion with knee flexed is unable to get pass vertical and able to get pass vertical on right and unable to get pass vertical and able to get pass vertical on left  Malleolar positioning is WNL b/l  STJ ROM WNL b/l, heel inversion is approximately 15° on right and 15° on left; heel eversion is approximately 10° on right and 10° on left; neutral calcaneal stance position is  everted°   1st ray ROM WNL b/l, able to dorsiflex/plantarflex b/l  Tibial varum is 0° b/l, Resting calcaneal stance position is varus and approximately 5° on right and valgus and approximately 5° on left  Gait analysis: other  Gross deformity noted: Limb length discrepancy on right and hammertoes     Neurological:   Gross and protective sensation intact  Tested using [place] monofilament exam    Skin: Skin is warm and intact  Capillary refill takes less than 2 seconds  Toenails x10 are elongated, dystrophic, yellow in color and thickened with subungual debris   Hyperkeratosis noted on plantar aspect of right 2nd metatarsal

## 2019-10-09 ENCOUNTER — HOSPITAL ENCOUNTER (OUTPATIENT)
Dept: NON INVASIVE DIAGNOSTICS | Facility: CLINIC | Age: 74
Discharge: HOME | End: 2019-10-09
Payer: MEDICARE

## 2019-10-09 DIAGNOSIS — I25.10 CORONARY ARTERY DISEASE INVOLVING NATIVE HEART WITHOUT ANGINA PECTORIS, UNSPECIFIED VESSEL OR LESION TYPE: ICD-10-CM

## 2019-10-09 DIAGNOSIS — R00.2 PALPITATIONS: ICD-10-CM

## 2019-10-09 PROCEDURE — 93226 XTRNL ECG REC<48 HR SCAN A/R: CPT

## 2019-10-09 PROCEDURE — 93225 XTRNL ECG REC<48 HRS REC: CPT

## 2019-10-09 PROCEDURE — 93306 TTE W/DOPPLER COMPLETE: CPT

## 2019-10-10 ENCOUNTER — OFFICE VISIT (OUTPATIENT)
Dept: INTERNAL MEDICINE CLINIC | Facility: CLINIC | Age: 74
End: 2019-10-10

## 2019-10-10 VITALS
SYSTOLIC BLOOD PRESSURE: 120 MMHG | BODY MASS INDEX: 24.46 KG/M2 | TEMPERATURE: 97.3 F | HEIGHT: 68 IN | WEIGHT: 161.38 LBS | DIASTOLIC BLOOD PRESSURE: 60 MMHG | HEART RATE: 88 BPM

## 2019-10-10 DIAGNOSIS — I10 ESSENTIAL HYPERTENSION: ICD-10-CM

## 2019-10-10 DIAGNOSIS — E78.5 HYPERLIPIDEMIA, UNSPECIFIED HYPERLIPIDEMIA TYPE: Primary | ICD-10-CM

## 2019-10-10 DIAGNOSIS — K21.00 GASTROESOPHAGEAL REFLUX DISEASE WITH ESOPHAGITIS: ICD-10-CM

## 2019-10-10 DIAGNOSIS — I25.10 CORONARY ARTERY DISEASE INVOLVING NATIVE HEART WITHOUT ANGINA PECTORIS, UNSPECIFIED VESSEL OR LESION TYPE: ICD-10-CM

## 2019-10-10 PROBLEM — R13.10 ODYNOPHAGIA: Status: RESOLVED | Noted: 2018-10-19 | Resolved: 2019-10-10

## 2019-10-10 PROCEDURE — 99214 OFFICE O/P EST MOD 30 MIN: CPT | Performed by: HOSPITALIST

## 2019-10-10 NOTE — PROGRESS NOTES
[facility] Visit Note  Lilliam Murphy 75 y o  male   MRN: [id number]    Assessment and Plan    1  Gastroesophageal reflux disease with esophagitis last EGD on 6/6/19 which showed resolved class D esophagitis  Patient advised on dietary and lifestyle modifications  patient with known large hiatal hernia  likely contributing to his symptoms however per GI, given his advanced age and comorbid conditions he remains a high risk surgical candidate and his symptoms appear to be mild  patient had the VBS showing mild oropharyngeal dysphagia  -barium esophagram with mild laryngeal penetration/tracheal aspiration  Plan  · Maximize lifestyle changes and medical therapy  CONTINUE omeprazole (PriLOSEC) 40 MG capsule; Take 1 capsule (40 mg total) by mouth daily      2  Tubular adenoma of colon  · Per GI, Pt is  due for repeat colonoscopy in 2024-may be deferred due to advan    3  Coronary artery disease patient has a history of coronary artery disease in 2000 status post 1 stent  He denies any chest pain any nausea, any SOB  Ortho he reports palpitation which he has a Holter monitor on currently  He had an echo 10/09/2019 which showed EF 70%  Plan  · Continue metoprolol succinate 50 mg daily  · Continue aspirin 81 mg daily  · Continue atorvastatin 40 mg daily    4  Hypertension pressure today is 120/60  Patient was previously on hydrochlorothiazide 12 5 mg which he is currently put on hold because of poor control blood pressure  Patient reports dizziness well on hydrochlorothiazide disease possibly due to low blood pressure  Plan  · Continue lisinopril 20 mg daily  · Continue Toprol XL 50 mg daily    5     Hyperlipidemia lipid panel was within normal limits limit except decrease HDL 39  PLAN  · Continue atorvastatin 40 mg daily    Health Maintenance:  Patient stated he had flu shot 9/19    Schedule a follow-up appointment in    Chief Complaint:   Routine follow-up visit  Subjective     History of Present Illness:  Patient is a 25-year-old male with past medical history of GERD with esophagitis, tubular adenoma of colon CAD S/P stent  x1 2000, hyperlipidemia,AAA repair 2009 last assessed 2015 presents for routine follow-up visit a he recently started having palpitations and presented to his Cardiology where he was given a Holter monitor, recently had an echocardiogram done 10/09/2019 which showed EF 75% normal sinus reading EKG  Today has no complaints he reports improvement in his GERD symptoms, he denies headaches, dizziness, SOB, chest pain, palpitation, nausea, vomiting, constipation, dysuria, hematuria, numbness tingling sensation of his extremities  He blood pressure 120/60 patient is afebrile HR 88  Patient looks stable not in any acute distress  Review of Systems   Constitutional: Negative for activity change, appetite change, chills, diaphoresis, fatigue and fever  HENT: Negative for congestion, postnasal drip, sinus pain and sore throat  Eyes: Negative for photophobia and visual disturbance  Respiratory: Negative for cough, choking, chest tightness, shortness of breath, wheezing and stridor  Cardiovascular: Negative for chest pain, palpitations and leg swelling  Gastrointestinal: Negative for abdominal distention, abdominal pain, anal bleeding, blood in stool, constipation, diarrhea, nausea and vomiting  Genitourinary: Negative for decreased urine volume, difficulty urinating, dysuria, flank pain and hematuria  Musculoskeletal: Positive for arthralgias  Negative for myalgias, neck pain and neck stiffness  Skin: Negative for color change, rash and wound  Neurological: Negative for tremors, syncope, weakness, light-headedness and headaches  Psychiatric/Behavioral: Negative for agitation and confusion           Current Outpatient Medications:     aspirin 81 MG tablet, Take 81 mg by mouth daily Resume on 3/11/18 , Disp: , Rfl:    atorvastatin (LIPITOR) 40 mg tablet, Take 1 tablet (40 mg total) by mouth daily with dinner Resume next scheduled dose upon discharge from the hospital, Disp: 90 tablet, Rfl: 3    lisinopril (ZESTRIL) 20 mg tablet, Take 1 tablet (20 mg total) by mouth daily, Disp: 90 tablet, Rfl: 3    metoprolol succinate (TOPROL-XL) 50 mg 24 hr tablet, Take 1 tablet (50 mg total) by mouth daily Resume on 3/11/18, Disp: 90 tablet, Rfl: 3    omeprazole (PriLOSEC) 40 MG capsule, Take 1 capsule (40 mg total) by mouth daily, Disp: 90 capsule, Rfl: 3    ranitidine (ZANTAC) 150 MG capsule, Take 150 mg by mouth as needed Resume on 3/11/18 as prior to hospitalization, Disp: , Rfl:   Past Medical History:   Diagnosis Date    Abdominal aortic aneurysm (AAA) ([facility] [address] )     last assessed nov 6 2015    Abscess of groin     last assessed oct 6 2014    Candidiasis, cutaneous     last assessed march 19 2014    Coronary artery disease     Dyslipidemia     Esophageal ulcer without bleeding     Gastritis     Hiatal hernia     Hx of cataract surgery, left     last assessed july 21 2014    Hyperlipidemia     Hypertension     Old myocardial infarction 2000    Recurrent right inguinal hernia     s/p right orchioectomy, mesh removal, and sinus trac removal patient being followed by surgery next appt 4/28/14 patient s/p keflex x 7 days for MSSA Cx repeat wound cx grew MSSA cx repeat wound cx grew MSSA and other armond (mixed) no MRSA identified conitunue close monitoring and local wound care, last assessed april 15 2014    Wound of skin     in the groin, right     Past Surgical History:   Procedure Laterality Date    ABDOMINAL AORTIC ANEURYSM REPAIR  2009    aortobi-iliac, dacron graft    APPENDECTOMY      open    CATARACT EXTRACTION Bilateral     COLONOSCOPY      CORONARY ANGIOPLASTY WITH STENT PLACEMENT      stent 2    CYSTOSCOPY      diagnostic onset nov 13 2014    EXPLORATORY LAPAROTOMY  12/09/2009    FL INJECTION LEFT HIP (NON ARTHROGRAM)  4/2/2019    HIP SURGERY Right     INGUINAL HERNIA REPAIR Right     unilateral x2    ORCHIECTOMY Right     RI COLONOSCOPY FLX DX W/COLLJ SPEC WHEN PFRMD N/A 5/22/2018    Procedure: COLONOSCOPY;  Surgeon: Kenton Vanessa DO;  [facility]; Service: Gastroenterology    RI ESOPHAGOGASTRODUODENOSCOPY TRANSORAL DIAGNOSTIC N/A 2/14/2019    Procedure: ESOPHAGOGASTRODUODENOSCOPY (EGD); Surgeon: Magda Pedro MD;  Location[facility];   Service: Gastroenterology    RI REPAIR INCISIONAL HERNIA,REDUCIBLE N/A 2/23/2018    Procedure: lysis of adhesions; INCISIONAL HERNIA REPAIR WITH MESH;  Surgeon: Americo Mendenhall MD;  Location[facility];  Service: General    UPPER GASTROINTESTINAL ENDOSCOPY       Social History     Socioeconomic History    Marital status:      Spouse name: Not on file    Number of children: Not on file    Years of education: Not on file    Highest education level: Not on file   Occupational History    Not on file   Social Needs    Financial resource strain: Not on file    Food insecurity:     Worry: Not on file     Inability: Not on file    Transportation needs:     Medical: Not on file     Non-medical: Not on file   Tobacco Use    Smoking status: Former Smoker    Smokeless tobacco: Never Used   Substance and Sexual Activity    Alcohol use: Yes     Comment: occasionally    Drug use: No    Sexual activity: Not on file   Lifestyle    Physical activity:     Days per week: Not on file     Minutes per session: Not on file    Stress: Not on file   Relationships    Social connections:     Talks on phone: Not on file     Gets together: Not on file     Attends Jain service: Not on file     Active member of club or organization: Not on file     Attends meetings of clubs or organizations: Not on file     Relationship status: Not on file    Intimate partner violence:     Fear of current or ex partner: Not on file     Emotionally abused: Not on file Physically abused: Not on file     Forced sexual activity: Not on file   Other Topics Concern    Not on file   Social History Narrative    Not on file     Family History   Problem Relation Age of Onset    Heart disease Mother     Diabetes Mother     Heart disease Father     Diabetes Sister     Cancer Brother      No Known Allergies    Objective     Vitals:    10/10/19 1453   BP: 120/60   BP Location: Right arm   Patient Position: Sitting   Cuff Size: Adult   Pulse: 88   Temp: (!) 97 3 °F (36 3 °C)   TempSrc: Oral   Weight: 73 2 kg (161 lb 6 oz)   Height: 5' 8" (1 727 m)       Physical exam:     GENERAL: NAD, patient walks with a cane sitting comfortably on the chair  HEENT:  NC/AT, PERRL, EOMI, no scleral icterus  CARDIAC:  RRR, +S1/S2, no S3/S4 heard, no m/g/r  PULMONARY:  CTA B/L, no wheezing/rales/rhonci, non-labored breathing  ABDOMEN:  Soft, NT/ND,no rebound/guarding/rigidity  Extremities:  No edema, cyanosis, or clubbing  NEUROLOGIC: Grossly intact  SKIN:  No rashes or erythema noted on exposed skin  Psych: Normal affect        ==  PLEASE NOTE:  This encounter was completed utilizing the [employer] Direct Speech Voice Recognition Software  Grammatical errors, random word insertions, pronoun errors and incomplete sentences are occasional consequences of the system due to software limitations, ambient noise and hardware issues  These may be missed by proof reading prior to affixing electronic signature  Any questions or concerns about the content, text or information contained within the body of this dictation should be directly addressed to the physician for clarification  Please do not hesitate to call me directly if you have any any questions or concerns

## 2019-10-11 ENCOUNTER — TELEPHONE (OUTPATIENT)
Dept: CARDIOLOGY CLINIC | Facility: CLINIC | Age: 74
End: 2019-10-11

## 2019-10-11 NOTE — TELEPHONE ENCOUNTER
----- Message from Yuly Rojo DO sent at 10/11/2019 12:24 PM EDT -----  Please call the patient regarding his TTE and let him know that overall it looked good and we will discuss results in detail at next appointment

## 2019-10-17 ENCOUNTER — TELEPHONE (OUTPATIENT)
Dept: CARDIOLOGY CLINIC | Facility: CLINIC | Age: 74
End: 2019-10-17

## 2019-10-17 PROCEDURE — 93227 XTRNL ECG REC<48 HR R&I: CPT | Performed by: INTERNAL MEDICINE

## 2019-10-17 NOTE — TELEPHONE ENCOUNTER
----- Message from Sridhar Agudelo DO sent at 10/17/2019 11:39 AM EDT -----  Please call the patient regarding his holter monitor and let him know that there were some extra beats seen but overall looks good with no arrhythmias and we will discuss in detail at next visit

## 2019-12-04 ENCOUNTER — OFFICE VISIT (OUTPATIENT)
Dept: CARDIOLOGY CLINIC | Facility: CLINIC | Age: 74
End: 2019-12-04
Payer: MEDICARE

## 2019-12-04 VITALS
SYSTOLIC BLOOD PRESSURE: 126 MMHG | BODY MASS INDEX: 22.88 KG/M2 | HEART RATE: 72 BPM | DIASTOLIC BLOOD PRESSURE: 60 MMHG | WEIGHT: 151 LBS | HEIGHT: 68 IN

## 2019-12-04 DIAGNOSIS — R00.2 PALPITATION: ICD-10-CM

## 2019-12-04 DIAGNOSIS — I10 ESSENTIAL HYPERTENSION: ICD-10-CM

## 2019-12-04 DIAGNOSIS — I25.10 CORONARY ARTERY DISEASE INVOLVING NATIVE HEART WITHOUT ANGINA PECTORIS, UNSPECIFIED VESSEL OR LESION TYPE: Primary | ICD-10-CM

## 2019-12-04 PROCEDURE — 99213 OFFICE O/P EST LOW 20 MIN: CPT | Performed by: INTERNAL MEDICINE

## 2019-12-04 RX ORDER — HYDROCHLOROTHIAZIDE 12.5 MG/1
25 TABLET ORAL DAILY
COMMUNITY
Start: 2019-11-29 | End: 2020-03-11 | Stop reason: SDUPTHER

## 2019-12-04 NOTE — PROGRESS NOTES
Cardiology Consultation     Tawanda Lund  [id number]  1945  HEART & VASCULAR [facility] [place] [id number]      1  Coronary artery disease involving native heart without angina pectoris, unspecified vessel or lesion type     2  Essential hypertension     3  Palpitation         Discussion/Summary:  1  coronary artery disease status post PCI in 2000  2  Palpitations- now resolved  3  Hypertension  4  Hyperlipidemia  5  Abdominal aortic aneurysm status post repair    -  48 hour Holter monitor showed sinus rhythm throughout the Holter monitor with average heart rate 63 beats per minute minimum heart rate 41 beats per minute and maximum heart rate 99 beats per minute  There were episodes of premature ventricular and premature atrial contractions however no significant arrhythmias were present  Patient's longest pause was 2 5 seconds at 2:43 a m  While sleeping  Patient does not recall any symptoms during wearing the Holter monitor   -  Transthoracic echocardiogram 10/09/2019 read as left ventricular systolic function normal estimated LVEF 70% no regional wall motion abnormalities trace mitral regurgitation and mild tricuspid regurgitation with no pericardial effusion   -  Patient's blood pressure in the office today well-controlled 126/60  - can continue current regimen of hydrochlorothiazide 25 mg daily, lisinopril 20 mg daily, metoprolol succinate 50 mg daily as well as continue atorvastatin 40 mg daily and aspirin 81 mg daily    -  Will continue to monitor patient at this time and see patient in approximately 6 months or sooner if necessary  - as always patient was counseled that if he were to have any warning or alarm type symptoms he should seek emergency medical care immediately      History of Present Illness:  palpitations and dizziness:  - patient is a 53-year-old male past medical history significant for hypertension, hyperlipidemia, coronary artery disease status post stenting in 2000 and history of abdominal aortic aneurysm repair approximately 9 years ago who presents for follow-up of palpitations and dizziness  During his last office visit patient had hydrochlorothiazide held which he states did help with his very brief dizziness however patient had swelling in his ankles after discontinuing the medication and therefore restarted at without any significant issues  He denies any issues with loss of consciousness, palpitations, chest pain, shortness of breath, abdominal pain  He states that intermittently he does still have brief episodes of dizziness if he is to move his head too quickly in bed however has not had any loss of consciousness or falls secondary to this        Patient Active Problem List   Diagnosis    Coronary artery disease    Hypertension    Incisional hernia    History of incisional hernia repair    Bursitis of left shoulder    Hyperlipidemia    PVD (peripheral vascular disease) ([facility] [address]    Serrated adenoma of colon    Primary osteoarthritis of right knee    Gastroesophageal reflux disease    Esophagitis, [place] grade D    Gastroesophageal reflux disease with esophagitis    Primary osteoarthritis of one hip, left    Tubular adenoma of colon     Past Medical History:   Diagnosis Date    Abdominal aortic aneurysm (AAA) ([facility] )     last assessed nov 6 2015    Abscess of groin     last assessed oct 6 2014    Candidiasis, cutaneous     last assessed march 19 2014    Coronary artery disease     Dyslipidemia     Esophageal ulcer without bleeding     Gastritis     Hiatal hernia     Hx of cataract surgery, left     last assessed july 21 2014    Hyperlipidemia     Hypertension     Old myocardial infarction 2000    Recurrent right inguinal hernia     s/p right orchioectomy, mesh removal, and sinus trac removal patient being followed by surgery next appt 4/28/14 patient s/p keflex x 7 days for MSSA Cx repeat wound cx grew MSSA cx repeat wound cx grew MSSA and other armond (mixed) no MRSA identified conitunue close monitoring and local wound care, last assessed april 15 2014    Wound of skin     in the groin, right     Social History     Socioeconomic History    Marital status:      Spouse name: Not on file    Number of children: Not on file    Years of education: Not on file    Highest education level: Not on file   Occupational History    Not on file   Social Needs    Financial resource strain: Not on file    Food insecurity:     Worry: Not on file     Inability: Not on file    Transportation needs:     Medical: Not on file     Non-medical: Not on file   Tobacco Use    Smoking status: Former Smoker    Smokeless tobacco: Never Used   Substance and Sexual Activity    Alcohol use: Yes     Comment: occasionally    Drug use: No    Sexual activity: Not on file   Lifestyle    Physical activity:     Days per week: Not on file     Minutes per session: Not on file    Stress: Not on file   Relationships    Social connections:     Talks on phone: Not on file     Gets together: Not on file     Attends Zoroastrian service: Not on file     Active member of club or organization: Not on file     Attends meetings of clubs or organizations: Not on file     Relationship status: Not on file    Intimate partner violence:     Fear of current or ex partner: Not on file     Emotionally abused: Not on file     Physically abused: Not on file     Forced sexual activity: Not on file   Other Topics Concern    Not on file   Social History Narrative    Not on file      Family History   Problem Relation Age of Onset    Heart disease Mother     Diabetes Mother     Heart disease Father     Diabetes Sister     Cancer Brother      Past Surgical History:   Procedure Laterality Date    ABDOMINAL AORTIC ANEURYSM REPAIR  2009    aortobi-iliac, dacron graft    APPENDECTOMY      open    CATARACT EXTRACTION Bilateral     COLONOSCOPY  CORONARY ANGIOPLASTY WITH STENT PLACEMENT      stent 2    CYSTOSCOPY      diagnostic onset nov 13 2014    EXPLORATORY LAPAROTOMY  12/09/2009    FL INJECTION LEFT HIP (NON ARTHROGRAM)  4/2/2019    HIP SURGERY Right     INGUINAL HERNIA REPAIR Right     unilateral x2    ORCHIECTOMY Right     DC COLONOSCOPY FLX DX W/COLLJ SPEC WHEN PFRMD N/A 5/22/2018    Procedure: COLONOSCOPY;  Surgeon: Joanna Carlin DO;  [facility]; Service: Gastroenterology    DC ESOPHAGOGASTRODUODENOSCOPY TRANSORAL DIAGNOSTIC N/A 2/14/2019    Procedure: ESOPHAGOGASTRODUODENOSCOPY (EGD); Surgeon: Yoon Hernandez MD;  [facility]; Service: Gastroenterology    DC REPAIR INCISIONAL HERNIA,REDUCIBLE N/A 2/23/2018    Procedure: lysis of adhesions; INCISIONAL HERNIA REPAIR WITH MESH;  Surgeon: Amor Avalos MD;  [facility];  Service: General    UPPER GASTROINTESTINAL ENDOSCOPY         Current Outpatient Medications:     aspirin 81 MG tablet, Take 81 mg by mouth daily Resume on 3/11/18 , Disp: , Rfl:     atorvastatin (LIPITOR) 40 mg tablet, Take 1 tablet (40 mg total) by mouth daily with dinner Resume next scheduled dose upon discharge from the hospital, Disp: 90 tablet, Rfl: 3    hydrochlorothiazide (HYDRODIURIL) 12 5 mg tablet, 25 mg daily , Disp: , Rfl:     lisinopril (ZESTRIL) 20 mg tablet, Take 1 tablet (20 mg total) by mouth daily, Disp: 90 tablet, Rfl: 3    metoprolol succinate (TOPROL-XL) 50 mg 24 hr tablet, Take 1 tablet (50 mg total) by mouth daily Resume on 3/11/18, Disp: 90 tablet, Rfl: 3    omeprazole (PriLOSEC) 40 MG capsule, Take 1 capsule (40 mg total) by mouth daily, Disp: 90 capsule, Rfl: 3    ranitidine (ZANTAC) 150 MG capsule, Take 150 mg by mouth as needed Resume on 3/11/18 as prior to hospitalization, Disp: , Rfl:   No Known Allergies      Labs:  No visits with results within 2 Month(s) from this visit     Latest known visit with results is:   Appointment on 09/11/2019   Component Date Value    Sodium 09/11/2019 142     Potassium 09/11/2019 3 9     Chloride 09/11/2019 112*    CO2 09/11/2019 26     ANION GAP 09/11/2019 4     BUN 09/11/2019 19     Creatinine 09/11/2019 1 05     Glucose, Fasting 09/11/2019 75     Calcium 09/11/2019 9 1     eGFR 09/11/2019 69     Cholesterol 09/11/2019 105     Triglycerides 09/11/2019 106     HDL, Direct 09/11/2019 39*    LDL Calculated 09/11/2019 45     Non-HDL-Chol (CHOL-HDL) 09/11/2019 66         Imaging: No results found  Review of Systems:  Review of Systems   Constitutional: Negative for chills, fever and unexpected weight change  HENT: Negative for trouble swallowing and voice change  Eyes: Negative for pain and redness  Respiratory: Negative for shortness of breath and wheezing  Cardiovascular: Negative for chest pain, palpitations and leg swelling  Gastrointestinal: Negative for abdominal pain, diarrhea and nausea  Genitourinary: Negative for dysuria  Musculoskeletal: Positive for arthralgias  Negative for neck pain and neck stiffness  Skin: Negative for rash  Neurological: Negative for dizziness, syncope, light-headedness and headaches  Psychiatric/Behavioral: Negative for agitation and confusion  Vitals:    12/04/19 1254   BP: 126/60   BP Location: Right arm   Patient Position: Sitting   Cuff Size: Standard   Pulse: 72   Weight: 68 5 kg (151 lb)   Height: 5' 8" (1 727 m)     Vitals:    12/04/19 1254   Weight: 68 5 kg (151 lb)     Height: 5' 8" (172 7 cm)     Physical Exam:  Physical Exam   Constitutional: He is oriented to person, place, and time  He appears well-nourished  No distress  HENT:   Head: Normocephalic and atraumatic  Eyes: Right eye exhibits no discharge  Left eye exhibits no discharge  Neck: No JVD present  No tracheal deviation present  Cardiovascular: Normal rate and regular rhythm  Exam reveals no friction rub     Pulmonary/Chest: Effort normal and breath sounds normal  No respiratory distress  He has no wheezes  Abdominal: Soft  Bowel sounds are normal  There is no tenderness  Musculoskeletal: He exhibits no edema or tenderness  Neurological: He is alert and oriented to person, place, and time  Skin: Skin is warm and dry  He is not diaphoretic  Psychiatric: He has a normal mood and affect

## 2019-12-06 DIAGNOSIS — I71.4 ABDOMINAL AORTIC ANEURYSM WITHOUT RUPTURE (HCC): ICD-10-CM

## 2019-12-06 DIAGNOSIS — I73.9 PVD (PERIPHERAL VASCULAR DISEASE) (HCC): Primary | ICD-10-CM

## 2019-12-11 ENCOUNTER — TELEPHONE (OUTPATIENT)
Dept: ADMINISTRATIVE | Facility: HOSPITAL | Age: 74
End: 2019-12-11

## 2019-12-31 ENCOUNTER — HOSPITAL ENCOUNTER (OUTPATIENT)
Dept: NON INVASIVE DIAGNOSTICS | Facility: CLINIC | Age: 74
Discharge: HOME | End: 2019-12-31
Payer: MEDICARE

## 2019-12-31 DIAGNOSIS — I73.9 PVD (PERIPHERAL VASCULAR DISEASE) (HCC): ICD-10-CM

## 2019-12-31 DIAGNOSIS — I71.4 ABDOMINAL AORTIC ANEURYSM WITHOUT RUPTURE (HCC): ICD-10-CM

## 2019-12-31 PROCEDURE — 93978 VASCULAR STUDY: CPT | Performed by: SURGERY

## 2019-12-31 PROCEDURE — 93923 UPR/LXTR ART STDY 3+ LVLS: CPT | Performed by: SURGERY

## 2019-12-31 PROCEDURE — 93923 UPR/LXTR ART STDY 3+ LVLS: CPT

## 2019-12-31 PROCEDURE — 93978 VASCULAR STUDY: CPT

## 2020-01-05 PROBLEM — I71.4 ABDOMINAL AORTIC ANEURYSM (AAA) WITHOUT RUPTURE: Status: ACTIVE | Noted: 2020-01-05

## 2020-01-05 PROBLEM — I73.9 PVD (PERIPHERAL VASCULAR DISEASE) (HCC): Status: RESOLVED | Noted: 2018-03-26 | Resolved: 2020-01-05

## 2020-01-05 PROBLEM — I71.40 ABDOMINAL AORTIC ANEURYSM (AAA) WITHOUT RUPTURE: Status: ACTIVE | Noted: 2020-01-05

## 2020-01-06 ENCOUNTER — OFFICE VISIT (OUTPATIENT)
Dept: VASCULAR SURGERY | Facility: CLINIC | Age: 75
End: 2020-01-06
Payer: MEDICARE

## 2020-01-06 VITALS
RESPIRATION RATE: 19 BRPM | DIASTOLIC BLOOD PRESSURE: 64 MMHG | HEART RATE: 70 BPM | WEIGHT: 162 LBS | HEIGHT: 68 IN | BODY MASS INDEX: 24.55 KG/M2 | SYSTOLIC BLOOD PRESSURE: 132 MMHG

## 2020-01-06 DIAGNOSIS — E78.5 HYPERLIPIDEMIA, UNSPECIFIED HYPERLIPIDEMIA TYPE: ICD-10-CM

## 2020-01-06 DIAGNOSIS — I71.4 ABDOMINAL AORTIC ANEURYSM (AAA) WITHOUT RUPTURE (HCC): Primary | ICD-10-CM

## 2020-01-06 DIAGNOSIS — I10 ESSENTIAL HYPERTENSION: ICD-10-CM

## 2020-01-06 PROCEDURE — 99204 OFFICE O/P NEW MOD 45 MIN: CPT | Performed by: PHYSICIAN ASSISTANT

## 2020-01-06 NOTE — ASSESSMENT & PLAN NOTE
76 y/o male former smoker w/hx HTN, HLD, GERD, CAD, s/p PCI/stent '00 and AAA, s/p [id number] bypass graft by Dr Silvia García 11/24/09  Recent noninvasive imaging continues to demonstrate patent graft with ABIs 1 1  Patient asymptomatic   -continue routine surveillance with AOIL yearly  -return to office in 1 year w/AOIL for [facility]  -continue ASA and statin therapy  -continue to optimize BP control  -instructed to contact the office in the interim with new concerns or symptoms

## 2020-01-06 NOTE — PROGRESS NOTES
Assessment/Plan:    Abdominal aortic aneurysm (AAA) without rupture [facility])  76 y/o male former smoker w/hx HTN, HLD, GERD, CAD, s/p PCI/stent '00 and AAA, s/p asxoy-hi-zyucz bypass graft by Dr Aggie Boxer 11/24/09  Recent noninvasive imaging continues to demonstrate patent graft with ABIs 1 1  Patient asymptomatic   -continue routine surveillance with AOIL yearly  -return to office in 1 year w/AOIL for [facility]  -continue ASA and statin therapy  -continue to optimize BP control  -instructed to contact the office in the interim with new concerns or symptoms  Hypertension  -BP well controlled  -continue current medical regimen  -management per PCP    Hyperlipidemia  -stable  -continue statin therapy  -management per PCP       Diagnoses and all orders for this visit:    Abdominal aortic aneurysm (AAA) without rupture (HCC)  -     VAS abdominal aorta/iliacs; complete study; Future    Essential hypertension    Hyperlipidemia, unspecified hyperlipidemia type          Subjective:      Patient ID: Laura Pineda is a 75 y o  male  76 y/o male former smoker w/hx HTN, HLD, GERD, CAD, s/p PCI/stent '00 and AAA, s/p ayrab-wy-[id number] bypass graft by Dr Aggie Boxer 11/24/09 who presents the office to review recent routine surveillance noninvasive imaging  Patient with prior history of 5 4 cm infrarenal AAA status post open repair '09 complicated by delayed postoperative wound dehiscence and evisceration requiring emergent exploration and closure 12/9/09  Patient continues to be followed poor postoperative surveillance  Recent AOIL 12/31/2019 has been reviewed and continues to demonstrate patent graft with R YO 1  0 8/1 8/126 and L YO 1 14/152/114  Patient asymptomatic and denies claudication, rest pain, tissue loss, blue toe syndrome, abdominal pain, postprandial abdominal pain or back pain        The following portions of the patient's history were reviewed and updated as appropriate: allergies, current medications, past family history, past medical history, past social history, past surgical history and problem list     Review of Systems   Constitutional: Negative  HENT: Negative  Eyes: Negative  Respiratory: Negative  Cardiovascular: Negative  Gastrointestinal: Negative  Endocrine: Negative  Genitourinary: Negative  Musculoskeletal: Negative  Skin: Negative  Allergic/Immunologic: Negative  Neurological: Negative  Hematological: Negative  Psychiatric/Behavioral: Negative  I have reviewed and made appropriate changes to the review of systems input by the medical assistant      Vitals:    01/06/20 1015   BP: 132/64   BP Location: Left arm   Patient Position: Sitting   Cuff Size: Standard   Pulse: 70   Resp: 19   Weight: 73 5 kg (162 lb)   Height: 5' 8" (1 727 m)       Patient Active Problem List   Diagnosis    Coronary artery disease    Hypertension    Incisional hernia    History of incisional hernia repair    Bursitis of left shoulder    Hyperlipidemia    Serrated adenoma of colon    Primary osteoarthritis of right knee    Gastroesophageal reflux disease    Esophagitis, [place] grade D    Gastroesophageal reflux disease with esophagitis    Primary osteoarthritis of one hip, left    Tubular adenoma of colon    Abdominal aortic aneurysm (AAA) without rupture [facility])       Past Surgical History:   Procedure Laterality Date    ABDOMINAL AORTIC ANEURYSM REPAIR  2009    aortobi-iliac, dacron graft    APPENDECTOMY      open    CATARACT EXTRACTION Bilateral     COLONOSCOPY      CORONARY ANGIOPLASTY WITH STENT PLACEMENT      stent 2    CYSTOSCOPY      diagnostic onset nov 13 2014    EXPLORATORY LAPAROTOMY  12/09/2009    FL INJECTION LEFT HIP (NON ARTHROGRAM)  4/2/2019    HIP SURGERY Right     INGUINAL HERNIA REPAIR Right     unilateral x2    ORCHIECTOMY Right     WI COLONOSCOPY FLX DX W/COLLJ SPEC WHEN PFRMD N/A 5/22/2018    Procedure: COLONOSCOPY;  Surgeon: Samina Chiang DO;  [facility]; Service: Gastroenterology    NE ESOPHAGOGASTRODUODENOSCOPY TRANSORAL DIAGNOSTIC N/A 2/14/2019    Procedure: ESOPHAGOGASTRODUODENOSCOPY (EGD); Surgeon: Larissa Marcos MD;  [facility];   Service: Gastroenterology    NE REPAIR INCISIONAL HERNIA,REDUCIBLE N/A 2/23/2018    Procedure: lysis of adhesions; INCISIONAL HERNIA REPAIR WITH MESH;  Surgeon: Dipti Gr MD;  Location:  MAIN OR;  Service: General    UPPER GASTROINTESTINAL ENDOSCOPY         Family History   Problem Relation Age of Onset    Heart disease Mother     Diabetes Mother     Heart disease Father     Diabetes Sister     Cancer Brother        Social History     Socioeconomic History    Marital status:      Spouse name: Not on file    Number of children: Not on file    Years of education: Not on file    Highest education level: Not on file   Occupational History    Not on file   Social Needs    Financial resource strain: Not on file    Food insecurity:     Worry: Not on file     Inability: Not on file    Transportation needs:     Medical: Not on file     Non-medical: Not on file   Tobacco Use    Smoking status: Former Smoker    Smokeless tobacco: Never Used   Substance and Sexual Activity    Alcohol use: Yes     Comment: occasionally    Drug use: No    Sexual activity: Not on file   Lifestyle    Physical activity:     Days per week: Not on file     Minutes per session: Not on file    Stress: Not on file   Relationships    Social connections:     Talks on phone: Not on file     Gets together: Not on file     Attends Muslim service: Not on file     Active member of club or organization: Not on file     Attends meetings of clubs or organizations: Not on file     Relationship status: Not on file    Intimate partner violence:     Fear of current or ex partner: Not on file     Emotionally abused: Not on file     Physically abused: Not on file     Forced sexual activity: Not on file Other Topics Concern    Not on file   Social History Narrative    Not on file       No Known Allergies      Current Outpatient Medications:     aspirin 81 MG tablet, Take 81 mg by mouth daily Resume on 3/11/18 , Disp: , Rfl:     atorvastatin (LIPITOR) 40 mg tablet, Take 1 tablet (40 mg total) by mouth daily with dinner Resume next scheduled dose upon discharge from the hospital, Disp: 90 tablet, Rfl: 3    hydrochlorothiazide (HYDRODIURIL) 12 5 mg tablet, 25 mg daily , Disp: , Rfl:     lisinopril (ZESTRIL) 20 mg tablet, Take 1 tablet (20 mg total) by mouth daily, Disp: 90 tablet, Rfl: 3    metoprolol succinate (TOPROL-XL) 50 mg 24 hr tablet, Take 1 tablet (50 mg total) by mouth daily Resume on 3/11/18, Disp: 90 tablet, Rfl: 3    omeprazole (PriLOSEC) 40 MG capsule, Take 1 capsule (40 mg total) by mouth daily, Disp: 90 capsule, Rfl: 3    ranitidine (ZANTAC) 150 MG capsule, Take 150 mg by mouth as needed Resume on 3/11/18 as prior to hospitalization, Disp: , Rfl:      Objective:  Imaging study:  AOIL 12/31/2019:  Imaging study reviewed and as described above  Patent aortic graft with R ABIs 1 08/28/126 and L YO 1 14/152/114  Triphasic and biphasic arterial waveforms    /64 (BP Location: Left arm, Patient Position: Sitting, Cuff Size: Standard)   Pulse 70   Resp 19   Ht 5' 8" (1 727 m)   Wt 73 5 kg (162 lb)   BMI 24 63 kg/m²          Physical Exam   Constitutional: He is oriented to person, place, and time  He appears well-developed and well-nourished  No distress  HENT:   Head: Normocephalic and atraumatic  Eyes: Pupils are equal, round, and reactive to light  Conjunctivae and EOM are normal  No scleral icterus  Neck: Normal range of motion  Neck supple  No JVD present  Carotid bruit is not present  No tracheal deviation present  No thyromegaly present  Cardiovascular: Normal rate, regular rhythm, S1 normal and S2 normal  Exam reveals no gallop, no S3 and no friction rub     No murmur heard   Pulses:       Carotid pulses are 2+ on the right side, and 2+ on the left side  Radial pulses are 2+ on the right side, and 2+ on the left side  Femoral pulses are 2+ on the right side, and 2+ on the left side  Popliteal pulses are 2+ on the right side, and 2+ on the left side  Bilateral lower extremities warm, pink, motor and sensory intact, well per   Pulmonary/Chest: Breath sounds normal  No stridor  No respiratory distress  He has no wheezes  He has no rhonchi  He has no rales  Abdominal: Soft  Bowel sounds are normal  He exhibits no distension, no abdominal bruit, no pulsatile midline mass and no mass  There is no hepatosplenomegaly  There is no tenderness  There is no rebound  Well-healed old midline laparotomy scar   Musculoskeletal: Normal range of motion  He exhibits no edema or deformity  Neurological: He is alert and oriented to person, place, and time  He has normal strength  Skin: Skin is warm, dry and intact  No lesion noted  No cyanosis or erythema  No pallor  Psychiatric: He has a normal mood and affect  Nursing note and vitals reviewed

## 2020-01-06 NOTE — PATIENT INSTRUCTIONS
-recent abdominal ultrasound shows patency of your aortic graft with continued adequate blood flow to your legs  -continue routine surveillance with abdominal ultrasound in 1 year with follow-up appointment in the office   -continue aspirin and atorvastatin  -please contact the office with new concerns or symptoms

## 2020-01-10 ENCOUNTER — TELEPHONE (OUTPATIENT)
Dept: INTERNAL MEDICINE CLINIC | Facility: CLINIC | Age: 75
End: 2020-01-10

## 2020-01-10 DIAGNOSIS — I10 HYPERTENSION: ICD-10-CM

## 2020-01-10 RX ORDER — ATORVASTATIN CALCIUM 40 MG/1
40 TABLET, FILM COATED ORAL
Qty: 90 TABLET | Refills: 3 | Status: SHIPPED | OUTPATIENT
Start: 2020-01-10 | End: 2020-01-14

## 2020-01-10 RX ORDER — LISINOPRIL 20 MG/1
20 TABLET ORAL DAILY
Qty: 90 TABLET | Refills: 3 | Status: SHIPPED | OUTPATIENT
Start: 2020-01-10 | End: 2020-01-14

## 2020-01-10 RX ORDER — METOPROLOL SUCCINATE 50 MG/1
50 TABLET, EXTENDED RELEASE ORAL DAILY
Qty: 90 TABLET | Refills: 3 | Status: SHIPPED | OUTPATIENT
Start: 2020-01-10 | End: 2020-01-14

## 2020-01-10 NOTE — TELEPHONE ENCOUNTER
Patient called in requesting refills  QTY of 90 with 3 refills  Dr Erazo is not enrolled with MEDICARE so it must be an attending to send in

## 2020-01-14 DIAGNOSIS — I10 HYPERTENSION: ICD-10-CM

## 2020-01-14 RX ORDER — METOPROLOL SUCCINATE 50 MG/1
50 TABLET, EXTENDED RELEASE ORAL DAILY
Qty: 90 TABLET | Refills: 3 | Status: SHIPPED | OUTPATIENT
Start: 2020-01-14 | End: 2021-02-22 | Stop reason: SDUPTHER

## 2020-01-14 RX ORDER — LISINOPRIL 20 MG/1
20 TABLET ORAL DAILY
Qty: 90 TABLET | Refills: 3 | Status: SHIPPED | OUTPATIENT
Start: 2020-01-14 | End: 2020-09-26 | Stop reason: HOSPADM

## 2020-01-14 RX ORDER — ATORVASTATIN CALCIUM 40 MG/1
40 TABLET, FILM COATED ORAL
Qty: 90 TABLET | Refills: 3 | Status: SHIPPED | OUTPATIENT
Start: 2020-01-14 | End: 2021-02-22 | Stop reason: SDUPTHER

## 2020-01-14 NOTE — TELEPHONE ENCOUNTER
carmen is asking if [facility] [place]   Pharmacy is noted in chart

## 2020-03-03 ENCOUNTER — OFFICE VISIT (OUTPATIENT)
Dept: GASTROENTEROLOGY | Facility: CLINIC | Age: 75
End: 2020-03-03
Payer: MEDICARE

## 2020-03-03 VITALS
WEIGHT: 153 LBS | SYSTOLIC BLOOD PRESSURE: 109 MMHG | HEART RATE: 69 BPM | DIASTOLIC BLOOD PRESSURE: 55 MMHG | BODY MASS INDEX: 23.19 KG/M2 | TEMPERATURE: 97.8 F | HEIGHT: 68 IN

## 2020-03-03 DIAGNOSIS — K20.80 ESOPHAGITIS, LOS ANGELES GRADE D: Primary | ICD-10-CM

## 2020-03-03 DIAGNOSIS — Z11.59 ENCOUNTER FOR HCV SCREENING TEST FOR LOW RISK PATIENT: ICD-10-CM

## 2020-03-03 DIAGNOSIS — R19.7 DIARRHEA, UNSPECIFIED TYPE: ICD-10-CM

## 2020-03-03 DIAGNOSIS — R10.30 LOWER ABDOMINAL PAIN: ICD-10-CM

## 2020-03-03 PROCEDURE — 1036F TOBACCO NON-USER: CPT | Performed by: INTERNAL MEDICINE

## 2020-03-03 PROCEDURE — 1160F RVW MEDS BY RX/DR IN RCRD: CPT | Performed by: INTERNAL MEDICINE

## 2020-03-03 PROCEDURE — 3074F SYST BP LT 130 MM HG: CPT | Performed by: INTERNAL MEDICINE

## 2020-03-03 PROCEDURE — 3008F BODY MASS INDEX DOCD: CPT | Performed by: INTERNAL MEDICINE

## 2020-03-03 PROCEDURE — 4040F PNEUMOC VAC/ADMIN/RCVD: CPT | Performed by: INTERNAL MEDICINE

## 2020-03-03 PROCEDURE — 99214 OFFICE O/P EST MOD 30 MIN: CPT | Performed by: INTERNAL MEDICINE

## 2020-03-03 PROCEDURE — 3078F DIAST BP <80 MM HG: CPT | Performed by: INTERNAL MEDICINE

## 2020-03-03 RX ORDER — DICYCLOMINE HYDROCHLORIDE 10 MG/1
10 CAPSULE ORAL 4 TIMES DAILY PRN
Qty: 60 CAPSULE | Refills: 3 | Status: SHIPPED | OUTPATIENT
Start: 2020-03-03 | End: 2020-03-03 | Stop reason: SDUPTHER

## 2020-03-03 RX ORDER — DICYCLOMINE HYDROCHLORIDE 10 MG/1
10 CAPSULE ORAL 4 TIMES DAILY PRN
Qty: 60 CAPSULE | Refills: 3 | Status: SHIPPED | OUTPATIENT
Start: 2020-03-03 | End: 2020-10-04 | Stop reason: HOSPADM

## 2020-03-03 NOTE — TELEPHONE ENCOUNTER
Name of medication, dose, quantity and frequency: hydrochlorothiazide (HYDRODIURIL) 12 5 mg tablet    Number of refills left: 0    Amount of medication left: "Enough for a couple of weeks"   Pharmacy verified and updated  [facility]    Additional information: As per patient, pharmacy needs new prescription  Unable to speak to pharmacist to confirm information

## 2020-03-03 NOTE — PROGRESS NOTES
[facility] - Outpatient Follow-up Note  Daisha Fountain 75 y o  male MRN: [id number]  Encounter: [id number]          ASSESSMENT AND PLAN:      1  Esophagitis, [place] grade D  -last EGD 06/2019 which showed resolution of grade D esophagitis  -heartburn symptoms maintained on once daily PPI would continue indefinitely  -dysphagia resolved    2  Diarrhea, unspecified type  3  Lower abdominal pain  -likely post infectious IBS from presumed infectious gastroenteritis few weeks preceding his current symptoms  -avoid dairy x2 weeks, start probiotic, Bentyl as needed  -is symptoms persist or worsen with alarm symptoms such as fever, severe abdominal pain, persistent nausea vomiting diarrhea advised patient to seek urgent medical care  -can consider CT scan given history of hernia repair with mesh however he is likely at a higher surgical risk given his multiple comorbid conditions  - dicyclomine (BENTYL) 10 mg capsule; Take 1 capsule (10 mg total) by mouth 4 (four) times a day as needed (abdominal pain/cramping)  Dispense: 60 capsule; Refill: 3    4  Encounter for HCV screening test for low risk patient  - Hepatitis C antibody; Future    RTC in 1 mos for re-evaluation of his symptoms  ______________________________________________________________________    SUBJECTIVE:  76-year-old male seen in follow-up for dysphagia and heartburn  Currently his symptoms are under control with once daily PPI  He is no longer taking H2 blocker(Zantac) and given recent cancer concerns advised him not to continue taking this  Today his has complaints of nonbloody diarrhea x2 days up to 3-4 times daily associated with lower abdominal pain  Denies nausea, vomiting, blood or melena in stool  Denies NSAID use, excessive alcohol use or smoking currently  Denies having history of pancreatitis  He notes that a few weeks ago he had a 4 day history of nausea, vomiting, diarrhea with abdominal pain    He denies any recent hospitalizations or antibiotics or sick contacts  Denies any eating of questionable foods  REVIEW OF SYSTEMS IS OTHERWISE NEGATIVE  Historical Information   Past Medical History:   Diagnosis Date    Abdominal aortic aneurysm (AAA) ([address] )     last assessed nov 6 2015    Abscess of groin     last assessed oct 6 2014    Candidiasis, cutaneous     last assessed march 19 2014    Coronary artery disease     Dyslipidemia     Esophageal ulcer without bleeding     Gastritis     Hiatal hernia     Hx of cataract surgery, left     last assessed july 21 2014    Hyperlipidemia     Hypertension     Old myocardial infarction 2000    Recurrent right inguinal hernia     s/p right orchioectomy, mesh removal, and sinus trac removal patient being followed by surgery next appt 4/28/14 patient s/p keflex x 7 days for MSSA Cx repeat wound cx grew MSSA cx repeat wound cx grew MSSA and other armond (mixed) no MRSA identified conitunue close monitoring and local wound care, last assessed april 15 2014    Wound of skin     in the groin, right     Past Surgical History:   Procedure Laterality Date    ABDOMINAL AORTIC ANEURYSM REPAIR  2009    aortobi-iliac, dacron graft    APPENDECTOMY      open    CATARACT EXTRACTION Bilateral     COLONOSCOPY      CORONARY ANGIOPLASTY WITH STENT PLACEMENT      stent 2    CYSTOSCOPY      diagnostic onset nov 13 2014    EXPLORATORY LAPAROTOMY  12/09/2009    FL INJECTION LEFT HIP (NON ARTHROGRAM)  4/2/2019    HIP SURGERY Right     INGUINAL HERNIA REPAIR Right     unilateral x2    ORCHIECTOMY Right     RI COLONOSCOPY FLX DX W/COLLJ SPEC WHEN PFRMD N/A 5/22/2018    Procedure: COLONOSCOPY;  Surgeon: Sachin Dumont DO;  [facility]; Service: Gastroenterology    RI ESOPHAGOGASTRODUODENOSCOPY TRANSORAL DIAGNOSTIC N/A 2/14/2019    Procedure: ESOPHAGOGASTRODUODENOSCOPY (EGD); Surgeon: Alena Felix MD;  [facility];   Service: Gastroenterology    RI REPAIR INCISIONAL HERNIA,REDUCIBLE N/A 2/23/2018    Procedure: lysis of adhesions; INCISIONAL HERNIA REPAIR WITH MESH;  Surgeon: Trudy Carrasquillo MD;  [facility];  Service: General    UPPER GASTROINTESTINAL ENDOSCOPY       Social History   Social History     Substance and Sexual Activity   Alcohol Use Yes    Comment: occasionally     Social History     Substance and Sexual Activity   Drug Use No     Social History     Tobacco Use   Smoking Status Former Smoker   Smokeless Tobacco Never Used     Family History   Problem Relation Age of Onset    Heart disease Mother     Diabetes Mother     Heart disease Father     Diabetes Sister     Cancer Brother        Meds/Allergies       Current Outpatient Medications:     aspirin 81 MG tablet    atorvastatin (LIPITOR) 40 mg tablet    hydrochlorothiazide (HYDRODIURIL) 12 5 mg tablet    lisinopril (ZESTRIL) 20 mg tablet    metoprolol succinate (TOPROL-XL) 50 mg 24 hr tablet    omeprazole (PriLOSEC) 40 MG capsule    dicyclomine (BENTYL) 10 mg capsule    No Known Allergies        Objective     Blood pressure 109/55, pulse 69, temperature 97 8 °F (36 6 °C), temperature source Tympanic, height 5' 8" (1 727 m), weight 69 4 kg (153 lb)  Body mass index is 23 26 kg/m²  PHYSICAL EXAM:      General Appearance:   Alert, cooperative, no distress   HEENT:   Normocephalic, atraumatic, anicteric      Neck:  Supple, symmetrical, trachea midline   Lungs:   Clear to auscultation bilaterally; no rales, rhonchi or wheezing; respirations unlabored    Heart[de-identified]   Regular rate and rhythm; no murmur, rub, or gallop     Abdomen:   Soft, non-tender, non-distended; normal bowel sounds; no masses, no organomegaly, large midline incision with palpable mesh and with possible herniation    Genitalia:   Deferred    Rectal:   Deferred    Extremities:  No cyanosis, clubbing or edema        Skin:  No jaundice, rashes, or lesions    Lymph nodes:  No palpable cervical lymphadenopathy  Lab Results:   No visits with results within 1 Day(s) from this visit  Latest known visit with results is:   Appointment on 09/11/2019   Component Date Value    Sodium 09/11/2019 142     Potassium 09/11/2019 3 9     Chloride 09/11/2019 112*    CO2 09/11/2019 26     ANION GAP 09/11/2019 4     BUN 09/11/2019 19     Creatinine 09/11/2019 1 05     Glucose, Fasting 09/11/2019 75     Calcium 09/11/2019 9 1     eGFR 09/11/2019 69     Cholesterol 09/11/2019 105     Triglycerides 09/11/2019 106     HDL, Direct 09/11/2019 39*    LDL Calculated 09/11/2019 45     Non-HDL-Chol (CHOL-HDL) 09/11/2019 66          Radiology Results:   No results found

## 2020-03-03 NOTE — PATIENT INSTRUCTIONS
Avoid dairy products for 2 weeks  Start a probiotic daily  If symptoms do not improve or worsen over the next 2-4 weeks will consider additional evaluation    Warning signs to seek out immediate care:  Persistent nausea and vomiting, fevers, vomiting of blood, black or bloody bowel movements and severe abdominal    Irritable Bowel Syndrome   WHAT YOU NEED TO KNOW:   Irritable bowel syndrome (IBS) is a condition that prevents food from moving through your intestines normally  The food may move through too slowly or too quickly  This causes bloating, increased gas, constipation, or diarrhea  DISCHARGE INSTRUCTIONS:   Return to the emergency department if:   · You have severe abdominal pain  · Your bowel movements are dark or have blood in them  Contact your healthcare provider if:   · You have a fever  · You have pain in your rectum  · Your abdominal pain does not go away, even after treatment  · You have questions or concerns about your condition or care  Medicines:   · Diarrhea medicine  helps decrease the amount of diarrhea you have  Some of these medicines coat the intestine and make bowel movements less watery  Other medicines work by slowing down how fast the intestines move food through  · Laxatives  help treat constipation by moving food and liquids out of your stomach faster  · Stool softeners  soften your bowel movements to prevent straining  · Muscle relaxers  decrease abdominal pain and muscle spasms  · Take your medicine as directed  Contact your healthcare provider if you think your medicine is not helping or if you have side effects  Tell him of her if you are allergic to any medicine  Keep a list of the medicines, vitamins, and herbs you take  Include the amounts, and when and why you take them  Bring the list or the pill bottles to follow-up visits  Carry your medicine list with you in case of an emergency  Manage IBS:   · Eat a variety of healthy foods    Healthy foods include fruits, vegetables, whole-grain breads, low-fat dairy products, beans, lean meats, and fish  You may need to avoid certain foods to decrease your symptoms  · Drink liquids as directed  Ask how much liquid to drink each day and which liquids are best for you  For most people, good liquids to drink are water, juice, and milk  · Exercise regularly  Ask about the best exercise plan for you  Exercise can decrease your blood pressure and improve your health  · Manage stress  Stress may slow healing and cause illness  Learn new ways to relax, such as deep breathing  · Keep a record  of everything you eat and drink, and your symptoms, for 3 weeks  Bring this record with you to your follow-up visits  Follow up with your healthcare provider as directed:  Write down your questions so you remember to ask them during your visits  © 2017 [address] Information is for End User's use only and may not be sold, redistributed or otherwise used for commercial purposes  All illustrations and images included in CareNotes® are the copyrighted property of [employer]  or [employer]  The above information is an  only  It is not intended as medical advice for individual conditions or treatments  Talk to your doctor, nurse or pharmacist before following any medical regimen to see if it is safe and effective for you

## 2020-03-04 RX ORDER — HYDROCHLOROTHIAZIDE 12.5 MG/1
25 TABLET ORAL DAILY
OUTPATIENT
Start: 2020-03-04

## 2020-03-04 NOTE — TELEPHONE ENCOUNTER
Patient complained of dizziness with hydrochlorothiazide  He is controlled on order blood pressure medication at this time  Will evaluate in next appointment

## 2020-03-05 NOTE — TELEPHONE ENCOUNTER
I spoke with patient and patient informed me that he was not 100% sure if it was the HCTZ causing the dizziness so he would like for you to send a prescription into the pharmacy  Patient states that medication was helping him with his ankle swelling and since he does not have any his ankles are beginning to swell  As per patient he states the prescription should be: HCTZ 12 5 MG TABLETS TAKING 2 TABLETS DAILY     TABLETS    3 REFILLS

## 2020-03-11 ENCOUNTER — OFFICE VISIT (OUTPATIENT)
Dept: INTERNAL MEDICINE CLINIC | Facility: CLINIC | Age: 75
End: 2020-03-11

## 2020-03-11 VITALS
SYSTOLIC BLOOD PRESSURE: 122 MMHG | DIASTOLIC BLOOD PRESSURE: 60 MMHG | HEART RATE: 58 BPM | TEMPERATURE: 97.8 F | BODY MASS INDEX: 22.73 KG/M2 | HEIGHT: 68 IN | WEIGHT: 150 LBS

## 2020-03-11 DIAGNOSIS — I10 ESSENTIAL HYPERTENSION: ICD-10-CM

## 2020-03-11 DIAGNOSIS — E78.5 HYPERLIPIDEMIA, UNSPECIFIED HYPERLIPIDEMIA TYPE: ICD-10-CM

## 2020-03-11 DIAGNOSIS — I25.10 CORONARY ARTERY DISEASE INVOLVING NATIVE HEART WITHOUT ANGINA PECTORIS, UNSPECIFIED VESSEL OR LESION TYPE: Primary | ICD-10-CM

## 2020-03-11 DIAGNOSIS — R60.0 PEDAL EDEMA: ICD-10-CM

## 2020-03-11 PROCEDURE — 3078F DIAST BP <80 MM HG: CPT | Performed by: INTERNAL MEDICINE

## 2020-03-11 PROCEDURE — 4040F PNEUMOC VAC/ADMIN/RCVD: CPT | Performed by: INTERNAL MEDICINE

## 2020-03-11 PROCEDURE — 99213 OFFICE O/P EST LOW 20 MIN: CPT | Performed by: INTERNAL MEDICINE

## 2020-03-11 PROCEDURE — 3074F SYST BP LT 130 MM HG: CPT | Performed by: INTERNAL MEDICINE

## 2020-03-11 PROCEDURE — 1160F RVW MEDS BY RX/DR IN RCRD: CPT | Performed by: INTERNAL MEDICINE

## 2020-03-11 PROCEDURE — 1036F TOBACCO NON-USER: CPT | Performed by: INTERNAL MEDICINE

## 2020-03-11 PROCEDURE — 3008F BODY MASS INDEX DOCD: CPT | Performed by: INTERNAL MEDICINE

## 2020-03-11 RX ORDER — HYDROCHLOROTHIAZIDE 25 MG/1
25 TABLET ORAL DAILY
Qty: 90 TABLET | Refills: 3 | Status: SHIPPED | OUTPATIENT
Start: 2020-03-11 | End: 2020-09-26 | Stop reason: HOSPADM

## 2020-03-11 RX ORDER — HYDROCHLOROTHIAZIDE 25 MG/1
25 TABLET ORAL DAILY
Qty: 90 TABLET | Refills: 1 | Status: SHIPPED | OUTPATIENT
Start: 2020-03-11 | End: 2020-03-11 | Stop reason: SDUPTHER

## 2020-03-11 NOTE — PROGRESS NOTES
Assessment/Plan:     Diagnoses and all orders for this visit:    Coronary artery disease involving native heart without angina pectoris, unspecified vessel or lesion type    Essential hypertension  -    Start: hydrochlorothiazide (HYDRODIURIL) 25 mg tablet; Take 1 tablet (25 mg total) by mouth daily    Patient does not think there is any relation to hydrochlorothiazide and his positional vertigo and that his symptoms were mild in the 1st place  He has been taking the hydrochlorothiazide as of currently and his blood pressure is of excellent control  With his I will continue the hydrochlorothiazide and change the pill to 25 mg once daily  I advised patient that if he develops any nausea vomiting or diarrhea with significant water loss that he hold his lisinopril and his hydrochlorothiazide while he is sick  Hyperlipidemia, unspecified hyperlipidemia type    Pedal edema  -     Discontinue: hydrochlorothiazide (HYDRODIURIL) 25 mg tablet; Take 1 tablet (25 mg total) by mouth daily  -     hydrochlorothiazide (HYDRODIURIL) 25 mg tablet; Take 1 tablet (25 mg total) by mouth daily    Other orders  -     Probiotic Product (PROBIOTIC ACIDOPHILUS BEADS PO); Take by mouth              Yesica Olivo is not a diabetic and therefore not on metformin  Subjective:      Patient ID: Yesica Olivo is a 75 y o  male  25-year-old male who presents for medication review, with some question over dizziness described as positional vertigo that is of brief duration and mild in severity  It was previously thought that his hydrochlorothiazide may be contributing to this dizziness however the medication was held for some time and he did not find any relation to the dizziness and this medication  He does state that this medication has been helping his pedal edema which is most prominent in gravity dependent region at the end of the day  His blood pressure is also well controlled 120s over 70s    He has no anginal symptoms, dyspnea, disequilibrium  He does have a history of IBS which more recently is of adequate control, no profuse diarrhea or nausea vomiting as of recently  The following portions of the patient's history were reviewed and updated as appropriate: allergies, current medications, past family history, past medical history, past social history, past surgical history and problem list     Review of Systems   Constitutional: Negative for fever  Respiratory: Negative for shortness of breath  Cardiovascular: Negative for chest pain  Gastrointestinal: Negative for diarrhea and vomiting  Objective:      /60 (BP Location: Left arm, Patient Position: Sitting, Cuff Size: Adult)   Pulse 58   Temp 97 8 °F (36 6 °C) (Oral)   Ht 5' 8" (1 727 m)   Wt 68 kg (150 lb)   BMI 22 81 kg/m²       Physical Exam:   Vital signs reviewed  General:   Well-appearing elderly male sitting comfortably in exam room chair  no acute distress  Head: normocephalic  Eyes: normal conjunctivae   Neck: supple  Lungs:   Clear bilaterally, no labored breathing/accessory muscle use  Heart: regular rate and rhythm without murmur  Abdomen: no distension  Extremities: no cyanosis, no significant edema  Neuro:   Awake with clear speech, mentation is appropriate, grossly intact with no obvious focal deficits  Skin: warm, dry             KATHERIN Knight           Chief Resident, PGY-3  Internal Medicine     3/11/2020 9:50 AM

## 2020-03-16 ENCOUNTER — OFFICE VISIT (OUTPATIENT)
Dept: MULTI SPECIALTY CLINIC | Facility: CLINIC | Age: 75
End: 2020-03-16

## 2020-03-16 VITALS
HEART RATE: 46 BPM | OXYGEN SATURATION: 97 % | SYSTOLIC BLOOD PRESSURE: 128 MMHG | DIASTOLIC BLOOD PRESSURE: 82 MMHG | TEMPERATURE: 98.2 F

## 2020-03-16 DIAGNOSIS — M21.70 LOWER LIMB LENGTH DIFFERENCE: ICD-10-CM

## 2020-03-16 DIAGNOSIS — L84 CALLUS: ICD-10-CM

## 2020-03-16 DIAGNOSIS — L60.3 ONYCHODYSTROPHY: Primary | ICD-10-CM

## 2020-03-16 PROCEDURE — 3074F SYST BP LT 130 MM HG: CPT | Performed by: PODIATRIST

## 2020-03-16 PROCEDURE — 99213 OFFICE O/P EST LOW 20 MIN: CPT | Performed by: PODIATRIST

## 2020-03-16 PROCEDURE — 4040F PNEUMOC VAC/ADMIN/RCVD: CPT | Performed by: PODIATRIST

## 2020-03-16 PROCEDURE — 3079F DIAST BP 80-89 MM HG: CPT | Performed by: PODIATRIST

## 2020-03-16 PROCEDURE — 1160F RVW MEDS BY RX/DR IN RCRD: CPT | Performed by: PODIATRIST

## 2020-03-16 PROCEDURE — 1036F TOBACCO NON-USER: CPT | Performed by: PODIATRIST

## 2020-03-16 NOTE — PROGRESS NOTES
[address] Visit  Josafat Nicolas 75 y o  male MRN: [id number]  Encounter: [id number]    Assessment/Plan        Diagnoses and all orders for this visit:    Onychodystrophy    Callus    Lower limb length difference           Plan:   Trimmed toenails x10 using large nippers to normal length and thickness without incident  (CPT 73794)   Excisionally debrided callus x3 using 15 blade to normal appearing epithelium without incident  ([id number])   Continue use of custom shoes for limb length discrepancy   Educated patient on proper foot hygiene and importance of drying interdigital spaces   RTC in 6 months for follow up    - Dr Krysta Dang was present for entirety of patient encounter  History of Present Illness     HPI:  Josafat Nicolas is a 75 y o  male who presents for bi-annual nail and callus palliative care  Patient states that he is unable to bend over to trim his own nails  He also states that his calluses become thick and painful due to limb length deformity  No other pedal complaints  Patient denies N/V/F/chills/SOB/CP  Review of Systems   Constitutional: Negative  HENT: Negative  Eyes: Negative  Respiratory: Negative  Cardiovascular: Negative  Gastrointestinal: Negative      Musculoskeletal: limb length deformity  Skin: Elongated toenails, calluses  Neurological: Negative        Historical Information   Past Medical History:   Diagnosis Date    Abdominal aortic aneurysm (AAA) ([facility] [address] )     last assessed nov 6 2015    Abscess of groin     last assessed oct 6 2014    Candidiasis, cutaneous     last assessed march 19 2014    Coronary artery disease     Dyslipidemia     Esophageal ulcer without bleeding     Gastritis     Hiatal hernia     Hx of cataract surgery, left     last assessed july 21 2014    Hyperlipidemia     Hypertension     Old myocardial infarction 2000    Recurrent right inguinal hernia     s/p right orchioectomy, mesh removal, and sinus trac removal patient being followed by surgery next appt 4/28/14 patient s/p keflex x 7 days for MSSA Cx repeat wound cx grew MSSA cx repeat wound cx grew MSSA and other armond (mixed) no MRSA identified conitunue close monitoring and local wound care, last assessed april 15 2014    Wound of skin     in the groin, right     Past Surgical History:   Procedure Laterality Date    ABDOMINAL AORTIC ANEURYSM REPAIR  2009    aortobi-iliac, dacron graft    APPENDECTOMY      open    CATARACT EXTRACTION Bilateral     COLONOSCOPY      CORONARY ANGIOPLASTY WITH STENT PLACEMENT      stent 2    CYSTOSCOPY      diagnostic onset nov 13 2014    EXPLORATORY LAPAROTOMY  12/09/2009    FL INJECTION LEFT HIP (NON ARTHROGRAM)  4/2/2019    HIP SURGERY Right     INGUINAL HERNIA REPAIR Right     unilateral x2    ORCHIECTOMY Right     UT COLONOSCOPY FLX DX W/COLLJ SPEC WHEN PFRMD N/A 5/22/2018    Procedure: COLONOSCOPY;  Surgeon: Leonela Belle DO;  [facility]; Service: Gastroenterology    UT ESOPHAGOGASTRODUODENOSCOPY TRANSORAL DIAGNOSTIC N/A 2/14/2019    Procedure: ESOPHAGOGASTRODUODENOSCOPY (EGD); Surgeon: Kiara Dawn MD;  [facility];   Service: Gastroenterology    UT REPAIR INCISIONAL HERNIA,REDUCIBLE N/A 2/23/2018    Procedure: lysis of adhesions; INCISIONAL HERNIA REPAIR WITH MESH;  Surgeon: Vikram Barnhart MD;  [facility];  Service: General    UPPER GASTROINTESTINAL ENDOSCOPY       Social History   Social History     Substance and Sexual Activity   Alcohol Use Yes    Comment: occasionally     Social History     Substance and Sexual Activity   Drug Use No     Social History     Tobacco Use   Smoking Status Former Smoker   Smokeless Tobacco Never Used     Family History:   Family History   Problem Relation Age of Onset    Heart disease Mother     Diabetes Mother     Heart disease Father     Diabetes Sister     Cancer Brother        Meds/Allergies     (Not in a hospital admission)  No Known Allergies    Objective     Current Vitals:   Blood Pressure: 128/82 (03/16/20 1254)  Pulse: (!) 46 (03/16/20 1254)  Temperature: 98 2 °F (36 8 °C) (03/16/20 1254)  Temp Source: Temporal (03/16/20 1254)  SpO2: 97 % (03/16/20 1254)        /82 (BP Location: Right arm, Patient Position: Sitting, Cuff Size: Adult)   Pulse (!) 46   Temp 98 2 °F (36 8 °C) (Temporal)   SpO2 97%       Lower Extremity Exam:    Physical Exam:    Musculoskeletal:  MMT is 4/5 to all compartments B/L, +0/4 edema B/L, Hallux ROM is < 65 degrees B/L, Ankle dorsiflexion < 10 degrees from neutral with leg extended B/L  Limb length discrepancy on the right  BL HAV deformities  Hammertoe deformities of digits 2-5 bilaterally  Cardiovascular:   Pedal pulses palpable, CFT< 3sec to digits  Pedal hair is Absent  Skin temperature warm to warm B/L  Dermatological:  No open Lesions  Toenails x10 elongated, yellow pigmented, soft, dystrophic, with subungual debris and malodor  Interdigital maceration of interspaces 2-3 bilaterally  Callus on plantar distal left 2nd digit, dorsal right 5th and 4th digits, medial left 1st MPJ  Neurological:  Gross sensation is intact  Protective sensation is intact  Imaging: I have personally reviewed pertinent films in PACS  EKG, Pathology, and Other Studies: I have personally reviewed pertinent reports  Today, Patient Was Seen By: Josefina Mao DPM    ** Please Note: Portions of the record may have been created with voice recognition software  Occasional wrong word or "sound a like" substitutions may have occurred due to the inherent limitations of voice recognition software  Read the chart carefully and recognize, using context, where substitutions have occurred   **

## 2020-03-31 ENCOUNTER — OFFICE VISIT (OUTPATIENT)
Dept: GASTROENTEROLOGY | Facility: CLINIC | Age: 75
End: 2020-03-31
Payer: MEDICARE

## 2020-03-31 VITALS
HEIGHT: 68 IN | DIASTOLIC BLOOD PRESSURE: 60 MMHG | BODY MASS INDEX: 23.04 KG/M2 | SYSTOLIC BLOOD PRESSURE: 130 MMHG | TEMPERATURE: 97.6 F | WEIGHT: 152 LBS | HEART RATE: 61 BPM

## 2020-03-31 DIAGNOSIS — R19.7 DIARRHEA, UNSPECIFIED TYPE: Primary | ICD-10-CM

## 2020-03-31 DIAGNOSIS — Z11.59 ENCOUNTER FOR HEPATITIS C VIRUS SCREENING TEST FOR HIGH RISK PATIENT: ICD-10-CM

## 2020-03-31 DIAGNOSIS — Z91.89 ENCOUNTER FOR HEPATITIS C VIRUS SCREENING TEST FOR HIGH RISK PATIENT: ICD-10-CM

## 2020-03-31 DIAGNOSIS — K21.9 GASTROESOPHAGEAL REFLUX DISEASE, ESOPHAGITIS PRESENCE NOT SPECIFIED: ICD-10-CM

## 2020-03-31 DIAGNOSIS — R10.9 ABDOMINAL PAIN, UNSPECIFIED ABDOMINAL LOCATION: ICD-10-CM

## 2020-03-31 PROCEDURE — 3078F DIAST BP <80 MM HG: CPT | Performed by: INTERNAL MEDICINE

## 2020-03-31 PROCEDURE — 4040F PNEUMOC VAC/ADMIN/RCVD: CPT | Performed by: INTERNAL MEDICINE

## 2020-03-31 PROCEDURE — 3008F BODY MASS INDEX DOCD: CPT | Performed by: INTERNAL MEDICINE

## 2020-03-31 PROCEDURE — 1036F TOBACCO NON-USER: CPT | Performed by: INTERNAL MEDICINE

## 2020-03-31 PROCEDURE — 1160F RVW MEDS BY RX/DR IN RCRD: CPT | Performed by: INTERNAL MEDICINE

## 2020-03-31 PROCEDURE — 99214 OFFICE O/P EST MOD 30 MIN: CPT | Performed by: INTERNAL MEDICINE

## 2020-03-31 PROCEDURE — 3075F SYST BP GE 130 - 139MM HG: CPT | Performed by: INTERNAL MEDICINE

## 2020-03-31 NOTE — PROGRESS NOTES
[facility] - Outpatient Follow-up Note  Tawanda Lund 75 y o  male MRN: [id number]  Encounter: [id number]          ASSESSMENT AND PLAN:    Tawanda Lund is a 75 y o  male who presents with complaint of GERD and intermittent pain and diarrhea  GERD well controlled on PPI  Intermittent pain and diarrhea improved over the past 3 weeks  This may represent IBS or post-infectious IBS now improving  He did not notice a significant difference with dairy free diet  Available labs and imaging reviewed  1  Diarrhea, unspecified type    2  Encounter for hepatitis C virus screening test for high risk patient    3  Abdominal pain, unspecified abdominal location    4  Gastroesophageal reflux disease, esophagitis presence not specified        Orders Placed This Encounter   Procedures    Hepatitis C antibody     -- continue PPI   -- Okay to reintroduce dairy  -- Bentyl PRN  -- Defer further work-up for now given improvement of symptoms  -- Hep C screen  ______________________________________________________________________    SUBJECTIVE:    Tawanda Lund is a 75 y o  male who presents with complaint of follow-up  He  Mostly feels well since last visit  Heartburn well controlled on omeprazole  No nee for TUMS as he does not require breakthrough  No vomiting, nausea, dysphagia, odynophagia  The diarrhea and abdominal pain are improved  It is intermittent, but nothing in the past 2-3 weeks  He tried to reduce cutting back dairy and he is not sure it makes a difference  Has not had to take Bentyl  No weight loss  No fevers, chills  He forgot to get the hep C blood test done  REVIEW OF SYSTEMS IS OTHERWISE NEGATIVE    10 point ROS reviewed and negative, except as above      Historical Information   Past Medical History:   Diagnosis Date    Abdominal aortic aneurysm (AAA) ([address] )     last assessed nov 6 2015    Abscess of groin     last assessed oct 6 2014    Candidiasis, cutaneous     last assessed march 19 2014    Coronary artery disease     Dyslipidemia     Esophageal ulcer without bleeding     Gastritis     Hiatal hernia     Hx of cataract surgery, left     last assessed july 21 2014    Hyperlipidemia     Hypertension     Old myocardial infarction 2000    Recurrent right inguinal hernia     s/p right orchioectomy, mesh removal, and sinus trac removal patient being followed by surgery next appt 4/28/14 patient s/p keflex x 7 days for MSSA Cx repeat wound cx grew MSSA cx repeat wound cx grew MSSA and other armond (mixed) no MRSA identified conitunue close monitoring and local wound care, last assessed april 15 2014    Wound of skin     in the groin, right     Past Surgical History:   Procedure Laterality Date    ABDOMINAL AORTIC ANEURYSM REPAIR  2009    aortobi-iliac, dacron graft    APPENDECTOMY      open    CATARACT EXTRACTION Bilateral     COLONOSCOPY      CORONARY ANGIOPLASTY WITH STENT PLACEMENT      stent 2    CYSTOSCOPY      diagnostic onset nov 13 2014    EXPLORATORY LAPAROTOMY  12/09/2009    FL INJECTION LEFT HIP (NON ARTHROGRAM)  4/2/2019    HIP SURGERY Right     INGUINAL HERNIA REPAIR Right     unilateral x2    ORCHIECTOMY Right     CT COLONOSCOPY FLX DX W/COLLJ SPEC WHEN PFRMD N/A 5/22/2018    Procedure: COLONOSCOPY;  Surgeon: Colin De La Rosa DO;  [facility]; Service: Gastroenterology    CT ESOPHAGOGASTRODUODENOSCOPY TRANSORAL DIAGNOSTIC N/A 2/14/2019    Procedure: ESOPHAGOGASTRODUODENOSCOPY (EGD); Surgeon: Alex Sood MD;  [facility];   Service: Gastroenterology    CT REPAIR INCISIONAL HERNIA,REDUCIBLE N/A 2/23/2018    Procedure: lysis of adhesions; INCISIONAL HERNIA REPAIR WITH MESH;  Surgeon: Peggy Hauser MD;  [facility];  Service: General    UPPER GASTROINTESTINAL ENDOSCOPY       Social History   Social History     Substance and Sexual Activity   Alcohol Use Yes    Comment: occasionally     Social History     Substance and Sexual Activity   Drug Use No     Social History     Tobacco Use   Smoking Status Former Smoker   Smokeless Tobacco Never Used     Family History   Problem Relation Age of Onset    Heart disease Mother     Diabetes Mother     Heart disease Father     Diabetes Sister     Cancer Brother        Meds/Allergies       Current Outpatient Medications:     aspirin 81 MG tablet    atorvastatin (LIPITOR) 40 mg tablet    hydrochlorothiazide (HYDRODIURIL) 25 mg tablet    lisinopril (ZESTRIL) 20 mg tablet    metoprolol succinate (TOPROL-XL) 50 mg 24 hr tablet    omeprazole (PriLOSEC) 40 MG capsule    Probiotic Product (PROBIOTIC ACIDOPHILUS BEADS PO)    dicyclomine (BENTYL) 10 mg capsule    No Known Allergies        Objective     Blood pressure 130/60, pulse 61, temperature 97 6 °F (36 4 °C), temperature source Tympanic, height 5' 8" (1 727 m), weight 68 9 kg (152 lb)  Body mass index is 23 11 kg/m²  PHYSICAL EXAM:      General Appearance:   Alert, cooperative, no distress   HEENT:   Normocephalic, atraumatic, anicteric  Neck:  Supple, symmetrical, trachea midline   Lungs:   Equal chest rise and respirations unlabored    Heart[de-identified]   Regular rate and rhythm   Abdomen:   Soft, non-tender, non-distended; normal bowel sounds; no masses, no organomegaly    Genitalia:   Deferred    Rectal:   Deferred    Extremities:  No cyanosis, clubbing or edema    Pulses:  2+ and symmetric    Skin:  No jaundice, rashes, or lesions    Lymph nodes:  No palpable cervical lymphadenopathy        Lab Results:   No visits with results within 1 Day(s) from this visit     Latest known visit with results is:   Appointment on 09/11/2019   Component Date Value    Sodium 09/11/2019 142     Potassium 09/11/2019 3 9     Chloride 09/11/2019 112*    CO2 09/11/2019 26     ANION GAP 09/11/2019 4     BUN 09/11/2019 19     Creatinine 09/11/2019 1 05     Glucose, Fasting 09/11/2019 75     Calcium 09/11/2019 9 1     eGFR 09/11/2019 69     Cholesterol 09/11/2019 105     Triglycerides 09/11/2019 106     HDL, Direct 09/11/2019 39*    LDL Calculated 09/11/2019 45     Non-HDL-Chol (CHOL-HDL) 09/11/2019 66        Lab Results   Component Value Date    WBC 7 32 03/26/2018    HGB 11 7 (L) 03/26/2018    HCT 36 3 (L) 03/26/2018    MCV 88 03/26/2018     03/26/2018       Lab Results   Component Value Date     12/10/2015    SODIUM 142 09/11/2019    K 3 9 09/11/2019     (H) 09/11/2019    CO2 26 09/11/2019    ANIONGAP 9 12/10/2015    AGAP 4 09/11/2019    BUN 19 09/11/2019    CREATININE 1 05 09/11/2019    GLUC 76 03/08/2018    GLUF 75 09/11/2019    CALCIUM 9 1 09/11/2019    AST 14 03/07/2016    ALT 25 03/07/2016    ALKPHOS 75 03/07/2016    PROT 8 2 06/18/2015    TP 7 4 03/07/2016    BILITOT 0 47 06/18/2015    TBILI 0 60 03/07/2016    EGFR 69 09/11/2019       No results found for: CRP    Lab Results   Component Value Date    VHU2CFZRVTBJ 0 538 02/02/2017       No results found for: IRON, TIBC, FERRITIN    Radiology Results:   No results found

## 2020-05-06 ENCOUNTER — TELEMEDICINE (OUTPATIENT)
Dept: INTERNAL MEDICINE CLINIC | Facility: CLINIC | Age: 75
End: 2020-05-06

## 2020-05-06 DIAGNOSIS — Z09 FOLLOW UP: Primary | ICD-10-CM

## 2020-05-06 PROCEDURE — G2025 DIS SITE TELE SVCS RHC/FQHC: HCPCS | Performed by: STUDENT IN AN ORGANIZED HEALTH CARE EDUCATION/TRAINING PROGRAM

## 2020-06-09 ENCOUNTER — TELEPHONE (OUTPATIENT)
Dept: CARDIOLOGY CLINIC | Facility: CLINIC | Age: 75
End: 2020-06-09

## 2020-06-10 ENCOUNTER — TELEPHONE (OUTPATIENT)
Dept: CARDIOLOGY CLINIC | Facility: CLINIC | Age: 75
End: 2020-06-10

## 2020-06-10 ENCOUNTER — OFFICE VISIT (OUTPATIENT)
Dept: CARDIOLOGY CLINIC | Facility: CLINIC | Age: 75
End: 2020-06-10
Payer: MEDICARE

## 2020-06-10 VITALS
SYSTOLIC BLOOD PRESSURE: 110 MMHG | TEMPERATURE: 97.4 F | WEIGHT: 152 LBS | HEART RATE: 60 BPM | BODY MASS INDEX: 23.04 KG/M2 | DIASTOLIC BLOOD PRESSURE: 60 MMHG | HEIGHT: 68 IN

## 2020-06-10 DIAGNOSIS — I10 ESSENTIAL HYPERTENSION: ICD-10-CM

## 2020-06-10 DIAGNOSIS — I25.10 CORONARY ARTERY DISEASE INVOLVING NATIVE HEART WITHOUT ANGINA PECTORIS, UNSPECIFIED VESSEL OR LESION TYPE: Primary | ICD-10-CM

## 2020-06-10 DIAGNOSIS — E78.5 HYPERLIPIDEMIA, UNSPECIFIED HYPERLIPIDEMIA TYPE: ICD-10-CM

## 2020-06-10 PROCEDURE — 3008F BODY MASS INDEX DOCD: CPT | Performed by: INTERNAL MEDICINE

## 2020-06-10 PROCEDURE — 3078F DIAST BP <80 MM HG: CPT | Performed by: INTERNAL MEDICINE

## 2020-06-10 PROCEDURE — 3074F SYST BP LT 130 MM HG: CPT | Performed by: INTERNAL MEDICINE

## 2020-06-10 PROCEDURE — 4040F PNEUMOC VAC/ADMIN/RCVD: CPT | Performed by: INTERNAL MEDICINE

## 2020-06-10 PROCEDURE — 99213 OFFICE O/P EST LOW 20 MIN: CPT | Performed by: INTERNAL MEDICINE

## 2020-06-10 PROCEDURE — 1036F TOBACCO NON-USER: CPT | Performed by: INTERNAL MEDICINE

## 2020-06-10 PROCEDURE — 1160F RVW MEDS BY RX/DR IN RCRD: CPT | Performed by: INTERNAL MEDICINE

## 2020-06-24 ENCOUNTER — APPOINTMENT (OUTPATIENT)
Dept: LAB | Age: 75
End: 2020-06-24
Payer: MEDICARE

## 2020-06-24 ENCOUNTER — TELEPHONE (OUTPATIENT)
Dept: CARDIOLOGY CLINIC | Facility: CLINIC | Age: 75
End: 2020-06-24

## 2020-06-24 DIAGNOSIS — Z91.89 ENCOUNTER FOR HEPATITIS C VIRUS SCREENING TEST FOR HIGH RISK PATIENT: ICD-10-CM

## 2020-06-24 DIAGNOSIS — Z11.59 ENCOUNTER FOR HEPATITIS C VIRUS SCREENING TEST FOR HIGH RISK PATIENT: ICD-10-CM

## 2020-06-24 LAB
ANION GAP SERPL CALCULATED.3IONS-SCNC: 4 MMOL/L (ref 4–13)
BUN SERPL-MCNC: 38 MG/DL (ref 5–25)
CALCIUM SERPL-MCNC: 9.6 MG/DL (ref 8.3–10.1)
CHLORIDE SERPL-SCNC: 106 MMOL/L (ref 100–108)
CO2 SERPL-SCNC: 29 MMOL/L (ref 21–32)
CREAT SERPL-MCNC: 1.09 MG/DL (ref 0.6–1.3)
GFR SERPL CREATININE-BSD FRML MDRD: 66 ML/MIN/1.73SQ M
GLUCOSE SERPL-MCNC: 95 MG/DL (ref 65–140)
HCV AB SER QL: NORMAL
POTASSIUM SERPL-SCNC: 3.8 MMOL/L (ref 3.5–5.3)
SODIUM SERPL-SCNC: 139 MMOL/L (ref 136–145)

## 2020-06-24 PROCEDURE — 80048 BASIC METABOLIC PNL TOTAL CA: CPT | Performed by: INTERNAL MEDICINE

## 2020-06-24 PROCEDURE — 36415 COLL VENOUS BLD VENIPUNCTURE: CPT | Performed by: INTERNAL MEDICINE

## 2020-06-24 PROCEDURE — 86803 HEPATITIS C AB TEST: CPT

## 2020-06-26 ENCOUNTER — TELEPHONE (OUTPATIENT)
Dept: GASTROENTEROLOGY | Facility: AMBULARY SURGERY CENTER | Age: 75
End: 2020-06-26

## 2020-06-26 NOTE — TELEPHONE ENCOUNTER
Patients GI provider:  Dr Yolanda Redman  Number to return call: ( [phone]    Reason for call: Pt calling to get lab results    Scheduled procedure/appointment date if applicable: Apt/procedure  na

## 2020-06-30 ENCOUNTER — TELEPHONE (OUTPATIENT)
Dept: GASTROENTEROLOGY | Facility: AMBULARY SURGERY CENTER | Age: 75
End: 2020-06-30

## 2020-06-30 NOTE — TELEPHONE ENCOUNTER
Patients GI provider:  Dr Jatin Bob    Number to return call: (   [phone]    Reason for call: Pt calling to get results from his hepatitis panel done 6-24-20    Scheduled procedure/appointment date if applicable: Apt/procedure  na

## 2020-07-21 DIAGNOSIS — K21.00 GASTROESOPHAGEAL REFLUX DISEASE WITH ESOPHAGITIS: ICD-10-CM

## 2020-07-21 RX ORDER — OMEPRAZOLE 40 MG/1
CAPSULE, DELAYED RELEASE ORAL
Qty: 90 CAPSULE | Refills: 3 | Status: SHIPPED | OUTPATIENT
Start: 2020-07-21 | End: 2021-08-16 | Stop reason: ALTCHOICE

## 2020-08-31 ENCOUNTER — OFFICE VISIT (OUTPATIENT)
Dept: URGENT CARE | Age: 75
End: 2020-08-31
Payer: MEDICARE

## 2020-08-31 VITALS
HEART RATE: 92 BPM | SYSTOLIC BLOOD PRESSURE: 142 MMHG | BODY MASS INDEX: 23.04 KG/M2 | HEIGHT: 68 IN | DIASTOLIC BLOOD PRESSURE: 86 MMHG | WEIGHT: 152 LBS | OXYGEN SATURATION: 100 % | TEMPERATURE: 98 F | RESPIRATION RATE: 18 BRPM

## 2020-08-31 DIAGNOSIS — H69.92 DISORDER OF LEFT EUSTACHIAN TUBE: Primary | ICD-10-CM

## 2020-08-31 PROCEDURE — 99213 OFFICE O/P EST LOW 20 MIN: CPT | Performed by: PHYSICIAN ASSISTANT

## 2020-08-31 PROCEDURE — G0463 HOSPITAL OUTPT CLINIC VISIT: HCPCS | Performed by: PHYSICIAN ASSISTANT

## 2020-08-31 RX ORDER — FLUTICASONE PROPIONATE 50 MCG
1 SPRAY, SUSPENSION (ML) NASAL DAILY
Qty: 1 BOTTLE | Refills: 0 | Status: SHIPPED | OUTPATIENT
Start: 2020-08-31 | End: 2021-07-07

## 2020-08-31 NOTE — PATIENT INSTRUCTIONS
Eustachian Tube Dysfunction   WHAT YOU NEED TO KNOW:   What is eustachian tube dysfunction? Eustachian tube dysfunction (ETD) is a condition that prevents your eustachian tubes from opening properly  It can also cause them to become blocked  Eustachian tubes connect your middle ear to the back of your nose and throat  These tubes open and allow air to flow in and out when you sneeze, swallow, or yawn  What causes or increases my risk of ETD? ETD may be caused by swelling or buildup of mucus in your eustachian tubes  Allergies, a cold, or sinus infection can increase your risk for ETD  Smoking also increases your risk for ETD  What are the signs and symptoms of ETD? · Fullness or pressure in your ears    · Muffled hearing    · Pain in one or both ears    · Ringing in your ears    · Popping or clicking feeling in your ears    · Trouble keeping your balance  How is ETD diagnosed? Your healthcare provider will ask about your symptoms  He will examine your ears, your nose, and the back of your throat  He may also do a hearing test    How is ETD treated? Your ETD may get better on its own without any treatment  You may need any of the following:  · Exercises  such as swallowing, yawning, or chewing gum may help to open your eustachian tubes  Your healthcare provider may also recommend that you take a deep breath and then blow with your mouth shut and your nostrils pinched closed  · Air pressure devices  push air into your nose and eustachian tubes to help relieve air pressure in your ear  · Treatment for allergies  such as decongestants, antihistamines, and nasal steroids may improve ETD  They may help decrease swelling of the eustachian tubes  · Ear tubes  may help to keep your middle ear open  During this procedure, your healthcare provider will cut a small hole in your eardrum  · A myringotomy  is procedure to make a small cut in your eardrum and suction out fluid from your middle ear  · Tuboplasty  is a procedure to widen your eustachian tubes  When should I contact my healthcare provider? · Your symptoms do not improve or get worse  · You have a fever  · You have any hearing loss  · You have questions or concerns about your condition or care  CARE AGREEMENT:   You have the right to help plan your care  Learn about your health condition and how it may be treated  Discuss treatment options with your caregivers to decide what care you want to receive  You always have the right to refuse treatment  The above information is an  only  It is not intended as medical advice for individual conditions or treatments  Talk to your doctor, nurse or pharmacist before following any medical regimen to see if it is safe and effective for you  © 2017 [employer] [employer]  Information is for End User's use only and may not be sold, redistributed or otherwise used for commercial purposes  All illustrations and images included in CareNotes® are the copyrighted property of [employer]  or [employer]

## 2020-08-31 NOTE — PROGRESS NOTES
[address] [employer] [facility]        NAME: Lizabeth Arredondo is a 67 y o  male  : 1945    MRN: [id number]  DATE: 2020  TIME: 12:32 PM    /86   Pulse 92   Temp 98 °F (36 7 °C)   Resp 18   Ht 5' 8" (1 727 m)   Wt 68 9 kg (152 lb)   SpO2 100%   BMI 23 11 kg/m²     Assessment and Plan   Disorder of left eustachian tube [H69 92]  1  Disorder of left eustachian tube  fluticasone (FLONASE) 50 mcg/act nasal spray         Patient Instructions       Follow up with PCP in 3-5 days  Proceed to  ER if symptoms worsen  Chief Complaint     Chief Complaint   Patient presents with   Ryan Amaro     pt states he had this fluid type feeling in his right and left ears since thursday  no fever, chills  no other symptons  History of Present Illness       Pt with left ear fullness x 4-5 days  abormal hearing left ear  Pt denies pain     Ear Fullness    There is pain in the left ear  This is a new problem  The current episode started in the past 7 days  The problem occurs constantly  The problem has been unchanged  There has been no fever  The patient is experiencing no pain  Pertinent negatives include no abdominal pain, coughing, diarrhea, ear discharge, headaches, hearing loss, neck pain, rash, rhinorrhea, sore throat or vomiting  He has tried nothing for the symptoms  The treatment provided no relief  There is no history of a chronic ear infection, hearing loss or a tympanostomy tube  Review of Systems   Review of Systems   Constitutional: Negative  HENT: Negative  Negative for ear discharge, hearing loss, rhinorrhea and sore throat  Left ear fullness    Eyes: Negative  Respiratory: Negative  Negative for cough  Cardiovascular: Negative  Gastrointestinal: Negative  Negative for abdominal pain, diarrhea and vomiting  Endocrine: Negative  Genitourinary: Negative  Musculoskeletal: Negative  Negative for neck pain  Skin: Negative  Negative for rash  Allergic/Immunologic: Negative  Neurological: Negative  Negative for headaches  Hematological: Negative  Psychiatric/Behavioral: Negative  All other systems reviewed and are negative          Current Medications       Current Outpatient Medications:     aspirin 81 MG tablet, Take 81 mg by mouth daily Resume on 3/11/18 , Disp: , Rfl:     atorvastatin (LIPITOR) 40 mg tablet, Take 1 tablet (40 mg total) by mouth daily with dinner Resume next scheduled dose upon discharge from the hospital, Disp: 90 tablet, Rfl: 3    hydrochlorothiazide (HYDRODIURIL) 25 mg tablet, Take 1 tablet (25 mg total) by mouth daily, Disp: 90 tablet, Rfl: 3    lisinopril (ZESTRIL) 20 mg tablet, Take 1 tablet (20 mg total) by mouth daily, Disp: 90 tablet, Rfl: 3    metoprolol succinate (TOPROL-XL) 50 mg 24 hr tablet, Take 1 tablet (50 mg total) by mouth daily Resume on 3/11/18, Disp: 90 tablet, Rfl: 3    omeprazole (PriLOSEC) 40 MG capsule, TAKE 1 CAPSULE DAILY, Disp: 90 capsule, Rfl: 3    Probiotic Product (PROBIOTIC ACIDOPHILUS BEADS PO), Take by mouth, Disp: , Rfl:     dicyclomine (BENTYL) 10 mg capsule, Take 1 capsule (10 mg total) by mouth 4 (four) times a day as needed (abdominal pain/cramping) (Patient not taking: Reported on 3/31/2020), Disp: 60 capsule, Rfl: 3    fluticasone (FLONASE) 50 mcg/act nasal spray, 1 spray into each nostril daily for 10 days, Disp: 1 Bottle, Rfl: 0    Current Allergies     Allergies as of 08/31/2020    (No Known Allergies)            The following portions of the patient's history were reviewed and updated as appropriate: allergies, current medications, past family history, past medical history, past social history, past surgical history and problem list      Past Medical History:   Diagnosis Date    Abdominal aortic aneurysm (AAA) ([facility] [address] )     last assessed nov 6 2015    Abscess of groin     last assessed oct 6 2014    Candidiasis, cutaneous     last assessed march 19 2014    Coronary artery disease     Dyslipidemia     Esophageal ulcer without bleeding     Gastritis     Hiatal hernia     Hx of cataract surgery, left     last assessed july 21 2014    Hyperlipidemia     Hypertension     Old myocardial infarction 2000    Recurrent right inguinal hernia     s/p right orchioectomy, mesh removal, and sinus trac removal patient being followed by surgery next appt 4/28/14 patient s/p keflex x 7 days for MSSA Cx repeat wound cx grew MSSA cx repeat wound cx grew MSSA and other armond (mixed) no MRSA identified conitunue close monitoring and local wound care, last assessed april 15 2014    Wound of skin     in the groin, right       Past Surgical History:   Procedure Laterality Date    ABDOMINAL AORTIC ANEURYSM REPAIR  2009    aortobi-iliac, dacron graft    APPENDECTOMY      open    CATARACT EXTRACTION Bilateral     COLONOSCOPY      CORONARY ANGIOPLASTY WITH STENT PLACEMENT      stent 2    CYSTOSCOPY      diagnostic onset nov 13 2014    EXPLORATORY LAPAROTOMY  12/09/2009    FL INJECTION LEFT HIP (NON ARTHROGRAM)  4/2/2019    HIP SURGERY Right     INGUINAL HERNIA REPAIR Right     unilateral x2    ORCHIECTOMY Right     DC COLONOSCOPY FLX DX W/COLLJ SPEC WHEN PFRMD N/A 5/22/2018    Procedure: COLONOSCOPY;  Surgeon: Nathan Wiseman DO;  [facility]; Service: Gastroenterology    DC ESOPHAGOGASTRODUODENOSCOPY TRANSORAL DIAGNOSTIC N/A 2/14/2019    Procedure: ESOPHAGOGASTRODUODENOSCOPY (EGD); Surgeon: Elijah Licea MD;  [facility];   Service: Gastroenterology    DC REPAIR INCISIONAL HERNIA,REDUCIBLE N/A 2/23/2018    Procedure: lysis of adhesions; INCISIONAL HERNIA REPAIR WITH MESH;  Surgeon: Patsy Napier MD;  [facility];  Service: General    UPPER GASTROINTESTINAL ENDOSCOPY         Family History   Problem Relation Age of Onset    Heart disease Mother     Diabetes Mother     Heart disease Father     Diabetes Sister     Cancer Brother Medications have been verified  Objective   /86   Pulse 92   Temp 98 °F (36 7 °C)   Resp 18   Ht 5' 8" (1 727 m)   Wt 68 9 kg (152 lb)   SpO2 100%   BMI 23 11 kg/m²        Physical Exam     Physical Exam  Vitals signs and nursing note reviewed  Constitutional:       Appearance: Normal appearance  He is normal weight  HENT:      Head: Normocephalic and atraumatic  Right Ear: Tympanic membrane, ear canal and external ear normal       Left Ear: Tympanic membrane, ear canal and external ear normal       Ears:      Comments: No mastoid tenderness no lymphadenopathy        Nose: Nose normal       Mouth/Throat:      Mouth: Mucous membranes are moist       Pharynx: Oropharynx is clear  Eyes:      Extraocular Movements: Extraocular movements intact  Conjunctiva/sclera: Conjunctivae normal       Pupils: Pupils are equal, round, and reactive to light  Neck:      Musculoskeletal: Normal range of motion and neck supple  Cardiovascular:      Rate and Rhythm: Regular rhythm  Tachycardia present  Pulses: Normal pulses  Heart sounds: Normal heart sounds  Pulmonary:      Effort: Pulmonary effort is normal       Breath sounds: Normal breath sounds  Abdominal:      General: Abdomen is flat  Bowel sounds are normal       Palpations: Abdomen is soft  Musculoskeletal: Normal range of motion  Skin:     General: Skin is warm  Capillary Refill: Capillary refill takes less than 2 seconds  Neurological:      General: No focal deficit present  Mental Status: He is alert and oriented to person, place, and time     Psychiatric:         Mood and Affect: Mood normal

## 2020-09-22 ENCOUNTER — OFFICE VISIT (OUTPATIENT)
Dept: MULTI SPECIALTY CLINIC | Facility: CLINIC | Age: 75
End: 2020-09-22

## 2020-09-22 VITALS — DIASTOLIC BLOOD PRESSURE: 68 MMHG | HEART RATE: 62 BPM | TEMPERATURE: 97.6 F | SYSTOLIC BLOOD PRESSURE: 128 MMHG

## 2020-09-22 DIAGNOSIS — B35.1 ONYCHOMYCOSIS: Primary | ICD-10-CM

## 2020-09-22 DIAGNOSIS — L84 CALLUS OF FOOT: ICD-10-CM

## 2020-09-22 DIAGNOSIS — L85.3 XEROSIS CUTIS: ICD-10-CM

## 2020-09-22 PROCEDURE — 11057 PARNG/CUTG B9 HYPRKR LES >4: CPT | Performed by: PODIATRIST

## 2020-09-22 PROCEDURE — 99214 OFFICE O/P EST MOD 30 MIN: CPT | Performed by: PODIATRIST

## 2020-09-22 PROCEDURE — 11721 DEBRIDE NAIL 6 OR MORE: CPT | Performed by: PODIATRIST

## 2020-09-22 RX ORDER — AMMONIUM LACTATE 12 G/100G
CREAM TOPICAL AS NEEDED
Qty: 385 G | Refills: 0 | Status: SHIPPED | OUTPATIENT
Start: 2020-09-22 | End: 2020-10-24 | Stop reason: HOSPADM

## 2020-09-22 NOTE — PROGRESS NOTES
At [address] 67 y o  male MRN: [id number]  Encounter: [id number]      Assessment/Plan        Diagnoses and all orders for this visit:    Onychomycosis    Xerosis cutis  -     ammonium lactate (LAC-HYDRIN) 12 % cream; Apply topically as needed for dry skin    Callus of foot           Plan:   Patient was seen/examined  All questions and concerns addressed   Nails x10 were sharply debrided to normal length and thickness with a large nail nipper without incident  All devitalized, unhealthy, and fungally infected tissue was removed   Patient's calluses x5 were trimmed using a #15 blade without incident   Patient was instructed to begin using ammonium lactate for his dry feet he was written a prescription for it   RTC in 6 month(s)    Dr Joe Hart was present during this entire procedure  History of Present Illness     HPI:  Elissa Nielsen is a 67 y o  male who presents with elongated toenails and dry skin  He also presents with painful calluses on his feet bilaterally  States that their nails are painful, elongated  They have difficulty applying their socks and shoes  The pressure within their shoe gear is painful and they have been unable to cut their nails adequately  Patient denies numbness/tingling  The patient denies any nausea, vomiting, fever, chills, shortness of breath, or chest pains  Review of Systems   Constitutional: Negative  HENT: Negative  Eyes: Negative  Respiratory: Negative  Cardiovascular: Negative  Gastrointestinal: Negative  Musculoskeletal: Negative   Skin: elongated thickened toenails  Calluses of the feet bilaterally x5, xerosis cutis   Neurological: Negative          Historical Information   Past Medical History:   Diagnosis Date    Abdominal aortic aneurysm (AAA) ([address] )     last assessed nov 6 2015    Abscess of groin     last assessed oct 6 2014    Candidiasis, cutaneous     last assessed march 19 2014    Coronary artery disease     Dyslipidemia     Esophageal ulcer without bleeding     Gastritis     Hiatal hernia     Hx of cataract surgery, left     last assessed july 21 2014    Hyperlipidemia     Hypertension     Old myocardial infarction 2000    Recurrent right inguinal hernia     s/p right orchioectomy, mesh removal, and sinus trac removal patient being followed by surgery next appt 4/28/14 patient s/p keflex x 7 days for MSSA Cx repeat wound cx grew MSSA cx repeat wound cx grew MSSA and other armond (mixed) no MRSA identified conitunue close monitoring and local wound care, last assessed april 15 2014    Wound of skin     in the groin, right     Past Surgical History:   Procedure Laterality Date    ABDOMINAL AORTIC ANEURYSM REPAIR  2009    aortobi-iliac, dacron graft    APPENDECTOMY      open    CATARACT EXTRACTION Bilateral     COLONOSCOPY      CORONARY ANGIOPLASTY WITH STENT PLACEMENT      stent 2    CYSTOSCOPY      diagnostic onset nov 13 2014    EXPLORATORY LAPAROTOMY  12/09/2009    FL INJECTION LEFT HIP (NON ARTHROGRAM)  4/2/2019    HIP SURGERY Right     INGUINAL HERNIA REPAIR Right     unilateral x2    ORCHIECTOMY Right     [place] COLONOSCOPY FLX DX W/COLLJ SPEC WHEN PFRMD N/A 5/22/2018    Procedure: COLONOSCOPY;  Surgeon: Hawa Rodriguez DO;  [facility]; Service: Gastroenterology    [place] ESOPHAGOGASTRODUODENOSCOPY TRANSORAL DIAGNOSTIC N/A 2/14/2019    Procedure: ESOPHAGOGASTRODUODENOSCOPY (EGD); Surgeon: Mg Gilbert MD;  [facility];   Service: Gastroenterology    [place] REPAIR INCISIONAL HERNIA,REDUCIBLE N/A 2/23/2018    Procedure: lysis of adhesions; INCISIONAL HERNIA REPAIR WITH MESH;  Surgeon: Niels Marinelli MD;  [facility];  Service: General    UPPER GASTROINTESTINAL ENDOSCOPY       Social History   Social History     Substance and Sexual Activity   Alcohol Use Yes    Comment: occasionally     Social History     Substance and Sexual Activity   Drug Use No Social History     Tobacco Use   Smoking Status Former Smoker   Smokeless Tobacco Never Used     Family History:   Family History   Problem Relation Age of Onset    Heart disease Mother     Diabetes Mother     Heart disease Father     Diabetes Sister     Cancer Brother        Meds/Allergies   (Not in a hospital admission)    No Known Allergies    Objective     Current Vitals:   Blood Pressure: 128/68 (09/22/20 1033)  Pulse: 62 (09/22/20 1033)  Temperature: 97 6 °F (36 4 °C) (09/22/20 1033)  Temp Source: Temporal (09/22/20 1033)        /68 (BP Location: Right arm, Patient Position: Sitting, Cuff Size: Adult)   Pulse 62   Temp 97 6 °F (36 4 °C) (Temporal)       Lower Extremity Exam:    Diabetic Foot Exam    Musculoskeletal:  MMT is 5/5 to all compartments of the LE, +2/4 edema B/L, Hallux limitus is present  Equinus is present  Limb length discrepancy is noted, the patient's right leg is about 10 inches shorter than his left, he wears custom made shoes for this issue  Vascular:   R DP is +2/4, R PT is +2/4, L DP is +2/4, L PT is +2/4, CFT< 3sec to all digits  Pedal hair is Absent  regular rate and rhythm  Multiple venous varicosities noted throughout the patient's lower extremities bilaterally  Skin:  No open Lesions  Skin of the LE is normal texture, temperature, turgor  Interdigital maceration is not present  Nails elongated and thickened with subungual debris x10  Xerotic skin is noted of the plantar surface of the feet bilaterally  Calluses noted to the dorsum of digits 4 and 5 of the right foot at the PIPJ  Callus noted to the plantar surface of the right foot, submetatarsal 2  Callus noted to the medial aspect of the patient's left hallux, as well as one to the distal tip of the left 2nd digit  Neurologic:  Gross sensation is intact  Protective sensation is Intact  Vibratory sensation is Intact  Sharp sensation is present

## 2020-09-22 NOTE — PATIENT INSTRUCTIONS
Ammonium Lactate (On the skin)   Ammonium Lactate (i-XAY-rjo-um LAK-vasquez)  Treats dry, scaly skin from xerosis or ichthyosis  Also temporarily relieves itching caused by these conditions  Brand Name(s): Al-12, AmLactin, Amlactin Distribution Pack, Lulu-Hydrolac, Lac-Hydrin Twelve, Ultravate Pac   There may be other brand names for this medicine  When This Medicine Should Not Be Used: This medicine is not right for everyone  Do not use it if you had an allergic reaction to ammonium lactate  How to Use This Medicine:   Cream, Foam, Lotion  · Your doctor will tell you how much of this medicine to apply and how often  Do not use more medicine or apply it more often than your doctor tells you to  · This medicine is for use on the skin only  Do not put the medicine on your face or on areas that have cuts or scrapes, or where you have recently shaved  If it does get on these areas, rinse it off right away  · Wash your hands with soap and water before and after you use this medicine  Shake the lotion well just before each use  · Apply a thin layer of the medicine to the affected area  Rub it in gently  · Missed dose: Apply a dose as soon as you can  If it is almost time for your next dose, wait until then and apply a regular dose  Do not apply extra medicine to make up for a missed dose  · Store the medicine in a closed container at room temperature, away from heat, moisture, and direct light  Drugs and Foods to Avoid:   Ask your doctor or pharmacist before using any other medicine, including over-the-counter medicines, vitamins, and herbal products  · Do not put cosmetics or skin care products on the treated skin  Warnings While Using This Medicine:   · Tell your doctor if you are pregnant or breastfeeding  · Do not use this medicine to treat a skin problem your doctor has not examined  · This medicine may make your skin more sensitive to sunlight  Wear sunscreen   Do not use sunlamps or tanning beds   · Call your doctor if your symptoms do not improve or if they get worse  · Keep all medicine out of the reach of children  Never share your medicine with anyone  Possible Side Effects While Using This Medicine:   Call your doctor right away if you notice any of these side effects:  · Allergic reaction: Itching or hives, swelling in your face or hands, swelling or tingling in your mouth or throat, chest tightness, trouble breathing  · Peeling, hives, or raised areas on treated skin  If you notice these less serious side effects, talk with your doctor:   · Changes in skin color  · Mild skin itching or dryness  · Stinging or burning of treated skin areas  If you notice other side effects that you think are caused by this medicine, tell your doctor  Call your doctor for medical advice about side effects  You may report side effects to FDA at 2-913-FDA-8648  © 2017 [address] Information is for End User's use only and may not be sold, redistributed or otherwise used for commercial purposes  The above information is an  only  It is not intended as medical advice for individual conditions or treatments  Talk to your doctor, nurse or pharmacist before following any medical regimen to see if it is safe and effective for you

## 2020-09-24 ENCOUNTER — APPOINTMENT (EMERGENCY)
Dept: RADIOLOGY | Facility: HOSPITAL | Age: 75
DRG: 674 | End: 2020-09-24
Payer: MEDICARE

## 2020-09-24 ENCOUNTER — HOSPITAL ENCOUNTER (INPATIENT)
Facility: HOSPITAL | Age: 75
LOS: 1 days | Discharge: HOME | DRG: 674 | End: 2020-09-26
Attending: EMERGENCY MEDICINE | Admitting: INTERNAL MEDICINE
Payer: MEDICARE

## 2020-09-24 DIAGNOSIS — N17.9 AKI (ACUTE KIDNEY INJURY) (HCC): ICD-10-CM

## 2020-09-24 DIAGNOSIS — I10 ESSENTIAL HYPERTENSION: ICD-10-CM

## 2020-09-24 DIAGNOSIS — R07.9 CHEST PAIN: Primary | ICD-10-CM

## 2020-09-24 DIAGNOSIS — Z09 FOLLOW UP: ICD-10-CM

## 2020-09-24 LAB
ALBUMIN SERPL BCP-MCNC: 4.1 G/DL (ref 3.5–5)
ALP SERPL-CCNC: 116 U/L (ref 46–116)
ALT SERPL W P-5'-P-CCNC: 25 U/L (ref 12–78)
ANION GAP SERPL CALCULATED.3IONS-SCNC: 7 MMOL/L (ref 4–13)
APTT PPP: 24 SECONDS (ref 23–37)
AST SERPL W P-5'-P-CCNC: 18 U/L (ref 5–45)
BASOPHILS # BLD AUTO: 0.05 THOUSANDS/ΜL (ref 0–0.1)
BASOPHILS NFR BLD AUTO: 0 % (ref 0–1)
BILIRUB SERPL-MCNC: 0.83 MG/DL (ref 0.2–1)
BUN SERPL-MCNC: 33 MG/DL (ref 5–25)
CALCIUM SERPL-MCNC: 10.2 MG/DL (ref 8.3–10.1)
CHLORIDE SERPL-SCNC: 108 MMOL/L (ref 100–108)
CO2 SERPL-SCNC: 26 MMOL/L (ref 21–32)
CREAT SERPL-MCNC: 1.34 MG/DL (ref 0.6–1.3)
EOSINOPHIL # BLD AUTO: 0.03 THOUSAND/ΜL (ref 0–0.61)
EOSINOPHIL NFR BLD AUTO: 0 % (ref 0–6)
ERYTHROCYTE [DISTWIDTH] IN BLOOD BY AUTOMATED COUNT: 13.8 % (ref 11.6–15.1)
GFR SERPL CREATININE-BSD FRML MDRD: 51 ML/MIN/1.73SQ M
GLUCOSE SERPL-MCNC: 106 MG/DL (ref 65–140)
HCT VFR BLD AUTO: 44.9 % (ref 36.5–49.3)
HGB BLD-MCNC: 14.9 G/DL (ref 12–17)
IMM GRANULOCYTES # BLD AUTO: 0.07 THOUSAND/UL (ref 0–0.2)
IMM GRANULOCYTES NFR BLD AUTO: 1 % (ref 0–2)
INR PPP: 1.03 (ref 0.84–1.19)
LYMPHOCYTES # BLD AUTO: 1.01 THOUSANDS/ΜL (ref 0.6–4.47)
LYMPHOCYTES NFR BLD AUTO: 8 % (ref 14–44)
MCH RBC QN AUTO: 30.9 PG (ref 26.8–34.3)
MCHC RBC AUTO-ENTMCNC: 33.2 G/DL (ref 31.4–37.4)
MCV RBC AUTO: 93 FL (ref 82–98)
MONOCYTES # BLD AUTO: 0.53 THOUSAND/ΜL (ref 0.17–1.22)
MONOCYTES NFR BLD AUTO: 4 % (ref 4–12)
NEUTROPHILS # BLD AUTO: 11.06 THOUSANDS/ΜL (ref 1.85–7.62)
NEUTS SEG NFR BLD AUTO: 87 % (ref 43–75)
NRBC BLD AUTO-RTO: 0 /100 WBCS
PLATELET # BLD AUTO: 269 THOUSANDS/UL (ref 149–390)
PMV BLD AUTO: 9.4 FL (ref 8.9–12.7)
POTASSIUM SERPL-SCNC: 4.4 MMOL/L (ref 3.5–5.3)
PROT SERPL-MCNC: 8.9 G/DL (ref 6.4–8.2)
PROTHROMBIN TIME: 13.5 SECONDS (ref 11.6–14.5)
RBC # BLD AUTO: 4.82 MILLION/UL (ref 3.88–5.62)
SODIUM SERPL-SCNC: 141 MMOL/L (ref 136–145)
TROPONIN I SERPL-MCNC: <0.02 NG/ML
WBC # BLD AUTO: 12.75 THOUSAND/UL (ref 4.31–10.16)

## 2020-09-24 PROCEDURE — 85025 COMPLETE CBC W/AUTO DIFF WBC: CPT | Performed by: EMERGENCY MEDICINE

## 2020-09-24 PROCEDURE — 99285 EMERGENCY DEPT VISIT HI MDM: CPT

## 2020-09-24 PROCEDURE — 80061 LIPID PANEL: CPT | Performed by: STUDENT IN AN ORGANIZED HEALTH CARE EDUCATION/TRAINING PROGRAM

## 2020-09-24 PROCEDURE — 85730 THROMBOPLASTIN TIME PARTIAL: CPT | Performed by: EMERGENCY MEDICINE

## 2020-09-24 PROCEDURE — 99285 EMERGENCY DEPT VISIT HI MDM: CPT | Performed by: EMERGENCY MEDICINE

## 2020-09-24 PROCEDURE — 93005 ELECTROCARDIOGRAM TRACING: CPT

## 2020-09-24 PROCEDURE — U0003 INFECTIOUS AGENT DETECTION BY NUCLEIC ACID (DNA OR RNA); SEVERE ACUTE RESPIRATORY SYNDROME CORONAVIRUS 2 (SARS-COV-2) (CORONAVIRUS DISEASE [COVID-19]), AMPLIFIED PROBE TECHNIQUE, MAKING USE OF HIGH THROUGHPUT TECHNOLOGIES AS DESCRIBED BY CMS-2020-01-R: HCPCS | Performed by: EMERGENCY MEDICINE

## 2020-09-24 PROCEDURE — 71045 X-RAY EXAM CHEST 1 VIEW: CPT

## 2020-09-24 PROCEDURE — 84484 ASSAY OF TROPONIN QUANT: CPT | Performed by: EMERGENCY MEDICINE

## 2020-09-24 PROCEDURE — 84443 ASSAY THYROID STIM HORMONE: CPT | Performed by: STUDENT IN AN ORGANIZED HEALTH CARE EDUCATION/TRAINING PROGRAM

## 2020-09-24 PROCEDURE — 80053 COMPREHEN METABOLIC PANEL: CPT | Performed by: EMERGENCY MEDICINE

## 2020-09-24 PROCEDURE — 36415 COLL VENOUS BLD VENIPUNCTURE: CPT

## 2020-09-24 PROCEDURE — 85610 PROTHROMBIN TIME: CPT | Performed by: EMERGENCY MEDICINE

## 2020-09-24 RX ORDER — ASPIRIN 81 MG/1
243 TABLET, CHEWABLE ORAL ONCE
Status: COMPLETED | OUTPATIENT
Start: 2020-09-24 | End: 2020-09-24

## 2020-09-24 RX ADMIN — ASPIRIN 81 MG 243 MG: 81 TABLET ORAL at 23:00

## 2020-09-24 RX ADMIN — NITROGLYCERIN 1 INCH: 20 OINTMENT TOPICAL at 23:16

## 2020-09-25 ENCOUNTER — APPOINTMENT (OUTPATIENT)
Dept: NON INVASIVE DIAGNOSTICS | Facility: HOSPITAL | Age: 75
DRG: 674 | End: 2020-09-25
Payer: MEDICARE

## 2020-09-25 PROBLEM — I20.0 UNSTABLE ANGINA (HCC): Status: ACTIVE | Noted: 2020-09-25

## 2020-09-25 PROBLEM — N17.9 AKI (ACUTE KIDNEY INJURY) (HCC): Status: ACTIVE | Noted: 2020-09-25

## 2020-09-25 LAB
ANION GAP SERPL CALCULATED.3IONS-SCNC: 7 MMOL/L (ref 4–13)
ATRIAL RATE: 57 BPM
ATRIAL RATE: 63 BPM
ATRIAL RATE: 65 BPM
ATRIAL RATE: 68 BPM
BASOPHILS # BLD AUTO: 0.02 THOUSANDS/ΜL (ref 0–0.1)
BASOPHILS NFR BLD AUTO: 0 % (ref 0–1)
BUN SERPL-MCNC: 31 MG/DL (ref 5–25)
CALCIUM SERPL-MCNC: 9.3 MG/DL (ref 8.3–10.1)
CHLORIDE SERPL-SCNC: 108 MMOL/L (ref 100–108)
CHOLEST SERPL-MCNC: 130 MG/DL (ref 50–200)
CO2 SERPL-SCNC: 26 MMOL/L (ref 21–32)
CREAT SERPL-MCNC: 1.11 MG/DL (ref 0.6–1.3)
EOSINOPHIL # BLD AUTO: 0 THOUSAND/ΜL (ref 0–0.61)
EOSINOPHIL NFR BLD AUTO: 0 % (ref 0–6)
ERYTHROCYTE [DISTWIDTH] IN BLOOD BY AUTOMATED COUNT: 13.7 % (ref 11.6–15.1)
EST. AVERAGE GLUCOSE BLD GHB EST-MCNC: 105 MG/DL
GFR SERPL CREATININE-BSD FRML MDRD: 64 ML/MIN/1.73SQ M
GLUCOSE SERPL-MCNC: 106 MG/DL (ref 65–140)
HBA1C MFR BLD: 5.3 %
HCT VFR BLD AUTO: 40.1 % (ref 36.5–49.3)
HDLC SERPL-MCNC: 41 MG/DL
HGB BLD-MCNC: 13.5 G/DL (ref 12–17)
IMM GRANULOCYTES # BLD AUTO: 0.04 THOUSAND/UL (ref 0–0.2)
IMM GRANULOCYTES NFR BLD AUTO: 0 % (ref 0–2)
LDLC SERPL CALC-MCNC: 72 MG/DL (ref 0–100)
LYMPHOCYTES # BLD AUTO: 0.61 THOUSANDS/ΜL (ref 0.6–4.47)
LYMPHOCYTES NFR BLD AUTO: 6 % (ref 14–44)
MCH RBC QN AUTO: 30.5 PG (ref 26.8–34.3)
MCHC RBC AUTO-ENTMCNC: 33.7 G/DL (ref 31.4–37.4)
MCV RBC AUTO: 91 FL (ref 82–98)
MONOCYTES # BLD AUTO: 0.63 THOUSAND/ΜL (ref 0.17–1.22)
MONOCYTES NFR BLD AUTO: 6 % (ref 4–12)
NEUTROPHILS # BLD AUTO: 9.89 THOUSANDS/ΜL (ref 1.85–7.62)
NEUTS SEG NFR BLD AUTO: 88 % (ref 43–75)
NRBC BLD AUTO-RTO: 0 /100 WBCS
NT-PROBNP SERPL-MCNC: 1324 PG/ML
P AXIS: 2 DEGREES
P AXIS: 28 DEGREES
P AXIS: 6 DEGREES
P AXIS: 62 DEGREES
PLATELET # BLD AUTO: 207 THOUSANDS/UL (ref 149–390)
PLATELET # BLD AUTO: 220 THOUSANDS/UL (ref 149–390)
PMV BLD AUTO: 9.3 FL (ref 8.9–12.7)
PMV BLD AUTO: 9.5 FL (ref 8.9–12.7)
POTASSIUM SERPL-SCNC: 3.6 MMOL/L (ref 3.5–5.3)
PR INTERVAL: 172 MS
PR INTERVAL: 172 MS
PR INTERVAL: 174 MS
PR INTERVAL: 184 MS
QRS AXIS: 32 DEGREES
QRS AXIS: 32 DEGREES
QRS AXIS: 35 DEGREES
QRS AXIS: 45 DEGREES
QRSD INTERVAL: 74 MS
QRSD INTERVAL: 80 MS
QRSD INTERVAL: 80 MS
QRSD INTERVAL: 82 MS
QT INTERVAL: 380 MS
QT INTERVAL: 402 MS
QT INTERVAL: 410 MS
QT INTERVAL: 414 MS
QTC INTERVAL: 402 MS
QTC INTERVAL: 407 MS
QTC INTERVAL: 418 MS
QTC INTERVAL: 419 MS
RBC # BLD AUTO: 4.42 MILLION/UL (ref 3.88–5.62)
SARS-COV-2 RNA RESP QL NAA+PROBE: NEGATIVE
SODIUM SERPL-SCNC: 141 MMOL/L (ref 136–145)
T WAVE AXIS: 39 DEGREES
T WAVE AXIS: 44 DEGREES
T WAVE AXIS: 45 DEGREES
T WAVE AXIS: 67 DEGREES
TRIGL SERPL-MCNC: 83 MG/DL
TROPONIN I SERPL-MCNC: <0.02 NG/ML
TSH SERPL DL<=0.05 MIU/L-ACNC: 0.36 UIU/ML (ref 0.36–3.74)
VENTRICULAR RATE: 57 BPM
VENTRICULAR RATE: 63 BPM
VENTRICULAR RATE: 65 BPM
VENTRICULAR RATE: 69 BPM
WBC # BLD AUTO: 11.19 THOUSAND/UL (ref 4.31–10.16)

## 2020-09-25 PROCEDURE — 93010 ELECTROCARDIOGRAM REPORT: CPT | Performed by: INTERNAL MEDICINE

## 2020-09-25 PROCEDURE — 80048 BASIC METABOLIC PNL TOTAL CA: CPT | Performed by: STUDENT IN AN ORGANIZED HEALTH CARE EDUCATION/TRAINING PROGRAM

## 2020-09-25 PROCEDURE — 84484 ASSAY OF TROPONIN QUANT: CPT | Performed by: STUDENT IN AN ORGANIZED HEALTH CARE EDUCATION/TRAINING PROGRAM

## 2020-09-25 PROCEDURE — 83036 HEMOGLOBIN GLYCOSYLATED A1C: CPT | Performed by: STUDENT IN AN ORGANIZED HEALTH CARE EDUCATION/TRAINING PROGRAM

## 2020-09-25 PROCEDURE — 85049 AUTOMATED PLATELET COUNT: CPT | Performed by: STUDENT IN AN ORGANIZED HEALTH CARE EDUCATION/TRAINING PROGRAM

## 2020-09-25 PROCEDURE — 83880 ASSAY OF NATRIURETIC PEPTIDE: CPT | Performed by: STUDENT IN AN ORGANIZED HEALTH CARE EDUCATION/TRAINING PROGRAM

## 2020-09-25 PROCEDURE — 93005 ELECTROCARDIOGRAM TRACING: CPT

## 2020-09-25 PROCEDURE — 93306 TTE W/DOPPLER COMPLETE: CPT

## 2020-09-25 PROCEDURE — 85025 COMPLETE CBC W/AUTO DIFF WBC: CPT | Performed by: STUDENT IN AN ORGANIZED HEALTH CARE EDUCATION/TRAINING PROGRAM

## 2020-09-25 RX ORDER — AMMONIUM LACTATE 12 G/100G
CREAM TOPICAL 2 TIMES DAILY PRN
Status: DISCONTINUED | OUTPATIENT
Start: 2020-09-25 | End: 2020-09-26 | Stop reason: HOSPADM

## 2020-09-25 RX ORDER — METOPROLOL SUCCINATE 50 MG/1
50 TABLET, EXTENDED RELEASE ORAL DAILY
Status: DISCONTINUED | OUTPATIENT
Start: 2020-09-25 | End: 2020-09-26 | Stop reason: HOSPADM

## 2020-09-25 RX ORDER — ACETAMINOPHEN 325 MG/1
650 TABLET ORAL EVERY 4 HOURS PRN
Status: DISCONTINUED | OUTPATIENT
Start: 2020-09-25 | End: 2020-09-26 | Stop reason: HOSPADM

## 2020-09-25 RX ORDER — ASPIRIN 81 MG/1
81 TABLET, CHEWABLE ORAL DAILY
Status: DISCONTINUED | OUTPATIENT
Start: 2020-09-25 | End: 2020-09-26 | Stop reason: HOSPADM

## 2020-09-25 RX ORDER — NITROGLYCERIN 0.4 MG/1
0.4 TABLET SUBLINGUAL
Status: DISCONTINUED | OUTPATIENT
Start: 2020-09-25 | End: 2020-09-26 | Stop reason: HOSPADM

## 2020-09-25 RX ORDER — ATORVASTATIN CALCIUM 40 MG/1
40 TABLET, FILM COATED ORAL
Status: DISCONTINUED | OUTPATIENT
Start: 2020-09-25 | End: 2020-09-26 | Stop reason: HOSPADM

## 2020-09-25 RX ORDER — AMLODIPINE BESYLATE 5 MG/1
5 TABLET ORAL DAILY
Status: DISCONTINUED | OUTPATIENT
Start: 2020-09-26 | End: 2020-09-26 | Stop reason: HOSPADM

## 2020-09-25 RX ORDER — PANTOPRAZOLE SODIUM 20 MG/1
20 TABLET, DELAYED RELEASE ORAL
Status: DISCONTINUED | OUTPATIENT
Start: 2020-09-25 | End: 2020-09-26 | Stop reason: HOSPADM

## 2020-09-25 RX ORDER — HEPARIN SODIUM 5000 [USP'U]/ML
5000 INJECTION, SOLUTION INTRAVENOUS; SUBCUTANEOUS EVERY 8 HOURS SCHEDULED
Status: DISCONTINUED | OUTPATIENT
Start: 2020-09-25 | End: 2020-09-26 | Stop reason: HOSPADM

## 2020-09-25 RX ORDER — HYDRALAZINE HYDROCHLORIDE 20 MG/ML
5 INJECTION INTRAMUSCULAR; INTRAVENOUS EVERY 8 HOURS PRN
Status: DISCONTINUED | OUTPATIENT
Start: 2020-09-25 | End: 2020-09-26 | Stop reason: HOSPADM

## 2020-09-25 RX ORDER — HYDRALAZINE HYDROCHLORIDE 20 MG/ML
5 INJECTION INTRAMUSCULAR; INTRAVENOUS EVERY 8 HOURS PRN
Status: DISCONTINUED | OUTPATIENT
Start: 2020-09-25 | End: 2020-09-25

## 2020-09-25 RX ADMIN — HYDRALAZINE HYDROCHLORIDE 5 MG: 20 INJECTION INTRAMUSCULAR; INTRAVENOUS at 08:52

## 2020-09-25 RX ADMIN — ATORVASTATIN CALCIUM 40 MG: 40 TABLET, FILM COATED ORAL at 17:12

## 2020-09-25 RX ADMIN — HEPARIN SODIUM 5000 UNITS: 5000 INJECTION INTRAVENOUS; SUBCUTANEOUS at 21:08

## 2020-09-25 RX ADMIN — HEPARIN SODIUM 5000 UNITS: 5000 INJECTION INTRAVENOUS; SUBCUTANEOUS at 05:22

## 2020-09-25 RX ADMIN — PANTOPRAZOLE SODIUM 20 MG: 20 TABLET, DELAYED RELEASE ORAL at 05:22

## 2020-09-25 RX ADMIN — ASPIRIN 81 MG CHEWABLE TABLET 81 MG: 81 TABLET CHEWABLE at 08:52

## 2020-09-25 RX ADMIN — METOPROLOL SUCCINATE 50 MG: 50 TABLET, EXTENDED RELEASE ORAL at 08:52

## 2020-09-25 RX ADMIN — HEPARIN SODIUM 5000 UNITS: 5000 INJECTION INTRAVENOUS; SUBCUTANEOUS at 13:40

## 2020-09-26 ENCOUNTER — APPOINTMENT (INPATIENT)
Dept: RADIOLOGY | Facility: HOSPITAL | Age: 75
DRG: 674 | End: 2020-09-26
Payer: MEDICARE

## 2020-09-26 ENCOUNTER — HOSPITAL ENCOUNTER (INPATIENT)
Facility: HOSPITAL | Age: 75
LOS: 8 days | Discharge: NURSING, REHAB OR LONG-TERM CARE | DRG: 674 | End: 2020-10-04
Attending: EMERGENCY MEDICINE | Admitting: INTERNAL MEDICINE
Payer: MEDICARE

## 2020-09-26 ENCOUNTER — APPOINTMENT (EMERGENCY)
Dept: RADIOLOGY | Facility: HOSPITAL | Age: 75
DRG: 674 | End: 2020-09-26
Payer: MEDICARE

## 2020-09-26 VITALS
BODY MASS INDEX: 24.69 KG/M2 | TEMPERATURE: 100.3 F | RESPIRATION RATE: 18 BRPM | WEIGHT: 162.92 LBS | HEIGHT: 68 IN | HEART RATE: 77 BPM | OXYGEN SATURATION: 93 % | SYSTOLIC BLOOD PRESSURE: 124 MMHG | DIASTOLIC BLOOD PRESSURE: 60 MMHG

## 2020-09-26 DIAGNOSIS — R09.02 HYPOXIA: ICD-10-CM

## 2020-09-26 DIAGNOSIS — R07.9 CHEST PAIN: ICD-10-CM

## 2020-09-26 DIAGNOSIS — K80.00 ACUTE CALCULOUS CHOLECYSTITIS: ICD-10-CM

## 2020-09-26 DIAGNOSIS — R06.00 DYSPNEA: Primary | ICD-10-CM

## 2020-09-26 LAB
ALBUMIN SERPL BCP-MCNC: 2.7 G/DL (ref 3.5–5)
ALBUMIN SERPL BCP-MCNC: 3.1 G/DL (ref 3.5–5)
ALP SERPL-CCNC: 83 U/L (ref 46–116)
ALP SERPL-CCNC: 93 U/L (ref 46–116)
ALT SERPL W P-5'-P-CCNC: 20 U/L (ref 12–78)
ALT SERPL W P-5'-P-CCNC: 24 U/L (ref 12–78)
ANION GAP SERPL CALCULATED.3IONS-SCNC: 7 MMOL/L (ref 4–13)
ANION GAP SERPL CALCULATED.3IONS-SCNC: 8 MMOL/L (ref 4–13)
APTT PPP: 36 SECONDS (ref 23–37)
AST SERPL W P-5'-P-CCNC: 18 U/L (ref 5–45)
AST SERPL W P-5'-P-CCNC: 20 U/L (ref 5–45)
BACTERIA UR QL AUTO: NORMAL /HPF
BASOPHILS # BLD AUTO: 0.03 THOUSANDS/ΜL (ref 0–0.1)
BASOPHILS # BLD AUTO: 0.04 THOUSANDS/ΜL (ref 0–0.1)
BASOPHILS NFR BLD AUTO: 0 % (ref 0–1)
BASOPHILS NFR BLD AUTO: 0 % (ref 0–1)
BILIRUB DIRECT SERPL-MCNC: 0.37 MG/DL (ref 0–0.2)
BILIRUB SERPL-MCNC: 1.39 MG/DL (ref 0.2–1)
BILIRUB SERPL-MCNC: 1.56 MG/DL (ref 0.2–1)
BILIRUB UR QL STRIP: ABNORMAL
BUN SERPL-MCNC: 23 MG/DL (ref 5–25)
BUN SERPL-MCNC: 24 MG/DL (ref 5–25)
CALCIUM ALBUM COR SERPL-MCNC: 10.3 MG/DL (ref 8.3–10.1)
CALCIUM SERPL-MCNC: 8.8 MG/DL (ref 8.3–10.1)
CALCIUM SERPL-MCNC: 9.6 MG/DL (ref 8.3–10.1)
CHLORIDE SERPL-SCNC: 105 MMOL/L (ref 100–108)
CHLORIDE SERPL-SCNC: 105 MMOL/L (ref 100–108)
CLARITY UR: CLEAR
CO2 SERPL-SCNC: 23 MMOL/L (ref 21–32)
CO2 SERPL-SCNC: 25 MMOL/L (ref 21–32)
COLOR UR: ABNORMAL
CREAT SERPL-MCNC: 1.09 MG/DL (ref 0.6–1.3)
CREAT SERPL-MCNC: 1.39 MG/DL (ref 0.6–1.3)
D DIMER PPP FEU-MCNC: 4.84 UG/ML FEU
EOSINOPHIL # BLD AUTO: 0 THOUSAND/ΜL (ref 0–0.61)
EOSINOPHIL # BLD AUTO: 0 THOUSAND/ΜL (ref 0–0.61)
EOSINOPHIL NFR BLD AUTO: 0 % (ref 0–6)
EOSINOPHIL NFR BLD AUTO: 0 % (ref 0–6)
ERYTHROCYTE [DISTWIDTH] IN BLOOD BY AUTOMATED COUNT: 13.9 % (ref 11.6–15.1)
ERYTHROCYTE [DISTWIDTH] IN BLOOD BY AUTOMATED COUNT: 14.1 % (ref 11.6–15.1)
GFR SERPL CREATININE-BSD FRML MDRD: 49 ML/MIN/1.73SQ M
GFR SERPL CREATININE-BSD FRML MDRD: 66 ML/MIN/1.73SQ M
GLUCOSE SERPL-MCNC: 100 MG/DL (ref 65–140)
GLUCOSE SERPL-MCNC: 97 MG/DL (ref 65–140)
GLUCOSE UR STRIP-MCNC: ABNORMAL MG/DL
HCT VFR BLD AUTO: 38 % (ref 36.5–49.3)
HCT VFR BLD AUTO: 41.3 % (ref 36.5–49.3)
HGB BLD-MCNC: 12.9 G/DL (ref 12–17)
HGB BLD-MCNC: 13.8 G/DL (ref 12–17)
HGB UR QL STRIP.AUTO: NEGATIVE
HYALINE CASTS #/AREA URNS LPF: NORMAL /LPF
IMM GRANULOCYTES # BLD AUTO: 0.12 THOUSAND/UL (ref 0–0.2)
IMM GRANULOCYTES # BLD AUTO: 0.13 THOUSAND/UL (ref 0–0.2)
IMM GRANULOCYTES NFR BLD AUTO: 1 % (ref 0–2)
IMM GRANULOCYTES NFR BLD AUTO: 1 % (ref 0–2)
INR PPP: 1.24 (ref 0.84–1.19)
KETONES UR STRIP-MCNC: ABNORMAL MG/DL
LEUKOCYTE ESTERASE UR QL STRIP: NEGATIVE
LYMPHOCYTES # BLD AUTO: 0.78 THOUSANDS/ΜL (ref 0.6–4.47)
LYMPHOCYTES # BLD AUTO: 1.15 THOUSANDS/ΜL (ref 0.6–4.47)
LYMPHOCYTES NFR BLD AUTO: 5 % (ref 14–44)
LYMPHOCYTES NFR BLD AUTO: 8 % (ref 14–44)
MCH RBC QN AUTO: 30.9 PG (ref 26.8–34.3)
MCH RBC QN AUTO: 31.1 PG (ref 26.8–34.3)
MCHC RBC AUTO-ENTMCNC: 33.4 G/DL (ref 31.4–37.4)
MCHC RBC AUTO-ENTMCNC: 33.9 G/DL (ref 31.4–37.4)
MCV RBC AUTO: 91 FL (ref 82–98)
MCV RBC AUTO: 93 FL (ref 82–98)
MONOCYTES # BLD AUTO: 0.79 THOUSAND/ΜL (ref 0.17–1.22)
MONOCYTES # BLD AUTO: 1.13 THOUSAND/ΜL (ref 0.17–1.22)
MONOCYTES NFR BLD AUTO: 5 % (ref 4–12)
MONOCYTES NFR BLD AUTO: 8 % (ref 4–12)
NEUTROPHILS # BLD AUTO: 12.26 THOUSANDS/ΜL (ref 1.85–7.62)
NEUTROPHILS # BLD AUTO: 13.57 THOUSANDS/ΜL (ref 1.85–7.62)
NEUTS SEG NFR BLD AUTO: 83 % (ref 43–75)
NEUTS SEG NFR BLD AUTO: 89 % (ref 43–75)
NITRITE UR QL STRIP: NEGATIVE
NON-SQ EPI CELLS URNS QL MICRO: NORMAL /HPF
NRBC BLD AUTO-RTO: 0 /100 WBCS
NRBC BLD AUTO-RTO: 0 /100 WBCS
NT-PROBNP SERPL-MCNC: 1080 PG/ML
PH UR STRIP.AUTO: 7 [PH]
PLATELET # BLD AUTO: 185 THOUSANDS/UL (ref 149–390)
PLATELET # BLD AUTO: 206 THOUSANDS/UL (ref 149–390)
PMV BLD AUTO: 9.3 FL (ref 8.9–12.7)
PMV BLD AUTO: 9.7 FL (ref 8.9–12.7)
POTASSIUM SERPL-SCNC: 3.4 MMOL/L (ref 3.5–5.3)
POTASSIUM SERPL-SCNC: 4.2 MMOL/L (ref 3.5–5.3)
PROCALCITONIN SERPL-MCNC: 3.52 NG/ML
PROT SERPL-MCNC: 7 G/DL (ref 6.4–8.2)
PROT SERPL-MCNC: 8.2 G/DL (ref 6.4–8.2)
PROT UR STRIP-MCNC: ABNORMAL MG/DL
PROTHROMBIN TIME: 15.6 SECONDS (ref 11.6–14.5)
RBC # BLD AUTO: 4.17 MILLION/UL (ref 3.88–5.62)
RBC # BLD AUTO: 4.44 MILLION/UL (ref 3.88–5.62)
RBC #/AREA URNS AUTO: NORMAL /HPF
SARS-COV-2 RNA RESP QL NAA+PROBE: NEGATIVE
SODIUM SERPL-SCNC: 136 MMOL/L (ref 136–145)
SODIUM SERPL-SCNC: 137 MMOL/L (ref 136–145)
SP GR UR STRIP.AUTO: 1.02 (ref 1–1.03)
TROPONIN I SERPL-MCNC: <0.02 NG/ML
UROBILINOGEN UR QL STRIP.AUTO: 1 E.U./DL
WBC # BLD AUTO: 14.7 THOUSAND/UL (ref 4.31–10.16)
WBC # BLD AUTO: 15.3 THOUSAND/UL (ref 4.31–10.16)
WBC #/AREA URNS AUTO: NORMAL /HPF

## 2020-09-26 PROCEDURE — 80076 HEPATIC FUNCTION PANEL: CPT | Performed by: STUDENT IN AN ORGANIZED HEALTH CARE EDUCATION/TRAINING PROGRAM

## 2020-09-26 PROCEDURE — 85379 FIBRIN DEGRADATION QUANT: CPT | Performed by: EMERGENCY MEDICINE

## 2020-09-26 PROCEDURE — 84484 ASSAY OF TROPONIN QUANT: CPT | Performed by: EMERGENCY MEDICINE

## 2020-09-26 PROCEDURE — 76705 ECHO EXAM OF ABDOMEN: CPT

## 2020-09-26 PROCEDURE — 83880 ASSAY OF NATRIURETIC PEPTIDE: CPT | Performed by: STUDENT IN AN ORGANIZED HEALTH CARE EDUCATION/TRAINING PROGRAM

## 2020-09-26 PROCEDURE — 85610 PROTHROMBIN TIME: CPT | Performed by: EMERGENCY MEDICINE

## 2020-09-26 PROCEDURE — 93306 TTE W/DOPPLER COMPLETE: CPT | Performed by: INTERNAL MEDICINE

## 2020-09-26 PROCEDURE — NC001 PR NO CHARGE: Performed by: INTERNAL MEDICINE

## 2020-09-26 PROCEDURE — 99285 EMERGENCY DEPT VISIT HI MDM: CPT

## 2020-09-26 PROCEDURE — 85025 COMPLETE CBC W/AUTO DIFF WBC: CPT | Performed by: STUDENT IN AN ORGANIZED HEALTH CARE EDUCATION/TRAINING PROGRAM

## 2020-09-26 PROCEDURE — 80053 COMPREHEN METABOLIC PANEL: CPT | Performed by: STUDENT IN AN ORGANIZED HEALTH CARE EDUCATION/TRAINING PROGRAM

## 2020-09-26 PROCEDURE — 85730 THROMBOPLASTIN TIME PARTIAL: CPT | Performed by: EMERGENCY MEDICINE

## 2020-09-26 PROCEDURE — 84145 PROCALCITONIN (PCT): CPT | Performed by: STUDENT IN AN ORGANIZED HEALTH CARE EDUCATION/TRAINING PROGRAM

## 2020-09-26 PROCEDURE — 93005 ELECTROCARDIOGRAM TRACING: CPT

## 2020-09-26 PROCEDURE — 71275 CT ANGIOGRAPHY CHEST: CPT

## 2020-09-26 PROCEDURE — 83605 ASSAY OF LACTIC ACID: CPT | Performed by: STUDENT IN AN ORGANIZED HEALTH CARE EDUCATION/TRAINING PROGRAM

## 2020-09-26 PROCEDURE — 1124F ACP DISCUSS-NO DSCNMKR DOCD: CPT | Performed by: EMERGENCY MEDICINE

## 2020-09-26 PROCEDURE — 87040 BLOOD CULTURE FOR BACTERIA: CPT | Performed by: STUDENT IN AN ORGANIZED HEALTH CARE EDUCATION/TRAINING PROGRAM

## 2020-09-26 PROCEDURE — U0003 INFECTIOUS AGENT DETECTION BY NUCLEIC ACID (DNA OR RNA); SEVERE ACUTE RESPIRATORY SYNDROME CORONAVIRUS 2 (SARS-COV-2) (CORONAVIRUS DISEASE [COVID-19]), AMPLIFIED PROBE TECHNIQUE, MAKING USE OF HIGH THROUGHPUT TECHNOLOGIES AS DESCRIBED BY CMS-2020-01-R: HCPCS | Performed by: EMERGENCY MEDICINE

## 2020-09-26 PROCEDURE — G1004 CDSM NDSC: HCPCS

## 2020-09-26 PROCEDURE — 81001 URINALYSIS AUTO W/SCOPE: CPT | Performed by: EMERGENCY MEDICINE

## 2020-09-26 PROCEDURE — 99222 1ST HOSP IP/OBS MODERATE 55: CPT | Performed by: INTERNAL MEDICINE

## 2020-09-26 PROCEDURE — 99285 EMERGENCY DEPT VISIT HI MDM: CPT | Performed by: EMERGENCY MEDICINE

## 2020-09-26 PROCEDURE — 36415 COLL VENOUS BLD VENIPUNCTURE: CPT | Performed by: EMERGENCY MEDICINE

## 2020-09-26 PROCEDURE — 80048 BASIC METABOLIC PNL TOTAL CA: CPT | Performed by: STUDENT IN AN ORGANIZED HEALTH CARE EDUCATION/TRAINING PROGRAM

## 2020-09-26 PROCEDURE — 71045 X-RAY EXAM CHEST 1 VIEW: CPT

## 2020-09-26 RX ORDER — HEPARIN SODIUM 1000 [USP'U]/ML
5600 INJECTION, SOLUTION INTRAVENOUS; SUBCUTANEOUS ONCE
Status: COMPLETED | OUTPATIENT
Start: 2020-09-26 | End: 2020-09-26

## 2020-09-26 RX ORDER — HEPARIN SODIUM 5000 [USP'U]/ML
5000 INJECTION, SOLUTION INTRAVENOUS; SUBCUTANEOUS EVERY 8 HOURS SCHEDULED
Status: DISCONTINUED | OUTPATIENT
Start: 2020-09-26 | End: 2020-10-04 | Stop reason: HOSPADM

## 2020-09-26 RX ORDER — HEPARIN SODIUM 1000 [USP'U]/ML
2800 INJECTION, SOLUTION INTRAVENOUS; SUBCUTANEOUS
Status: DISCONTINUED | OUTPATIENT
Start: 2020-09-26 | End: 2020-09-26

## 2020-09-26 RX ORDER — NITROGLYCERIN 0.4 MG/1
0.4 TABLET SUBLINGUAL
Qty: 5 TABLET | Refills: 0 | Status: SHIPPED | OUTPATIENT
Start: 2020-09-26 | End: 2020-09-26

## 2020-09-26 RX ORDER — NITROGLYCERIN 0.4 MG/1
0.4 TABLET SUBLINGUAL
Qty: 5 TABLET | Refills: 0 | Status: SHIPPED | OUTPATIENT
Start: 2020-09-26

## 2020-09-26 RX ORDER — PANTOPRAZOLE SODIUM 40 MG/1
40 TABLET, DELAYED RELEASE ORAL
Status: DISCONTINUED | OUTPATIENT
Start: 2020-09-27 | End: 2020-09-30

## 2020-09-26 RX ORDER — POTASSIUM CHLORIDE 20 MEQ/1
40 TABLET, EXTENDED RELEASE ORAL ONCE
Status: COMPLETED | OUTPATIENT
Start: 2020-09-26 | End: 2020-09-26

## 2020-09-26 RX ORDER — SODIUM CHLORIDE, SODIUM GLUCONATE, SODIUM ACETATE, POTASSIUM CHLORIDE, MAGNESIUM CHLORIDE, SODIUM PHOSPHATE, DIBASIC, AND POTASSIUM PHOSPHATE .53; .5; .37; .037; .03; .012; .00082 G/100ML; G/100ML; G/100ML; G/100ML; G/100ML; G/100ML; G/100ML
75 INJECTION, SOLUTION INTRAVENOUS CONTINUOUS
Status: DISCONTINUED | OUTPATIENT
Start: 2020-09-26 | End: 2020-09-28

## 2020-09-26 RX ORDER — AMLODIPINE BESYLATE 5 MG/1
5 TABLET ORAL DAILY
Qty: 30 TABLET | Refills: 0 | Status: CANCELLED | OUTPATIENT
Start: 2020-09-26

## 2020-09-26 RX ORDER — AMLODIPINE BESYLATE 5 MG/1
5 TABLET ORAL DAILY
Qty: 90 TABLET | Refills: 0 | Status: SHIPPED | OUTPATIENT
Start: 2020-09-26 | End: 2020-09-26 | Stop reason: SDUPTHER

## 2020-09-26 RX ORDER — METOPROLOL SUCCINATE 50 MG/1
50 TABLET, EXTENDED RELEASE ORAL DAILY
Status: DISCONTINUED | OUTPATIENT
Start: 2020-09-27 | End: 2020-10-04 | Stop reason: HOSPADM

## 2020-09-26 RX ORDER — HEPARIN SODIUM 10000 [USP'U]/100ML
3-30 INJECTION, SOLUTION INTRAVENOUS
Status: DISCONTINUED | OUTPATIENT
Start: 2020-09-26 | End: 2020-09-26

## 2020-09-26 RX ORDER — HEPARIN SODIUM 1000 [USP'U]/ML
5600 INJECTION, SOLUTION INTRAVENOUS; SUBCUTANEOUS
Status: DISCONTINUED | OUTPATIENT
Start: 2020-09-26 | End: 2020-09-26

## 2020-09-26 RX ORDER — ASPIRIN 81 MG/1
243 TABLET, CHEWABLE ORAL ONCE
Status: COMPLETED | OUTPATIENT
Start: 2020-09-26 | End: 2020-09-26

## 2020-09-26 RX ORDER — AMLODIPINE BESYLATE 5 MG/1
5 TABLET ORAL DAILY
Qty: 90 TABLET | Refills: 0 | Status: SHIPPED | OUTPATIENT
Start: 2020-09-26 | End: 2021-01-29 | Stop reason: SDUPTHER

## 2020-09-26 RX ORDER — ACETAMINOPHEN 325 MG/1
650 TABLET ORAL EVERY 6 HOURS PRN
Status: DISCONTINUED | OUTPATIENT
Start: 2020-09-26 | End: 2020-10-04 | Stop reason: HOSPADM

## 2020-09-26 RX ORDER — ASPIRIN 81 MG/1
81 TABLET, CHEWABLE ORAL ONCE
Status: COMPLETED | OUTPATIENT
Start: 2020-09-26 | End: 2020-09-26

## 2020-09-26 RX ORDER — ATORVASTATIN CALCIUM 40 MG/1
40 TABLET, FILM COATED ORAL
Status: DISCONTINUED | OUTPATIENT
Start: 2020-09-26 | End: 2020-10-04 | Stop reason: HOSPADM

## 2020-09-26 RX ADMIN — HEPARIN SODIUM 5000 UNITS: 5000 INJECTION INTRAVENOUS; SUBCUTANEOUS at 05:22

## 2020-09-26 RX ADMIN — HEPARIN SODIUM 18 UNITS/KG/HR: 10000 INJECTION, SOLUTION INTRAVENOUS at 17:48

## 2020-09-26 RX ADMIN — PIPERACILLIN SODIUM AND TAZOBACTAM SODIUM 4.5 G: 36; 4.5 INJECTION, POWDER, FOR SOLUTION INTRAVENOUS at 21:20

## 2020-09-26 RX ADMIN — AMLODIPINE BESYLATE 5 MG: 5 TABLET ORAL at 09:04

## 2020-09-26 RX ADMIN — METOPROLOL SUCCINATE 50 MG: 50 TABLET, EXTENDED RELEASE ORAL at 09:04

## 2020-09-26 RX ADMIN — ASPIRIN 81 MG CHEWABLE TABLET 81 MG: 81 TABLET CHEWABLE at 09:04

## 2020-09-26 RX ADMIN — POTASSIUM CHLORIDE 40 MEQ: 1500 TABLET, EXTENDED RELEASE ORAL at 09:04

## 2020-09-26 RX ADMIN — IOHEXOL 85 ML: 350 INJECTION, SOLUTION INTRAVENOUS at 18:29

## 2020-09-26 RX ADMIN — ASPIRIN 81 MG CHEWABLE TABLET 81 MG: 81 TABLET CHEWABLE at 21:27

## 2020-09-26 RX ADMIN — PANTOPRAZOLE SODIUM 20 MG: 20 TABLET, DELAYED RELEASE ORAL at 05:22

## 2020-09-26 RX ADMIN — HEPARIN SODIUM 5600 UNITS: 1000 INJECTION INTRAVENOUS; SUBCUTANEOUS at 17:47

## 2020-09-26 RX ADMIN — HEPARIN SODIUM 5000 UNITS: 5000 INJECTION INTRAVENOUS; SUBCUTANEOUS at 21:28

## 2020-09-26 RX ADMIN — SODIUM CHLORIDE, SODIUM GLUCONATE, SODIUM ACETATE, POTASSIUM CHLORIDE, MAGNESIUM CHLORIDE, SODIUM PHOSPHATE, DIBASIC, AND POTASSIUM PHOSPHATE 75 ML/HR: .53; .5; .37; .037; .03; .012; .00082 INJECTION, SOLUTION INTRAVENOUS at 21:25

## 2020-09-26 RX ADMIN — ATORVASTATIN CALCIUM 40 MG: 40 TABLET, FILM COATED ORAL at 21:28

## 2020-09-26 RX ADMIN — ASPIRIN 81 MG CHEWABLE TABLET 243 MG: 81 TABLET CHEWABLE at 15:55

## 2020-09-27 LAB
ALBUMIN SERPL BCP-MCNC: 2.5 G/DL (ref 3.5–5)
ALP SERPL-CCNC: 77 U/L (ref 46–116)
ALT SERPL W P-5'-P-CCNC: 22 U/L (ref 12–78)
ANION GAP SERPL CALCULATED.3IONS-SCNC: 10 MMOL/L (ref 4–13)
AST SERPL W P-5'-P-CCNC: 25 U/L (ref 5–45)
ATRIAL RATE: 94 BPM
BASOPHILS # BLD AUTO: 0.03 THOUSANDS/ΜL (ref 0–0.1)
BASOPHILS NFR BLD AUTO: 0 % (ref 0–1)
BILIRUB DIRECT SERPL-MCNC: 0.63 MG/DL (ref 0–0.2)
BILIRUB SERPL-MCNC: 1.65 MG/DL (ref 0.2–1)
BUN SERPL-MCNC: 22 MG/DL (ref 5–25)
CALCIUM SERPL-MCNC: 8.3 MG/DL (ref 8.3–10.1)
CHLORIDE SERPL-SCNC: 104 MMOL/L (ref 100–108)
CO2 SERPL-SCNC: 20 MMOL/L (ref 21–32)
CREAT SERPL-MCNC: 1.14 MG/DL (ref 0.6–1.3)
EOSINOPHIL # BLD AUTO: 0 THOUSAND/ΜL (ref 0–0.61)
EOSINOPHIL NFR BLD AUTO: 0 % (ref 0–6)
ERYTHROCYTE [DISTWIDTH] IN BLOOD BY AUTOMATED COUNT: 13.8 % (ref 11.6–15.1)
GFR SERPL CREATININE-BSD FRML MDRD: 62 ML/MIN/1.73SQ M
GLUCOSE SERPL-MCNC: 107 MG/DL (ref 65–140)
HCT VFR BLD AUTO: 37.1 % (ref 36.5–49.3)
HGB BLD-MCNC: 12.2 G/DL (ref 12–17)
IMM GRANULOCYTES # BLD AUTO: 0.07 THOUSAND/UL (ref 0–0.2)
IMM GRANULOCYTES NFR BLD AUTO: 1 % (ref 0–2)
LACTATE SERPL-SCNC: 1.5 MMOL/L (ref 0.5–2)
LYMPHOCYTES # BLD AUTO: 0.39 THOUSANDS/ΜL (ref 0.6–4.47)
LYMPHOCYTES NFR BLD AUTO: 3 % (ref 14–44)
MAGNESIUM SERPL-MCNC: 1.8 MG/DL (ref 1.6–2.6)
MCH RBC QN AUTO: 30.5 PG (ref 26.8–34.3)
MCHC RBC AUTO-ENTMCNC: 32.9 G/DL (ref 31.4–37.4)
MCV RBC AUTO: 93 FL (ref 82–98)
MONOCYTES # BLD AUTO: 0.66 THOUSAND/ΜL (ref 0.17–1.22)
MONOCYTES NFR BLD AUTO: 6 % (ref 4–12)
NEUTROPHILS # BLD AUTO: 10.44 THOUSANDS/ΜL (ref 1.85–7.62)
NEUTS SEG NFR BLD AUTO: 90 % (ref 43–75)
NRBC BLD AUTO-RTO: 0 /100 WBCS
P AXIS: 51 DEGREES
PHOSPHATE SERPL-MCNC: 1.4 MG/DL (ref 2.3–4.1)
PLATELET # BLD AUTO: 156 THOUSANDS/UL (ref 149–390)
PMV BLD AUTO: 10 FL (ref 8.9–12.7)
POTASSIUM SERPL-SCNC: 3.1 MMOL/L (ref 3.5–5.3)
PR INTERVAL: 166 MS
PROCALCITONIN SERPL-MCNC: 4.1 NG/ML
PROT SERPL-MCNC: 6.7 G/DL (ref 6.4–8.2)
QRS AXIS: 41 DEGREES
QRSD INTERVAL: 80 MS
QT INTERVAL: 326 MS
QTC INTERVAL: 407 MS
RBC # BLD AUTO: 4 MILLION/UL (ref 3.88–5.62)
SODIUM SERPL-SCNC: 134 MMOL/L (ref 136–145)
T WAVE AXIS: 49 DEGREES
VENTRICULAR RATE: 94 BPM
WBC # BLD AUTO: 11.59 THOUSAND/UL (ref 4.31–10.16)

## 2020-09-27 PROCEDURE — 80048 BASIC METABOLIC PNL TOTAL CA: CPT | Performed by: STUDENT IN AN ORGANIZED HEALTH CARE EDUCATION/TRAINING PROGRAM

## 2020-09-27 PROCEDURE — 99233 SBSQ HOSP IP/OBS HIGH 50: CPT | Performed by: INTERNAL MEDICINE

## 2020-09-27 PROCEDURE — 83735 ASSAY OF MAGNESIUM: CPT | Performed by: STUDENT IN AN ORGANIZED HEALTH CARE EDUCATION/TRAINING PROGRAM

## 2020-09-27 PROCEDURE — 84100 ASSAY OF PHOSPHORUS: CPT | Performed by: STUDENT IN AN ORGANIZED HEALTH CARE EDUCATION/TRAINING PROGRAM

## 2020-09-27 PROCEDURE — 84145 PROCALCITONIN (PCT): CPT | Performed by: STUDENT IN AN ORGANIZED HEALTH CARE EDUCATION/TRAINING PROGRAM

## 2020-09-27 PROCEDURE — 99222 1ST HOSP IP/OBS MODERATE 55: CPT | Performed by: SURGERY

## 2020-09-27 PROCEDURE — 93010 ELECTROCARDIOGRAM REPORT: CPT | Performed by: INTERNAL MEDICINE

## 2020-09-27 PROCEDURE — 85025 COMPLETE CBC W/AUTO DIFF WBC: CPT | Performed by: STUDENT IN AN ORGANIZED HEALTH CARE EDUCATION/TRAINING PROGRAM

## 2020-09-27 PROCEDURE — 80076 HEPATIC FUNCTION PANEL: CPT | Performed by: STUDENT IN AN ORGANIZED HEALTH CARE EDUCATION/TRAINING PROGRAM

## 2020-09-27 RX ORDER — IPRATROPIUM BROMIDE AND ALBUTEROL SULFATE 2.5; .5 MG/3ML; MG/3ML
3 SOLUTION RESPIRATORY (INHALATION) ONCE
Status: DISCONTINUED | OUTPATIENT
Start: 2020-09-27 | End: 2020-09-27

## 2020-09-27 RX ORDER — SODIUM CHLORIDE, SODIUM LACTATE, POTASSIUM CHLORIDE, CALCIUM CHLORIDE 600; 310; 30; 20 MG/100ML; MG/100ML; MG/100ML; MG/100ML
75 INJECTION, SOLUTION INTRAVENOUS CONTINUOUS
Status: DISCONTINUED | OUTPATIENT
Start: 2020-09-27 | End: 2020-09-28

## 2020-09-27 RX ORDER — DOCOSANOL 100 MG/G
CREAM TOPICAL 3 TIMES DAILY PRN
Status: DISCONTINUED | OUTPATIENT
Start: 2020-09-27 | End: 2020-09-27

## 2020-09-27 RX ORDER — POTASSIUM CHLORIDE 20 MEQ/1
40 TABLET, EXTENDED RELEASE ORAL DAILY
Status: COMPLETED | OUTPATIENT
Start: 2020-09-27 | End: 2020-09-27

## 2020-09-27 RX ADMIN — METOPROLOL SUCCINATE 50 MG: 50 TABLET, EXTENDED RELEASE ORAL at 08:52

## 2020-09-27 RX ADMIN — POTASSIUM CHLORIDE 40 MEQ: 1500 TABLET, EXTENDED RELEASE ORAL at 08:52

## 2020-09-27 RX ADMIN — SODIUM CHLORIDE, SODIUM GLUCONATE, SODIUM ACETATE, POTASSIUM CHLORIDE, MAGNESIUM CHLORIDE, SODIUM PHOSPHATE, DIBASIC, AND POTASSIUM PHOSPHATE 75 ML/HR: .53; .5; .37; .037; .03; .012; .00082 INJECTION, SOLUTION INTRAVENOUS at 13:12

## 2020-09-27 RX ADMIN — PIPERACILLIN SODIUM AND TAZOBACTAM SODIUM 4.5 G: 36; 4.5 INJECTION, POWDER, FOR SOLUTION INTRAVENOUS at 19:57

## 2020-09-27 RX ADMIN — PANTOPRAZOLE SODIUM 40 MG: 40 TABLET, DELAYED RELEASE ORAL at 05:50

## 2020-09-27 RX ADMIN — PIPERACILLIN SODIUM AND TAZOBACTAM SODIUM 4.5 G: 36; 4.5 INJECTION, POWDER, FOR SOLUTION INTRAVENOUS at 02:04

## 2020-09-27 RX ADMIN — HEPARIN SODIUM 5000 UNITS: 5000 INJECTION INTRAVENOUS; SUBCUTANEOUS at 13:15

## 2020-09-27 RX ADMIN — PIPERACILLIN SODIUM AND TAZOBACTAM SODIUM 4.5 G: 36; 4.5 INJECTION, POWDER, FOR SOLUTION INTRAVENOUS at 08:49

## 2020-09-27 RX ADMIN — ACETAMINOPHEN 650 MG: 325 TABLET, FILM COATED ORAL at 14:50

## 2020-09-27 RX ADMIN — POTASSIUM PHOSPHATE, MONOBASIC POTASSIUM PHOSPHATE, DIBASIC 21 MMOL: 224; 236 INJECTION, SOLUTION, CONCENTRATE INTRAVENOUS at 13:06

## 2020-09-27 RX ADMIN — ACETAMINOPHEN 650 MG: 325 TABLET, FILM COATED ORAL at 08:52

## 2020-09-27 RX ADMIN — PIPERACILLIN SODIUM AND TAZOBACTAM SODIUM 4.5 G: 36; 4.5 INJECTION, POWDER, FOR SOLUTION INTRAVENOUS at 14:54

## 2020-09-27 RX ADMIN — ATORVASTATIN CALCIUM 40 MG: 40 TABLET, FILM COATED ORAL at 16:34

## 2020-09-27 RX ADMIN — HEPARIN SODIUM 5000 UNITS: 5000 INJECTION INTRAVENOUS; SUBCUTANEOUS at 05:49

## 2020-09-27 RX ADMIN — HEPARIN SODIUM 5000 UNITS: 5000 INJECTION INTRAVENOUS; SUBCUTANEOUS at 21:22

## 2020-09-28 ENCOUNTER — ANESTHESIA EVENT (INPATIENT)
Dept: PERIOP | Facility: HOSPITAL | Age: 75
DRG: 674 | End: 2020-09-28
Payer: MEDICARE

## 2020-09-28 ENCOUNTER — TRANSITIONAL CARE MANAGEMENT (OUTPATIENT)
Dept: INTERNAL MEDICINE CLINIC | Facility: CLINIC | Age: 75
End: 2020-09-28

## 2020-09-28 ENCOUNTER — APPOINTMENT (INPATIENT)
Dept: RADIOLOGY | Facility: HOSPITAL | Age: 75
DRG: 674 | End: 2020-09-28
Payer: MEDICARE

## 2020-09-28 LAB
ABO GROUP BLD: NORMAL
ALBUMIN SERPL BCP-MCNC: 2.3 G/DL (ref 3.5–5)
ALP SERPL-CCNC: 70 U/L (ref 46–116)
ALT SERPL W P-5'-P-CCNC: 33 U/L (ref 12–78)
AMORPH URATE CRY URNS QL MICRO: ABNORMAL /HPF
ANION GAP SERPL CALCULATED.3IONS-SCNC: 8 MMOL/L (ref 4–13)
APTT PPP: 48 SECONDS (ref 23–37)
AST SERPL W P-5'-P-CCNC: 48 U/L (ref 5–45)
BACTERIA UR QL AUTO: ABNORMAL /HPF
BASOPHILS # BLD AUTO: 0.02 THOUSANDS/ΜL (ref 0–0.1)
BASOPHILS NFR BLD AUTO: 0 % (ref 0–1)
BILIRUB SERPL-MCNC: 1.3 MG/DL (ref 0.2–1)
BILIRUB UR QL STRIP: NEGATIVE
BLD GP AB SCN SERPL QL: NEGATIVE
BUN SERPL-MCNC: 33 MG/DL (ref 5–25)
CALCIUM ALBUM COR SERPL-MCNC: 9.8 MG/DL (ref 8.3–10.1)
CALCIUM SERPL-MCNC: 8.4 MG/DL (ref 8.3–10.1)
CHLORIDE SERPL-SCNC: 111 MMOL/L (ref 100–108)
CLARITY UR: ABNORMAL
CO2 SERPL-SCNC: 21 MMOL/L (ref 21–32)
COLOR UR: YELLOW
CREAT SERPL-MCNC: 2.09 MG/DL (ref 0.6–1.3)
EOSINOPHIL # BLD AUTO: 0.01 THOUSAND/ΜL (ref 0–0.61)
EOSINOPHIL NFR BLD AUTO: 0 % (ref 0–6)
ERYTHROCYTE [DISTWIDTH] IN BLOOD BY AUTOMATED COUNT: 14.1 % (ref 11.6–15.1)
GFR SERPL CREATININE-BSD FRML MDRD: 30 ML/MIN/1.73SQ M
GLUCOSE SERPL-MCNC: 89 MG/DL (ref 65–140)
GLUCOSE UR STRIP-MCNC: NEGATIVE MG/DL
HCT VFR BLD AUTO: 34.7 % (ref 36.5–49.3)
HGB BLD-MCNC: 11.6 G/DL (ref 12–17)
HGB UR QL STRIP.AUTO: ABNORMAL
IMM GRANULOCYTES # BLD AUTO: 0.05 THOUSAND/UL (ref 0–0.2)
IMM GRANULOCYTES NFR BLD AUTO: 1 % (ref 0–2)
INR PPP: 1.33 (ref 0.84–1.19)
KETONES UR STRIP-MCNC: NEGATIVE MG/DL
LEUKOCYTE ESTERASE UR QL STRIP: NEGATIVE
LYMPHOCYTES # BLD AUTO: 0.57 THOUSANDS/ΜL (ref 0.6–4.47)
LYMPHOCYTES NFR BLD AUTO: 9 % (ref 14–44)
MAGNESIUM SERPL-MCNC: 2.4 MG/DL (ref 1.6–2.6)
MCH RBC QN AUTO: 31.4 PG (ref 26.8–34.3)
MCHC RBC AUTO-ENTMCNC: 33.4 G/DL (ref 31.4–37.4)
MCV RBC AUTO: 94 FL (ref 82–98)
MONOCYTES # BLD AUTO: 0.47 THOUSAND/ΜL (ref 0.17–1.22)
MONOCYTES NFR BLD AUTO: 7 % (ref 4–12)
NEUTROPHILS # BLD AUTO: 5.52 THOUSANDS/ΜL (ref 1.85–7.62)
NEUTS SEG NFR BLD AUTO: 83 % (ref 43–75)
NITRITE UR QL STRIP: NEGATIVE
NON-SQ EPI CELLS URNS QL MICRO: ABNORMAL /HPF
NRBC BLD AUTO-RTO: 0 /100 WBCS
PH UR STRIP.AUTO: 5.5 [PH]
PHOSPHATE SERPL-MCNC: 3.8 MG/DL (ref 2.3–4.1)
PLATELET # BLD AUTO: 137 THOUSANDS/UL (ref 149–390)
PMV BLD AUTO: 10 FL (ref 8.9–12.7)
POTASSIUM SERPL-SCNC: 4.5 MMOL/L (ref 3.5–5.3)
PROT SERPL-MCNC: 6.6 G/DL (ref 6.4–8.2)
PROT UR STRIP-MCNC: ABNORMAL MG/DL
PROTHROMBIN TIME: 16.5 SECONDS (ref 11.6–14.5)
RBC # BLD AUTO: 3.69 MILLION/UL (ref 3.88–5.62)
RBC #/AREA URNS AUTO: ABNORMAL /HPF
RH BLD: POSITIVE
SODIUM SERPL-SCNC: 140 MMOL/L (ref 136–145)
SP GR UR STRIP.AUTO: 1.02 (ref 1–1.03)
SPECIMEN EXPIRATION DATE: NORMAL
UROBILINOGEN UR QL STRIP.AUTO: 2 E.U./DL
WBC # BLD AUTO: 6.64 THOUSAND/UL (ref 4.31–10.16)
WBC #/AREA URNS AUTO: ABNORMAL /HPF

## 2020-09-28 PROCEDURE — 85730 THROMBOPLASTIN TIME PARTIAL: CPT | Performed by: ORTHOPAEDIC SURGERY

## 2020-09-28 PROCEDURE — G1004 CDSM NDSC: HCPCS

## 2020-09-28 PROCEDURE — 99233 SBSQ HOSP IP/OBS HIGH 50: CPT | Performed by: INTERNAL MEDICINE

## 2020-09-28 PROCEDURE — 86850 RBC ANTIBODY SCREEN: CPT | Performed by: ORTHOPAEDIC SURGERY

## 2020-09-28 PROCEDURE — 86900 BLOOD TYPING SEROLOGIC ABO: CPT | Performed by: ORTHOPAEDIC SURGERY

## 2020-09-28 PROCEDURE — 84100 ASSAY OF PHOSPHORUS: CPT | Performed by: STUDENT IN AN ORGANIZED HEALTH CARE EDUCATION/TRAINING PROGRAM

## 2020-09-28 PROCEDURE — 81001 URINALYSIS AUTO W/SCOPE: CPT | Performed by: INTERNAL MEDICINE

## 2020-09-28 PROCEDURE — 83735 ASSAY OF MAGNESIUM: CPT | Performed by: STUDENT IN AN ORGANIZED HEALTH CARE EDUCATION/TRAINING PROGRAM

## 2020-09-28 PROCEDURE — 85025 COMPLETE CBC W/AUTO DIFF WBC: CPT | Performed by: STUDENT IN AN ORGANIZED HEALTH CARE EDUCATION/TRAINING PROGRAM

## 2020-09-28 PROCEDURE — 99223 1ST HOSP IP/OBS HIGH 75: CPT | Performed by: INTERNAL MEDICINE

## 2020-09-28 PROCEDURE — 86901 BLOOD TYPING SEROLOGIC RH(D): CPT | Performed by: ORTHOPAEDIC SURGERY

## 2020-09-28 PROCEDURE — 80053 COMPREHEN METABOLIC PANEL: CPT | Performed by: STUDENT IN AN ORGANIZED HEALTH CARE EDUCATION/TRAINING PROGRAM

## 2020-09-28 PROCEDURE — 85610 PROTHROMBIN TIME: CPT | Performed by: ORTHOPAEDIC SURGERY

## 2020-09-28 PROCEDURE — 74181 MRI ABDOMEN W/O CONTRAST: CPT

## 2020-09-28 PROCEDURE — 99233 SBSQ HOSP IP/OBS HIGH 50: CPT | Performed by: SURGERY

## 2020-09-28 RX ORDER — SODIUM CHLORIDE, SODIUM LACTATE, POTASSIUM CHLORIDE, CALCIUM CHLORIDE 600; 310; 30; 20 MG/100ML; MG/100ML; MG/100ML; MG/100ML
50 INJECTION, SOLUTION INTRAVENOUS CONTINUOUS
Status: DISCONTINUED | OUTPATIENT
Start: 2020-09-29 | End: 2020-10-01

## 2020-09-28 RX ORDER — CEFAZOLIN SODIUM 1 G/50ML
1000 SOLUTION INTRAVENOUS
Status: COMPLETED | OUTPATIENT
Start: 2020-09-29 | End: 2020-09-29

## 2020-09-28 RX ORDER — FUROSEMIDE 10 MG/ML
40 INJECTION INTRAMUSCULAR; INTRAVENOUS ONCE
Status: COMPLETED | OUTPATIENT
Start: 2020-09-28 | End: 2020-09-28

## 2020-09-28 RX ADMIN — CEFEPIME HYDROCHLORIDE 1000 MG: 1 INJECTION, POWDER, FOR SOLUTION INTRAMUSCULAR; INTRAVENOUS at 11:39

## 2020-09-28 RX ADMIN — CEFEPIME HYDROCHLORIDE 1000 MG: 1 INJECTION, POWDER, FOR SOLUTION INTRAMUSCULAR; INTRAVENOUS at 21:06

## 2020-09-28 RX ADMIN — HEPARIN SODIUM 5000 UNITS: 5000 INJECTION INTRAVENOUS; SUBCUTANEOUS at 05:32

## 2020-09-28 RX ADMIN — METOPROLOL SUCCINATE 50 MG: 50 TABLET, EXTENDED RELEASE ORAL at 09:03

## 2020-09-28 RX ADMIN — SODIUM CHLORIDE, SODIUM LACTATE, POTASSIUM CHLORIDE, AND CALCIUM CHLORIDE 75 ML/HR: .6; .31; .03; .02 INJECTION, SOLUTION INTRAVENOUS at 12:06

## 2020-09-28 RX ADMIN — METRONIDAZOLE 500 MG: 500 INJECTION, SOLUTION INTRAVENOUS at 12:07

## 2020-09-28 RX ADMIN — PIPERACILLIN SODIUM AND TAZOBACTAM SODIUM 4.5 G: 36; 4.5 INJECTION, POWDER, FOR SOLUTION INTRAVENOUS at 09:02

## 2020-09-28 RX ADMIN — HEPARIN SODIUM 5000 UNITS: 5000 INJECTION INTRAVENOUS; SUBCUTANEOUS at 15:55

## 2020-09-28 RX ADMIN — PIPERACILLIN SODIUM AND TAZOBACTAM SODIUM 4.5 G: 36; 4.5 INJECTION, POWDER, FOR SOLUTION INTRAVENOUS at 02:29

## 2020-09-28 RX ADMIN — ATORVASTATIN CALCIUM 40 MG: 40 TABLET, FILM COATED ORAL at 15:53

## 2020-09-28 RX ADMIN — ACETAMINOPHEN 650 MG: 325 TABLET, FILM COATED ORAL at 00:45

## 2020-09-28 RX ADMIN — HEPARIN SODIUM 5000 UNITS: 5000 INJECTION INTRAVENOUS; SUBCUTANEOUS at 21:06

## 2020-09-28 RX ADMIN — METRONIDAZOLE 500 MG: 500 INJECTION, SOLUTION INTRAVENOUS at 18:11

## 2020-09-28 RX ADMIN — PANTOPRAZOLE SODIUM 40 MG: 40 TABLET, DELAYED RELEASE ORAL at 05:32

## 2020-09-28 RX ADMIN — FUROSEMIDE 40 MG: 10 INJECTION, SOLUTION INTRAMUSCULAR; INTRAVENOUS at 15:55

## 2020-09-29 ENCOUNTER — APPOINTMENT (INPATIENT)
Dept: RADIOLOGY | Facility: HOSPITAL | Age: 75
DRG: 674 | End: 2020-09-29
Payer: MEDICARE

## 2020-09-29 ENCOUNTER — ANESTHESIA (INPATIENT)
Dept: PERIOP | Facility: HOSPITAL | Age: 75
DRG: 674 | End: 2020-09-29
Payer: MEDICARE

## 2020-09-29 VITALS — HEART RATE: 110 BPM

## 2020-09-29 LAB
ALBUMIN SERPL BCP-MCNC: 2.3 G/DL (ref 3.5–5)
ALP SERPL-CCNC: 72 U/L (ref 46–116)
ALT SERPL W P-5'-P-CCNC: 32 U/L (ref 12–78)
ANION GAP SERPL CALCULATED.3IONS-SCNC: 10 MMOL/L (ref 4–13)
ANION GAP SERPL CALCULATED.3IONS-SCNC: 8 MMOL/L (ref 4–13)
AST SERPL W P-5'-P-CCNC: 41 U/L (ref 5–45)
BASOPHILS # BLD AUTO: 0.02 THOUSANDS/ΜL (ref 0–0.1)
BASOPHILS # BLD MANUAL: 0 THOUSAND/UL (ref 0–0.1)
BASOPHILS NFR BLD AUTO: 0 % (ref 0–1)
BASOPHILS NFR MAR MANUAL: 0 % (ref 0–1)
BILIRUB SERPL-MCNC: 0.7 MG/DL (ref 0.2–1)
BUN SERPL-MCNC: 37 MG/DL (ref 5–25)
BUN SERPL-MCNC: 39 MG/DL (ref 5–25)
CALCIUM ALBUM COR SERPL-MCNC: 9.7 MG/DL (ref 8.3–10.1)
CALCIUM SERPL-MCNC: 8.3 MG/DL (ref 8.3–10.1)
CALCIUM SERPL-MCNC: 8.6 MG/DL (ref 8.3–10.1)
CHLORIDE SERPL-SCNC: 110 MMOL/L (ref 100–108)
CHLORIDE SERPL-SCNC: 111 MMOL/L (ref 100–108)
CO2 SERPL-SCNC: 20 MMOL/L (ref 21–32)
CO2 SERPL-SCNC: 22 MMOL/L (ref 21–32)
CREAT SERPL-MCNC: 1.62 MG/DL (ref 0.6–1.3)
CREAT SERPL-MCNC: 1.78 MG/DL (ref 0.6–1.3)
EOSINOPHIL # BLD AUTO: 0.09 THOUSAND/ΜL (ref 0–0.61)
EOSINOPHIL # BLD MANUAL: 0 THOUSAND/UL (ref 0–0.4)
EOSINOPHIL NFR BLD AUTO: 2 % (ref 0–6)
EOSINOPHIL NFR BLD MANUAL: 0 % (ref 0–6)
ERYTHROCYTE [DISTWIDTH] IN BLOOD BY AUTOMATED COUNT: 14.1 % (ref 11.6–15.1)
ERYTHROCYTE [DISTWIDTH] IN BLOOD BY AUTOMATED COUNT: 14.4 % (ref 11.6–15.1)
GFR SERPL CREATININE-BSD FRML MDRD: 36 ML/MIN/1.73SQ M
GFR SERPL CREATININE-BSD FRML MDRD: 41 ML/MIN/1.73SQ M
GLUCOSE SERPL-MCNC: 119 MG/DL (ref 65–140)
GLUCOSE SERPL-MCNC: 82 MG/DL (ref 65–140)
HCT VFR BLD AUTO: 34.6 % (ref 36.5–49.3)
HCT VFR BLD AUTO: 34.6 % (ref 36.5–49.3)
HGB BLD-MCNC: 11.4 G/DL (ref 12–17)
HGB BLD-MCNC: 11.6 G/DL (ref 12–17)
IMM GRANULOCYTES # BLD AUTO: 0.02 THOUSAND/UL (ref 0–0.2)
IMM GRANULOCYTES NFR BLD AUTO: 0 % (ref 0–2)
LYMPHOCYTES # BLD AUTO: 0.1 THOUSAND/UL (ref 0.6–4.47)
LYMPHOCYTES # BLD AUTO: 0.56 THOUSANDS/ΜL (ref 0.6–4.47)
LYMPHOCYTES # BLD AUTO: 1 % (ref 14–44)
LYMPHOCYTES NFR BLD AUTO: 10 % (ref 14–44)
MCH RBC QN AUTO: 30.4 PG (ref 26.8–34.3)
MCH RBC QN AUTO: 31.3 PG (ref 26.8–34.3)
MCHC RBC AUTO-ENTMCNC: 32.9 G/DL (ref 31.4–37.4)
MCHC RBC AUTO-ENTMCNC: 33.5 G/DL (ref 31.4–37.4)
MCV RBC AUTO: 92 FL (ref 82–98)
MCV RBC AUTO: 93 FL (ref 82–98)
MONOCYTES # BLD AUTO: 0.29 THOUSAND/UL (ref 0–1.22)
MONOCYTES # BLD AUTO: 0.59 THOUSAND/ΜL (ref 0.17–1.22)
MONOCYTES NFR BLD AUTO: 11 % (ref 4–12)
MONOCYTES NFR BLD: 3 % (ref 4–12)
NEUTROPHILS # BLD AUTO: 4.35 THOUSANDS/ΜL (ref 1.85–7.62)
NEUTROPHILS # BLD MANUAL: 9.17 THOUSAND/UL (ref 1.85–7.62)
NEUTS BAND NFR BLD MANUAL: 6 % (ref 0–8)
NEUTS SEG NFR BLD AUTO: 77 % (ref 43–75)
NEUTS SEG NFR BLD AUTO: 88 % (ref 43–75)
NRBC BLD AUTO-RTO: 0 /100 WBCS
NRBC BLD AUTO-RTO: 0 /100 WBCS
PLATELET # BLD AUTO: 140 THOUSANDS/UL (ref 149–390)
PLATELET # BLD AUTO: 194 THOUSANDS/UL (ref 149–390)
PLATELET BLD QL SMEAR: ADEQUATE
PMV BLD AUTO: 9.7 FL (ref 8.9–12.7)
PMV BLD AUTO: 9.8 FL (ref 8.9–12.7)
POIKILOCYTOSIS BLD QL SMEAR: PRESENT
POTASSIUM SERPL-SCNC: 2.9 MMOL/L (ref 3.5–5.3)
POTASSIUM SERPL-SCNC: 3.9 MMOL/L (ref 3.5–5.3)
PROT SERPL-MCNC: 6.5 G/DL (ref 6.4–8.2)
RBC # BLD AUTO: 3.71 MILLION/UL (ref 3.88–5.62)
RBC # BLD AUTO: 3.75 MILLION/UL (ref 3.88–5.62)
RBC MORPH BLD: PRESENT
SODIUM SERPL-SCNC: 140 MMOL/L (ref 136–145)
SODIUM SERPL-SCNC: 141 MMOL/L (ref 136–145)
VARIANT LYMPHS # BLD AUTO: 2 %
WBC # BLD AUTO: 5.63 THOUSAND/UL (ref 4.31–10.16)
WBC # BLD AUTO: 9.76 THOUSAND/UL (ref 4.31–10.16)

## 2020-09-29 PROCEDURE — 47600 CHOLECYSTECTOMY: CPT | Performed by: SURGERY

## 2020-09-29 PROCEDURE — 99232 SBSQ HOSP IP/OBS MODERATE 35: CPT | Performed by: INTERNAL MEDICINE

## 2020-09-29 PROCEDURE — 87186 SC STD MICRODIL/AGAR DIL: CPT | Performed by: SURGERY

## 2020-09-29 PROCEDURE — 74018 RADEX ABDOMEN 1 VIEW: CPT

## 2020-09-29 PROCEDURE — 87176 TISSUE HOMOGENIZATION CULTR: CPT | Performed by: SURGERY

## 2020-09-29 PROCEDURE — 87075 CULTR BACTERIA EXCEPT BLOOD: CPT | Performed by: SURGERY

## 2020-09-29 PROCEDURE — 80053 COMPREHEN METABOLIC PANEL: CPT | Performed by: STUDENT IN AN ORGANIZED HEALTH CARE EDUCATION/TRAINING PROGRAM

## 2020-09-29 PROCEDURE — 88304 TISSUE EXAM BY PATHOLOGIST: CPT | Performed by: PATHOLOGY

## 2020-09-29 PROCEDURE — 80048 BASIC METABOLIC PNL TOTAL CA: CPT | Performed by: STUDENT IN AN ORGANIZED HEALTH CARE EDUCATION/TRAINING PROGRAM

## 2020-09-29 PROCEDURE — 87070 CULTURE OTHR SPECIMN AEROBIC: CPT | Performed by: SURGERY

## 2020-09-29 PROCEDURE — 87205 SMEAR GRAM STAIN: CPT | Performed by: SURGERY

## 2020-09-29 PROCEDURE — 85027 COMPLETE CBC AUTOMATED: CPT | Performed by: STUDENT IN AN ORGANIZED HEALTH CARE EDUCATION/TRAINING PROGRAM

## 2020-09-29 PROCEDURE — 87077 CULTURE AEROBIC IDENTIFY: CPT | Performed by: SURGERY

## 2020-09-29 PROCEDURE — 99233 SBSQ HOSP IP/OBS HIGH 50: CPT | Performed by: SURGERY

## 2020-09-29 PROCEDURE — 85025 COMPLETE CBC W/AUTO DIFF WBC: CPT | Performed by: STUDENT IN AN ORGANIZED HEALTH CARE EDUCATION/TRAINING PROGRAM

## 2020-09-29 PROCEDURE — 0FJ44ZZ INSPECTION OF GALLBLADDER, PERCUTANEOUS ENDOSCOPIC APPROACH: ICD-10-PCS | Performed by: SURGERY

## 2020-09-29 PROCEDURE — 85007 BL SMEAR W/DIFF WBC COUNT: CPT | Performed by: STUDENT IN AN ORGANIZED HEALTH CARE EDUCATION/TRAINING PROGRAM

## 2020-09-29 PROCEDURE — 99233 SBSQ HOSP IP/OBS HIGH 50: CPT | Performed by: INTERNAL MEDICINE

## 2020-09-29 PROCEDURE — 0FT40ZZ RESECTION OF GALLBLADDER, OPEN APPROACH: ICD-10-PCS | Performed by: SURGERY

## 2020-09-29 PROCEDURE — 71045 X-RAY EXAM CHEST 1 VIEW: CPT

## 2020-09-29 RX ORDER — MAGNESIUM HYDROXIDE 1200 MG/15ML
LIQUID ORAL AS NEEDED
Status: DISCONTINUED | OUTPATIENT
Start: 2020-09-29 | End: 2020-09-29 | Stop reason: HOSPADM

## 2020-09-29 RX ORDER — HYDROMORPHONE HCL/PF 1 MG/ML
0.2 SYRINGE (ML) INJECTION ONCE
Status: COMPLETED | OUTPATIENT
Start: 2020-09-29 | End: 2020-09-29

## 2020-09-29 RX ORDER — HYDROMORPHONE HCL 110MG/55ML
PATIENT CONTROLLED ANALGESIA SYRINGE INTRAVENOUS AS NEEDED
Status: DISCONTINUED | OUTPATIENT
Start: 2020-09-29 | End: 2020-09-29

## 2020-09-29 RX ORDER — CEFAZOLIN SODIUM 1 G/3ML
INJECTION, POWDER, FOR SOLUTION INTRAMUSCULAR; INTRAVENOUS AS NEEDED
Status: DISCONTINUED | OUTPATIENT
Start: 2020-09-29 | End: 2020-09-29

## 2020-09-29 RX ORDER — POTASSIUM CHLORIDE 20 MEQ/1
40 TABLET, EXTENDED RELEASE ORAL ONCE
Status: COMPLETED | OUTPATIENT
Start: 2020-09-29 | End: 2020-09-29

## 2020-09-29 RX ORDER — ONDANSETRON 2 MG/ML
INJECTION INTRAMUSCULAR; INTRAVENOUS AS NEEDED
Status: DISCONTINUED | OUTPATIENT
Start: 2020-09-29 | End: 2020-09-29

## 2020-09-29 RX ORDER — HYDROMORPHONE HCL/PF 1 MG/ML
0.2 SYRINGE (ML) INJECTION
Status: DISCONTINUED | OUTPATIENT
Start: 2020-09-29 | End: 2020-10-04 | Stop reason: HOSPADM

## 2020-09-29 RX ORDER — BUPIVACAINE HYDROCHLORIDE 5 MG/ML
INJECTION, SOLUTION PERINEURAL AS NEEDED
Status: DISCONTINUED | OUTPATIENT
Start: 2020-09-29 | End: 2020-09-29 | Stop reason: HOSPADM

## 2020-09-29 RX ORDER — ROCURONIUM BROMIDE 10 MG/ML
INJECTION, SOLUTION INTRAVENOUS AS NEEDED
Status: DISCONTINUED | OUTPATIENT
Start: 2020-09-29 | End: 2020-09-29

## 2020-09-29 RX ORDER — FENTANYL CITRATE/PF 50 MCG/ML
25 SYRINGE (ML) INJECTION
Status: DISCONTINUED | OUTPATIENT
Start: 2020-09-29 | End: 2020-09-29 | Stop reason: HOSPADM

## 2020-09-29 RX ORDER — ONDANSETRON 2 MG/ML
4 INJECTION INTRAMUSCULAR; INTRAVENOUS ONCE AS NEEDED
Status: DISCONTINUED | OUTPATIENT
Start: 2020-09-29 | End: 2020-09-29 | Stop reason: HOSPADM

## 2020-09-29 RX ORDER — LIDOCAINE HYDROCHLORIDE 10 MG/ML
INJECTION, SOLUTION EPIDURAL; INFILTRATION; INTRACAUDAL; PERINEURAL AS NEEDED
Status: DISCONTINUED | OUTPATIENT
Start: 2020-09-29 | End: 2020-09-29

## 2020-09-29 RX ORDER — GLYCOPYRROLATE 0.2 MG/ML
INJECTION INTRAMUSCULAR; INTRAVENOUS AS NEEDED
Status: DISCONTINUED | OUTPATIENT
Start: 2020-09-29 | End: 2020-09-29

## 2020-09-29 RX ORDER — NEOSTIGMINE METHYLSULFATE 1 MG/ML
INJECTION INTRAVENOUS AS NEEDED
Status: DISCONTINUED | OUTPATIENT
Start: 2020-09-29 | End: 2020-09-29

## 2020-09-29 RX ORDER — POTASSIUM CHLORIDE 14.9 MG/ML
20 INJECTION INTRAVENOUS ONCE
Status: DISCONTINUED | OUTPATIENT
Start: 2020-09-29 | End: 2020-10-01

## 2020-09-29 RX ORDER — FENTANYL CITRATE 50 UG/ML
INJECTION, SOLUTION INTRAMUSCULAR; INTRAVENOUS AS NEEDED
Status: DISCONTINUED | OUTPATIENT
Start: 2020-09-29 | End: 2020-09-29

## 2020-09-29 RX ORDER — PROPOFOL 10 MG/ML
INJECTION, EMULSION INTRAVENOUS AS NEEDED
Status: DISCONTINUED | OUTPATIENT
Start: 2020-09-29 | End: 2020-09-29

## 2020-09-29 RX ORDER — FUROSEMIDE 10 MG/ML
40 INJECTION INTRAMUSCULAR; INTRAVENOUS ONCE
Status: COMPLETED | OUTPATIENT
Start: 2020-09-30 | End: 2020-09-30

## 2020-09-29 RX ORDER — DEXAMETHASONE SODIUM PHOSPHATE 10 MG/ML
INJECTION, SOLUTION INTRAMUSCULAR; INTRAVENOUS AS NEEDED
Status: DISCONTINUED | OUTPATIENT
Start: 2020-09-29 | End: 2020-09-29

## 2020-09-29 RX ORDER — HYDROMORPHONE HCL/PF 1 MG/ML
0.5 SYRINGE (ML) INJECTION
Status: DISCONTINUED | OUTPATIENT
Start: 2020-09-29 | End: 2020-10-04 | Stop reason: HOSPADM

## 2020-09-29 RX ORDER — HYDROMORPHONE HCL/PF 1 MG/ML
0.2 SYRINGE (ML) INJECTION
Status: DISCONTINUED | OUTPATIENT
Start: 2020-09-29 | End: 2020-09-29 | Stop reason: HOSPADM

## 2020-09-29 RX ADMIN — CEFEPIME HYDROCHLORIDE 1000 MG: 1 INJECTION, POWDER, FOR SOLUTION INTRAMUSCULAR; INTRAVENOUS at 10:36

## 2020-09-29 RX ADMIN — HYDROMORPHONE HYDROCHLORIDE 0.2 MG: 1 INJECTION, SOLUTION INTRAMUSCULAR; INTRAVENOUS; SUBCUTANEOUS at 18:48

## 2020-09-29 RX ADMIN — PROPOFOL 150 MG: 10 INJECTION, EMULSION INTRAVENOUS at 11:56

## 2020-09-29 RX ADMIN — SODIUM CHLORIDE, SODIUM LACTATE, POTASSIUM CHLORIDE, AND CALCIUM CHLORIDE 125 ML/HR: .6; .31; .03; .02 INJECTION, SOLUTION INTRAVENOUS at 00:31

## 2020-09-29 RX ADMIN — FENTANYL CITRATE 50 MCG: 50 INJECTION, SOLUTION INTRAMUSCULAR; INTRAVENOUS at 13:23

## 2020-09-29 RX ADMIN — PHENYLEPHRINE HYDROCHLORIDE 100 MCG: 10 INJECTION INTRAVENOUS at 15:04

## 2020-09-29 RX ADMIN — PHENYLEPHRINE HYDROCHLORIDE 100 MCG: 10 INJECTION INTRAVENOUS at 12:04

## 2020-09-29 RX ADMIN — HEPARIN SODIUM 5000 UNITS: 5000 INJECTION INTRAVENOUS; SUBCUTANEOUS at 22:34

## 2020-09-29 RX ADMIN — PHENYLEPHRINE HYDROCHLORIDE 100 MCG: 10 INJECTION INTRAVENOUS at 11:56

## 2020-09-29 RX ADMIN — POTASSIUM CHLORIDE 40 MEQ: 1500 TABLET, EXTENDED RELEASE ORAL at 09:01

## 2020-09-29 RX ADMIN — HEPARIN SODIUM 5000 UNITS: 5000 INJECTION INTRAVENOUS; SUBCUTANEOUS at 05:24

## 2020-09-29 RX ADMIN — CEFAZOLIN 1000 MG: 1 INJECTION, POWDER, FOR SOLUTION INTRAVENOUS at 12:14

## 2020-09-29 RX ADMIN — DEXAMETHASONE SODIUM PHOSPHATE 10 MG: 10 INJECTION, SOLUTION INTRAMUSCULAR; INTRAVENOUS at 15:38

## 2020-09-29 RX ADMIN — METRONIDAZOLE 500 MG: 500 INJECTION, SOLUTION INTRAVENOUS at 19:08

## 2020-09-29 RX ADMIN — PANTOPRAZOLE SODIUM 40 MG: 40 TABLET, DELAYED RELEASE ORAL at 05:24

## 2020-09-29 RX ADMIN — ROCURONIUM BROMIDE 20 MG: 10 INJECTION, SOLUTION INTRAVENOUS at 12:15

## 2020-09-29 RX ADMIN — ONDANSETRON 4 MG: 2 INJECTION INTRAMUSCULAR; INTRAVENOUS at 15:38

## 2020-09-29 RX ADMIN — NEOSTIGMINE METHYLSULFATE 4 MG: 1 INJECTION, SOLUTION INTRAVENOUS at 15:39

## 2020-09-29 RX ADMIN — LIDOCAINE HYDROCHLORIDE 50 MG: 10 INJECTION, SOLUTION EPIDURAL; INFILTRATION; INTRACAUDAL; PERINEURAL at 11:56

## 2020-09-29 RX ADMIN — METOPROLOL SUCCINATE 50 MG: 50 TABLET, EXTENDED RELEASE ORAL at 09:00

## 2020-09-29 RX ADMIN — GLYCOPYRROLATE 0.8 MG: 0.2 INJECTION, SOLUTION INTRAMUSCULAR; INTRAVENOUS at 15:39

## 2020-09-29 RX ADMIN — METRONIDAZOLE 500 MG: 500 INJECTION, SOLUTION INTRAVENOUS at 01:52

## 2020-09-29 RX ADMIN — SODIUM CHLORIDE, SODIUM LACTATE, POTASSIUM CHLORIDE, AND CALCIUM CHLORIDE: .6; .31; .03; .02 INJECTION, SOLUTION INTRAVENOUS at 15:05

## 2020-09-29 RX ADMIN — HYDROMORPHONE HYDROCHLORIDE 1 MG: 2 INJECTION, SOLUTION INTRAMUSCULAR; INTRAVENOUS; SUBCUTANEOUS at 15:06

## 2020-09-29 RX ADMIN — CEFAZOLIN SODIUM 1000 MG: 1 SOLUTION INTRAVENOUS at 09:29

## 2020-09-29 RX ADMIN — FENTANYL CITRATE 50 MCG: 50 INJECTION, SOLUTION INTRAMUSCULAR; INTRAVENOUS at 11:56

## 2020-09-29 RX ADMIN — CEFEPIME HYDROCHLORIDE 1000 MG: 1 INJECTION, POWDER, FOR SOLUTION INTRAMUSCULAR; INTRAVENOUS at 22:36

## 2020-09-29 RX ADMIN — ROCURONIUM BROMIDE 20 MG: 10 INJECTION, SOLUTION INTRAVENOUS at 12:54

## 2020-09-29 RX ADMIN — METRONIDAZOLE 500 MG: 500 INJECTION, SOLUTION INTRAVENOUS at 10:37

## 2020-09-29 RX ADMIN — HYDROMORPHONE HYDROCHLORIDE 0.5 MG: 1 INJECTION, SOLUTION INTRAMUSCULAR; INTRAVENOUS; SUBCUTANEOUS at 23:59

## 2020-09-29 RX ADMIN — ROCURONIUM BROMIDE 10 MG: 10 INJECTION, SOLUTION INTRAVENOUS at 15:03

## 2020-09-29 RX ADMIN — SODIUM CHLORIDE, SODIUM LACTATE, POTASSIUM CHLORIDE, AND CALCIUM CHLORIDE: .6; .31; .03; .02 INJECTION, SOLUTION INTRAVENOUS at 13:46

## 2020-09-29 RX ADMIN — ROCURONIUM BROMIDE 30 MG: 10 INJECTION, SOLUTION INTRAVENOUS at 11:57

## 2020-09-29 NOTE — ANESTHESIA PREPROCEDURE EVALUATION
Procedure:  CHOLECYSTECTOMY LAPAROSCOPIC, possible open, possible INTRAOP CHOLANGIOGRAM (N/A Abdomen)    Relevant Problems   ANESTHESIA (within normal limits)   (-) History of anesthesia complications      CARDIO   (+) Abdominal aortic aneurysm (AAA) without rupture (HCC)   (+) Coronary artery disease   (+) Hyperlipidemia   (+) Hypertension      ENDO (within normal limits)      GI/HEPATIC   (+) Gastroesophageal reflux disease      /RENAL   (+) JOVANNI (acute kidney injury) ([facility] [address] )      HEMATOLOGY (within normal limits)      MUSCULOSKELETAL   (+) Primary osteoarthritis of right knee      NEURO/PSYCH   (-) CVA (cerebral vascular accident) ([facility] [address] )   (-) Seizures ([facility] [address] )      PULMONARY   (-) Shortness of breath   (-) URI (upper respiratory infection)      Other   (+) Acute calculous cholecystitis   PMHx of CAD ( s/p PCI in 2000, unknown anatomy), HTN, HLD, AAA ( s/p surgical repair  10 years ago)        TTE 9/2020:  LEFT VENTRICLE:  Systolic function was normal  Ejection fraction was estimated to be 70 %  There were no regional wall motion abnormalities  Doppler parameters were consistent with abnormal left ventricular relaxation (grade 1 diastolic dysfunction)      LEFT ATRIUM:  The atrium was mildly dilated      TRICUSPID VALVE:  There was mild regurgitation  Pulmonary artery systolic pressure was within the normal range  Physical Exam    Airway    Mallampati score: II  TM Distance: >3 FB  Neck ROM: full     Dental   No notable dental hx     Cardiovascular      Pulmonary      Other Findings        Anesthesia Plan  ASA Score- 3     Anesthesia Type- general with ASA Monitors  Additional Monitors:   Airway Plan: ETT  Plan Factors-Exercise tolerance (METS): <4 METS  Chart reviewed  EKG reviewed  Induction- intravenous  Postoperative Plan-     Informed Consent- Anesthetic plan and risks discussed with patient  I personally reviewed this patient with the CRNA   Discussed and agreed on the Anesthesia Plan with the ASHLEIGH Schulte

## 2020-09-30 LAB
ALBUMIN SERPL BCP-MCNC: 2.1 G/DL (ref 3.5–5)
ALP SERPL-CCNC: 73 U/L (ref 46–116)
ALT SERPL W P-5'-P-CCNC: 43 U/L (ref 12–78)
ANION GAP SERPL CALCULATED.3IONS-SCNC: 10 MMOL/L (ref 4–13)
AST SERPL W P-5'-P-CCNC: 78 U/L (ref 5–45)
BASOPHILS # BLD AUTO: 0.01 THOUSANDS/ΜL (ref 0–0.1)
BASOPHILS NFR BLD AUTO: 0 % (ref 0–1)
BILIRUB SERPL-MCNC: 0.41 MG/DL (ref 0.2–1)
BUN SERPL-MCNC: 37 MG/DL (ref 5–25)
CALCIUM ALBUM COR SERPL-MCNC: 9.5 MG/DL (ref 8.3–10.1)
CALCIUM SERPL-MCNC: 8 MG/DL (ref 8.3–10.1)
CHLORIDE SERPL-SCNC: 113 MMOL/L (ref 100–108)
CO2 SERPL-SCNC: 21 MMOL/L (ref 21–32)
CREAT SERPL-MCNC: 1.43 MG/DL (ref 0.6–1.3)
EOSINOPHIL # BLD AUTO: 0 THOUSAND/ΜL (ref 0–0.61)
EOSINOPHIL NFR BLD AUTO: 0 % (ref 0–6)
ERYTHROCYTE [DISTWIDTH] IN BLOOD BY AUTOMATED COUNT: 14.6 % (ref 11.6–15.1)
GFR SERPL CREATININE-BSD FRML MDRD: 47 ML/MIN/1.73SQ M
GLUCOSE SERPL-MCNC: 120 MG/DL (ref 65–140)
HCT VFR BLD AUTO: 33.1 % (ref 36.5–49.3)
HGB BLD-MCNC: 10.8 G/DL (ref 12–17)
IMM GRANULOCYTES # BLD AUTO: 0.05 THOUSAND/UL (ref 0–0.2)
IMM GRANULOCYTES NFR BLD AUTO: 1 % (ref 0–2)
LYMPHOCYTES # BLD AUTO: 0.39 THOUSANDS/ΜL (ref 0.6–4.47)
LYMPHOCYTES NFR BLD AUTO: 6 % (ref 14–44)
MAGNESIUM SERPL-MCNC: 2.4 MG/DL (ref 1.6–2.6)
MCH RBC QN AUTO: 30.8 PG (ref 26.8–34.3)
MCHC RBC AUTO-ENTMCNC: 32.6 G/DL (ref 31.4–37.4)
MCV RBC AUTO: 94 FL (ref 82–98)
MONOCYTES # BLD AUTO: 0.32 THOUSAND/ΜL (ref 0.17–1.22)
MONOCYTES NFR BLD AUTO: 5 % (ref 4–12)
NEUTROPHILS # BLD AUTO: 6.25 THOUSANDS/ΜL (ref 1.85–7.62)
NEUTS SEG NFR BLD AUTO: 88 % (ref 43–75)
NRBC BLD AUTO-RTO: 0 /100 WBCS
PHOSPHATE SERPL-MCNC: 2.5 MG/DL (ref 2.3–4.1)
PLATELET # BLD AUTO: 166 THOUSANDS/UL (ref 149–390)
PMV BLD AUTO: 9.8 FL (ref 8.9–12.7)
POTASSIUM SERPL-SCNC: 4.2 MMOL/L (ref 3.5–5.3)
PROT SERPL-MCNC: 6.5 G/DL (ref 6.4–8.2)
RBC # BLD AUTO: 3.51 MILLION/UL (ref 3.88–5.62)
SODIUM SERPL-SCNC: 144 MMOL/L (ref 136–145)
WBC # BLD AUTO: 7.02 THOUSAND/UL (ref 4.31–10.16)

## 2020-09-30 PROCEDURE — 99232 SBSQ HOSP IP/OBS MODERATE 35: CPT | Performed by: INTERNAL MEDICINE

## 2020-09-30 PROCEDURE — 84100 ASSAY OF PHOSPHORUS: CPT | Performed by: STUDENT IN AN ORGANIZED HEALTH CARE EDUCATION/TRAINING PROGRAM

## 2020-09-30 PROCEDURE — 80053 COMPREHEN METABOLIC PANEL: CPT | Performed by: STUDENT IN AN ORGANIZED HEALTH CARE EDUCATION/TRAINING PROGRAM

## 2020-09-30 PROCEDURE — 83735 ASSAY OF MAGNESIUM: CPT | Performed by: STUDENT IN AN ORGANIZED HEALTH CARE EDUCATION/TRAINING PROGRAM

## 2020-09-30 PROCEDURE — 99024 POSTOP FOLLOW-UP VISIT: CPT | Performed by: SURGERY

## 2020-09-30 PROCEDURE — 97163 PT EVAL HIGH COMPLEX 45 MIN: CPT

## 2020-09-30 PROCEDURE — C9113 INJ PANTOPRAZOLE SODIUM, VIA: HCPCS | Performed by: SURGERY

## 2020-09-30 PROCEDURE — 85025 COMPLETE CBC W/AUTO DIFF WBC: CPT | Performed by: STUDENT IN AN ORGANIZED HEALTH CARE EDUCATION/TRAINING PROGRAM

## 2020-09-30 RX ORDER — PANTOPRAZOLE SODIUM 40 MG/1
40 INJECTION, POWDER, FOR SOLUTION INTRAVENOUS
Status: DISCONTINUED | OUTPATIENT
Start: 2020-09-30 | End: 2020-09-30

## 2020-09-30 RX ORDER — AMLODIPINE BESYLATE 5 MG/1
5 TABLET ORAL DAILY
Status: DISCONTINUED | OUTPATIENT
Start: 2020-09-30 | End: 2020-10-02

## 2020-09-30 RX ORDER — LANOLIN ALCOHOL/MO/W.PET/CERES
3 CREAM (GRAM) TOPICAL
Status: DISCONTINUED | OUTPATIENT
Start: 2020-09-30 | End: 2020-10-04 | Stop reason: HOSPADM

## 2020-09-30 RX ORDER — PANTOPRAZOLE SODIUM 40 MG/1
40 INJECTION, POWDER, FOR SOLUTION INTRAVENOUS
Status: DISCONTINUED | OUTPATIENT
Start: 2020-09-30 | End: 2020-10-04 | Stop reason: HOSPADM

## 2020-09-30 RX ADMIN — HEPARIN SODIUM 5000 UNITS: 5000 INJECTION INTRAVENOUS; SUBCUTANEOUS at 13:48

## 2020-09-30 RX ADMIN — METRONIDAZOLE 500 MG: 500 INJECTION, SOLUTION INTRAVENOUS at 02:57

## 2020-09-30 RX ADMIN — FUROSEMIDE 40 MG: 10 INJECTION, SOLUTION INTRAMUSCULAR; INTRAVENOUS at 06:35

## 2020-09-30 RX ADMIN — HYDROMORPHONE HYDROCHLORIDE 0.2 MG: 1 INJECTION, SOLUTION INTRAMUSCULAR; INTRAVENOUS; SUBCUTANEOUS at 09:23

## 2020-09-30 RX ADMIN — HYDROMORPHONE HYDROCHLORIDE 0.2 MG: 1 INJECTION, SOLUTION INTRAMUSCULAR; INTRAVENOUS; SUBCUTANEOUS at 13:49

## 2020-09-30 RX ADMIN — METRONIDAZOLE 500 MG: 500 INJECTION, SOLUTION INTRAVENOUS at 09:42

## 2020-09-30 RX ADMIN — ATORVASTATIN CALCIUM 40 MG: 40 TABLET, FILM COATED ORAL at 18:09

## 2020-09-30 RX ADMIN — METOPROLOL SUCCINATE 50 MG: 50 TABLET, EXTENDED RELEASE ORAL at 08:57

## 2020-09-30 RX ADMIN — SODIUM CHLORIDE, SODIUM LACTATE, POTASSIUM CHLORIDE, AND CALCIUM CHLORIDE 50 ML/HR: .6; .31; .03; .02 INJECTION, SOLUTION INTRAVENOUS at 03:06

## 2020-09-30 RX ADMIN — PANTOPRAZOLE SODIUM 40 MG: 40 INJECTION, POWDER, FOR SOLUTION INTRAVENOUS at 08:57

## 2020-09-30 RX ADMIN — CEFEPIME HYDROCHLORIDE 1000 MG: 1 INJECTION, POWDER, FOR SOLUTION INTRAMUSCULAR; INTRAVENOUS at 10:53

## 2020-09-30 RX ADMIN — HEPARIN SODIUM 5000 UNITS: 5000 INJECTION INTRAVENOUS; SUBCUTANEOUS at 06:35

## 2020-09-30 RX ADMIN — METRONIDAZOLE 500 MG: 500 INJECTION, SOLUTION INTRAVENOUS at 18:10

## 2020-09-30 RX ADMIN — AMLODIPINE BESYLATE 5 MG: 5 TABLET ORAL at 13:48

## 2020-09-30 RX ADMIN — CEFEPIME HYDROCHLORIDE 1000 MG: 1 INJECTION, POWDER, FOR SOLUTION INTRAMUSCULAR; INTRAVENOUS at 21:16

## 2020-09-30 RX ADMIN — HEPARIN SODIUM 5000 UNITS: 5000 INJECTION INTRAVENOUS; SUBCUTANEOUS at 21:16

## 2020-09-30 RX ADMIN — HYDROMORPHONE HYDROCHLORIDE 0.2 MG: 1 INJECTION, SOLUTION INTRAMUSCULAR; INTRAVENOUS; SUBCUTANEOUS at 21:17

## 2020-10-01 LAB
ALBUMIN SERPL BCP-MCNC: 2.4 G/DL (ref 3.5–5)
ALP SERPL-CCNC: 63 U/L (ref 46–116)
ALT SERPL W P-5'-P-CCNC: 40 U/L (ref 12–78)
ANION GAP SERPL CALCULATED.3IONS-SCNC: 7 MMOL/L (ref 4–13)
AST SERPL W P-5'-P-CCNC: 62 U/L (ref 5–45)
BACTERIA SPEC ANAEROBE CULT: NORMAL
BACTERIA TISS AEROBE CULT: ABNORMAL
BILIRUB SERPL-MCNC: 0.47 MG/DL (ref 0.2–1)
BUN SERPL-MCNC: 44 MG/DL (ref 5–25)
CALCIUM ALBUM COR SERPL-MCNC: 9.8 MG/DL (ref 8.3–10.1)
CALCIUM SERPL-MCNC: 8.5 MG/DL (ref 8.3–10.1)
CHLORIDE SERPL-SCNC: 114 MMOL/L (ref 100–108)
CO2 SERPL-SCNC: 26 MMOL/L (ref 21–32)
CREAT SERPL-MCNC: 1.42 MG/DL (ref 0.6–1.3)
ERYTHROCYTE [DISTWIDTH] IN BLOOD BY AUTOMATED COUNT: 14.6 % (ref 11.6–15.1)
GFR SERPL CREATININE-BSD FRML MDRD: 48 ML/MIN/1.73SQ M
GLUCOSE SERPL-MCNC: 93 MG/DL (ref 65–140)
GRAM STN SPEC: ABNORMAL
GRAM STN SPEC: ABNORMAL
HCT VFR BLD AUTO: 34.1 % (ref 36.5–49.3)
HGB BLD-MCNC: 10.8 G/DL (ref 12–17)
MCH RBC QN AUTO: 30.5 PG (ref 26.8–34.3)
MCHC RBC AUTO-ENTMCNC: 31.7 G/DL (ref 31.4–37.4)
MCV RBC AUTO: 96 FL (ref 82–98)
NRBC BLD AUTO-RTO: 0 /100 WBCS
PLATELET # BLD AUTO: 225 THOUSANDS/UL (ref 149–390)
PMV BLD AUTO: 9.8 FL (ref 8.9–12.7)
POTASSIUM SERPL-SCNC: 3.2 MMOL/L (ref 3.5–5.3)
PROT SERPL-MCNC: 6.6 G/DL (ref 6.4–8.2)
RBC # BLD AUTO: 3.54 MILLION/UL (ref 3.88–5.62)
SODIUM SERPL-SCNC: 147 MMOL/L (ref 136–145)
WBC # BLD AUTO: 7.47 THOUSAND/UL (ref 4.31–10.16)

## 2020-10-01 PROCEDURE — 99232 SBSQ HOSP IP/OBS MODERATE 35: CPT | Performed by: INTERNAL MEDICINE

## 2020-10-01 PROCEDURE — 80053 COMPREHEN METABOLIC PANEL: CPT | Performed by: STUDENT IN AN ORGANIZED HEALTH CARE EDUCATION/TRAINING PROGRAM

## 2020-10-01 PROCEDURE — 97167 OT EVAL HIGH COMPLEX 60 MIN: CPT

## 2020-10-01 PROCEDURE — 99024 POSTOP FOLLOW-UP VISIT: CPT | Performed by: SURGERY

## 2020-10-01 PROCEDURE — 85027 COMPLETE CBC AUTOMATED: CPT | Performed by: STUDENT IN AN ORGANIZED HEALTH CARE EDUCATION/TRAINING PROGRAM

## 2020-10-01 PROCEDURE — C9113 INJ PANTOPRAZOLE SODIUM, VIA: HCPCS | Performed by: SURGERY

## 2020-10-01 PROCEDURE — 97110 THERAPEUTIC EXERCISES: CPT

## 2020-10-01 PROCEDURE — 97116 GAIT TRAINING THERAPY: CPT

## 2020-10-01 RX ORDER — POTASSIUM CHLORIDE 14.9 MG/ML
20 INJECTION INTRAVENOUS ONCE
Status: COMPLETED | OUTPATIENT
Start: 2020-10-01 | End: 2020-10-01

## 2020-10-01 RX ORDER — FUROSEMIDE 10 MG/ML
40 INJECTION INTRAMUSCULAR; INTRAVENOUS ONCE
Status: COMPLETED | OUTPATIENT
Start: 2020-10-01 | End: 2020-10-01

## 2020-10-01 RX ORDER — FUROSEMIDE 10 MG/ML
40 INJECTION INTRAMUSCULAR; INTRAVENOUS ONCE
Status: DISCONTINUED | OUTPATIENT
Start: 2020-10-01 | End: 2020-10-01

## 2020-10-01 RX ADMIN — SODIUM CHLORIDE, SODIUM LACTATE, POTASSIUM CHLORIDE, AND CALCIUM CHLORIDE 50 ML/HR: .6; .31; .03; .02 INJECTION, SOLUTION INTRAVENOUS at 01:01

## 2020-10-01 RX ADMIN — PANTOPRAZOLE SODIUM 40 MG: 40 INJECTION, POWDER, FOR SOLUTION INTRAVENOUS at 08:42

## 2020-10-01 RX ADMIN — MELATONIN 3 MG: at 21:51

## 2020-10-01 RX ADMIN — MELATONIN 3 MG: at 00:52

## 2020-10-01 RX ADMIN — FUROSEMIDE 40 MG: 10 INJECTION, SOLUTION INTRAMUSCULAR; INTRAVENOUS at 08:42

## 2020-10-01 RX ADMIN — HYDROMORPHONE HYDROCHLORIDE 0.5 MG: 1 INJECTION, SOLUTION INTRAMUSCULAR; INTRAVENOUS; SUBCUTANEOUS at 11:51

## 2020-10-01 RX ADMIN — ATORVASTATIN CALCIUM 40 MG: 40 TABLET, FILM COATED ORAL at 15:45

## 2020-10-01 RX ADMIN — AMLODIPINE BESYLATE 5 MG: 5 TABLET ORAL at 08:43

## 2020-10-01 RX ADMIN — FUROSEMIDE 40 MG: 10 INJECTION, SOLUTION INTRAMUSCULAR; INTRAVENOUS at 13:38

## 2020-10-01 RX ADMIN — HEPARIN SODIUM 5000 UNITS: 5000 INJECTION INTRAVENOUS; SUBCUTANEOUS at 13:38

## 2020-10-01 RX ADMIN — HEPARIN SODIUM 5000 UNITS: 5000 INJECTION INTRAVENOUS; SUBCUTANEOUS at 21:51

## 2020-10-01 RX ADMIN — HEPARIN SODIUM 5000 UNITS: 5000 INJECTION INTRAVENOUS; SUBCUTANEOUS at 07:03

## 2020-10-01 RX ADMIN — POTASSIUM CHLORIDE 20 MEQ: 14.9 INJECTION, SOLUTION INTRAVENOUS at 08:42

## 2020-10-01 RX ADMIN — METOPROLOL SUCCINATE 50 MG: 50 TABLET, EXTENDED RELEASE ORAL at 08:43

## 2020-10-02 ENCOUNTER — APPOINTMENT (INPATIENT)
Dept: RADIOLOGY | Facility: HOSPITAL | Age: 75
DRG: 674 | End: 2020-10-02
Payer: MEDICARE

## 2020-10-02 PROBLEM — R09.02 HYPOXIA: Status: ACTIVE | Noted: 2020-10-02

## 2020-10-02 LAB
ANION GAP SERPL CALCULATED.3IONS-SCNC: 8 MMOL/L (ref 4–13)
BACTERIA BLD CULT: NORMAL
BACTERIA BLD CULT: NORMAL
BUN SERPL-MCNC: 39 MG/DL (ref 5–25)
CALCIUM SERPL-MCNC: 8 MG/DL (ref 8.3–10.1)
CHLORIDE SERPL-SCNC: 108 MMOL/L (ref 100–108)
CO2 SERPL-SCNC: 26 MMOL/L (ref 21–32)
CREAT SERPL-MCNC: 1.18 MG/DL (ref 0.6–1.3)
ERYTHROCYTE [DISTWIDTH] IN BLOOD BY AUTOMATED COUNT: 14.5 % (ref 11.6–15.1)
GFR SERPL CREATININE-BSD FRML MDRD: 60 ML/MIN/1.73SQ M
GLUCOSE SERPL-MCNC: 87 MG/DL (ref 65–140)
HCT VFR BLD AUTO: 30.5 % (ref 36.5–49.3)
HGB BLD-MCNC: 9.8 G/DL (ref 12–17)
MCH RBC QN AUTO: 30.6 PG (ref 26.8–34.3)
MCHC RBC AUTO-ENTMCNC: 32.1 G/DL (ref 31.4–37.4)
MCV RBC AUTO: 95 FL (ref 82–98)
NRBC BLD AUTO-RTO: 0 /100 WBCS
PLATELET # BLD AUTO: 238 THOUSANDS/UL (ref 149–390)
PMV BLD AUTO: 10.3 FL (ref 8.9–12.7)
POTASSIUM SERPL-SCNC: 2.9 MMOL/L (ref 3.5–5.3)
POTASSIUM SERPL-SCNC: 3.3 MMOL/L (ref 3.5–5.3)
RBC # BLD AUTO: 3.2 MILLION/UL (ref 3.88–5.62)
SODIUM SERPL-SCNC: 142 MMOL/L (ref 136–145)
WBC # BLD AUTO: 6.04 THOUSAND/UL (ref 4.31–10.16)

## 2020-10-02 PROCEDURE — 84132 ASSAY OF SERUM POTASSIUM: CPT | Performed by: STUDENT IN AN ORGANIZED HEALTH CARE EDUCATION/TRAINING PROGRAM

## 2020-10-02 PROCEDURE — 71045 X-RAY EXAM CHEST 1 VIEW: CPT

## 2020-10-02 PROCEDURE — 99232 SBSQ HOSP IP/OBS MODERATE 35: CPT | Performed by: INTERNAL MEDICINE

## 2020-10-02 PROCEDURE — 80048 BASIC METABOLIC PNL TOTAL CA: CPT | Performed by: STUDENT IN AN ORGANIZED HEALTH CARE EDUCATION/TRAINING PROGRAM

## 2020-10-02 PROCEDURE — C9113 INJ PANTOPRAZOLE SODIUM, VIA: HCPCS | Performed by: SURGERY

## 2020-10-02 PROCEDURE — 99024 POSTOP FOLLOW-UP VISIT: CPT | Performed by: SURGERY

## 2020-10-02 PROCEDURE — 85027 COMPLETE CBC AUTOMATED: CPT | Performed by: STUDENT IN AN ORGANIZED HEALTH CARE EDUCATION/TRAINING PROGRAM

## 2020-10-02 RX ORDER — POTASSIUM CHLORIDE 14.9 MG/ML
20 INJECTION INTRAVENOUS ONCE
Status: COMPLETED | OUTPATIENT
Start: 2020-10-02 | End: 2020-10-02

## 2020-10-02 RX ORDER — POTASSIUM CHLORIDE 20 MEQ/1
20 TABLET, EXTENDED RELEASE ORAL ONCE
Status: COMPLETED | OUTPATIENT
Start: 2020-10-02 | End: 2020-10-02

## 2020-10-02 RX ORDER — POTASSIUM CHLORIDE 14.9 MG/ML
20 INJECTION INTRAVENOUS
Status: DISCONTINUED | OUTPATIENT
Start: 2020-10-02 | End: 2020-10-02

## 2020-10-02 RX ORDER — POTASSIUM CHLORIDE 20 MEQ/1
40 TABLET, EXTENDED RELEASE ORAL EVERY 4 HOURS
Status: COMPLETED | OUTPATIENT
Start: 2020-10-02 | End: 2020-10-02

## 2020-10-02 RX ORDER — HYDROCHLOROTHIAZIDE 25 MG/1
25 TABLET ORAL DAILY
Status: DISCONTINUED | OUTPATIENT
Start: 2020-10-02 | End: 2020-10-04 | Stop reason: HOSPADM

## 2020-10-02 RX ORDER — LISINOPRIL 20 MG/1
20 TABLET ORAL DAILY
Status: DISCONTINUED | OUTPATIENT
Start: 2020-10-03 | End: 2020-10-04 | Stop reason: HOSPADM

## 2020-10-02 RX ADMIN — HEPARIN SODIUM 5000 UNITS: 5000 INJECTION INTRAVENOUS; SUBCUTANEOUS at 21:19

## 2020-10-02 RX ADMIN — MELATONIN 3 MG: at 21:18

## 2020-10-02 RX ADMIN — ACETAMINOPHEN 650 MG: 325 TABLET, FILM COATED ORAL at 21:18

## 2020-10-02 RX ADMIN — HYDROMORPHONE HYDROCHLORIDE 0.2 MG: 1 INJECTION, SOLUTION INTRAMUSCULAR; INTRAVENOUS; SUBCUTANEOUS at 21:13

## 2020-10-02 RX ADMIN — POTASSIUM CHLORIDE 40 MEQ: 1500 TABLET, EXTENDED RELEASE ORAL at 08:07

## 2020-10-02 RX ADMIN — ATORVASTATIN CALCIUM 40 MG: 40 TABLET, FILM COATED ORAL at 16:58

## 2020-10-02 RX ADMIN — POTASSIUM CHLORIDE 40 MEQ: 1500 TABLET, EXTENDED RELEASE ORAL at 11:27

## 2020-10-02 RX ADMIN — AMLODIPINE BESYLATE 5 MG: 5 TABLET ORAL at 08:07

## 2020-10-02 RX ADMIN — POTASSIUM CHLORIDE 20 MEQ: 14.9 INJECTION, SOLUTION INTRAVENOUS at 08:18

## 2020-10-02 RX ADMIN — PANTOPRAZOLE SODIUM 40 MG: 40 INJECTION, POWDER, FOR SOLUTION INTRAVENOUS at 08:07

## 2020-10-02 RX ADMIN — HEPARIN SODIUM 5000 UNITS: 5000 INJECTION INTRAVENOUS; SUBCUTANEOUS at 14:42

## 2020-10-02 RX ADMIN — HEPARIN SODIUM 5000 UNITS: 5000 INJECTION INTRAVENOUS; SUBCUTANEOUS at 06:40

## 2020-10-02 RX ADMIN — HYDROCHLOROTHIAZIDE 25 MG: 25 TABLET ORAL at 16:58

## 2020-10-02 RX ADMIN — METOPROLOL SUCCINATE 50 MG: 50 TABLET, EXTENDED RELEASE ORAL at 08:07

## 2020-10-02 RX ADMIN — POTASSIUM CHLORIDE 20 MEQ: 1500 TABLET, EXTENDED RELEASE ORAL at 16:59

## 2020-10-03 LAB
ANION GAP SERPL CALCULATED.3IONS-SCNC: 4 MMOL/L (ref 4–13)
BASOPHILS # BLD MANUAL: 0.2 THOUSAND/UL (ref 0–0.1)
BASOPHILS NFR MAR MANUAL: 3 % (ref 0–1)
BUN SERPL-MCNC: 32 MG/DL (ref 5–25)
CALCIUM SERPL-MCNC: 8.2 MG/DL (ref 8.3–10.1)
CHLORIDE SERPL-SCNC: 114 MMOL/L (ref 100–108)
CO2 SERPL-SCNC: 27 MMOL/L (ref 21–32)
CREAT SERPL-MCNC: 1.12 MG/DL (ref 0.6–1.3)
EOSINOPHIL # BLD MANUAL: 0.2 THOUSAND/UL (ref 0–0.4)
EOSINOPHIL NFR BLD MANUAL: 3 % (ref 0–6)
ERYTHROCYTE [DISTWIDTH] IN BLOOD BY AUTOMATED COUNT: 14.3 % (ref 11.6–15.1)
GFR SERPL CREATININE-BSD FRML MDRD: 63 ML/MIN/1.73SQ M
GLUCOSE SERPL-MCNC: 90 MG/DL (ref 65–140)
HCT VFR BLD AUTO: 32.3 % (ref 36.5–49.3)
HGB BLD-MCNC: 10.1 G/DL (ref 12–17)
LYMPHOCYTES # BLD AUTO: 0.46 THOUSAND/UL (ref 0.6–4.47)
LYMPHOCYTES # BLD AUTO: 7 % (ref 14–44)
MCH RBC QN AUTO: 30.1 PG (ref 26.8–34.3)
MCHC RBC AUTO-ENTMCNC: 31.3 G/DL (ref 31.4–37.4)
MCV RBC AUTO: 96 FL (ref 82–98)
MONOCYTES # BLD AUTO: 0.39 THOUSAND/UL (ref 0–1.22)
MONOCYTES NFR BLD: 6 % (ref 4–12)
NEUTROPHILS # BLD MANUAL: 4.45 THOUSAND/UL (ref 1.85–7.62)
NEUTS SEG NFR BLD AUTO: 68 % (ref 43–75)
NRBC BLD AUTO-RTO: 0 /100 WBCS
PLATELET # BLD AUTO: 263 THOUSANDS/UL (ref 149–390)
PLATELET BLD QL SMEAR: ADEQUATE
PMV BLD AUTO: 9.8 FL (ref 8.9–12.7)
POLYCHROMASIA BLD QL SMEAR: PRESENT
POTASSIUM SERPL-SCNC: 3.7 MMOL/L (ref 3.5–5.3)
RBC # BLD AUTO: 3.35 MILLION/UL (ref 3.88–5.62)
RBC MORPH BLD: PRESENT
SODIUM SERPL-SCNC: 145 MMOL/L (ref 136–145)
VARIANT LYMPHS # BLD AUTO: 13 %
WBC # BLD AUTO: 6.55 THOUSAND/UL (ref 4.31–10.16)

## 2020-10-03 PROCEDURE — 99024 POSTOP FOLLOW-UP VISIT: CPT | Performed by: SURGERY

## 2020-10-03 PROCEDURE — C9113 INJ PANTOPRAZOLE SODIUM, VIA: HCPCS | Performed by: SURGERY

## 2020-10-03 PROCEDURE — 85007 BL SMEAR W/DIFF WBC COUNT: CPT | Performed by: STUDENT IN AN ORGANIZED HEALTH CARE EDUCATION/TRAINING PROGRAM

## 2020-10-03 PROCEDURE — 99232 SBSQ HOSP IP/OBS MODERATE 35: CPT | Performed by: INTERNAL MEDICINE

## 2020-10-03 PROCEDURE — 85027 COMPLETE CBC AUTOMATED: CPT | Performed by: STUDENT IN AN ORGANIZED HEALTH CARE EDUCATION/TRAINING PROGRAM

## 2020-10-03 PROCEDURE — 80048 BASIC METABOLIC PNL TOTAL CA: CPT | Performed by: STUDENT IN AN ORGANIZED HEALTH CARE EDUCATION/TRAINING PROGRAM

## 2020-10-03 RX ADMIN — ATORVASTATIN CALCIUM 40 MG: 40 TABLET, FILM COATED ORAL at 16:06

## 2020-10-03 RX ADMIN — HEPARIN SODIUM 5000 UNITS: 5000 INJECTION INTRAVENOUS; SUBCUTANEOUS at 21:26

## 2020-10-03 RX ADMIN — HEPARIN SODIUM 5000 UNITS: 5000 INJECTION INTRAVENOUS; SUBCUTANEOUS at 16:07

## 2020-10-03 RX ADMIN — HEPARIN SODIUM 5000 UNITS: 5000 INJECTION INTRAVENOUS; SUBCUTANEOUS at 05:00

## 2020-10-03 RX ADMIN — MELATONIN 3 MG: at 21:26

## 2020-10-03 RX ADMIN — PANTOPRAZOLE SODIUM 40 MG: 40 INJECTION, POWDER, FOR SOLUTION INTRAVENOUS at 08:23

## 2020-10-04 ENCOUNTER — HOSPITAL ENCOUNTER (INPATIENT)
Facility: HOSPITAL | Age: 75
LOS: 20 days | Discharge: HOME HEALTH | DRG: 949 | End: 2020-10-24
Attending: INTERNAL MEDICINE | Admitting: INTERNAL MEDICINE
Payer: MEDICARE

## 2020-10-04 VITALS
HEIGHT: 68 IN | RESPIRATION RATE: 17 BRPM | TEMPERATURE: 98 F | HEART RATE: 82 BPM | SYSTOLIC BLOOD PRESSURE: 100 MMHG | WEIGHT: 152.1 LBS | BODY MASS INDEX: 23.05 KG/M2 | OXYGEN SATURATION: 93 % | DIASTOLIC BLOOD PRESSURE: 51 MMHG

## 2020-10-04 DIAGNOSIS — R13.19 ESOPHAGEAL DYSPHAGIA: Primary | ICD-10-CM

## 2020-10-04 DIAGNOSIS — I10 ESSENTIAL HYPERTENSION: ICD-10-CM

## 2020-10-04 DIAGNOSIS — J96.01 ACUTE HYPOXEMIC RESPIRATORY FAILURE (HCC): ICD-10-CM

## 2020-10-04 PROBLEM — I25.10 CORONARY ARTERY DISEASE: Status: RESOLVED | Noted: 2018-02-09 | Resolved: 2020-10-04

## 2020-10-04 PROBLEM — N17.9 AKI (ACUTE KIDNEY INJURY) (HCC): Status: RESOLVED | Noted: 2020-09-25 | Resolved: 2020-10-04

## 2020-10-04 PROBLEM — Z90.49 STATUS POST CHOLECYSTECTOMY: Status: ACTIVE | Noted: 2020-10-04

## 2020-10-04 PROBLEM — R26.2 AMBULATORY DYSFUNCTION: Status: ACTIVE | Noted: 2020-10-04

## 2020-10-04 PROBLEM — K80.00 ACUTE CALCULOUS CHOLECYSTITIS: Status: RESOLVED | Noted: 2020-09-26 | Resolved: 2020-10-04

## 2020-10-04 LAB
ANION GAP SERPL CALCULATED.3IONS-SCNC: 6 MMOL/L (ref 4–13)
BASOPHILS # BLD AUTO: 0.05 THOUSANDS/ΜL (ref 0–0.1)
BASOPHILS NFR BLD AUTO: 1 % (ref 0–1)
BUN SERPL-MCNC: 22 MG/DL (ref 5–25)
CALCIUM SERPL-MCNC: 8.2 MG/DL (ref 8.3–10.1)
CHLORIDE SERPL-SCNC: 109 MMOL/L (ref 100–108)
CO2 SERPL-SCNC: 25 MMOL/L (ref 21–32)
CREAT SERPL-MCNC: 1.1 MG/DL (ref 0.6–1.3)
EOSINOPHIL # BLD AUTO: 0.15 THOUSAND/ΜL (ref 0–0.61)
EOSINOPHIL NFR BLD AUTO: 2 % (ref 0–6)
ERYTHROCYTE [DISTWIDTH] IN BLOOD BY AUTOMATED COUNT: 14.3 % (ref 11.6–15.1)
GFR SERPL CREATININE-BSD FRML MDRD: 65 ML/MIN/1.73SQ M
GLUCOSE SERPL-MCNC: 109 MG/DL (ref 65–140)
HCT VFR BLD AUTO: 31 % (ref 36.5–49.3)
HGB BLD-MCNC: 10.1 G/DL (ref 12–17)
IMM GRANULOCYTES # BLD AUTO: 0.13 THOUSAND/UL (ref 0–0.2)
IMM GRANULOCYTES NFR BLD AUTO: 2 % (ref 0–2)
LYMPHOCYTES # BLD AUTO: 1.87 THOUSANDS/ΜL (ref 0.6–4.47)
LYMPHOCYTES NFR BLD AUTO: 21 % (ref 14–44)
MCH RBC QN AUTO: 30.5 PG (ref 26.8–34.3)
MCHC RBC AUTO-ENTMCNC: 32.6 G/DL (ref 31.4–37.4)
MCV RBC AUTO: 94 FL (ref 82–98)
MONOCYTES # BLD AUTO: 0.46 THOUSAND/ΜL (ref 0.17–1.22)
MONOCYTES NFR BLD AUTO: 5 % (ref 4–12)
NEUTROPHILS # BLD AUTO: 6.17 THOUSANDS/ΜL (ref 1.85–7.62)
NEUTS SEG NFR BLD AUTO: 69 % (ref 43–75)
NRBC BLD AUTO-RTO: 0 /100 WBCS
PLATELET # BLD AUTO: 328 THOUSANDS/UL (ref 149–390)
PMV BLD AUTO: 9.5 FL (ref 8.9–12.7)
POTASSIUM SERPL-SCNC: 3.5 MMOL/L (ref 3.5–5.3)
RBC # BLD AUTO: 3.31 MILLION/UL (ref 3.88–5.62)
SARS-COV-2 RNA RESP QL NAA+PROBE: NEGATIVE
SODIUM SERPL-SCNC: 140 MMOL/L (ref 136–145)
WBC # BLD AUTO: 8.83 THOUSAND/UL (ref 4.31–10.16)

## 2020-10-04 PROCEDURE — NC001 PR NO CHARGE: Performed by: INTERNAL MEDICINE

## 2020-10-04 PROCEDURE — 80048 BASIC METABOLIC PNL TOTAL CA: CPT | Performed by: STUDENT IN AN ORGANIZED HEALTH CARE EDUCATION/TRAINING PROGRAM

## 2020-10-04 PROCEDURE — 99024 POSTOP FOLLOW-UP VISIT: CPT | Performed by: SURGERY

## 2020-10-04 PROCEDURE — 99306 1ST NF CARE HIGH MDM 50: CPT | Performed by: INTERNAL MEDICINE

## 2020-10-04 PROCEDURE — C9113 INJ PANTOPRAZOLE SODIUM, VIA: HCPCS | Performed by: SURGERY

## 2020-10-04 PROCEDURE — U0003 INFECTIOUS AGENT DETECTION BY NUCLEIC ACID (DNA OR RNA); SEVERE ACUTE RESPIRATORY SYNDROME CORONAVIRUS 2 (SARS-COV-2) (CORONAVIRUS DISEASE [COVID-19]), AMPLIFIED PROBE TECHNIQUE, MAKING USE OF HIGH THROUGHPUT TECHNOLOGIES AS DESCRIBED BY CMS-2020-01-R: HCPCS | Performed by: STUDENT IN AN ORGANIZED HEALTH CARE EDUCATION/TRAINING PROGRAM

## 2020-10-04 PROCEDURE — 85025 COMPLETE CBC W/AUTO DIFF WBC: CPT | Performed by: STUDENT IN AN ORGANIZED HEALTH CARE EDUCATION/TRAINING PROGRAM

## 2020-10-04 RX ORDER — PANTOPRAZOLE SODIUM 40 MG/1
40 INJECTION, POWDER, FOR SOLUTION INTRAVENOUS
Status: DISCONTINUED | OUTPATIENT
Start: 2020-10-05 | End: 2020-10-05

## 2020-10-04 RX ORDER — HYDROCHLOROTHIAZIDE 25 MG/1
25 TABLET ORAL DAILY
Status: DISCONTINUED | OUTPATIENT
Start: 2020-10-05 | End: 2020-10-04

## 2020-10-04 RX ORDER — LANOLIN ALCOHOL/MO/W.PET/CERES
3 CREAM (GRAM) TOPICAL
Status: DISCONTINUED | OUTPATIENT
Start: 2020-10-04 | End: 2020-10-24 | Stop reason: HOSPADM

## 2020-10-04 RX ORDER — ATORVASTATIN CALCIUM 40 MG/1
40 TABLET, FILM COATED ORAL
Status: DISCONTINUED | OUTPATIENT
Start: 2020-10-04 | End: 2020-10-24 | Stop reason: HOSPADM

## 2020-10-04 RX ORDER — METOPROLOL SUCCINATE 50 MG/1
50 TABLET, EXTENDED RELEASE ORAL DAILY
Status: DISCONTINUED | OUTPATIENT
Start: 2020-10-05 | End: 2020-10-04

## 2020-10-04 RX ORDER — OXYCODONE HYDROCHLORIDE 5 MG/1
2.5 TABLET ORAL EVERY 4 HOURS PRN
Status: DISCONTINUED | OUTPATIENT
Start: 2020-10-04 | End: 2020-10-24 | Stop reason: HOSPADM

## 2020-10-04 RX ORDER — ACETAMINOPHEN 325 MG/1
650 TABLET ORAL EVERY 6 HOURS PRN
Status: DISCONTINUED | OUTPATIENT
Start: 2020-10-04 | End: 2020-10-24 | Stop reason: HOSPADM

## 2020-10-04 RX ORDER — LISINOPRIL 20 MG/1
20 TABLET ORAL DAILY
Status: DISCONTINUED | OUTPATIENT
Start: 2020-10-05 | End: 2020-10-04

## 2020-10-04 RX ORDER — HYDROMORPHONE HCL/PF 1 MG/ML
0.5 SYRINGE (ML) INJECTION
Status: DISCONTINUED | OUTPATIENT
Start: 2020-10-04 | End: 2020-10-04

## 2020-10-04 RX ORDER — OXYCODONE HYDROCHLORIDE 5 MG/1
5 TABLET ORAL EVERY 4 HOURS PRN
Status: DISCONTINUED | OUTPATIENT
Start: 2020-10-04 | End: 2020-10-24 | Stop reason: HOSPADM

## 2020-10-04 RX ORDER — HEPARIN SODIUM 5000 [USP'U]/ML
5000 INJECTION, SOLUTION INTRAVENOUS; SUBCUTANEOUS EVERY 8 HOURS SCHEDULED
Status: DISCONTINUED | OUTPATIENT
Start: 2020-10-04 | End: 2020-10-23

## 2020-10-04 RX ORDER — HYDROMORPHONE HCL/PF 1 MG/ML
0.2 SYRINGE (ML) INJECTION
Status: DISCONTINUED | OUTPATIENT
Start: 2020-10-04 | End: 2020-10-04

## 2020-10-04 RX ORDER — AMOXICILLIN 250 MG
1 CAPSULE ORAL 2 TIMES DAILY
Status: DISCONTINUED | OUTPATIENT
Start: 2020-10-04 | End: 2020-10-24 | Stop reason: HOSPADM

## 2020-10-04 RX ADMIN — PANTOPRAZOLE SODIUM 40 MG: 40 INJECTION, POWDER, FOR SOLUTION INTRAVENOUS at 08:41

## 2020-10-04 RX ADMIN — HEPARIN SODIUM 5000 UNITS: 5000 INJECTION INTRAVENOUS; SUBCUTANEOUS at 22:20

## 2020-10-04 RX ADMIN — HYDROMORPHONE HYDROCHLORIDE 0.2 MG: 1 INJECTION, SOLUTION INTRAMUSCULAR; INTRAVENOUS; SUBCUTANEOUS at 01:13

## 2020-10-04 RX ADMIN — ATORVASTATIN CALCIUM 40 MG: 40 TABLET, FILM COATED ORAL at 18:12

## 2020-10-04 RX ADMIN — HEPARIN SODIUM 5000 UNITS: 5000 INJECTION INTRAVENOUS; SUBCUTANEOUS at 06:09

## 2020-10-04 RX ADMIN — MELATONIN TAB 3 MG 3 MG: 3 TAB at 22:25

## 2020-10-05 ENCOUNTER — TRANSITIONAL CARE MANAGEMENT (OUTPATIENT)
Dept: INTERNAL MEDICINE CLINIC | Facility: CLINIC | Age: 75
End: 2020-10-05

## 2020-10-05 LAB — SARS-COV-2 RNA RESP QL NAA+PROBE: NEGATIVE

## 2020-10-05 PROCEDURE — 97530 THERAPEUTIC ACTIVITIES: CPT

## 2020-10-05 PROCEDURE — 97167 OT EVAL HIGH COMPLEX 60 MIN: CPT

## 2020-10-05 PROCEDURE — 92610 EVALUATE SWALLOWING FUNCTION: CPT

## 2020-10-05 PROCEDURE — TCMNV: Performed by: HOSPITALIST

## 2020-10-05 PROCEDURE — 87635 SARS-COV-2 COVID-19 AMP PRB: CPT | Performed by: INTERNAL MEDICINE

## 2020-10-05 PROCEDURE — 97116 GAIT TRAINING THERAPY: CPT

## 2020-10-05 PROCEDURE — 97535 SELF CARE MNGMENT TRAINING: CPT

## 2020-10-05 PROCEDURE — 97163 PT EVAL HIGH COMPLEX 45 MIN: CPT

## 2020-10-05 RX ORDER — NYSTATIN 100000 [USP'U]/G
1 POWDER TOPICAL 2 TIMES DAILY
Status: DISCONTINUED | OUTPATIENT
Start: 2020-10-05 | End: 2020-10-24 | Stop reason: HOSPADM

## 2020-10-05 RX ORDER — PANTOPRAZOLE SODIUM 40 MG/1
40 TABLET, DELAYED RELEASE ORAL
Status: DISCONTINUED | OUTPATIENT
Start: 2020-10-05 | End: 2020-10-06

## 2020-10-05 RX ADMIN — ATORVASTATIN CALCIUM 40 MG: 40 TABLET, FILM COATED ORAL at 16:27

## 2020-10-05 RX ADMIN — HEPARIN SODIUM 5000 UNITS: 5000 INJECTION INTRAVENOUS; SUBCUTANEOUS at 21:22

## 2020-10-05 RX ADMIN — TUBERCULIN PURIFIED PROTEIN DERIVATIVE 5 UNITS: 5 INJECTION INTRADERMAL at 06:02

## 2020-10-05 RX ADMIN — SENNOSIDES AND DOCUSATE SODIUM 1 TABLET: 8.6; 5 TABLET ORAL at 08:59

## 2020-10-05 RX ADMIN — HEPARIN SODIUM 5000 UNITS: 5000 INJECTION INTRAVENOUS; SUBCUTANEOUS at 16:27

## 2020-10-05 RX ADMIN — ACETAMINOPHEN 650 MG: 325 TABLET ORAL at 09:00

## 2020-10-05 RX ADMIN — PANTOPRAZOLE SODIUM 40 MG: 40 TABLET, DELAYED RELEASE ORAL at 16:27

## 2020-10-05 RX ADMIN — MELATONIN TAB 3 MG 3 MG: 3 TAB at 21:26

## 2020-10-05 RX ADMIN — HEPARIN SODIUM 5000 UNITS: 5000 INJECTION INTRAVENOUS; SUBCUTANEOUS at 06:02

## 2020-10-06 PROCEDURE — 97116 GAIT TRAINING THERAPY: CPT

## 2020-10-06 PROCEDURE — 99222 1ST HOSP IP/OBS MODERATE 55: CPT | Performed by: PHYSICIAN ASSISTANT

## 2020-10-06 PROCEDURE — 97530 THERAPEUTIC ACTIVITIES: CPT

## 2020-10-06 PROCEDURE — 97535 SELF CARE MNGMENT TRAINING: CPT

## 2020-10-06 RX ORDER — PANTOPRAZOLE SODIUM 40 MG/1
40 TABLET, DELAYED RELEASE ORAL
Status: DISCONTINUED | OUTPATIENT
Start: 2020-10-06 | End: 2020-10-24 | Stop reason: HOSPADM

## 2020-10-06 RX ADMIN — PANTOPRAZOLE SODIUM 40 MG: 40 TABLET, DELAYED RELEASE ORAL at 15:57

## 2020-10-06 RX ADMIN — MELATONIN TAB 3 MG 3 MG: 3 TAB at 21:08

## 2020-10-06 RX ADMIN — HEPARIN SODIUM 5000 UNITS: 5000 INJECTION INTRAVENOUS; SUBCUTANEOUS at 21:07

## 2020-10-06 RX ADMIN — ACETAMINOPHEN 650 MG: 325 TABLET ORAL at 00:34

## 2020-10-06 RX ADMIN — PANTOPRAZOLE SODIUM 40 MG: 40 TABLET, DELAYED RELEASE ORAL at 06:53

## 2020-10-06 RX ADMIN — ACETAMINOPHEN 650 MG: 325 TABLET ORAL at 15:56

## 2020-10-06 RX ADMIN — NYSTATIN 1 APPLICATION: 100000 POWDER TOPICAL at 17:14

## 2020-10-06 RX ADMIN — HEPARIN SODIUM 5000 UNITS: 5000 INJECTION INTRAVENOUS; SUBCUTANEOUS at 06:52

## 2020-10-06 RX ADMIN — ATORVASTATIN CALCIUM 40 MG: 40 TABLET, FILM COATED ORAL at 15:57

## 2020-10-06 RX ADMIN — HEPARIN SODIUM 5000 UNITS: 5000 INJECTION INTRAVENOUS; SUBCUTANEOUS at 14:37

## 2020-10-06 RX ADMIN — NYSTATIN 1 APPLICATION: 100000 POWDER TOPICAL at 09:33

## 2020-10-07 LAB — SARS-COV-2 RNA RESP QL NAA+PROBE: NEGATIVE

## 2020-10-07 PROCEDURE — 97535 SELF CARE MNGMENT TRAINING: CPT

## 2020-10-07 PROCEDURE — 97110 THERAPEUTIC EXERCISES: CPT

## 2020-10-07 PROCEDURE — 97530 THERAPEUTIC ACTIVITIES: CPT

## 2020-10-07 PROCEDURE — 97116 GAIT TRAINING THERAPY: CPT

## 2020-10-07 PROCEDURE — 87635 SARS-COV-2 COVID-19 AMP PRB: CPT | Performed by: INTERNAL MEDICINE

## 2020-10-07 PROCEDURE — 99232 SBSQ HOSP IP/OBS MODERATE 35: CPT | Performed by: PHYSICIAN ASSISTANT

## 2020-10-07 RX ADMIN — HEPARIN SODIUM 5000 UNITS: 5000 INJECTION INTRAVENOUS; SUBCUTANEOUS at 21:51

## 2020-10-07 RX ADMIN — MELATONIN TAB 3 MG 3 MG: 3 TAB at 21:50

## 2020-10-07 RX ADMIN — HEPARIN SODIUM 5000 UNITS: 5000 INJECTION INTRAVENOUS; SUBCUTANEOUS at 14:24

## 2020-10-07 RX ADMIN — HEPARIN SODIUM 5000 UNITS: 5000 INJECTION INTRAVENOUS; SUBCUTANEOUS at 06:29

## 2020-10-07 RX ADMIN — PANTOPRAZOLE SODIUM 40 MG: 40 TABLET, DELAYED RELEASE ORAL at 17:16

## 2020-10-07 RX ADMIN — PANTOPRAZOLE SODIUM 40 MG: 40 TABLET, DELAYED RELEASE ORAL at 06:29

## 2020-10-07 RX ADMIN — NYSTATIN 1 APPLICATION: 100000 POWDER TOPICAL at 09:03

## 2020-10-07 RX ADMIN — NYSTATIN 1 APPLICATION: 100000 POWDER TOPICAL at 17:17

## 2020-10-07 RX ADMIN — ATORVASTATIN CALCIUM 40 MG: 40 TABLET, FILM COATED ORAL at 17:16

## 2020-10-08 PROCEDURE — 97530 THERAPEUTIC ACTIVITIES: CPT

## 2020-10-08 PROCEDURE — 97535 SELF CARE MNGMENT TRAINING: CPT

## 2020-10-08 PROCEDURE — 99232 SBSQ HOSP IP/OBS MODERATE 35: CPT | Performed by: PHYSICIAN ASSISTANT

## 2020-10-08 PROCEDURE — 97110 THERAPEUTIC EXERCISES: CPT

## 2020-10-08 PROCEDURE — 97116 GAIT TRAINING THERAPY: CPT

## 2020-10-08 RX ADMIN — PANTOPRAZOLE SODIUM 40 MG: 40 TABLET, DELAYED RELEASE ORAL at 15:59

## 2020-10-08 RX ADMIN — SENNOSIDES AND DOCUSATE SODIUM 1 TABLET: 8.6; 5 TABLET ORAL at 17:04

## 2020-10-08 RX ADMIN — ATORVASTATIN CALCIUM 40 MG: 40 TABLET, FILM COATED ORAL at 15:59

## 2020-10-08 RX ADMIN — MELATONIN TAB 3 MG 3 MG: 3 TAB at 21:32

## 2020-10-08 RX ADMIN — PANTOPRAZOLE SODIUM 40 MG: 40 TABLET, DELAYED RELEASE ORAL at 06:27

## 2020-10-08 RX ADMIN — HEPARIN SODIUM 5000 UNITS: 5000 INJECTION INTRAVENOUS; SUBCUTANEOUS at 21:34

## 2020-10-08 RX ADMIN — NYSTATIN 1 APPLICATION: 100000 POWDER TOPICAL at 17:04

## 2020-10-08 RX ADMIN — NYSTATIN 1 APPLICATION: 100000 POWDER TOPICAL at 08:12

## 2020-10-08 RX ADMIN — HEPARIN SODIUM 5000 UNITS: 5000 INJECTION INTRAVENOUS; SUBCUTANEOUS at 15:59

## 2020-10-08 RX ADMIN — HEPARIN SODIUM 5000 UNITS: 5000 INJECTION INTRAVENOUS; SUBCUTANEOUS at 06:27

## 2020-10-08 RX ADMIN — OXYCODONE HYDROCHLORIDE 5 MG: 5 TABLET ORAL at 21:32

## 2020-10-09 ENCOUNTER — OFFICE VISIT (OUTPATIENT)
Dept: SURGERY | Facility: CLINIC | Age: 75
End: 2020-10-09

## 2020-10-09 VITALS — WEIGHT: 156 LBS | HEIGHT: 68 IN | BODY MASS INDEX: 23.64 KG/M2 | TEMPERATURE: 96.7 F

## 2020-10-09 DIAGNOSIS — Z90.49 STATUS POST CHOLECYSTECTOMY: Primary | ICD-10-CM

## 2020-10-09 LAB
BASOPHILS # BLD AUTO: 0.1 THOUSANDS/ΜL (ref 0–0.1)
BASOPHILS NFR BLD AUTO: 1 % (ref 0–1)
EOSINOPHIL # BLD AUTO: 0.1 THOUSAND/ΜL (ref 0–0.4)
EOSINOPHIL NFR BLD AUTO: 1 % (ref 0–6)
ERYTHROCYTE [DISTWIDTH] IN BLOOD BY AUTOMATED COUNT: 15 %
HCT VFR BLD AUTO: 31 % (ref 41–53)
HGB BLD-MCNC: 10.3 G/DL (ref 13.5–17.5)
LYMPHOCYTES # BLD AUTO: 1.3 THOUSANDS/ΜL (ref 0.5–4)
LYMPHOCYTES NFR BLD AUTO: 18 % (ref 25–45)
MCH RBC QN AUTO: 30.2 PG (ref 26–34)
MCHC RBC AUTO-ENTMCNC: 33.1 G/DL (ref 31–36)
MCV RBC AUTO: 91 FL (ref 80–100)
MONOCYTES # BLD AUTO: 0.5 THOUSAND/ΜL (ref 0.2–0.9)
MONOCYTES NFR BLD AUTO: 7 % (ref 1–10)
NEUTROPHILS # BLD AUTO: 5.5 THOUSANDS/ΜL (ref 1.8–7.8)
NEUTS SEG NFR BLD AUTO: 74 % (ref 45–65)
PLATELET # BLD AUTO: 401 THOUSANDS/UL (ref 150–450)
PMV BLD AUTO: 7.5 FL (ref 8.9–12.7)
RBC # BLD AUTO: 3.4 MILLION/UL (ref 4.5–5.9)
WBC # BLD AUTO: 7.4 THOUSAND/UL (ref 4.5–11)

## 2020-10-09 PROCEDURE — 97530 THERAPEUTIC ACTIVITIES: CPT

## 2020-10-09 PROCEDURE — 97110 THERAPEUTIC EXERCISES: CPT

## 2020-10-09 PROCEDURE — 99232 SBSQ HOSP IP/OBS MODERATE 35: CPT | Performed by: PHYSICIAN ASSISTANT

## 2020-10-09 PROCEDURE — 85025 COMPLETE CBC W/AUTO DIFF WBC: CPT | Performed by: INTERNAL MEDICINE

## 2020-10-09 PROCEDURE — 97535 SELF CARE MNGMENT TRAINING: CPT

## 2020-10-09 PROCEDURE — 97116 GAIT TRAINING THERAPY: CPT

## 2020-10-09 PROCEDURE — 99024 POSTOP FOLLOW-UP VISIT: CPT | Performed by: SURGERY

## 2020-10-09 RX ADMIN — PANTOPRAZOLE SODIUM 40 MG: 40 TABLET, DELAYED RELEASE ORAL at 06:08

## 2020-10-09 RX ADMIN — HEPARIN SODIUM 5000 UNITS: 5000 INJECTION INTRAVENOUS; SUBCUTANEOUS at 06:08

## 2020-10-09 RX ADMIN — ATORVASTATIN CALCIUM 40 MG: 40 TABLET, FILM COATED ORAL at 15:47

## 2020-10-09 RX ADMIN — SENNOSIDES AND DOCUSATE SODIUM 1 TABLET: 8.6; 5 TABLET ORAL at 17:35

## 2020-10-09 RX ADMIN — ACETAMINOPHEN 650 MG: 325 TABLET ORAL at 08:26

## 2020-10-09 RX ADMIN — SENNOSIDES AND DOCUSATE SODIUM 1 TABLET: 8.6; 5 TABLET ORAL at 08:26

## 2020-10-09 RX ADMIN — NYSTATIN 1 APPLICATION: 100000 POWDER TOPICAL at 17:35

## 2020-10-09 RX ADMIN — MELATONIN TAB 3 MG 3 MG: 3 TAB at 21:59

## 2020-10-09 RX ADMIN — NYSTATIN 1 APPLICATION: 100000 POWDER TOPICAL at 08:28

## 2020-10-09 RX ADMIN — OXYCODONE HYDROCHLORIDE 5 MG: 5 TABLET ORAL at 21:58

## 2020-10-09 RX ADMIN — HEPARIN SODIUM 5000 UNITS: 5000 INJECTION INTRAVENOUS; SUBCUTANEOUS at 15:47

## 2020-10-09 RX ADMIN — PANTOPRAZOLE SODIUM 40 MG: 40 TABLET, DELAYED RELEASE ORAL at 15:47

## 2020-10-09 RX ADMIN — HEPARIN SODIUM 5000 UNITS: 5000 INJECTION INTRAVENOUS; SUBCUTANEOUS at 21:54

## 2020-10-10 PROCEDURE — 97110 THERAPEUTIC EXERCISES: CPT | Performed by: PHYSICAL THERAPIST

## 2020-10-10 PROCEDURE — 97116 GAIT TRAINING THERAPY: CPT | Performed by: PHYSICAL THERAPIST

## 2020-10-10 RX ADMIN — HEPARIN SODIUM 5000 UNITS: 5000 INJECTION INTRAVENOUS; SUBCUTANEOUS at 05:58

## 2020-10-10 RX ADMIN — HEPARIN SODIUM 5000 UNITS: 5000 INJECTION INTRAVENOUS; SUBCUTANEOUS at 21:29

## 2020-10-10 RX ADMIN — HEPARIN SODIUM 5000 UNITS: 5000 INJECTION INTRAVENOUS; SUBCUTANEOUS at 14:59

## 2020-10-10 RX ADMIN — NYSTATIN 1 APPLICATION: 100000 POWDER TOPICAL at 09:33

## 2020-10-10 RX ADMIN — MELATONIN TAB 3 MG 3 MG: 3 TAB at 21:28

## 2020-10-10 RX ADMIN — PANTOPRAZOLE SODIUM 40 MG: 40 TABLET, DELAYED RELEASE ORAL at 18:12

## 2020-10-10 RX ADMIN — ATORVASTATIN CALCIUM 40 MG: 40 TABLET, FILM COATED ORAL at 18:12

## 2020-10-10 RX ADMIN — PANTOPRAZOLE SODIUM 40 MG: 40 TABLET, DELAYED RELEASE ORAL at 06:04

## 2020-10-11 PROCEDURE — 97535 SELF CARE MNGMENT TRAINING: CPT

## 2020-10-11 PROCEDURE — 99308 SBSQ NF CARE LOW MDM 20: CPT | Performed by: INTERNAL MEDICINE

## 2020-10-11 PROCEDURE — 97530 THERAPEUTIC ACTIVITIES: CPT

## 2020-10-11 RX ORDER — METOPROLOL TARTRATE 50 MG/1
50 TABLET, FILM COATED ORAL
Status: DISCONTINUED | OUTPATIENT
Start: 2020-10-11 | End: 2020-10-24 | Stop reason: HOSPADM

## 2020-10-11 RX ADMIN — HEPARIN SODIUM 5000 UNITS: 5000 INJECTION INTRAVENOUS; SUBCUTANEOUS at 05:52

## 2020-10-11 RX ADMIN — NYSTATIN 1 APPLICATION: 100000 POWDER TOPICAL at 16:54

## 2020-10-11 RX ADMIN — PANTOPRAZOLE SODIUM 40 MG: 40 TABLET, DELAYED RELEASE ORAL at 16:54

## 2020-10-11 RX ADMIN — METOPROLOL TARTRATE 50 MG: 50 TABLET, FILM COATED ORAL at 13:34

## 2020-10-11 RX ADMIN — NYSTATIN 1 APPLICATION: 100000 POWDER TOPICAL at 09:43

## 2020-10-11 RX ADMIN — ATORVASTATIN CALCIUM 40 MG: 40 TABLET, FILM COATED ORAL at 16:54

## 2020-10-11 RX ADMIN — PANTOPRAZOLE SODIUM 40 MG: 40 TABLET, DELAYED RELEASE ORAL at 06:56

## 2020-10-11 RX ADMIN — MELATONIN TAB 3 MG 3 MG: 3 TAB at 21:25

## 2020-10-11 RX ADMIN — HEPARIN SODIUM 5000 UNITS: 5000 INJECTION INTRAVENOUS; SUBCUTANEOUS at 21:25

## 2020-10-11 RX ADMIN — HEPARIN SODIUM 5000 UNITS: 5000 INJECTION INTRAVENOUS; SUBCUTANEOUS at 13:34

## 2020-10-12 LAB
ANION GAP SERPL CALCULATED.3IONS-SCNC: 5 MMOL/L (ref 5–14)
BUN SERPL-MCNC: 12 MG/DL (ref 5–25)
CALCIUM SERPL-MCNC: 8.3 MG/DL (ref 8.4–10.2)
CHLORIDE SERPL-SCNC: 109 MMOL/L (ref 97–108)
CO2 SERPL-SCNC: 24 MMOL/L (ref 22–30)
CREAT SERPL-MCNC: 0.91 MG/DL (ref 0.7–1.5)
GFR SERPL CREATININE-BSD FRML MDRD: 82 ML/MIN/1.73SQ M
GLUCOSE SERPL-MCNC: 95 MG/DL (ref 70–99)
POTASSIUM SERPL-SCNC: 3 MMOL/L (ref 3.6–5)
SODIUM SERPL-SCNC: 138 MMOL/L (ref 137–147)

## 2020-10-12 PROCEDURE — 80048 BASIC METABOLIC PNL TOTAL CA: CPT | Performed by: INTERNAL MEDICINE

## 2020-10-12 PROCEDURE — 97530 THERAPEUTIC ACTIVITIES: CPT

## 2020-10-12 PROCEDURE — 97535 SELF CARE MNGMENT TRAINING: CPT

## 2020-10-12 PROCEDURE — 97116 GAIT TRAINING THERAPY: CPT

## 2020-10-12 PROCEDURE — 92526 ORAL FUNCTION THERAPY: CPT

## 2020-10-12 PROCEDURE — 97110 THERAPEUTIC EXERCISES: CPT

## 2020-10-12 PROCEDURE — 99232 SBSQ HOSP IP/OBS MODERATE 35: CPT | Performed by: PHYSICIAN ASSISTANT

## 2020-10-12 RX ORDER — POTASSIUM CHLORIDE 20 MEQ/1
40 TABLET, EXTENDED RELEASE ORAL EVERY 4 HOURS
Status: COMPLETED | OUTPATIENT
Start: 2020-10-12 | End: 2020-10-12

## 2020-10-12 RX ADMIN — ATORVASTATIN CALCIUM 40 MG: 40 TABLET, FILM COATED ORAL at 17:29

## 2020-10-12 RX ADMIN — MELATONIN TAB 3 MG 3 MG: 3 TAB at 20:20

## 2020-10-12 RX ADMIN — POTASSIUM CHLORIDE 40 MEQ: 20 TABLET, EXTENDED RELEASE ORAL at 22:16

## 2020-10-12 RX ADMIN — NYSTATIN 1 APPLICATION: 100000 POWDER TOPICAL at 17:32

## 2020-10-12 RX ADMIN — HEPARIN SODIUM 5000 UNITS: 5000 INJECTION INTRAVENOUS; SUBCUTANEOUS at 22:17

## 2020-10-12 RX ADMIN — HEPARIN SODIUM 5000 UNITS: 5000 INJECTION INTRAVENOUS; SUBCUTANEOUS at 14:14

## 2020-10-12 RX ADMIN — HEPARIN SODIUM 5000 UNITS: 5000 INJECTION INTRAVENOUS; SUBCUTANEOUS at 05:51

## 2020-10-12 RX ADMIN — POTASSIUM CHLORIDE 40 MEQ: 20 TABLET, EXTENDED RELEASE ORAL at 17:27

## 2020-10-12 RX ADMIN — NYSTATIN 1 APPLICATION: 100000 POWDER TOPICAL at 09:23

## 2020-10-12 RX ADMIN — PANTOPRAZOLE SODIUM 40 MG: 40 TABLET, DELAYED RELEASE ORAL at 17:29

## 2020-10-12 RX ADMIN — PANTOPRAZOLE SODIUM 40 MG: 40 TABLET, DELAYED RELEASE ORAL at 06:02

## 2020-10-12 RX ADMIN — METOPROLOL TARTRATE 50 MG: 50 TABLET, FILM COATED ORAL at 06:02

## 2020-10-13 PROCEDURE — 97110 THERAPEUTIC EXERCISES: CPT

## 2020-10-13 PROCEDURE — 97530 THERAPEUTIC ACTIVITIES: CPT

## 2020-10-13 PROCEDURE — 97116 GAIT TRAINING THERAPY: CPT

## 2020-10-13 PROCEDURE — 97535 SELF CARE MNGMENT TRAINING: CPT

## 2020-10-13 RX ORDER — DIPHENHYDRAMINE HCL 25 MG
25 TABLET ORAL
Status: DISCONTINUED | OUTPATIENT
Start: 2020-10-13 | End: 2020-10-24 | Stop reason: HOSPADM

## 2020-10-13 RX ADMIN — PANTOPRAZOLE SODIUM 40 MG: 40 TABLET, DELAYED RELEASE ORAL at 06:34

## 2020-10-13 RX ADMIN — SENNOSIDES AND DOCUSATE SODIUM 1 TABLET: 8.6; 5 TABLET ORAL at 17:56

## 2020-10-13 RX ADMIN — HEPARIN SODIUM 5000 UNITS: 5000 INJECTION INTRAVENOUS; SUBCUTANEOUS at 14:16

## 2020-10-13 RX ADMIN — NYSTATIN 1 APPLICATION: 100000 POWDER TOPICAL at 17:57

## 2020-10-13 RX ADMIN — ACETAMINOPHEN 650 MG: 325 TABLET ORAL at 10:18

## 2020-10-13 RX ADMIN — OXYCODONE HYDROCHLORIDE 5 MG: 5 TABLET ORAL at 00:44

## 2020-10-13 RX ADMIN — ATORVASTATIN CALCIUM 40 MG: 40 TABLET, FILM COATED ORAL at 17:56

## 2020-10-13 RX ADMIN — HEPARIN SODIUM 5000 UNITS: 5000 INJECTION INTRAVENOUS; SUBCUTANEOUS at 22:15

## 2020-10-13 RX ADMIN — PANTOPRAZOLE SODIUM 40 MG: 40 TABLET, DELAYED RELEASE ORAL at 17:56

## 2020-10-13 RX ADMIN — NYSTATIN 1 APPLICATION: 100000 POWDER TOPICAL at 09:27

## 2020-10-13 RX ADMIN — HEPARIN SODIUM 5000 UNITS: 5000 INJECTION INTRAVENOUS; SUBCUTANEOUS at 06:34

## 2020-10-13 RX ADMIN — METOPROLOL TARTRATE 50 MG: 50 TABLET, FILM COATED ORAL at 06:34

## 2020-10-14 ENCOUNTER — APPOINTMENT (OUTPATIENT)
Dept: RADIOLOGY | Facility: HOSPITAL | Age: 75
DRG: 949 | End: 2020-10-14
Payer: MEDICARE

## 2020-10-14 LAB
ANION GAP SERPL CALCULATED.3IONS-SCNC: 4 MMOL/L (ref 5–14)
BUN SERPL-MCNC: 17 MG/DL (ref 5–25)
CALCIUM SERPL-MCNC: 8.8 MG/DL (ref 8.4–10.2)
CHLORIDE SERPL-SCNC: 107 MMOL/L (ref 97–108)
CO2 SERPL-SCNC: 24 MMOL/L (ref 22–30)
CREAT SERPL-MCNC: 0.95 MG/DL (ref 0.7–1.5)
GFR SERPL CREATININE-BSD FRML MDRD: 77 ML/MIN/1.73SQ M
GLUCOSE SERPL-MCNC: 138 MG/DL (ref 70–99)
POTASSIUM SERPL-SCNC: 3.8 MMOL/L (ref 3.6–5)
SODIUM SERPL-SCNC: 135 MMOL/L (ref 137–147)
URATE SERPL-MCNC: 5 MG/DL (ref 3.5–8.5)

## 2020-10-14 PROCEDURE — 80048 BASIC METABOLIC PNL TOTAL CA: CPT | Performed by: INTERNAL MEDICINE

## 2020-10-14 PROCEDURE — 97535 SELF CARE MNGMENT TRAINING: CPT

## 2020-10-14 PROCEDURE — 99308 SBSQ NF CARE LOW MDM 20: CPT | Performed by: INTERNAL MEDICINE

## 2020-10-14 PROCEDURE — 73110 X-RAY EXAM OF WRIST: CPT

## 2020-10-14 PROCEDURE — 84550 ASSAY OF BLOOD/URIC ACID: CPT | Performed by: INTERNAL MEDICINE

## 2020-10-14 RX ORDER — IBUPROFEN 600 MG/1
600 TABLET ORAL EVERY 6 HOURS PRN
Status: DISPENSED | OUTPATIENT
Start: 2020-10-14 | End: 2020-10-17

## 2020-10-14 RX ADMIN — PANTOPRAZOLE SODIUM 40 MG: 40 TABLET, DELAYED RELEASE ORAL at 06:18

## 2020-10-14 RX ADMIN — PANTOPRAZOLE SODIUM 40 MG: 40 TABLET, DELAYED RELEASE ORAL at 17:45

## 2020-10-14 RX ADMIN — IBUPROFEN 600 MG: 600 TABLET ORAL at 14:39

## 2020-10-14 RX ADMIN — NYSTATIN 1 APPLICATION: 100000 POWDER TOPICAL at 09:06

## 2020-10-14 RX ADMIN — HEPARIN SODIUM 5000 UNITS: 5000 INJECTION INTRAVENOUS; SUBCUTANEOUS at 14:39

## 2020-10-14 RX ADMIN — OXYCODONE HYDROCHLORIDE 2.5 MG: 5 TABLET ORAL at 04:28

## 2020-10-14 RX ADMIN — HEPARIN SODIUM 5000 UNITS: 5000 INJECTION INTRAVENOUS; SUBCUTANEOUS at 21:47

## 2020-10-14 RX ADMIN — HEPARIN SODIUM 5000 UNITS: 5000 INJECTION INTRAVENOUS; SUBCUTANEOUS at 06:19

## 2020-10-14 RX ADMIN — SENNOSIDES AND DOCUSATE SODIUM 1 TABLET: 8.6; 5 TABLET ORAL at 09:06

## 2020-10-14 RX ADMIN — NYSTATIN 1 APPLICATION: 100000 POWDER TOPICAL at 17:44

## 2020-10-14 RX ADMIN — ATORVASTATIN CALCIUM 40 MG: 40 TABLET, FILM COATED ORAL at 17:45

## 2020-10-14 RX ADMIN — IBUPROFEN 600 MG: 600 TABLET ORAL at 22:27

## 2020-10-14 RX ADMIN — METOPROLOL TARTRATE 50 MG: 50 TABLET, FILM COATED ORAL at 06:18

## 2020-10-14 RX ADMIN — OXYCODONE HYDROCHLORIDE 5 MG: 5 TABLET ORAL at 09:07

## 2020-10-15 PROCEDURE — 97530 THERAPEUTIC ACTIVITIES: CPT

## 2020-10-15 PROCEDURE — 97110 THERAPEUTIC EXERCISES: CPT

## 2020-10-15 PROCEDURE — 97116 GAIT TRAINING THERAPY: CPT

## 2020-10-15 RX ADMIN — NYSTATIN 1 APPLICATION: 100000 POWDER TOPICAL at 17:29

## 2020-10-15 RX ADMIN — HEPARIN SODIUM 5000 UNITS: 5000 INJECTION INTRAVENOUS; SUBCUTANEOUS at 13:48

## 2020-10-15 RX ADMIN — PANTOPRAZOLE SODIUM 40 MG: 40 TABLET, DELAYED RELEASE ORAL at 16:56

## 2020-10-15 RX ADMIN — PANTOPRAZOLE SODIUM 40 MG: 40 TABLET, DELAYED RELEASE ORAL at 06:09

## 2020-10-15 RX ADMIN — HEPARIN SODIUM 5000 UNITS: 5000 INJECTION INTRAVENOUS; SUBCUTANEOUS at 06:12

## 2020-10-15 RX ADMIN — IBUPROFEN 600 MG: 600 TABLET ORAL at 19:57

## 2020-10-15 RX ADMIN — HEPARIN SODIUM 5000 UNITS: 5000 INJECTION INTRAVENOUS; SUBCUTANEOUS at 21:27

## 2020-10-15 RX ADMIN — METOPROLOL TARTRATE 50 MG: 50 TABLET, FILM COATED ORAL at 06:09

## 2020-10-15 RX ADMIN — NYSTATIN 1 APPLICATION: 100000 POWDER TOPICAL at 10:07

## 2020-10-15 RX ADMIN — ATORVASTATIN CALCIUM 40 MG: 40 TABLET, FILM COATED ORAL at 16:56

## 2020-10-16 PROBLEM — M25.532 LEFT WRIST PAIN: Status: ACTIVE | Noted: 2020-10-16

## 2020-10-16 PROCEDURE — 99308 SBSQ NF CARE LOW MDM 20: CPT | Performed by: INTERNAL MEDICINE

## 2020-10-16 PROCEDURE — 97530 THERAPEUTIC ACTIVITIES: CPT

## 2020-10-16 PROCEDURE — 97116 GAIT TRAINING THERAPY: CPT

## 2020-10-16 PROCEDURE — 97535 SELF CARE MNGMENT TRAINING: CPT

## 2020-10-16 PROCEDURE — 97110 THERAPEUTIC EXERCISES: CPT

## 2020-10-16 RX ORDER — AMLODIPINE BESYLATE 5 MG/1
5 TABLET ORAL DAILY
Status: DISCONTINUED | OUTPATIENT
Start: 2020-10-16 | End: 2020-10-24 | Stop reason: HOSPADM

## 2020-10-16 RX ADMIN — SENNOSIDES AND DOCUSATE SODIUM 1 TABLET: 8.6; 5 TABLET ORAL at 17:14

## 2020-10-16 RX ADMIN — HEPARIN SODIUM 5000 UNITS: 5000 INJECTION INTRAVENOUS; SUBCUTANEOUS at 05:58

## 2020-10-16 RX ADMIN — ATORVASTATIN CALCIUM 40 MG: 40 TABLET, FILM COATED ORAL at 17:14

## 2020-10-16 RX ADMIN — METOPROLOL TARTRATE 50 MG: 50 TABLET, FILM COATED ORAL at 06:15

## 2020-10-16 RX ADMIN — PANTOPRAZOLE SODIUM 40 MG: 40 TABLET, DELAYED RELEASE ORAL at 17:14

## 2020-10-16 RX ADMIN — HEPARIN SODIUM 5000 UNITS: 5000 INJECTION INTRAVENOUS; SUBCUTANEOUS at 22:31

## 2020-10-16 RX ADMIN — PANTOPRAZOLE SODIUM 40 MG: 40 TABLET, DELAYED RELEASE ORAL at 06:14

## 2020-10-16 RX ADMIN — SENNOSIDES AND DOCUSATE SODIUM 1 TABLET: 8.6; 5 TABLET ORAL at 10:52

## 2020-10-16 RX ADMIN — AMLODIPINE BESYLATE 5 MG: 5 TABLET ORAL at 10:51

## 2020-10-16 RX ADMIN — HEPARIN SODIUM 5000 UNITS: 5000 INJECTION INTRAVENOUS; SUBCUTANEOUS at 14:00

## 2020-10-16 RX ADMIN — IBUPROFEN 600 MG: 600 TABLET ORAL at 19:39

## 2020-10-16 RX ADMIN — NYSTATIN 1 APPLICATION: 100000 POWDER TOPICAL at 10:52

## 2020-10-16 RX ADMIN — NYSTATIN 1 APPLICATION: 100000 POWDER TOPICAL at 17:14

## 2020-10-17 RX ADMIN — PANTOPRAZOLE SODIUM 40 MG: 40 TABLET, DELAYED RELEASE ORAL at 06:32

## 2020-10-17 RX ADMIN — AMLODIPINE BESYLATE 5 MG: 5 TABLET ORAL at 10:34

## 2020-10-17 RX ADMIN — SENNOSIDES AND DOCUSATE SODIUM 1 TABLET: 8.6; 5 TABLET ORAL at 10:33

## 2020-10-17 RX ADMIN — HEPARIN SODIUM 5000 UNITS: 5000 INJECTION INTRAVENOUS; SUBCUTANEOUS at 14:39

## 2020-10-17 RX ADMIN — NYSTATIN 1 APPLICATION: 100000 POWDER TOPICAL at 09:00

## 2020-10-17 RX ADMIN — PANTOPRAZOLE SODIUM 40 MG: 40 TABLET, DELAYED RELEASE ORAL at 16:55

## 2020-10-17 RX ADMIN — HEPARIN SODIUM 5000 UNITS: 5000 INJECTION INTRAVENOUS; SUBCUTANEOUS at 06:32

## 2020-10-17 RX ADMIN — NYSTATIN 1 APPLICATION: 100000 POWDER TOPICAL at 17:27

## 2020-10-17 RX ADMIN — MELATONIN TAB 3 MG 3 MG: 3 TAB at 21:46

## 2020-10-17 RX ADMIN — METOPROLOL TARTRATE 50 MG: 50 TABLET, FILM COATED ORAL at 06:32

## 2020-10-17 RX ADMIN — HEPARIN SODIUM 5000 UNITS: 5000 INJECTION INTRAVENOUS; SUBCUTANEOUS at 21:46

## 2020-10-17 RX ADMIN — ATORVASTATIN CALCIUM 40 MG: 40 TABLET, FILM COATED ORAL at 16:55

## 2020-10-18 PROCEDURE — 97116 GAIT TRAINING THERAPY: CPT

## 2020-10-18 PROCEDURE — 97530 THERAPEUTIC ACTIVITIES: CPT

## 2020-10-18 RX ADMIN — METOPROLOL TARTRATE 50 MG: 50 TABLET, FILM COATED ORAL at 06:18

## 2020-10-18 RX ADMIN — NYSTATIN 1 APPLICATION: 100000 POWDER TOPICAL at 17:16

## 2020-10-18 RX ADMIN — AMLODIPINE BESYLATE 5 MG: 5 TABLET ORAL at 09:21

## 2020-10-18 RX ADMIN — DIPHENHYDRAMINE HCL 25 MG: 25 TABLET ORAL at 21:11

## 2020-10-18 RX ADMIN — HEPARIN SODIUM 5000 UNITS: 5000 INJECTION INTRAVENOUS; SUBCUTANEOUS at 06:18

## 2020-10-18 RX ADMIN — PANTOPRAZOLE SODIUM 40 MG: 40 TABLET, DELAYED RELEASE ORAL at 16:07

## 2020-10-18 RX ADMIN — HEPARIN SODIUM 5000 UNITS: 5000 INJECTION INTRAVENOUS; SUBCUTANEOUS at 14:45

## 2020-10-18 RX ADMIN — ATORVASTATIN CALCIUM 40 MG: 40 TABLET, FILM COATED ORAL at 16:07

## 2020-10-18 RX ADMIN — HEPARIN SODIUM 5000 UNITS: 5000 INJECTION INTRAVENOUS; SUBCUTANEOUS at 21:11

## 2020-10-18 RX ADMIN — MELATONIN TAB 3 MG 3 MG: 3 TAB at 21:11

## 2020-10-18 RX ADMIN — PANTOPRAZOLE SODIUM 40 MG: 40 TABLET, DELAYED RELEASE ORAL at 06:18

## 2020-10-18 RX ADMIN — NYSTATIN 1 APPLICATION: 100000 POWDER TOPICAL at 09:21

## 2020-10-19 PROCEDURE — 97530 THERAPEUTIC ACTIVITIES: CPT

## 2020-10-19 PROCEDURE — 97110 THERAPEUTIC EXERCISES: CPT

## 2020-10-19 PROCEDURE — 97116 GAIT TRAINING THERAPY: CPT

## 2020-10-19 PROCEDURE — 97535 SELF CARE MNGMENT TRAINING: CPT

## 2020-10-19 RX ADMIN — DIPHENHYDRAMINE HCL 25 MG: 25 TABLET ORAL at 21:37

## 2020-10-19 RX ADMIN — NYSTATIN 1 APPLICATION: 100000 POWDER TOPICAL at 17:27

## 2020-10-19 RX ADMIN — PANTOPRAZOLE SODIUM 40 MG: 40 TABLET, DELAYED RELEASE ORAL at 15:47

## 2020-10-19 RX ADMIN — AMLODIPINE BESYLATE 5 MG: 5 TABLET ORAL at 08:32

## 2020-10-19 RX ADMIN — PANTOPRAZOLE SODIUM 40 MG: 40 TABLET, DELAYED RELEASE ORAL at 08:32

## 2020-10-19 RX ADMIN — ATORVASTATIN CALCIUM 40 MG: 40 TABLET, FILM COATED ORAL at 15:47

## 2020-10-19 RX ADMIN — HEPARIN SODIUM 5000 UNITS: 5000 INJECTION INTRAVENOUS; SUBCUTANEOUS at 14:01

## 2020-10-19 RX ADMIN — NYSTATIN 1 APPLICATION: 100000 POWDER TOPICAL at 08:33

## 2020-10-19 RX ADMIN — HEPARIN SODIUM 5000 UNITS: 5000 INJECTION INTRAVENOUS; SUBCUTANEOUS at 06:11

## 2020-10-19 RX ADMIN — PANTOPRAZOLE SODIUM 40 MG: 40 TABLET, DELAYED RELEASE ORAL at 06:10

## 2020-10-19 RX ADMIN — METOPROLOL TARTRATE 50 MG: 50 TABLET, FILM COATED ORAL at 06:10

## 2020-10-19 RX ADMIN — HEPARIN SODIUM 5000 UNITS: 5000 INJECTION INTRAVENOUS; SUBCUTANEOUS at 21:37

## 2020-10-20 PROCEDURE — 97116 GAIT TRAINING THERAPY: CPT

## 2020-10-20 PROCEDURE — 97530 THERAPEUTIC ACTIVITIES: CPT

## 2020-10-20 PROCEDURE — 97110 THERAPEUTIC EXERCISES: CPT

## 2020-10-20 RX ADMIN — HEPARIN SODIUM 5000 UNITS: 5000 INJECTION INTRAVENOUS; SUBCUTANEOUS at 05:23

## 2020-10-20 RX ADMIN — NYSTATIN 1 APPLICATION: 100000 POWDER TOPICAL at 08:36

## 2020-10-20 RX ADMIN — PANTOPRAZOLE SODIUM 40 MG: 40 TABLET, DELAYED RELEASE ORAL at 16:21

## 2020-10-20 RX ADMIN — DIPHENHYDRAMINE HCL 25 MG: 25 TABLET ORAL at 21:32

## 2020-10-20 RX ADMIN — ATORVASTATIN CALCIUM 40 MG: 40 TABLET, FILM COATED ORAL at 16:21

## 2020-10-20 RX ADMIN — AMLODIPINE BESYLATE 5 MG: 5 TABLET ORAL at 08:36

## 2020-10-20 RX ADMIN — METOPROLOL TARTRATE 50 MG: 50 TABLET, FILM COATED ORAL at 05:23

## 2020-10-20 RX ADMIN — HEPARIN SODIUM 5000 UNITS: 5000 INJECTION INTRAVENOUS; SUBCUTANEOUS at 21:30

## 2020-10-20 RX ADMIN — HEPARIN SODIUM 5000 UNITS: 5000 INJECTION INTRAVENOUS; SUBCUTANEOUS at 16:20

## 2020-10-20 RX ADMIN — PANTOPRAZOLE SODIUM 40 MG: 40 TABLET, DELAYED RELEASE ORAL at 06:10

## 2020-10-21 PROCEDURE — 97535 SELF CARE MNGMENT TRAINING: CPT

## 2020-10-21 PROCEDURE — 97116 GAIT TRAINING THERAPY: CPT

## 2020-10-21 PROCEDURE — 97530 THERAPEUTIC ACTIVITIES: CPT

## 2020-10-21 PROCEDURE — 99232 SBSQ HOSP IP/OBS MODERATE 35: CPT | Performed by: INTERNAL MEDICINE

## 2020-10-21 PROCEDURE — 97110 THERAPEUTIC EXERCISES: CPT

## 2020-10-21 RX ADMIN — AMLODIPINE BESYLATE 5 MG: 5 TABLET ORAL at 09:12

## 2020-10-21 RX ADMIN — PANTOPRAZOLE SODIUM 40 MG: 40 TABLET, DELAYED RELEASE ORAL at 06:21

## 2020-10-21 RX ADMIN — MELATONIN TAB 3 MG 3 MG: 3 TAB at 21:20

## 2020-10-21 RX ADMIN — HEPARIN SODIUM 5000 UNITS: 5000 INJECTION INTRAVENOUS; SUBCUTANEOUS at 21:16

## 2020-10-21 RX ADMIN — DIPHENHYDRAMINE HCL 25 MG: 25 TABLET ORAL at 22:48

## 2020-10-21 RX ADMIN — NYSTATIN 1 APPLICATION: 100000 POWDER TOPICAL at 17:00

## 2020-10-21 RX ADMIN — NYSTATIN 1 APPLICATION: 100000 POWDER TOPICAL at 09:17

## 2020-10-21 RX ADMIN — HEPARIN SODIUM 5000 UNITS: 5000 INJECTION INTRAVENOUS; SUBCUTANEOUS at 14:57

## 2020-10-21 RX ADMIN — MELATONIN TAB 3 MG 3 MG: 3 TAB at 00:01

## 2020-10-21 RX ADMIN — METOPROLOL TARTRATE 50 MG: 50 TABLET, FILM COATED ORAL at 05:45

## 2020-10-21 RX ADMIN — ATORVASTATIN CALCIUM 40 MG: 40 TABLET, FILM COATED ORAL at 16:58

## 2020-10-21 RX ADMIN — HEPARIN SODIUM 5000 UNITS: 5000 INJECTION INTRAVENOUS; SUBCUTANEOUS at 05:43

## 2020-10-21 RX ADMIN — PANTOPRAZOLE SODIUM 40 MG: 40 TABLET, DELAYED RELEASE ORAL at 16:58

## 2020-10-22 ENCOUNTER — OFFICE VISIT (OUTPATIENT)
Dept: SURGERY | Facility: CLINIC | Age: 75
End: 2020-10-22

## 2020-10-22 VITALS — HEIGHT: 68 IN | TEMPERATURE: 97.7 F | BODY MASS INDEX: 23 KG/M2 | HEART RATE: 105 BPM

## 2020-10-22 DIAGNOSIS — Z90.49 STATUS POST CHOLECYSTECTOMY: Primary | ICD-10-CM

## 2020-10-22 PROCEDURE — 97110 THERAPEUTIC EXERCISES: CPT

## 2020-10-22 PROCEDURE — 99024 POSTOP FOLLOW-UP VISIT: CPT | Performed by: SURGERY

## 2020-10-22 PROCEDURE — 97530 THERAPEUTIC ACTIVITIES: CPT

## 2020-10-22 PROCEDURE — 97535 SELF CARE MNGMENT TRAINING: CPT

## 2020-10-22 PROCEDURE — 97116 GAIT TRAINING THERAPY: CPT

## 2020-10-22 RX ADMIN — HEPARIN SODIUM 5000 UNITS: 5000 INJECTION INTRAVENOUS; SUBCUTANEOUS at 06:22

## 2020-10-22 RX ADMIN — HEPARIN SODIUM 5000 UNITS: 5000 INJECTION INTRAVENOUS; SUBCUTANEOUS at 21:18

## 2020-10-22 RX ADMIN — MELATONIN TAB 3 MG 3 MG: 3 TAB at 21:21

## 2020-10-22 RX ADMIN — PANTOPRAZOLE SODIUM 40 MG: 40 TABLET, DELAYED RELEASE ORAL at 06:22

## 2020-10-22 RX ADMIN — DIPHENHYDRAMINE HCL 25 MG: 25 TABLET ORAL at 22:11

## 2020-10-22 RX ADMIN — NYSTATIN 1 APPLICATION: 100000 POWDER TOPICAL at 17:13

## 2020-10-22 RX ADMIN — METOPROLOL TARTRATE 50 MG: 50 TABLET, FILM COATED ORAL at 06:22

## 2020-10-22 RX ADMIN — PANTOPRAZOLE SODIUM 40 MG: 40 TABLET, DELAYED RELEASE ORAL at 16:24

## 2020-10-22 RX ADMIN — ATORVASTATIN CALCIUM 40 MG: 40 TABLET, FILM COATED ORAL at 16:24

## 2020-10-22 RX ADMIN — AMLODIPINE BESYLATE 5 MG: 5 TABLET ORAL at 08:30

## 2020-10-22 RX ADMIN — HEPARIN SODIUM 5000 UNITS: 5000 INJECTION INTRAVENOUS; SUBCUTANEOUS at 14:12

## 2020-10-22 RX ADMIN — NYSTATIN 1 APPLICATION: 100000 POWDER TOPICAL at 08:32

## 2020-10-23 ENCOUNTER — APPOINTMENT (OUTPATIENT)
Dept: RADIOLOGY | Facility: HOSPITAL | Age: 75
DRG: 949 | End: 2020-10-23
Payer: MEDICARE

## 2020-10-23 PROBLEM — J96.01 ACUTE HYPOXEMIC RESPIRATORY FAILURE (HCC): Status: ACTIVE | Noted: 2020-10-23

## 2020-10-23 LAB
ANION GAP SERPL CALCULATED.3IONS-SCNC: 5 MMOL/L (ref 5–14)
BASOPHILS # BLD AUTO: 0.1 THOUSANDS/ΜL (ref 0–0.1)
BASOPHILS NFR BLD AUTO: 1 % (ref 0–1)
BUN SERPL-MCNC: 17 MG/DL (ref 5–25)
CALCIUM SERPL-MCNC: 8.9 MG/DL (ref 8.4–10.2)
CHLORIDE SERPL-SCNC: 108 MMOL/L (ref 97–108)
CO2 SERPL-SCNC: 25 MMOL/L (ref 22–30)
CREAT SERPL-MCNC: 0.91 MG/DL (ref 0.7–1.5)
EOSINOPHIL # BLD AUTO: 0.2 THOUSAND/ΜL (ref 0–0.4)
EOSINOPHIL NFR BLD AUTO: 4 % (ref 0–6)
ERYTHROCYTE [DISTWIDTH] IN BLOOD BY AUTOMATED COUNT: 15.4 %
GFR SERPL CREATININE-BSD FRML MDRD: 82 ML/MIN/1.73SQ M
GLUCOSE SERPL-MCNC: 87 MG/DL (ref 70–99)
HCT VFR BLD AUTO: 30.8 % (ref 41–53)
HGB BLD-MCNC: 10.3 G/DL (ref 13.5–17.5)
LACTATE SERPL-SCNC: 1.2 MMOL/L (ref 0.7–2)
LYMPHOCYTES # BLD AUTO: 1.2 THOUSANDS/ΜL (ref 0.5–4)
LYMPHOCYTES NFR BLD AUTO: 20 % (ref 25–45)
MAGNESIUM SERPL-MCNC: 1.8 MG/DL (ref 1.6–2.3)
MCH RBC QN AUTO: 30.3 PG (ref 26–34)
MCHC RBC AUTO-ENTMCNC: 33.5 G/DL (ref 31–36)
MCV RBC AUTO: 90 FL (ref 80–100)
MONOCYTES # BLD AUTO: 0.5 THOUSAND/ΜL (ref 0.2–0.9)
MONOCYTES NFR BLD AUTO: 8 % (ref 1–10)
NEUTROPHILS # BLD AUTO: 4.2 THOUSANDS/ΜL (ref 1.8–7.8)
NEUTS SEG NFR BLD AUTO: 68 % (ref 45–65)
PLATELET # BLD AUTO: 263 THOUSANDS/UL (ref 150–450)
PMV BLD AUTO: 7.3 FL (ref 8.9–12.7)
POTASSIUM SERPL-SCNC: 4.4 MMOL/L (ref 3.6–5)
PROCALCITONIN SERPL-MCNC: 0.07 NG/ML
RBC # BLD AUTO: 3.41 MILLION/UL (ref 4.5–5.9)
SODIUM SERPL-SCNC: 138 MMOL/L (ref 137–147)
WBC # BLD AUTO: 6.2 THOUSAND/UL (ref 4.5–11)

## 2020-10-23 PROCEDURE — 71045 X-RAY EXAM CHEST 1 VIEW: CPT

## 2020-10-23 PROCEDURE — 83605 ASSAY OF LACTIC ACID: CPT | Performed by: INTERNAL MEDICINE

## 2020-10-23 PROCEDURE — 97535 SELF CARE MNGMENT TRAINING: CPT

## 2020-10-23 PROCEDURE — 83735 ASSAY OF MAGNESIUM: CPT | Performed by: INTERNAL MEDICINE

## 2020-10-23 PROCEDURE — 85025 COMPLETE CBC W/AUTO DIFF WBC: CPT | Performed by: INTERNAL MEDICINE

## 2020-10-23 PROCEDURE — 97116 GAIT TRAINING THERAPY: CPT

## 2020-10-23 PROCEDURE — 99308 SBSQ NF CARE LOW MDM 20: CPT | Performed by: INTERNAL MEDICINE

## 2020-10-23 PROCEDURE — 80048 BASIC METABOLIC PNL TOTAL CA: CPT | Performed by: INTERNAL MEDICINE

## 2020-10-23 PROCEDURE — 84145 PROCALCITONIN (PCT): CPT | Performed by: INTERNAL MEDICINE

## 2020-10-23 PROCEDURE — 97530 THERAPEUTIC ACTIVITIES: CPT

## 2020-10-23 RX ORDER — CEFPODOXIME PROXETIL 200 MG/1
400 TABLET, FILM COATED ORAL 2 TIMES DAILY WITH MEALS
Status: DISCONTINUED | OUTPATIENT
Start: 2020-10-23 | End: 2020-10-24 | Stop reason: HOSPADM

## 2020-10-23 RX ORDER — FUROSEMIDE 40 MG/1
40 TABLET ORAL ONCE
Status: COMPLETED | OUTPATIENT
Start: 2020-10-23 | End: 2020-10-23

## 2020-10-23 RX ORDER — AZITHROMYCIN 250 MG/1
500 TABLET, FILM COATED ORAL EVERY 24 HOURS
Status: DISCONTINUED | OUTPATIENT
Start: 2020-10-23 | End: 2020-10-24 | Stop reason: HOSPADM

## 2020-10-23 RX ADMIN — CEFPODOXIME PROXETIL 400 MG: 200 TABLET, FILM COATED ORAL at 17:35

## 2020-10-23 RX ADMIN — DIPHENHYDRAMINE HCL 25 MG: 25 TABLET ORAL at 21:51

## 2020-10-23 RX ADMIN — HEPARIN SODIUM 5000 UNITS: 5000 INJECTION INTRAVENOUS; SUBCUTANEOUS at 06:00

## 2020-10-23 RX ADMIN — PANTOPRAZOLE SODIUM 40 MG: 40 TABLET, DELAYED RELEASE ORAL at 06:18

## 2020-10-23 RX ADMIN — HEPARIN SODIUM 5000 UNITS: 5000 INJECTION INTRAVENOUS; SUBCUTANEOUS at 14:00

## 2020-10-23 RX ADMIN — METOPROLOL TARTRATE 50 MG: 50 TABLET, FILM COATED ORAL at 06:18

## 2020-10-23 RX ADMIN — NYSTATIN 1 APPLICATION: 100000 POWDER TOPICAL at 08:59

## 2020-10-23 RX ADMIN — FUROSEMIDE 40 MG: 40 TABLET ORAL at 11:27

## 2020-10-23 RX ADMIN — MELATONIN TAB 3 MG 3 MG: 3 TAB at 21:49

## 2020-10-23 RX ADMIN — AMLODIPINE BESYLATE 5 MG: 5 TABLET ORAL at 08:59

## 2020-10-23 RX ADMIN — AZITHROMYCIN 500 MG: 250 TABLET, FILM COATED ORAL at 14:00

## 2020-10-23 RX ADMIN — PANTOPRAZOLE SODIUM 40 MG: 40 TABLET, DELAYED RELEASE ORAL at 17:36

## 2020-10-23 RX ADMIN — NYSTATIN 1 APPLICATION: 100000 POWDER TOPICAL at 17:37

## 2020-10-23 RX ADMIN — ATORVASTATIN CALCIUM 40 MG: 40 TABLET, FILM COATED ORAL at 17:35

## 2020-10-24 VITALS
DIASTOLIC BLOOD PRESSURE: 79 MMHG | TEMPERATURE: 98.6 F | RESPIRATION RATE: 19 BRPM | HEART RATE: 80 BPM | OXYGEN SATURATION: 93 % | WEIGHT: 154.32 LBS | HEIGHT: 68 IN | SYSTOLIC BLOOD PRESSURE: 134 MMHG | BODY MASS INDEX: 23.39 KG/M2

## 2020-10-24 PROBLEM — M25.532 LEFT WRIST PAIN: Status: RESOLVED | Noted: 2020-10-16 | Resolved: 2020-10-24

## 2020-10-24 PROBLEM — J96.01 ACUTE HYPOXEMIC RESPIRATORY FAILURE (HCC): Status: RESOLVED | Noted: 2020-10-23 | Resolved: 2020-10-24

## 2020-10-24 LAB
ANION GAP SERPL CALCULATED.3IONS-SCNC: 5 MMOL/L (ref 5–14)
BASOPHILS # BLD AUTO: 0 THOUSANDS/ΜL (ref 0–0.1)
BASOPHILS NFR BLD AUTO: 1 % (ref 0–1)
BUN SERPL-MCNC: 15 MG/DL (ref 5–25)
CALCIUM SERPL-MCNC: 8.7 MG/DL (ref 8.4–10.2)
CHLORIDE SERPL-SCNC: 107 MMOL/L (ref 97–108)
CO2 SERPL-SCNC: 24 MMOL/L (ref 22–30)
CREAT SERPL-MCNC: 1.01 MG/DL (ref 0.7–1.5)
EOSINOPHIL # BLD AUTO: 0.2 THOUSAND/ΜL (ref 0–0.4)
EOSINOPHIL NFR BLD AUTO: 4 % (ref 0–6)
ERYTHROCYTE [DISTWIDTH] IN BLOOD BY AUTOMATED COUNT: 15.1 %
GFR SERPL CREATININE-BSD FRML MDRD: 72 ML/MIN/1.73SQ M
GLUCOSE SERPL-MCNC: 82 MG/DL (ref 70–99)
HCT VFR BLD AUTO: 27.3 % (ref 41–53)
HGB BLD-MCNC: 9.4 G/DL (ref 13.5–17.5)
INR PPP: 1.11 (ref 0.84–1.19)
LYMPHOCYTES # BLD AUTO: 1.2 THOUSANDS/ΜL (ref 0.5–4)
LYMPHOCYTES NFR BLD AUTO: 26 % (ref 25–45)
MCH RBC QN AUTO: 30.7 PG (ref 26–34)
MCHC RBC AUTO-ENTMCNC: 34.4 G/DL (ref 31–36)
MCV RBC AUTO: 89 FL (ref 80–100)
MONOCYTES # BLD AUTO: 0.3 THOUSAND/ΜL (ref 0.2–0.9)
MONOCYTES NFR BLD AUTO: 6 % (ref 1–10)
NEUTROPHILS # BLD AUTO: 3 THOUSANDS/ΜL (ref 1.8–7.8)
NEUTS SEG NFR BLD AUTO: 63 % (ref 45–65)
PLATELET # BLD AUTO: 226 THOUSANDS/UL (ref 150–450)
PMV BLD AUTO: 7.1 FL (ref 8.9–12.7)
POTASSIUM SERPL-SCNC: 3.6 MMOL/L (ref 3.6–5)
PROCALCITONIN SERPL-MCNC: <0.05 NG/ML
PROTHROMBIN TIME: 14.4 SECONDS (ref 11.6–14.5)
RBC # BLD AUTO: 3.06 MILLION/UL (ref 4.5–5.9)
SODIUM SERPL-SCNC: 136 MMOL/L (ref 137–147)
WBC # BLD AUTO: 4.7 THOUSAND/UL (ref 4.5–11)

## 2020-10-24 PROCEDURE — 97110 THERAPEUTIC EXERCISES: CPT

## 2020-10-24 PROCEDURE — 84145 PROCALCITONIN (PCT): CPT | Performed by: INTERNAL MEDICINE

## 2020-10-24 PROCEDURE — 97530 THERAPEUTIC ACTIVITIES: CPT

## 2020-10-24 PROCEDURE — 85610 PROTHROMBIN TIME: CPT | Performed by: INTERNAL MEDICINE

## 2020-10-24 PROCEDURE — 85025 COMPLETE CBC W/AUTO DIFF WBC: CPT | Performed by: INTERNAL MEDICINE

## 2020-10-24 PROCEDURE — 97116 GAIT TRAINING THERAPY: CPT

## 2020-10-24 PROCEDURE — 99223 1ST HOSP IP/OBS HIGH 75: CPT | Performed by: INTERNAL MEDICINE

## 2020-10-24 PROCEDURE — 99316 NF DSCHRG MGMT 30 MIN+: CPT | Performed by: INTERNAL MEDICINE

## 2020-10-24 PROCEDURE — 97535 SELF CARE MNGMENT TRAINING: CPT

## 2020-10-24 PROCEDURE — 80048 BASIC METABOLIC PNL TOTAL CA: CPT | Performed by: INTERNAL MEDICINE

## 2020-10-24 RX ORDER — HYDROCHLOROTHIAZIDE 12.5 MG/1
12.5 TABLET ORAL DAILY
Status: DISCONTINUED | OUTPATIENT
Start: 2020-10-24 | End: 2020-10-24 | Stop reason: HOSPADM

## 2020-10-24 RX ADMIN — CEFPODOXIME PROXETIL 400 MG: 200 TABLET, FILM COATED ORAL at 08:30

## 2020-10-24 RX ADMIN — NYSTATIN 1 APPLICATION: 100000 POWDER TOPICAL at 09:22

## 2020-10-24 RX ADMIN — PANTOPRAZOLE SODIUM 40 MG: 40 TABLET, DELAYED RELEASE ORAL at 08:14

## 2020-10-24 RX ADMIN — HYDROCHLOROTHIAZIDE 12.5 MG: 12.5 TABLET ORAL at 09:01

## 2020-10-24 RX ADMIN — AZITHROMYCIN 500 MG: 250 TABLET, FILM COATED ORAL at 14:06

## 2020-10-24 RX ADMIN — AMLODIPINE BESYLATE 5 MG: 5 TABLET ORAL at 09:01

## 2020-10-24 RX ADMIN — METOPROLOL TARTRATE 50 MG: 50 TABLET, FILM COATED ORAL at 06:28

## 2020-10-28 ENCOUNTER — TRANSITIONAL CARE MANAGEMENT (OUTPATIENT)
Dept: INTERNAL MEDICINE CLINIC | Facility: CLINIC | Age: 75
End: 2020-10-28

## 2020-10-30 ENCOUNTER — TELEPHONE (OUTPATIENT)
Dept: SURGERY | Facility: CLINIC | Age: 75
End: 2020-10-30

## 2020-11-02 ENCOUNTER — OFFICE VISIT (OUTPATIENT)
Dept: INTERNAL MEDICINE CLINIC | Facility: CLINIC | Age: 75
End: 2020-11-02

## 2020-11-02 VITALS
SYSTOLIC BLOOD PRESSURE: 130 MMHG | BODY MASS INDEX: 23.65 KG/M2 | HEART RATE: 90 BPM | WEIGHT: 151 LBS | TEMPERATURE: 98 F | OXYGEN SATURATION: 94 % | DIASTOLIC BLOOD PRESSURE: 60 MMHG

## 2020-11-02 DIAGNOSIS — R09.02 HYPOXIA: ICD-10-CM

## 2020-11-02 DIAGNOSIS — Z90.49 STATUS POST CHOLECYSTECTOMY: Primary | ICD-10-CM

## 2020-11-02 PROCEDURE — 99495 TRANSJ CARE MGMT MOD F2F 14D: CPT | Performed by: INTERNAL MEDICINE

## 2020-11-02 RX ORDER — DICYCLOMINE HYDROCHLORIDE 10 MG/1
10 CAPSULE ORAL AS NEEDED
COMMUNITY
End: 2021-08-16 | Stop reason: ALTCHOICE

## 2020-11-02 RX ORDER — HYDROCHLOROTHIAZIDE 25 MG/1
25 TABLET ORAL DAILY
COMMUNITY
End: 2020-11-19 | Stop reason: SDUPTHER

## 2020-11-04 ENCOUNTER — OFFICE VISIT (OUTPATIENT)
Dept: WOUND CARE | Facility: HOSPITAL | Age: 75
End: 2020-11-04
Payer: MEDICARE

## 2020-11-04 VITALS
HEART RATE: 67 BPM | RESPIRATION RATE: 16 BRPM | TEMPERATURE: 97.8 F | WEIGHT: 151 LBS | SYSTOLIC BLOOD PRESSURE: 159 MMHG | DIASTOLIC BLOOD PRESSURE: 70 MMHG | BODY MASS INDEX: 22.88 KG/M2 | HEIGHT: 68 IN

## 2020-11-04 DIAGNOSIS — T81.89XA NON-HEALING SURGICAL WOUND, INITIAL ENCOUNTER: Primary | ICD-10-CM

## 2020-11-04 DIAGNOSIS — Z90.49 STATUS POST CHOLECYSTECTOMY: ICD-10-CM

## 2020-11-04 PROCEDURE — 99213 OFFICE O/P EST LOW 20 MIN: CPT | Performed by: PREVENTIVE MEDICINE

## 2020-11-04 PROCEDURE — 11042 DBRDMT SUBQ TIS 1ST 20SQCM/<: CPT | Performed by: PREVENTIVE MEDICINE

## 2020-11-04 PROCEDURE — 99202 OFFICE O/P NEW SF 15 MIN: CPT | Performed by: PREVENTIVE MEDICINE

## 2020-11-04 RX ORDER — LIDOCAINE HYDROCHLORIDE 40 MG/ML
5 SOLUTION TOPICAL ONCE
Status: COMPLETED | OUTPATIENT
Start: 2020-11-04 | End: 2020-11-04

## 2020-11-04 RX ADMIN — LIDOCAINE HYDROCHLORIDE 5 ML: 40 SOLUTION TOPICAL at 08:20

## 2020-11-12 ENCOUNTER — CONSULT (OUTPATIENT)
Dept: SURGERY | Facility: CLINIC | Age: 75
End: 2020-11-12

## 2020-11-12 VITALS
HEIGHT: 67 IN | TEMPERATURE: 97.2 F | SYSTOLIC BLOOD PRESSURE: 132 MMHG | HEART RATE: 79 BPM | WEIGHT: 151 LBS | DIASTOLIC BLOOD PRESSURE: 74 MMHG | BODY MASS INDEX: 23.7 KG/M2

## 2020-11-12 DIAGNOSIS — Z90.49 STATUS POST CHOLECYSTECTOMY: Primary | ICD-10-CM

## 2020-11-12 PROCEDURE — 99024 POSTOP FOLLOW-UP VISIT: CPT | Performed by: SURGERY

## 2020-11-16 ENCOUNTER — CLINICAL SUPPORT (OUTPATIENT)
Dept: INTERNAL MEDICINE CLINIC | Facility: CLINIC | Age: 75
End: 2020-11-16

## 2020-11-16 ENCOUNTER — OFFICE VISIT (OUTPATIENT)
Dept: WOUND CARE | Facility: HOSPITAL | Age: 75
End: 2020-11-16
Payer: MEDICARE

## 2020-11-16 VITALS
HEART RATE: 72 BPM | DIASTOLIC BLOOD PRESSURE: 70 MMHG | RESPIRATION RATE: 18 BRPM | TEMPERATURE: 97.2 F | SYSTOLIC BLOOD PRESSURE: 157 MMHG

## 2020-11-16 DIAGNOSIS — Z23 NEED FOR INFLUENZA VACCINATION: Primary | ICD-10-CM

## 2020-11-16 DIAGNOSIS — T81.89XA NON-HEALING SURGICAL WOUND, INITIAL ENCOUNTER: Primary | ICD-10-CM

## 2020-11-16 PROCEDURE — G0008 ADMIN INFLUENZA VIRUS VAC: HCPCS | Performed by: HOSPITALIST

## 2020-11-16 PROCEDURE — 11042 DBRDMT SUBQ TIS 1ST 20SQCM/<: CPT | Performed by: SURGERY

## 2020-11-16 PROCEDURE — 90662 IIV NO PRSV INCREASED AG IM: CPT | Performed by: HOSPITALIST

## 2020-11-16 RX ORDER — LIDOCAINE HYDROCHLORIDE 40 MG/ML
5 SOLUTION TOPICAL ONCE
Status: COMPLETED | OUTPATIENT
Start: 2020-11-16 | End: 2020-11-16

## 2020-11-16 RX ADMIN — LIDOCAINE HYDROCHLORIDE 5 ML: 40 SOLUTION TOPICAL at 13:13

## 2020-11-19 DIAGNOSIS — I10 ESSENTIAL HYPERTENSION: Primary | ICD-10-CM

## 2020-11-19 RX ORDER — HYDROCHLOROTHIAZIDE 25 MG/1
25 TABLET ORAL DAILY
Qty: 90 TABLET | Refills: 3 | Status: SHIPPED | OUTPATIENT
Start: 2020-11-19 | End: 2020-12-02 | Stop reason: ALTCHOICE

## 2020-11-30 ENCOUNTER — OFFICE VISIT (OUTPATIENT)
Dept: WOUND CARE | Facility: HOSPITAL | Age: 75
End: 2020-11-30
Payer: MEDICARE

## 2020-11-30 VITALS
TEMPERATURE: 98.1 F | SYSTOLIC BLOOD PRESSURE: 140 MMHG | RESPIRATION RATE: 18 BRPM | HEART RATE: 60 BPM | DIASTOLIC BLOOD PRESSURE: 72 MMHG

## 2020-11-30 DIAGNOSIS — T81.89XA NON-HEALING SURGICAL WOUND, INITIAL ENCOUNTER: Primary | ICD-10-CM

## 2020-11-30 PROCEDURE — 99213 OFFICE O/P EST LOW 20 MIN: CPT | Performed by: SURGERY

## 2020-11-30 PROCEDURE — 99212 OFFICE O/P EST SF 10 MIN: CPT | Performed by: SURGERY

## 2020-11-30 RX ORDER — LIDOCAINE HYDROCHLORIDE 40 MG/ML
5 SOLUTION TOPICAL ONCE
Status: COMPLETED | OUTPATIENT
Start: 2020-11-30 | End: 2020-11-30

## 2020-11-30 RX ADMIN — LIDOCAINE HYDROCHLORIDE 5 ML: 40 SOLUTION TOPICAL at 13:17

## 2020-12-02 ENCOUNTER — OFFICE VISIT (OUTPATIENT)
Dept: CARDIOLOGY CLINIC | Facility: CLINIC | Age: 75
End: 2020-12-02
Payer: MEDICARE

## 2020-12-02 VITALS
WEIGHT: 160 LBS | SYSTOLIC BLOOD PRESSURE: 120 MMHG | HEART RATE: 72 BPM | DIASTOLIC BLOOD PRESSURE: 62 MMHG | HEIGHT: 68 IN | BODY MASS INDEX: 24.25 KG/M2

## 2020-12-02 DIAGNOSIS — E78.5 DYSLIPIDEMIA: ICD-10-CM

## 2020-12-02 DIAGNOSIS — I10 ESSENTIAL HYPERTENSION: Primary | ICD-10-CM

## 2020-12-02 DIAGNOSIS — R00.2 PALPITATION: ICD-10-CM

## 2020-12-02 DIAGNOSIS — I25.10 CORONARY ARTERY DISEASE INVOLVING NATIVE HEART WITHOUT ANGINA PECTORIS, UNSPECIFIED VESSEL OR LESION TYPE: ICD-10-CM

## 2020-12-02 PROCEDURE — 99213 OFFICE O/P EST LOW 20 MIN: CPT | Performed by: INTERNAL MEDICINE

## 2020-12-02 RX ORDER — FUROSEMIDE 20 MG/1
20 TABLET ORAL 2 TIMES DAILY
Qty: 90 TABLET | Refills: 0 | Status: SHIPPED | OUTPATIENT
Start: 2020-12-02 | End: 2021-01-06

## 2020-12-02 RX ORDER — POTASSIUM CHLORIDE 750 MG/1
10 TABLET, EXTENDED RELEASE ORAL DAILY
Qty: 90 TABLET | Refills: 0 | Status: SHIPPED | OUTPATIENT
Start: 2020-12-02 | End: 2021-01-06 | Stop reason: SDUPTHER

## 2020-12-07 ENCOUNTER — OFFICE VISIT (OUTPATIENT)
Dept: WOUND CARE | Facility: HOSPITAL | Age: 75
End: 2020-12-07
Payer: MEDICARE

## 2020-12-07 DIAGNOSIS — T81.89XA NON-HEALING SURGICAL WOUND, INITIAL ENCOUNTER: Primary | ICD-10-CM

## 2020-12-07 PROCEDURE — 99212 OFFICE O/P EST SF 10 MIN: CPT | Performed by: SURGERY

## 2020-12-07 PROCEDURE — 99213 OFFICE O/P EST LOW 20 MIN: CPT | Performed by: SURGERY

## 2020-12-07 RX ORDER — LIDOCAINE HYDROCHLORIDE 40 MG/ML
1 SOLUTION TOPICAL ONCE
Status: COMPLETED | OUTPATIENT
Start: 2020-12-07 | End: 2020-12-07

## 2020-12-07 RX ADMIN — LIDOCAINE HYDROCHLORIDE 1 ML: 40 SOLUTION TOPICAL at 10:54

## 2020-12-08 ENCOUNTER — HOSPITAL ENCOUNTER (OUTPATIENT)
Dept: NON INVASIVE DIAGNOSTICS | Facility: CLINIC | Age: 75
Discharge: HOME | End: 2020-12-08
Payer: MEDICARE

## 2020-12-08 DIAGNOSIS — R00.2 PALPITATION: ICD-10-CM

## 2020-12-08 PROCEDURE — 93225 XTRNL ECG REC<48 HRS REC: CPT

## 2020-12-08 PROCEDURE — 93226 XTRNL ECG REC<48 HR SCAN A/R: CPT

## 2020-12-09 ENCOUNTER — LAB REQUISITION (OUTPATIENT)
Dept: LAB | Facility: HOSPITAL | Age: 75
End: 2020-12-09
Payer: MEDICARE

## 2020-12-09 ENCOUNTER — TELEPHONE (OUTPATIENT)
Dept: CARDIOLOGY CLINIC | Facility: CLINIC | Age: 75
End: 2020-12-09

## 2020-12-09 DIAGNOSIS — E78.5 HYPERLIPIDEMIA, UNSPECIFIED: ICD-10-CM

## 2020-12-09 DIAGNOSIS — I10 ESSENTIAL (PRIMARY) HYPERTENSION: ICD-10-CM

## 2020-12-09 LAB
ALBUMIN SERPL BCP-MCNC: 3.2 G/DL (ref 3.5–5)
ALP SERPL-CCNC: 96 U/L (ref 46–116)
ALT SERPL W P-5'-P-CCNC: 22 U/L (ref 12–78)
ANION GAP SERPL CALCULATED.3IONS-SCNC: 8 MMOL/L (ref 4–13)
AST SERPL W P-5'-P-CCNC: 21 U/L (ref 5–45)
BILIRUB SERPL-MCNC: 0.51 MG/DL (ref 0.2–1)
BUN SERPL-MCNC: 25 MG/DL (ref 5–25)
CALCIUM ALBUM COR SERPL-MCNC: 9.9 MG/DL (ref 8.3–10.1)
CALCIUM SERPL-MCNC: 9.3 MG/DL (ref 8.3–10.1)
CHLORIDE SERPL-SCNC: 105 MMOL/L (ref 100–108)
CO2 SERPL-SCNC: 27 MMOL/L (ref 21–32)
CREAT SERPL-MCNC: 1.06 MG/DL (ref 0.6–1.3)
GFR SERPL CREATININE-BSD FRML MDRD: 68 ML/MIN/1.73SQ M
GLUCOSE SERPL-MCNC: 98 MG/DL (ref 65–140)
POTASSIUM SERPL-SCNC: 3.6 MMOL/L (ref 3.5–5.3)
PROT SERPL-MCNC: 8.3 G/DL (ref 6.4–8.2)
SODIUM SERPL-SCNC: 140 MMOL/L (ref 136–145)

## 2020-12-09 PROCEDURE — 80053 COMPREHEN METABOLIC PANEL: CPT | Performed by: INTERNAL MEDICINE

## 2020-12-15 PROCEDURE — 93227 XTRNL ECG REC<48 HR R&I: CPT | Performed by: INTERNAL MEDICINE

## 2020-12-16 ENCOUNTER — TELEPHONE (OUTPATIENT)
Dept: CARDIOLOGY CLINIC | Facility: CLINIC | Age: 75
End: 2020-12-16

## 2020-12-21 ENCOUNTER — OFFICE VISIT (OUTPATIENT)
Dept: WOUND CARE | Facility: HOSPITAL | Age: 75
End: 2020-12-21
Payer: MEDICARE

## 2020-12-21 VITALS
SYSTOLIC BLOOD PRESSURE: 132 MMHG | HEART RATE: 88 BPM | TEMPERATURE: 98.1 F | RESPIRATION RATE: 16 BRPM | DIASTOLIC BLOOD PRESSURE: 62 MMHG

## 2020-12-21 DIAGNOSIS — T81.89XA NON-HEALING SURGICAL WOUND, INITIAL ENCOUNTER: Primary | ICD-10-CM

## 2020-12-21 DIAGNOSIS — Z90.49 STATUS POST CHOLECYSTECTOMY: ICD-10-CM

## 2020-12-21 PROCEDURE — 97597 DBRDMT OPN WND 1ST 20 CM/<: CPT | Performed by: PREVENTIVE MEDICINE

## 2020-12-21 RX ORDER — LIDOCAINE HYDROCHLORIDE 40 MG/ML
5 SOLUTION TOPICAL ONCE
Status: COMPLETED | OUTPATIENT
Start: 2020-12-21 | End: 2020-12-21

## 2020-12-21 RX ADMIN — LIDOCAINE HYDROCHLORIDE 5 ML: 40 SOLUTION TOPICAL at 11:08

## 2021-01-04 ENCOUNTER — OFFICE VISIT (OUTPATIENT)
Dept: WOUND CARE | Facility: HOSPITAL | Age: 76
End: 2021-01-04
Payer: MEDICARE

## 2021-01-04 VITALS
RESPIRATION RATE: 16 BRPM | TEMPERATURE: 98.9 F | HEART RATE: 68 BPM | SYSTOLIC BLOOD PRESSURE: 136 MMHG | DIASTOLIC BLOOD PRESSURE: 64 MMHG

## 2021-01-04 DIAGNOSIS — T81.89XA NON-HEALING SURGICAL WOUND, INITIAL ENCOUNTER: Primary | ICD-10-CM

## 2021-01-04 PROCEDURE — 17250 CHEM CAUT OF GRANLTJ TISSUE: CPT | Performed by: SURGERY

## 2021-01-04 RX ORDER — LIDOCAINE HYDROCHLORIDE 40 MG/ML
1 SOLUTION TOPICAL ONCE
Status: COMPLETED | OUTPATIENT
Start: 2021-01-04 | End: 2021-01-04

## 2021-01-04 RX ADMIN — LIDOCAINE HYDROCHLORIDE 1 ML: 40 SOLUTION TOPICAL at 11:15

## 2021-01-04 NOTE — PROGRESS NOTES
Patient ID: Alfa Ballard is a 67 y o  male Date of Birth 1945       Chief Complaint   Patient presents with    Follow Up Wound Care Visit     abdomen       Allergies:  Patient has no known allergies  Diagnosis:  1  Non-healing surgical wound, initial encounter  Assessment & Plan:  Suture removed from wound and silver nitrate applied    Orders:  -     lidocaine (XYLOCAINE) 4 % topical solution 1 mL  -     Wound cleansing and dressings; Future     Diagnosis ICD-10-CM Associated Orders   1   Non-healing surgical wound, initial encounter  T81 [id number] lidocaine (XYLOCAINE) 4 % topical solution 1 mL     Wound cleansing and dressings          Subjective:   HPI  Here for wound follow up  The following portions of the patient's history were reviewed and updated as appropriate:   Patient Active Problem List   Diagnosis    Hypertension    Incisional hernia    History of incisional hernia repair    Bursitis of left shoulder    Serrated adenoma of colon    Primary osteoarthritis of right knee    Gastroesophageal reflux disease    Gastroesophageal reflux disease with esophagitis    Primary osteoarthritis of one hip, left    Tubular adenoma of colon    Abdominal aortic aneurysm (AAA) without rupture ([address] )    Follow up    Dyslipidemia    Unstable angina ([address] )    Status post cholecystectomy    Ambulatory dysfunction    Surgical wound, non healing     Past Medical History:   Diagnosis Date    Abdominal aortic aneurysm (AAA) ([address] )     last assessed nov 6 2015    Abscess of groin     last assessed oct 6 2014    Arthritis     Candidiasis, cutaneous     last assessed march 19 2014    Coronary artery disease     Dyslipidemia     Esophageal ulcer without bleeding     Gastritis     Hiatal hernia     Hx of cataract surgery, left     last assessed july 21 2014    Hyperlipidemia     Hypertension     Old myocardial infarction 2000    Recurrent right inguinal hernia     s/p right orchioectomy, mesh removal, and sinus trac removal patient being followed by surgery next appt 4/28/14 patient s/p keflex x 7 days for MSSA Cx repeat wound cx grew MSSA cx repeat wound cx grew MSSA and other armond (mixed) no MRSA identified conitunue close monitoring and local wound care, last assessed april 15 2014    Renal disorder     Wound of skin     in the groin, right     Past Surgical History:   Procedure Laterality Date    ABDOMINAL AORTIC ANEURYSM REPAIR  2009    aortobi-iliac, dacron graft    APPENDECTOMY      open    CARDIAC SURGERY      CATARACT EXTRACTION Bilateral     CHOLECYSTECTOMY N/A 9/29/2020    Procedure: CHOLECYSTECTOMY;  Surgeon: Prince Farida MD;  [facility];  Service: General    COLONOSCOPY     [address]      stent 2    CYSTOSCOPY      diagnostic onset nov 13 2014    EXPLORATORY LAPAROTOMY  12/09/2009    EYE SURGERY      FL INJECTION LEFT HIP (NON ARTHROGRAM)  4/2/2019    HIP SURGERY Right     INGUINAL HERNIA REPAIR Right     unilateral x2    ORCHIECTOMY Right     SC COLONOSCOPY FLX DX W/COLLJ SPEC WHEN PFRMD N/A 5/22/2018    Procedure: COLONOSCOPY;  Surgeon: Lucie Mejía DO;  [facility]; Service: Gastroenterology    SC ESOPHAGOGASTRODUODENOSCOPY TRANSORAL DIAGNOSTIC N/A 2/14/2019    Procedure: ESOPHAGOGASTRODUODENOSCOPY (EGD); Surgeon: Tania Diaz MD;  [facility];   Service: Gastroenterology    SC LAP,DIAGNOSTIC ABDOMEN N/A 9/29/2020    Procedure: LAPAROSCOPIC DIAGNOSTIC,   ;  Surgeon: Prince Farida MD;  [facility];  Service: General    [address] [place] [address] N/A 2/23/2018    Procedure: lysis of adhesions; INCISIONAL HERNIA REPAIR WITH MESH;  Surgeon: Trudy Carrasquillo MD;  [facility];  Service: General    UPPER GASTROINTESTINAL ENDOSCOPY       Social History     Socioeconomic History    Marital status:      Spouse name: None    Number of children: None    Years of education: None    Highest education level: None   Occupational History    None   Social Needs    Financial resource strain: None    Food insecurity     Worry: None     Inability: None    Transportation needs     Medical: None     Non-medical: None   Tobacco Use    Smoking status: Former Smoker    Smokeless tobacco: Never Used    Tobacco comment: quit 20 years ago   Substance and Sexual Activity    Alcohol use: Not Currently     Frequency: Monthly or less     Binge frequency: Never     Comment: occasionally    Drug use: No    Sexual activity: None   Lifestyle    Physical activity     Days per week: None     Minutes per session: None    Stress: None   Relationships    Social connections     Talks on phone: None     Gets together: None     Attends Taoist service: None     Active member of club or organization: None     Attends meetings of clubs or organizations: None     Relationship status: None    Intimate partner violence     Fear of current or ex partner: None     Emotionally abused: None     Physically abused: None     Forced sexual activity: None   Other Topics Concern    None   Social History Narrative    None        Current Outpatient Medications:     amLODIPine (NORVASC) 5 mg tablet, Take 1 tablet (5 mg total) by mouth daily, Disp: 90 tablet, Rfl: 0    aspirin 81 MG tablet, Take 81 mg by mouth daily Resume on 3/11/18 , Disp: , Rfl:     atorvastatin (LIPITOR) 40 mg tablet, Take 1 tablet (40 mg total) by mouth daily with dinner Resume next scheduled dose upon discharge from the hospital, Disp: 90 tablet, Rfl: 3    dicyclomine (BENTYL) 10 mg capsule, Take 10 mg by mouth as needed, Disp: , Rfl:     fluticasone (FLONASE) 50 mcg/act nasal spray, 1 spray into each nostril daily for 10 days, Disp: 1 Bottle, Rfl: 0    furosemide (LASIX) 20 mg tablet, Take 1 tablet (20 mg total) by mouth 2 (two) times a day, Disp: 90 tablet, Rfl: 0    metoprolol succinate (TOPROL-XL) 50 mg 24 hr tablet, Take 1 tablet (50 mg total) by mouth daily Resume on 3/11/18, Disp: 90 tablet, Rfl: 3    nitroglycerin (NITROSTAT) 0 4 mg SL tablet, Place 1 tablet (0 4 mg total) under the tongue every 5 (five) minutes as needed for chest pain, Disp: 5 tablet, Rfl: 0    omeprazole (PriLOSEC) 40 MG capsule, TAKE 1 CAPSULE DAILY, Disp: 90 capsule, Rfl: 3    potassium chloride (K-DUR,KLOR-CON) 10 mEq tablet, Take 1 tablet (10 mEq total) by mouth daily, Disp: 90 tablet, Rfl: 0  No current facility-administered medications for this visit  Family History   Problem Relation Age of Onset    Heart disease Mother     Diabetes Mother     Heart disease Father     Diabetes Sister     Cancer Brother       Review of Systems      Objective:  /64   Pulse 68   Temp 98 9 °F (37 2 °C)   Resp 16     Physical Exam        Wound 09/29/20 Surgical Abdomen Right;Upper;Quadrant (Active)   Wound Image    01/04/21 1054   Wound Description Beefy red;Yellow 01/04/21 1058   Bailee-wound Assessment Scar Tissue 01/04/21 1058   Wound Length (cm) 1 cm 01/04/21 1058   Wound Width (cm) 1 cm 01/04/21 1058   Wound Depth (cm) 0 1 cm 01/04/21 1058   Wound Surface Area (cm^2) 1 cm^2 01/04/21 1058   Wound Volume (cm^3) 0 1 cm^3 01/04/21 1058   Calculated Wound Volume (cm^3) 0 1 cm^3 01/04/21 1058   Change in Wound Size % 96 14 01/04/21 1058   Drainage Amount Moderate 01/04/21 1058   Drainage Description Serosanguineous 01/04/21 1058   Non-staged Wound Description Full thickness 01/04/21 1058   Dressing Status Intact 01/04/21 1058     Right subcostal wound with suture and hypergranulation tissue  Procedures        After permission and placement of topical local anesthetic, 1 silver nitrate sticks were used and applied to the open areas of the wound  A dressing was applied  Patient tolerated procedure well        Wound Instructions:  Orders Placed This Encounter   Procedures    Wound cleansing and dressings     RUQ Abdominal wound:     Wash your hands with soap and water  Remove old dressing, discard into plastic bag and place in trash  Cleanse the wound with NSS prior to applying a clean dressing  Do not use tissue or cotton balls  Do not scrub the wound  Pat dry using gauze  Shower yes ----on dressing change days, keep dressings in place for the shower  Apply Skin prep to skin surrounding wound  Apply small piece of maxorb to the wound  Cover with Hydrocolloid  Change dressing 3 x weekly  Treatments above were completed today at the [address] [place] [address]     F/U in 2 weeks     Standing Status:   Future     Standing Expiration Date:   1/4/2022         Austin Arrington MD      Portions of the record may have been created with voice recognition software  Occasional wrong word or "sound a like" substitutions may have occurred due to the inherent limitations of voice recognition software  Read the chart carefully and recognize, using context, where substitutions have occurred

## 2021-01-04 NOTE — PATIENT INSTRUCTIONS
Orders Placed This Encounter   Procedures    Wound cleansing and dressings     RUQ Abdominal wound:     Wash your hands with soap and water  Remove old dressing, discard into plastic bag and place in trash  Cleanse the wound with NSS prior to applying a clean dressing  Do not use tissue or cotton balls  Do not scrub the wound  Pat dry using gauze  Shower yes ----on dressing change days, keep dressings in place for the shower  Apply Skin prep to skin surrounding wound  Apply small piece of maxorb to the wound  Cover with Hydrocolloid  Change dressing 3 x weekly     Treatments above were completed today at the [address] [place] [address]     F/U in 2 weeks     Standing Status:   Future     Standing Expiration Date:   1/4/2022

## 2021-01-05 ENCOUNTER — HOSPITAL ENCOUNTER (OUTPATIENT)
Dept: NON INVASIVE DIAGNOSTICS | Facility: CLINIC | Age: 76
Discharge: HOME | End: 2021-01-05
Payer: MEDICARE

## 2021-01-05 DIAGNOSIS — I71.4 ABDOMINAL AORTIC ANEURYSM (AAA) WITHOUT RUPTURE (HCC): ICD-10-CM

## 2021-01-05 DIAGNOSIS — I71.4 ABDOMINAL AORTIC ANEURYSM (AAA) WITHOUT RUPTURE (HCC): Primary | ICD-10-CM

## 2021-01-05 PROCEDURE — 93923 UPR/LXTR ART STDY 3+ LVLS: CPT

## 2021-01-05 PROCEDURE — 93978 VASCULAR STUDY: CPT

## 2021-01-05 PROCEDURE — 93978 VASCULAR STUDY: CPT | Performed by: SURGERY

## 2021-01-05 NOTE — OP NOTE
OPERATIVE REPORT  PATIENT NAME: Reynold Casillas    :  [birth year]  MRN: [id number]  Pt Location: [facility]    SURGERY DATE: 2019    Surgeon(s) and Role:     * Kais Lucas MD - Primary    ESOPHAGOGASTRODUODENOSCOPY    PROCEDURE: EGD    SEDATION: Monitored anesthesia care, check anesthesia records    ASA Class: 2    INDICATIONS:  Odynophagia and history of peptic ulcer disease    CONSENT:  Informed consent was obtained for the procedure, including sedation after explaining the risks and benefits of the procedure  Risks including but not limited to bleeding, perforation, infection, and missed lesion  PREPARATION:   Telemetry, pulse oximetry, blood pressure were monitored throughout the procedure  Patient was identified by myself both verbally and by visual inspection of ID band  DESCRIPTION:   Patient was placed in the left lateral decubitus position and was sedated with the above medication  The gastroscope was introduced in to the oropharynx and the esophagus was intubated under direct visualization  Scope was passed down the esophagus up to 2nd part of the duodenum  A careful inspection was made as the gastroscope was withdrawn, including a retroflexed view of the stomach; findings and interventions are described below  FINDINGS:    #1  Esophagus- LA grade D esophagitis with multiple linear erosions in the distal esophagus  This is likely secondary to uncontrolled reflux  A 5 mm nodule noted at the GE junction  The polyp was removed using cold biopsy forceps  Large mixed sliding and paraesophageal hernia noted  #2Ronney Glee cluster of polyps noted in the proximal esophagus predominantly and cardia  Multiple cold biopsies were taken from this area  Mild nonerosive gastritis noted in the antrum  Biopsy taken using cold biopsy forceps    #3  Duodenum- normal bulb and 2nd portion of the duodenum         IMPRESSIONS:      Severe esophagitis in the distal esophagus    A small nodule removed from GE junction  Cluster of polyps at the GE junction  Biopsies taken from these polyps  Mild nonerosive gastritis cold biopsies were taken to rule out H pylori infection  Normal duodenum  Large hiatal hernia noted  RECOMMENDATIONS:     Reflux precautions  I recommend starting pantoprazole 40 mg once daily  Follow-up biopsy result  Follow-up in the office  COMPLICATIONS:  None; patient tolerated the procedure well      SPECIMENS:    ID Type Source Tests Collected by Time Destination   1 : gastro esophageal junction nodule Tissue Esophagogastric junction TISSUE EXAM Thao Carr MD 2/14/2019 1036    2 : polyp in the cardia Tissue Polyp, Stomach/Small Intestine TISSUE EXAM Thao Carr MD 2/14/2019 1043    3 : r/o h pylori Tissue Stomach TISSUE EXAM Thao Carr MD 2/14/2019 1047        ESTIMATED BLOOD LOSS:  Minimal    SIGNATURE: Thao Carr MD  DATE: February 14, 2019  TIME: 11:02 AM pt denies

## 2021-01-06 ENCOUNTER — OFFICE VISIT (OUTPATIENT)
Dept: CARDIOLOGY CLINIC | Facility: CLINIC | Age: 76
End: 2021-01-06
Payer: MEDICARE

## 2021-01-06 VITALS
DIASTOLIC BLOOD PRESSURE: 62 MMHG | BODY MASS INDEX: 22.13 KG/M2 | HEART RATE: 70 BPM | SYSTOLIC BLOOD PRESSURE: 124 MMHG | WEIGHT: 146 LBS | HEIGHT: 68 IN

## 2021-01-06 DIAGNOSIS — I25.10 CORONARY ARTERY DISEASE INVOLVING NATIVE HEART WITHOUT ANGINA PECTORIS, UNSPECIFIED VESSEL OR LESION TYPE: ICD-10-CM

## 2021-01-06 DIAGNOSIS — I51.89 DIASTOLIC DYSFUNCTION: ICD-10-CM

## 2021-01-06 DIAGNOSIS — R00.2 PALPITATIONS: ICD-10-CM

## 2021-01-06 DIAGNOSIS — I10 ESSENTIAL HYPERTENSION: Primary | ICD-10-CM

## 2021-01-06 PROCEDURE — 99213 OFFICE O/P EST LOW 20 MIN: CPT | Performed by: INTERNAL MEDICINE

## 2021-01-06 RX ORDER — FUROSEMIDE 20 MG/1
20 TABLET ORAL DAILY
Qty: 90 TABLET | Refills: 2 | Status: SHIPPED | OUTPATIENT
Start: 2021-01-06 | End: 2021-07-07 | Stop reason: SDUPTHER

## 2021-01-06 RX ORDER — POTASSIUM CHLORIDE 750 MG/1
10 TABLET, EXTENDED RELEASE ORAL DAILY
Qty: 90 TABLET | Refills: 2 | Status: SHIPPED | OUTPATIENT
Start: 2021-01-06 | End: 2021-12-07 | Stop reason: SDUPTHER

## 2021-01-06 NOTE — PROGRESS NOTES
Cardiology Follow Up    Josseline Pichardop  [id number]  1945  HEART & VASCULAR [facility] [place] [id number]      1  Essential hypertension     2  Coronary artery disease involving native heart without angina pectoris, unspecified vessel or lesion type     3  Palpitations     4  Diastolic dysfunction         Discussion/Summary:  1  hypertension  2  Diastolic dysfunction  3  Intermittent palpitations- unchanged  4  CAD with history PCI in 2000  5  History of abdominal aortic aneurysm s/p repair    - weight in the office today 146 lb down from 160 lb at last office visit with improvement in lower extremity edema to now trace lower extremity edema in bilateral lower extremities  -Patient has had significant improvement in lower extremity edema and symptoms and therefore will dose of furosemide 20 mg  daily with potassium supplementation of 10 mEq daily since CMP from 12/09/2020 after being on new diuretic for 1 week showed potassium 3 6 stable from previous and creatinine 1 06 stable from previous when patient was on hydrochlorothiazide  -  Will refill patient's furosemide now through his mail service since patient notes that it is affordable to him  - Holter monitor from 12/08/2020 showing predominantly sinus rhythm with average heart rate 60 beats per minute no significant pauses or advanced degree heart block and minimal PACs and PVCs without any symptoms listed on diary   -  Blood pressure is well controlled and therefore will continue current regimen of amlodipine 5 mg daily, metoprolol succinate 50 mg daily  - lipid panel 09/24/2020 showing total cholesterol 130, HDL 41, LDL 72    Venancio Orozco and in that setting will continue current dose of atorvastatin 40 mg daily    - counseled patient on the importance of dietary and lifestyle modifications including salt restriction less than 2000 mg of salt daily and fluid restriction less than 2000 mL of fluid daily along with weighing himself on a daily basis and contacting the office if he were to have any symptoms or weight gain of 3 or more lb in 1 day  -  Patient counseled on warning or alarm type symptoms and if these were to occur should seek emergency medical care immediately  -  Will see patient in approximately 3 months or sooner if necessary      History of Present Illness:   - Patient is a 78-year-old male past medical history significant for hypertension, hyperlipidemia, coronary artery disease with PCI in 2000 that was history of abdominal aortic aneurysm repair in 2010 who presents to the office today for scheduled follow-up  Patient had been hospitalized in October of 2020 for acute cholecystitis requiring cholecystectomy and had been having issues with wound healing for which she follows with the wound clinic and worsening lower extremity edema since hospitalization   -  Currently patient notes improvement in lower extremity edema however states that this is a day-by-day  He notes that lower extremity edema is significantly improved in the mornings and then does seem to worsen throughout the day while he is on his feet  He denies any chest pain, palpitations, lightheadedness or dizziness, nausea vomiting, loss of consciousness, shortness of breath, orthopnea at this time        Patient Active Problem List   Diagnosis    CAD (coronary artery disease)    Hypertension    Incisional hernia    History of incisional hernia repair    Bursitis of left shoulder    Serrated adenoma of colon    Primary osteoarthritis of right knee    Gastroesophageal reflux disease    Gastroesophageal reflux disease with esophagitis    Primary osteoarthritis of one hip, left    Tubular adenoma of colon    Abdominal aortic aneurysm (AAA) without rupture ([facility] [address] )    Follow up    Dyslipidemia    Unstable angina (HCC)    Status post cholecystectomy    Ambulatory dysfunction    Surgical wound, non healing    Palpitations  Diastolic dysfunction     Past Medical History:   Diagnosis Date    Abdominal aortic aneurysm (AAA) ([facility] [address] )     last assessed nov 6 2015    Abscess of groin     last assessed oct 6 2014    Arthritis     Candidiasis, cutaneous     last assessed march 19 2014    Coronary artery disease     Dyslipidemia     Esophageal ulcer without bleeding     Gastritis     Hiatal hernia     Hx of cataract surgery, left     last assessed july 21 2014    Hyperlipidemia     Hypertension     Old myocardial infarction 2000    Recurrent right inguinal hernia     s/p right orchioectomy, mesh removal, and sinus trac removal patient being followed by surgery next appt 4/28/14 patient s/p keflex x 7 days for MSSA Cx repeat wound cx grew MSSA cx repeat wound cx grew MSSA and other armond (mixed) no MRSA identified conitunue close monitoring and local wound care, last assessed april 15 2014    Renal disorder     Wound of skin     in the groin, right     Social History     Socioeconomic History    Marital status:      Spouse name: Not on file    Number of children: Not on file    Years of education: Not on file    Highest education level: Not on file   Occupational History    Not on file   Social Needs    Financial resource strain: Not on file    Food insecurity     Worry: Not on file     Inability: Not on file   "[employer] needs     Medical: Not on file     Non-medical: Not on file   Tobacco Use    Smoking status: Former Smoker    Smokeless tobacco: Never Used    Tobacco comment: quit 20 years ago   Substance and Sexual Activity    Alcohol use: Not Currently     Frequency: Monthly or less     Binge frequency: Never     Comment: occasionally    Drug use: No    Sexual activity: Not on file   Lifestyle    Physical activity     Days per week: Not on file     Minutes per session: Not on file    Stress: Not on file   Relationships    Social connections     Talks on phone: Not on file     Gets together: Not on file     Attends Yazidism service: Not on file     Active member of club or organization: Not on file     Attends meetings of clubs or organizations: Not on file     Relationship status: Not on file    Intimate partner violence     Fear of current or ex partner: Not on file     Emotionally abused: Not on file     Physically abused: Not on file     Forced sexual activity: Not on file   Other Topics Concern    Not on file   Social History Narrative    Not on file      Family History   Problem Relation Age of Onset    Heart disease Mother     Diabetes Mother     Heart disease Father     Diabetes Sister     Cancer Brother      Past Surgical History:   Procedure Laterality Date    ABDOMINAL AORTIC ANEURYSM REPAIR  2009    aortobi-iliac, dacron graft    APPENDECTOMY      open    CARDIAC SURGERY      CATARACT EXTRACTION Bilateral     CHOLECYSTECTOMY N/A 9/29/2020    Procedure: CHOLECYSTECTOMY;  Surgeon: Fransisco Sun MD;  [facility];  Service: General    COLONOSCOPY      CORONARY ANGIOPLASTY WITH STENT PLACEMENT      stent 2    CYSTOSCOPY      diagnostic onset nov 13 2014    EXPLORATORY LAPAROTOMY  12/09/2009    EYE SURGERY      FL INJECTION LEFT HIP (NON ARTHROGRAM)  4/2/2019    HIP SURGERY Right     INGUINAL HERNIA REPAIR Right     unilateral x2    ORCHIECTOMY Right     SC COLONOSCOPY FLX DX W/COLLJ SPEC WHEN PFRMD N/A 5/22/2018    Procedure: COLONOSCOPY;  Surgeon: Zhang Dubon DO;  [facility]; Service: Gastroenterology    SC ESOPHAGOGASTRODUODENOSCOPY TRANSORAL DIAGNOSTIC N/A 2/14/2019    Procedure: ESOPHAGOGASTRODUODENOSCOPY (EGD); Surgeon: Min Luna MD;  [facility];   Service: Gastroenterology    SC LAP,DIAGNOSTIC ABDOMEN N/A 9/29/2020    Procedure: LAPAROSCOPIC DIAGNOSTIC,   ;  Surgeon: Fransisco Sun MD;  [facility];  Service: [address] N/A 2/23/2018    Procedure: lysis of adhesions; INCISIONAL HERNIA REPAIR WITH MESH;  Surgeon: Celena Boeck, MD;  [facility];  Service: General    UPPER GASTROINTESTINAL ENDOSCOPY         Current Outpatient Medications:     aspirin 81 MG tablet, Take 81 mg by mouth daily Resume on 3/11/18 , Disp: , Rfl:     atorvastatin (LIPITOR) 40 mg tablet, Take 1 tablet (40 mg total) by mouth daily with dinner Resume next scheduled dose upon discharge from the hospital, Disp: 90 tablet, Rfl: 3    dicyclomine (BENTYL) 10 mg capsule, Take 10 mg by mouth as needed, Disp: , Rfl:     furosemide (LASIX) 20 mg tablet, Take 1 tablet (20 mg total) by mouth 2 (two) times a day, Disp: 90 tablet, Rfl: 0    metoprolol succinate (TOPROL-XL) 50 mg 24 hr tablet, Take 1 tablet (50 mg total) by mouth daily Resume on 3/11/18, Disp: 90 tablet, Rfl: 3    nitroglycerin (NITROSTAT) 0 4 mg SL tablet, Place 1 tablet (0 4 mg total) under the tongue every 5 (five) minutes as needed for chest pain, Disp: 5 tablet, Rfl: 0    omeprazole (PriLOSEC) 40 MG capsule, TAKE 1 CAPSULE DAILY, Disp: 90 capsule, Rfl: 3    potassium chloride (K-DUR,KLOR-CON) 10 mEq tablet, Take 1 tablet (10 mEq total) by mouth daily, Disp: 90 tablet, Rfl: 0    amLODIPine (NORVASC) 5 mg tablet, Take 1 tablet (5 mg total) by mouth daily, Disp: 90 tablet, Rfl: 0    fluticasone (FLONASE) 50 mcg/act nasal spray, 1 spray into each nostril daily for 10 days, Disp: 1 Bottle, Rfl: 0  No Known Allergies      Labs:  Lab Requisition on 12/09/2020   Component Date Value    Sodium 12/09/2020 140     Potassium 12/09/2020 3 6     Chloride 12/09/2020 105     CO2 12/09/2020 27     ANION GAP 12/09/2020 8     BUN 12/09/2020 25     Creatinine 12/09/2020 1 06     Glucose 12/09/2020 98     Calcium 12/09/2020 9 3     Corrected Calcium 12/09/2020 9 9     AST 12/09/2020 21     ALT 12/09/2020 22     Alkaline Phosphatase 12/09/2020 96     Total Protein 12/09/2020 8 3*    Albumin 12/09/2020 3 2*    Total Bilirubin 12/09/2020 0 51     eGFR 12/09/2020 68         Imaging: Vas Abdominal Aorta/iliacs; With Acacia's    Result Date: 1/5/2021  Narrative:  [facility] REPORT CLINICAL: Indications:  Yearly surveillance of AAA repair  Patient is asymptomatic at this time  Operative History: 2009-12-09 Postoperative exploration, abdomen 2009-11-24 Aorto_bi_iliac graft AAA Knitted Dacron 2000-01-01 Cardiac angioplasty with stent placement 02/2018 Abdominal Hernia repair with Mesh reported by patient Risk Factors: Obesity, HTN, HLD and previous smoking (quit >10yrs ago)  Clinical Right Pressure:  133/ mm Hg, Left Pressure:  130/ mm Hg  FINDINGS:  Unilateral                PSV (cm/s)  Aortic Inflow anastmosis          81  Aortic Inflow Artery              91  Graft Stem                       109   Segment           Right       Left                                PSV (cm/s)  PSV (cm/s)  Prox  EIA                230         198  Dist EIA                 198         159  Dist Graft Limb          189         226  Limb Anastomosis         133         284  Mid Graft Limb           193         280  Prox Graft Limb          161         284     CONCLUSION:  Impression Widely patent [id number] bypass graft Celiac and superior mesenteric arteries are unable to be visualized due to severe bowel gas  Ankle/Brachial indices are: Rt - 1 05, normal  (Prior: 1 08) and Lt - 1 17, normal  (Prior: 1 14) Great toe pressures are within the healing range  Compared to previous study on 12/31/19, there is no significant change  Technically difficult study due to patient body habitus and severe bowel gas  SIGNATURE: Electronically Signed by: Lona Ramey on 2021-01-05 04:51:22 PM      Review of Systems:  Review of Systems   Constitutional: Negative for diaphoresis, fever and unexpected weight change  HENT: Negative for trouble swallowing and voice change  Eyes: Negative for pain and redness     Respiratory: Negative for shortness of breath and wheezing  Cardiovascular: Negative for chest pain, palpitations and leg swelling  Gastrointestinal: Negative for abdominal pain, diarrhea, nausea and vomiting  Genitourinary: Negative for dysuria  Musculoskeletal: Negative for neck pain and neck stiffness  Neurological: Negative for syncope and headaches  Psychiatric/Behavioral: Negative for agitation and confusion  All other systems reviewed and are negative  Vitals:    01/06/21 1254   BP: 124/62   BP Location: Left arm   Patient Position: Sitting   Cuff Size: Standard   Pulse: 70   Weight: 66 2 kg (146 lb)   Height: 5' 8" (1 727 m)     Vitals:    01/06/21 1254   Weight: 66 2 kg (146 lb)     Height: 5' 8" (172 7 cm)     Physical Exam:  Physical Exam  Vitals signs reviewed  Constitutional:       General: He is not in acute distress  HENT:      Head: Normocephalic and atraumatic  Eyes:      General:         Right eye: No discharge  Left eye: No discharge  Neck:      Comments: Trachea midline, no JVD appreciated  Cardiovascular:      Rate and Rhythm: Normal rate and regular rhythm  Heart sounds: No friction rub  Pulmonary:      Effort: Pulmonary effort is normal  No respiratory distress  Breath sounds: No wheezing  Abdominal:      General: Bowel sounds are normal       Palpations: Abdomen is soft  Tenderness: There is no abdominal tenderness  Musculoskeletal:      Right lower leg: Edema (  Trace lower extremity edema) present  Left lower leg: Edema ( trace lower extremity edema) present  Skin:     General: Skin is warm and dry  Neurological:      Mental Status: He is alert        Comments: Awake, alert, able to answer questions appropriately   Psychiatric:         Mood and Affect: Mood normal          Behavior: Behavior normal

## 2021-01-08 ENCOUNTER — OFFICE VISIT (OUTPATIENT)
Dept: INTERNAL MEDICINE CLINIC | Facility: CLINIC | Age: 76
End: 2021-01-08

## 2021-01-08 VITALS — DIASTOLIC BLOOD PRESSURE: 56 MMHG | HEART RATE: 84 BPM | SYSTOLIC BLOOD PRESSURE: 119 MMHG | TEMPERATURE: 98.9 F

## 2021-01-08 DIAGNOSIS — Z09 FOLLOW UP: Primary | ICD-10-CM

## 2021-01-08 DIAGNOSIS — R79.89 ABNORMAL CBC: ICD-10-CM

## 2021-01-08 DIAGNOSIS — R77.9 ABNORMALITY OF PLASMA PROTEIN, UNSPECIFIED: ICD-10-CM

## 2021-01-08 PROCEDURE — 99214 OFFICE O/P EST MOD 30 MIN: CPT | Performed by: INTERNAL MEDICINE

## 2021-01-08 NOTE — PROGRESS NOTES
[facility] Visit Note  Juan Francisco Byrd 67 y o  male   MRN: [id number]    Assessment and Plan      1  Gastroesophageal reflux disease with esophagitis EGD on 6/6/19 showed resolved class D esophagitis  He has a known large hiatal hernia  likely contributing to his symptoms however per GI, given his advanced age and comorbid conditions he remains a high risk surgical candidate and his symptoms appear to be mild  Today he reports his symptoms are well controlled with lifestyle modification and Prilosec      Plan  ·  Continue omeprazole (PriLOSEC) 40 MG capsule; Take 1 capsule (40 mg total) by mouth daily      2  Tubular adenoma of colon Per GI, Pt is  due for repeat colonoscopy in 2024-may be deferred due to advanced age  3)    Coronary artery disease  Intermittent Palpitation  Hypertension BP today 119/56  patient has a history of coronary artery disease in 2000 status post 1 stent  Patient had a history of palpitation 7 months ago and was on 48 hour Holter monitor with results unremarkable for bradycardia or heart block  Most recent echo 10/09/2019 showed EF 70% with no regional wall abnormality  He denies any chest pain, shortness of breath , palpitation , headaches , lightheadedness, nausea, vomiting , numbness tingling sensation of extremities  Patient follows up with Cardiology, he was last seen on 01/06/2021  Plan  · Continue metoprolol succinate 50 mg daily  · Continue aspirin 81 mg daily  · Continue atorvastatin 40 mg daily  · Continue Toprol XL 50 mg daily  · Continue  Lasix 20 mg daily  · Continue  Amlodipine 5 mg daily  · Continue to follow-up with cardiology AS RECOMMENDED     4  Hyperlipidemia  Plan  Continue atorvastatin 40 mg daily    5)   Follow-up labs at next visit patient has possible anemia based on his CBC although he denies hematemesis, hematochezia, melena, abdominal pain, headaches, lightheadedness, chest pain, palpitation, SOB at this time, I will order a repeat CBC and iron panel to be reviewed her to his next visit  He also has elevated protein in his recent lab work will order CMP to be reviewed at his next visit      PHQ-9 Depression Screening    PHQ-9:   Frequency of the following problems over the past two weeks:      Little interest or pleasure in doing things: 0 - not at all  Feeling down, depressed, or hopeless: 0 - not at all  PHQ-2 Score: 0       Healthcare maintenance  Colonoscopy due 2024, flu shot 919, hep C screening pending, Tdap 07/11/2012, pneumococcal vaccines complete  Patient refused to Shingrix vaccine          Schedule a follow-up appointment in  6 months for Medicare annual wellness visit    Chief Complaint:  Follow-up of chronic medical conditions  Subjective     History of Present Illness:   Cy Fink is a 75 y o  male with a past medical history of GERD with esophagitis resolved, tubular adenoma of the colon, CAD s/p stent x1 2000, HLD, AAA repair 2009, presents for routine visit of his chronic conditions  He had cholecystectomy in 09/2020 and has a wound nurse visiting him at home  On examination today the incision site is clean dry and intact  His blood pressure today is 119/56 and admits to stable blood pressure within this range  He admits to compliance with his medication  Patient follows up with Gastroenterology for his GERD with esophagitis and vascular surgery for his AAA  He had recent VS study of his abdominal aorta and YO which were normal on 01/05/2021  Patient denies headaches, lightheadedness, chest pain, shortness of breath, palpitations, nausea, vomiting, diarrhea, constipation, numbness tingling sensation of his extremities patient reports general sense of well-being  Patient reports that he is  isolating at home due to the corona virus pandemic  Patient has no complain at this time    He was advised to call the clinic for medication refill if needed, or any other health needs, patient verbalized understanding and reported that he does not need any medication refill at this time and that he feels very well  Review of Systems  Twelve point review of system unremarkable except that listed in my HPI above        Current Outpatient Medications:     amLODIPine (NORVASC) 5 mg tablet, Take 1 tablet (5 mg total) by mouth daily, Disp: 90 tablet, Rfl: 0    aspirin 81 MG tablet, Take 81 mg by mouth daily Resume on 3/11/18 , Disp: , Rfl:     atorvastatin (LIPITOR) 40 mg tablet, Take 1 tablet (40 mg total) by mouth daily with dinner Resume next scheduled dose upon discharge from the hospital, Disp: 90 tablet, Rfl: 3    dicyclomine (BENTYL) 10 mg capsule, Take 10 mg by mouth as needed, Disp: , Rfl:     furosemide (LASIX) 20 mg tablet, Take 1 tablet (20 mg total) by mouth daily, Disp: 90 tablet, Rfl: 2    metoprolol succinate (TOPROL-XL) 50 mg 24 hr tablet, Take 1 tablet (50 mg total) by mouth daily Resume on 3/11/18, Disp: 90 tablet, Rfl: 3    nitroglycerin (NITROSTAT) 0 4 mg SL tablet, Place 1 tablet (0 4 mg total) under the tongue every 5 (five) minutes as needed for chest pain, Disp: 5 tablet, Rfl: 0    omeprazole (PriLOSEC) 40 MG capsule, TAKE 1 CAPSULE DAILY, Disp: 90 capsule, Rfl: 3    potassium chloride (K-DUR,KLOR-CON) 10 mEq tablet, Take 1 tablet (10 mEq total) by mouth daily, Disp: 90 tablet, Rfl: 2    fluticasone (FLONASE) 50 mcg/act nasal spray, 1 spray into each nostril daily for 10 days (Patient not taking: Reported on 1/8/2021), Disp: 1 Bottle, Rfl: 0  Past Medical History:   Diagnosis Date    Abdominal aortic aneurysm (AAA) ([facility] [address] )     last assessed nov 6 2015    Abscess of groin     last assessed oct 6 2014    Arthritis     Candidiasis, cutaneous     last assessed march 19 2014    Coronary artery disease     Dyslipidemia     Esophageal ulcer without bleeding     Gastritis     Hiatal hernia     Hx of cataract surgery, left     last assessed july 21 2014    Hyperlipidemia     Hypertension     Old myocardial infarction 2000    Recurrent right inguinal hernia     s/p right orchioectomy, mesh removal, and sinus trac removal patient being followed by surgery next appt 4/28/14 patient s/p keflex x 7 days for MSSA Cx repeat wound cx grew MSSA cx repeat wound cx grew MSSA and other armond (mixed) no MRSA identified conitunue close monitoring and local wound care, last assessed april 15 2014    Renal disorder     Wound of skin     in the groin, right     Past Surgical History:   Procedure Laterality Date    ABDOMINAL AORTIC ANEURYSM REPAIR  2009    aortobi-iliac, dacron graft    APPENDECTOMY      open    CARDIAC SURGERY      CATARACT EXTRACTION Bilateral     CHOLECYSTECTOMY N/A 9/29/2020    Procedure: CHOLECYSTECTOMY;  Surgeon: Mariel Lopes MD;  [facility];  Service: General    COLONOSCOPY      CORONARY ANGIOPLASTY WITH STENT PLACEMENT      stent 2    CYSTOSCOPY      diagnostic onset nov 13 2014    EXPLORATORY LAPAROTOMY  12/09/2009    EYE SURGERY      FL INJECTION LEFT HIP (NON ARTHROGRAM)  4/2/2019    HIP SURGERY Right     INGUINAL HERNIA REPAIR Right     unilateral x2    ORCHIECTOMY Right     PA COLONOSCOPY FLX DX W/COLLJ SPEC WHEN PFRMD N/A 5/22/2018    Procedure: COLONOSCOPY;  Surgeon: Krissy Oliva DO;  [facility]; Service: Gastroenterology    PA ESOPHAGOGASTRODUODENOSCOPY TRANSORAL DIAGNOSTIC N/A 2/14/2019    Procedure: ESOPHAGOGASTRODUODENOSCOPY (EGD); Surgeon: Pilar Clay MD;  [facility];   Service: Gastroenterology    PA LAP,DIAGNOSTIC ABDOMEN N/A 9/29/2020    Procedure: LAPAROSCOPIC DIAGNOSTIC,   ;  Surgeon: Mariel oLpes MD;  [facility];  Service: General    [address] [place] [address]  N/A 2/23/2018    Procedure: lysis of adhesions; INCISIONAL HERNIA REPAIR WITH MESH;  Surgeon: Deepthi Kohli MD;  [facility];  Service: General    UPPER GASTROINTESTINAL ENDOSCOPY       Social History Socioeconomic History    Marital status:      Spouse name: Not on file    Number of children: Not on file    Years of education: Not on file    Highest education level: Not on file   Occupational History    Not on file   Social Needs    Financial resource strain: Not on file    Food insecurity     Worry: Not on file     Inability: Not on file    Transportation needs     Medical: Not on file     Non-medical: Not on file   Tobacco Use    Smoking status: Former Smoker    Smokeless tobacco: Never Used    Tobacco comment: quit 20 years ago   Substance and Sexual Activity    Alcohol use: Not Currently     Frequency: Monthly or less     Binge frequency: Never     Comment: occasionally    Drug use: No    Sexual activity: Not on file   Lifestyle    Physical activity     Days per week: Not on file     Minutes per session: Not on file    Stress: Not on file   Relationships    Social connections     Talks on phone: Not on file     Gets together: Not on file     Attends Hoahaoism service: Not on file     Active member of club or organization: Not on file     Attends meetings of clubs or organizations: Not on file     Relationship status: Not on file    Intimate partner violence     Fear of current or ex partner: Not on file     Emotionally abused: Not on file     Physically abused: Not on file     Forced sexual activity: Not on file   Other Topics Concern    Not on file   Social History Narrative    Not on file     Family History   Problem Relation Age of Onset    Heart disease Mother     Diabetes Mother     Heart disease Father     Diabetes Sister     Cancer Brother      No Known Allergies    Objective     Vitals:    01/08/21 0909   BP: 119/56   BP Location: Right arm   Patient Position: Sitting   Cuff Size: Adult   Pulse: 84   Temp: 98 9 °F (37 2 °C)   TempSrc: Temporal       Physical exam:     GENERAL: NAD  HEENT:  NC/AT, PERRL, EOMI, no scleral icterus  CARDIAC:  RRR, +S1/S2, no S3/S4 heard, no m/g/r  PULMONARY:  CTA B/L, no wheezing/rales/rhonci, non-labored breathing  ABDOMEN:   Healed Vertical incision, incision with dressing in the right upper quadrant CDI,  Soft, NT/ND,no rebound/guarding/rigidity  Extremities:  No edema, cyanosis, or clubbing  NEUROLOGIC: Grossly intact  SKIN:  No rashes or erythema noted on exposed skin  Psych: Normal affect        ==  PLEASE NOTE:  This encounter was completed utilizing the [employer] Direct Speech Voice Recognition Software  Grammatical errors, random word insertions, pronoun errors and incomplete sentences are occasional consequences of the system due to software limitations, ambient noise and hardware issues  These may be missed by proof reading prior to affixing electronic signature  Any questions or concerns about the content, text or information contained within the body of this dictation should be directly addressed to the physician for clarification  Please do not hesitate to call me directly if you have any any questions or concerns

## 2021-01-18 ENCOUNTER — OFFICE VISIT (OUTPATIENT)
Dept: WOUND CARE | Facility: HOSPITAL | Age: 76
End: 2021-01-18
Payer: MEDICARE

## 2021-01-18 VITALS
HEART RATE: 68 BPM | RESPIRATION RATE: 18 BRPM | DIASTOLIC BLOOD PRESSURE: 56 MMHG | SYSTOLIC BLOOD PRESSURE: 144 MMHG | TEMPERATURE: 98.2 F

## 2021-01-18 DIAGNOSIS — T81.89XA NON-HEALING SURGICAL WOUND, INITIAL ENCOUNTER: Primary | ICD-10-CM

## 2021-01-18 PROCEDURE — 99213 OFFICE O/P EST LOW 20 MIN: CPT | Performed by: SURGERY

## 2021-01-18 PROCEDURE — 99212 OFFICE O/P EST SF 10 MIN: CPT | Performed by: SURGERY

## 2021-01-18 NOTE — PROGRESS NOTES
Patient ID: Josafat Nicolas is a 67 y o  male Date of Birth 1945       Chief Complaint   Patient presents with    Follow Up Wound Care Visit     abdomen       Allergies:  Patient has no known allergies  Diagnosis:  1  Non-healing surgical wound, initial encounter  Assessment & Plan: Will continue to lollow and see back    Orders:  -     Wound cleansing and dressings; Future  -     Wound home care; Future     Diagnosis ICD-10-CM Associated Orders   1   Non-healing surgical wound, initial encounter  T81 [id number] Wound cleansing and dressings     Wound home care          Subjective:   HPI  Here for wound follow up  The following portions of the patient's history were reviewed and updated as appropriate:   Patient Active Problem List   Diagnosis    CAD (coronary artery disease)    Hypertension    Incisional hernia    History of incisional hernia repair    Bursitis of left shoulder    Serrated adenoma of colon    Primary osteoarthritis of right knee    Gastroesophageal reflux disease    Gastroesophageal reflux disease with esophagitis    Primary osteoarthritis of one hip, left    Tubular adenoma of colon    Abdominal aortic aneurysm (AAA) without rupture ([facility] [address]    Follow up    Dyslipidemia    Unstable angina ([facility] [address]    Status post cholecystectomy    Ambulatory dysfunction    Surgical wound, non healing    Palpitations    Diastolic dysfunction     Past Medical History:   Diagnosis Date    Abdominal aortic aneurysm (AAA) ([facility] [address] )     last assessed nov 6 2015    Abscess of groin     last assessed oct 6 2014    Arthritis     Candidiasis, cutaneous     last assessed march 19 2014    Coronary artery disease     Dyslipidemia     Esophageal ulcer without bleeding     Gastritis     Hiatal hernia     Hx of cataract surgery, left     last assessed july 21 2014    Hyperlipidemia     Hypertension     Old myocardial infarction 2000    Recurrent right inguinal hernia     s/p right orchioectomy, mesh removal, and sinus trac removal patient being followed by surgery next appt 4/28/14 patient s/p keflex x 7 days for MSSA Cx repeat wound cx grew MSSA cx repeat wound cx grew MSSA and other armond (mixed) no MRSA identified conitunue close monitoring and local wound care, last assessed april 15 2014    Renal disorder     Wound of skin     in the groin, right     Past Surgical History:   Procedure Laterality Date    ABDOMINAL AORTIC ANEURYSM REPAIR  2009    aortobi-iliac, dacron graft    APPENDECTOMY      open    CARDIAC SURGERY      CATARACT EXTRACTION Bilateral     CHOLECYSTECTOMY N/A 9/29/2020    Procedure: CHOLECYSTECTOMY;  Surgeon: Adrian Malave MD;  [facility];  Service: General    COLONOSCOPY     [address]      stent 2    CYSTOSCOPY      diagnostic onset nov 13 2014    EXPLORATORY LAPAROTOMY  12/09/2009    EYE SURGERY      FL INJECTION LEFT HIP (NON ARTHROGRAM)  4/2/2019    HIP SURGERY Right     INGUINAL HERNIA REPAIR Right     unilateral x2    ORCHIECTOMY Right     AK COLONOSCOPY FLX DX W/COLLJ SPEC WHEN PFRMD N/A 5/22/2018    Procedure: COLONOSCOPY;  Surgeon: Shanti Leroy DO;  [facility]; Service: Gastroenterology    AK ESOPHAGOGASTRODUODENOSCOPY TRANSORAL DIAGNOSTIC N/A 2/14/2019    Procedure: ESOPHAGOGASTRODUODENOSCOPY (EGD); Surgeon: Efren Cranker, MD;  [facility];   Service: Gastroenterology    AK LAP,DIAGNOSTIC ABDOMEN N/A 9/29/2020    Procedure: LAPAROSCOPIC DIAGNOSTIC,   ;  Surgeon: Adrian Malave MD;  [facility];  Service: General    [address] [place] [address] N/A 2/23/2018    Procedure: lysis of adhesions; INCISIONAL HERNIA REPAIR WITH MESH;  Surgeon: Meghana Piper MD;  [facility];  Service: General    UPPER GASTROINTESTINAL ENDOSCOPY       Social History     Socioeconomic History    Marital status:      Spouse name: None    Number of children: None    Years of education: None    Highest education level: None   Occupational History    None   Social Needs    Financial resource strain: None    Food insecurity     Worry: None     Inability: None    Transportation needs     Medical: None     Non-medical: None   Tobacco Use    Smoking status: Former Smoker    Smokeless tobacco: Never Used    Tobacco comment: quit 20 years ago   Substance and Sexual Activity    Alcohol use: Not Currently     Frequency: Monthly or less     Binge frequency: Never     Comment: occasionally    Drug use: No    Sexual activity: None   Lifestyle    Physical activity     Days per week: None     Minutes per session: None    Stress: None   Relationships    Social connections     Talks on phone: None     Gets together: None     Attends Scientology service: None     Active member of club or organization: None     Attends meetings of clubs or organizations: None     Relationship status: None    Intimate partner violence     Fear of current or ex partner: None     Emotionally abused: None     Physically abused: None     Forced sexual activity: None   Other Topics Concern    None   Social History Narrative    None        Current Outpatient Medications:     amLODIPine (NORVASC) 5 mg tablet, Take 1 tablet (5 mg total) by mouth daily, Disp: 90 tablet, Rfl: 0    aspirin 81 MG tablet, Take 81 mg by mouth daily Resume on 3/11/18 , Disp: , Rfl:     atorvastatin (LIPITOR) 40 mg tablet, Take 1 tablet (40 mg total) by mouth daily with dinner Resume next scheduled dose upon discharge from the hospital, Disp: 90 tablet, Rfl: 3    dicyclomine (BENTYL) 10 mg capsule, Take 10 mg by mouth as needed, Disp: , Rfl:     fluticasone (FLONASE) 50 mcg/act nasal spray, 1 spray into each nostril daily for 10 days (Patient not taking: Reported on 1/8/2021), Disp: 1 Bottle, Rfl: 0    furosemide (LASIX) 20 mg tablet, Take 1 tablet (20 mg total) by mouth daily, Disp: 90 tablet, Rfl: 2    metoprolol succinate (TOPROL-XL) 50 mg 24 hr tablet, Take 1 tablet (50 mg total) by mouth daily Resume on 3/11/18, Disp: 90 tablet, Rfl: 3    nitroglycerin (NITROSTAT) 0 4 mg SL tablet, Place 1 tablet (0 4 mg total) under the tongue every 5 (five) minutes as needed for chest pain, Disp: 5 tablet, Rfl: 0    omeprazole (PriLOSEC) 40 MG capsule, TAKE 1 CAPSULE DAILY, Disp: 90 capsule, Rfl: 3    potassium chloride (K-DUR,KLOR-CON) 10 mEq tablet, Take 1 tablet (10 mEq total) by mouth daily, Disp: 90 tablet, Rfl: 2  Family History   Problem Relation Age of Onset    Heart disease Mother     Diabetes Mother     Heart disease Father     Diabetes Sister     Cancer Brother       Review of Systems      Objective:  /56   Pulse 68   Temp 98 2 °F (36 8 °C)   Resp 18     Physical Exam        Wound 09/29/20 Surgical Abdomen Right;Upper;Quadrant (Active)   Wound Image   01/18/21 1050   Wound Description Beefy red 01/18/21 1101   Bailee-wound Assessment Scar Tissue 01/18/21 1101   Wound Length (cm) 0 1 cm 01/18/21 1101   Wound Width (cm) 0 1 cm 01/18/21 1101   Wound Depth (cm) 0 1 cm 01/18/21 1101   Wound Surface Area (cm^2) 0 01 cm^2 01/18/21 1101   Wound Volume (cm^3) 0 cm^3 01/18/21 1101   Calculated Wound Volume (cm^3) 0 cm^3 01/18/21 1101   Change in Wound Size % 100 01/18/21 1101   Drainage Amount Scant 01/18/21 1101   Drainage Description Serosanguineous 01/18/21 1101   Non-staged Wound Description Full thickness 01/18/21 1101   Dressing Status Intact 01/18/21 1101   Very small superficial area may be open                      Procedures             Wound Instructions:  Orders Placed This Encounter   Procedures    Wound cleansing and dressings     RUQ Abdominal wound:     Wash your hands with soap and water  Remove old dressing, discard into plastic bag and place in trash  Cleanse the wound with NSS prior to applying a clean dressing  Do not use tissue or cotton balls  Do not scrub the wound  Pat dry using gauze    Shower yes ----on dressing change days, keep dressings in place for the shower  Apply Skin prep to skin surrounding wound  Apply small piece of maxorb to the wound  Cover with Hydrocolloid  Change dressing 3 x weekly  Treatments above were completed today at the [facility]             Standing Status:   Future     Standing Expiration Date:   1/18/2022    Wound home care      [facility]     Standing Status:   Future     Standing Expiration Date:   1/18/2022         Austin Arrington MD      Portions of the record may have been created with voice recognition software  Occasional wrong word or "sound a like" substitutions may have occurred due to the inherent limitations of voice recognition software  Read the chart carefully and recognize, using context, where substitutions have occurred

## 2021-01-18 NOTE — PATIENT INSTRUCTIONS
Orders Placed This Encounter   Procedures    Wound cleansing and dressings     RUQ Abdominal wound:     Wash your hands with soap and water  Remove old dressing, discard into plastic bag and place in trash  Cleanse the wound with NSS prior to applying a clean dressing  Do not use tissue or cotton balls  Do not scrub the wound  Pat dry using gauze  Shower yes ----on dressing change days, keep dressings in place for the shower  Apply Skin prep to skin surrounding wound  Apply small piece of maxorb to the wound  Cover with Hydrocolloid  Change dressing 3 x weekly     Treatments above were completed today at the [facility]             Standing Status:   Future     Standing Expiration Date:   1/18/2022    Wound home care     AURELIO GAMBOA     Standing Status:   Future     Standing Expiration Date:   1/18/2022

## 2021-01-21 ENCOUNTER — APPOINTMENT (OUTPATIENT)
Dept: LAB | Age: 76
End: 2021-01-21
Payer: MEDICARE

## 2021-01-21 LAB
ANION GAP SERPL CALCULATED.3IONS-SCNC: 6 MMOL/L (ref 4–13)
BUN SERPL-MCNC: 22 MG/DL (ref 5–25)
CALCIUM SERPL-MCNC: 9.7 MG/DL (ref 8.3–10.1)
CHLORIDE SERPL-SCNC: 112 MMOL/L (ref 100–108)
CO2 SERPL-SCNC: 25 MMOL/L (ref 21–32)
CREAT SERPL-MCNC: 0.93 MG/DL (ref 0.6–1.3)
GFR SERPL CREATININE-BSD FRML MDRD: 79 ML/MIN/1.73SQ M
GLUCOSE P FAST SERPL-MCNC: 86 MG/DL (ref 65–99)
POTASSIUM SERPL-SCNC: 4.7 MMOL/L (ref 3.5–5.3)
SODIUM SERPL-SCNC: 143 MMOL/L (ref 136–145)

## 2021-01-21 PROCEDURE — 36415 COLL VENOUS BLD VENIPUNCTURE: CPT | Performed by: INTERNAL MEDICINE

## 2021-01-21 PROCEDURE — 80048 BASIC METABOLIC PNL TOTAL CA: CPT | Performed by: INTERNAL MEDICINE

## 2021-01-22 ENCOUNTER — TELEPHONE (OUTPATIENT)
Dept: CARDIOLOGY CLINIC | Facility: CLINIC | Age: 76
End: 2021-01-22

## 2021-01-22 NOTE — TELEPHONE ENCOUNTER
Called patient and gave results         ----- Message from Yuly Rojo DO sent at 1/21/2021  4:16 PM EST -----  Please call patient and let him know kidney function on today blood work is similar to previous

## 2021-01-25 ENCOUNTER — OFFICE VISIT (OUTPATIENT)
Dept: WOUND CARE | Facility: HOSPITAL | Age: 76
End: 2021-01-25
Payer: MEDICARE

## 2021-01-25 VITALS
TEMPERATURE: 97.5 F | HEART RATE: 72 BPM | RESPIRATION RATE: 18 BRPM | SYSTOLIC BLOOD PRESSURE: 142 MMHG | DIASTOLIC BLOOD PRESSURE: 68 MMHG

## 2021-01-25 DIAGNOSIS — T81.89XA NON-HEALING SURGICAL WOUND, INITIAL ENCOUNTER: Primary | ICD-10-CM

## 2021-01-25 PROCEDURE — 99212 OFFICE O/P EST SF 10 MIN: CPT | Performed by: SURGERY

## 2021-01-25 NOTE — PROGRESS NOTES
Patient ID: Hattie Montoya is a 67 y o  male Date of Birth 1945       Chief Complaint   Patient presents with    Follow Up Wound Care Visit     abdominal wound       Allergies:  Patient has no known allergies  Diagnosis:  1  Non-healing surgical wound, initial encounter  Assessment & Plan:  Wound healed  Will see back if needed    Orders:  -     Wound cleansing and dressings; Future     Diagnosis ICD-10-CM Associated Orders   1   Non-healing surgical wound, initial encounter  [id number] Wound cleansing and dressings          Subjective:   HPI  Here for wound follow up  The following portions of the patient's history were reviewed and updated as appropriate:   Patient Active Problem List   Diagnosis    CAD (coronary artery disease)    Hypertension    Incisional hernia    History of incisional hernia repair    Bursitis of left shoulder    Serrated adenoma of colon    Primary osteoarthritis of right knee    Gastroesophageal reflux disease    Gastroesophageal reflux disease with esophagitis    Primary osteoarthritis of one hip, left    Tubular adenoma of colon    Abdominal [facility] ([facility]) [facility] rupture [facility] [address] [facility]    Follow up    Dyslipidemia    Unstable angina ([facility] [address] [facility]    Status post cholecystectomy    Ambulatory dysfunction    Surgical wound, non healing    Palpitations    Diastolic dysfunction     Past Medical History:   Diagnosis Date    Abdominal aortic aneurysm (AAA) ([facility] [address] )     last assessed nov 6 2015    Abscess of groin     last assessed oct 6 2014    Arthritis     Candidiasis, cutaneous     last assessed march 19 2014    Coronary artery disease     Dyslipidemia     Esophageal ulcer without bleeding     Gastritis     Hiatal hernia     Hx of cataract surgery, left     last assessed july 21 2014    Hyperlipidemia     Hypertension     Old myocardial infarction 2000    Recurrent right inguinal hernia     s/p right orchioectomy, mesh removal, and sinus trac removal patient being followed by surgery next appt 4/28/14 patient s/p keflex x 7 days for MSSA Cx repeat wound cx grew MSSA cx repeat wound cx grew MSSA and other armond (mixed) no MRSA identified conitunue close monitoring and local wound care, last assessed april 15 2014    Renal disorder     Wound of skin     in the groin, right     Past Surgical History:   Procedure Laterality Date    ABDOMINAL AORTIC ANEURYSM REPAIR  2009    aortobi-iliac, dacron graft    APPENDECTOMY      open    CARDIAC SURGERY      CATARACT EXTRACTION Bilateral     CHOLECYSTECTOMY N/A 9/29/2020    Procedure: CHOLECYSTECTOMY;  Surgeon: Sylvia Barry MD;  [facility];  Service: General    COLONOSCOPY      CORONARY ANGIOPLASTY WITH STENT PLACEMENT      stent 2    CYSTOSCOPY      diagnostic onset nov 13 2014    EXPLORATORY LAPAROTOMY  12/09/2009    EYE SURGERY      FL INJECTION LEFT HIP (NON ARTHROGRAM)  4/2/2019    HIP SURGERY Right     INGUINAL HERNIA REPAIR Right     unilateral x2    ORCHIECTOMY Right     [place] COLONOSCOPY FLX DX W/COLLJ SPEC WHEN PFRMD N/A 5/22/2018    Procedure: COLONOSCOPY;  Surgeon: Kadie Sears DO;  [facility]; Service: Gastroenterology    [place] ESOPHAGOGASTRODUODENOSCOPY TRANSORAL DIAGNOSTIC N/A 2/14/2019    Procedure: ESOPHAGOGASTRODUODENOSCOPY (EGD); Surgeon: Maida Aden MD;  [facility];   Service: Gastroenterology    [place] LAP,DIAGNOSTIC ABDOMEN N/A 9/29/2020    Procedure: LAPAROSCOPIC DIAGNOSTIC,   ;  Surgeon: Sylvia Barry MD;  [facility];  Service: General    [address] [place] [address] N/A 2/23/2018    Procedure: lysis of adhesions; INCISIONAL HERNIA REPAIR WITH MESH;  Surgeon: Celena Boeck, MD;  [facility];  Service: General    UPPER GASTROINTESTINAL ENDOSCOPY       Social History     Socioeconomic History    Marital status:      Spouse name: Not on file    Number of children: Not on file    Years of education: Not on file    Highest education level: Not on file   Occupational History    Not on file   Social Needs    Financial resource strain: Not on file    Food insecurity     Worry: Not on file     Inability: Not on file    Transportation needs     Medical: Not on file     Non-medical: Not on file   Tobacco Use    Smoking status: Former Smoker    Smokeless tobacco: Never Used    Tobacco comment: quit 20 years ago   Substance and Sexual Activity    Alcohol use: Not Currently     Frequency: Monthly or less     Binge frequency: Never     Comment: occasionally    Drug use: No    Sexual activity: Not on file   Lifestyle    Physical activity     Days per week: Not on file     Minutes per session: Not on file    Stress: Not on file   Relationships    Social connections     Talks on phone: Not on file     Gets together: Not on file     Attends Sikh service: Not on file     Active member of club or organization: Not on file     Attends meetings of clubs or organizations: Not on file     Relationship status: Not on file    Intimate partner violence     Fear of current or ex partner: Not on file     Emotionally abused: Not on file     Physically abused: Not on file     Forced sexual activity: Not on file   Other Topics Concern    Not on file   Social History Narrative    Not on file        Current Outpatient Medications:     amLODIPine (NORVASC) 5 mg tablet, Take 1 tablet (5 mg total) by mouth daily, Disp: 90 tablet, Rfl: 0    aspirin 81 MG tablet, Take 81 mg by mouth daily Resume on 3/11/18 , Disp: , Rfl:     atorvastatin (LIPITOR) 40 mg tablet, Take 1 tablet (40 mg total) by mouth daily with dinner Resume next scheduled dose upon discharge from the hospital, Disp: 90 tablet, Rfl: 3    dicyclomine (BENTYL) 10 mg capsule, Take 10 mg by mouth as needed, Disp: , Rfl:     fluticasone (FLONASE) 50 mcg/act nasal spray, 1 spray into each nostril daily for 10 days (Patient not taking: Reported on 1/8/2021), Disp: 1 Bottle, Rfl: 0    furosemide (LASIX) 20 mg tablet, Take 1 tablet (20 mg total) by mouth daily, Disp: 90 tablet, Rfl: 2    metoprolol succinate (TOPROL-XL) 50 mg 24 hr tablet, Take 1 tablet (50 mg total) by mouth daily Resume on 3/11/18, Disp: 90 tablet, Rfl: 3    nitroglycerin (NITROSTAT) 0 4 mg SL tablet, Place 1 tablet (0 4 mg total) under the tongue every 5 (five) minutes as needed for chest pain, Disp: 5 tablet, Rfl: 0    omeprazole (PriLOSEC) 40 MG capsule, TAKE 1 CAPSULE DAILY, Disp: 90 capsule, Rfl: 3    potassium chloride (K-DUR,KLOR-CON) 10 mEq tablet, Take 1 tablet (10 mEq total) by mouth daily, Disp: 90 tablet, Rfl: 2  Family History   Problem Relation Age of Onset    Heart disease Mother     Diabetes Mother     Heart disease Father     Diabetes Sister     Cancer Brother       Review of Systems      Objective:  /68   Pulse 72   Temp 97 5 °F (36 4 °C)   Resp 18     Physical Exam        Wound 09/29/20 Surgical Abdomen Right;Upper;Quadrant (Active)   Wound Image   01/25/21 1036   Wound Description Epithelialization 01/25/21 1043   Bailee-wound Assessment Scar Tissue 01/18/21 1101   Wound Length (cm) 0 cm 01/25/21 1043   Wound Width (cm) 0 cm 01/25/21 1043   Wound Depth (cm) 0 cm 01/25/21 1043   Wound Surface Area (cm^2) 0 cm^2 01/25/21 1043   Wound Volume (cm^3) 0 cm^3 01/25/21 1043   Calculated Wound Volume (cm^3) 0 cm^3 01/25/21 1043   Change in Wound Size % 100 01/25/21 1043   Drainage Amount None 01/25/21 1043   Drainage Description Serosanguineous 01/18/21 1101   Non-staged Wound Description Full thickness 01/18/21 1101   Dressing Status Intact 01/18/21 1101     Abdominal wound healed                    Procedures             Wound Instructions:  Orders Placed This Encounter   Procedures    Wound cleansing and dressings     The wound is well healed! You may apply moisturizer daily to the scar    Thank you for coming to the [facility]!!     Standing Status:   Future     Standing Expiration Date:   1/25/2022         Kurt Alaniz MD      Portions of the record may have been created with voice recognition software  Occasional wrong word or "sound a like" substitutions may have occurred due to the inherent limitations of voice recognition software  Read the chart carefully and recognize, using context, where substitutions have occurred

## 2021-01-25 NOTE — PATIENT INSTRUCTIONS
Orders Placed This Encounter   Procedures    Wound cleansing and dressings     The wound is well healed! You may apply moisturizer daily to the scar    Thank you for coming to the [facility]!!     Standing Status:   Future     Standing Expiration Date:   1/25/2022

## 2021-01-26 DIAGNOSIS — I10 ESSENTIAL HYPERTENSION: ICD-10-CM

## 2021-01-26 RX ORDER — AMLODIPINE BESYLATE 5 MG/1
5 TABLET ORAL DAILY
Qty: 90 TABLET | Refills: 3 | Status: CANCELLED | OUTPATIENT
Start: 2021-01-26

## 2021-01-26 NOTE — TELEPHONE ENCOUNTER
Name of medication, dose, quantity and frequency    Requested Prescriptions     Pending Prescriptions Disp Refills    amLODIPine (NORVASC) 5 mg tablet 90 tablet 3     Sig: Take 1 tablet (5 mg total) by mouth daily         Number of refills left: 0    Amount of medication left: 4 pills    Pharmacy verified and updated    Additional information:  Patient is requesting #90 with 3 refills to be sent to [address] [place] [address]  Order updated

## 2021-01-27 ENCOUNTER — TELEPHONE (OUTPATIENT)
Dept: INTERNAL MEDICINE CLINIC | Facility: CLINIC | Age: 76
End: 2021-01-27

## 2021-01-27 NOTE — TELEPHONE ENCOUNTER
Basilia Carrasco from [facility] stating she wasn't able to see patient to day will stop by Friday but patient is doing fine  Any questions or concerns feel free to contact her at [phone]  [facility]

## 2021-01-29 DIAGNOSIS — I10 ESSENTIAL HYPERTENSION: ICD-10-CM

## 2021-01-29 RX ORDER — AMLODIPINE BESYLATE 5 MG/1
5 TABLET ORAL DAILY
Qty: 90 TABLET | Refills: 3 | Status: SHIPPED | OUTPATIENT
Start: 2021-01-29 | End: 2021-07-07 | Stop reason: SINTOL

## 2021-02-22 DIAGNOSIS — I10 HYPERTENSION: ICD-10-CM

## 2021-02-22 RX ORDER — METOPROLOL SUCCINATE 50 MG/1
50 TABLET, EXTENDED RELEASE ORAL DAILY
Qty: 90 TABLET | Refills: 3 | Status: SHIPPED | OUTPATIENT
Start: 2021-02-22 | End: 2021-12-07 | Stop reason: SDUPTHER

## 2021-02-22 RX ORDER — ATORVASTATIN CALCIUM 40 MG/1
40 TABLET, FILM COATED ORAL
Qty: 90 TABLET | Refills: 3 | Status: SHIPPED | OUTPATIENT
Start: 2021-02-22 | End: 2021-12-07 | Stop reason: SDUPTHER

## 2021-02-22 NOTE — TELEPHONE ENCOUNTER
Name of medication, dose, quantity and frequency    Requested Prescriptions     Pending Prescriptions Disp Refills    atorvastatin (LIPITOR) 40 mg tablet 90 tablet 3     Sig: Take 1 tablet (40 mg total) by mouth daily with dinner Resume next scheduled dose upon discharge from the hospital    metoprolol succinate (TOPROL-XL) 50 mg 24 hr tablet 90 tablet 3     Sig: Take 1 tablet (50 mg total) by mouth daily Resume on 3/11/18     Number of refills left: NONE    Amount of medication left: APPROX   1 1/2 [address] verified and updated- [facility]  PHARM[facility]Y    Additional information: PLEASE SEND 90 DAY SUPPLY WITH 3 REFILLS PER INSURANCE    PLEASE CALL PATIENT WHEN REFILLS ARE SENT

## 2021-03-22 ENCOUNTER — TELEPHONE (OUTPATIENT)
Dept: INTERNAL MEDICINE CLINIC | Facility: CLINIC | Age: 76
End: 2021-03-22

## 2021-03-22 DIAGNOSIS — B35.1 ONYCHOMYCOSIS: Primary | ICD-10-CM

## 2021-03-22 NOTE — TELEPHONE ENCOUNTER
Please put in an order for Podiatry  Patient has a follow up appointment scheduled for 3/23/21  Diagnosis Onychomycosis, callus of foot, Xerosis cutis  Thanks you

## 2021-03-23 ENCOUNTER — OFFICE VISIT (OUTPATIENT)
Dept: MULTI SPECIALTY CLINIC | Facility: CLINIC | Age: 76
End: 2021-03-23

## 2021-03-23 VITALS
TEMPERATURE: 98.3 F | WEIGHT: 152 LBS | SYSTOLIC BLOOD PRESSURE: 145 MMHG | HEART RATE: 62 BPM | BODY MASS INDEX: 23.11 KG/M2 | DIASTOLIC BLOOD PRESSURE: 67 MMHG

## 2021-03-23 DIAGNOSIS — B35.1 ONYCHOMYCOSIS: ICD-10-CM

## 2021-03-23 DIAGNOSIS — I83.93 ASYMPTOMATIC VARICOSE VEINS OF BOTH LOWER EXTREMITIES: ICD-10-CM

## 2021-03-23 DIAGNOSIS — L84 CALLUS OF FOOT: Primary | ICD-10-CM

## 2021-03-23 PROCEDURE — 99212 OFFICE O/P EST SF 10 MIN: CPT | Performed by: PODIATRIST

## 2021-03-23 NOTE — PROGRESS NOTES
Podiatry Clinic Visit  Catina Webster 67 y o  male MRN: [id number]  Encounter: [id number]    Assessment/Plan        Diagnoses and all orders for this visit:    Callus of foot    Onychomycosis  -     Ambulatory referral to Podiatry    Asymptomatic varicose veins of both lower extremities           Plan:   Patient was seen and examined with all their questions and concerns addressed   Patient's hyperkeratotic lesions x6 were trimmed using a #15 blade without incident  [CPT: 46758]   Patient was instructed to purchase compression socks and to elevate his feet   Patient was re-appointed for 1-week for treatment of his mycotic nails as proper equipment to treat them was not available today     - Dr Villegas Dose was available/present for entirety of patient encounter and present for all procedures  History of Present Illness     HPI: Catina Webster is a 67 y o  male who presents complaining of bilateral foot pain secondary to hyperkeratotic lesions  He also complains of his toenails x10 being elongated, thickened, discolored, and difficult to cut  He states that his calluses become painful about every 4-5 weeks and that he cannot reach his feet to take care of them at home  The patient has no further podiatric complaints at this time  Review of Systems   Constitutional: Negative  HENT: Negative  Eyes: Negative  Respiratory: Negative  Cardiovascular: Negative  Gastrointestinal: Negative  Musculoskeletal: foot pain  Skin: callus x6  Neurological: Negative          Historical Information   Past Medical History:   Diagnosis Date    Abdominal aortic aneurysm (AAA) ([facility] [address] )     last assessed nov 6 2015    Abscess of groin     last assessed oct 6 2014    Arthritis     Candidiasis, cutaneous     last assessed march 19 2014    Coronary artery disease     Dyslipidemia     Esophageal ulcer without bleeding     Gastritis     Hiatal hernia     Hx of cataract surgery, left     last assessed july 21 2014    Hyperlipidemia     Hypertension     Old myocardial infarction 2000    Recurrent right inguinal hernia     s/p right orchioectomy, mesh removal, and sinus trac removal patient being followed by surgery next appt 4/28/14 patient s/p keflex x 7 days for MSSA Cx repeat wound cx grew MSSA cx repeat wound cx grew MSSA and other armond (mixed) no MRSA identified conitunue close monitoring and local wound care, last assessed april 15 2014    Renal disorder     Wound of skin     in the groin, right     Past Surgical History:   Procedure Laterality Date    ABDOMINAL AORTIC ANEURYSM REPAIR  2009    aortobi-iliac, dacron graft    APPENDECTOMY      open    CARDIAC SURGERY      CATARACT EXTRACTION Bilateral     CHOLECYSTECTOMY N/A 9/29/2020    Procedure: CHOLECYSTECTOMY;  Surgeon: Julian Castro MD;  [facility];  Service: General    COLONOSCOPY      CORONARY ANGIOPLASTY WITH STENT PLACEMENT      stent 2    CYSTOSCOPY      diagnostic onset nov 13 2014    EXPLORATORY LAPAROTOMY  12/09/2009    EYE SURGERY      FL INJECTION LEFT HIP (NON ARTHROGRAM)  4/2/2019    HIP SURGERY Right     INGUINAL HERNIA REPAIR Right     unilateral x2    ORCHIECTOMY Right     MD COLONOSCOPY FLX DX W/COLLJ SPEC WHEN PFRMD N/A 5/22/2018    Procedure: COLONOSCOPY;  Surgeon: Frankey Spillers, DO;  [facility]; Service: Gastroenterology    MD ESOPHAGOGASTRODUODENOSCOPY TRANSORAL DIAGNOSTIC N/A 2/14/2019    Procedure: ESOPHAGOGASTRODUODENOSCOPY (EGD); Surgeon: Margarita Vickers MD;  [facility];   Service: Gastroenterology    MD LAP,DIAGNOSTIC ABDOMEN N/A 9/29/2020    Procedure: LAPAROSCOPIC DIAGNOSTIC,   ;  Surgeon: Julian Castro MD;  [facility];  Service: General    [address] [place] [address] N/A 2/23/2018    Procedure: lysis of adhesions; INCISIONAL HERNIA REPAIR WITH MESH;  Surgeon: Rashid Montes MD;  [facility];  Service: General    UPPER GASTROINTESTINAL ENDOSCOPY       Social History   Social History     Substance and Sexual Activity   Alcohol Use Not Currently    Frequency: Monthly or less    Binge frequency: Never    Comment: occasionally     Social History     Substance and Sexual Activity   Drug Use No     Social History     Tobacco Use   Smoking Status Former Smoker   Smokeless Tobacco Never Used   Tobacco Comment    quit 20 years ago     Family History:   Family History   Problem Relation Age of Onset    Heart disease Mother     Diabetes Mother     Heart disease Father     Diabetes Sister     Cancer Brother        Meds/Allergies   (Not in a hospital admission)    No Known Allergies    Objective     Current Vitals:   Blood Pressure: 145/67 (03/23/21 1049)  Pulse: 62 (03/23/21 1049)  Temperature: 98 3 °F (36 8 °C) (03/23/21 1049)  Temp Source: Temporal (03/23/21 1049)  Weight - Scale: 68 9 kg (152 lb) (03/23/21 1049)        /67 (BP Location: Right arm, Patient Position: Sitting, Cuff Size: Adult)   Pulse 62   Temp 98 3 °F (36 8 °C) (Temporal)   Wt 68 9 kg (152 lb)   BMI 23 11 kg/m²       Lower Extremity Exam:    Foot Exam    Musculoskeletal:  MMT is 4/5 to all compartments of the LE, +2/4 edema B/L, Hallux limitus is present  Equinus is present  No pain with passive ROM of pedal joints  Pain on palpation of plantar submet 4 hyperkeratotic lesions     Vascular:   DP pulses and PT pulses are present bilaterally  , CFT< 3sec to all digits  Pedal hair is Absent  Pulse has regular rate and rhythm  Skin temperature warm to warm B/L  Multiple varicosities to the lower extremities noted  Dermatological:  No open Lesions  Skin of the LE is normal texture, temperature, turgor  Interdigital maceration is not present  Hyperkeratotic lesions noted to the distal aspects of digits 3 and 4 bilaterally  As well as plantar submet head 4 area  Neurologic:  Gross sensation is intact  Protective sensation is Intact  Vibratory sensation is Intact   Marta Snyder sensation is present

## 2021-03-26 ENCOUNTER — IMMUNIZATIONS (OUTPATIENT)
Dept: FAMILY MEDICINE CLINIC | Facility: HOSPITAL | Age: 76
End: 2021-03-26

## 2021-03-26 DIAGNOSIS — Z23 ENCOUNTER FOR IMMUNIZATION: Primary | ICD-10-CM

## 2021-03-26 PROCEDURE — 91301 SARS-COV-2 / COVID-19 MRNA VACCINE (MODERNA) 100 MCG: CPT

## 2021-03-26 PROCEDURE — 0011A SARS-COV-2 / COVID-19 MRNA VACCINE (MODERNA) 100 MCG: CPT

## 2021-03-30 ENCOUNTER — OFFICE VISIT (OUTPATIENT)
Dept: MULTI SPECIALTY CLINIC | Facility: CLINIC | Age: 76
End: 2021-03-30

## 2021-03-30 VITALS
DIASTOLIC BLOOD PRESSURE: 60 MMHG | BODY MASS INDEX: 23.11 KG/M2 | WEIGHT: 152 LBS | SYSTOLIC BLOOD PRESSURE: 126 MMHG | HEART RATE: 69 BPM

## 2021-03-30 DIAGNOSIS — B35.1 ONYCHOMYCOSIS: Primary | ICD-10-CM

## 2021-03-30 PROCEDURE — 11721 DEBRIDE NAIL 6 OR MORE: CPT | Performed by: PODIATRIST

## 2021-03-30 NOTE — PROGRESS NOTES
Procedures    Mycotic Nail Debridement [CPT: 90953]  Patient was seen today for mycotic nail debridement  He was seen on 3/23/21 however the correct equipment at our clinic was not available in order to treat his mycotic nails  He returns today (3/30/21) for routine nail debridement  Patient's toenails x10 were examined and found to be elongated, thickened, discolored, and incurvated with subungual debris present  A large nail nipper was used to remove length of nails 1-10 as well as reduce thickness  Subungual debris was also removed  Overall 10 mycotic toenails were debrided without incident and with removal of fungal tissue  The patient tolerated the procedure well

## 2021-04-07 ENCOUNTER — OFFICE VISIT (OUTPATIENT)
Dept: CARDIOLOGY CLINIC | Facility: CLINIC | Age: 76
End: 2021-04-07
Payer: MEDICARE

## 2021-04-07 VITALS
SYSTOLIC BLOOD PRESSURE: 120 MMHG | HEART RATE: 64 BPM | BODY MASS INDEX: 23.04 KG/M2 | WEIGHT: 152 LBS | DIASTOLIC BLOOD PRESSURE: 48 MMHG | OXYGEN SATURATION: 94 % | HEIGHT: 68 IN

## 2021-04-07 DIAGNOSIS — I25.10 CORONARY ARTERY DISEASE INVOLVING NATIVE HEART WITHOUT ANGINA PECTORIS, UNSPECIFIED VESSEL OR LESION TYPE: ICD-10-CM

## 2021-04-07 DIAGNOSIS — I10 ESSENTIAL HYPERTENSION: Primary | ICD-10-CM

## 2021-04-07 DIAGNOSIS — E78.5 DYSLIPIDEMIA: ICD-10-CM

## 2021-04-07 DIAGNOSIS — I51.89 DIASTOLIC DYSFUNCTION: ICD-10-CM

## 2021-04-07 DIAGNOSIS — I71.4 ABDOMINAL AORTIC ANEURYSM (AAA) WITHOUT RUPTURE (HCC): ICD-10-CM

## 2021-04-07 PROCEDURE — 99213 OFFICE O/P EST LOW 20 MIN: CPT | Performed by: INTERNAL MEDICINE

## 2021-04-07 NOTE — PROGRESS NOTES
Cardiology Follow Up    Carlos Gallego  [id number]  1945  HEART & VASCULAR [facility] [place] [facility] [address]      1  Essential hypertension     2  Coronary artery disease involving native heart without angina pectoris, unspecified vessel or lesion type     3  Abdominal aortic aneurysm (AAA) without rupture ([address] )     4  Diastolic dysfunction     5  Dyslipidemia         Discussion/Summary: 1  Hypertension  2  Diastolic dysfunction  3  Intermittent palpitations- unchanged  4  Coronary artery disease with history of PCI in 2000  5  History of abdominal aortic aneurysm s/p repair      -As blood pressure appears well controlled in the office today and per patient weight has been stable around 152 lb for the last month will continue current medical therapy with amlodipine 5 mg daily, atorvastatin 40 mg daily, furosemide 20 mg daily, potassium supplementation 10 mEq daily with furosemide and metoprolol succinate 50 mg daily   -  Patient counseled on the importance of dietary and lifestyle modifications including sticking to fluid and salt restricted diet to less than 2000 mg salt daily and less than 2000 mL fluid daily and will continue to improve  He also will reinitiate weaning himself and will monitor his home blood pressures  He was informed to contact the office if he were to have any symptoms or waking greater than 3 lb in 1 day  -  Patient counseled that if he were to have any warning or alarm type symptoms he should seek emergency medical care immediately    -  Will see patient for follow-up in approximately 3 months or sooner if necessary    History of Present Illness:  - patient is a 78-year-old male past medical history significant for hypertension, hyperlipidemia, coronary artery disease with PCI in 2000 as well as abdominal aortic aneurysm with repair in 2010 and intermittent palpitations who presents to the office today for scheduled follow-up  -  Currently patient notes improvement overall and denies any chest pain, palpitations, lightheadedness or dizziness, loss of consciousness  He notes that his lower extremity edema overall is improved from his hospitalization back in October of 2020 however does note that the lower extremity swelling does seem to be worse the longer he is on his feet during the day and seems to resolve by morning after elevation of his lower extremities along with taking his furosemide daily  He notes there is room for improvement with his diet and lifestyle modifications as he has not been judicious in monitoring his fluid intake or salt intake        Patient Active Problem List   Diagnosis    CAD (coronary artery disease)    Hypertension    Incisional hernia    History of incisional hernia repair    Bursitis of left shoulder    Serrated adenoma of colon    Primary osteoarthritis of right knee    Gastroesophageal reflux disease    Gastroesophageal reflux disease with esophagitis    Primary osteoarthritis of one hip, left    Tubular adenoma of colon    Abdominal aortic aneurysm (AAA) without rupture [facility] )    Follow up    Dyslipidemia    Unstable angina ([facility] )    Status post cholecystectomy    Ambulatory dysfunction    Surgical wound, non healing    Palpitations    Diastolic dysfunction     Past Medical History:   Diagnosis Date    Abdominal aortic aneurysm (AAA) ([facility] )     last assessed nov 6 2015    Abscess of groin     last assessed oct 6 2014    Arthritis     Candidiasis, cutaneous     last assessed march 19 2014    Coronary artery disease     Dyslipidemia     Esophageal ulcer without bleeding     Gastritis     Hiatal hernia     Hx of cataract surgery, left     last assessed july 21 2014    Hyperlipidemia     Hypertension     Old myocardial infarction 2000    Recurrent right inguinal hernia     s/p right orchioectomy, mesh removal, and sinus trac removal patient being followed by surgery next appt 4/28/14 patient s/p keflex x 7 days for MSSA Cx repeat wound cx grew MSSA cx repeat wound cx grew MSSA and other armond (mixed) no MRSA identified conitunue close monitoring and local wound care, last assessed april 15 2014    Renal disorder     Wound of skin     in the groin, right     Social History     Socioeconomic History    Marital status:      Spouse name: Not on file    Number of children: Not on file    Years of education: Not on file    Highest education level: Not on file   Occupational History    Not on file   Social Needs    Financial resource strain: Not on file    Food insecurity     Worry: Not on file     Inability: Not on file   [employer] needs     Medical: Not on file     Non-medical: Not on file   Tobacco Use    Smoking status: Former Smoker    Smokeless tobacco: Never Used    Tobacco comment: quit 20 years ago   Substance and Sexual Activity    Alcohol use: Not Currently     Frequency: Monthly or less     Binge frequency: Never     Comment: occasionally    Drug use: No    Sexual activity: Not on file   Lifestyle    Physical activity     Days per week: Not on file     Minutes per session: Not on file    Stress: Not on file   Relationships    Social connections     Talks on phone: Not on file     Gets together: Not on file     Attends Tenriism service: Not on file     Active member of club or organization: Not on file     Attends meetings of clubs or organizations: Not on file     Relationship status: Not on file    Intimate partner violence     Fear of current or ex partner: Not on file     Emotionally abused: Not on file     Physically abused: Not on file     Forced sexual activity: Not on file   Other Topics Concern    Not on file   Social History Narrative    Not on file      Family History   Problem Relation Age of Onset    Heart disease Mother     Diabetes Mother     Heart disease Father     Diabetes Sister     Cancer Brother Past Surgical History:   Procedure Laterality Date    ABDOMINAL AORTIC ANEURYSM REPAIR  2009    aortobi-iliac, dacron graft    APPENDECTOMY      open    CARDIAC SURGERY      CATARACT EXTRACTION Bilateral     CHOLECYSTECTOMY N/A 9/29/2020    Procedure: CHOLECYSTECTOMY;  Surgeon: Marvin Egan MD;  [facility];  Service: General    COLONOSCOPY      CORONARY ANGIOPLASTY WITH STENT PLACEMENT      stent 2    CYSTOSCOPY      diagnostic onset nov 13 2014    EXPLORATORY LAPAROTOMY  12/09/2009    EYE SURGERY      FL INJECTION LEFT HIP (NON ARTHROGRAM)  4/2/2019    HIP SURGERY Right     INGUINAL HERNIA REPAIR Right     unilateral x2    ORCHIECTOMY Right     NE COLONOSCOPY FLX DX W/COLLJ SPEC WHEN PFRMD N/A 5/22/2018    Procedure: COLONOSCOPY;  Surgeon: Nery Baum DO;  [facility]; Service: Gastroenterology    NE ESOPHAGOGASTRODUODENOSCOPY TRANSORAL DIAGNOSTIC N/A 2/14/2019    Procedure: ESOPHAGOGASTRODUODENOSCOPY (EGD); Surgeon: Marelyn Hammans, MD;  [facility];   Service: Gastroenterology    NE LAP,DIAGNOSTIC ABDOMEN N/A 9/29/2020    Procedure: LAPAROSCOPIC DIAGNOSTIC,   ;  Surgeon: Marvin Egan MD;  [facility];  Service: General    NE REPAIR INCISIONAL HERNIA,REDUCIBLE N/A 2/23/2018    Procedure: lysis of adhesions; INCISIONAL HERNIA REPAIR WITH MESH;  Surgeon: Jayla Ariza MD;  [facility];  Service: General    UPPER GASTROINTESTINAL ENDOSCOPY         Current Outpatient Medications:     amLODIPine (NORVASC) 5 mg tablet, Take 1 tablet (5 mg total) by mouth daily, Disp: 90 tablet, Rfl: 3    aspirin 81 MG tablet, Take 81 mg by mouth daily Resume on 3/11/18 , Disp: , Rfl:     atorvastatin (LIPITOR) 40 mg tablet, Take 1 tablet (40 mg total) by mouth daily with dinner Resume next scheduled dose upon discharge from the hospital, Disp: 90 tablet, Rfl: 3    dicyclomine (BENTYL) 10 mg capsule, Take 10 mg by mouth as needed, Disp: , Rfl:    furosemide (LASIX) 20 mg tablet, Take 1 tablet (20 mg total) by mouth daily, Disp: 90 tablet, Rfl: 2    metoprolol succinate (TOPROL-XL) 50 mg 24 hr tablet, Take 1 tablet (50 mg total) by mouth daily Resume on 3/11/18, Disp: 90 tablet, Rfl: 3    nitroglycerin (NITROSTAT) 0 4 mg SL tablet, Place 1 tablet (0 4 mg total) under the tongue every 5 (five) minutes as needed for chest pain, Disp: 5 tablet, Rfl: 0    omeprazole (PriLOSEC) 40 MG capsule, TAKE 1 CAPSULE DAILY, Disp: 90 capsule, Rfl: 3    potassium chloride (K-DUR,KLOR-CON) 10 mEq tablet, Take 1 tablet (10 mEq total) by mouth daily, Disp: 90 tablet, Rfl: 2    fluticasone (FLONASE) 50 mcg/act nasal spray, 1 spray into each nostril daily for 10 days (Patient not taking: Reported on 1/8/2021), Disp: 1 Bottle, Rfl: 0  No Known Allergies      Labs:  No visits with results within 2 Month(s) from this visit  Latest known visit with results is:   Office Visit on 01/06/2021   Component Date Value    Sodium 01/21/2021 143     Potassium 01/21/2021 4 7     Chloride 01/21/2021 112*    CO2 01/21/2021 25     ANION GAP 01/21/2021 6     BUN 01/21/2021 22     Creatinine 01/21/2021 0 93     Glucose, Fasting 01/21/2021 86     Calcium 01/21/2021 9 7     eGFR 01/21/2021 79         Imaging: No results found  Review of Systems:  Review of Systems   Constitutional: Negative for diaphoresis and fever  HENT: Negative for trouble swallowing and voice change  Vitals:    04/07/21 1258   BP: (!) 120/48   BP Location: Right arm   Patient Position: Sitting   Cuff Size: Standard   Pulse: 64   SpO2: 94%   Weight: 68 9 kg (152 lb)   Height: 5' 8" (1 727 m)     Vitals:    04/07/21 1258   Weight: 68 9 kg (152 lb)     Height: 5' 8" (172 7 cm)     Physical Exam:  Physical Exam  Vitals signs reviewed  Constitutional:       General: He is not in acute distress  Appearance: He is not toxic-appearing  HENT:      Head: Normocephalic and atraumatic     Eyes: General:         Right eye: No discharge  Left eye: No discharge  Neck:      Musculoskeletal: No muscular tenderness  Comments: No JVD, trachea midline  Cardiovascular:      Rate and Rhythm: Normal rate and regular rhythm  Heart sounds: No friction rub  Pulmonary:      Effort: Pulmonary effort is normal  No respiratory distress  Breath sounds: No wheezing  Abdominal:      General: Bowel sounds are normal       Palpations: Abdomen is soft  Tenderness: There is no abdominal tenderness  Musculoskeletal:      Right lower leg: Edema (trace) present  Left lower leg: Edema (trace) present  Skin:     General: Skin is warm and dry  Neurological:      Mental Status: He is alert        Comments: Awake, alert, able to answer questions appropriately   Psychiatric:         Mood and Affect: Mood normal          Behavior: Behavior normal

## 2021-04-28 ENCOUNTER — IMMUNIZATIONS (OUTPATIENT)
Dept: FAMILY MEDICINE CLINIC | Facility: HOSPITAL | Age: 76
End: 2021-04-28

## 2021-04-28 DIAGNOSIS — Z23 ENCOUNTER FOR IMMUNIZATION: Primary | ICD-10-CM

## 2021-04-28 PROCEDURE — 91301 SARS-COV-2 / COVID-19 MRNA VACCINE (MODERNA) 100 MCG: CPT

## 2021-04-28 PROCEDURE — 0012A SARS-COV-2 / COVID-19 MRNA VACCINE (MODERNA) 100 MCG: CPT

## 2021-07-07 ENCOUNTER — OFFICE VISIT (OUTPATIENT)
Dept: CARDIOLOGY CLINIC | Facility: CLINIC | Age: 76
End: 2021-07-07
Payer: MEDICARE

## 2021-07-07 VITALS
BODY MASS INDEX: 23.79 KG/M2 | WEIGHT: 157 LBS | HEIGHT: 68 IN | SYSTOLIC BLOOD PRESSURE: 136 MMHG | OXYGEN SATURATION: 94 % | DIASTOLIC BLOOD PRESSURE: 58 MMHG | HEART RATE: 83 BPM

## 2021-07-07 DIAGNOSIS — I25.118 ATHEROSCLEROTIC HEART DISEASE OF NATIVE CORONARY ARTERY WITH OTHER FORMS OF ANGINA PECTORIS (HCC): ICD-10-CM

## 2021-07-07 DIAGNOSIS — I74.09 OTHER ARTERIAL EMBOLISM AND THROMBOSIS OF ABDOMINAL AORTA (HCC): Primary | ICD-10-CM

## 2021-07-07 DIAGNOSIS — I10 ESSENTIAL HYPERTENSION: ICD-10-CM

## 2021-07-07 PROCEDURE — 99213 OFFICE O/P EST LOW 20 MIN: CPT | Performed by: INTERNAL MEDICINE

## 2021-07-07 RX ORDER — FUROSEMIDE 20 MG/1
20 TABLET ORAL DAILY
Qty: 90 TABLET | Refills: 3 | Status: SHIPPED | OUTPATIENT
Start: 2021-07-07 | End: 2021-12-07 | Stop reason: SDUPTHER

## 2021-07-07 NOTE — PROGRESS NOTES
Cardiology Follow Up    Lacie Hastings  [id number]  1945  HEART & VASCULAR [facility] [address] [facility] [address]      No diagnosis found  Discussion/Summary: 1  Hypertension  2  Diastolic dysfunction  3  Intermittent palpitations- unchanged  4  Coronary artery disease with history of PCI in 2000  5  History of abdominal aortic aneurysm s/p repair      -As blood pressure in 130s which is what it usually is at home  -  Patient counseled on the importance of dietary and lifestyle modifications including sticking to fluid and salt restricted diet to less than 2000 mg salt daily   -  Patient counseled that if he were to have any warning or alarm type symptoms he should seek emergency medical care immediately  - Patient on aspirin and atorvastatin 40 mg QD  - Leg swelling likely from knee surgeries, varicose veins, obesity  Advised to take additional 20 mg Lasix on days he feels more than usual swelling and shortness of breath  We will discontinue norvasc  Advised to call in 2 weeks with blood pressure readings  Can adjust meds at thime    - Intermittent shortness of breath  Likely deconditioning  No anginal symptoms     History of Present Illness:  - patient is a 19-year-old male past medical history significant for hypertension, hyperlipidemia, coronary artery disease with PCI in 2000 as well as abdominal aortic aneurysm with repair in 2010 and intermittent palpitations who presents to the office today for scheduled follow-up  -  Currently patient notes improvement overall and denies any chest pain, palpitations, lightheadedness or dizziness, loss of consciousness     - Weight 157 lb  Last visit it was 152 lb  Lower extremity swelling unchanged from before  Worsening leg swelling at night  Likely due to knee surgeries  Patient on Norvasc once a day  Patient was started on lasix 20 mg on January 6           Patient Active Problem List   Diagnosis    CAD (coronary artery disease)    Hypertension    Incisional hernia    History of incisional hernia repair    Bursitis of left shoulder    Serrated adenoma of colon    Primary osteoarthritis of right knee    Gastroesophageal reflux disease    Gastroesophageal reflux disease with esophagitis    Primary osteoarthritis of one hip, left    Tubular adenoma of colon    Abdominal aortic aneurysm (AAA) without rupture ([facility] )    Follow up    Dyslipidemia    Unstable angina (HCC)    Status post cholecystectomy    Ambulatory dysfunction    Surgical wound, non healing    Palpitations    Diastolic dysfunction     Past Medical History:   Diagnosis Date    Abdominal aortic aneurysm (AAA) ([facility] )     last assessed nov 6 2015    Abscess of groin     last assessed oct 6 2014    Arthritis     Candidiasis, cutaneous     last assessed march 19 2014    Coronary artery disease     Dyslipidemia     Esophageal ulcer without bleeding     Gastritis     Hiatal hernia     Hx of cataract surgery, left     last assessed july 21 2014    Hyperlipidemia     Hypertension     Old myocardial infarction 2000    Recurrent right inguinal hernia     s/p right orchioectomy, mesh removal, and sinus trac removal patient being followed by surgery next appt 4/28/14 patient s/p keflex x 7 days for MSSA Cx repeat wound cx grew MSSA cx repeat wound cx grew MSSA and other armond (mixed) no MRSA identified conitunue close monitoring and local wound care, last assessed april 15 2014    Renal disorder     Wound of skin     in the groin, right     Social History     Socioeconomic History    Marital status:      Spouse name: Not on file    Number of children: Not on file    Years of education: Not on file    Highest education level: Not on file   Occupational History    Not on file   Tobacco Use    Smoking status: Former Smoker    Smokeless tobacco: Never Used    Tobacco comment: quit 20 years ago   Vaping Use    Vaping Use: Never used   Substance and Sexual Activity    Alcohol use: Not Currently     Comment: occasionally    Drug use: No    Sexual activity: Not on file   Other Topics Concern    Not on file   Social History Narrative    Not on file     Social Determinants of Health     Financial Resource Strain:     Difficulty of Paying Living Expenses:    Food Insecurity:     Worried About Running Out of Food in the Last Year:     [address] in the Last Year:    Transportation Needs:     Lack of Transportation (Medical):      Lack of Transportation (Non-Medical):    Physical Activity:     Days of Exercise per Week:     Minutes of Exercise per Session:    Stress:     Feeling of Stress :    Social Connections:     Frequency of Communication with Friends and Family:     Frequency of Social Gatherings with Friends and Family:     Attends Gnosticism Services:     Active Member of Clubs or Organizations:     Attends Club or Organization Meetings:     Marital Status:    Intimate Partner Violence:     Fear of Current or Ex-Partner:     Emotionally Abused:     Physically Abused:     Sexually Abused:       Family History   Problem Relation Age of Onset    Heart disease Mother     Diabetes Mother     Heart disease Father     Diabetes Sister     Cancer Brother      Past Surgical History:   Procedure Laterality Date    ABDOMINAL AORTIC ANEURYSM REPAIR  2009    aortobi-iliac, dacron graft    APPENDECTOMY      open    CARDIAC SURGERY      CATARACT EXTRACTION Bilateral     CHOLECYSTECTOMY N/A 9/29/2020    Procedure: CHOLECYSTECTOMY;  Surgeon: Kathleen Leger MD;  [facility];  Service: General    COLONOSCOPY      CORONARY ANGIOPLASTY WITH STENT PLACEMENT      stent 2    CYSTOSCOPY      diagnostic onset nov 13 2014    EXPLORATORY LAPAROTOMY  12/09/2009    EYE SURGERY      FL INJECTION LEFT HIP (NON ARTHROGRAM)  4/2/2019    HIP SURGERY Right     INGUINAL HERNIA REPAIR Right     unilateral x2    ORCHIECTOMY Right     NE COLONOSCOPY FLX DX W/COLLJ SPEC WHEN PFRMD N/A 5/22/2018    Procedure: COLONOSCOPY;  Surgeon: Ayo Aparicio DO;  [facility]; Service: Gastroenterology    NE ESOPHAGOGASTRODUODENOSCOPY TRANSORAL DIAGNOSTIC N/A 2/14/2019    Procedure: ESOPHAGOGASTRODUODENOSCOPY (EGD); Surgeon: Rowena Umaña MD;  [facility];   Service: Gastroenterology    NE LAP,DIAGNOSTIC ABDOMEN N/A 9/29/2020    Procedure: LAPAROSCOPIC DIAGNOSTIC,   ;  Surgeon: Ghulam Ford MD;  [facility];  Service: General    NE REPAIR INCISIONAL HERNIA,REDUCIBLE N/A 2/23/2018    Procedure: lysis of adhesions; INCISIONAL HERNIA REPAIR WITH MESH;  Surgeon: Luis Alberto Ignacio MD;  [facility];  Service: General    UPPER GASTROINTESTINAL ENDOSCOPY         Current Outpatient Medications:     amLODIPine (NORVASC) 5 mg tablet, Take 1 tablet (5 mg total) by mouth daily, Disp: 90 tablet, Rfl: 3    aspirin 81 MG tablet, Take 81 mg by mouth daily Resume on 3/11/18 , Disp: , Rfl:     atorvastatin (LIPITOR) 40 mg tablet, Take 1 tablet (40 mg total) by mouth daily with dinner Resume next scheduled dose upon discharge from the hospital, Disp: 90 tablet, Rfl: 3    furosemide (LASIX) 20 mg tablet, Take 1 tablet (20 mg total) by mouth daily, Disp: 90 tablet, Rfl: 2    metoprolol succinate (TOPROL-XL) 50 mg 24 hr tablet, Take 1 tablet (50 mg total) by mouth daily Resume on 3/11/18, Disp: 90 tablet, Rfl: 3    omeprazole (PriLOSEC) 40 MG capsule, TAKE 1 CAPSULE DAILY, Disp: 90 capsule, Rfl: 3    potassium chloride (K-DUR,KLOR-CON) 10 mEq tablet, Take 1 tablet (10 mEq total) by mouth daily, Disp: 90 tablet, Rfl: 2    dicyclomine (BENTYL) 10 mg capsule, Take 10 mg by mouth as needed (Patient not taking: Reported on 7/7/2021), Disp: , Rfl:     nitroglycerin (NITROSTAT) 0 4 mg SL tablet, Place 1 tablet (0 4 mg total) under the tongue every 5 (five) minutes as needed for chest pain (Patient not taking: Reported on 7/7/2021), Disp: 5 tablet, Rfl: 0  No Known Allergies      Labs:  No visits with results within 2 Month(s) from this visit  Latest known visit with results is:   Office Visit on 01/06/2021   Component Date Value    Sodium 01/21/2021 143     Potassium 01/21/2021 4 7     Chloride 01/21/2021 112*    CO2 01/21/2021 25     ANION GAP 01/21/2021 6     BUN 01/21/2021 22     Creatinine 01/21/2021 0 93     Glucose, Fasting 01/21/2021 86     Calcium 01/21/2021 9 7     eGFR 01/21/2021 79         Imaging: No results found  Review of Systems:  Review of Systems   Constitutional: Negative for diaphoresis and fever  HENT: Negative for trouble swallowing and voice change  There were no vitals filed for this visit  There were no vitals filed for this visit  Physical Exam:  Physical Exam  Vitals reviewed  Constitutional:       General: He is not in acute distress  Appearance: He is not toxic-appearing  HENT:      Head: Normocephalic and atraumatic  Eyes:      General:         Right eye: No discharge  Left eye: No discharge  Neck:      Comments: No JVD, trachea midline  Cardiovascular:      Rate and Rhythm: Normal rate and regular rhythm  Heart sounds: No friction rub  Pulmonary:      Effort: Pulmonary effort is normal  No respiratory distress  Breath sounds: No wheezing  Abdominal:      General: Bowel sounds are normal       Palpations: Abdomen is soft  Tenderness: There is no abdominal tenderness  Musculoskeletal:      Cervical back: No muscular tenderness  Right lower leg: Edema (trace) present  Left lower leg: Edema (trace) present  Skin:     General: Skin is warm and dry  Neurological:      Mental Status: He is alert  Comments: Awake, alert, able to answer questions appropriately   Psychiatric:         Mood and Affect: Mood normal          Behavior: Behavior normal      Arterial pulses palpable

## 2021-07-28 DIAGNOSIS — I10 ESSENTIAL HYPERTENSION: Primary | ICD-10-CM

## 2021-07-28 RX ORDER — LISINOPRIL 5 MG/1
5 TABLET ORAL DAILY
Qty: 90 TABLET | Refills: 1 | Status: SHIPPED | OUTPATIENT
Start: 2021-07-28 | End: 2021-12-07 | Stop reason: SDUPTHER

## 2021-07-28 NOTE — LETTER
Good afternoon,      I am starting the patient on lisinopril 5 mg QD  I called multiple times but could not reach him  Could let him know if you are able to reach him tomorrow? Let me know if you or he has any questions and I will try again       Thank you,  Torrey Martinez

## 2021-07-28 NOTE — PROGRESS NOTES
Patient left his blood pressure log which showed blood pressure consistently in 140s/150s after discontinuation of amlodipine  I will start the patient on lisinopril 5 mg QD  Patient did not answer the phone  I will the office reach out

## 2021-07-29 ENCOUNTER — TELEPHONE (OUTPATIENT)
Dept: CARDIOLOGY CLINIC | Facility: CLINIC | Age: 76
End: 2021-07-29

## 2021-07-29 NOTE — TELEPHONE ENCOUNTER
Dr Jacquelyn Noble: letter: I am starting the patient on lisinopril 5 mg QD  I called multiple times but could not reach him  I called Marilu Og and advised him to start the Lisinopril once it comes in the mail  He swore he was already taking that, until he checked his medications  He will track his b/p and call if still elevated

## 2021-08-16 ENCOUNTER — OFFICE VISIT (OUTPATIENT)
Dept: INTERNAL MEDICINE CLINIC | Facility: CLINIC | Age: 76
End: 2021-08-16

## 2021-08-16 ENCOUNTER — APPOINTMENT (OUTPATIENT)
Dept: LAB | Facility: CLINIC | Age: 76
End: 2021-08-16
Payer: MEDICARE

## 2021-08-16 VITALS
WEIGHT: 158 LBS | SYSTOLIC BLOOD PRESSURE: 128 MMHG | HEART RATE: 62 BPM | TEMPERATURE: 98.3 F | BODY MASS INDEX: 24.02 KG/M2 | DIASTOLIC BLOOD PRESSURE: 67 MMHG | OXYGEN SATURATION: 90 %

## 2021-08-16 DIAGNOSIS — R09.82 POST-NASAL DRIP: ICD-10-CM

## 2021-08-16 DIAGNOSIS — I25.10 CORONARY ARTERY DISEASE INVOLVING NATIVE CORONARY ARTERY OF NATIVE HEART WITHOUT ANGINA PECTORIS: ICD-10-CM

## 2021-08-16 DIAGNOSIS — R79.89 ELEVATED BRAIN NATRIURETIC PEPTIDE (BNP) LEVEL: ICD-10-CM

## 2021-08-16 DIAGNOSIS — R79.89 ABNORMAL CBC: ICD-10-CM

## 2021-08-16 DIAGNOSIS — Z09 FOLLOW UP: ICD-10-CM

## 2021-08-16 DIAGNOSIS — R77.9 ABNORMALITY OF PLASMA PROTEIN, UNSPECIFIED: ICD-10-CM

## 2021-08-16 DIAGNOSIS — K21.9 GASTROESOPHAGEAL REFLUX DISEASE WITHOUT ESOPHAGITIS: Primary | ICD-10-CM

## 2021-08-16 LAB
ALBUMIN SERPL BCP-MCNC: 3.3 G/DL (ref 3.5–5)
ALP SERPL-CCNC: 107 U/L (ref 46–116)
ALT SERPL W P-5'-P-CCNC: 20 U/L (ref 12–78)
ANION GAP SERPL CALCULATED.3IONS-SCNC: 4 MMOL/L (ref 4–13)
AST SERPL W P-5'-P-CCNC: 16 U/L (ref 5–45)
BASOPHILS # BLD AUTO: 0.09 THOUSANDS/ΜL (ref 0–0.1)
BASOPHILS NFR BLD AUTO: 1 % (ref 0–1)
BILIRUB SERPL-MCNC: 0.5 MG/DL (ref 0.2–1)
BUN SERPL-MCNC: 20 MG/DL (ref 5–25)
CALCIUM ALBUM COR SERPL-MCNC: 9.7 MG/DL (ref 8.3–10.1)
CALCIUM SERPL-MCNC: 9.1 MG/DL (ref 8.3–10.1)
CHLORIDE SERPL-SCNC: 112 MMOL/L (ref 100–108)
CO2 SERPL-SCNC: 24 MMOL/L (ref 21–32)
CREAT SERPL-MCNC: 1.09 MG/DL (ref 0.6–1.3)
EOSINOPHIL # BLD AUTO: 0.16 THOUSAND/ΜL (ref 0–0.61)
EOSINOPHIL NFR BLD AUTO: 2 % (ref 0–6)
ERYTHROCYTE [DISTWIDTH] IN BLOOD BY AUTOMATED COUNT: 17.1 % (ref 11.6–15.1)
FERRITIN SERPL-MCNC: 20 NG/ML (ref 8–388)
GFR SERPL CREATININE-BSD FRML MDRD: 65 ML/MIN/1.73SQ M
GLUCOSE P FAST SERPL-MCNC: 96 MG/DL (ref 65–99)
HCT VFR BLD AUTO: 39.6 % (ref 36.5–49.3)
HGB BLD-MCNC: 12 G/DL (ref 12–17)
IMM GRANULOCYTES # BLD AUTO: 0.03 THOUSAND/UL (ref 0–0.2)
IMM GRANULOCYTES NFR BLD AUTO: 0 % (ref 0–2)
IRON SATN MFR SERPL: 9 %
IRON SERPL-MCNC: 42 UG/DL (ref 65–175)
LYMPHOCYTES # BLD AUTO: 1.21 THOUSANDS/ΜL (ref 0.6–4.47)
LYMPHOCYTES NFR BLD AUTO: 15 % (ref 14–44)
MCH RBC QN AUTO: 24.5 PG (ref 26.8–34.3)
MCHC RBC AUTO-ENTMCNC: 30.3 G/DL (ref 31.4–37.4)
MCV RBC AUTO: 81 FL (ref 82–98)
MONOCYTES # BLD AUTO: 0.43 THOUSAND/ΜL (ref 0.17–1.22)
MONOCYTES NFR BLD AUTO: 5 % (ref 4–12)
NEUTROPHILS # BLD AUTO: 6.41 THOUSANDS/ΜL (ref 1.85–7.62)
NEUTS SEG NFR BLD AUTO: 77 % (ref 43–75)
NRBC BLD AUTO-RTO: 0 /100 WBCS
NT-PROBNP SERPL-MCNC: 235 PG/ML
PLATELET # BLD AUTO: 302 THOUSANDS/UL (ref 149–390)
PMV BLD AUTO: 10.5 FL (ref 8.9–12.7)
POTASSIUM SERPL-SCNC: 4.3 MMOL/L (ref 3.5–5.3)
PROT SERPL-MCNC: 7.9 G/DL (ref 6.4–8.2)
RBC # BLD AUTO: 4.89 MILLION/UL (ref 3.88–5.62)
SODIUM SERPL-SCNC: 140 MMOL/L (ref 136–145)
TIBC SERPL-MCNC: 450 UG/DL (ref 250–450)
WBC # BLD AUTO: 8.33 THOUSAND/UL (ref 4.31–10.16)

## 2021-08-16 PROCEDURE — 83540 ASSAY OF IRON: CPT

## 2021-08-16 PROCEDURE — 82728 ASSAY OF FERRITIN: CPT

## 2021-08-16 PROCEDURE — 99213 OFFICE O/P EST LOW 20 MIN: CPT | Performed by: INTERNAL MEDICINE

## 2021-08-16 PROCEDURE — 85025 COMPLETE CBC W/AUTO DIFF WBC: CPT

## 2021-08-16 PROCEDURE — 80053 COMPREHEN METABOLIC PANEL: CPT

## 2021-08-16 PROCEDURE — 83880 ASSAY OF NATRIURETIC PEPTIDE: CPT

## 2021-08-16 PROCEDURE — 83550 IRON BINDING TEST: CPT

## 2021-08-16 PROCEDURE — 36415 COLL VENOUS BLD VENIPUNCTURE: CPT

## 2021-08-16 RX ORDER — LORATADINE 10 MG/1
10 TABLET ORAL DAILY
Qty: 10 TABLET | Refills: 0 | Status: CANCELLED | OUTPATIENT
Start: 2021-08-16 | End: 2021-08-26

## 2021-08-16 RX ORDER — FLUTICASONE PROPIONATE 50 MCG
1 SPRAY, SUSPENSION (ML) NASAL DAILY
Qty: 9.9 ML | Refills: 0 | Status: SHIPPED | OUTPATIENT
Start: 2021-08-16 | End: 2021-12-20

## 2021-08-16 RX ORDER — FAMOTIDINE 20 MG/1
20 TABLET, FILM COATED ORAL 2 TIMES DAILY
Qty: 180 TABLET | Refills: 3 | Status: SHIPPED | OUTPATIENT
Start: 2021-08-16 | End: 2021-12-07 | Stop reason: ALTCHOICE

## 2021-08-16 NOTE — PROGRESS NOTES
INTERNAL MEDICINE FOLLOW-UP OFFICE VISIT  [facility]  [address] [place] [address]    NAME: Pilar Campo  AGE: 67 y o  SEX: male    DATE OF ENCOUNTER: 8/16/2021    Assessment and Plan     1  Gastroesophageal reflux disease without esophagitis  EGD on 6/6/19 showed resolved class D esophagitis  He has a known large hiatal hernia  likely contributing to his symptoms however per GI, given his advanced age and comorbid conditions he remains a high risk surgical candidate and his symptoms appear to be mild  Today he reports his symptoms are well controlled with lifestyle modification and Prilosec    - Will deescalate from omeprazole 40 mg daily and start Pepcid 20 mg b i d   - famotidine (PEPCID) 20 mg tablet; Take 1 tablet (20 mg total) by mouth 2 (two) times a day  Dispense: 180 tablet; Refill: 3    2  Post-nasal drip patient  Admits to symptoms of postnasal  Drip  Will hold off starting patient  Claritin 10 mg   Plan  - sodium chloride (OCEAN) 0 65 % nasal spray; 1 spray into each nostril as needed (popst nasal drip)  Dispense: 88 mL; Refill: 0  - fluticasone (FLONASE) 50 mcg/act nasal spray; 1 spray into each nostril daily  Dispense: 9 9 mL; Refill: 0    3  Coronary artery disease involving native coronary artery of native heart without angina pectoris  Intermittent Palpitation   Patient has just recently been started on Lasix 20 mg daily  Plan  - Basic metabolic panel; Future  - NT-BNP PRO;  Future  - Continue metoprolol succinate 50 mg daily  - Continue aspirin 81 mg daily  - Continue atorvastatin 40 mg daily  - Continue Toprol XL 50 mg daily  - Continue  Lasix 20 mg daily  - Continue  Amlodipine 5 mg daily  - Continue to follow-up with cardiology AS RECOMMENDED    Hyperlipidemia  Plan  - Continue atorvastatin 40 mg daily     Tubular adenoma of colon Per GI, Pt is  due for repeat colonoscopy in 2023      PHQ-9 Depression Screening    PHQ-9:   Frequency of the following problems over the past two weeks:           Orders Placed This Encounter   Procedures    Basic metabolic panel    NT-BNP PRO       - Counseling Documentation: patient was counseled regarding: risk factor reductions and importance of compliance with treatment    Chief Complaint     Chief Complaint   Patient presents with    Follow-up       History of Present Illness     TERRY Stephens is a 67 y o  male with a past medical history of GERD with esophagitis resolved, tubular adenoma of the colon, CAD s/p stent x1 2000, HLD, AAA repair 2009, presents for routine visit of his chronic conditions  Patient admits to compliance with his medication his blood pressure today is 128/67  He recently saw Cardiology on 07/21 for  Bilateral lower extremity swelling and shortness of breath  Which  Was thought to be secondary to varicose vein /deconditioning, no anginal symptoms  His  Amlodipine was discontinued and he was started on Lasix 20 mg daily and also advised to take an extra 20 mg on a by he feels more short of breath/more leg swelling  Per Cardiology note his blood pressure was noted to be systolics 135N to 438S after amlodipine was discontinued so he was started on lisinopril 5 mg daily  Today he admits to improvement of symptoms states his compliance with his Lasix 20 mg daily and has not required extra 20 mg  He is compliant with diet  Patient complained of symptoms of postnasal drip today, he denies   Recent URI symptoms,fever but admits to cough occasionally productive of thick clear sputum  He denies headaches, lightheadedness, chest pain, shortness of breath, palpitation, abdominal pain, N/V/C/D          The following portions of the patient's history were reviewed and updated as appropriate: allergies, current medications, past family history, past medical history, past social history, past surgical history and problem list     Review of Systems     Review of Systems 12 point review of system remarkable except that listed in my HPI above    Active Problem List     Patient Active Problem List   Diagnosis    CAD (coronary artery disease)    Hypertension    Incisional hernia    History of incisional hernia repair    Bursitis of left shoulder    Serrated adenoma of colon    Primary osteoarthritis of right knee    Gastroesophageal reflux disease    Gastroesophageal reflux disease with esophagitis    Primary osteoarthritis of one hip, left    Tubular adenoma of colon    Abdominal aortic aneurysm (AAA) without rupture ([facility] [address] )    Follow up    Dyslipidemia    Unstable angina (HCC)    Status post cholecystectomy    Ambulatory dysfunction    Surgical wound, non healing    Palpitations    Diastolic dysfunction    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)       Objective     /67 (BP Location: Left arm, Patient Position: Sitting, Cuff Size: Standard)   Pulse 62   Temp 98 3 °F (36 8 °C) (Temporal)   Wt 71 7 kg (158 lb)   SpO2 90%   BMI 24 02 kg/m²     Physical Exam  Constitutional:       Appearance: Normal appearance  HENT:      Head: Normocephalic and atraumatic  Mouth/Throat:      Mouth: Mucous membranes are moist    Cardiovascular:      Rate and Rhythm: Normal rate and regular rhythm  Pulses: Normal pulses  Heart sounds: Normal heart sounds  Pulmonary:      Effort: Pulmonary effort is normal       Breath sounds: Normal breath sounds  Abdominal:      General: Bowel sounds are normal       Palpations: Abdomen is soft  Comments: Scar for AAA surgery noted   also healed cholecystectomy scar   Musculoskeletal:         General: No tenderness  Normal range of motion  Cervical back: Normal range of motion and neck supple  Skin:     General: Skin is warm and dry  Neurological:      Mental Status: He is alert           Pertinent Laboratory/Diagnostic Studies:  CBC:   Lab Results   Component Value Date/Time    WBC 4 70 10/24/2020 04:34 AM    WBC 8 14 06/18/2015 10:43 AM    RBC 3 06 (L) 10/24/2020 04:34 AM    RBC 4 64 06/18/2015 10:43 AM    HGB 9 4 (L) 10/24/2020 04:34 AM    HGB 12 7 06/18/2015 10:43 AM    HCT 27 3 (L) 10/24/2020 04:34 AM    HCT 38 7 06/18/2015 10:43 AM    MCV 89 10/24/2020 04:34 AM    MCV 83 06/18/2015 10:43 AM    MCH 30 7 10/24/2020 04:34 AM    MCH 27 4 06/18/2015 10:43 AM    MCHC 34 4 10/24/2020 04:34 AM    MCHC 32 8 06/18/2015 10:43 AM    RDW 15 1 10/24/2020 04:34 AM    RDW 13 5 06/18/2015 10:43 AM    MPV 7 1 (L) 10/24/2020 04:34 AM    MPV 8 7 (L) 06/18/2015 10:43 AM     10/24/2020 04:34 AM     06/18/2015 10:43 AM    NRBC 0 10/04/2020 05:31 AM    NEUTOPHILPCT 63 10/24/2020 04:34 AM    NEUTOPHILPCT 77 (H) 06/18/2015 10:43 AM    LYMPHOPCT 26 10/24/2020 04:34 AM    LYMPHOPCT 15 06/18/2015 10:43 AM    MONOPCT 6 10/24/2020 04:34 AM    MONOPCT 5 06/18/2015 10:43 AM    EOSPCT 4 10/24/2020 04:34 AM    EOSPCT 2 06/18/2015 10:43 AM    BASOPCT 1 10/24/2020 04:34 AM    BASOPCT 1 06/18/2015 10:43 AM    NEUTROABS 3 00 10/24/2020 04:34 AM    NEUTROABS 6 27 06/18/2015 10:43 AM    LYMPHSABS 1 20 10/24/2020 04:34 AM    LYMPHSABS 1 22 06/18/2015 10:43 AM    MONOSABS 0 30 10/24/2020 04:34 AM    MONOSABS 0 41 06/18/2015 10:43 AM    EOSABS 0 20 10/24/2020 04:34 AM    EOSABS 0 16 06/18/2015 10:43 AM     Chemistry Profile:   Results from Last 12 Months   Lab Units 01/21/21  1045 12/09/20  1100 10/23/20  1238 09/30/20  0632   POTASSIUM mmol/L 4 7 3 6 4 4 4 2   CHLORIDE mmol/L 112* 105 108 113*   CO2 mmol/L 25 27 25 21   BUN mg/dL 22 25 17 37*   CREATININE mg/dL 0 93 1 06 0 91 1 43*   GLUCOSE FASTING mg/dL 86  --   --   --    GLUCOSE RANDOM mg/dL  --  98 87 120   CALCIUM mg/dL 9 7 9 3 8 9 8 0*   CORRECTED CALCIUM mg/dL  --  9 9  --  9 5   MAGNESIUM mg/dL  --   --  1 8 2 4   PHOSPHORUS mg/dL  --   --   --  2 5   AST U/L  --  21  --  78*   ALT U/L  --  22  --  43   ALK PHOS U/L  --  96  --  73   EGFR ml/min/1 73sq m 79 68 82 47       Current Medications     Current Outpatient Medications:     aspirin 81 MG tablet, Take 81 mg by mouth daily Resume on 3/11/18 , Disp: , Rfl:     atorvastatin (LIPITOR) 40 mg tablet, Take 1 tablet (40 mg total) by mouth daily with dinner Resume next scheduled dose upon discharge from the hospital, Disp: 90 tablet, Rfl: 3    furosemide (LASIX) 20 mg tablet, Take 1 tablet (20 mg total) by mouth daily Can take one additional dose as needed for leg swelling or shortness of breath, Disp: 90 tablet, Rfl: 3    lisinopril (ZESTRIL) 5 mg tablet, Take 1 tablet (5 mg total) by mouth daily, Disp: 90 tablet, Rfl: 1    metoprolol succinate (TOPROL-XL) 50 mg 24 hr tablet, Take 1 tablet (50 mg total) by mouth daily Resume on 3/11/18, Disp: 90 tablet, Rfl: 3    nitroglycerin (NITROSTAT) 0 4 mg SL tablet, Place 1 tablet (0 4 mg total) under the tongue every 5 (five) minutes as needed for chest pain, Disp: 5 tablet, Rfl: 0    potassium chloride (K-DUR,KLOR-CON) 10 mEq tablet, Take 1 tablet (10 mEq total) by mouth daily, Disp: 90 tablet, Rfl: 2    famotidine (PEPCID) 20 mg tablet, Take 1 tablet (20 mg total) by mouth 2 (two) times a day, Disp: 180 tablet, Rfl: 3    fluticasone (FLONASE) 50 mcg/act nasal spray, 1 spray into each nostril daily, Disp: 9 9 mL, Rfl: 0    sodium chloride (OCEAN) 0 65 % nasal spray, 1 spray into each nostril as needed (popst nasal drip), Disp: 88 mL, Rfl: 0    Health Maintenance     Health Maintenance   Topic Date Due    Medicare Annual Wellness Visit (AWV)  04/08/2020    Colorectal Cancer Screening  05/22/2021    Influenza Vaccine (1) 09/01/2021    Depression Screening PHQ  01/08/2022    Fall Risk  01/08/2022    BMI: Adult  07/07/2022    DTaP,Tdap,and Td Vaccines (2 - Td or Tdap) 07/11/2022    Hepatitis C Screening  Completed    Pneumococcal Vaccine: 65+ Years  Completed    COVID-19 Vaccine  Completed    HIB Vaccine  Aged Out    Hepatitis B Vaccine  Aged Out    IPV Vaccine  Aged C/ Luke [address] Hepatitis A Vaccine  Aged Out    Meningococcal ACWY Vaccine  Aged Out    HPV Vaccine  Aged Out     Immunization History   Administered Date(s) Administered    INFLUENZA 12/31/2017    Influenza Quadrivalent, 6-35 Months IM 01/31/2017    Influenza, high dose seasonal 0 7 mL 10/19/2018, 09/30/2019, 11/16/2020    Influenza, seasonal, injectable 11/25/2013, 10/08/2015    Influenza, seasonal, injectable, preservative free 12/31/2017    Pneumococcal Conjugate 13-Valent 03/04/2016    Pneumococcal Polysaccharide PPV23 08/08/2011    SARS-CoV-2 / COVID-19 mRNA IM (Moderna) 03/26/2021, 04/28/2021    Tdap 07/11/2012    Tuberculin Skin Test-PPD Intradermal 10/05/2020       Loretta AMAYA    Internal Medicine   8/16/2021 1:17 PM

## 2021-08-17 DIAGNOSIS — E61.1 IRON DEFICIENCY: Primary | ICD-10-CM

## 2021-08-17 RX ORDER — FERROUS SULFATE 325(65) MG
325 TABLET ORAL EVERY OTHER DAY
Qty: 45 TABLET | Refills: 3 | Status: SHIPPED | OUTPATIENT
Start: 2021-08-17 | End: 2022-02-11 | Stop reason: SDUPTHER

## 2021-10-01 ENCOUNTER — TELEPHONE (OUTPATIENT)
Dept: INTERNAL MEDICINE CLINIC | Facility: CLINIC | Age: 76
End: 2021-10-01

## 2021-10-04 DIAGNOSIS — K21.9 GASTROESOPHAGEAL REFLUX DISEASE WITHOUT ESOPHAGITIS: ICD-10-CM

## 2021-10-04 DIAGNOSIS — K21.00 GASTROESOPHAGEAL REFLUX DISEASE WITH ESOPHAGITIS: Primary | ICD-10-CM

## 2021-10-04 RX ORDER — OMEPRAZOLE 40 MG/1
40 CAPSULE, DELAYED RELEASE ORAL DAILY
Qty: 90 CAPSULE | Refills: 3 | Status: SHIPPED | OUTPATIENT
Start: 2021-10-04 | End: 2022-07-26

## 2021-10-05 ENCOUNTER — CLINICAL SUPPORT (OUTPATIENT)
Dept: INTERNAL MEDICINE CLINIC | Facility: CLINIC | Age: 76
End: 2021-10-05

## 2021-10-05 ENCOUNTER — OFFICE VISIT (OUTPATIENT)
Dept: MULTI SPECIALTY CLINIC | Facility: CLINIC | Age: 76
End: 2021-10-05

## 2021-10-05 VITALS — DIASTOLIC BLOOD PRESSURE: 70 MMHG | HEART RATE: 67 BPM | SYSTOLIC BLOOD PRESSURE: 159 MMHG

## 2021-10-05 DIAGNOSIS — B35.1 ONYCHOMYCOSIS: Primary | ICD-10-CM

## 2021-10-05 DIAGNOSIS — L85.3 XEROSIS CUTIS: ICD-10-CM

## 2021-10-05 DIAGNOSIS — I83.93 ASYMPTOMATIC VARICOSE VEINS OF BOTH LOWER EXTREMITIES: ICD-10-CM

## 2021-10-05 DIAGNOSIS — L84 CALLUS OF FOOT: ICD-10-CM

## 2021-10-05 DIAGNOSIS — Z23 NEED FOR INFLUENZA VACCINATION: Primary | ICD-10-CM

## 2021-10-05 PROCEDURE — 90662 IIV NO PRSV INCREASED AG IM: CPT | Performed by: INTERNAL MEDICINE

## 2021-10-05 PROCEDURE — G0008 ADMIN INFLUENZA VIRUS VAC: HCPCS | Performed by: INTERNAL MEDICINE

## 2021-10-05 PROCEDURE — 99213 OFFICE O/P EST LOW 20 MIN: CPT | Performed by: PODIATRIST

## 2021-12-01 ENCOUNTER — RA CDI HCC (OUTPATIENT)
Dept: OTHER | Facility: HOSPITAL | Age: 76
End: 2021-12-01

## 2021-12-07 ENCOUNTER — OFFICE VISIT (OUTPATIENT)
Dept: INTERNAL MEDICINE CLINIC | Facility: CLINIC | Age: 76
End: 2021-12-07

## 2021-12-07 VITALS
SYSTOLIC BLOOD PRESSURE: 145 MMHG | DIASTOLIC BLOOD PRESSURE: 67 MMHG | OXYGEN SATURATION: 92 % | WEIGHT: 153 LBS | TEMPERATURE: 97.9 F | HEART RATE: 67 BPM | BODY MASS INDEX: 23.26 KG/M2

## 2021-12-07 DIAGNOSIS — I10 HYPERTENSION: ICD-10-CM

## 2021-12-07 DIAGNOSIS — I10 ESSENTIAL HYPERTENSION: ICD-10-CM

## 2021-12-07 PROCEDURE — 99213 OFFICE O/P EST LOW 20 MIN: CPT | Performed by: INTERNAL MEDICINE

## 2021-12-07 RX ORDER — METOPROLOL SUCCINATE 50 MG/1
50 TABLET, EXTENDED RELEASE ORAL DAILY
Qty: 90 TABLET | Refills: 5 | Status: SHIPPED | OUTPATIENT
Start: 2021-12-07

## 2021-12-07 RX ORDER — LISINOPRIL 5 MG/1
5 TABLET ORAL DAILY
Qty: 90 TABLET | Refills: 5 | Status: SHIPPED | OUTPATIENT
Start: 2021-12-07

## 2021-12-07 RX ORDER — POTASSIUM CHLORIDE 750 MG/1
10 TABLET, EXTENDED RELEASE ORAL DAILY
Qty: 90 TABLET | Refills: 5 | Status: SHIPPED | OUTPATIENT
Start: 2021-12-07

## 2021-12-07 RX ORDER — ATORVASTATIN CALCIUM 40 MG/1
40 TABLET, FILM COATED ORAL
Qty: 90 TABLET | Refills: 3 | Status: SHIPPED | OUTPATIENT
Start: 2021-12-07

## 2021-12-07 RX ORDER — FUROSEMIDE 20 MG/1
20 TABLET ORAL DAILY
Qty: 90 TABLET | Refills: 3 | Status: SHIPPED | OUTPATIENT
Start: 2021-12-07 | End: 2022-03-03 | Stop reason: SDUPTHER

## 2021-12-20 ENCOUNTER — OFFICE VISIT (OUTPATIENT)
Dept: INTERNAL MEDICINE CLINIC | Facility: CLINIC | Age: 76
End: 2021-12-20

## 2021-12-20 VITALS
WEIGHT: 153 LBS | TEMPERATURE: 97.8 F | HEART RATE: 69 BPM | DIASTOLIC BLOOD PRESSURE: 70 MMHG | BODY MASS INDEX: 23.26 KG/M2 | SYSTOLIC BLOOD PRESSURE: 127 MMHG | OXYGEN SATURATION: 91 %

## 2021-12-20 DIAGNOSIS — I10 PRIMARY HYPERTENSION: Primary | ICD-10-CM

## 2021-12-20 PROCEDURE — 99213 OFFICE O/P EST LOW 20 MIN: CPT | Performed by: PHYSICIAN ASSISTANT

## 2022-01-01 ENCOUNTER — APPOINTMENT (OUTPATIENT)
Dept: RADIOLOGY | Facility: HOSPITAL | Age: 77
DRG: 853 | End: 2022-01-01
Payer: MEDICARE

## 2022-01-01 ENCOUNTER — APPOINTMENT (INPATIENT)
Dept: NON INVASIVE DIAGNOSTICS | Facility: HOSPITAL | Age: 77
DRG: 853 | End: 2022-01-01
Payer: MEDICARE

## 2022-01-01 ENCOUNTER — HOSPITAL ENCOUNTER (INPATIENT)
Facility: HOSPITAL | Age: 77
LOS: 18 days | DRG: 853 | End: 2022-10-08
Attending: EMERGENCY MEDICINE | Admitting: STUDENT IN AN ORGANIZED HEALTH CARE EDUCATION/TRAINING PROGRAM
Payer: MEDICARE

## 2022-01-01 ENCOUNTER — APPOINTMENT (INPATIENT)
Dept: RADIOLOGY | Facility: HOSPITAL | Age: 77
DRG: 853 | End: 2022-01-01
Payer: MEDICARE

## 2022-01-01 ENCOUNTER — APPOINTMENT (OUTPATIENT)
Dept: GASTROENTEROLOGY | Facility: HOSPITAL | Age: 77
DRG: 853 | End: 2022-01-01
Payer: MEDICARE

## 2022-01-01 ENCOUNTER — APPOINTMENT (EMERGENCY)
Dept: RADIOLOGY | Facility: HOSPITAL | Age: 77
DRG: 853 | End: 2022-01-01
Payer: MEDICARE

## 2022-01-01 VITALS
OXYGEN SATURATION: 41 % | HEIGHT: 68 IN | BODY MASS INDEX: 19.11 KG/M2 | SYSTOLIC BLOOD PRESSURE: 83 MMHG | DIASTOLIC BLOOD PRESSURE: 36 MMHG | TEMPERATURE: 98.6 F | WEIGHT: 126.1 LBS

## 2022-01-01 DIAGNOSIS — K56.609 SMALL BOWEL OBSTRUCTION (HCC): Primary | ICD-10-CM

## 2022-01-01 DIAGNOSIS — J18.9 PNEUMONIA: ICD-10-CM

## 2022-01-01 DIAGNOSIS — N17.9 ACUTE RENAL FAILURE (ARF) (HCC): ICD-10-CM

## 2022-01-01 DIAGNOSIS — K56.609 SBO (SMALL BOWEL OBSTRUCTION) (HCC): ICD-10-CM

## 2022-01-01 DIAGNOSIS — T81.31XA DISRUPTION OR DEHISCENCE OF CLOSURE OF SKIN, INITIAL ENCOUNTER: ICD-10-CM

## 2022-01-01 DIAGNOSIS — E87.5 HYPERKALEMIA: ICD-10-CM

## 2022-01-01 DIAGNOSIS — J96.01 ACUTE RESPIRATORY FAILURE WITH HYPOXIA (HCC): ICD-10-CM

## 2022-01-01 DIAGNOSIS — N17.9 ACUTE RENAL FAILURE, UNSPECIFIED ACUTE RENAL FAILURE TYPE (HCC): ICD-10-CM

## 2022-01-01 DIAGNOSIS — R57.9 SHOCK (HCC): ICD-10-CM

## 2022-01-01 LAB
2HR DELTA HS TROPONIN: -3 NG/L
2HR DELTA HS TROPONIN: 2 NG/L
4HR DELTA HS TROPONIN: 12 NG/L
4HR DELTA HS TROPONIN: 3 NG/L
ABO GROUP BLD BPU: NORMAL
ABO GROUP BLD: NORMAL
ALBUMIN SERPL BCP-MCNC: 0.8 G/DL (ref 3.5–5)
ALBUMIN SERPL BCP-MCNC: 0.8 G/DL (ref 3.5–5)
ALBUMIN SERPL BCP-MCNC: 0.9 G/DL (ref 3.5–5)
ALBUMIN SERPL BCP-MCNC: 0.9 G/DL (ref 3.5–5)
ALBUMIN SERPL BCP-MCNC: 1.1 G/DL (ref 3.5–5)
ALBUMIN SERPL BCP-MCNC: 1.6 G/DL (ref 3.5–5)
ALBUMIN SERPL BCP-MCNC: 1.6 G/DL (ref 3.5–5)
ALBUMIN SERPL BCP-MCNC: 1.7 G/DL (ref 3.5–5)
ALBUMIN SERPL BCP-MCNC: 1.7 G/DL (ref 3.5–5)
ALBUMIN SERPL BCP-MCNC: 1.8 G/DL (ref 3.5–5)
ALBUMIN SERPL BCP-MCNC: 1.9 G/DL (ref 3.5–5)
ALBUMIN SERPL BCP-MCNC: 2 G/DL (ref 3.5–5)
ALBUMIN SERPL BCP-MCNC: 2 G/DL (ref 3.5–5)
ALBUMIN SERPL BCP-MCNC: 2.2 G/DL (ref 3.5–5)
ALBUMIN SERPL BCP-MCNC: 2.4 G/DL (ref 3.5–5)
ALBUMIN SERPL BCP-MCNC: 3.4 G/DL (ref 3.5–5)
ALBUMIN SERPL BCP-MCNC: 4.2 G/DL (ref 3.5–5)
ALP SERPL-CCNC: 103 U/L (ref 46–116)
ALP SERPL-CCNC: 105 U/L (ref 46–116)
ALP SERPL-CCNC: 109 U/L (ref 46–116)
ALP SERPL-CCNC: 114 U/L (ref 46–116)
ALP SERPL-CCNC: 120 U/L (ref 46–116)
ALP SERPL-CCNC: 121 U/L (ref 46–116)
ALP SERPL-CCNC: 124 U/L (ref 46–116)
ALP SERPL-CCNC: 157 U/L (ref 46–116)
ALP SERPL-CCNC: 158 U/L (ref 46–116)
ALP SERPL-CCNC: 205 U/L (ref 46–116)
ALP SERPL-CCNC: 208 U/L (ref 46–116)
ALP SERPL-CCNC: 25 U/L (ref 46–116)
ALP SERPL-CCNC: 42 U/L (ref 46–116)
ALP SERPL-CCNC: 45 U/L (ref 46–116)
ALP SERPL-CCNC: 56 U/L (ref 46–116)
ALP SERPL-CCNC: 65 U/L (ref 46–116)
ALP SERPL-CCNC: 69 U/L (ref 46–116)
ALP SERPL-CCNC: 78 U/L (ref 46–116)
ALP SERPL-CCNC: 82 U/L (ref 46–116)
ALP SERPL-CCNC: 96 U/L (ref 46–116)
ALT SERPL W P-5'-P-CCNC: 1363 U/L (ref 12–78)
ALT SERPL W P-5'-P-CCNC: 1402 U/L (ref 12–78)
ALT SERPL W P-5'-P-CCNC: 17 U/L (ref 12–78)
ALT SERPL W P-5'-P-CCNC: 17 U/L (ref 12–78)
ALT SERPL W P-5'-P-CCNC: 19 U/L (ref 12–78)
ALT SERPL W P-5'-P-CCNC: 20 U/L (ref 12–78)
ALT SERPL W P-5'-P-CCNC: 21 U/L (ref 12–78)
ALT SERPL W P-5'-P-CCNC: 22 U/L (ref 12–78)
ALT SERPL W P-5'-P-CCNC: 22 U/L (ref 12–78)
ALT SERPL W P-5'-P-CCNC: 23 U/L (ref 12–78)
ALT SERPL W P-5'-P-CCNC: 24 U/L (ref 12–78)
ALT SERPL W P-5'-P-CCNC: 25 U/L (ref 12–78)
ALT SERPL W P-5'-P-CCNC: 30 U/L (ref 12–78)
ALT SERPL W P-5'-P-CCNC: 30 U/L (ref 12–78)
ALT SERPL W P-5'-P-CCNC: 37 U/L (ref 12–78)
ALT SERPL W P-5'-P-CCNC: 394 U/L (ref 12–78)
ALT SERPL W P-5'-P-CCNC: 40 U/L (ref 12–78)
ALT SERPL W P-5'-P-CCNC: 404 U/L (ref 12–78)
ALT SERPL W P-5'-P-CCNC: 546 U/L (ref 12–78)
ALT SERPL W P-5'-P-CCNC: 61 U/L (ref 12–78)
AMORPH URATE CRY URNS QL MICRO: ABNORMAL
ANION GAP SERPL CALCULATED.3IONS-SCNC: 10 MMOL/L (ref 4–13)
ANION GAP SERPL CALCULATED.3IONS-SCNC: 11 MMOL/L (ref 4–13)
ANION GAP SERPL CALCULATED.3IONS-SCNC: 13 MMOL/L (ref 4–13)
ANION GAP SERPL CALCULATED.3IONS-SCNC: 14 MMOL/L (ref 4–13)
ANION GAP SERPL CALCULATED.3IONS-SCNC: 15 MMOL/L (ref 4–13)
ANION GAP SERPL CALCULATED.3IONS-SCNC: 22 MMOL/L (ref 4–13)
ANION GAP SERPL CALCULATED.3IONS-SCNC: 24 MMOL/L (ref 4–13)
ANION GAP SERPL CALCULATED.3IONS-SCNC: 26 MMOL/L (ref 4–13)
ANION GAP SERPL CALCULATED.3IONS-SCNC: 3 MMOL/L (ref 4–13)
ANION GAP SERPL CALCULATED.3IONS-SCNC: 31 MMOL/L (ref 4–13)
ANION GAP SERPL CALCULATED.3IONS-SCNC: 4 MMOL/L (ref 4–13)
ANION GAP SERPL CALCULATED.3IONS-SCNC: 5 MMOL/L (ref 4–13)
ANION GAP SERPL CALCULATED.3IONS-SCNC: 5 MMOL/L (ref 4–13)
ANION GAP SERPL CALCULATED.3IONS-SCNC: 6 MMOL/L (ref 4–13)
ANION GAP SERPL CALCULATED.3IONS-SCNC: 7 MMOL/L (ref 4–13)
ANION GAP SERPL CALCULATED.3IONS-SCNC: 8 MMOL/L (ref 4–13)
ANION GAP SERPL CALCULATED.3IONS-SCNC: 8 MMOL/L (ref 4–13)
ANION GAP SERPL CALCULATED.3IONS-SCNC: 9 MMOL/L (ref 4–13)
ANISOCYTOSIS BLD QL SMEAR: PRESENT
AORTIC ROOT: 3.3 CM
APICAL FOUR CHAMBER EJECTION FRACTION: 58 %
APTT PPP: 36 SECONDS (ref 23–37)
ARTERIAL PATENCY WRIST A: YES
ASCENDING AORTA: 3.5 CM
AST SERPL W P-5'-P-CCNC: 1140 U/L (ref 5–45)
AST SERPL W P-5'-P-CCNC: 1208 U/L (ref 5–45)
AST SERPL W P-5'-P-CCNC: 136 U/L (ref 5–45)
AST SERPL W P-5'-P-CCNC: 1633 U/L (ref 5–45)
AST SERPL W P-5'-P-CCNC: 25 U/L (ref 5–45)
AST SERPL W P-5'-P-CCNC: 27 U/L (ref 5–45)
AST SERPL W P-5'-P-CCNC: 27 U/L (ref 5–45)
AST SERPL W P-5'-P-CCNC: 30 U/L (ref 5–45)
AST SERPL W P-5'-P-CCNC: 31 U/L (ref 5–45)
AST SERPL W P-5'-P-CCNC: 33 U/L (ref 5–45)
AST SERPL W P-5'-P-CCNC: 35 U/L (ref 5–45)
AST SERPL W P-5'-P-CCNC: 36 U/L (ref 5–45)
AST SERPL W P-5'-P-CCNC: 37 U/L (ref 5–45)
AST SERPL W P-5'-P-CCNC: 38 U/L (ref 5–45)
AST SERPL W P-5'-P-CCNC: 39 U/L (ref 5–45)
AST SERPL W P-5'-P-CCNC: 39 U/L (ref 5–45)
AST SERPL W P-5'-P-CCNC: 4253 U/L (ref 5–45)
AST SERPL W P-5'-P-CCNC: 4507 U/L (ref 5–45)
ATRIAL RATE: 102 BPM
ATRIAL RATE: 156 BPM
ATRIAL RATE: 162 BPM
ATRIAL RATE: 68 BPM
ATRIAL RATE: 79 BPM
ATRIAL RATE: 92 BPM
ATRIAL RATE: 92 BPM
ATRIAL RATE: 94 BPM
BACTERIA BLD CULT: NORMAL
BACTERIA UR QL AUTO: ABNORMAL /HPF
BACTERIA UR QL AUTO: ABNORMAL /HPF
BASE EX.OXY STD BLDV CALC-SCNC: 69 % (ref 60–80)
BASE EX.OXY STD BLDV CALC-SCNC: 72.9 % (ref 60–80)
BASE EX.OXY STD BLDV CALC-SCNC: 73.6 % (ref 60–80)
BASE EX.OXY STD BLDV CALC-SCNC: 81.5 % (ref 60–80)
BASE EX.OXY STD BLDV CALC-SCNC: 82.1 % (ref 60–80)
BASE EXCESS BLDA CALC-SCNC: -0.1 MMOL/L
BASE EXCESS BLDA CALC-SCNC: -0.1 MMOL/L
BASE EXCESS BLDA CALC-SCNC: -0.3 MMOL/L
BASE EXCESS BLDA CALC-SCNC: -0.3 MMOL/L
BASE EXCESS BLDA CALC-SCNC: -0.9 MMOL/L
BASE EXCESS BLDA CALC-SCNC: -1 MMOL/L
BASE EXCESS BLDA CALC-SCNC: -1 MMOL/L (ref -2–3)
BASE EXCESS BLDA CALC-SCNC: -1.2 MMOL/L
BASE EXCESS BLDA CALC-SCNC: -1.3 MMOL/L
BASE EXCESS BLDA CALC-SCNC: -1.4 MMOL/L
BASE EXCESS BLDA CALC-SCNC: -1.7 MMOL/L
BASE EXCESS BLDA CALC-SCNC: -1.9 MMOL/L
BASE EXCESS BLDA CALC-SCNC: -10 MMOL/L (ref -2–3)
BASE EXCESS BLDA CALC-SCNC: -13 MMOL/L
BASE EXCESS BLDA CALC-SCNC: -13.2 MMOL/L
BASE EXCESS BLDA CALC-SCNC: -17.2 MMOL/L
BASE EXCESS BLDA CALC-SCNC: -18 MMOL/L (ref -2–3)
BASE EXCESS BLDA CALC-SCNC: -19 MMOL/L (ref -2–3)
BASE EXCESS BLDA CALC-SCNC: -19 MMOL/L (ref -2–3)
BASE EXCESS BLDA CALC-SCNC: -2 MMOL/L
BASE EXCESS BLDA CALC-SCNC: -2 MMOL/L (ref -2–3)
BASE EXCESS BLDA CALC-SCNC: -2.1 MMOL/L
BASE EXCESS BLDA CALC-SCNC: -2.4 MMOL/L
BASE EXCESS BLDA CALC-SCNC: -2.4 MMOL/L
BASE EXCESS BLDA CALC-SCNC: -2.7 MMOL/L
BASE EXCESS BLDA CALC-SCNC: -2.8 MMOL/L
BASE EXCESS BLDA CALC-SCNC: -2.8 MMOL/L
BASE EXCESS BLDA CALC-SCNC: -20.5 MMOL/L
BASE EXCESS BLDA CALC-SCNC: -21 MMOL/L (ref -2–3)
BASE EXCESS BLDA CALC-SCNC: -23.8 MMOL/L
BASE EXCESS BLDA CALC-SCNC: -3 MMOL/L
BASE EXCESS BLDA CALC-SCNC: -3.2 MMOL/L
BASE EXCESS BLDA CALC-SCNC: -3.3 MMOL/L
BASE EXCESS BLDA CALC-SCNC: -3.4 MMOL/L
BASE EXCESS BLDA CALC-SCNC: -3.5 MMOL/L
BASE EXCESS BLDA CALC-SCNC: -3.6 MMOL/L
BASE EXCESS BLDA CALC-SCNC: -4.1 MMOL/L
BASE EXCESS BLDA CALC-SCNC: -4.4 MMOL/L
BASE EXCESS BLDA CALC-SCNC: -4.5 MMOL/L
BASE EXCESS BLDA CALC-SCNC: -4.8 MMOL/L
BASE EXCESS BLDA CALC-SCNC: -4.9 MMOL/L
BASE EXCESS BLDA CALC-SCNC: -4.9 MMOL/L
BASE EXCESS BLDA CALC-SCNC: -5 MMOL/L (ref -2–3)
BASE EXCESS BLDA CALC-SCNC: -5.2 MMOL/L
BASE EXCESS BLDA CALC-SCNC: -6 MMOL/L
BASE EXCESS BLDA CALC-SCNC: -6 MMOL/L (ref -2–3)
BASE EXCESS BLDA CALC-SCNC: -9 MMOL/L
BASE EXCESS BLDA CALC-SCNC: -9.7 MMOL/L
BASE EXCESS BLDA CALC-SCNC: 0 MMOL/L (ref -2–3)
BASE EXCESS BLDA CALC-SCNC: 1.2 MMOL/L
BASE EXCESS BLDA CALC-SCNC: 2.6 MMOL/L
BASE EXCESS BLDV CALC-SCNC: -0.5 MMOL/L
BASE EXCESS BLDV CALC-SCNC: -1.1 MMOL/L
BASE EXCESS BLDV CALC-SCNC: -17 MMOL/L
BASE EXCESS BLDV CALC-SCNC: -6.3 MMOL/L
BASE EXCESS BLDV CALC-SCNC: -9.1 MMOL/L
BASOPHILS # BLD AUTO: 0.03 THOUSANDS/ΜL (ref 0–0.1)
BASOPHILS # BLD AUTO: 0.05 THOUSANDS/ΜL (ref 0–0.1)
BASOPHILS # BLD AUTO: 0.06 THOUSANDS/ΜL (ref 0–0.1)
BASOPHILS # BLD MANUAL: 0 THOUSAND/UL (ref 0–0.1)
BASOPHILS # BLD MANUAL: 0 THOUSAND/UL (ref 0–0.1)
BASOPHILS # BLD MANUAL: 0.03 THOUSAND/UL (ref 0–0.1)
BASOPHILS # BLD MANUAL: 0.07 THOUSAND/UL (ref 0–0.1)
BASOPHILS # BLD MANUAL: 0.17 THOUSAND/UL (ref 0–0.1)
BASOPHILS # BLD MANUAL: 0.22 THOUSAND/UL (ref 0–0.1)
BASOPHILS NFR BLD AUTO: 0 % (ref 0–1)
BASOPHILS NFR BLD AUTO: 1 % (ref 0–1)
BASOPHILS NFR MAR MANUAL: 0 % (ref 0–1)
BASOPHILS NFR MAR MANUAL: 0 % (ref 0–1)
BASOPHILS NFR MAR MANUAL: 1 % (ref 0–1)
BILIRUB DIRECT SERPL-MCNC: 1.05 MG/DL (ref 0–0.2)
BILIRUB DIRECT SERPL-MCNC: 3.14 MG/DL (ref 0–0.2)
BILIRUB DIRECT SERPL-MCNC: 3.16 MG/DL (ref 0–0.2)
BILIRUB SERPL-MCNC: 1.25 MG/DL (ref 0.2–1)
BILIRUB SERPL-MCNC: 1.41 MG/DL (ref 0.2–1)
BILIRUB SERPL-MCNC: 1.44 MG/DL (ref 0.2–1)
BILIRUB SERPL-MCNC: 1.54 MG/DL (ref 0.2–1)
BILIRUB SERPL-MCNC: 1.63 MG/DL (ref 0.2–1)
BILIRUB SERPL-MCNC: 1.69 MG/DL (ref 0.2–1)
BILIRUB SERPL-MCNC: 1.81 MG/DL (ref 0.2–1)
BILIRUB SERPL-MCNC: 2.39 MG/DL (ref 0.2–1)
BILIRUB SERPL-MCNC: 2.58 MG/DL (ref 0.2–1)
BILIRUB SERPL-MCNC: 2.7 MG/DL (ref 0.2–1)
BILIRUB SERPL-MCNC: 3.04 MG/DL (ref 0.2–1)
BILIRUB SERPL-MCNC: 3.13 MG/DL (ref 0.2–1)
BILIRUB SERPL-MCNC: 3.52 MG/DL (ref 0.2–1)
BILIRUB SERPL-MCNC: 3.56 MG/DL (ref 0.2–1)
BILIRUB SERPL-MCNC: 3.68 MG/DL (ref 0.2–1)
BILIRUB SERPL-MCNC: 4.61 MG/DL (ref 0.2–1)
BILIRUB SERPL-MCNC: 4.72 MG/DL (ref 0.2–1)
BILIRUB SERPL-MCNC: 4.73 MG/DL (ref 0.2–1)
BILIRUB SERPL-MCNC: 4.85 MG/DL (ref 0.2–1)
BILIRUB SERPL-MCNC: 4.87 MG/DL (ref 0.2–1)
BILIRUB UR QL STRIP: ABNORMAL
BILIRUB UR QL STRIP: ABNORMAL
BLD GP AB SCN SERPL QL: NEGATIVE
BODY TEMPERATURE: 96.1 DEGREES FEHRENHEIT
BODY TEMPERATURE: 98 DEGREES FEHRENHEIT
BODY TEMPERATURE: 98.1 DEGREES FEHRENHEIT
BODY TEMPERATURE: 98.4 DEGREES FEHRENHEIT
BODY TEMPERATURE: 98.4 DEGREES FEHRENHEIT
BODY TEMPERATURE: 98.8 DEGREES FEHRENHEIT
BODY TEMPERATURE: 98.9 DEGREES FEHRENHEIT
BODY TEMPERATURE: 99 DEGREES FEHRENHEIT
BODY TEMPERATURE: 99 DEGREES FEHRENHEIT
BODY TEMPERATURE: 99.1 DEGREES FEHRENHEIT
BODY TEMPERATURE: 99.7 DEGREES FEHRENHEIT
BODY TEMPERATURE: 99.8 DEGREES FEHRENHEIT
BODY TEMPERATURE: 99.9 DEGREES FEHRENHEIT
BPU ID: NORMAL
BUN SERPL-MCNC: 102 MG/DL (ref 5–25)
BUN SERPL-MCNC: 112 MG/DL (ref 5–25)
BUN SERPL-MCNC: 119 MG/DL (ref 5–25)
BUN SERPL-MCNC: 13 MG/DL (ref 5–25)
BUN SERPL-MCNC: 13 MG/DL (ref 5–25)
BUN SERPL-MCNC: 16 MG/DL (ref 5–25)
BUN SERPL-MCNC: 16 MG/DL (ref 5–25)
BUN SERPL-MCNC: 17 MG/DL (ref 5–25)
BUN SERPL-MCNC: 18 MG/DL (ref 5–25)
BUN SERPL-MCNC: 19 MG/DL (ref 5–25)
BUN SERPL-MCNC: 20 MG/DL (ref 5–25)
BUN SERPL-MCNC: 22 MG/DL (ref 5–25)
BUN SERPL-MCNC: 23 MG/DL (ref 5–25)
BUN SERPL-MCNC: 23 MG/DL (ref 5–25)
BUN SERPL-MCNC: 25 MG/DL (ref 5–25)
BUN SERPL-MCNC: 25 MG/DL (ref 5–25)
BUN SERPL-MCNC: 26 MG/DL (ref 5–25)
BUN SERPL-MCNC: 27 MG/DL (ref 5–25)
BUN SERPL-MCNC: 28 MG/DL (ref 5–25)
BUN SERPL-MCNC: 31 MG/DL (ref 5–25)
BUN SERPL-MCNC: 33 MG/DL (ref 5–25)
BUN SERPL-MCNC: 34 MG/DL (ref 5–25)
BUN SERPL-MCNC: 39 MG/DL (ref 5–25)
BUN SERPL-MCNC: 41 MG/DL (ref 5–25)
BUN SERPL-MCNC: 74 MG/DL (ref 5–25)
BUN SERPL-MCNC: 81 MG/DL (ref 5–25)
BUN SERPL-MCNC: 86 MG/DL (ref 5–25)
BUN SERPL-MCNC: 92 MG/DL (ref 5–25)
BUN SERPL-MCNC: 93 MG/DL (ref 5–25)
BUN SERPL-MCNC: 93 MG/DL (ref 5–25)
BUN SERPL-MCNC: 99 MG/DL (ref 5–25)
CA-I BLD-SCNC: 0.8 MMOL/L (ref 1.12–1.32)
CA-I BLD-SCNC: 0.86 MMOL/L (ref 1.12–1.32)
CA-I BLD-SCNC: 0.94 MMOL/L (ref 1.12–1.32)
CA-I BLD-SCNC: 1.02 MMOL/L (ref 1.12–1.32)
CA-I BLD-SCNC: 1.03 MMOL/L (ref 1.12–1.32)
CA-I BLD-SCNC: 1.07 MMOL/L (ref 1.12–1.32)
CA-I BLD-SCNC: 1.07 MMOL/L (ref 1.12–1.32)
CA-I BLD-SCNC: 1.08 MMOL/L (ref 1.12–1.32)
CA-I BLD-SCNC: 1.08 MMOL/L (ref 1.12–1.32)
CA-I BLD-SCNC: 1.09 MMOL/L (ref 1.12–1.32)
CA-I BLD-SCNC: 1.09 MMOL/L (ref 1.12–1.32)
CA-I BLD-SCNC: 1.11 MMOL/L (ref 1.12–1.32)
CA-I BLD-SCNC: 1.13 MMOL/L (ref 1.12–1.32)
CA-I BLD-SCNC: 1.14 MMOL/L (ref 1.12–1.32)
CA-I BLD-SCNC: 1.2 MMOL/L (ref 1.12–1.32)
CA-I BLD-SCNC: 1.21 MMOL/L (ref 1.12–1.32)
CA-I BLD-SCNC: 1.21 MMOL/L (ref 1.12–1.32)
CA-I BLD-SCNC: 1.22 MMOL/L (ref 1.12–1.32)
CA-I BLD-SCNC: 1.22 MMOL/L (ref 1.12–1.32)
CA-I BLD-SCNC: 1.23 MMOL/L (ref 1.12–1.32)
CA-I BLD-SCNC: 1.52 MMOL/L (ref 1.12–1.32)
CA-I BLD-SCNC: 1.78 MMOL/L (ref 1.12–1.32)
CALCIUM ALBUM COR SERPL-MCNC: 10.2 MG/DL (ref 8.3–10.1)
CALCIUM ALBUM COR SERPL-MCNC: 11.5 MG/DL (ref 8.3–10.1)
CALCIUM ALBUM COR SERPL-MCNC: 11.9 MG/DL (ref 8.3–10.1)
CALCIUM ALBUM COR SERPL-MCNC: 12 MG/DL (ref 8.3–10.1)
CALCIUM ALBUM COR SERPL-MCNC: 16.1 MG/DL (ref 8.3–10.1)
CALCIUM ALBUM COR SERPL-MCNC: 8 MG/DL (ref 8.3–10.1)
CALCIUM ALBUM COR SERPL-MCNC: 8.5 MG/DL (ref 8.3–10.1)
CALCIUM ALBUM COR SERPL-MCNC: 8.7 MG/DL (ref 8.3–10.1)
CALCIUM ALBUM COR SERPL-MCNC: 8.9 MG/DL (ref 8.3–10.1)
CALCIUM ALBUM COR SERPL-MCNC: 9 MG/DL (ref 8.3–10.1)
CALCIUM ALBUM COR SERPL-MCNC: 9.1 MG/DL (ref 8.3–10.1)
CALCIUM ALBUM COR SERPL-MCNC: 9.2 MG/DL (ref 8.3–10.1)
CALCIUM ALBUM COR SERPL-MCNC: 9.3 MG/DL (ref 8.3–10.1)
CALCIUM ALBUM COR SERPL-MCNC: 9.3 MG/DL (ref 8.3–10.1)
CALCIUM ALBUM COR SERPL-MCNC: 9.8 MG/DL (ref 8.3–10.1)
CALCIUM SERPL-MCNC: 10.5 MG/DL (ref 8.3–10.1)
CALCIUM SERPL-MCNC: 13.6 MG/DL (ref 8.3–10.1)
CALCIUM SERPL-MCNC: 6.6 MG/DL (ref 8.3–10.1)
CALCIUM SERPL-MCNC: 7 MG/DL (ref 8.3–10.1)
CALCIUM SERPL-MCNC: 7 MG/DL (ref 8.3–10.1)
CALCIUM SERPL-MCNC: 7.1 MG/DL (ref 8.3–10.1)
CALCIUM SERPL-MCNC: 7.2 MG/DL (ref 8.3–10.1)
CALCIUM SERPL-MCNC: 7.3 MG/DL (ref 8.3–10.1)
CALCIUM SERPL-MCNC: 7.4 MG/DL (ref 8.3–10.1)
CALCIUM SERPL-MCNC: 7.5 MG/DL (ref 8.3–10.1)
CALCIUM SERPL-MCNC: 7.5 MG/DL (ref 8.3–10.1)
CALCIUM SERPL-MCNC: 7.6 MG/DL (ref 8.3–10.1)
CALCIUM SERPL-MCNC: 7.6 MG/DL (ref 8.3–10.1)
CALCIUM SERPL-MCNC: 7.7 MG/DL (ref 8.3–10.1)
CALCIUM SERPL-MCNC: 7.8 MG/DL (ref 8.3–10.1)
CALCIUM SERPL-MCNC: 8.2 MG/DL (ref 8.3–10.1)
CALCIUM SERPL-MCNC: 8.3 MG/DL (ref 8.3–10.1)
CALCIUM SERPL-MCNC: 8.4 MG/DL (ref 8.3–10.1)
CALCIUM SERPL-MCNC: 8.6 MG/DL (ref 8.3–10.1)
CALCIUM SERPL-MCNC: 8.6 MG/DL (ref 8.3–10.1)
CALCIUM SERPL-MCNC: 8.7 MG/DL (ref 8.3–10.1)
CALCIUM SERPL-MCNC: 8.7 MG/DL (ref 8.3–10.1)
CALCIUM SERPL-MCNC: 9 MG/DL (ref 8.3–10.1)
CALCIUM SERPL-MCNC: 9.3 MG/DL (ref 8.3–10.1)
CALCIUM SERPL-MCNC: 9.7 MG/DL (ref 8.3–10.1)
CARDIAC TROPONIN I PNL SERPL HS: 105 NG/L
CARDIAC TROPONIN I PNL SERPL HS: 36 NG/L
CARDIAC TROPONIN I PNL SERPL HS: 38 NG/L
CARDIAC TROPONIN I PNL SERPL HS: 39 NG/L
CARDIAC TROPONIN I PNL SERPL HS: 90 NG/L
CARDIAC TROPONIN I PNL SERPL HS: 93 NG/L
CFFMA (FUNCTIONAL FIBRINOGEN MAX AMPLITUDE): 19.5 MM (ref 15–32)
CHLORIDE SERPL-SCNC: 104 MMOL/L (ref 96–108)
CHLORIDE SERPL-SCNC: 111 MMOL/L (ref 96–108)
CHLORIDE SERPL-SCNC: 114 MMOL/L (ref 96–108)
CHLORIDE SERPL-SCNC: 115 MMOL/L (ref 96–108)
CHLORIDE SERPL-SCNC: 115 MMOL/L (ref 96–108)
CHLORIDE SERPL-SCNC: 116 MMOL/L (ref 96–108)
CHLORIDE SERPL-SCNC: 117 MMOL/L (ref 96–108)
CHLORIDE SERPL-SCNC: 118 MMOL/L (ref 96–108)
CHLORIDE SERPL-SCNC: 119 MMOL/L (ref 96–108)
CHLORIDE SERPL-SCNC: 120 MMOL/L (ref 96–108)
CHLORIDE SERPL-SCNC: 122 MMOL/L (ref 96–108)
CKLY30: 0 % (ref 0–2.6)
CKR(REACTION TIME): 5.8 MIN (ref 4.6–9.1)
CLARITY UR: ABNORMAL
CLARITY UR: ABNORMAL
CO2 SERPL-SCNC: 11 MMOL/L (ref 21–32)
CO2 SERPL-SCNC: 12 MMOL/L (ref 21–32)
CO2 SERPL-SCNC: 13 MMOL/L (ref 21–32)
CO2 SERPL-SCNC: 14 MMOL/L (ref 21–32)
CO2 SERPL-SCNC: 16 MMOL/L (ref 21–32)
CO2 SERPL-SCNC: 17 MMOL/L (ref 21–32)
CO2 SERPL-SCNC: 17 MMOL/L (ref 21–32)
CO2 SERPL-SCNC: 19 MMOL/L (ref 21–32)
CO2 SERPL-SCNC: 20 MMOL/L (ref 21–32)
CO2 SERPL-SCNC: 20 MMOL/L (ref 21–32)
CO2 SERPL-SCNC: 21 MMOL/L (ref 21–32)
CO2 SERPL-SCNC: 22 MMOL/L (ref 21–32)
CO2 SERPL-SCNC: 23 MMOL/L (ref 21–32)
CO2 SERPL-SCNC: 24 MMOL/L (ref 21–32)
CO2 SERPL-SCNC: 25 MMOL/L (ref 21–32)
CO2 SERPL-SCNC: 26 MMOL/L (ref 21–32)
CO2 SERPL-SCNC: 26 MMOL/L (ref 21–32)
COLOR UR: ABNORMAL
COLOR UR: ABNORMAL
CORTIS SERPL-MCNC: 53.6 UG/DL
CREAT SERPL-MCNC: 0.71 MG/DL (ref 0.6–1.3)
CREAT SERPL-MCNC: 0.72 MG/DL (ref 0.6–1.3)
CREAT SERPL-MCNC: 0.73 MG/DL (ref 0.6–1.3)
CREAT SERPL-MCNC: 0.74 MG/DL (ref 0.6–1.3)
CREAT SERPL-MCNC: 0.75 MG/DL (ref 0.6–1.3)
CREAT SERPL-MCNC: 0.75 MG/DL (ref 0.6–1.3)
CREAT SERPL-MCNC: 0.76 MG/DL (ref 0.6–1.3)
CREAT SERPL-MCNC: 0.79 MG/DL (ref 0.6–1.3)
CREAT SERPL-MCNC: 0.84 MG/DL (ref 0.6–1.3)
CREAT SERPL-MCNC: 0.85 MG/DL (ref 0.6–1.3)
CREAT SERPL-MCNC: 0.92 MG/DL (ref 0.6–1.3)
CREAT SERPL-MCNC: 0.93 MG/DL (ref 0.6–1.3)
CREAT SERPL-MCNC: 0.96 MG/DL (ref 0.6–1.3)
CREAT SERPL-MCNC: 0.98 MG/DL (ref 0.6–1.3)
CREAT SERPL-MCNC: 0.98 MG/DL (ref 0.6–1.3)
CREAT SERPL-MCNC: 1.01 MG/DL (ref 0.6–1.3)
CREAT SERPL-MCNC: 1.02 MG/DL (ref 0.6–1.3)
CREAT SERPL-MCNC: 1.03 MG/DL (ref 0.6–1.3)
CREAT SERPL-MCNC: 1.03 MG/DL (ref 0.6–1.3)
CREAT SERPL-MCNC: 1.05 MG/DL (ref 0.6–1.3)
CREAT SERPL-MCNC: 1.06 MG/DL (ref 0.6–1.3)
CREAT SERPL-MCNC: 1.07 MG/DL (ref 0.6–1.3)
CREAT SERPL-MCNC: 1.15 MG/DL (ref 0.6–1.3)
CREAT SERPL-MCNC: 1.2 MG/DL (ref 0.6–1.3)
CREAT SERPL-MCNC: 1.27 MG/DL (ref 0.6–1.3)
CREAT SERPL-MCNC: 1.37 MG/DL (ref 0.6–1.3)
CREAT SERPL-MCNC: 1.55 MG/DL (ref 0.6–1.3)
CREAT SERPL-MCNC: 1.61 MG/DL (ref 0.6–1.3)
CREAT SERPL-MCNC: 1.89 MG/DL (ref 0.6–1.3)
CREAT SERPL-MCNC: 2.51 MG/DL (ref 0.6–1.3)
CREAT SERPL-MCNC: 2.88 MG/DL (ref 0.6–1.3)
CREAT SERPL-MCNC: 2.9 MG/DL (ref 0.6–1.3)
CREAT SERPL-MCNC: 2.99 MG/DL (ref 0.6–1.3)
CREAT SERPL-MCNC: 3.02 MG/DL (ref 0.6–1.3)
CREAT SERPL-MCNC: 3.21 MG/DL (ref 0.6–1.3)
CREAT SERPL-MCNC: 3.23 MG/DL (ref 0.6–1.3)
CREAT SERPL-MCNC: 3.29 MG/DL (ref 0.6–1.3)
CROSSMATCH: NORMAL
CRTMA(RAPIDTEG MAX AMPLITUDE): 63 MM (ref 52–70)
DACRYOCYTES BLD QL SMEAR: PRESENT
E WAVE DECELERATION TIME: 140 MS
EOSINOPHIL # BLD AUTO: 0.01 THOUSAND/ΜL (ref 0–0.61)
EOSINOPHIL # BLD AUTO: 0.02 THOUSAND/ΜL (ref 0–0.61)
EOSINOPHIL # BLD AUTO: 0.07 THOUSAND/ΜL (ref 0–0.61)
EOSINOPHIL # BLD AUTO: 0.08 THOUSAND/ΜL (ref 0–0.61)
EOSINOPHIL # BLD AUTO: 0.14 THOUSAND/ΜL (ref 0–0.61)
EOSINOPHIL # BLD MANUAL: 0 THOUSAND/UL (ref 0–0.4)
EOSINOPHIL # BLD MANUAL: 0.07 THOUSAND/UL (ref 0–0.4)
EOSINOPHIL # BLD MANUAL: 0.28 THOUSAND/UL (ref 0–0.4)
EOSINOPHIL # BLD MANUAL: 0.29 THOUSAND/UL (ref 0–0.4)
EOSINOPHIL NFR BLD AUTO: 0 % (ref 0–6)
EOSINOPHIL NFR BLD AUTO: 0 % (ref 0–6)
EOSINOPHIL NFR BLD AUTO: 1 % (ref 0–6)
EOSINOPHIL NFR BLD MANUAL: 0 % (ref 0–6)
EOSINOPHIL NFR BLD MANUAL: 1 % (ref 0–6)
EOSINOPHIL NFR BLD MANUAL: 1 % (ref 0–6)
EOSINOPHIL NFR BLD MANUAL: 4 % (ref 0–6)
ERYTHROCYTE [DISTWIDTH] IN BLOOD BY AUTOMATED COUNT: 14.5 % (ref 11.6–15.1)
ERYTHROCYTE [DISTWIDTH] IN BLOOD BY AUTOMATED COUNT: 14.6 % (ref 11.6–15.1)
ERYTHROCYTE [DISTWIDTH] IN BLOOD BY AUTOMATED COUNT: 14.7 % (ref 11.6–15.1)
ERYTHROCYTE [DISTWIDTH] IN BLOOD BY AUTOMATED COUNT: 15.2 % (ref 11.6–15.1)
ERYTHROCYTE [DISTWIDTH] IN BLOOD BY AUTOMATED COUNT: 15.5 % (ref 11.6–15.1)
ERYTHROCYTE [DISTWIDTH] IN BLOOD BY AUTOMATED COUNT: 15.7 % (ref 11.6–15.1)
ERYTHROCYTE [DISTWIDTH] IN BLOOD BY AUTOMATED COUNT: 16.3 % (ref 11.6–15.1)
ERYTHROCYTE [DISTWIDTH] IN BLOOD BY AUTOMATED COUNT: 16.3 % (ref 11.6–15.1)
ERYTHROCYTE [DISTWIDTH] IN BLOOD BY AUTOMATED COUNT: 16.5 % (ref 11.6–15.1)
ERYTHROCYTE [DISTWIDTH] IN BLOOD BY AUTOMATED COUNT: 16.5 % (ref 11.6–15.1)
ERYTHROCYTE [DISTWIDTH] IN BLOOD BY AUTOMATED COUNT: 16.6 % (ref 11.6–15.1)
ERYTHROCYTE [DISTWIDTH] IN BLOOD BY AUTOMATED COUNT: 16.6 % (ref 11.6–15.1)
ERYTHROCYTE [DISTWIDTH] IN BLOOD BY AUTOMATED COUNT: 16.7 % (ref 11.6–15.1)
ERYTHROCYTE [DISTWIDTH] IN BLOOD BY AUTOMATED COUNT: 16.7 % (ref 11.6–15.1)
ERYTHROCYTE [DISTWIDTH] IN BLOOD BY AUTOMATED COUNT: 16.8 % (ref 11.6–15.1)
ERYTHROCYTE [DISTWIDTH] IN BLOOD BY AUTOMATED COUNT: 17 % (ref 11.6–15.1)
ERYTHROCYTE [DISTWIDTH] IN BLOOD BY AUTOMATED COUNT: 17.2 % (ref 11.6–15.1)
ERYTHROCYTE [DISTWIDTH] IN BLOOD BY AUTOMATED COUNT: 17.3 % (ref 11.6–15.1)
ERYTHROCYTE [DISTWIDTH] IN BLOOD BY AUTOMATED COUNT: 17.3 % (ref 11.6–15.1)
ERYTHROCYTE [DISTWIDTH] IN BLOOD BY AUTOMATED COUNT: 17.4 % (ref 11.6–15.1)
ERYTHROCYTE [DISTWIDTH] IN BLOOD BY AUTOMATED COUNT: 17.5 % (ref 11.6–15.1)
ERYTHROCYTE [DISTWIDTH] IN BLOOD BY AUTOMATED COUNT: 17.9 % (ref 11.6–15.1)
ERYTHROCYTE [DISTWIDTH] IN BLOOD BY AUTOMATED COUNT: 17.9 % (ref 11.6–15.1)
ERYTHROCYTE [DISTWIDTH] IN BLOOD BY AUTOMATED COUNT: 18.1 % (ref 11.6–15.1)
EST. AVERAGE GLUCOSE BLD GHB EST-MCNC: 105 MG/DL
FIO2 GAS DIL.REBREATH: 100 L
FIO2 GAS DIL.REBREATH: 100 L
FIO2: 90 %
FIO2: 90 %
FLUAV RNA RESP QL NAA+PROBE: NEGATIVE
FLUBV RNA RESP QL NAA+PROBE: NEGATIVE
FRACTIONAL SHORTENING: 26 (ref 28–44)
GFR SERPL CREATININE-BSD FRML MDRD: 17 ML/MIN/1.73SQ M
GFR SERPL CREATININE-BSD FRML MDRD: 18 ML/MIN/1.73SQ M
GFR SERPL CREATININE-BSD FRML MDRD: 19 ML/MIN/1.73SQ M
GFR SERPL CREATININE-BSD FRML MDRD: 23 ML/MIN/1.73SQ M
GFR SERPL CREATININE-BSD FRML MDRD: 33 ML/MIN/1.73SQ M
GFR SERPL CREATININE-BSD FRML MDRD: 40 ML/MIN/1.73SQ M
GFR SERPL CREATININE-BSD FRML MDRD: 42 ML/MIN/1.73SQ M
GFR SERPL CREATININE-BSD FRML MDRD: 49 ML/MIN/1.73SQ M
GFR SERPL CREATININE-BSD FRML MDRD: 53 ML/MIN/1.73SQ M
GFR SERPL CREATININE-BSD FRML MDRD: 57 ML/MIN/1.73SQ M
GFR SERPL CREATININE-BSD FRML MDRD: 60 ML/MIN/1.73SQ M
GFR SERPL CREATININE-BSD FRML MDRD: 66 ML/MIN/1.73SQ M
GFR SERPL CREATININE-BSD FRML MDRD: 66 ML/MIN/1.73SQ M
GFR SERPL CREATININE-BSD FRML MDRD: 67 ML/MIN/1.73SQ M
GFR SERPL CREATININE-BSD FRML MDRD: 69 ML/MIN/1.73SQ M
GFR SERPL CREATININE-BSD FRML MDRD: 69 ML/MIN/1.73SQ M
GFR SERPL CREATININE-BSD FRML MDRD: 70 ML/MIN/1.73SQ M
GFR SERPL CREATININE-BSD FRML MDRD: 70 ML/MIN/1.73SQ M
GFR SERPL CREATININE-BSD FRML MDRD: 73 ML/MIN/1.73SQ M
GFR SERPL CREATININE-BSD FRML MDRD: 73 ML/MIN/1.73SQ M
GFR SERPL CREATININE-BSD FRML MDRD: 75 ML/MIN/1.73SQ M
GFR SERPL CREATININE-BSD FRML MDRD: 78 ML/MIN/1.73SQ M
GFR SERPL CREATININE-BSD FRML MDRD: 79 ML/MIN/1.73SQ M
GFR SERPL CREATININE-BSD FRML MDRD: 83 ML/MIN/1.73SQ M
GFR SERPL CREATININE-BSD FRML MDRD: 83 ML/MIN/1.73SQ M
GFR SERPL CREATININE-BSD FRML MDRD: 86 ML/MIN/1.73SQ M
GFR SERPL CREATININE-BSD FRML MDRD: 87 ML/MIN/1.73SQ M
GFR SERPL CREATININE-BSD FRML MDRD: 88 ML/MIN/1.73SQ M
GFR SERPL CREATININE-BSD FRML MDRD: 88 ML/MIN/1.73SQ M
GFR SERPL CREATININE-BSD FRML MDRD: 89 ML/MIN/1.73SQ M
GFR SERPL CREATININE-BSD FRML MDRD: 89 ML/MIN/1.73SQ M
GGT SERPL-CCNC: 110 U/L (ref 5–85)
GIANT PLATELETS BLD QL SMEAR: PRESENT
GLUCOSE SERPL-MCNC: 104 MG/DL (ref 65–140)
GLUCOSE SERPL-MCNC: 105 MG/DL (ref 65–140)
GLUCOSE SERPL-MCNC: 108 MG/DL (ref 65–140)
GLUCOSE SERPL-MCNC: 109 MG/DL (ref 65–140)
GLUCOSE SERPL-MCNC: 110 MG/DL (ref 65–140)
GLUCOSE SERPL-MCNC: 113 MG/DL (ref 65–140)
GLUCOSE SERPL-MCNC: 115 MG/DL (ref 65–140)
GLUCOSE SERPL-MCNC: 116 MG/DL (ref 65–140)
GLUCOSE SERPL-MCNC: 117 MG/DL (ref 65–140)
GLUCOSE SERPL-MCNC: 117 MG/DL (ref 65–140)
GLUCOSE SERPL-MCNC: 118 MG/DL (ref 65–140)
GLUCOSE SERPL-MCNC: 119 MG/DL (ref 65–140)
GLUCOSE SERPL-MCNC: 120 MG/DL (ref 65–140)
GLUCOSE SERPL-MCNC: 121 MG/DL (ref 65–140)
GLUCOSE SERPL-MCNC: 122 MG/DL (ref 65–140)
GLUCOSE SERPL-MCNC: 122 MG/DL (ref 65–140)
GLUCOSE SERPL-MCNC: 123 MG/DL (ref 65–140)
GLUCOSE SERPL-MCNC: 124 MG/DL (ref 65–140)
GLUCOSE SERPL-MCNC: 125 MG/DL (ref 65–140)
GLUCOSE SERPL-MCNC: 125 MG/DL (ref 65–140)
GLUCOSE SERPL-MCNC: 126 MG/DL (ref 65–140)
GLUCOSE SERPL-MCNC: 129 MG/DL (ref 65–140)
GLUCOSE SERPL-MCNC: 129 MG/DL (ref 65–140)
GLUCOSE SERPL-MCNC: 130 MG/DL (ref 65–140)
GLUCOSE SERPL-MCNC: 131 MG/DL (ref 65–140)
GLUCOSE SERPL-MCNC: 132 MG/DL (ref 65–140)
GLUCOSE SERPL-MCNC: 134 MG/DL (ref 65–140)
GLUCOSE SERPL-MCNC: 135 MG/DL (ref 65–140)
GLUCOSE SERPL-MCNC: 135 MG/DL (ref 65–140)
GLUCOSE SERPL-MCNC: 136 MG/DL (ref 65–140)
GLUCOSE SERPL-MCNC: 137 MG/DL (ref 65–140)
GLUCOSE SERPL-MCNC: 137 MG/DL (ref 65–140)
GLUCOSE SERPL-MCNC: 141 MG/DL (ref 65–140)
GLUCOSE SERPL-MCNC: 141 MG/DL (ref 65–140)
GLUCOSE SERPL-MCNC: 142 MG/DL (ref 65–140)
GLUCOSE SERPL-MCNC: 142 MG/DL (ref 65–140)
GLUCOSE SERPL-MCNC: 144 MG/DL (ref 65–140)
GLUCOSE SERPL-MCNC: 146 MG/DL (ref 65–140)
GLUCOSE SERPL-MCNC: 146 MG/DL (ref 65–140)
GLUCOSE SERPL-MCNC: 147 MG/DL (ref 65–140)
GLUCOSE SERPL-MCNC: 149 MG/DL (ref 65–140)
GLUCOSE SERPL-MCNC: 150 MG/DL (ref 65–140)
GLUCOSE SERPL-MCNC: 152 MG/DL (ref 65–140)
GLUCOSE SERPL-MCNC: 154 MG/DL (ref 65–140)
GLUCOSE SERPL-MCNC: 162 MG/DL (ref 65–140)
GLUCOSE SERPL-MCNC: 164 MG/DL (ref 65–140)
GLUCOSE SERPL-MCNC: 165 MG/DL (ref 65–140)
GLUCOSE SERPL-MCNC: 171 MG/DL (ref 65–140)
GLUCOSE SERPL-MCNC: 172 MG/DL (ref 65–140)
GLUCOSE SERPL-MCNC: 177 MG/DL (ref 65–140)
GLUCOSE SERPL-MCNC: 188 MG/DL (ref 65–140)
GLUCOSE SERPL-MCNC: 197 MG/DL (ref 65–140)
GLUCOSE SERPL-MCNC: 20 MG/DL (ref 65–140)
GLUCOSE SERPL-MCNC: 204 MG/DL (ref 65–140)
GLUCOSE SERPL-MCNC: 205 MG/DL (ref 65–140)
GLUCOSE SERPL-MCNC: 210 MG/DL (ref 65–140)
GLUCOSE SERPL-MCNC: 226 MG/DL (ref 65–140)
GLUCOSE SERPL-MCNC: 24 MG/DL (ref 65–140)
GLUCOSE SERPL-MCNC: 41 MG/DL (ref 65–140)
GLUCOSE SERPL-MCNC: 42 MG/DL (ref 65–140)
GLUCOSE SERPL-MCNC: 468 MG/DL (ref 65–140)
GLUCOSE SERPL-MCNC: 49 MG/DL (ref 65–140)
GLUCOSE SERPL-MCNC: 66 MG/DL (ref 65–140)
GLUCOSE SERPL-MCNC: 67 MG/DL (ref 65–140)
GLUCOSE SERPL-MCNC: 68 MG/DL (ref 65–140)
GLUCOSE SERPL-MCNC: 73 MG/DL (ref 65–140)
GLUCOSE SERPL-MCNC: 77 MG/DL (ref 65–140)
GLUCOSE SERPL-MCNC: 85 MG/DL (ref 65–140)
GLUCOSE SERPL-MCNC: 88 MG/DL (ref 65–140)
GLUCOSE SERPL-MCNC: 91 MG/DL (ref 65–140)
GLUCOSE SERPL-MCNC: 95 MG/DL (ref 65–140)
GLUCOSE SERPL-MCNC: 97 MG/DL (ref 65–140)
GLUCOSE SERPL-MCNC: 99 MG/DL (ref 65–140)
GLUCOSE SERPL-MCNC: >500 MG/DL (ref 65–140)
GLUCOSE UR STRIP-MCNC: NEGATIVE MG/DL
GLUCOSE UR STRIP-MCNC: NEGATIVE MG/DL
GRAN CASTS #/AREA URNS LPF: ABNORMAL /[LPF]
HBA1C MFR BLD: 5.3 %
HCO3 BLDA-SCNC: 11.1 MMOL/L (ref 22–28)
HCO3 BLDA-SCNC: 11.4 MMOL/L (ref 24–30)
HCO3 BLDA-SCNC: 12.1 MMOL/L (ref 22–28)
HCO3 BLDA-SCNC: 12.2 MMOL/L (ref 22–28)
HCO3 BLDA-SCNC: 15.2 MMOL/L (ref 22–28)
HCO3 BLDA-SCNC: 17 MMOL/L (ref 22–28)
HCO3 BLDA-SCNC: 17.1 MMOL/L (ref 22–28)
HCO3 BLDA-SCNC: 17.2 MMOL/L (ref 22–28)
HCO3 BLDA-SCNC: 18.6 MMOL/L (ref 22–28)
HCO3 BLDA-SCNC: 18.9 MMOL/L (ref 22–28)
HCO3 BLDA-SCNC: 19 MMOL/L (ref 22–28)
HCO3 BLDA-SCNC: 19.8 MMOL/L (ref 22–28)
HCO3 BLDA-SCNC: 20.3 MMOL/L (ref 22–28)
HCO3 BLDA-SCNC: 20.4 MMOL/L (ref 22–28)
HCO3 BLDA-SCNC: 20.5 MMOL/L (ref 22–28)
HCO3 BLDA-SCNC: 20.6 MMOL/L (ref 22–28)
HCO3 BLDA-SCNC: 20.8 MMOL/L (ref 22–28)
HCO3 BLDA-SCNC: 20.9 MMOL/L (ref 22–28)
HCO3 BLDA-SCNC: 21 MMOL/L (ref 22–28)
HCO3 BLDA-SCNC: 21.1 MMOL/L (ref 22–28)
HCO3 BLDA-SCNC: 21.2 MMOL/L (ref 22–28)
HCO3 BLDA-SCNC: 21.2 MMOL/L (ref 22–28)
HCO3 BLDA-SCNC: 21.3 MMOL/L (ref 22–28)
HCO3 BLDA-SCNC: 21.5 MMOL/L (ref 22–28)
HCO3 BLDA-SCNC: 21.6 MMOL/L (ref 22–28)
HCO3 BLDA-SCNC: 21.7 MMOL/L (ref 22–28)
HCO3 BLDA-SCNC: 21.9 MMOL/L (ref 22–28)
HCO3 BLDA-SCNC: 22 MMOL/L (ref 22–28)
HCO3 BLDA-SCNC: 22.2 MMOL/L (ref 22–28)
HCO3 BLDA-SCNC: 22.3 MMOL/L (ref 22–28)
HCO3 BLDA-SCNC: 22.7 MMOL/L (ref 22–28)
HCO3 BLDA-SCNC: 22.8 MMOL/L (ref 22–28)
HCO3 BLDA-SCNC: 22.8 MMOL/L (ref 22–28)
HCO3 BLDA-SCNC: 22.9 MMOL/L (ref 22–28)
HCO3 BLDA-SCNC: 22.9 MMOL/L (ref 22–28)
HCO3 BLDA-SCNC: 23 MMOL/L (ref 22–28)
HCO3 BLDA-SCNC: 23 MMOL/L (ref 22–28)
HCO3 BLDA-SCNC: 23.2 MMOL/L (ref 22–28)
HCO3 BLDA-SCNC: 23.3 MMOL/L (ref 22–28)
HCO3 BLDA-SCNC: 23.3 MMOL/L (ref 22–28)
HCO3 BLDA-SCNC: 23.7 MMOL/L (ref 22–28)
HCO3 BLDA-SCNC: 23.8 MMOL/L (ref 22–28)
HCO3 BLDA-SCNC: 24 MMOL/L (ref 22–28)
HCO3 BLDA-SCNC: 24 MMOL/L (ref 22–28)
HCO3 BLDA-SCNC: 24.1 MMOL/L (ref 22–28)
HCO3 BLDA-SCNC: 24.2 MMOL/L (ref 22–28)
HCO3 BLDA-SCNC: 25 MMOL/L (ref 22–28)
HCO3 BLDA-SCNC: 25.3 MMOL/L (ref 22–28)
HCO3 BLDA-SCNC: 25.8 MMOL/L (ref 22–28)
HCO3 BLDA-SCNC: 27.2 MMOL/L (ref 22–28)
HCO3 BLDA-SCNC: 30.2 MMOL/L (ref 22–28)
HCO3 BLDA-SCNC: 8.8 MMOL/L (ref 22–28)
HCO3 BLDA-SCNC: 9.6 MMOL/L (ref 22–28)
HCO3 BLDV-SCNC: 15.4 MMOL/L (ref 24–30)
HCO3 BLDV-SCNC: 19 MMOL/L (ref 24–30)
HCO3 BLDV-SCNC: 22.5 MMOL/L (ref 24–30)
HCO3 BLDV-SCNC: 24 MMOL/L (ref 24–30)
HCO3 BLDV-SCNC: 25 MMOL/L (ref 24–30)
HCT VFR BLD AUTO: 22.4 % (ref 36.5–49.3)
HCT VFR BLD AUTO: 22.8 % (ref 36.5–49.3)
HCT VFR BLD AUTO: 23.4 % (ref 36.5–49.3)
HCT VFR BLD AUTO: 23.5 % (ref 36.5–49.3)
HCT VFR BLD AUTO: 23.6 % (ref 36.5–49.3)
HCT VFR BLD AUTO: 24.9 % (ref 36.5–49.3)
HCT VFR BLD AUTO: 25.1 % (ref 36.5–49.3)
HCT VFR BLD AUTO: 25.4 % (ref 36.5–49.3)
HCT VFR BLD AUTO: 25.8 % (ref 36.5–49.3)
HCT VFR BLD AUTO: 27.1 % (ref 36.5–49.3)
HCT VFR BLD AUTO: 27.1 % (ref 36.5–49.3)
HCT VFR BLD AUTO: 27.5 % (ref 36.5–49.3)
HCT VFR BLD AUTO: 27.6 % (ref 36.5–49.3)
HCT VFR BLD AUTO: 27.6 % (ref 36.5–49.3)
HCT VFR BLD AUTO: 28 % (ref 36.5–49.3)
HCT VFR BLD AUTO: 28.3 % (ref 36.5–49.3)
HCT VFR BLD AUTO: 28.4 % (ref 36.5–49.3)
HCT VFR BLD AUTO: 28.4 % (ref 36.5–49.3)
HCT VFR BLD AUTO: 28.8 % (ref 36.5–49.3)
HCT VFR BLD AUTO: 28.9 % (ref 36.5–49.3)
HCT VFR BLD AUTO: 29.2 % (ref 36.5–49.3)
HCT VFR BLD AUTO: 29.3 % (ref 36.5–49.3)
HCT VFR BLD AUTO: 30.6 % (ref 36.5–49.3)
HCT VFR BLD AUTO: 32.3 % (ref 36.5–49.3)
HCT VFR BLD AUTO: 32.9 % (ref 36.5–49.3)
HCT VFR BLD AUTO: 33 % (ref 36.5–49.3)
HCT VFR BLD AUTO: 34.3 % (ref 36.5–49.3)
HCT VFR BLD AUTO: 36 % (ref 36.5–49.3)
HCT VFR BLD AUTO: 38.9 % (ref 36.5–49.3)
HCT VFR BLD AUTO: 56.7 % (ref 36.5–49.3)
HCT VFR BLD CALC: 21 % (ref 36.5–49.3)
HCT VFR BLD CALC: 21 % (ref 36.5–49.3)
HCT VFR BLD CALC: 22 % (ref 36.5–49.3)
HCT VFR BLD CALC: 24 % (ref 36.5–49.3)
HCT VFR BLD CALC: 25 % (ref 36.5–49.3)
HCT VFR BLD CALC: 29 % (ref 36.5–49.3)
HCT VFR BLD CALC: 32 % (ref 36.5–49.3)
HCT VFR BLD CALC: 34 % (ref 36.5–49.3)
HCT VFR BLD CALC: 37 % (ref 36.5–49.3)
HCT VFR BLD CALC: 37 % (ref 36.5–49.3)
HFNC FLOW LPM: 45
HFNC FLOW LPM: 45
HFNC FLOW LPM: 50
HFNC FLOW LPM: 55
HFNC FLOW LPM: 55
HGB BLD-MCNC: 10 G/DL (ref 12–17)
HGB BLD-MCNC: 10.2 G/DL (ref 12–17)
HGB BLD-MCNC: 10.3 G/DL (ref 12–17)
HGB BLD-MCNC: 10.8 G/DL (ref 12–17)
HGB BLD-MCNC: 11.3 G/DL (ref 12–17)
HGB BLD-MCNC: 18 G/DL (ref 12–17)
HGB BLD-MCNC: 6.1 G/DL (ref 12–17)
HGB BLD-MCNC: 6.3 G/DL (ref 12–17)
HGB BLD-MCNC: 6.6 G/DL (ref 12–17)
HGB BLD-MCNC: 6.8 G/DL (ref 12–17)
HGB BLD-MCNC: 6.8 G/DL (ref 12–17)
HGB BLD-MCNC: 7.2 G/DL (ref 12–17)
HGB BLD-MCNC: 7.2 G/DL (ref 12–17)
HGB BLD-MCNC: 8.1 G/DL (ref 12–17)
HGB BLD-MCNC: 8.3 G/DL (ref 12–17)
HGB BLD-MCNC: 8.6 G/DL (ref 12–17)
HGB BLD-MCNC: 8.6 G/DL (ref 12–17)
HGB BLD-MCNC: 8.8 G/DL (ref 12–17)
HGB BLD-MCNC: 9 G/DL (ref 12–17)
HGB BLD-MCNC: 9 G/DL (ref 12–17)
HGB BLD-MCNC: 9.1 G/DL (ref 12–17)
HGB BLD-MCNC: 9.1 G/DL (ref 12–17)
HGB BLD-MCNC: 9.3 G/DL (ref 12–17)
HGB BLD-MCNC: 9.3 G/DL (ref 12–17)
HGB BLD-MCNC: 9.5 G/DL (ref 12–17)
HGB BLD-MCNC: 9.6 G/DL (ref 12–17)
HGB BLD-MCNC: 9.7 G/DL (ref 12–17)
HGB BLD-MCNC: 9.9 G/DL (ref 12–17)
HGB BLDA-MCNC: 10.9 G/DL (ref 12–17)
HGB BLDA-MCNC: 11.6 G/DL (ref 12–17)
HGB BLDA-MCNC: 12.6 G/DL (ref 12–17)
HGB BLDA-MCNC: 12.6 G/DL (ref 12–17)
HGB BLDA-MCNC: 7.1 G/DL (ref 12–17)
HGB BLDA-MCNC: 7.1 G/DL (ref 12–17)
HGB BLDA-MCNC: 7.5 G/DL (ref 12–17)
HGB BLDA-MCNC: 8.2 G/DL (ref 12–17)
HGB BLDA-MCNC: 8.5 G/DL (ref 12–17)
HGB BLDA-MCNC: 9.9 G/DL (ref 12–17)
HGB UR QL STRIP.AUTO: ABNORMAL
HGB UR QL STRIP.AUTO: ABNORMAL
HOROWITZ INDEX BLDA+IHG-RTO: 100 MM[HG]
HOROWITZ INDEX BLDA+IHG-RTO: 40 MM[HG]
HOROWITZ INDEX BLDA+IHG-RTO: 50 MM[HG]
HOROWITZ INDEX BLDA+IHG-RTO: 60 MM[HG]
HOROWITZ INDEX BLDA+IHG-RTO: 8 MM[HG]
HOROWITZ INDEX BLDA+IHG-RTO: 90 MM[HG]
I-TIME: 0.8
I-TIME: 0.8
IMM GRANULOCYTES # BLD AUTO: 0.06 THOUSAND/UL (ref 0–0.2)
IMM GRANULOCYTES # BLD AUTO: 0.14 THOUSAND/UL (ref 0–0.2)
IMM GRANULOCYTES # BLD AUTO: 0.15 THOUSAND/UL (ref 0–0.2)
IMM GRANULOCYTES # BLD AUTO: 0.17 THOUSAND/UL (ref 0–0.2)
IMM GRANULOCYTES # BLD AUTO: 0.19 THOUSAND/UL (ref 0–0.2)
IMM GRANULOCYTES NFR BLD AUTO: 0 % (ref 0–2)
IMM GRANULOCYTES NFR BLD AUTO: 1 % (ref 0–2)
IMM GRANULOCYTES NFR BLD AUTO: 1 % (ref 0–2)
IMM GRANULOCYTES NFR BLD AUTO: 2 % (ref 0–2)
IMM GRANULOCYTES NFR BLD AUTO: 2 % (ref 0–2)
INR PPP: 1.13 (ref 0.84–1.19)
INR PPP: 1.16 (ref 0.84–1.19)
INTERVENTRICULAR SEPTUM IN DIASTOLE (PARASTERNAL SHORT AXIS VIEW): 1.2 CM
INTERVENTRICULAR SEPTUM: 1.2 CM (ref 0.6–1.1)
KETONES UR STRIP-MCNC: NEGATIVE MG/DL
KETONES UR STRIP-MCNC: NEGATIVE MG/DL
LAAS-AP2: 21.1 CM2
LAAS-AP4: 19.7 CM2
LACTATE SERPL-SCNC: 0.8 MMOL/L (ref 0.5–2)
LACTATE SERPL-SCNC: 1 MMOL/L (ref 0.5–2)
LACTATE SERPL-SCNC: 1.1 MMOL/L (ref 0.5–2)
LACTATE SERPL-SCNC: 1.2 MMOL/L (ref 0.5–2)
LACTATE SERPL-SCNC: 1.7 MMOL/L (ref 0.5–2)
LACTATE SERPL-SCNC: 1.8 MMOL/L (ref 0.5–2)
LACTATE SERPL-SCNC: 1.8 MMOL/L (ref 0.5–2)
LACTATE SERPL-SCNC: 1.9 MMOL/L (ref 0.5–2)
LACTATE SERPL-SCNC: 12.9 MMOL/L (ref 0.5–2)
LACTATE SERPL-SCNC: 18.1 MMOL/L (ref 0.5–2)
LACTATE SERPL-SCNC: 2.1 MMOL/L (ref 0.5–2)
LACTATE SERPL-SCNC: 2.2 MMOL/L (ref 0.5–2)
LACTATE SERPL-SCNC: 20.9 MMOL/L (ref 0.5–2)
LACTATE SERPL-SCNC: 21.7 MMOL/L (ref 0.5–2)
LACTATE SERPL-SCNC: 3.4 MMOL/L (ref 0.5–2)
LACTATE SERPL-SCNC: 32.2 MMOL/L (ref 0.5–2)
LACTATE SERPL-SCNC: 4.6 MMOL/L (ref 0.5–2)
LACTATE SERPL-SCNC: 5 MMOL/L (ref 0.5–2)
LACTATE SERPL-SCNC: 6.5 MMOL/L (ref 0.5–2)
LACTATE SERPL-SCNC: 7.2 MMOL/L (ref 0.5–2)
LACTATE SERPL-SCNC: 8.2 MMOL/L (ref 0.5–2)
LACTATE SERPL-SCNC: 8.4 MMOL/L (ref 0.5–2)
LACTATE SERPL-SCNC: 9.3 MMOL/L (ref 0.5–2)
LEFT ATRIUM SIZE: 3.7 CM
LEFT INTERNAL DIMENSION IN SYSTOLE: 2.9 CM (ref 2.1–4)
LEFT VENTRICULAR INTERNAL DIMENSION IN DIASTOLE: 3.9 CM (ref 3.5–6)
LEFT VENTRICULAR POSTERIOR WALL IN END DIASTOLE: 1.1 CM
LEFT VENTRICULAR STROKE VOLUME: 36 ML
LEUKOCYTE ESTERASE UR QL STRIP: NEGATIVE
LEUKOCYTE ESTERASE UR QL STRIP: NEGATIVE
LVSV (TEICH): 36 ML
LYMPHOCYTES # BLD AUTO: 0 % (ref 14–44)
LYMPHOCYTES # BLD AUTO: 0 THOUSAND/UL (ref 0.6–4.47)
LYMPHOCYTES # BLD AUTO: 0.48 THOUSAND/UL (ref 0.6–4.47)
LYMPHOCYTES # BLD AUTO: 0.8 THOUSANDS/ΜL (ref 0.6–4.47)
LYMPHOCYTES # BLD AUTO: 0.87 THOUSANDS/ΜL (ref 0.6–4.47)
LYMPHOCYTES # BLD AUTO: 0.88 THOUSAND/UL (ref 0.6–4.47)
LYMPHOCYTES # BLD AUTO: 0.93 THOUSANDS/ΜL (ref 0.6–4.47)
LYMPHOCYTES # BLD AUTO: 1.17 THOUSAND/UL (ref 0.6–4.47)
LYMPHOCYTES # BLD AUTO: 1.42 THOUSANDS/ΜL (ref 0.6–4.47)
LYMPHOCYTES # BLD AUTO: 1.46 THOUSANDS/ΜL (ref 0.6–4.47)
LYMPHOCYTES # BLD AUTO: 11 % (ref 14–44)
LYMPHOCYTES # BLD AUTO: 12 % (ref 14–44)
LYMPHOCYTES # BLD AUTO: 19 % (ref 14–44)
LYMPHOCYTES # BLD AUTO: 3.03 THOUSAND/UL (ref 0.6–4.47)
LYMPHOCYTES # BLD AUTO: 3.15 THOUSAND/UL (ref 0.6–4.47)
LYMPHOCYTES # BLD AUTO: 36 % (ref 14–44)
LYMPHOCYTES # BLD AUTO: 7 % (ref 14–44)
LYMPHOCYTES NFR BLD AUTO: 10 % (ref 14–44)
LYMPHOCYTES NFR BLD AUTO: 4 % (ref 14–44)
LYMPHOCYTES NFR BLD AUTO: 7 % (ref 14–44)
LYMPHOCYTES NFR BLD AUTO: 9 % (ref 14–44)
LYMPHOCYTES NFR BLD AUTO: 9 % (ref 14–44)
MACROCYTES BLD QL AUTO: PRESENT
MACROCYTES BLD QL AUTO: PRESENT
MAGNESIUM SERPL-MCNC: 1.8 MG/DL (ref 1.6–2.6)
MAGNESIUM SERPL-MCNC: 1.9 MG/DL (ref 1.6–2.6)
MAGNESIUM SERPL-MCNC: 2.2 MG/DL (ref 1.6–2.6)
MAGNESIUM SERPL-MCNC: 2.4 MG/DL (ref 1.6–2.6)
MAGNESIUM SERPL-MCNC: 2.4 MG/DL (ref 1.6–2.6)
MAGNESIUM SERPL-MCNC: 2.5 MG/DL (ref 1.6–2.6)
MAGNESIUM SERPL-MCNC: 2.6 MG/DL (ref 1.6–2.6)
MAGNESIUM SERPL-MCNC: 2.7 MG/DL (ref 1.6–2.6)
MAGNESIUM SERPL-MCNC: 2.8 MG/DL (ref 1.6–2.6)
MAGNESIUM SERPL-MCNC: 3 MG/DL (ref 1.6–2.6)
MAGNESIUM SERPL-MCNC: 3 MG/DL (ref 1.6–2.6)
MAGNESIUM SERPL-MCNC: 3.1 MG/DL (ref 1.6–2.6)
MAGNESIUM SERPL-MCNC: 3.2 MG/DL (ref 1.6–2.6)
MAGNESIUM SERPL-MCNC: 3.2 MG/DL (ref 1.6–2.6)
MCH RBC QN AUTO: 29 PG (ref 26.8–34.3)
MCH RBC QN AUTO: 29 PG (ref 26.8–34.3)
MCH RBC QN AUTO: 29.1 PG (ref 26.8–34.3)
MCH RBC QN AUTO: 29.2 PG (ref 26.8–34.3)
MCH RBC QN AUTO: 29.3 PG (ref 26.8–34.3)
MCH RBC QN AUTO: 29.3 PG (ref 26.8–34.3)
MCH RBC QN AUTO: 29.4 PG (ref 26.8–34.3)
MCH RBC QN AUTO: 29.4 PG (ref 26.8–34.3)
MCH RBC QN AUTO: 29.5 PG (ref 26.8–34.3)
MCH RBC QN AUTO: 29.6 PG (ref 26.8–34.3)
MCH RBC QN AUTO: 29.7 PG (ref 26.8–34.3)
MCH RBC QN AUTO: 29.8 PG (ref 26.8–34.3)
MCH RBC QN AUTO: 29.8 PG (ref 26.8–34.3)
MCH RBC QN AUTO: 30 PG (ref 26.8–34.3)
MCH RBC QN AUTO: 30 PG (ref 26.8–34.3)
MCH RBC QN AUTO: 30.1 PG (ref 26.8–34.3)
MCH RBC QN AUTO: 30.1 PG (ref 26.8–34.3)
MCH RBC QN AUTO: 30.2 PG (ref 26.8–34.3)
MCH RBC QN AUTO: 30.2 PG (ref 26.8–34.3)
MCH RBC QN AUTO: 30.3 PG (ref 26.8–34.3)
MCH RBC QN AUTO: 30.3 PG (ref 26.8–34.3)
MCH RBC QN AUTO: 30.4 PG (ref 26.8–34.3)
MCH RBC QN AUTO: 30.5 PG (ref 26.8–34.3)
MCH RBC QN AUTO: 30.6 PG (ref 26.8–34.3)
MCH RBC QN AUTO: 30.7 PG (ref 26.8–34.3)
MCH RBC QN AUTO: 31.2 PG (ref 26.8–34.3)
MCHC RBC AUTO-ENTMCNC: 26 G/DL (ref 31.4–37.4)
MCHC RBC AUTO-ENTMCNC: 26 G/DL (ref 31.4–37.4)
MCHC RBC AUTO-ENTMCNC: 26.9 G/DL (ref 31.4–37.4)
MCHC RBC AUTO-ENTMCNC: 27.1 G/DL (ref 31.4–37.4)
MCHC RBC AUTO-ENTMCNC: 28.3 G/DL (ref 31.4–37.4)
MCHC RBC AUTO-ENTMCNC: 29 G/DL (ref 31.4–37.4)
MCHC RBC AUTO-ENTMCNC: 29.5 G/DL (ref 31.4–37.4)
MCHC RBC AUTO-ENTMCNC: 29.9 G/DL (ref 31.4–37.4)
MCHC RBC AUTO-ENTMCNC: 30.1 G/DL (ref 31.4–37.4)
MCHC RBC AUTO-ENTMCNC: 30.4 G/DL (ref 31.4–37.4)
MCHC RBC AUTO-ENTMCNC: 30.4 G/DL (ref 31.4–37.4)
MCHC RBC AUTO-ENTMCNC: 30.5 G/DL (ref 31.4–37.4)
MCHC RBC AUTO-ENTMCNC: 31 G/DL (ref 31.4–37.4)
MCHC RBC AUTO-ENTMCNC: 31.1 G/DL (ref 31.4–37.4)
MCHC RBC AUTO-ENTMCNC: 31.2 G/DL (ref 31.4–37.4)
MCHC RBC AUTO-ENTMCNC: 31.2 G/DL (ref 31.4–37.4)
MCHC RBC AUTO-ENTMCNC: 31.4 G/DL (ref 31.4–37.4)
MCHC RBC AUTO-ENTMCNC: 31.4 G/DL (ref 31.4–37.4)
MCHC RBC AUTO-ENTMCNC: 31.5 G/DL (ref 31.4–37.4)
MCHC RBC AUTO-ENTMCNC: 31.6 G/DL (ref 31.4–37.4)
MCHC RBC AUTO-ENTMCNC: 31.7 G/DL (ref 31.4–37.4)
MCHC RBC AUTO-ENTMCNC: 31.8 G/DL (ref 31.4–37.4)
MCHC RBC AUTO-ENTMCNC: 32 G/DL (ref 31.4–37.4)
MCHC RBC AUTO-ENTMCNC: 32.2 G/DL (ref 31.4–37.4)
MCHC RBC AUTO-ENTMCNC: 32.4 G/DL (ref 31.4–37.4)
MCHC RBC AUTO-ENTMCNC: 32.7 G/DL (ref 31.4–37.4)
MCHC RBC AUTO-ENTMCNC: 33.3 G/DL (ref 31.4–37.4)
MCV RBC AUTO: 100 FL (ref 82–98)
MCV RBC AUTO: 100 FL (ref 82–98)
MCV RBC AUTO: 105 FL (ref 82–98)
MCV RBC AUTO: 113 FL (ref 82–98)
MCV RBC AUTO: 113 FL (ref 82–98)
MCV RBC AUTO: 114 FL (ref 82–98)
MCV RBC AUTO: 118 FL (ref 82–98)
MCV RBC AUTO: 91 FL (ref 82–98)
MCV RBC AUTO: 92 FL (ref 82–98)
MCV RBC AUTO: 93 FL (ref 82–98)
MCV RBC AUTO: 94 FL (ref 82–98)
MCV RBC AUTO: 95 FL (ref 82–98)
MCV RBC AUTO: 95 FL (ref 82–98)
MCV RBC AUTO: 96 FL (ref 82–98)
MCV RBC AUTO: 97 FL (ref 82–98)
MCV RBC AUTO: 98 FL (ref 82–98)
MCV RBC AUTO: 98 FL (ref 82–98)
MCV RBC AUTO: 99 FL (ref 82–98)
METAMYELOCYTES NFR BLD MANUAL: 1 % (ref 0–1)
METAMYELOCYTES NFR BLD MANUAL: 1 % (ref 0–1)
METAMYELOCYTES NFR BLD MANUAL: 14 % (ref 0–1)
MONOCYTES # BLD AUTO: 0.16 THOUSAND/UL (ref 0–1.22)
MONOCYTES # BLD AUTO: 0.21 THOUSAND/UL (ref 0–1.22)
MONOCYTES # BLD AUTO: 0.22 THOUSAND/UL (ref 0–1.22)
MONOCYTES # BLD AUTO: 0.33 THOUSAND/UL (ref 0–1.22)
MONOCYTES # BLD AUTO: 0.48 THOUSAND/ΜL (ref 0.17–1.22)
MONOCYTES # BLD AUTO: 0.65 THOUSAND/ΜL (ref 0.17–1.22)
MONOCYTES # BLD AUTO: 0.67 THOUSAND/UL (ref 0–1.22)
MONOCYTES # BLD AUTO: 0.68 THOUSAND/ΜL (ref 0.17–1.22)
MONOCYTES # BLD AUTO: 0.83 THOUSAND/ΜL (ref 0.17–1.22)
MONOCYTES # BLD AUTO: 1.16 THOUSAND/ΜL (ref 0.17–1.22)
MONOCYTES # BLD AUTO: 1.38 THOUSAND/UL (ref 0–1.22)
MONOCYTES NFR BLD AUTO: 4 % (ref 4–12)
MONOCYTES NFR BLD AUTO: 4 % (ref 4–12)
MONOCYTES NFR BLD AUTO: 5 % (ref 4–12)
MONOCYTES NFR BLD AUTO: 6 % (ref 4–12)
MONOCYTES NFR BLD AUTO: 8 % (ref 4–12)
MONOCYTES NFR BLD: 2 % (ref 4–12)
MONOCYTES NFR BLD: 3 % (ref 4–12)
MONOCYTES NFR BLD: 5 % (ref 4–12)
MONOCYTES NFR BLD: 5 % (ref 4–12)
MRSA NOSE QL CULT: NORMAL
MRSA NOSE QL CULT: NORMAL
MUCOUS THREADS UR QL AUTO: ABNORMAL
MV E'TISSUE VEL-SEP: 8 CM/S
MV PEAK A VEL: 0.85 M/S
MV PEAK E VEL: 79 CM/S
MV STENOSIS PRESSURE HALF TIME: 41 MS
MV VALVE AREA P 1/2 METHOD: 5.37
MYELOCYTES NFR BLD MANUAL: 3 % (ref 0–1)
MYELOCYTES NFR BLD MANUAL: 6 % (ref 0–1)
NASAL CANNULA: 5
NASAL CANNULA: 6
NEUTROPHILS # BLD AUTO: 13.72 THOUSANDS/ΜL (ref 1.85–7.62)
NEUTROPHILS # BLD AUTO: 15.87 THOUSANDS/ΜL (ref 1.85–7.62)
NEUTROPHILS # BLD AUTO: 18.56 THOUSANDS/ΜL (ref 1.85–7.62)
NEUTROPHILS # BLD AUTO: 7.2 THOUSANDS/ΜL (ref 1.85–7.62)
NEUTROPHILS # BLD AUTO: 8.41 THOUSANDS/ΜL (ref 1.85–7.62)
NEUTROPHILS # BLD MANUAL: 1.17 THOUSAND/UL (ref 1.85–7.62)
NEUTROPHILS # BLD MANUAL: 12.27 THOUSAND/UL (ref 1.85–7.62)
NEUTROPHILS # BLD MANUAL: 20.39 THOUSAND/UL (ref 1.85–7.62)
NEUTROPHILS # BLD MANUAL: 20.92 THOUSAND/UL (ref 1.85–7.62)
NEUTROPHILS # BLD MANUAL: 5.77 THOUSAND/UL (ref 1.85–7.62)
NEUTROPHILS # BLD MANUAL: 6.14 THOUSAND/UL (ref 1.85–7.62)
NEUTS BAND NFR BLD MANUAL: 13 % (ref 0–8)
NEUTS BAND NFR BLD MANUAL: 19 % (ref 0–8)
NEUTS BAND NFR BLD MANUAL: 22 % (ref 0–8)
NEUTS BAND NFR BLD MANUAL: 8 % (ref 0–8)
NEUTS SEG NFR BLD AUTO: 17 % (ref 43–75)
NEUTS SEG NFR BLD AUTO: 52 % (ref 43–75)
NEUTS SEG NFR BLD AUTO: 66 % (ref 43–75)
NEUTS SEG NFR BLD AUTO: 79 % (ref 43–75)
NEUTS SEG NFR BLD AUTO: 79 % (ref 43–75)
NEUTS SEG NFR BLD AUTO: 81 % (ref 43–75)
NEUTS SEG NFR BLD AUTO: 83 % (ref 43–75)
NEUTS SEG NFR BLD AUTO: 85 % (ref 43–75)
NEUTS SEG NFR BLD AUTO: 88 % (ref 43–75)
NEUTS SEG NFR BLD AUTO: 89 % (ref 43–75)
NEUTS SEG NFR BLD AUTO: 89 % (ref 43–75)
NITRITE UR QL STRIP: NEGATIVE
NITRITE UR QL STRIP: NEGATIVE
NON VENT HFNC FIO2: 65
NON VENT HFNC FIO2: 65
NON VENT HFNC FIO2: 68
NON VENT HFNC FIO2: 80
NON VENT HFNC FIO2: 80
NON VENT TYPE HFNC: ABNORMAL
NON-SQ EPI CELLS URNS QL MICRO: ABNORMAL /HPF
NON-SQ EPI CELLS URNS QL MICRO: ABNORMAL /HPF
NRBC BLD AUTO-RTO: 0 /100 WBCS
NRBC BLD AUTO-RTO: 1 /100 WBC (ref 0–2)
NRBC BLD AUTO-RTO: 1 /100 WBCS
NRBC BLD AUTO-RTO: 11 /100 WBCS
NRBC BLD AUTO-RTO: 2 /100 WBCS
NRBC BLD AUTO-RTO: 3 /100 WBC (ref 0–2)
NRBC BLD AUTO-RTO: 4 /100 WBC (ref 0–2)
NT-PROBNP SERPL-MCNC: 2077 PG/ML
O2 CT BLDA-SCNC: 10.8 ML/DL (ref 16–23)
O2 CT BLDA-SCNC: 11.2 ML/DL (ref 16–23)
O2 CT BLDA-SCNC: 11.2 ML/DL (ref 16–23)
O2 CT BLDA-SCNC: 11.3 ML/DL (ref 16–23)
O2 CT BLDA-SCNC: 11.4 ML/DL (ref 16–23)
O2 CT BLDA-SCNC: 11.5 ML/DL (ref 16–23)
O2 CT BLDA-SCNC: 11.5 ML/DL (ref 16–23)
O2 CT BLDA-SCNC: 11.8 ML/DL (ref 16–23)
O2 CT BLDA-SCNC: 11.8 ML/DL (ref 16–23)
O2 CT BLDA-SCNC: 12 ML/DL (ref 16–23)
O2 CT BLDA-SCNC: 12 ML/DL (ref 16–23)
O2 CT BLDA-SCNC: 12.1 ML/DL (ref 16–23)
O2 CT BLDA-SCNC: 12.3 ML/DL (ref 16–23)
O2 CT BLDA-SCNC: 12.4 ML/DL (ref 16–23)
O2 CT BLDA-SCNC: 12.4 ML/DL (ref 16–23)
O2 CT BLDA-SCNC: 12.5 ML/DL (ref 16–23)
O2 CT BLDA-SCNC: 12.5 ML/DL (ref 16–23)
O2 CT BLDA-SCNC: 12.6 ML/DL (ref 16–23)
O2 CT BLDA-SCNC: 12.7 ML/DL (ref 16–23)
O2 CT BLDA-SCNC: 12.8 ML/DL (ref 16–23)
O2 CT BLDA-SCNC: 12.9 ML/DL (ref 16–23)
O2 CT BLDA-SCNC: 13 ML/DL (ref 16–23)
O2 CT BLDA-SCNC: 13.4 ML/DL (ref 16–23)
O2 CT BLDA-SCNC: 13.4 ML/DL (ref 16–23)
O2 CT BLDA-SCNC: 13.6 ML/DL (ref 16–23)
O2 CT BLDA-SCNC: 13.7 ML/DL (ref 16–23)
O2 CT BLDA-SCNC: 13.8 ML/DL (ref 16–23)
O2 CT BLDA-SCNC: 13.8 ML/DL (ref 16–23)
O2 CT BLDA-SCNC: 14.7 ML/DL (ref 16–23)
O2 CT BLDA-SCNC: 14.8 ML/DL (ref 16–23)
O2 CT BLDA-SCNC: 14.9 ML/DL (ref 16–23)
O2 CT BLDA-SCNC: 15.3 ML/DL (ref 16–23)
O2 CT BLDA-SCNC: 7.8 ML/DL (ref 16–23)
O2 CT BLDA-SCNC: 8.6 ML/DL (ref 16–23)
O2 CT BLDA-SCNC: 9.2 ML/DL (ref 16–23)
O2 CT BLDV-SCNC: 10.2 ML/DL
O2 CT BLDV-SCNC: 11.9 ML/DL
O2 CT BLDV-SCNC: 12 ML/DL
O2 CT BLDV-SCNC: 8.5 ML/DL
O2 CT BLDV-SCNC: 9.2 ML/DL
OVALOCYTES BLD QL SMEAR: PRESENT
OXYHGB MFR BLDA: 78 % (ref 94–97)
OXYHGB MFR BLDA: 81.6 % (ref 94–97)
OXYHGB MFR BLDA: 84.2 % (ref 94–97)
OXYHGB MFR BLDA: 86.1 % (ref 94–97)
OXYHGB MFR BLDA: 88.2 % (ref 94–97)
OXYHGB MFR BLDA: 88.4 % (ref 94–97)
OXYHGB MFR BLDA: 88.6 % (ref 94–97)
OXYHGB MFR BLDA: 89.4 % (ref 94–97)
OXYHGB MFR BLDA: 89.9 % (ref 94–97)
OXYHGB MFR BLDA: 90.5 % (ref 94–97)
OXYHGB MFR BLDA: 90.9 % (ref 94–97)
OXYHGB MFR BLDA: 91.1 % (ref 94–97)
OXYHGB MFR BLDA: 91.1 % (ref 94–97)
OXYHGB MFR BLDA: 91.7 % (ref 94–97)
OXYHGB MFR BLDA: 91.9 % (ref 94–97)
OXYHGB MFR BLDA: 92 % (ref 94–97)
OXYHGB MFR BLDA: 92.7 % (ref 94–97)
OXYHGB MFR BLDA: 92.8 % (ref 94–97)
OXYHGB MFR BLDA: 93.1 % (ref 94–97)
OXYHGB MFR BLDA: 94 % (ref 94–97)
OXYHGB MFR BLDA: 94 % (ref 94–97)
OXYHGB MFR BLDA: 94.3 % (ref 94–97)
OXYHGB MFR BLDA: 95.3 % (ref 94–97)
OXYHGB MFR BLDA: 95.4 % (ref 94–97)
OXYHGB MFR BLDA: 95.4 % (ref 94–97)
OXYHGB MFR BLDA: 96.2 % (ref 94–97)
OXYHGB MFR BLDA: 96.3 % (ref 94–97)
OXYHGB MFR BLDA: 96.6 % (ref 94–97)
OXYHGB MFR BLDA: 96.8 % (ref 94–97)
OXYHGB MFR BLDA: 96.9 % (ref 94–97)
OXYHGB MFR BLDA: 97.3 % (ref 94–97)
OXYHGB MFR BLDA: 97.3 % (ref 94–97)
OXYHGB MFR BLDA: 97.4 % (ref 94–97)
OXYHGB MFR BLDA: 97.4 % (ref 94–97)
OXYHGB MFR BLDA: 97.5 % (ref 94–97)
OXYHGB MFR BLDA: 97.6 % (ref 94–97)
OXYHGB MFR BLDA: 97.7 % (ref 94–97)
OXYHGB MFR BLDA: 97.9 % (ref 94–97)
OXYHGB MFR BLDA: 98.1 % (ref 94–97)
OXYHGB MFR BLDA: 98.3 % (ref 94–97)
OXYHGB MFR BLDA: 98.3 % (ref 94–97)
OXYHGB MFR BLDA: 98.5 % (ref 94–97)
OXYHGB MFR BLDA: 98.7 % (ref 94–97)
P AXIS: -34 DEGREES
P AXIS: 104 DEGREES
P AXIS: 37 DEGREES
P AXIS: 4 DEGREES
P AXIS: 50 DEGREES
P AXIS: 53 DEGREES
P AXIS: 86 DEGREES
PCO2 BLD: 10 MMOL/L (ref 21–32)
PCO2 BLD: 13 MMOL/L (ref 21–32)
PCO2 BLD: 14 MMOL/L (ref 21–32)
PCO2 BLD: 14 MMOL/L (ref 21–32)
PCO2 BLD: 20 MMOL/L (ref 21–32)
PCO2 BLD: 21.4 MM HG (ref 36–44)
PCO2 BLD: 23 MMOL/L (ref 21–32)
PCO2 BLD: 26.5 MM HG (ref 36–44)
PCO2 BLD: 27 MMOL/L (ref 21–32)
PCO2 BLD: 27 MMOL/L (ref 21–32)
PCO2 BLD: 38 MM HG (ref 42–50)
PCO2 BLD: 44.8 MM HG (ref 36–44)
PCO2 BLD: 49.2 MM HG (ref 36–44)
PCO2 BLD: 51 MM HG (ref 36–44)
PCO2 BLD: 53 MM HG (ref 42–50)
PCO2 BLD: 53.6 MM HG (ref 36–44)
PCO2 BLD: 62.5 MM HG (ref 42–50)
PCO2 BLD: 63.2 MM HG (ref 36–44)
PCO2 BLD: 64.5 MM HG (ref 36–44)
PCO2 BLD: 77.7 MM HG (ref 36–44)
PCO2 BLDA: 28.7 MM HG (ref 36–44)
PCO2 BLDA: 31 MM HG (ref 36–44)
PCO2 BLDA: 31.6 MM HG (ref 36–44)
PCO2 BLDA: 31.8 MM HG (ref 36–44)
PCO2 BLDA: 32 MM HG (ref 36–44)
PCO2 BLDA: 32.9 MM HG (ref 36–44)
PCO2 BLDA: 33.7 MM HG (ref 36–44)
PCO2 BLDA: 34.5 MM HG (ref 36–44)
PCO2 BLDA: 35 MM HG (ref 36–44)
PCO2 BLDA: 35.2 MM HG (ref 36–44)
PCO2 BLDA: 35.3 MM HG (ref 36–44)
PCO2 BLDA: 35.3 MM HG (ref 36–44)
PCO2 BLDA: 35.6 MM HG (ref 36–44)
PCO2 BLDA: 35.7 MM HG (ref 36–44)
PCO2 BLDA: 36.2 MM HG (ref 36–44)
PCO2 BLDA: 36.2 MM HG (ref 36–44)
PCO2 BLDA: 36.9 MM HG (ref 36–44)
PCO2 BLDA: 37.1 MM HG (ref 36–44)
PCO2 BLDA: 37.6 MM HG (ref 36–44)
PCO2 BLDA: 37.8 MM HG (ref 36–44)
PCO2 BLDA: 38.5 MM HG (ref 36–44)
PCO2 BLDA: 40.5 MM HG (ref 36–44)
PCO2 BLDA: 40.6 MM HG (ref 36–44)
PCO2 BLDA: 40.9 MM HG (ref 36–44)
PCO2 BLDA: 41 MM HG (ref 36–44)
PCO2 BLDA: 41.7 MM HG (ref 36–44)
PCO2 BLDA: 41.8 MM HG (ref 36–44)
PCO2 BLDA: 42.2 MM HG (ref 36–44)
PCO2 BLDA: 42.5 MM HG (ref 36–44)
PCO2 BLDA: 44.3 MM HG (ref 36–44)
PCO2 BLDA: 44.5 MM HG (ref 36–44)
PCO2 BLDA: 44.8 MM HG (ref 36–44)
PCO2 BLDA: 45 MM HG (ref 36–44)
PCO2 BLDA: 46.3 MM HG (ref 36–44)
PCO2 BLDA: 46.8 MM HG (ref 36–44)
PCO2 BLDA: 52.4 MM HG (ref 36–44)
PCO2 BLDA: 54.5 MM HG (ref 36–44)
PCO2 BLDA: 56.6 MM HG (ref 36–44)
PCO2 BLDA: 60.2 MM HG (ref 36–44)
PCO2 BLDA: 69.7 MM HG (ref 36–44)
PCO2 BLDA: 71.1 MM HG (ref 36–44)
PCO2 BLDA: 71.5 MM HG (ref 36–44)
PCO2 BLDA: 97.4 MM HG (ref 36–44)
PCO2 BLDV: 38.6 MM HG (ref 42–50)
PCO2 BLDV: 48.5 MM HG (ref 42–50)
PCO2 BLDV: 52.8 MM HG (ref 42–50)
PCO2 BLDV: 62 MM HG (ref 42–50)
PCO2 BLDV: 75.1 MM HG (ref 42–50)
PCO2 TEMP ADJ BLDA: 38.3 MM HG (ref 36–44)
PCO2 TEMP ADJ BLDA: 38.8 MM HG (ref 36–44)
PCO2 TEMP ADJ BLDA: 46.4 MM HG (ref 36–44)
PCO2 TEMP ADJ BLDA: 46.7 MM HG (ref 36–44)
PCO2 TEMP ADJ BLDA: 51.3 MM HG (ref 36–44)
PCO2 TEMP ADJ BLDA: 57.1 MM HG (ref 36–44)
PEEP RESPIRATORY: 10 CM[H2O]
PEEP RESPIRATORY: 14 CM[H2O]
PEEP RESPIRATORY: 30 CM[H2O]
PEEP RESPIRATORY: 50 CM[H2O]
PEEP RESPIRATORY: 6 CM[H2O]
PEEP RESPIRATORY: 8 CM[H2O]
PEEP RESPIRATORY: 8 CM[H2O]
PH BLD: 6.88 [PH] (ref 7.35–7.45)
PH BLD: 6.89 [PH] (ref 7.35–7.45)
PH BLD: 6.94 [PH] (ref 7.35–7.45)
PH BLD: 7.03 [PH] (ref 7.35–7.45)
PH BLD: 7.09 [PH] (ref 7.3–7.4)
PH BLD: 7.13 [PH] (ref 7.35–7.45)
PH BLD: 7.17 [PH] (ref 7.3–7.4)
PH BLD: 7.17 [PH] (ref 7.3–7.4)
PH BLD: 7.23 [PH] (ref 7.35–7.45)
PH BLD: 7.23 [PH] (ref 7.35–7.45)
PH BLD: 7.26 [PH] (ref 7.35–7.45)
PH BLD: 7.28 [PH] (ref 7.35–7.45)
PH BLD: 7.29 [PH] (ref 7.35–7.45)
PH BLD: 7.34 [PH] (ref 7.35–7.45)
PH BLD: 7.36 [PH] (ref 7.35–7.45)
PH BLD: 7.37 [PH] (ref 7.35–7.45)
PH BLD: 7.38 [PH] (ref 7.35–7.45)
PH BLD: 7.57 [PH] (ref 7.35–7.45)
PH BLDA: 6.76 [PH] (ref 7.35–7.45)
PH BLDA: 6.92 [PH] (ref 7.35–7.45)
PH BLDA: 6.95 [PH] (ref 7.35–7.45)
PH BLDA: 7 [PH] (ref 7.35–7.45)
PH BLDA: 7.07 [PH] (ref 7.35–7.45)
PH BLDA: 7.11 [PH] (ref 7.35–7.45)
PH BLDA: 7.17 [PH] (ref 7.35–7.45)
PH BLDA: 7.23 [PH] (ref 7.35–7.45)
PH BLDA: 7.27 [PH] (ref 7.35–7.45)
PH BLDA: 7.28 [PH] (ref 7.35–7.45)
PH BLDA: 7.29 [PH] (ref 7.35–7.45)
PH BLDA: 7.32 [PH] (ref 7.35–7.45)
PH BLDA: 7.33 [PH] (ref 7.35–7.45)
PH BLDA: 7.33 [PH] (ref 7.35–7.45)
PH BLDA: 7.35 [PH] (ref 7.35–7.45)
PH BLDA: 7.36 [PH] (ref 7.35–7.45)
PH BLDA: 7.37 [PH] (ref 7.35–7.45)
PH BLDA: 7.38 [PH] (ref 7.35–7.45)
PH BLDA: 7.39 [PH] (ref 7.35–7.45)
PH BLDA: 7.4 [PH] (ref 7.35–7.45)
PH BLDA: 7.4 [PH] (ref 7.35–7.45)
PH BLDA: 7.41 [PH] (ref 7.35–7.45)
PH BLDA: 7.42 [PH] (ref 7.35–7.45)
PH BLDA: 7.42 [PH] (ref 7.35–7.45)
PH BLDA: 7.43 [PH] (ref 7.35–7.45)
PH BLDA: 7.44 [PH] (ref 7.35–7.45)
PH BLDA: 7.44 [PH] (ref 7.35–7.45)
PH BLDA: 7.48 [PH] (ref 7.35–7.45)
PH BLDA: 7.53 [PH] (ref 7.35–7.45)
PH BLDV: 6.93 [PH] (ref 7.3–7.4)
PH BLDV: 7.17 [PH] (ref 7.3–7.4)
PH BLDV: 7.18 [PH] (ref 7.3–7.4)
PH BLDV: 7.33 [PH] (ref 7.3–7.4)
PH BLDV: 7.41 [PH] (ref 7.3–7.4)
PH UR STRIP.AUTO: 5.5 [PH]
PH UR STRIP.AUTO: 5.5 [PH]
PHOSPHATE SERPL-MCNC: 1.1 MG/DL (ref 2.3–4.1)
PHOSPHATE SERPL-MCNC: 1.1 MG/DL (ref 2.3–4.1)
PHOSPHATE SERPL-MCNC: 1.6 MG/DL (ref 2.3–4.1)
PHOSPHATE SERPL-MCNC: 1.7 MG/DL (ref 2.3–4.1)
PHOSPHATE SERPL-MCNC: 1.7 MG/DL (ref 2.3–4.1)
PHOSPHATE SERPL-MCNC: 1.8 MG/DL (ref 2.3–4.1)
PHOSPHATE SERPL-MCNC: 1.8 MG/DL (ref 2.3–4.1)
PHOSPHATE SERPL-MCNC: 1.9 MG/DL (ref 2.3–4.1)
PHOSPHATE SERPL-MCNC: 10.2 MG/DL (ref 2.3–4.1)
PHOSPHATE SERPL-MCNC: 13.7 MG/DL (ref 2.3–4.1)
PHOSPHATE SERPL-MCNC: 17.3 MG/DL (ref 2.3–4.1)
PHOSPHATE SERPL-MCNC: 2.2 MG/DL (ref 2.3–4.1)
PHOSPHATE SERPL-MCNC: 2.2 MG/DL (ref 2.3–4.1)
PHOSPHATE SERPL-MCNC: 2.3 MG/DL (ref 2.3–4.1)
PHOSPHATE SERPL-MCNC: 2.4 MG/DL (ref 2.3–4.1)
PHOSPHATE SERPL-MCNC: 2.9 MG/DL (ref 2.3–4.1)
PHOSPHATE SERPL-MCNC: 2.9 MG/DL (ref 2.3–4.1)
PHOSPHATE SERPL-MCNC: 3 MG/DL (ref 2.3–4.1)
PHOSPHATE SERPL-MCNC: 3 MG/DL (ref 2.3–4.1)
PHOSPHATE SERPL-MCNC: 3.2 MG/DL (ref 2.3–4.1)
PHOSPHATE SERPL-MCNC: 3.8 MG/DL (ref 2.3–4.1)
PHOSPHATE SERPL-MCNC: 3.9 MG/DL (ref 2.3–4.1)
PHOSPHATE SERPL-MCNC: 4 MG/DL (ref 2.3–4.1)
PHOSPHATE SERPL-MCNC: 4.6 MG/DL (ref 2.3–4.1)
PHOSPHATE SERPL-MCNC: 4.8 MG/DL (ref 2.3–4.1)
PLATELET # BLD AUTO: 103 THOUSANDS/UL (ref 149–390)
PLATELET # BLD AUTO: 109 THOUSANDS/UL (ref 149–390)
PLATELET # BLD AUTO: 114 THOUSANDS/UL (ref 149–390)
PLATELET # BLD AUTO: 119 THOUSANDS/UL (ref 149–390)
PLATELET # BLD AUTO: 124 THOUSANDS/UL (ref 149–390)
PLATELET # BLD AUTO: 126 THOUSANDS/UL (ref 149–390)
PLATELET # BLD AUTO: 127 THOUSANDS/UL (ref 149–390)
PLATELET # BLD AUTO: 128 THOUSANDS/UL (ref 149–390)
PLATELET # BLD AUTO: 134 THOUSANDS/UL (ref 149–390)
PLATELET # BLD AUTO: 139 THOUSANDS/UL (ref 149–390)
PLATELET # BLD AUTO: 141 THOUSANDS/UL (ref 149–390)
PLATELET # BLD AUTO: 148 THOUSANDS/UL (ref 149–390)
PLATELET # BLD AUTO: 156 THOUSANDS/UL (ref 149–390)
PLATELET # BLD AUTO: 173 THOUSANDS/UL (ref 149–390)
PLATELET # BLD AUTO: 202 THOUSANDS/UL (ref 149–390)
PLATELET # BLD AUTO: 221 THOUSANDS/UL (ref 149–390)
PLATELET # BLD AUTO: 259 THOUSANDS/UL (ref 149–390)
PLATELET # BLD AUTO: 301 THOUSANDS/UL (ref 149–390)
PLATELET # BLD AUTO: 327 THOUSANDS/UL (ref 149–390)
PLATELET # BLD AUTO: 328 THOUSANDS/UL (ref 149–390)
PLATELET # BLD AUTO: 336 THOUSANDS/UL (ref 149–390)
PLATELET # BLD AUTO: 398 THOUSANDS/UL (ref 149–390)
PLATELET # BLD AUTO: 466 THOUSANDS/UL (ref 149–390)
PLATELET # BLD AUTO: 490 THOUSANDS/UL (ref 149–390)
PLATELET # BLD AUTO: 492 THOUSANDS/UL (ref 149–390)
PLATELET # BLD AUTO: 549 THOUSANDS/UL (ref 149–390)
PLATELET # BLD AUTO: 580 THOUSANDS/UL (ref 149–390)
PLATELET # BLD AUTO: 676 THOUSANDS/UL (ref 149–390)
PLATELET # BLD AUTO: 75 THOUSANDS/UL (ref 149–390)
PLATELET BLD QL SMEAR: ABNORMAL
PLATELET BLD QL SMEAR: ADEQUATE
PLOW: 0
PLOW: 0
PMV BLD AUTO: 10.3 FL (ref 8.9–12.7)
PMV BLD AUTO: 10.4 FL (ref 8.9–12.7)
PMV BLD AUTO: 10.5 FL (ref 8.9–12.7)
PMV BLD AUTO: 10.7 FL (ref 8.9–12.7)
PMV BLD AUTO: 10.8 FL (ref 8.9–12.7)
PMV BLD AUTO: 10.8 FL (ref 8.9–12.7)
PMV BLD AUTO: 10.9 FL (ref 8.9–12.7)
PMV BLD AUTO: 11 FL (ref 8.9–12.7)
PMV BLD AUTO: 11.1 FL (ref 8.9–12.7)
PMV BLD AUTO: 11.2 FL (ref 8.9–12.7)
PMV BLD AUTO: 11.3 FL (ref 8.9–12.7)
PMV BLD AUTO: 11.4 FL (ref 8.9–12.7)
PMV BLD AUTO: 11.4 FL (ref 8.9–12.7)
PMV BLD AUTO: 11.6 FL (ref 8.9–12.7)
PMV BLD AUTO: 11.7 FL (ref 8.9–12.7)
PMV BLD AUTO: 9.4 FL (ref 8.9–12.7)
PMV BLD AUTO: 9.5 FL (ref 8.9–12.7)
PMV BLD AUTO: 9.5 FL (ref 8.9–12.7)
PMV BLD AUTO: 9.6 FL (ref 8.9–12.7)
PMV BLD AUTO: 9.7 FL (ref 8.9–12.7)
PMV BLD AUTO: 9.7 FL (ref 8.9–12.7)
PMV BLD AUTO: 9.8 FL (ref 8.9–12.7)
PO2 BLD: 107.5 MM HG (ref 75–129)
PO2 BLD: 122 MM HG (ref 75–129)
PO2 BLD: 129 MM HG (ref 75–129)
PO2 BLD: 150 MM HG (ref 75–129)
PO2 BLD: 224 MM HG (ref 75–129)
PO2 BLD: 276.8 MM HG (ref 75–129)
PO2 BLD: 282 MM HG (ref 75–129)
PO2 BLD: 38 MM HG (ref 35–45)
PO2 BLD: 45 MM HG (ref 75–129)
PO2 BLD: 68.6 MM HG (ref 75–129)
PO2 BLD: 69 MM HG (ref 75–129)
PO2 BLD: 73.3 MM HG (ref 75–129)
PO2 BLD: 90 MM HG (ref 75–129)
PO2 BLD: 97.3 MM HG (ref 75–129)
PO2 BLDA: 101.1 MM HG (ref 75–129)
PO2 BLDA: 103.2 MM HG (ref 75–129)
PO2 BLDA: 103.2 MM HG (ref 75–129)
PO2 BLDA: 104.7 MM HG (ref 75–129)
PO2 BLDA: 105.5 MM HG (ref 75–129)
PO2 BLDA: 108.2 MM HG (ref 75–129)
PO2 BLDA: 108.5 MM HG (ref 75–129)
PO2 BLDA: 113.6 MM HG (ref 75–129)
PO2 BLDA: 113.9 MM HG (ref 75–129)
PO2 BLDA: 114.5 MM HG (ref 75–129)
PO2 BLDA: 115.6 MM HG (ref 75–129)
PO2 BLDA: 118.8 MM HG (ref 75–129)
PO2 BLDA: 129.6 MM HG (ref 75–129)
PO2 BLDA: 136.1 MM HG (ref 75–129)
PO2 BLDA: 136.2 MM HG (ref 75–129)
PO2 BLDA: 144.8 MM HG (ref 75–129)
PO2 BLDA: 153.9 MM HG (ref 75–129)
PO2 BLDA: 166.1 MM HG (ref 75–129)
PO2 BLDA: 266.7 MM HG (ref 75–129)
PO2 BLDA: 273.4 MM HG (ref 75–129)
PO2 BLDA: 332.8 MM HG (ref 75–129)
PO2 BLDA: 52.5 MM HG (ref 75–129)
PO2 BLDA: 53.1 MM HG (ref 75–129)
PO2 BLDA: 54 MM HG (ref 75–129)
PO2 BLDA: 54.3 MM HG (ref 75–129)
PO2 BLDA: 56.9 MM HG (ref 75–129)
PO2 BLDA: 57.1 MM HG (ref 75–129)
PO2 BLDA: 59.8 MM HG (ref 75–129)
PO2 BLDA: 59.9 MM HG (ref 75–129)
PO2 BLDA: 62.1 MM HG (ref 75–129)
PO2 BLDA: 62.3 MM HG (ref 75–129)
PO2 BLDA: 62.4 MM HG (ref 75–129)
PO2 BLDA: 63.8 MM HG (ref 75–129)
PO2 BLDA: 65.3 MM HG (ref 75–129)
PO2 BLDA: 65.6 MM HG (ref 75–129)
PO2 BLDA: 65.7 MM HG (ref 75–129)
PO2 BLDA: 67.7 MM HG (ref 75–129)
PO2 BLDA: 69.2 MM HG (ref 75–129)
PO2 BLDA: 72.3 MM HG (ref 75–129)
PO2 BLDA: 72.4 MM HG (ref 75–129)
PO2 BLDA: 73.2 MM HG (ref 75–129)
PO2 BLDA: 75.6 MM HG (ref 75–129)
PO2 BLDA: 89.9 MM HG (ref 75–129)
PO2 BLDV: 36.5 MM HG (ref 35–45)
PO2 BLDV: 38.8 MM HG (ref 35–45)
PO2 BLDV: 49.8 MM HG (ref 35–45)
PO2 BLDV: 51.9 MM HG (ref 35–45)
PO2 BLDV: 71.3 MM HG (ref 35–45)
PO2 VENOUS TEMP CORRECTED: 50.2 MM HG (ref 35–45)
PO2 VENOUS TEMP CORRECTED: 52.6 MM HG (ref 35–45)
POIKILOCYTOSIS BLD QL SMEAR: PRESENT
POLYCHROMASIA BLD QL SMEAR: PRESENT
POTASSIUM BLD-SCNC: 3.3 MMOL/L (ref 3.5–5.3)
POTASSIUM BLD-SCNC: 3.4 MMOL/L (ref 3.5–5.3)
POTASSIUM BLD-SCNC: 3.7 MMOL/L (ref 3.5–5.3)
POTASSIUM BLD-SCNC: 4 MMOL/L (ref 3.5–5.3)
POTASSIUM BLD-SCNC: 4.1 MMOL/L (ref 3.5–5.3)
POTASSIUM BLD-SCNC: 5.4 MMOL/L (ref 3.5–5.3)
POTASSIUM BLD-SCNC: 6.1 MMOL/L (ref 3.5–5.3)
POTASSIUM BLD-SCNC: 6.1 MMOL/L (ref 3.5–5.3)
POTASSIUM BLD-SCNC: 6.3 MMOL/L (ref 3.5–5.3)
POTASSIUM BLD-SCNC: 7 MMOL/L (ref 3.5–5.3)
POTASSIUM SERPL-SCNC: 3.3 MMOL/L (ref 3.5–5.3)
POTASSIUM SERPL-SCNC: 3.4 MMOL/L (ref 3.5–5.3)
POTASSIUM SERPL-SCNC: 3.5 MMOL/L (ref 3.5–5.3)
POTASSIUM SERPL-SCNC: 3.6 MMOL/L (ref 3.5–5.3)
POTASSIUM SERPL-SCNC: 3.7 MMOL/L (ref 3.5–5.3)
POTASSIUM SERPL-SCNC: 3.9 MMOL/L (ref 3.5–5.3)
POTASSIUM SERPL-SCNC: 4 MMOL/L (ref 3.5–5.3)
POTASSIUM SERPL-SCNC: 4.1 MMOL/L (ref 3.5–5.3)
POTASSIUM SERPL-SCNC: 4.1 MMOL/L (ref 3.5–5.3)
POTASSIUM SERPL-SCNC: 4.2 MMOL/L (ref 3.5–5.3)
POTASSIUM SERPL-SCNC: 4.3 MMOL/L (ref 3.5–5.3)
POTASSIUM SERPL-SCNC: 4.3 MMOL/L (ref 3.5–5.3)
POTASSIUM SERPL-SCNC: 4.4 MMOL/L (ref 3.5–5.3)
POTASSIUM SERPL-SCNC: 5.3 MMOL/L (ref 3.5–5.3)
POTASSIUM SERPL-SCNC: 5.7 MMOL/L (ref 3.5–5.3)
POTASSIUM SERPL-SCNC: 5.8 MMOL/L (ref 3.5–5.3)
POTASSIUM SERPL-SCNC: 5.9 MMOL/L (ref 3.5–5.3)
POTASSIUM SERPL-SCNC: 6.5 MMOL/L (ref 3.5–5.3)
POTASSIUM SERPL-SCNC: 6.6 MMOL/L (ref 3.5–5.3)
POTASSIUM SERPL-SCNC: 7.4 MMOL/L (ref 3.5–5.3)
POTASSIUM SERPL-SCNC: 9.5 MMOL/L (ref 3.5–5.3)
PR INTERVAL: 0 MS
PR INTERVAL: 113 MS
PR INTERVAL: 138 MS
PR INTERVAL: 150 MS
PR INTERVAL: 158 MS
PR INTERVAL: 163 MS
PR INTERVAL: 179 MS
PR INTERVAL: 202 MS
PRESSURE CONTROL: 12
PRESSURE CONTROL: 12
PRESSURE CONTROL: 14
PRESSURE CONTROL: 14
PRESSURE CONTROL: 15
PRESSURE CONTROL: 18
PRESSURE CONTROL: ABNORMAL
PROCALCITONIN SERPL-MCNC: 0.52 NG/ML
PROCALCITONIN SERPL-MCNC: 0.67 NG/ML
PROCALCITONIN SERPL-MCNC: 13.48 NG/ML
PROCALCITONIN SERPL-MCNC: 2.73 NG/ML
PROT SERPL-MCNC: 3.9 G/DL (ref 6.4–8.4)
PROT SERPL-MCNC: 4.1 G/DL (ref 6.4–8.4)
PROT SERPL-MCNC: 4.2 G/DL (ref 6.4–8.4)
PROT SERPL-MCNC: 4.5 G/DL (ref 6.4–8.4)
PROT SERPL-MCNC: 4.8 G/DL (ref 6.4–8.4)
PROT SERPL-MCNC: 4.9 G/DL (ref 6.4–8.4)
PROT SERPL-MCNC: 5 G/DL (ref 6.4–8.4)
PROT SERPL-MCNC: 5.2 G/DL (ref 6.4–8.4)
PROT SERPL-MCNC: 5.3 G/DL (ref 6.4–8.4)
PROT SERPL-MCNC: 5.4 G/DL (ref 6.4–8.4)
PROT SERPL-MCNC: 5.9 G/DL (ref 6.4–8.4)
PROT SERPL-MCNC: 6.1 G/DL (ref 6.4–8.4)
PROT SERPL-MCNC: 6.4 G/DL (ref 6.4–8.4)
PROT SERPL-MCNC: 7.5 G/DL (ref 6.4–8.4)
PROT SERPL-MCNC: 8.1 G/DL (ref 6.4–8.4)
PROT SERPL-MCNC: 9.9 G/DL (ref 6.4–8.4)
PROT UR STRIP-MCNC: ABNORMAL MG/DL
PROT UR STRIP-MCNC: ABNORMAL MG/DL
PROTHROMBIN TIME: 14.8 SECONDS (ref 11.6–14.5)
PROTHROMBIN TIME: 15 SECONDS (ref 11.6–14.5)
QRS AXIS: 42 DEGREES
QRS AXIS: 55 DEGREES
QRS AXIS: 55 DEGREES
QRS AXIS: 60 DEGREES
QRS AXIS: 78 DEGREES
QRS AXIS: 78 DEGREES
QRS AXIS: 85 DEGREES
QRS AXIS: 94 DEGREES
QRSD INTERVAL: 78 MS
QRSD INTERVAL: 79 MS
QRSD INTERVAL: 83 MS
QRSD INTERVAL: 83 MS
QRSD INTERVAL: 88 MS
QRSD INTERVAL: 96 MS
QT INTERVAL: 263 MS
QT INTERVAL: 271 MS
QT INTERVAL: 321 MS
QT INTERVAL: 346 MS
QT INTERVAL: 346 MS
QT INTERVAL: 354 MS
QT INTERVAL: 358 MS
QT INTERVAL: 400 MS
QTC INTERVAL: 402 MS
QTC INTERVAL: 411 MS
QTC INTERVAL: 417 MS
QTC INTERVAL: 424 MS
QTC INTERVAL: 426 MS
QTC INTERVAL: 428 MS
QTC INTERVAL: 438 MS
QTC INTERVAL: 450 MS
RA PRESSURE ESTIMATED: 15 MMHG
RBC # BLD AUTO: 2 MILLION/UL (ref 3.88–5.62)
RBC # BLD AUTO: 2.07 MILLION/UL (ref 3.88–5.62)
RBC # BLD AUTO: 2.22 MILLION/UL (ref 3.88–5.62)
RBC # BLD AUTO: 2.22 MILLION/UL (ref 3.88–5.62)
RBC # BLD AUTO: 2.31 MILLION/UL (ref 3.88–5.62)
RBC # BLD AUTO: 2.42 MILLION/UL (ref 3.88–5.62)
RBC # BLD AUTO: 2.43 MILLION/UL (ref 3.88–5.62)
RBC # BLD AUTO: 2.74 MILLION/UL (ref 3.88–5.62)
RBC # BLD AUTO: 2.77 MILLION/UL (ref 3.88–5.62)
RBC # BLD AUTO: 2.78 MILLION/UL (ref 3.88–5.62)
RBC # BLD AUTO: 2.84 MILLION/UL (ref 3.88–5.62)
RBC # BLD AUTO: 2.85 MILLION/UL (ref 3.88–5.62)
RBC # BLD AUTO: 2.89 MILLION/UL (ref 3.88–5.62)
RBC # BLD AUTO: 2.93 MILLION/UL (ref 3.88–5.62)
RBC # BLD AUTO: 2.96 MILLION/UL (ref 3.88–5.62)
RBC # BLD AUTO: 2.96 MILLION/UL (ref 3.88–5.62)
RBC # BLD AUTO: 3 MILLION/UL (ref 3.88–5.62)
RBC # BLD AUTO: 3.03 MILLION/UL (ref 3.88–5.62)
RBC # BLD AUTO: 3.08 MILLION/UL (ref 3.88–5.62)
RBC # BLD AUTO: 3.16 MILLION/UL (ref 3.88–5.62)
RBC # BLD AUTO: 3.16 MILLION/UL (ref 3.88–5.62)
RBC # BLD AUTO: 3.18 MILLION/UL (ref 3.88–5.62)
RBC # BLD AUTO: 3.3 MILLION/UL (ref 3.88–5.62)
RBC # BLD AUTO: 3.31 MILLION/UL (ref 3.88–5.62)
RBC # BLD AUTO: 3.32 MILLION/UL (ref 3.88–5.62)
RBC # BLD AUTO: 3.43 MILLION/UL (ref 3.88–5.62)
RBC # BLD AUTO: 3.57 MILLION/UL (ref 3.88–5.62)
RBC # BLD AUTO: 3.9 MILLION/UL (ref 3.88–5.62)
RBC # BLD AUTO: 6.14 MILLION/UL (ref 3.88–5.62)
RBC #/AREA URNS AUTO: ABNORMAL /HPF
RBC #/AREA URNS AUTO: ABNORMAL /HPF
RBC MORPH BLD: PRESENT
RH BLD: POSITIVE
RIGHT ATRIUM AREA SYSTOLE A4C: 13.3 CM2
RIGHT VENTRICLE ID DIMENSION: 3.5 CM
RSV RNA RESP QL NAA+PROBE: NEGATIVE
RV PSP: 55 MMHG
SAO2 % BLD FROM PO2: 100 % (ref 60–85)
SAO2 % BLD FROM PO2: 100 % (ref 60–85)
SAO2 % BLD FROM PO2: 53 % (ref 60–85)
SAO2 % BLD FROM PO2: 76 % (ref 60–85)
SAO2 % BLD FROM PO2: 76 % (ref 60–85)
SAO2 % BLD FROM PO2: 87 % (ref 60–85)
SAO2 % BLD FROM PO2: 88 % (ref 60–85)
SAO2 % BLD FROM PO2: 91 % (ref 60–85)
SAO2 % BLD FROM PO2: 99 % (ref 60–85)
SAO2 % BLD FROM PO2: 99 % (ref 60–85)
SARS-COV-2 RNA RESP QL NAA+PROBE: NEGATIVE
SL CV LEFT ATRIUM LENGTH A2C: 5.6 CM
SL CV LV EF: 65
SL CV PED ECHO LEFT VENTRICLE DIASTOLIC VOLUME (MOD BIPLANE) 2D: 67 ML
SL CV PED ECHO LEFT VENTRICLE SYSTOLIC VOLUME (MOD BIPLANE) 2D: 31 ML
SODIUM BLD-SCNC: 142 MMOL/L (ref 136–145)
SODIUM BLD-SCNC: 145 MMOL/L (ref 136–145)
SODIUM BLD-SCNC: 146 MMOL/L (ref 136–145)
SODIUM BLD-SCNC: 146 MMOL/L (ref 136–145)
SODIUM BLD-SCNC: 147 MMOL/L (ref 136–145)
SODIUM BLD-SCNC: 148 MMOL/L (ref 136–145)
SODIUM BLD-SCNC: 150 MMOL/L (ref 136–145)
SODIUM BLD-SCNC: 154 MMOL/L (ref 136–145)
SODIUM SERPL-SCNC: 138 MMOL/L (ref 135–147)
SODIUM SERPL-SCNC: 140 MMOL/L (ref 135–147)
SODIUM SERPL-SCNC: 144 MMOL/L (ref 135–147)
SODIUM SERPL-SCNC: 145 MMOL/L (ref 135–147)
SODIUM SERPL-SCNC: 146 MMOL/L (ref 135–147)
SODIUM SERPL-SCNC: 147 MMOL/L (ref 135–147)
SODIUM SERPL-SCNC: 148 MMOL/L (ref 135–147)
SODIUM SERPL-SCNC: 149 MMOL/L (ref 135–147)
SODIUM SERPL-SCNC: 150 MMOL/L (ref 135–147)
SODIUM SERPL-SCNC: 150 MMOL/L (ref 135–147)
SODIUM SERPL-SCNC: 151 MMOL/L (ref 135–147)
SODIUM SERPL-SCNC: 152 MMOL/L (ref 135–147)
SODIUM SERPL-SCNC: 156 MMOL/L (ref 135–147)
SODIUM SERPL-SCNC: 159 MMOL/L (ref 135–147)
SP GR UR STRIP.AUTO: 1.02 (ref 1–1.03)
SP GR UR STRIP.AUTO: 1.03 (ref 1–1.03)
SPECIMEN EXPIRATION DATE: NORMAL
SPECIMEN SOURCE: ABNORMAL
T WAVE AXIS: -79 DEGREES
T WAVE AXIS: -85 DEGREES
T WAVE AXIS: 18 DEGREES
T WAVE AXIS: 55 DEGREES
T WAVE AXIS: 64 DEGREES
T WAVE AXIS: 70 DEGREES
T WAVE AXIS: 73 DEGREES
T WAVE AXIS: 90 DEGREES
THIGH: 30
THIGH: 30
TLOW: 0.5
TLOW: 0.5
TR MAX PG: 40 MMHG
TR PEAK VELOCITY: 3.1 M/S
TRICUSPID VALVE PEAK REGURGITATION VELOCITY: 3.14 M/S
TSH SERPL DL<=0.05 MIU/L-ACNC: 0.96 UIU/ML (ref 0.45–4.5)
UNIT DISPENSE STATUS: NORMAL
UNIT PRODUCT CODE: NORMAL
UNIT PRODUCT VOLUME: 300 ML
UNIT PRODUCT VOLUME: 350 ML
UNIT RH: NORMAL
UROBILINOGEN UR STRIP-ACNC: 2 MG/DL
UROBILINOGEN UR STRIP-ACNC: <2 MG/DL
VANCOMYCIN SERPL-MCNC: 13.1 UG/ML (ref 10–20)
VANCOMYCIN SERPL-MCNC: 18.5 UG/ML (ref 10–20)
VANCOMYCIN SERPL-MCNC: 22.5 UG/ML (ref 10–20)
VARIANT LYMPHS # BLD AUTO: 1 %
VARIANT LYMPHS # BLD AUTO: 1 %
VARIANT LYMPHS # BLD AUTO: 2 %
VARIANT LYMPHS # BLD AUTO: 4 %
VARIANT LYMPHS # BLD AUTO: 5 %
VENT AC: 12
VENT AC: 14
VENT AC: 20
VENT AC: 24
VENT AC: 26
VENT AC: 28
VENT APRV: 12
VENT APRV: 12
VENT- AC: AC
VENT- APRV: ABNORMAL
VENT- APRV: ABNORMAL
VENTRICULAR RATE: 102 BPM
VENTRICULAR RATE: 142 BPM
VENTRICULAR RATE: 156 BPM
VENTRICULAR RATE: 68 BPM
VENTRICULAR RATE: 79 BPM
VENTRICULAR RATE: 92 BPM
VENTRICULAR RATE: 92 BPM
VENTRICULAR RATE: 94 BPM
VT SETTING VENT: 400 ML
VT SETTING VENT: 400 ML
VT SETTING VENT: 450 ML
WBC # BLD AUTO: 10.08 THOUSAND/UL (ref 4.31–10.16)
WBC # BLD AUTO: 11.43 THOUSAND/UL (ref 4.31–10.16)
WBC # BLD AUTO: 11.76 THOUSAND/UL (ref 4.31–10.16)
WBC # BLD AUTO: 12.88 THOUSAND/UL (ref 4.31–10.16)
WBC # BLD AUTO: 15.38 THOUSAND/UL (ref 4.31–10.16)
WBC # BLD AUTO: 16.14 THOUSAND/UL (ref 4.31–10.16)
WBC # BLD AUTO: 16.58 THOUSAND/UL (ref 4.31–10.16)
WBC # BLD AUTO: 18.08 THOUSAND/UL (ref 4.31–10.16)
WBC # BLD AUTO: 18.65 THOUSAND/UL (ref 4.31–10.16)
WBC # BLD AUTO: 19.61 THOUSAND/UL (ref 4.31–10.16)
WBC # BLD AUTO: 19.81 THOUSAND/UL (ref 4.31–10.16)
WBC # BLD AUTO: 21.02 THOUSAND/UL (ref 4.31–10.16)
WBC # BLD AUTO: 22.25 THOUSAND/UL (ref 4.31–10.16)
WBC # BLD AUTO: 23.01 THOUSAND/UL (ref 4.31–10.16)
WBC # BLD AUTO: 27.55 THOUSAND/UL (ref 4.31–10.16)
WBC # BLD AUTO: 3.25 THOUSAND/UL (ref 4.31–10.16)
WBC # BLD AUTO: 3.57 THOUSAND/UL (ref 4.31–10.16)
WBC # BLD AUTO: 4.5 THOUSAND/UL (ref 4.31–10.16)
WBC # BLD AUTO: 6.9 THOUSAND/UL (ref 4.31–10.16)
WBC # BLD AUTO: 7.31 THOUSAND/UL (ref 4.31–10.16)
WBC # BLD AUTO: 7.89 THOUSAND/UL (ref 4.31–10.16)
WBC # BLD AUTO: 8.3 THOUSAND/UL (ref 4.31–10.16)
WBC # BLD AUTO: 8.74 THOUSAND/UL (ref 4.31–10.16)
WBC # BLD AUTO: 8.85 THOUSAND/UL (ref 4.31–10.16)
WBC # BLD AUTO: 8.93 THOUSAND/UL (ref 4.31–10.16)
WBC # BLD AUTO: 8.95 THOUSAND/UL (ref 4.31–10.16)
WBC # BLD AUTO: 8.99 THOUSAND/UL (ref 4.31–10.16)
WBC # BLD AUTO: 9.08 THOUSAND/UL (ref 4.31–10.16)
WBC # BLD AUTO: 9.71 THOUSAND/UL (ref 4.31–10.16)
WBC #/AREA URNS AUTO: ABNORMAL /HPF
WBC #/AREA URNS AUTO: ABNORMAL /HPF

## 2022-01-01 PROCEDURE — 85610 PROTHROMBIN TIME: CPT | Performed by: NURSE PRACTITIONER

## 2022-01-01 PROCEDURE — 4A133B1 MONITORING OF ARTERIAL PRESSURE, PERIPHERAL, PERCUTANEOUS APPROACH: ICD-10-PCS | Performed by: EMERGENCY MEDICINE

## 2022-01-01 PROCEDURE — 85730 THROMBOPLASTIN TIME PARTIAL: CPT | Performed by: NURSE PRACTITIONER

## 2022-01-01 PROCEDURE — 0DQV0ZZ REPAIR MESENTERY, OPEN APPROACH: ICD-10-PCS | Performed by: STUDENT IN AN ORGANIZED HEALTH CARE EDUCATION/TRAINING PROGRAM

## 2022-01-01 PROCEDURE — 71045 X-RAY EXAM CHEST 1 VIEW: CPT

## 2022-01-01 PROCEDURE — 82330 ASSAY OF CALCIUM: CPT

## 2022-01-01 PROCEDURE — 86901 BLOOD TYPING SEROLOGIC RH(D): CPT

## 2022-01-01 PROCEDURE — 85007 BL SMEAR W/DIFF WBC COUNT: CPT

## 2022-01-01 PROCEDURE — 83735 ASSAY OF MAGNESIUM: CPT | Performed by: PHYSICIAN ASSISTANT

## 2022-01-01 PROCEDURE — 86923 COMPATIBILITY TEST ELECTRIC: CPT

## 2022-01-01 PROCEDURE — 87040 BLOOD CULTURE FOR BACTERIA: CPT | Performed by: PHYSICIAN ASSISTANT

## 2022-01-01 PROCEDURE — 36556 INSERT NON-TUNNEL CV CATH: CPT | Performed by: SURGERY

## 2022-01-01 PROCEDURE — 31500 INSERT EMERGENCY AIRWAY: CPT

## 2022-01-01 PROCEDURE — 80053 COMPREHEN METABOLIC PANEL: CPT | Performed by: NURSE PRACTITIONER

## 2022-01-01 PROCEDURE — 82803 BLOOD GASES ANY COMBINATION: CPT

## 2022-01-01 PROCEDURE — 84145 PROCALCITONIN (PCT): CPT

## 2022-01-01 PROCEDURE — G1004 CDSM NDSC: HCPCS

## 2022-01-01 PROCEDURE — 31500 INSERT EMERGENCY AIRWAY: CPT | Performed by: SURGERY

## 2022-01-01 PROCEDURE — 99285 EMERGENCY DEPT VISIT HI MDM: CPT

## 2022-01-01 PROCEDURE — 4A133J1 MONITORING OF ARTERIAL PULSE, PERIPHERAL, PERCUTANEOUS APPROACH: ICD-10-PCS | Performed by: EMERGENCY MEDICINE

## 2022-01-01 PROCEDURE — 94760 N-INVAS EAR/PLS OXIMETRY 1: CPT | Performed by: SOCIAL WORKER

## 2022-01-01 PROCEDURE — 90945 DIALYSIS ONE EVALUATION: CPT

## 2022-01-01 PROCEDURE — 97597 DBRDMT OPN WND 1ST 20 CM/<: CPT | Performed by: SURGERY

## 2022-01-01 PROCEDURE — 83735 ASSAY OF MAGNESIUM: CPT | Performed by: STUDENT IN AN ORGANIZED HEALTH CARE EDUCATION/TRAINING PROGRAM

## 2022-01-01 PROCEDURE — NC001 PR NO CHARGE: Performed by: SURGERY

## 2022-01-01 PROCEDURE — 74018 RADEX ABDOMEN 1 VIEW: CPT

## 2022-01-01 PROCEDURE — 94760 N-INVAS EAR/PLS OXIMETRY 1: CPT

## 2022-01-01 PROCEDURE — 81001 URINALYSIS AUTO W/SCOPE: CPT

## 2022-01-01 PROCEDURE — 80048 BASIC METABOLIC PNL TOTAL CA: CPT | Performed by: STUDENT IN AN ORGANIZED HEALTH CARE EDUCATION/TRAINING PROGRAM

## 2022-01-01 PROCEDURE — 85007 BL SMEAR W/DIFF WBC COUNT: CPT | Performed by: STUDENT IN AN ORGANIZED HEALTH CARE EDUCATION/TRAINING PROGRAM

## 2022-01-01 PROCEDURE — 82533 TOTAL CORTISOL: CPT

## 2022-01-01 PROCEDURE — 84100 ASSAY OF PHOSPHORUS: CPT

## 2022-01-01 PROCEDURE — NC001 PR NO CHARGE: Performed by: INTERNAL MEDICINE

## 2022-01-01 PROCEDURE — 80048 BASIC METABOLIC PNL TOTAL CA: CPT

## 2022-01-01 PROCEDURE — 0437T IMPLTJ SYNTH RNFCMT ABDL WAL: CPT | Performed by: STUDENT IN AN ORGANIZED HEALTH CARE EDUCATION/TRAINING PROGRAM

## 2022-01-01 PROCEDURE — 94664 DEMO&/EVAL PT USE INHALER: CPT

## 2022-01-01 PROCEDURE — 82947 ASSAY GLUCOSE BLOOD QUANT: CPT

## 2022-01-01 PROCEDURE — 82805 BLOOD GASES W/O2 SATURATION: CPT

## 2022-01-01 PROCEDURE — 93306 TTE W/DOPPLER COMPLETE: CPT

## 2022-01-01 PROCEDURE — 85018 HEMOGLOBIN: CPT | Performed by: NURSE PRACTITIONER

## 2022-01-01 PROCEDURE — 5A1935Z RESPIRATORY VENTILATION, LESS THAN 24 CONSECUTIVE HOURS: ICD-10-PCS | Performed by: EMERGENCY MEDICINE

## 2022-01-01 PROCEDURE — C9113 INJ PANTOPRAZOLE SODIUM, VIA: HCPCS | Performed by: PHYSICIAN ASSISTANT

## 2022-01-01 PROCEDURE — 80053 COMPREHEN METABOLIC PANEL: CPT

## 2022-01-01 PROCEDURE — 85397 CLOTTING FUNCT ACTIVITY: CPT

## 2022-01-01 PROCEDURE — 82248 BILIRUBIN DIRECT: CPT

## 2022-01-01 PROCEDURE — 80048 BASIC METABOLIC PNL TOTAL CA: CPT | Performed by: SURGERY

## 2022-01-01 PROCEDURE — 82948 REAGENT STRIP/BLOOD GLUCOSE: CPT

## 2022-01-01 PROCEDURE — 80202 ASSAY OF VANCOMYCIN: CPT | Performed by: SURGERY

## 2022-01-01 PROCEDURE — 80048 BASIC METABOLIC PNL TOTAL CA: CPT | Performed by: PHYSICIAN ASSISTANT

## 2022-01-01 PROCEDURE — 84100 ASSAY OF PHOSPHORUS: CPT | Performed by: STUDENT IN AN ORGANIZED HEALTH CARE EDUCATION/TRAINING PROGRAM

## 2022-01-01 PROCEDURE — 31624 DX BRONCHOSCOPE/LAVAGE: CPT | Performed by: SURGERY

## 2022-01-01 PROCEDURE — 5A1955Z RESPIRATORY VENTILATION, GREATER THAN 96 CONSECUTIVE HOURS: ICD-10-PCS | Performed by: SURGERY

## 2022-01-01 PROCEDURE — 99291 CRITICAL CARE FIRST HOUR: CPT | Performed by: SURGERY

## 2022-01-01 PROCEDURE — 36600 WITHDRAWAL OF ARTERIAL BLOOD: CPT

## 2022-01-01 PROCEDURE — 88304 TISSUE EXAM BY PATHOLOGIST: CPT | Performed by: PATHOLOGY

## 2022-01-01 PROCEDURE — 87106 FUNGI IDENTIFICATION YEAST: CPT | Performed by: INTERNAL MEDICINE

## 2022-01-01 PROCEDURE — NC001 PR NO CHARGE: Performed by: STUDENT IN AN ORGANIZED HEALTH CARE EDUCATION/TRAINING PROGRAM

## 2022-01-01 PROCEDURE — 82805 BLOOD GASES W/O2 SATURATION: CPT | Performed by: SURGERY

## 2022-01-01 PROCEDURE — 84145 PROCALCITONIN (PCT): CPT | Performed by: INTERNAL MEDICINE

## 2022-01-01 PROCEDURE — 94640 AIRWAY INHALATION TREATMENT: CPT

## 2022-01-01 PROCEDURE — 94003 VENT MGMT INPAT SUBQ DAY: CPT

## 2022-01-01 PROCEDURE — 82805 BLOOD GASES W/O2 SATURATION: CPT | Performed by: PHYSICIAN ASSISTANT

## 2022-01-01 PROCEDURE — 76705 ECHO EXAM OF ABDOMEN: CPT

## 2022-01-01 PROCEDURE — 86900 BLOOD TYPING SEROLOGIC ABO: CPT

## 2022-01-01 PROCEDURE — 36415 COLL VENOUS BLD VENIPUNCTURE: CPT

## 2022-01-01 PROCEDURE — 85025 COMPLETE CBC W/AUTO DIFF WBC: CPT

## 2022-01-01 PROCEDURE — C9113 INJ PANTOPRAZOLE SODIUM, VIA: HCPCS | Performed by: NURSE PRACTITIONER

## 2022-01-01 PROCEDURE — 0241U HB NFCT DS VIR RESP RNA 4 TRGT: CPT | Performed by: STUDENT IN AN ORGANIZED HEALTH CARE EDUCATION/TRAINING PROGRAM

## 2022-01-01 PROCEDURE — 0WJP0ZZ INSPECTION OF GASTROINTESTINAL TRACT, OPEN APPROACH: ICD-10-PCS | Performed by: STUDENT IN AN ORGANIZED HEALTH CARE EDUCATION/TRAINING PROGRAM

## 2022-01-01 PROCEDURE — 85027 COMPLETE CBC AUTOMATED: CPT

## 2022-01-01 PROCEDURE — 85610 PROTHROMBIN TIME: CPT

## 2022-01-01 PROCEDURE — 83605 ASSAY OF LACTIC ACID: CPT | Performed by: STUDENT IN AN ORGANIZED HEALTH CARE EDUCATION/TRAINING PROGRAM

## 2022-01-01 PROCEDURE — 0DB90ZZ EXCISION OF DUODENUM, OPEN APPROACH: ICD-10-PCS | Performed by: STUDENT IN AN ORGANIZED HEALTH CARE EDUCATION/TRAINING PROGRAM

## 2022-01-01 PROCEDURE — 94002 VENT MGMT INPAT INIT DAY: CPT

## 2022-01-01 PROCEDURE — 30233N1 TRANSFUSION OF NONAUTOLOGOUS RED BLOOD CELLS INTO PERIPHERAL VEIN, PERCUTANEOUS APPROACH: ICD-10-PCS | Performed by: EMERGENCY MEDICINE

## 2022-01-01 PROCEDURE — 85027 COMPLETE CBC AUTOMATED: CPT | Performed by: PHYSICIAN ASSISTANT

## 2022-01-01 PROCEDURE — 74181 MRI ABDOMEN W/O CONTRAST: CPT

## 2022-01-01 PROCEDURE — 94669 MECHANICAL CHEST WALL OSCILL: CPT

## 2022-01-01 PROCEDURE — 85384 FIBRINOGEN ACTIVITY: CPT

## 2022-01-01 PROCEDURE — 99292 CRITICAL CARE ADDL 30 MIN: CPT | Performed by: SURGERY

## 2022-01-01 PROCEDURE — 84100 ASSAY OF PHOSPHORUS: CPT | Performed by: PHYSICIAN ASSISTANT

## 2022-01-01 PROCEDURE — 0FT40ZZ RESECTION OF GALLBLADDER, OPEN APPROACH: ICD-10-PCS | Performed by: STUDENT IN AN ORGANIZED HEALTH CARE EDUCATION/TRAINING PROGRAM

## 2022-01-01 PROCEDURE — 80076 HEPATIC FUNCTION PANEL: CPT | Performed by: PHYSICIAN ASSISTANT

## 2022-01-01 PROCEDURE — 97167 OT EVAL HIGH COMPLEX 60 MIN: CPT

## 2022-01-01 PROCEDURE — 80053 COMPREHEN METABOLIC PANEL: CPT | Performed by: PHYSICIAN ASSISTANT

## 2022-01-01 PROCEDURE — 84132 ASSAY OF SERUM POTASSIUM: CPT

## 2022-01-01 PROCEDURE — 80048 BASIC METABOLIC PNL TOTAL CA: CPT | Performed by: NURSE PRACTITIONER

## 2022-01-01 PROCEDURE — 85018 HEMOGLOBIN: CPT

## 2022-01-01 PROCEDURE — 96361 HYDRATE IV INFUSION ADD-ON: CPT

## 2022-01-01 PROCEDURE — P9016 RBC LEUKOCYTES REDUCED: HCPCS

## 2022-01-01 PROCEDURE — 83735 ASSAY OF MAGNESIUM: CPT | Performed by: NURSE PRACTITIONER

## 2022-01-01 PROCEDURE — 83735 ASSAY OF MAGNESIUM: CPT

## 2022-01-01 PROCEDURE — 93010 ELECTROCARDIOGRAM REPORT: CPT | Performed by: INTERNAL MEDICINE

## 2022-01-01 PROCEDURE — 82330 ASSAY OF CALCIUM: CPT | Performed by: PHYSICIAN ASSISTANT

## 2022-01-01 PROCEDURE — 74176 CT ABD & PELVIS W/O CONTRAST: CPT

## 2022-01-01 PROCEDURE — 84100 ASSAY OF PHOSPHORUS: CPT | Performed by: SURGERY

## 2022-01-01 PROCEDURE — 87070 CULTURE OTHR SPECIMN AEROBIC: CPT | Performed by: INTERNAL MEDICINE

## 2022-01-01 PROCEDURE — 84295 ASSAY OF SERUM SODIUM: CPT

## 2022-01-01 PROCEDURE — 83735 ASSAY OF MAGNESIUM: CPT | Performed by: SURGERY

## 2022-01-01 PROCEDURE — 99024 POSTOP FOLLOW-UP VISIT: CPT | Performed by: SURGERY

## 2022-01-01 PROCEDURE — 36569 INSJ PICC 5 YR+ W/O IMAGING: CPT

## 2022-01-01 PROCEDURE — 4A133J1 MONITORING OF ARTERIAL PULSE, PERIPHERAL, PERCUTANEOUS APPROACH: ICD-10-PCS | Performed by: SURGERY

## 2022-01-01 PROCEDURE — 94668 MNPJ CHEST WALL SBSQ: CPT

## 2022-01-01 PROCEDURE — 93005 ELECTROCARDIOGRAM TRACING: CPT

## 2022-01-01 PROCEDURE — 02HV33Z INSERTION OF INFUSION DEVICE INTO SUPERIOR VENA CAVA, PERCUTANEOUS APPROACH: ICD-10-PCS | Performed by: EMERGENCY MEDICINE

## 2022-01-01 PROCEDURE — 84100 ASSAY OF PHOSPHORUS: CPT | Performed by: NURSE PRACTITIONER

## 2022-01-01 PROCEDURE — 83605 ASSAY OF LACTIC ACID: CPT | Performed by: PHYSICIAN ASSISTANT

## 2022-01-01 PROCEDURE — C9113 INJ PANTOPRAZOLE SODIUM, VIA: HCPCS | Performed by: SURGERY

## 2022-01-01 PROCEDURE — 99232 SBSQ HOSP IP/OBS MODERATE 35: CPT | Performed by: EMERGENCY MEDICINE

## 2022-01-01 PROCEDURE — 80202 ASSAY OF VANCOMYCIN: CPT | Performed by: NURSE PRACTITIONER

## 2022-01-01 PROCEDURE — 97598 DBRDMT OPN WND ADDL 20CM/<: CPT | Performed by: SURGERY

## 2022-01-01 PROCEDURE — 88305 TISSUE EXAM BY PATHOLOGIST: CPT | Performed by: PATHOLOGY

## 2022-01-01 PROCEDURE — 82805 BLOOD GASES W/O2 SATURATION: CPT | Performed by: STUDENT IN AN ORGANIZED HEALTH CARE EDUCATION/TRAINING PROGRAM

## 2022-01-01 PROCEDURE — 84484 ASSAY OF TROPONIN QUANT: CPT

## 2022-01-01 PROCEDURE — 0DBA0ZZ EXCISION OF JEJUNUM, OPEN APPROACH: ICD-10-PCS | Performed by: STUDENT IN AN ORGANIZED HEALTH CARE EDUCATION/TRAINING PROGRAM

## 2022-01-01 PROCEDURE — 82805 BLOOD GASES W/O2 SATURATION: CPT | Performed by: INTERNAL MEDICINE

## 2022-01-01 PROCEDURE — 0DB80ZZ EXCISION OF SMALL INTESTINE, OPEN APPROACH: ICD-10-PCS | Performed by: STUDENT IN AN ORGANIZED HEALTH CARE EDUCATION/TRAINING PROGRAM

## 2022-01-01 PROCEDURE — 97530 THERAPEUTIC ACTIVITIES: CPT

## 2022-01-01 PROCEDURE — 96374 THER/PROPH/DIAG INJ IV PUSH: CPT

## 2022-01-01 PROCEDURE — 83880 ASSAY OF NATRIURETIC PEPTIDE: CPT | Performed by: NURSE PRACTITIONER

## 2022-01-01 PROCEDURE — 0WUF0JZ SUPPLEMENT ABDOMINAL WALL WITH SYNTHETIC SUBSTITUTE, OPEN APPROACH: ICD-10-PCS | Performed by: STUDENT IN AN ORGANIZED HEALTH CARE EDUCATION/TRAINING PROGRAM

## 2022-01-01 PROCEDURE — 85027 COMPLETE CBC AUTOMATED: CPT | Performed by: NURSE PRACTITIONER

## 2022-01-01 PROCEDURE — NC001 PR NO CHARGE: Performed by: EMERGENCY MEDICINE

## 2022-01-01 PROCEDURE — C9113 INJ PANTOPRAZOLE SODIUM, VIA: HCPCS

## 2022-01-01 PROCEDURE — 83605 ASSAY OF LACTIC ACID: CPT

## 2022-01-01 PROCEDURE — 87103 BLOOD FUNGUS CULTURE: CPT | Performed by: PHYSICIAN ASSISTANT

## 2022-01-01 PROCEDURE — 86850 RBC ANTIBODY SCREEN: CPT

## 2022-01-01 PROCEDURE — 0HD7XZZ EXTRACTION OF ABDOMEN SKIN, EXTERNAL APPROACH: ICD-10-PCS | Performed by: SURGERY

## 2022-01-01 PROCEDURE — 76937 US GUIDE VASCULAR ACCESS: CPT | Performed by: SURGERY

## 2022-01-01 PROCEDURE — 03HY32Z INSERTION OF MONITORING DEVICE INTO UPPER ARTERY, PERCUTANEOUS APPROACH: ICD-10-PCS | Performed by: SURGERY

## 2022-01-01 PROCEDURE — 49402 REMOVE FOREIGN BODY ADBOMEN: CPT | Performed by: SURGERY

## 2022-01-01 PROCEDURE — 36620 INSERTION CATHETER ARTERY: CPT | Performed by: SURGERY

## 2022-01-01 PROCEDURE — 82805 BLOOD GASES W/O2 SATURATION: CPT | Performed by: NURSE PRACTITIONER

## 2022-01-01 PROCEDURE — 82977 ASSAY OF GGT: CPT

## 2022-01-01 PROCEDURE — 36620 INSERTION CATHETER ARTERY: CPT | Performed by: EMERGENCY MEDICINE

## 2022-01-01 PROCEDURE — 87040 BLOOD CULTURE FOR BACTERIA: CPT | Performed by: SURGERY

## 2022-01-01 PROCEDURE — 49440 PLACE GASTROSTOMY TUBE PERC: CPT | Performed by: STUDENT IN AN ORGANIZED HEALTH CARE EDUCATION/TRAINING PROGRAM

## 2022-01-01 PROCEDURE — 96365 THER/PROPH/DIAG IV INF INIT: CPT

## 2022-01-01 PROCEDURE — 49002 REOPENING OF ABDOMEN: CPT | Performed by: STUDENT IN AN ORGANIZED HEALTH CARE EDUCATION/TRAINING PROGRAM

## 2022-01-01 PROCEDURE — 93306 TTE W/DOPPLER COMPLETE: CPT | Performed by: INTERNAL MEDICINE

## 2022-01-01 PROCEDURE — 87103 BLOOD FUNGUS CULTURE: CPT

## 2022-01-01 PROCEDURE — 85025 COMPLETE CBC W/AUTO DIFF WBC: CPT | Performed by: STUDENT IN AN ORGANIZED HEALTH CARE EDUCATION/TRAINING PROGRAM

## 2022-01-01 PROCEDURE — C1751 CATH, INF, PER/CENT/MIDLINE: HCPCS

## 2022-01-01 PROCEDURE — 71260 CT THORAX DX C+: CPT

## 2022-01-01 PROCEDURE — 85576 BLOOD PLATELET AGGREGATION: CPT

## 2022-01-01 PROCEDURE — 44121 REMOVAL OF SMALL INTESTINE: CPT | Performed by: STUDENT IN AN ORGANIZED HEALTH CARE EDUCATION/TRAINING PROGRAM

## 2022-01-01 PROCEDURE — 99291 CRITICAL CARE FIRST HOUR: CPT

## 2022-01-01 PROCEDURE — 36556 INSERT NON-TUNNEL CV CATH: CPT | Performed by: EMERGENCY MEDICINE

## 2022-01-01 PROCEDURE — 2W03X6Z CHANGE PRESSURE DRESSING ON ABDOMINAL WALL: ICD-10-PCS | Performed by: SURGERY

## 2022-01-01 PROCEDURE — 85014 HEMATOCRIT: CPT | Performed by: INTERNAL MEDICINE

## 2022-01-01 PROCEDURE — 87015 SPECIMEN INFECT AGNT CONCNTJ: CPT | Performed by: INTERNAL MEDICINE

## 2022-01-01 PROCEDURE — 0WPF0JZ REMOVAL OF SYNTHETIC SUBSTITUTE FROM ABDOMINAL WALL, OPEN APPROACH: ICD-10-PCS | Performed by: SURGERY

## 2022-01-01 PROCEDURE — 85018 HEMOGLOBIN: CPT | Performed by: INTERNAL MEDICINE

## 2022-01-01 PROCEDURE — 99291 CRITICAL CARE FIRST HOUR: CPT | Performed by: EMERGENCY MEDICINE

## 2022-01-01 PROCEDURE — 87281 PNEUMOCYSTIS CARINII AG IF: CPT | Performed by: INTERNAL MEDICINE

## 2022-01-01 PROCEDURE — 31645 BRNCHSC W/THER ASPIR 1ST: CPT | Performed by: SURGERY

## 2022-01-01 PROCEDURE — 0DN80ZZ RELEASE SMALL INTESTINE, OPEN APPROACH: ICD-10-PCS | Performed by: STUDENT IN AN ORGANIZED HEALTH CARE EDUCATION/TRAINING PROGRAM

## 2022-01-01 PROCEDURE — 97110 THERAPEUTIC EXERCISES: CPT

## 2022-01-01 PROCEDURE — 97605 NEG PRS WND THER DME<=50SQCM: CPT | Performed by: STUDENT IN AN ORGANIZED HEALTH CARE EDUCATION/TRAINING PROGRAM

## 2022-01-01 PROCEDURE — 74177 CT ABD & PELVIS W/CONTRAST: CPT

## 2022-01-01 PROCEDURE — 87040 BLOOD CULTURE FOR BACTERIA: CPT

## 2022-01-01 PROCEDURE — 44120 REMOVAL OF SMALL INTESTINE: CPT | Performed by: STUDENT IN AN ORGANIZED HEALTH CARE EDUCATION/TRAINING PROGRAM

## 2022-01-01 PROCEDURE — 99292 CRITICAL CARE ADDL 30 MIN: CPT | Performed by: NURSE PRACTITIONER

## 2022-01-01 PROCEDURE — 85027 COMPLETE CBC AUTOMATED: CPT | Performed by: STUDENT IN AN ORGANIZED HEALTH CARE EDUCATION/TRAINING PROGRAM

## 2022-01-01 PROCEDURE — 36620 INSERTION CATHETER ARTERY: CPT | Performed by: PHYSICIAN ASSISTANT

## 2022-01-01 PROCEDURE — 85014 HEMATOCRIT: CPT

## 2022-01-01 PROCEDURE — 0BH17EZ INSERTION OF ENDOTRACHEAL AIRWAY INTO TRACHEA, VIA NATURAL OR ARTIFICIAL OPENING: ICD-10-PCS | Performed by: SURGERY

## 2022-01-01 PROCEDURE — 0WJG0ZZ INSPECTION OF PERITONEAL CAVITY, OPEN APPROACH: ICD-10-PCS | Performed by: SURGERY

## 2022-01-01 PROCEDURE — 88307 TISSUE EXAM BY PATHOLOGIST: CPT | Performed by: PATHOLOGY

## 2022-01-01 PROCEDURE — 83036 HEMOGLOBIN GLYCOSYLATED A1C: CPT | Performed by: NURSE PRACTITIONER

## 2022-01-01 PROCEDURE — 04HY32Z INSERTION OF MONITORING DEVICE INTO LOWER ARTERY, PERCUTANEOUS APPROACH: ICD-10-PCS | Performed by: SURGERY

## 2022-01-01 PROCEDURE — 87081 CULTURE SCREEN ONLY: CPT | Performed by: NURSE PRACTITIONER

## 2022-01-01 PROCEDURE — 84443 ASSAY THYROID STIM HORMONE: CPT | Performed by: NURSE PRACTITIONER

## 2022-01-01 PROCEDURE — 85027 COMPLETE CBC AUTOMATED: CPT | Performed by: SURGERY

## 2022-01-01 PROCEDURE — C1781 MESH (IMPLANTABLE): HCPCS | Performed by: STUDENT IN AN ORGANIZED HEALTH CARE EDUCATION/TRAINING PROGRAM

## 2022-01-01 PROCEDURE — 85007 BL SMEAR W/DIFF WBC COUNT: CPT | Performed by: PHYSICIAN ASSISTANT

## 2022-01-01 PROCEDURE — 96367 TX/PROPH/DG ADDL SEQ IV INF: CPT

## 2022-01-01 PROCEDURE — 87102 FUNGUS ISOLATION CULTURE: CPT | Performed by: INTERNAL MEDICINE

## 2022-01-01 PROCEDURE — 0WQF0ZZ REPAIR ABDOMINAL WALL, OPEN APPROACH: ICD-10-PCS | Performed by: STUDENT IN AN ORGANIZED HEALTH CARE EDUCATION/TRAINING PROGRAM

## 2022-01-01 PROCEDURE — 99291 CRITICAL CARE FIRST HOUR: CPT | Performed by: STUDENT IN AN ORGANIZED HEALTH CARE EDUCATION/TRAINING PROGRAM

## 2022-01-01 PROCEDURE — 82330 ASSAY OF CALCIUM: CPT | Performed by: SURGERY

## 2022-01-01 PROCEDURE — NC001 PR NO CHARGE: Performed by: PHYSICIAN ASSISTANT

## 2022-01-01 PROCEDURE — 83605 ASSAY OF LACTIC ACID: CPT | Performed by: SURGERY

## 2022-01-01 PROCEDURE — 4A133B1 MONITORING OF ARTERIAL PRESSURE, PERIPHERAL, PERCUTANEOUS APPROACH: ICD-10-PCS | Performed by: SURGERY

## 2022-01-01 PROCEDURE — 0B9D8ZX DRAINAGE OF RIGHT MIDDLE LUNG LOBE, VIA NATURAL OR ARTIFICIAL OPENING ENDOSCOPIC, DIAGNOSTIC: ICD-10-PCS | Performed by: SURGERY

## 2022-01-01 PROCEDURE — 5A1935Z RESPIRATORY VENTILATION, LESS THAN 24 CONSECUTIVE HOURS: ICD-10-PCS | Performed by: SURGERY

## 2022-01-01 PROCEDURE — 86900 BLOOD TYPING SEROLOGIC ABO: CPT | Performed by: SURGERY

## 2022-01-01 PROCEDURE — 02HV33Z INSERTION OF INFUSION DEVICE INTO SUPERIOR VENA CAVA, PERCUTANEOUS APPROACH: ICD-10-PCS | Performed by: SURGERY

## 2022-01-01 PROCEDURE — 71275 CT ANGIOGRAPHY CHEST: CPT

## 2022-01-01 PROCEDURE — 99291 CRITICAL CARE FIRST HOUR: CPT | Performed by: NURSE PRACTITIONER

## 2022-01-01 PROCEDURE — 47600 CHOLECYSTECTOMY: CPT | Performed by: STUDENT IN AN ORGANIZED HEALTH CARE EDUCATION/TRAINING PROGRAM

## 2022-01-01 PROCEDURE — 84484 ASSAY OF TROPONIN QUANT: CPT | Performed by: STUDENT IN AN ORGANIZED HEALTH CARE EDUCATION/TRAINING PROGRAM

## 2022-01-01 PROCEDURE — 80053 COMPREHEN METABOLIC PANEL: CPT | Performed by: SURGERY

## 2022-01-01 PROCEDURE — 3E0436Z INTRODUCTION OF NUTRITIONAL SUBSTANCE INTO CENTRAL VEIN, PERCUTANEOUS APPROACH: ICD-10-PCS | Performed by: SURGERY

## 2022-01-01 PROCEDURE — 0WPF0JZ REMOVAL OF SYNTHETIC SUBSTITUTE FROM ABDOMINAL WALL, OPEN APPROACH: ICD-10-PCS | Performed by: STUDENT IN AN ORGANIZED HEALTH CARE EDUCATION/TRAINING PROGRAM

## 2022-01-01 PROCEDURE — 84145 PROCALCITONIN (PCT): CPT | Performed by: STUDENT IN AN ORGANIZED HEALTH CARE EDUCATION/TRAINING PROGRAM

## 2022-01-01 PROCEDURE — 83605 ASSAY OF LACTIC ACID: CPT | Performed by: NURSE PRACTITIONER

## 2022-01-01 PROCEDURE — 03HY32Z INSERTION OF MONITORING DEVICE INTO UPPER ARTERY, PERCUTANEOUS APPROACH: ICD-10-PCS | Performed by: EMERGENCY MEDICINE

## 2022-01-01 PROCEDURE — 85347 COAGULATION TIME ACTIVATED: CPT

## 2022-01-01 PROCEDURE — 80053 COMPREHEN METABOLIC PANEL: CPT | Performed by: STUDENT IN AN ORGANIZED HEALTH CARE EDUCATION/TRAINING PROGRAM

## 2022-01-01 PROCEDURE — 86901 BLOOD TYPING SEROLOGIC RH(D): CPT | Performed by: SURGERY

## 2022-01-01 PROCEDURE — 86850 RBC ANTIBODY SCREEN: CPT | Performed by: SURGERY

## 2022-01-01 PROCEDURE — 0B9M8ZX DRAINAGE OF BILATERAL LUNGS, VIA NATURAL OR ARTIFICIAL OPENING ENDOSCOPIC, DIAGNOSTIC: ICD-10-PCS | Performed by: SURGERY

## 2022-01-01 PROCEDURE — 97163 PT EVAL HIGH COMPLEX 45 MIN: CPT

## 2022-01-01 PROCEDURE — 97605 NEG PRS WND THER DME<=50SQCM: CPT | Performed by: SURGERY

## 2022-01-01 PROCEDURE — 99233 SBSQ HOSP IP/OBS HIGH 50: CPT | Performed by: SURGERY

## 2022-01-01 PROCEDURE — 88112 CYTOPATH CELL ENHANCE TECH: CPT | Performed by: PATHOLOGY

## 2022-01-01 DEVICE — PHASIX ST MESH, RECTANGLE
Type: IMPLANTABLE DEVICE | Site: ABDOMEN | Status: FUNCTIONAL
Brand: PHASIX ST MESH

## 2022-01-01 RX ORDER — HYDROMORPHONE HCL/PF 1 MG/ML
0.5 SYRINGE (ML) INJECTION
Status: DISCONTINUED | OUTPATIENT
Start: 2022-01-01 | End: 2022-01-01

## 2022-01-01 RX ORDER — VANCOMYCIN HYDROCHLORIDE 1 G/200ML
15 INJECTION, SOLUTION INTRAVENOUS DAILY PRN
Status: DISCONTINUED | OUTPATIENT
Start: 2022-01-01 | End: 2022-01-01 | Stop reason: HOSPADM

## 2022-01-01 RX ORDER — FENTANYL CITRATE 50 UG/ML
INJECTION, SOLUTION INTRAMUSCULAR; INTRAVENOUS
Status: COMPLETED
Start: 2022-01-01 | End: 2022-01-01

## 2022-01-01 RX ORDER — POTASSIUM CHLORIDE 750 MG/1
10 TABLET, EXTENDED RELEASE ORAL DAILY
Status: DISCONTINUED | OUTPATIENT
Start: 2022-01-01 | End: 2022-01-01

## 2022-01-01 RX ORDER — FENTANYL CITRATE-0.9 % NACL/PF 10 MCG/ML
150 PLASTIC BAG, INJECTION (ML) INTRAVENOUS CONTINUOUS
Status: DISCONTINUED | OUTPATIENT
Start: 2022-01-01 | End: 2022-01-01

## 2022-01-01 RX ORDER — NOREPINEPHRINE BITARTRATE 1 MG/ML
INJECTION, SOLUTION INTRAVENOUS
Status: DISPENSED
Start: 2022-01-01 | End: 2022-01-01

## 2022-01-01 RX ORDER — CALCIUM GLUCONATE 20 MG/ML
1 INJECTION, SOLUTION INTRAVENOUS ONCE
Status: COMPLETED | OUTPATIENT
Start: 2022-01-01 | End: 2022-01-01

## 2022-01-01 RX ORDER — METRONIDAZOLE 500 MG/100ML
500 INJECTION, SOLUTION INTRAVENOUS EVERY 8 HOURS
Status: DISCONTINUED | OUTPATIENT
Start: 2022-01-01 | End: 2022-01-01

## 2022-01-01 RX ORDER — FENTANYL CITRATE 50 UG/ML
50 INJECTION, SOLUTION INTRAMUSCULAR; INTRAVENOUS EVERY 2 HOUR PRN
Status: DISCONTINUED | OUTPATIENT
Start: 2022-01-01 | End: 2022-01-01

## 2022-01-01 RX ORDER — SODIUM CHLORIDE, SODIUM GLUCONATE, SODIUM ACETATE, POTASSIUM CHLORIDE, MAGNESIUM CHLORIDE, SODIUM PHOSPHATE, DIBASIC, AND POTASSIUM PHOSPHATE .53; .5; .37; .037; .03; .012; .00082 G/100ML; G/100ML; G/100ML; G/100ML; G/100ML; G/100ML; G/100ML
125 INJECTION, SOLUTION INTRAVENOUS CONTINUOUS
Status: DISPENSED | OUTPATIENT
Start: 2022-01-01 | End: 2022-01-01

## 2022-01-01 RX ORDER — DEXTROSE MONOHYDRATE 25 G/50ML
INJECTION, SOLUTION INTRAVENOUS
Status: COMPLETED
Start: 2022-01-01 | End: 2022-01-01

## 2022-01-01 RX ORDER — OXYCODONE HYDROCHLORIDE 5 MG/1
2.5 TABLET ORAL EVERY 4 HOURS PRN
Status: DISCONTINUED | OUTPATIENT
Start: 2022-01-01 | End: 2022-01-01

## 2022-01-01 RX ORDER — SODIUM CHLORIDE FOR INHALATION 3 %
4 VIAL, NEBULIZER (ML) INHALATION
Status: DISCONTINUED | OUTPATIENT
Start: 2022-01-01 | End: 2022-01-01

## 2022-01-01 RX ORDER — MIDAZOLAM HYDROCHLORIDE 2 MG/2ML
INJECTION, SOLUTION INTRAMUSCULAR; INTRAVENOUS
Status: COMPLETED
Start: 2022-01-01 | End: 2022-01-01

## 2022-01-01 RX ORDER — CALCIUM GLUCONATE 20 MG/ML
2 INJECTION, SOLUTION INTRAVENOUS ONCE
Status: COMPLETED | OUTPATIENT
Start: 2022-01-01 | End: 2022-01-01

## 2022-01-01 RX ORDER — FUROSEMIDE 10 MG/ML
20 INJECTION INTRAMUSCULAR; INTRAVENOUS ONCE
Status: COMPLETED | OUTPATIENT
Start: 2022-01-01 | End: 2022-01-01

## 2022-01-01 RX ORDER — DEXTROSE MONOHYDRATE 25 G/50ML
50 INJECTION, SOLUTION INTRAVENOUS ONCE
Status: COMPLETED | OUTPATIENT
Start: 2022-01-01 | End: 2022-01-01

## 2022-01-01 RX ORDER — LABETALOL HYDROCHLORIDE 5 MG/ML
10 INJECTION, SOLUTION INTRAVENOUS EVERY 6 HOURS PRN
Status: DISCONTINUED | OUTPATIENT
Start: 2022-01-01 | End: 2022-01-01

## 2022-01-01 RX ORDER — HYDROMORPHONE HCL/PF 1 MG/ML
1 SYRINGE (ML) INJECTION ONCE
Status: COMPLETED | OUTPATIENT
Start: 2022-01-01 | End: 2022-01-01

## 2022-01-01 RX ORDER — ONDANSETRON 2 MG/ML
4 INJECTION INTRAMUSCULAR; INTRAVENOUS EVERY 4 HOURS PRN
Status: DISCONTINUED | OUTPATIENT
Start: 2022-01-01 | End: 2022-01-01 | Stop reason: HOSPADM

## 2022-01-01 RX ORDER — HEPARIN SODIUM 10000 [USP'U]/100ML
500 INJECTION, SOLUTION INTRAVENOUS CONTINUOUS
Status: DISCONTINUED | OUTPATIENT
Start: 2022-01-01 | End: 2022-01-01 | Stop reason: HOSPADM

## 2022-01-01 RX ORDER — METOPROLOL TARTRATE 5 MG/5ML
5 INJECTION INTRAVENOUS EVERY 6 HOURS
Status: DISCONTINUED | OUTPATIENT
Start: 2022-01-01 | End: 2022-01-01

## 2022-01-01 RX ORDER — AMIODARONE HYDROCHLORIDE 50 MG/ML
INJECTION, SOLUTION INTRAVENOUS
Status: COMPLETED
Start: 2022-01-01 | End: 2022-01-01

## 2022-01-01 RX ORDER — MAGNESIUM HYDROXIDE 1200 MG/15ML
LIQUID ORAL AS NEEDED
Status: DISCONTINUED | OUTPATIENT
Start: 2022-01-01 | End: 2022-01-01 | Stop reason: HOSPADM

## 2022-01-01 RX ORDER — FENTANYL CITRATE-0.9 % NACL/PF 10 MCG/ML
50 PLASTIC BAG, INJECTION (ML) INTRAVENOUS CONTINUOUS
Status: DISCONTINUED | OUTPATIENT
Start: 2022-01-01 | End: 2022-01-01

## 2022-01-01 RX ORDER — MIDAZOLAM HYDROCHLORIDE 2 MG/2ML
2 INJECTION, SOLUTION INTRAMUSCULAR; INTRAVENOUS ONCE
Status: COMPLETED | OUTPATIENT
Start: 2022-01-01 | End: 2022-01-01

## 2022-01-01 RX ORDER — SODIUM CHLORIDE FOR INHALATION 0.9 %
VIAL, NEBULIZER (ML) INHALATION
Status: COMPLETED
Start: 2022-01-01 | End: 2022-01-01

## 2022-01-01 RX ORDER — ONDANSETRON 2 MG/ML
4 INJECTION INTRAMUSCULAR; INTRAVENOUS ONCE
Status: COMPLETED | OUTPATIENT
Start: 2022-01-01 | End: 2022-01-01

## 2022-01-01 RX ORDER — PANTOPRAZOLE SODIUM 40 MG/10ML
40 INJECTION, POWDER, LYOPHILIZED, FOR SOLUTION INTRAVENOUS EVERY 12 HOURS SCHEDULED
Status: DISCONTINUED | OUTPATIENT
Start: 2022-01-01 | End: 2022-01-01 | Stop reason: HOSPADM

## 2022-01-01 RX ORDER — FENTANYL CITRATE 50 UG/ML
50 INJECTION, SOLUTION INTRAMUSCULAR; INTRAVENOUS
Status: DISCONTINUED | OUTPATIENT
Start: 2022-01-01 | End: 2022-01-01

## 2022-01-01 RX ORDER — FENTANYL CITRATE 50 UG/ML
50 INJECTION, SOLUTION INTRAMUSCULAR; INTRAVENOUS EVERY 2 HOUR PRN
Status: DISCONTINUED | OUTPATIENT
Start: 2022-01-01 | End: 2022-01-01 | Stop reason: HOSPADM

## 2022-01-01 RX ORDER — SODIUM CHLORIDE, SODIUM GLUCONATE, SODIUM ACETATE, POTASSIUM CHLORIDE, MAGNESIUM CHLORIDE, SODIUM PHOSPHATE, DIBASIC, AND POTASSIUM PHOSPHATE .53; .5; .37; .037; .03; .012; .00082 G/100ML; G/100ML; G/100ML; G/100ML; G/100ML; G/100ML; G/100ML
250 INJECTION, SOLUTION INTRAVENOUS ONCE
Status: COMPLETED | OUTPATIENT
Start: 2022-01-01 | End: 2022-01-01

## 2022-01-01 RX ORDER — DEXTROSE MONOHYDRATE 25 G/50ML
25 INJECTION, SOLUTION INTRAVENOUS ONCE
Status: COMPLETED | OUTPATIENT
Start: 2022-01-01 | End: 2022-01-01

## 2022-01-01 RX ORDER — HEPARIN SODIUM 5000 [USP'U]/ML
5000 INJECTION, SOLUTION INTRAVENOUS; SUBCUTANEOUS EVERY 8 HOURS SCHEDULED
Status: DISCONTINUED | OUTPATIENT
Start: 2022-01-01 | End: 2022-01-01

## 2022-01-01 RX ORDER — SODIUM CHLORIDE, SODIUM GLUCONATE, SODIUM ACETATE, POTASSIUM CHLORIDE, MAGNESIUM CHLORIDE, SODIUM PHOSPHATE, DIBASIC, AND POTASSIUM PHOSPHATE .53; .5; .37; .037; .03; .012; .00082 G/100ML; G/100ML; G/100ML; G/100ML; G/100ML; G/100ML; G/100ML
1000 INJECTION, SOLUTION INTRAVENOUS ONCE
Status: COMPLETED | OUTPATIENT
Start: 2022-01-01 | End: 2022-01-01

## 2022-01-01 RX ORDER — LORAZEPAM 2 MG/ML
1 INJECTION INTRAMUSCULAR
Status: DISCONTINUED | OUTPATIENT
Start: 2022-01-01 | End: 2022-01-01 | Stop reason: HOSPADM

## 2022-01-01 RX ORDER — OXYCODONE HYDROCHLORIDE 5 MG/1
5 TABLET ORAL EVERY 4 HOURS PRN
Status: DISCONTINUED | OUTPATIENT
Start: 2022-01-01 | End: 2022-01-01

## 2022-01-01 RX ORDER — HYDROMORPHONE HCL/PF 1 MG/ML
0.5 SYRINGE (ML) INJECTION EVERY 4 HOURS
Status: DISCONTINUED | OUTPATIENT
Start: 2022-01-01 | End: 2022-01-01

## 2022-01-01 RX ORDER — INSULIN LISPRO 100 [IU]/ML
1-6 INJECTION, SOLUTION INTRAVENOUS; SUBCUTANEOUS EVERY 6 HOURS SCHEDULED
Status: DISCONTINUED | OUTPATIENT
Start: 2022-01-01 | End: 2022-01-01

## 2022-01-01 RX ORDER — LEVALBUTEROL INHALATION SOLUTION 0.63 MG/3ML
0.63 SOLUTION RESPIRATORY (INHALATION)
Status: DISCONTINUED | OUTPATIENT
Start: 2022-01-01 | End: 2022-01-01

## 2022-01-01 RX ORDER — MAGNESIUM SULFATE HEPTAHYDRATE 40 MG/ML
2 INJECTION, SOLUTION INTRAVENOUS ONCE
Status: COMPLETED | OUTPATIENT
Start: 2022-01-01 | End: 2022-01-01

## 2022-01-01 RX ORDER — POTASSIUM CHLORIDE 14.9 MG/ML
20 INJECTION INTRAVENOUS ONCE
Status: DISCONTINUED | OUTPATIENT
Start: 2022-01-01 | End: 2022-01-01

## 2022-01-01 RX ORDER — ALBUMIN (HUMAN) 12.5 G/50ML
50 SOLUTION INTRAVENOUS ONCE
Status: COMPLETED | OUTPATIENT
Start: 2022-01-01 | End: 2022-01-01

## 2022-01-01 RX ORDER — LISINOPRIL 5 MG/1
5 TABLET ORAL DAILY
Status: DISCONTINUED | OUTPATIENT
Start: 2022-01-01 | End: 2022-01-01

## 2022-01-01 RX ORDER — POTASSIUM CHLORIDE 29.8 MG/ML
40 INJECTION INTRAVENOUS ONCE
Status: COMPLETED | OUTPATIENT
Start: 2022-01-01 | End: 2022-01-01

## 2022-01-01 RX ORDER — PROPOFOL 10 MG/ML
5-50 INJECTION, EMULSION INTRAVENOUS
Status: DISCONTINUED | OUTPATIENT
Start: 2022-01-01 | End: 2022-01-01

## 2022-01-01 RX ORDER — EPINEPHRINE 1 MG/ML
INJECTION, SOLUTION, CONCENTRATE INTRAVENOUS
Status: COMPLETED
Start: 2022-01-01 | End: 2022-01-01

## 2022-01-01 RX ORDER — ALBUMIN, HUMAN INJ 5% 5 %
25 SOLUTION INTRAVENOUS ONCE
Status: COMPLETED | OUTPATIENT
Start: 2022-01-01 | End: 2022-01-01

## 2022-01-01 RX ORDER — CALCIUM CHLORIDE 100 MG/ML
1 INJECTION INTRAVENOUS; INTRAVENTRICULAR ONCE
Status: COMPLETED | OUTPATIENT
Start: 2022-01-01 | End: 2022-01-01

## 2022-01-01 RX ORDER — SODIUM CHLORIDE FOR INHALATION 0.9 %
3 VIAL, NEBULIZER (ML) INHALATION ONCE
Status: COMPLETED | OUTPATIENT
Start: 2022-01-01 | End: 2022-01-01

## 2022-01-01 RX ORDER — NOREPINEPHRINE BITARTRATE 1 MG/ML
INJECTION, SOLUTION INTRAVENOUS
Status: COMPLETED
Start: 2022-01-01 | End: 2022-01-01

## 2022-01-01 RX ORDER — HYDROMORPHONE HCL/PF 1 MG/ML
1 SYRINGE (ML) INJECTION ONCE
Status: DISCONTINUED | OUTPATIENT
Start: 2022-01-01 | End: 2022-01-01

## 2022-01-01 RX ORDER — FENTANYL CITRATE 50 UG/ML
100 INJECTION, SOLUTION INTRAMUSCULAR; INTRAVENOUS ONCE
Status: COMPLETED | OUTPATIENT
Start: 2022-01-01 | End: 2022-01-01

## 2022-01-01 RX ORDER — ATROPINE SULFATE 0.1 MG/ML
0.5 INJECTION INTRAVENOUS ONCE
Status: COMPLETED | OUTPATIENT
Start: 2022-01-01 | End: 2022-01-01

## 2022-01-01 RX ORDER — VANCOMYCIN HYDROCHLORIDE 1 G/200ML
15 INJECTION, SOLUTION INTRAVENOUS ONCE
Status: COMPLETED | OUTPATIENT
Start: 2022-01-01 | End: 2022-01-01

## 2022-01-01 RX ORDER — METOPROLOL SUCCINATE 50 MG/1
50 TABLET, EXTENDED RELEASE ORAL DAILY
Status: DISCONTINUED | OUTPATIENT
Start: 2022-01-01 | End: 2022-01-01

## 2022-01-01 RX ORDER — OLANZAPINE 5 MG/1
5 TABLET, ORALLY DISINTEGRATING ORAL
Status: DISCONTINUED | OUTPATIENT
Start: 2022-01-01 | End: 2022-01-01

## 2022-01-01 RX ORDER — FENTANYL CITRATE-0.9 % NACL/PF 10 MCG/ML
100 PLASTIC BAG, INJECTION (ML) INTRAVENOUS CONTINUOUS
Status: DISCONTINUED | OUTPATIENT
Start: 2022-01-01 | End: 2022-01-01 | Stop reason: HOSPADM

## 2022-01-01 RX ORDER — ALBUMIN (HUMAN) 12.5 G/50ML
25 SOLUTION INTRAVENOUS ONCE
Status: COMPLETED | OUTPATIENT
Start: 2022-01-01 | End: 2022-01-01

## 2022-01-01 RX ORDER — ACETAMINOPHEN 650 MG/1
650 SUPPOSITORY RECTAL EVERY 4 HOURS PRN
Status: DISCONTINUED | OUTPATIENT
Start: 2022-01-01 | End: 2022-01-01 | Stop reason: HOSPADM

## 2022-01-01 RX ORDER — HYDROMORPHONE HCL IN WATER/PF 6 MG/30 ML
0.2 PATIENT CONTROLLED ANALGESIA SYRINGE INTRAVENOUS EVERY 4 HOURS PRN
Status: DISCONTINUED | OUTPATIENT
Start: 2022-01-01 | End: 2022-01-01

## 2022-01-01 RX ORDER — HYDROMORPHONE HCL IN WATER/PF 6 MG/30 ML
0.2 PATIENT CONTROLLED ANALGESIA SYRINGE INTRAVENOUS
Status: DISCONTINUED | OUTPATIENT
Start: 2022-01-01 | End: 2022-01-01

## 2022-01-01 RX ORDER — HYDROMORPHONE HCL/PF 1 MG/ML
0.5 SYRINGE (ML) INJECTION EVERY 6 HOURS
Status: DISCONTINUED | OUTPATIENT
Start: 2022-01-01 | End: 2022-01-01

## 2022-01-01 RX ORDER — HYDROMORPHONE HCL/PF 1 MG/ML
0.5 SYRINGE (ML) INJECTION EVERY 4 HOURS PRN
Status: DISCONTINUED | OUTPATIENT
Start: 2022-01-01 | End: 2022-01-01

## 2022-01-01 RX ORDER — FENTANYL CITRATE 50 UG/ML
50 INJECTION, SOLUTION INTRAMUSCULAR; INTRAVENOUS ONCE
Status: COMPLETED | OUTPATIENT
Start: 2022-01-01 | End: 2022-01-01

## 2022-01-01 RX ORDER — ACETAMINOPHEN 325 MG/1
975 TABLET ORAL EVERY 8 HOURS SCHEDULED
Status: DISCONTINUED | OUTPATIENT
Start: 2022-01-01 | End: 2022-01-01

## 2022-01-01 RX ORDER — METRONIDAZOLE 500 MG/100ML
500 INJECTION, SOLUTION INTRAVENOUS EVERY 8 HOURS
Status: DISCONTINUED | OUTPATIENT
Start: 2022-01-01 | End: 2022-01-01 | Stop reason: HOSPADM

## 2022-01-01 RX ORDER — HYDROMORPHONE HCL/PF 1 MG/ML
0.5 SYRINGE (ML) INJECTION ONCE
Status: DISCONTINUED | OUTPATIENT
Start: 2022-01-01 | End: 2022-01-01

## 2022-01-01 RX ORDER — FUROSEMIDE 20 MG/1
20 TABLET ORAL DAILY
Status: DISCONTINUED | OUTPATIENT
Start: 2022-01-01 | End: 2022-01-01

## 2022-01-01 RX ORDER — METOPROLOL TARTRATE 5 MG/5ML
5 INJECTION INTRAVENOUS ONCE
Status: DISCONTINUED | OUTPATIENT
Start: 2022-01-01 | End: 2022-01-01

## 2022-01-01 RX ORDER — HYDROMORPHONE HCL IN WATER/PF 6 MG/30 ML
0.2 PATIENT CONTROLLED ANALGESIA SYRINGE INTRAVENOUS ONCE
Status: COMPLETED | OUTPATIENT
Start: 2022-01-01 | End: 2022-01-01

## 2022-01-01 RX ORDER — ALBUMIN, HUMAN INJ 5% 5 %
SOLUTION INTRAVENOUS
Status: DISCONTINUED
Start: 2022-01-01 | End: 2022-01-01 | Stop reason: WASHOUT

## 2022-01-01 RX ORDER — FENTANYL CITRATE 50 UG/ML
100 INJECTION, SOLUTION INTRAMUSCULAR; INTRAVENOUS ONCE
Status: DISCONTINUED | OUTPATIENT
Start: 2022-01-01 | End: 2022-01-01

## 2022-01-01 RX ORDER — SODIUM CHLORIDE, SODIUM GLUCONATE, SODIUM ACETATE, POTASSIUM CHLORIDE, MAGNESIUM CHLORIDE, SODIUM PHOSPHATE, DIBASIC, AND POTASSIUM PHOSPHATE .53; .5; .37; .037; .03; .012; .00082 G/100ML; G/100ML; G/100ML; G/100ML; G/100ML; G/100ML; G/100ML
1000 INJECTION, SOLUTION INTRAVENOUS ONCE
Status: DISCONTINUED | OUTPATIENT
Start: 2022-01-01 | End: 2022-01-01

## 2022-01-01 RX ORDER — ENOXAPARIN SODIUM 100 MG/ML
40 INJECTION SUBCUTANEOUS
Status: DISCONTINUED | OUTPATIENT
Start: 2022-01-01 | End: 2022-01-01 | Stop reason: HOSPADM

## 2022-01-01 RX ORDER — DEXMEDETOMIDINE HYDROCHLORIDE 4 UG/ML
.1-1.5 INJECTION, SOLUTION INTRAVENOUS
Status: DISCONTINUED | OUTPATIENT
Start: 2022-01-01 | End: 2022-01-01

## 2022-01-01 RX ORDER — CALCIUM GLUCONATE 20 MG/ML
INJECTION, SOLUTION INTRAVENOUS
Status: DISPENSED
Start: 2022-01-01 | End: 2022-01-01

## 2022-01-01 RX ORDER — CHLORHEXIDINE GLUCONATE 0.12 MG/ML
15 RINSE ORAL EVERY 12 HOURS SCHEDULED
Status: DISCONTINUED | OUTPATIENT
Start: 2022-01-01 | End: 2022-01-01 | Stop reason: HOSPADM

## 2022-01-01 RX ORDER — BISACODYL 10 MG
10 SUPPOSITORY, RECTAL RECTAL ONCE
Status: COMPLETED | OUTPATIENT
Start: 2022-01-01 | End: 2022-01-01

## 2022-01-01 RX ORDER — CHLORHEXIDINE GLUCONATE 0.12 MG/ML
15 RINSE ORAL EVERY 12 HOURS SCHEDULED
Status: DISCONTINUED | OUTPATIENT
Start: 2022-01-01 | End: 2022-01-01

## 2022-01-01 RX ORDER — DEXTROSE MONOHYDRATE 25 G/50ML
25 INJECTION, SOLUTION INTRAVENOUS ONCE
Status: DISCONTINUED | OUTPATIENT
Start: 2022-01-01 | End: 2022-01-01

## 2022-01-01 RX ORDER — NITROGLYCERIN 0.4 MG/1
0.4 TABLET SUBLINGUAL
Status: DISCONTINUED | OUTPATIENT
Start: 2022-01-01 | End: 2022-01-01

## 2022-01-01 RX ORDER — MECLIZINE HYDROCHLORIDE 25 MG/1
25 TABLET ORAL 3 TIMES DAILY PRN
Status: DISCONTINUED | OUTPATIENT
Start: 2022-01-01 | End: 2022-01-01

## 2022-01-01 RX ORDER — ACETAMINOPHEN 325 MG/1
650 TABLET ORAL EVERY 6 HOURS PRN
Status: DISCONTINUED | OUTPATIENT
Start: 2022-01-01 | End: 2022-01-01

## 2022-01-01 RX ORDER — METOCLOPRAMIDE HYDROCHLORIDE 5 MG/ML
10 INJECTION INTRAMUSCULAR; INTRAVENOUS EVERY 6 HOURS
Status: COMPLETED | OUTPATIENT
Start: 2022-01-01 | End: 2022-01-01

## 2022-01-01 RX ORDER — VANCOMYCIN HYDROCHLORIDE 1 G/200ML
15 INJECTION, SOLUTION INTRAVENOUS ONCE
Status: DISCONTINUED | OUTPATIENT
Start: 2022-01-01 | End: 2022-01-01

## 2022-01-01 RX ORDER — SODIUM CHLORIDE, SODIUM GLUCONATE, SODIUM ACETATE, POTASSIUM CHLORIDE, MAGNESIUM CHLORIDE, SODIUM PHOSPHATE, DIBASIC, AND POTASSIUM PHOSPHATE .53; .5; .37; .037; .03; .012; .00082 G/100ML; G/100ML; G/100ML; G/100ML; G/100ML; G/100ML; G/100ML
500 INJECTION, SOLUTION INTRAVENOUS ONCE
Status: COMPLETED | OUTPATIENT
Start: 2022-01-01 | End: 2022-01-01

## 2022-01-01 RX ORDER — PROPOFOL 10 MG/ML
INJECTION, EMULSION INTRAVENOUS
Status: COMPLETED
Start: 2022-01-01 | End: 2022-01-01

## 2022-01-01 RX ORDER — ONDANSETRON 2 MG/ML
4 INJECTION INTRAMUSCULAR; INTRAVENOUS EVERY 4 HOURS PRN
Status: DISCONTINUED | OUTPATIENT
Start: 2022-01-01 | End: 2022-01-01

## 2022-01-01 RX ORDER — FUROSEMIDE 10 MG/ML
40 INJECTION INTRAMUSCULAR; INTRAVENOUS ONCE
Status: COMPLETED | OUTPATIENT
Start: 2022-01-01 | End: 2022-01-01

## 2022-01-01 RX ORDER — PANTOPRAZOLE SODIUM 40 MG/1
40 TABLET, DELAYED RELEASE ORAL
Status: DISCONTINUED | OUTPATIENT
Start: 2022-01-01 | End: 2022-01-01

## 2022-01-01 RX ORDER — ATORVASTATIN CALCIUM 40 MG/1
40 TABLET, FILM COATED ORAL
Status: DISCONTINUED | OUTPATIENT
Start: 2022-01-01 | End: 2022-01-01

## 2022-01-01 RX ORDER — POTASSIUM CHLORIDE 14.9 MG/ML
20 INJECTION INTRAVENOUS ONCE
Status: COMPLETED | OUTPATIENT
Start: 2022-01-01 | End: 2022-01-01

## 2022-01-01 RX ORDER — DEXMEDETOMIDINE HYDROCHLORIDE 4 UG/ML
.1-.7 INJECTION, SOLUTION INTRAVENOUS
Status: DISCONTINUED | OUTPATIENT
Start: 2022-01-01 | End: 2022-01-01

## 2022-01-01 RX ORDER — ALBUMIN, HUMAN INJ 5% 5 %
SOLUTION INTRAVENOUS
Status: DISPENSED
Start: 2022-01-01 | End: 2022-01-01

## 2022-01-01 RX ORDER — CHLORPROMAZINE HYDROCHLORIDE 25 MG/ML
25 INJECTION INTRAMUSCULAR EVERY 6 HOURS PRN
Status: DISCONTINUED | OUTPATIENT
Start: 2022-01-01 | End: 2022-01-01

## 2022-01-01 RX ORDER — ACETAMINOPHEN 650 MG/1
650 SUPPOSITORY RECTAL EVERY 6 HOURS PRN
Status: DISCONTINUED | OUTPATIENT
Start: 2022-01-01 | End: 2022-01-01

## 2022-01-01 RX ORDER — LANOLIN ALCOHOL/MO/W.PET/CERES
3 CREAM (GRAM) TOPICAL
Status: DISCONTINUED | OUTPATIENT
Start: 2022-01-01 | End: 2022-01-01 | Stop reason: HOSPADM

## 2022-01-01 RX ORDER — SODIUM CHLORIDE, SODIUM LACTATE, POTASSIUM CHLORIDE, CALCIUM CHLORIDE 600; 310; 30; 20 MG/100ML; MG/100ML; MG/100ML; MG/100ML
125 INJECTION, SOLUTION INTRAVENOUS CONTINUOUS
Status: DISCONTINUED | OUTPATIENT
Start: 2022-01-01 | End: 2022-01-01

## 2022-01-01 RX ADMIN — CHLORHEXIDINE GLUCONATE 15 ML: 1.2 SOLUTION ORAL at 08:02

## 2022-01-01 RX ADMIN — HYDROMORPHONE HYDROCHLORIDE 0.5 MG: 1 INJECTION, SOLUTION INTRAMUSCULAR; INTRAVENOUS; SUBCUTANEOUS at 21:14

## 2022-01-01 RX ADMIN — MAGNESIUM SULFATE IN WATER 2 G: 40 INJECTION, SOLUTION INTRAVENOUS at 07:09

## 2022-01-01 RX ADMIN — ENOXAPARIN SODIUM 40 MG: 40 INJECTION SUBCUTANEOUS at 08:03

## 2022-01-01 RX ADMIN — CHLORHEXIDINE GLUCONATE 15 ML: 1.2 SOLUTION ORAL at 20:02

## 2022-01-01 RX ADMIN — HEPARIN SODIUM 5000 UNITS: 5000 INJECTION INTRAVENOUS; SUBCUTANEOUS at 08:10

## 2022-01-01 RX ADMIN — METRONIDAZOLE 500 MG: 500 INJECTION, SOLUTION INTRAVENOUS at 22:09

## 2022-01-01 RX ADMIN — ALBUTEROL SULFATE 10 MG: 2.5 SOLUTION RESPIRATORY (INHALATION) at 03:03

## 2022-01-01 RX ADMIN — HEPARIN SODIUM 5000 UNITS: 5000 INJECTION INTRAVENOUS; SUBCUTANEOUS at 23:20

## 2022-01-01 RX ADMIN — PANTOPRAZOLE SODIUM 40 MG: 40 INJECTION, POWDER, FOR SOLUTION INTRAVENOUS at 20:52

## 2022-01-01 RX ADMIN — CEFEPIME 1000 MG: 1 INJECTION, POWDER, FOR SOLUTION INTRAMUSCULAR; INTRAVENOUS at 19:30

## 2022-01-01 RX ADMIN — IOHEXOL 85 ML: 350 INJECTION, SOLUTION INTRAVENOUS at 17:15

## 2022-01-01 RX ADMIN — HYDROMORPHONE HYDROCHLORIDE 0.2 MG: 0.2 INJECTION, SOLUTION INTRAMUSCULAR; INTRAVENOUS; SUBCUTANEOUS at 08:56

## 2022-01-01 RX ADMIN — DEXMEDETOMIDINE HYDROCHLORIDE 0.1 MCG/KG/HR: 400 INJECTION INTRAVENOUS at 03:00

## 2022-01-01 RX ADMIN — DEXTROSE MONOHYDRATE 50 ML: 25 INJECTION, SOLUTION INTRAVENOUS at 12:52

## 2022-01-01 RX ADMIN — SODIUM BICARBONATE 100 MEQ: 84 INJECTION INTRAVENOUS at 10:38

## 2022-01-01 RX ADMIN — HEPARIN SODIUM 5000 UNITS: 5000 INJECTION INTRAVENOUS; SUBCUTANEOUS at 22:30

## 2022-01-01 RX ADMIN — HYDROMORPHONE HYDROCHLORIDE 0.5 MG: 1 INJECTION, SOLUTION INTRAMUSCULAR; INTRAVENOUS; SUBCUTANEOUS at 17:31

## 2022-01-01 RX ADMIN — CEFEPIME 1000 MG: 1 INJECTION, POWDER, FOR SOLUTION INTRAMUSCULAR; INTRAVENOUS at 21:20

## 2022-01-01 RX ADMIN — PANTOPRAZOLE SODIUM 40 MG: 40 INJECTION, POWDER, FOR SOLUTION INTRAVENOUS at 08:56

## 2022-01-01 RX ADMIN — PANTOPRAZOLE SODIUM 40 MG: 40 INJECTION, POWDER, FOR SOLUTION INTRAVENOUS at 10:00

## 2022-01-01 RX ADMIN — SODIUM PHOSPHATE, MONOBASIC, MONOHYDRATE 30 MMOL: 276; 142 INJECTION, SOLUTION INTRAVENOUS at 10:00

## 2022-01-01 RX ADMIN — CHLORHEXIDINE GLUCONATE 15 ML: 1.2 SOLUTION ORAL at 20:40

## 2022-01-01 RX ADMIN — SODIUM PHOSPHATE, MONOBASIC, MONOHYDRATE 12 MMOL: 276; 142 INJECTION, SOLUTION INTRAVENOUS at 07:15

## 2022-01-01 RX ADMIN — FENTANYL CITRATE 50 MCG: 50 INJECTION INTRAMUSCULAR; INTRAVENOUS at 13:02

## 2022-01-01 RX ADMIN — HEPARIN SODIUM 500 UNITS/HR: 10000 INJECTION, SOLUTION INTRAVENOUS at 01:47

## 2022-01-01 RX ADMIN — PIPERACILLIN AND TAZOBACTAM 3.38 G: 3; .375 INJECTION, POWDER, FOR SOLUTION INTRAVENOUS at 16:30

## 2022-01-01 RX ADMIN — POTASSIUM PHOSPHATE, MONOBASIC AND POTASSIUM PHOSPHATE, DIBASIC 30 MMOL: 224; 236 INJECTION, SOLUTION, CONCENTRATE INTRAVENOUS at 19:40

## 2022-01-01 RX ADMIN — PIPERACILLIN AND TAZOBACTAM 3.38 G: 36; 4.5 INJECTION, POWDER, FOR SOLUTION INTRAVENOUS at 08:59

## 2022-01-01 RX ADMIN — ACETAMINOPHEN 650 MG: 650 SUPPOSITORY RECTAL at 22:05

## 2022-01-01 RX ADMIN — HYDROMORPHONE HYDROCHLORIDE 0.5 MG: 1 INJECTION, SOLUTION INTRAMUSCULAR; INTRAVENOUS; SUBCUTANEOUS at 01:15

## 2022-01-01 RX ADMIN — HEPARIN SODIUM 5000 UNITS: 5000 INJECTION INTRAVENOUS; SUBCUTANEOUS at 13:01

## 2022-01-01 RX ADMIN — DEXMEDETOMIDINE HYDROCHLORIDE 0.5 MCG/KG/HR: 400 INJECTION INTRAVENOUS at 03:53

## 2022-01-01 RX ADMIN — METRONIDAZOLE 500 MG: 500 INJECTION, SOLUTION INTRAVENOUS at 22:21

## 2022-01-01 RX ADMIN — METOCLOPRAMIDE HYDROCHLORIDE 10 MG: 5 INJECTION INTRAMUSCULAR; INTRAVENOUS at 08:14

## 2022-01-01 RX ADMIN — SODIUM PHOSPHATE, MONOBASIC, MONOHYDRATE 12 MMOL: 276; 142 INJECTION, SOLUTION INTRAVENOUS at 23:38

## 2022-01-01 RX ADMIN — AMIODARONE HYDROCHLORIDE: 50 INJECTION, SOLUTION INTRAVENOUS at 17:39

## 2022-01-01 RX ADMIN — ASCORBIC ACID: 500 INJECTION INTRAVENOUS at 02:20

## 2022-01-01 RX ADMIN — PANTOPRAZOLE SODIUM 40 MG: 40 INJECTION, POWDER, FOR SOLUTION INTRAVENOUS at 21:46

## 2022-01-01 RX ADMIN — PROPOFOL 20 MCG/KG/MIN: 10 INJECTION, EMULSION INTRAVENOUS at 18:20

## 2022-01-01 RX ADMIN — HEPARIN SODIUM 5000 UNITS: 5000 INJECTION INTRAVENOUS; SUBCUTANEOUS at 15:31

## 2022-01-01 RX ADMIN — IOHEXOL 100 ML: 350 INJECTION, SOLUTION INTRAVENOUS at 17:36

## 2022-01-01 RX ADMIN — LEVALBUTEROL HYDROCHLORIDE 0.63 MG: 0.63 SOLUTION RESPIRATORY (INHALATION) at 07:11

## 2022-01-01 RX ADMIN — PANTOPRAZOLE SODIUM 40 MG: 40 INJECTION, POWDER, FOR SOLUTION INTRAVENOUS at 21:33

## 2022-01-01 RX ADMIN — SODIUM BICARBONATE 100 MEQ: 84 INJECTION INTRAVENOUS at 18:05

## 2022-01-01 RX ADMIN — NOREPINEPHRINE BITARTRATE 12 MCG/MIN: 1 INJECTION, SOLUTION, CONCENTRATE INTRAVENOUS at 07:50

## 2022-01-01 RX ADMIN — PIPERACILLIN AND TAZOBACTAM 3.38 G: 3; .375 INJECTION, POWDER, FOR SOLUTION INTRAVENOUS at 16:14

## 2022-01-01 RX ADMIN — HYDROMORPHONE HYDROCHLORIDE 0.5 MG: 1 INJECTION, SOLUTION INTRAMUSCULAR; INTRAVENOUS; SUBCUTANEOUS at 10:28

## 2022-01-01 RX ADMIN — FENTANYL CITRATE 100 MCG/HR: 0.05 INJECTION, SOLUTION INTRAMUSCULAR; INTRAVENOUS at 05:42

## 2022-01-01 RX ADMIN — DEXMEDETOMIDINE HYDROCHLORIDE 0.8 MCG/KG/HR: 400 INJECTION INTRAVENOUS at 01:43

## 2022-01-01 RX ADMIN — ASCORBIC ACID: 500 INJECTION INTRAVENOUS at 20:39

## 2022-01-01 RX ADMIN — PANTOPRAZOLE SODIUM 40 MG: 40 INJECTION, POWDER, FOR SOLUTION INTRAVENOUS at 20:02

## 2022-01-01 RX ADMIN — DEXTROSE MONOHYDRATE 50 ML: 25 INJECTION, SOLUTION INTRAVENOUS at 12:53

## 2022-01-01 RX ADMIN — CALCIUM CHLORIDE 1 G: 100 INJECTION INTRAVENOUS; INTRAVENTRICULAR at 18:27

## 2022-01-01 RX ADMIN — CALCIUM GLUCONATE 3 G: 98 INJECTION, SOLUTION INTRAVENOUS at 07:49

## 2022-01-01 RX ADMIN — NOREPINEPHRINE BITARTRATE 3 MCG/MIN: 1 INJECTION, SOLUTION, CONCENTRATE INTRAVENOUS at 09:36

## 2022-01-01 RX ADMIN — NOREPINEPHRINE BITARTRATE 9 MCG/MIN: 1 INJECTION, SOLUTION, CONCENTRATE INTRAVENOUS at 14:00

## 2022-01-01 RX ADMIN — METOROPROLOL TARTRATE 5 MG: 5 INJECTION, SOLUTION INTRAVENOUS at 03:05

## 2022-01-01 RX ADMIN — METRONIDAZOLE 500 MG: 500 INJECTION, SOLUTION INTRAVENOUS at 05:40

## 2022-01-01 RX ADMIN — PANTOPRAZOLE SODIUM 40 MG: 40 INJECTION, POWDER, FOR SOLUTION INTRAVENOUS at 20:25

## 2022-01-01 RX ADMIN — SODIUM CHLORIDE 1 MG/HR: 9 INJECTION INTRAMUSCULAR; INTRAVENOUS; SUBCUTANEOUS at 05:35

## 2022-01-01 RX ADMIN — ASCORBIC ACID: 500 INJECTION INTRAVENOUS at 21:58

## 2022-01-01 RX ADMIN — NOREPINEPHRINE BITARTRATE 30 MCG/MIN: 1 INJECTION, SOLUTION, CONCENTRATE INTRAVENOUS at 23:15

## 2022-01-01 RX ADMIN — CEFEPIME 2000 MG: 2 INJECTION, POWDER, FOR SOLUTION INTRAVENOUS at 06:34

## 2022-01-01 RX ADMIN — METOROPROLOL TARTRATE 5 MG: 5 INJECTION, SOLUTION INTRAVENOUS at 02:10

## 2022-01-01 RX ADMIN — PANTOPRAZOLE SODIUM 40 MG: 40 INJECTION, POWDER, FOR SOLUTION INTRAVENOUS at 19:45

## 2022-01-01 RX ADMIN — PIPERACILLIN AND TAZOBACTAM 3.38 G: 3; .375 INJECTION, POWDER, FOR SOLUTION INTRAVENOUS at 23:20

## 2022-01-01 RX ADMIN — BISACODYL 10 MG: 10 SUPPOSITORY RECTAL at 16:44

## 2022-01-01 RX ADMIN — PIPERACILLIN AND TAZOBACTAM 3.38 G: 3; .375 INJECTION, POWDER, FOR SOLUTION INTRAVENOUS at 05:32

## 2022-01-01 RX ADMIN — HEPARIN SODIUM 5000 UNITS: 5000 INJECTION INTRAVENOUS; SUBCUTANEOUS at 05:35

## 2022-01-01 RX ADMIN — IPRATROPIUM BROMIDE 0.5 MG: 0.5 SOLUTION RESPIRATORY (INHALATION) at 14:05

## 2022-01-01 RX ADMIN — PANTOPRAZOLE SODIUM 40 MG: 40 INJECTION, POWDER, FOR SOLUTION INTRAVENOUS at 21:16

## 2022-01-01 RX ADMIN — ISODIUM CHLORIDE 3 ML: 0.03 SOLUTION RESPIRATORY (INHALATION) at 18:21

## 2022-01-01 RX ADMIN — ALBUTEROL SULFATE 10 MG: 2.5 SOLUTION RESPIRATORY (INHALATION) at 18:21

## 2022-01-01 RX ADMIN — ASCORBIC ACID: 500 INJECTION INTRAVENOUS at 22:10

## 2022-01-01 RX ADMIN — NOREPINEPHRINE BITARTRATE 3 MCG/MIN: 1 INJECTION, SOLUTION, CONCENTRATE INTRAVENOUS at 02:56

## 2022-01-01 RX ADMIN — PIPERACILLIN AND TAZOBACTAM 3.38 G: 36; 4.5 INJECTION, POWDER, FOR SOLUTION INTRAVENOUS at 00:20

## 2022-01-01 RX ADMIN — SODIUM CHLORIDE, SODIUM GLUCONATE, SODIUM ACETATE, POTASSIUM CHLORIDE, MAGNESIUM CHLORIDE, SODIUM PHOSPHATE, DIBASIC, AND POTASSIUM PHOSPHATE 500 ML: .53; .5; .37; .037; .03; .012; .00082 INJECTION, SOLUTION INTRAVENOUS at 22:10

## 2022-01-01 RX ADMIN — VASOPRESSIN 0.04 UNITS/MIN: 20 INJECTION INTRAVENOUS at 09:26

## 2022-01-01 RX ADMIN — PANTOPRAZOLE SODIUM 40 MG: 40 INJECTION, POWDER, FOR SOLUTION INTRAVENOUS at 08:10

## 2022-01-01 RX ADMIN — HEPARIN SODIUM 5000 UNITS: 5000 INJECTION INTRAVENOUS; SUBCUTANEOUS at 08:20

## 2022-01-01 RX ADMIN — PIPERACILLIN AND TAZOBACTAM 3.38 G: 36; 4.5 INJECTION, POWDER, FOR SOLUTION INTRAVENOUS at 17:35

## 2022-01-01 RX ADMIN — CALCIUM GLUCONATE 1 G: 20 INJECTION, SOLUTION INTRAVENOUS at 07:35

## 2022-01-01 RX ADMIN — OLANZAPINE 5 MG: 5 TABLET, ORALLY DISINTEGRATING ORAL at 22:09

## 2022-01-01 RX ADMIN — PIPERACILLIN AND TAZOBACTAM 3.38 G: 3; .375 INJECTION, POWDER, FOR SOLUTION INTRAVENOUS at 11:56

## 2022-01-01 RX ADMIN — POTASSIUM CHLORIDE 20 MEQ: 14.9 INJECTION, SOLUTION INTRAVENOUS at 03:20

## 2022-01-01 RX ADMIN — PANTOPRAZOLE SODIUM 40 MG: 40 INJECTION, POWDER, FOR SOLUTION INTRAVENOUS at 08:45

## 2022-01-01 RX ADMIN — NOREPINEPHRINE BITARTRATE 24 MCG/MIN: 1 SOLUTION INTRAVENOUS at 13:10

## 2022-01-01 RX ADMIN — SODIUM CHLORIDE 1000 ML: 0.9 INJECTION, SOLUTION INTRAVENOUS at 03:36

## 2022-01-01 RX ADMIN — CHLORHEXIDINE GLUCONATE 15 ML: 1.2 SOLUTION ORAL at 20:30

## 2022-01-01 RX ADMIN — DEXTROSE MONOHYDRATE 50 ML: 25 INJECTION, SOLUTION INTRAVENOUS at 18:37

## 2022-01-01 RX ADMIN — HEPARIN SODIUM 5000 UNITS: 5000 INJECTION INTRAVENOUS; SUBCUTANEOUS at 14:00

## 2022-01-01 RX ADMIN — HEPARIN SODIUM 5000 UNITS: 5000 INJECTION INTRAVENOUS; SUBCUTANEOUS at 05:45

## 2022-01-01 RX ADMIN — PANTOPRAZOLE SODIUM 40 MG: 40 INJECTION, POWDER, FOR SOLUTION INTRAVENOUS at 08:13

## 2022-01-01 RX ADMIN — POTASSIUM PHOSPHATE, MONOBASIC AND POTASSIUM PHOSPHATE, DIBASIC 21 MMOL: 224; 236 INJECTION, SOLUTION, CONCENTRATE INTRAVENOUS at 09:52

## 2022-01-01 RX ADMIN — FENTANYL CITRATE 50 MCG: 50 INJECTION INTRAMUSCULAR; INTRAVENOUS at 16:00

## 2022-01-01 RX ADMIN — CHLORHEXIDINE GLUCONATE 15 ML: 1.2 SOLUTION ORAL at 08:45

## 2022-01-01 RX ADMIN — HEPARIN SODIUM 5000 UNITS: 5000 INJECTION INTRAVENOUS; SUBCUTANEOUS at 15:03

## 2022-01-01 RX ADMIN — METOCLOPRAMIDE HYDROCHLORIDE 10 MG: 5 INJECTION INTRAMUSCULAR; INTRAVENOUS at 16:35

## 2022-01-01 RX ADMIN — ATROPINE SULFATE 0.5 MG: 0.1 INJECTION INTRAVENOUS at 04:45

## 2022-01-01 RX ADMIN — PANTOPRAZOLE SODIUM 40 MG: 40 INJECTION, POWDER, FOR SOLUTION INTRAVENOUS at 20:07

## 2022-01-01 RX ADMIN — SODIUM CHLORIDE, SODIUM GLUCONATE, SODIUM ACETATE, POTASSIUM CHLORIDE, MAGNESIUM CHLORIDE, SODIUM PHOSPHATE, DIBASIC, AND POTASSIUM PHOSPHATE 1000 ML: .53; .5; .37; .037; .03; .012; .00082 INJECTION, SOLUTION INTRAVENOUS at 08:25

## 2022-01-01 RX ADMIN — ENOXAPARIN SODIUM 40 MG: 40 INJECTION SUBCUTANEOUS at 08:13

## 2022-01-01 RX ADMIN — MIDAZOLAM 2 MG: 1 INJECTION INTRAMUSCULAR; INTRAVENOUS at 13:33

## 2022-01-01 RX ADMIN — ASCORBIC ACID: 500 INJECTION INTRAVENOUS at 20:47

## 2022-01-01 RX ADMIN — CALCIUM CHLORIDE 1 G: 100 INJECTION INTRAVENOUS; INTRAVENTRICULAR at 19:08

## 2022-01-01 RX ADMIN — HYDROMORPHONE HYDROCHLORIDE 0.2 MG: 0.2 INJECTION, SOLUTION INTRAMUSCULAR; INTRAVENOUS; SUBCUTANEOUS at 12:15

## 2022-01-01 RX ADMIN — PIPERACILLIN AND TAZOBACTAM 3.38 G: 36; 4.5 INJECTION, POWDER, FOR SOLUTION INTRAVENOUS at 16:31

## 2022-01-01 RX ADMIN — FENTANYL CITRATE 100 MCG/HR: 0.05 INJECTION, SOLUTION INTRAMUSCULAR; INTRAVENOUS at 17:59

## 2022-01-01 RX ADMIN — SODIUM CHLORIDE, SODIUM GLUCONATE, SODIUM ACETATE, POTASSIUM CHLORIDE, MAGNESIUM CHLORIDE, SODIUM PHOSPHATE, DIBASIC, AND POTASSIUM PHOSPHATE 1000 ML: .53; .5; .37; .037; .03; .012; .00082 INJECTION, SOLUTION INTRAVENOUS at 16:14

## 2022-01-01 RX ADMIN — ALBUMIN (HUMAN) 25 G: 0.25 INJECTION, SOLUTION INTRAVENOUS at 19:12

## 2022-01-01 RX ADMIN — CALCIUM GLUCONATE 1 G: 20 INJECTION, SOLUTION INTRAVENOUS at 06:25

## 2022-01-01 RX ADMIN — ONDANSETRON 4 MG: 2 INJECTION INTRAMUSCULAR; INTRAVENOUS at 12:17

## 2022-01-01 RX ADMIN — DEXMEDETOMIDINE HYDROCHLORIDE 0.3 MCG/KG/HR: 400 INJECTION INTRAVENOUS at 12:15

## 2022-01-01 RX ADMIN — PIPERACILLIN AND TAZOBACTAM 3.38 G: 36; 4.5 INJECTION, POWDER, FOR SOLUTION INTRAVENOUS at 01:22

## 2022-01-01 RX ADMIN — FUROSEMIDE 20 MG: 10 INJECTION, SOLUTION INTRAMUSCULAR; INTRAVENOUS at 13:01

## 2022-01-01 RX ADMIN — NOREPINEPHRINE BITARTRATE 10 MCG/MIN: 1 INJECTION, SOLUTION, CONCENTRATE INTRAVENOUS at 03:38

## 2022-01-01 RX ADMIN — HYDROMORPHONE HYDROCHLORIDE 0.5 MG: 1 INJECTION, SOLUTION INTRAMUSCULAR; INTRAVENOUS; SUBCUTANEOUS at 02:10

## 2022-01-01 RX ADMIN — SODIUM CHLORIDE, SODIUM GLUCONATE, SODIUM ACETATE, POTASSIUM CHLORIDE, MAGNESIUM CHLORIDE, SODIUM PHOSPHATE, DIBASIC, AND POTASSIUM PHOSPHATE 125 ML/HR: .53; .5; .37; .037; .03; .012; .00082 INJECTION, SOLUTION INTRAVENOUS at 18:12

## 2022-01-01 RX ADMIN — METRONIDAZOLE 500 MG: 500 INJECTION, SOLUTION INTRAVENOUS at 06:44

## 2022-01-01 RX ADMIN — PANTOPRAZOLE SODIUM 40 MG: 40 INJECTION, POWDER, FOR SOLUTION INTRAVENOUS at 20:40

## 2022-01-01 RX ADMIN — SODIUM CHLORIDE, SODIUM GLUCONATE, SODIUM ACETATE, POTASSIUM CHLORIDE, MAGNESIUM CHLORIDE, SODIUM PHOSPHATE, DIBASIC, AND POTASSIUM PHOSPHATE 1000 ML: .53; .5; .37; .037; .03; .012; .00082 INJECTION, SOLUTION INTRAVENOUS at 07:47

## 2022-01-01 RX ADMIN — SODIUM CHLORIDE 800 ML: 0.9 INJECTION, SOLUTION INTRAVENOUS at 05:31

## 2022-01-01 RX ADMIN — HEPARIN SODIUM 5000 UNITS: 5000 INJECTION INTRAVENOUS; SUBCUTANEOUS at 15:52

## 2022-01-01 RX ADMIN — NOREPINEPHRINE BITARTRATE 7 MCG/MIN: 1 INJECTION, SOLUTION, CONCENTRATE INTRAVENOUS at 18:21

## 2022-01-01 RX ADMIN — PROPOFOL 20 MCG/KG/MIN: 10 INJECTION, EMULSION INTRAVENOUS at 01:50

## 2022-01-01 RX ADMIN — METRONIDAZOLE 500 MG: 500 INJECTION, SOLUTION INTRAVENOUS at 05:20

## 2022-01-01 RX ADMIN — VASOPRESSIN 0.04 UNITS/MIN: 20 INJECTION INTRAVENOUS at 07:48

## 2022-01-01 RX ADMIN — FENTANYL CITRATE 150 MCG/HR: 0.05 INJECTION, SOLUTION INTRAMUSCULAR; INTRAVENOUS at 23:11

## 2022-01-01 RX ADMIN — PIPERACILLIN AND TAZOBACTAM 3.38 G: 36; 4.5 INJECTION, POWDER, FOR SOLUTION INTRAVENOUS at 08:27

## 2022-01-01 RX ADMIN — Medication 10 MG: at 18:21

## 2022-01-01 RX ADMIN — FENTANYL CITRATE 50 MCG: 50 INJECTION INTRAMUSCULAR; INTRAVENOUS at 21:35

## 2022-01-01 RX ADMIN — HYDROMORPHONE HYDROCHLORIDE 0.5 MG: 1 INJECTION, SOLUTION INTRAMUSCULAR; INTRAVENOUS; SUBCUTANEOUS at 04:39

## 2022-01-01 RX ADMIN — HEPARIN SODIUM 5000 UNITS: 5000 INJECTION INTRAVENOUS; SUBCUTANEOUS at 05:43

## 2022-01-01 RX ADMIN — SODIUM CHLORIDE, SODIUM GLUCONATE, SODIUM ACETATE, POTASSIUM CHLORIDE, MAGNESIUM CHLORIDE, SODIUM PHOSPHATE, DIBASIC, AND POTASSIUM PHOSPHATE 250 ML: .53; .5; .37; .037; .03; .012; .00082 INJECTION, SOLUTION INTRAVENOUS at 23:35

## 2022-01-01 RX ADMIN — METOROPROLOL TARTRATE 5 MG: 5 INJECTION, SOLUTION INTRAVENOUS at 20:02

## 2022-01-01 RX ADMIN — PIPERACILLIN AND TAZOBACTAM 3.38 G: 36; 4.5 INJECTION, POWDER, FOR SOLUTION INTRAVENOUS at 00:38

## 2022-01-01 RX ADMIN — HEPARIN SODIUM 5000 UNITS: 5000 INJECTION INTRAVENOUS; SUBCUTANEOUS at 00:38

## 2022-01-01 RX ADMIN — PANTOPRAZOLE SODIUM 40 MG: 40 INJECTION, POWDER, FOR SOLUTION INTRAVENOUS at 08:14

## 2022-01-01 RX ADMIN — IPRATROPIUM BROMIDE 0.5 MG: 0.5 SOLUTION RESPIRATORY (INHALATION) at 07:31

## 2022-01-01 RX ADMIN — HYDROMORPHONE HYDROCHLORIDE 0.5 MG: 1 INJECTION, SOLUTION INTRAMUSCULAR; INTRAVENOUS; SUBCUTANEOUS at 11:04

## 2022-01-01 RX ADMIN — DEXTROSE MONOHYDRATE 50 ML: 25 INJECTION, SOLUTION INTRAVENOUS at 00:00

## 2022-01-01 RX ADMIN — ACETAMINOPHEN 650 MG: 650 SUPPOSITORY RECTAL at 04:26

## 2022-01-01 RX ADMIN — DEXTROSE 150 MG: 50 INJECTION, SOLUTION INTRAVENOUS at 14:31

## 2022-01-01 RX ADMIN — NOREPINEPHRINE BITARTRATE 16 MCG/MIN: 1 SOLUTION INTRAVENOUS at 20:10

## 2022-01-01 RX ADMIN — FENTANYL CITRATE 50 MCG: 50 INJECTION INTRAMUSCULAR; INTRAVENOUS at 03:52

## 2022-01-01 RX ADMIN — SODIUM BICARBONATE 50 MEQ: 84 INJECTION INTRAVENOUS at 18:09

## 2022-01-01 RX ADMIN — FENTANYL CITRATE 50 MCG: 50 INJECTION INTRAMUSCULAR; INTRAVENOUS at 13:59

## 2022-01-01 RX ADMIN — PIPERACILLIN AND TAZOBACTAM 3.38 G: 36; 4.5 INJECTION, POWDER, FOR SOLUTION INTRAVENOUS at 01:26

## 2022-01-01 RX ADMIN — SODIUM CHLORIDE: 9 INJECTION INTRAMUSCULAR; INTRAVENOUS; SUBCUTANEOUS at 14:09

## 2022-01-01 RX ADMIN — FUROSEMIDE 40 MG: 10 INJECTION, SOLUTION INTRAMUSCULAR; INTRAVENOUS at 15:32

## 2022-01-01 RX ADMIN — EPINEPHRINE 5 MCG/MIN: 1 INJECTION INTRAMUSCULAR; INTRAVENOUS; SUBCUTANEOUS at 17:48

## 2022-01-01 RX ADMIN — ASCORBIC ACID: 500 INJECTION INTRAVENOUS at 21:15

## 2022-01-01 RX ADMIN — ASCORBIC ACID: 500 INJECTION INTRAVENOUS at 01:00

## 2022-01-01 RX ADMIN — ALBUMIN (HUMAN) 25 G: 0.25 INJECTION, SOLUTION INTRAVENOUS at 00:15

## 2022-01-01 RX ADMIN — HEPARIN SODIUM 5000 UNITS: 5000 INJECTION INTRAVENOUS; SUBCUTANEOUS at 08:13

## 2022-01-01 RX ADMIN — HEPARIN SODIUM 5000 UNITS: 5000 INJECTION INTRAVENOUS; SUBCUTANEOUS at 23:58

## 2022-01-01 RX ADMIN — SODIUM BICARBONATE 150 ML/HR: 84 INJECTION, SOLUTION INTRAVENOUS at 03:33

## 2022-01-01 RX ADMIN — CHLORHEXIDINE GLUCONATE 15 ML: 1.2 SOLUTION ORAL at 08:30

## 2022-01-01 RX ADMIN — PANTOPRAZOLE SODIUM 40 MG: 40 INJECTION, POWDER, FOR SOLUTION INTRAVENOUS at 21:15

## 2022-01-01 RX ADMIN — FENTANYL CITRATE 50 MCG/HR: 50 INJECTION INTRAVENOUS at 11:47

## 2022-01-01 RX ADMIN — POTASSIUM PHOSPHATE, MONOBASIC AND POTASSIUM PHOSPHATE, DIBASIC 30 MMOL: 224; 236 INJECTION, SOLUTION, CONCENTRATE INTRAVENOUS at 09:15

## 2022-01-01 RX ADMIN — HYDROMORPHONE HYDROCHLORIDE 0.5 MG: 1 INJECTION, SOLUTION INTRAMUSCULAR; INTRAVENOUS; SUBCUTANEOUS at 18:19

## 2022-01-01 RX ADMIN — CHLORHEXIDINE GLUCONATE 15 ML: 1.2 SOLUTION ORAL at 20:39

## 2022-01-01 RX ADMIN — HYDROMORPHONE HYDROCHLORIDE 0.5 MG: 1 INJECTION, SOLUTION INTRAMUSCULAR; INTRAVENOUS; SUBCUTANEOUS at 11:36

## 2022-01-01 RX ADMIN — FENTANYL CITRATE 100 MCG: 50 INJECTION INTRAMUSCULAR; INTRAVENOUS at 12:12

## 2022-01-01 RX ADMIN — FENTANYL CITRATE 100 MCG: 50 INJECTION INTRAMUSCULAR; INTRAVENOUS at 12:46

## 2022-01-01 RX ADMIN — NOREPINEPHRINE BITARTRATE 21 MCG/MIN: 1 SOLUTION INTRAVENOUS at 17:08

## 2022-01-01 RX ADMIN — LORAZEPAM 1 MG: 2 INJECTION INTRAMUSCULAR; INTRAVENOUS at 05:25

## 2022-01-01 RX ADMIN — PIPERACILLIN AND TAZOBACTAM 3.38 G: 3; .375 INJECTION, POWDER, FOR SOLUTION INTRAVENOUS at 09:36

## 2022-01-01 RX ADMIN — CHLORHEXIDINE GLUCONATE 15 ML: 1.2 SOLUTION ORAL at 21:59

## 2022-01-01 RX ADMIN — PIPERACILLIN AND TAZOBACTAM 3.38 G: 3; .375 INJECTION, POWDER, FOR SOLUTION INTRAVENOUS at 04:15

## 2022-01-01 RX ADMIN — SODIUM BICARBONATE 150 ML/HR: 84 INJECTION, SOLUTION INTRAVENOUS at 20:10

## 2022-01-01 RX ADMIN — FENTANYL CITRATE 100 MCG/HR: 0.05 INJECTION, SOLUTION INTRAMUSCULAR; INTRAVENOUS at 20:42

## 2022-01-01 RX ADMIN — PIPERACILLIN AND TAZOBACTAM 3.38 G: 3; .375 INJECTION, POWDER, FOR SOLUTION INTRAVENOUS at 22:40

## 2022-01-01 RX ADMIN — CHLORHEXIDINE GLUCONATE 15 ML: 1.2 SOLUTION ORAL at 08:14

## 2022-01-01 RX ADMIN — PANTOPRAZOLE SODIUM 40 MG: 40 INJECTION, POWDER, FOR SOLUTION INTRAVENOUS at 08:01

## 2022-01-01 RX ADMIN — SODIUM CHLORIDE, SODIUM GLUCONATE, SODIUM ACETATE, POTASSIUM CHLORIDE, MAGNESIUM CHLORIDE, SODIUM PHOSPHATE, DIBASIC, AND POTASSIUM PHOSPHATE 1000 ML: .53; .5; .37; .037; .03; .012; .00082 INJECTION, SOLUTION INTRAVENOUS at 10:42

## 2022-01-01 RX ADMIN — PANTOPRAZOLE SODIUM 40 MG: 40 INJECTION, POWDER, FOR SOLUTION INTRAVENOUS at 08:33

## 2022-01-01 RX ADMIN — SODIUM CHLORIDE, SODIUM GLUCONATE, SODIUM ACETATE, POTASSIUM CHLORIDE AND MAGNESIUM CHLORIDE 1000 ML: 526; 502; 368; 37; 30 INJECTION, SOLUTION INTRAVENOUS at 18:09

## 2022-01-01 RX ADMIN — CHLORHEXIDINE GLUCONATE 15 ML: 1.2 SOLUTION ORAL at 08:56

## 2022-01-01 RX ADMIN — SODIUM CHLORIDE 1 MG/HR: 9 INJECTION INTRAMUSCULAR; INTRAVENOUS; SUBCUTANEOUS at 03:00

## 2022-01-01 RX ADMIN — VASOPRESSIN 0.04 UNITS/MIN: 20 INJECTION INTRAVENOUS at 23:15

## 2022-01-01 RX ADMIN — FENTANYL CITRATE 50 MCG: 50 INJECTION, SOLUTION INTRAMUSCULAR; INTRAVENOUS at 14:00

## 2022-01-01 RX ADMIN — IPRATROPIUM BROMIDE 0.5 MG: 0.5 SOLUTION RESPIRATORY (INHALATION) at 13:10

## 2022-01-01 RX ADMIN — ACETAMINOPHEN 650 MG: 650 SUPPOSITORY RECTAL at 23:42

## 2022-01-01 RX ADMIN — MIDAZOLAM HYDROCHLORIDE 2 MG: 2 INJECTION, SOLUTION INTRAMUSCULAR; INTRAVENOUS at 13:33

## 2022-01-01 RX ADMIN — CHLORHEXIDINE GLUCONATE 15 ML: 1.2 SOLUTION ORAL at 08:33

## 2022-01-01 RX ADMIN — ASCORBIC ACID: 500 INJECTION INTRAVENOUS at 20:40

## 2022-01-01 RX ADMIN — PANTOPRAZOLE SODIUM 40 MG: 40 INJECTION, POWDER, FOR SOLUTION INTRAVENOUS at 12:47

## 2022-01-01 RX ADMIN — HYDROMORPHONE HYDROCHLORIDE 0.2 MG: 0.2 INJECTION, SOLUTION INTRAMUSCULAR; INTRAVENOUS; SUBCUTANEOUS at 03:55

## 2022-01-01 RX ADMIN — HEPARIN SODIUM 5000 UNITS: 5000 INJECTION INTRAVENOUS; SUBCUTANEOUS at 08:01

## 2022-01-01 RX ADMIN — FUROSEMIDE 20 MG: 10 INJECTION, SOLUTION INTRAMUSCULAR; INTRAVENOUS at 12:39

## 2022-01-01 RX ADMIN — SODIUM BICARBONATE 50 MEQ: 84 INJECTION INTRAVENOUS at 07:30

## 2022-01-01 RX ADMIN — NOREPINEPHRINE BITARTRATE 30 MCG/MIN: 1 INJECTION, SOLUTION, CONCENTRATE INTRAVENOUS at 03:13

## 2022-01-01 RX ADMIN — CHLORHEXIDINE GLUCONATE 15 ML: 1.2 SOLUTION ORAL at 08:08

## 2022-01-01 RX ADMIN — ONDANSETRON 4 MG: 2 INJECTION INTRAMUSCULAR; INTRAVENOUS at 23:53

## 2022-01-01 RX ADMIN — OLANZAPINE 5 MG: 5 TABLET, ORALLY DISINTEGRATING ORAL at 22:36

## 2022-01-01 RX ADMIN — FENTANYL CITRATE 50 MCG: 50 INJECTION INTRAMUSCULAR; INTRAVENOUS at 15:03

## 2022-01-01 RX ADMIN — HYDROMORPHONE HYDROCHLORIDE 0.5 MG: 1 INJECTION, SOLUTION INTRAMUSCULAR; INTRAVENOUS; SUBCUTANEOUS at 12:30

## 2022-01-01 RX ADMIN — CHLORHEXIDINE GLUCONATE 15 ML: 1.2 SOLUTION ORAL at 12:47

## 2022-01-01 RX ADMIN — Medication: at 21:55

## 2022-01-01 RX ADMIN — POTASSIUM PHOSPHATE, MONOBASIC AND POTASSIUM PHOSPHATE, DIBASIC 30 MMOL: 224; 236 INJECTION, SOLUTION, CONCENTRATE INTRAVENOUS at 08:17

## 2022-01-01 RX ADMIN — HYDROMORPHONE HYDROCHLORIDE 0.2 MG: 0.2 INJECTION, SOLUTION INTRAMUSCULAR; INTRAVENOUS; SUBCUTANEOUS at 13:16

## 2022-01-01 RX ADMIN — CHLORHEXIDINE GLUCONATE 15 ML: 1.2 SOLUTION ORAL at 20:51

## 2022-01-01 RX ADMIN — SODIUM CHLORIDE, SODIUM GLUCONATE, SODIUM ACETATE, POTASSIUM CHLORIDE, MAGNESIUM CHLORIDE, SODIUM PHOSPHATE, DIBASIC, AND POTASSIUM PHOSPHATE 1000 ML: .53; .5; .37; .037; .03; .012; .00082 INJECTION, SOLUTION INTRAVENOUS at 10:50

## 2022-01-01 RX ADMIN — FUROSEMIDE 20 MG: 10 INJECTION, SOLUTION INTRAMUSCULAR; INTRAVENOUS at 09:52

## 2022-01-01 RX ADMIN — MAGNESIUM SULFATE IN WATER 2 G: 40 INJECTION, SOLUTION INTRAVENOUS at 19:30

## 2022-01-01 RX ADMIN — HEPARIN SODIUM 5000 UNITS: 5000 INJECTION INTRAVENOUS; SUBCUTANEOUS at 05:24

## 2022-01-01 RX ADMIN — Medication 3 MG: at 21:51

## 2022-01-01 RX ADMIN — CALCIUM GLUCONATE 1 G: 20 INJECTION, SOLUTION INTRAVENOUS at 19:40

## 2022-01-01 RX ADMIN — SODIUM CHLORIDE, SODIUM GLUCONATE, SODIUM ACETATE, POTASSIUM CHLORIDE, MAGNESIUM CHLORIDE, SODIUM PHOSPHATE, DIBASIC, AND POTASSIUM PHOSPHATE 125 ML/HR: .53; .5; .37; .037; .03; .012; .00082 INJECTION, SOLUTION INTRAVENOUS at 02:00

## 2022-01-01 RX ADMIN — SODIUM CHLORIDE, SODIUM GLUCONATE, SODIUM ACETATE, POTASSIUM CHLORIDE, MAGNESIUM CHLORIDE, SODIUM PHOSPHATE, DIBASIC, AND POTASSIUM PHOSPHATE 1000 ML: .53; .5; .37; .037; .03; .012; .00082 INJECTION, SOLUTION INTRAVENOUS at 19:48

## 2022-01-01 RX ADMIN — HEPARIN SODIUM 5000 UNITS: 5000 INJECTION INTRAVENOUS; SUBCUTANEOUS at 22:12

## 2022-01-01 RX ADMIN — Medication 20000 ML: at 17:30

## 2022-01-01 RX ADMIN — SODIUM CHLORIDE 1 MG/HR: 9 INJECTION INTRAMUSCULAR; INTRAVENOUS; SUBCUTANEOUS at 02:55

## 2022-01-01 RX ADMIN — FUROSEMIDE 20 MG: 10 INJECTION, SOLUTION INTRAMUSCULAR; INTRAVENOUS at 09:08

## 2022-01-01 RX ADMIN — PANTOPRAZOLE SODIUM 40 MG: 40 INJECTION, POWDER, FOR SOLUTION INTRAVENOUS at 08:24

## 2022-01-01 RX ADMIN — CEFEPIME 1000 MG: 1 INJECTION, POWDER, FOR SOLUTION INTRAMUSCULAR; INTRAVENOUS at 10:14

## 2022-01-01 RX ADMIN — CEFEPIME 2000 MG: 2 INJECTION, POWDER, FOR SOLUTION INTRAVENOUS at 07:46

## 2022-01-01 RX ADMIN — HEPARIN SODIUM 5000 UNITS: 5000 INJECTION INTRAVENOUS; SUBCUTANEOUS at 06:05

## 2022-01-01 RX ADMIN — DEXMEDETOMIDINE HYDROCHLORIDE 0.5 MCG/KG/HR: 400 INJECTION INTRAVENOUS at 11:47

## 2022-01-01 RX ADMIN — PIPERACILLIN AND TAZOBACTAM 3.38 G: 3; .375 INJECTION, POWDER, FOR SOLUTION INTRAVENOUS at 06:05

## 2022-01-01 RX ADMIN — FUROSEMIDE 20 MG: 10 INJECTION, SOLUTION INTRAMUSCULAR; INTRAVENOUS at 11:01

## 2022-01-01 RX ADMIN — NOREPINEPHRINE BITARTRATE 5 MCG/MIN: 1 INJECTION, SOLUTION, CONCENTRATE INTRAVENOUS at 00:11

## 2022-01-01 RX ADMIN — CHLORHEXIDINE GLUCONATE 15 ML: 1.2 SOLUTION ORAL at 08:20

## 2022-01-01 RX ADMIN — CEFEPIME HYDROCHLORIDE 1000 MG: 1 INJECTION, POWDER, FOR SOLUTION INTRAMUSCULAR; INTRAVENOUS at 10:10

## 2022-01-01 RX ADMIN — LEVALBUTEROL HYDROCHLORIDE 0.63 MG: 0.63 SOLUTION RESPIRATORY (INHALATION) at 07:31

## 2022-01-01 RX ADMIN — MICAFUNGIN 100 MG: 10 INJECTION, POWDER, LYOPHILIZED, FOR SOLUTION INTRAVENOUS at 17:40

## 2022-01-01 RX ADMIN — CHLORHEXIDINE GLUCONATE 15 ML: 1.2 SOLUTION ORAL at 08:10

## 2022-01-01 RX ADMIN — PIPERACILLIN AND TAZOBACTAM 3.38 G: 36; 4.5 INJECTION, POWDER, FOR SOLUTION INTRAVENOUS at 14:00

## 2022-01-01 RX ADMIN — ASCORBIC ACID: 500 INJECTION INTRAVENOUS at 21:40

## 2022-01-01 RX ADMIN — ENOXAPARIN SODIUM 40 MG: 40 INJECTION SUBCUTANEOUS at 08:14

## 2022-01-01 RX ADMIN — CALCIUM CHLORIDE 1 G: 100 INJECTION INTRAVENOUS; INTRAVENTRICULAR at 00:16

## 2022-01-01 RX ADMIN — NOREPINEPHRINE BITARTRATE 4 MG: 1 SOLUTION INTRAVENOUS at 09:00

## 2022-01-01 RX ADMIN — DEXMEDETOMIDINE HYDROCHLORIDE 0.4 MCG/KG/HR: 400 INJECTION INTRAVENOUS at 16:59

## 2022-01-01 RX ADMIN — DEXTROSE 150 MG: 50 INJECTION, SOLUTION INTRAVENOUS at 17:13

## 2022-01-01 RX ADMIN — PIPERACILLIN AND TAZOBACTAM 3.38 G: 36; 4.5 INJECTION, POWDER, FOR SOLUTION INTRAVENOUS at 17:13

## 2022-01-01 RX ADMIN — DEXTROSE MONOHYDRATE 25 ML: 25 INJECTION, SOLUTION INTRAVENOUS at 11:15

## 2022-01-01 RX ADMIN — PIPERACILLIN AND TAZOBACTAM 3.38 G: 36; 4.5 INJECTION, POWDER, FOR SOLUTION INTRAVENOUS at 17:45

## 2022-01-01 RX ADMIN — HYDROMORPHONE HYDROCHLORIDE 0.5 MG: 1 INJECTION, SOLUTION INTRAMUSCULAR; INTRAVENOUS; SUBCUTANEOUS at 02:14

## 2022-01-01 RX ADMIN — CHLORHEXIDINE GLUCONATE 15 ML: 1.2 SOLUTION ORAL at 19:45

## 2022-01-01 RX ADMIN — HYDROMORPHONE HYDROCHLORIDE 0.5 MG: 1 INJECTION, SOLUTION INTRAMUSCULAR; INTRAVENOUS; SUBCUTANEOUS at 22:23

## 2022-01-01 RX ADMIN — ENOXAPARIN SODIUM 40 MG: 40 INJECTION SUBCUTANEOUS at 08:01

## 2022-01-01 RX ADMIN — Medication 3 ML: at 18:21

## 2022-01-01 RX ADMIN — VANCOMYCIN HYDROCHLORIDE 1000 MG: 1 INJECTION, SOLUTION INTRAVENOUS at 06:34

## 2022-01-01 RX ADMIN — Medication 12.5 MG: at 11:22

## 2022-01-01 RX ADMIN — METRONIDAZOLE 500 MG: 500 INJECTION, SOLUTION INTRAVENOUS at 22:30

## 2022-01-01 RX ADMIN — LORAZEPAM 1 MG: 2 INJECTION INTRAMUSCULAR; INTRAVENOUS at 23:05

## 2022-01-01 RX ADMIN — CALCIUM GLUCONATE 2 G: 20 INJECTION, SOLUTION INTRAVENOUS at 04:55

## 2022-01-01 RX ADMIN — Medication 1 SPRAY: at 02:35

## 2022-01-01 RX ADMIN — DEXTROSE MONOHYDRATE 50 ML: 25 INJECTION, SOLUTION INTRAVENOUS at 18:28

## 2022-01-01 RX ADMIN — ALBUMIN (HUMAN) 50 G: 0.25 INJECTION, SOLUTION INTRAVENOUS at 07:30

## 2022-01-01 RX ADMIN — FENTANYL CITRATE 50 MCG: 50 INJECTION INTRAMUSCULAR; INTRAVENOUS at 08:56

## 2022-01-01 RX ADMIN — NOREPINEPHRINE BITARTRATE 24 MCG/MIN: 1 SOLUTION INTRAVENOUS at 15:34

## 2022-01-01 RX ADMIN — IPRATROPIUM BROMIDE 0.5 MG: 0.5 SOLUTION RESPIRATORY (INHALATION) at 20:03

## 2022-01-01 RX ADMIN — Medication 100 MEQ: at 18:05

## 2022-01-01 RX ADMIN — INSULIN HUMAN 10 UNITS: 100 INJECTION, SOLUTION PARENTERAL at 11:33

## 2022-01-01 RX ADMIN — MAGNESIUM SULFATE IN WATER 2 G: 40 INJECTION, SOLUTION INTRAVENOUS at 07:39

## 2022-01-01 RX ADMIN — ONDANSETRON 4 MG: 2 INJECTION INTRAMUSCULAR; INTRAVENOUS at 15:31

## 2022-01-01 RX ADMIN — ASCORBIC ACID: 500 INJECTION INTRAVENOUS at 21:43

## 2022-01-01 RX ADMIN — VASOPRESSIN 0.04 UNITS/MIN: 20 INJECTION INTRAVENOUS at 10:52

## 2022-01-01 RX ADMIN — ONDANSETRON 4 MG: 2 INJECTION INTRAMUSCULAR; INTRAVENOUS at 01:15

## 2022-01-01 RX ADMIN — LEVALBUTEROL HYDROCHLORIDE 0.63 MG: 0.63 SOLUTION RESPIRATORY (INHALATION) at 14:05

## 2022-01-01 RX ADMIN — ENOXAPARIN SODIUM 40 MG: 40 INJECTION SUBCUTANEOUS at 12:47

## 2022-01-01 RX ADMIN — ISODIUM CHLORIDE 3 ML: 0.03 SOLUTION RESPIRATORY (INHALATION) at 14:09

## 2022-01-01 RX ADMIN — VASOPRESSIN 0.04 UNITS/MIN: 20 INJECTION INTRAVENOUS at 05:42

## 2022-01-01 RX ADMIN — PANTOPRAZOLE SODIUM 40 MG: 40 INJECTION, POWDER, FOR SOLUTION INTRAVENOUS at 20:39

## 2022-01-01 RX ADMIN — POTASSIUM CHLORIDE 40 MEQ: 29.8 INJECTION, SOLUTION INTRAVENOUS at 07:10

## 2022-01-01 RX ADMIN — PIPERACILLIN AND TAZOBACTAM 3.38 G: 3; .375 INJECTION, POWDER, FOR SOLUTION INTRAVENOUS at 03:35

## 2022-01-01 RX ADMIN — VANCOMYCIN HYDROCHLORIDE 1500 MG: 10 INJECTION, POWDER, LYOPHILIZED, FOR SOLUTION INTRAVENOUS at 12:46

## 2022-01-01 RX ADMIN — CHLORHEXIDINE GLUCONATE 15 ML: 1.2 SOLUTION ORAL at 21:16

## 2022-01-01 RX ADMIN — SODIUM BICARBONATE 100 MEQ: 84 INJECTION INTRAVENOUS at 12:27

## 2022-01-01 RX ADMIN — CEFTRIAXONE SODIUM 2000 MG: 10 INJECTION, POWDER, FOR SOLUTION INTRAVENOUS at 04:06

## 2022-01-01 RX ADMIN — CHLORHEXIDINE GLUCONATE 15 ML: 1.2 SOLUTION ORAL at 21:34

## 2022-01-01 RX ADMIN — SODIUM BICARBONATE 50 MEQ: 84 INJECTION INTRAVENOUS at 00:17

## 2022-01-01 RX ADMIN — DEXTROSE MONOHYDRATE 25 ML: 25 INJECTION, SOLUTION INTRAVENOUS at 02:37

## 2022-01-01 RX ADMIN — LEVALBUTEROL HYDROCHLORIDE 0.63 MG: 0.63 SOLUTION RESPIRATORY (INHALATION) at 13:10

## 2022-01-01 RX ADMIN — FUROSEMIDE 20 MG: 10 INJECTION, SOLUTION INTRAMUSCULAR; INTRAVENOUS at 12:01

## 2022-01-01 RX ADMIN — HYDROMORPHONE HYDROCHLORIDE 0.2 MG: 0.2 INJECTION, SOLUTION INTRAMUSCULAR; INTRAVENOUS; SUBCUTANEOUS at 00:27

## 2022-01-01 RX ADMIN — DEXMEDETOMIDINE HYDROCHLORIDE 0.5 MCG/KG/HR: 400 INJECTION INTRAVENOUS at 00:11

## 2022-01-01 RX ADMIN — FENTANYL CITRATE 100 MCG/HR: 50 INJECTION INTRAVENOUS at 18:26

## 2022-01-01 RX ADMIN — VASOPRESSIN 0.04 UNITS/MIN: 20 INJECTION INTRAVENOUS at 17:07

## 2022-01-01 RX ADMIN — FENTANYL CITRATE 50 MCG: 50 INJECTION INTRAMUSCULAR; INTRAVENOUS at 14:00

## 2022-01-01 RX ADMIN — PIPERACILLIN AND TAZOBACTAM 3.38 G: 36; 4.5 INJECTION, POWDER, FOR SOLUTION INTRAVENOUS at 09:50

## 2022-01-01 RX ADMIN — METRONIDAZOLE 500 MG: 500 INJECTION, SOLUTION INTRAVENOUS at 04:25

## 2022-01-01 RX ADMIN — METOROPROLOL TARTRATE 5 MG: 5 INJECTION, SOLUTION INTRAVENOUS at 08:33

## 2022-01-01 RX ADMIN — PANTOPRAZOLE SODIUM 40 MG: 40 INJECTION, POWDER, FOR SOLUTION INTRAVENOUS at 21:20

## 2022-01-01 RX ADMIN — HEPARIN SODIUM 5000 UNITS: 5000 INJECTION INTRAVENOUS; SUBCUTANEOUS at 00:14

## 2022-01-01 RX ADMIN — METOCLOPRAMIDE HYDROCHLORIDE 10 MG: 5 INJECTION INTRAMUSCULAR; INTRAVENOUS at 20:44

## 2022-01-01 RX ADMIN — INSULIN HUMAN 10 UNITS: 100 INJECTION, SOLUTION PARENTERAL at 18:37

## 2022-01-01 RX ADMIN — CHLORHEXIDINE GLUCONATE 15 ML: 1.2 SOLUTION ORAL at 08:36

## 2022-01-01 RX ADMIN — METRONIDAZOLE 500 MG: 500 INJECTION, SOLUTION INTRAVENOUS at 13:19

## 2022-01-01 RX ADMIN — CEFEPIME HYDROCHLORIDE 1000 MG: 1 INJECTION, POWDER, FOR SOLUTION INTRAMUSCULAR; INTRAVENOUS at 10:09

## 2022-01-01 RX ADMIN — Medication: at 21:50

## 2022-01-01 RX ADMIN — NOREPINEPHRINE BITARTRATE 15 MCG/MIN: 1 INJECTION, SOLUTION, CONCENTRATE INTRAVENOUS at 18:41

## 2022-01-01 RX ADMIN — CEFEPIME 2000 MG: 2 INJECTION, POWDER, FOR SOLUTION INTRAVENOUS at 07:32

## 2022-01-01 RX ADMIN — PIPERACILLIN AND TAZOBACTAM 3.38 G: 36; 4.5 INJECTION, POWDER, FOR SOLUTION INTRAVENOUS at 17:01

## 2022-01-01 RX ADMIN — PIPERACILLIN AND TAZOBACTAM 3.38 G: 36; 4.5 INJECTION, POWDER, FOR SOLUTION INTRAVENOUS at 08:34

## 2022-01-01 RX ADMIN — HYDROMORPHONE HYDROCHLORIDE 0.5 MG: 1 INJECTION, SOLUTION INTRAMUSCULAR; INTRAVENOUS; SUBCUTANEOUS at 02:53

## 2022-01-01 RX ADMIN — PANTOPRAZOLE SODIUM 40 MG: 40 INJECTION, POWDER, FOR SOLUTION INTRAVENOUS at 20:44

## 2022-01-01 RX ADMIN — SODIUM CHLORIDE, SODIUM GLUCONATE, SODIUM ACETATE, POTASSIUM CHLORIDE, MAGNESIUM CHLORIDE, SODIUM PHOSPHATE, DIBASIC, AND POTASSIUM PHOSPHATE 125 ML/HR: .53; .5; .37; .037; .03; .012; .00082 INJECTION, SOLUTION INTRAVENOUS at 02:21

## 2022-01-01 RX ADMIN — ALBUMIN (HUMAN) 50 G: 0.25 INJECTION, SOLUTION INTRAVENOUS at 23:13

## 2022-01-01 RX ADMIN — CHLORHEXIDINE GLUCONATE 15 ML: 1.2 SOLUTION ORAL at 08:24

## 2022-01-01 RX ADMIN — CHLORHEXIDINE GLUCONATE 15 ML: 1.2 SOLUTION ORAL at 20:25

## 2022-01-01 RX ADMIN — CALCIUM GLUCONATE 2 G: 20 INJECTION, SOLUTION INTRAVENOUS at 16:27

## 2022-01-01 RX ADMIN — PANTOPRAZOLE SODIUM 40 MG: 40 INJECTION, POWDER, FOR SOLUTION INTRAVENOUS at 08:03

## 2022-01-01 RX ADMIN — NOREPINEPHRINE BITARTRATE 5 MCG/MIN: 1 SOLUTION INTRAVENOUS at 08:40

## 2022-01-01 RX ADMIN — CHLORPROMAZINE HYDROCHLORIDE 25 MG: 25 INJECTION INTRAMUSCULAR at 10:26

## 2022-01-01 RX ADMIN — POTASSIUM PHOSPHATE, MONOBASIC AND POTASSIUM PHOSPHATE, DIBASIC 12 MMOL: 224; 236 INJECTION, SOLUTION, CONCENTRATE INTRAVENOUS at 01:15

## 2022-01-01 RX ADMIN — ASCORBIC ACID: 500 INJECTION INTRAVENOUS at 20:49

## 2022-01-01 RX ADMIN — METOROPROLOL TARTRATE 5 MG: 5 INJECTION, SOLUTION INTRAVENOUS at 08:30

## 2022-01-01 RX ADMIN — NOREPINEPHRINE BITARTRATE 4 MCG/MIN: 1 INJECTION, SOLUTION, CONCENTRATE INTRAVENOUS at 08:34

## 2022-01-01 RX ADMIN — NOREPINEPHRINE BITARTRATE 5 MCG/MIN: 1 INJECTION, SOLUTION, CONCENTRATE INTRAVENOUS at 20:31

## 2022-01-01 RX ADMIN — SODIUM BICARBONATE 50 MEQ: 84 INJECTION INTRAVENOUS at 03:06

## 2022-01-01 RX ADMIN — HYDROMORPHONE HYDROCHLORIDE 0.5 MG: 1 INJECTION, SOLUTION INTRAMUSCULAR; INTRAVENOUS; SUBCUTANEOUS at 23:24

## 2022-01-01 RX ADMIN — SODIUM CHLORIDE, SODIUM LACTATE, POTASSIUM CHLORIDE, AND CALCIUM CHLORIDE 125 ML/HR: .6; .31; .03; .02 INJECTION, SOLUTION INTRAVENOUS at 09:36

## 2022-01-01 RX ADMIN — HYDROMORPHONE HYDROCHLORIDE 0.5 MG: 1 INJECTION, SOLUTION INTRAMUSCULAR; INTRAVENOUS; SUBCUTANEOUS at 11:23

## 2022-01-01 RX ADMIN — HYDROMORPHONE HYDROCHLORIDE 0.5 MG: 1 INJECTION, SOLUTION INTRAMUSCULAR; INTRAVENOUS; SUBCUTANEOUS at 21:16

## 2022-01-01 RX ADMIN — ALBUMIN (HUMAN) 25 G: 12.5 INJECTION, SOLUTION INTRAVENOUS at 07:27

## 2022-01-01 RX ADMIN — VASOPRESSIN 0.04 UNITS/MIN: 20 INJECTION INTRAVENOUS at 15:32

## 2022-01-01 RX ADMIN — ONDANSETRON 4 MG: 2 INJECTION INTRAMUSCULAR; INTRAVENOUS at 08:09

## 2022-01-01 RX ADMIN — IPRATROPIUM BROMIDE 0.5 MG: 0.5 SOLUTION RESPIRATORY (INHALATION) at 07:11

## 2022-01-01 RX ADMIN — MIDAZOLAM 2 MG: 1 INJECTION INTRAMUSCULAR; INTRAVENOUS at 12:13

## 2022-01-01 RX ADMIN — ACETAMINOPHEN 650 MG: 650 SUPPOSITORY RECTAL at 16:15

## 2022-01-01 RX ADMIN — CALCIUM GLUCONATE 1 G: 20 INJECTION, SOLUTION INTRAVENOUS at 03:05

## 2022-01-01 RX ADMIN — FUROSEMIDE 20 MG: 10 INJECTION, SOLUTION INTRAMUSCULAR; INTRAVENOUS at 18:22

## 2022-01-01 RX ADMIN — METRONIDAZOLE 500 MG: 500 INJECTION, SOLUTION INTRAVENOUS at 13:45

## 2022-01-01 RX ADMIN — METRONIDAZOLE 500 MG: 500 INJECTION, SOLUTION INTRAVENOUS at 05:59

## 2022-01-01 RX ADMIN — PIPERACILLIN AND TAZOBACTAM 3.38 G: 3; .375 INJECTION, POWDER, FOR SOLUTION INTRAVENOUS at 18:14

## 2022-01-01 RX ADMIN — PANTOPRAZOLE SODIUM 40 MG: 40 INJECTION, POWDER, FOR SOLUTION INTRAVENOUS at 08:30

## 2022-01-01 RX ADMIN — SODIUM CHLORIDE, SODIUM GLUCONATE, SODIUM ACETATE, POTASSIUM CHLORIDE, MAGNESIUM CHLORIDE, SODIUM PHOSPHATE, DIBASIC, AND POTASSIUM PHOSPHATE 1000 ML: .53; .5; .37; .037; .03; .012; .00082 INJECTION, SOLUTION INTRAVENOUS at 18:10

## 2022-01-01 RX ADMIN — CALCIUM GLUCONATE 2 G: 20 INJECTION, SOLUTION INTRAVENOUS at 23:40

## 2022-01-01 RX ADMIN — MIDAZOLAM HYDROCHLORIDE 2 MG: 1 INJECTION, SOLUTION INTRAMUSCULAR; INTRAVENOUS at 13:00

## 2022-01-01 RX ADMIN — ASCORBIC ACID: 500 INJECTION INTRAVENOUS at 23:10

## 2022-01-01 RX ADMIN — DEXMEDETOMIDINE HYDROCHLORIDE 0.6 MCG/KG/HR: 400 INJECTION INTRAVENOUS at 17:08

## 2022-01-01 RX ADMIN — SODIUM CHLORIDE, SODIUM GLUCONATE, SODIUM ACETATE, POTASSIUM CHLORIDE, MAGNESIUM CHLORIDE, SODIUM PHOSPHATE, DIBASIC, AND POTASSIUM PHOSPHATE 500 ML: .53; .5; .37; .037; .03; .012; .00082 INJECTION, SOLUTION INTRAVENOUS at 19:45

## 2022-01-01 RX ADMIN — POTASSIUM CHLORIDE 40 MEQ: 29.8 INJECTION, SOLUTION INTRAVENOUS at 07:45

## 2022-01-01 RX ADMIN — HYDROMORPHONE HYDROCHLORIDE 0.2 MG: 0.2 INJECTION, SOLUTION INTRAMUSCULAR; INTRAVENOUS; SUBCUTANEOUS at 18:13

## 2022-01-01 RX ADMIN — CEFEPIME 2000 MG: 2 INJECTION, POWDER, FOR SOLUTION INTRAVENOUS at 18:18

## 2022-01-01 RX ADMIN — HYDROMORPHONE HYDROCHLORIDE 0.5 MG: 1 INJECTION, SOLUTION INTRAMUSCULAR; INTRAVENOUS; SUBCUTANEOUS at 09:51

## 2022-01-01 RX ADMIN — SODIUM CHLORIDE, SODIUM GLUCONATE, SODIUM ACETATE, POTASSIUM CHLORIDE, MAGNESIUM CHLORIDE, SODIUM PHOSPHATE, DIBASIC, AND POTASSIUM PHOSPHATE 125 ML/HR: .53; .5; .37; .037; .03; .012; .00082 INJECTION, SOLUTION INTRAVENOUS at 17:53

## 2022-01-01 RX ADMIN — SODIUM CHLORIDE: 9 INJECTION INTRAMUSCULAR; INTRAVENOUS; SUBCUTANEOUS at 14:11

## 2022-01-01 RX ADMIN — PANTOPRAZOLE SODIUM 40 MG: 40 INJECTION, POWDER, FOR SOLUTION INTRAVENOUS at 08:20

## 2022-01-01 RX ADMIN — SODIUM CHLORIDE, SODIUM GLUCONATE, SODIUM ACETATE, POTASSIUM CHLORIDE, MAGNESIUM CHLORIDE, SODIUM PHOSPHATE, DIBASIC, AND POTASSIUM PHOSPHATE 125 ML/HR: .53; .5; .37; .037; .03; .012; .00082 INJECTION, SOLUTION INTRAVENOUS at 11:55

## 2022-01-01 RX ADMIN — LORAZEPAM 1 MG: 2 INJECTION INTRAMUSCULAR; INTRAVENOUS at 21:21

## 2022-01-01 RX ADMIN — POTASSIUM PHOSPHATE, MONOBASIC AND POTASSIUM PHOSPHATE, DIBASIC 30 MMOL: 224; 236 INJECTION, SOLUTION, CONCENTRATE INTRAVENOUS at 09:10

## 2022-01-01 RX ADMIN — HEPARIN SODIUM 5000 UNITS: 5000 INJECTION INTRAVENOUS; SUBCUTANEOUS at 17:02

## 2022-01-01 RX ADMIN — SODIUM BICARBONATE 50 MEQ: 84 INJECTION INTRAVENOUS at 00:16

## 2022-01-01 RX ADMIN — HYDROMORPHONE HYDROCHLORIDE 0.2 MG: 0.2 INJECTION, SOLUTION INTRAMUSCULAR; INTRAVENOUS; SUBCUTANEOUS at 12:19

## 2022-01-01 RX ADMIN — SODIUM CHLORIDE, SODIUM GLUCONATE, SODIUM ACETATE, POTASSIUM CHLORIDE, MAGNESIUM CHLORIDE, SODIUM PHOSPHATE, DIBASIC, AND POTASSIUM PHOSPHATE 125 ML/HR: .53; .5; .37; .037; .03; .012; .00082 INJECTION, SOLUTION INTRAVENOUS at 20:53

## 2022-01-01 RX ADMIN — METOROPROLOL TARTRATE 5 MG: 5 INJECTION, SOLUTION INTRAVENOUS at 21:33

## 2022-01-01 RX ADMIN — FUROSEMIDE 20 MG: 10 INJECTION, SOLUTION INTRAMUSCULAR; INTRAVENOUS at 13:24

## 2022-01-01 RX ADMIN — FENTANYL CITRATE 50 MCG: 50 INJECTION INTRAMUSCULAR; INTRAVENOUS at 20:05

## 2022-01-01 RX ADMIN — HEPARIN SODIUM 5000 UNITS: 5000 INJECTION INTRAVENOUS; SUBCUTANEOUS at 14:41

## 2022-01-01 RX ADMIN — POTASSIUM CHLORIDE 40 MEQ: 29.8 INJECTION, SOLUTION INTRAVENOUS at 14:49

## 2022-01-01 RX ADMIN — CEFEPIME 2000 MG: 2 INJECTION, POWDER, FOR SOLUTION INTRAVENOUS at 17:56

## 2022-01-01 RX ADMIN — MIDAZOLAM HYDROCHLORIDE 2 MG: 2 INJECTION, SOLUTION INTRAMUSCULAR; INTRAVENOUS at 13:00

## 2022-01-01 RX ADMIN — DEXTROSE MONOHYDRATE 50 ML: 25 INJECTION, SOLUTION INTRAVENOUS at 03:06

## 2022-01-01 RX ADMIN — PANTOPRAZOLE SODIUM 40 MG: 40 INJECTION, POWDER, FOR SOLUTION INTRAVENOUS at 20:35

## 2022-01-01 RX ADMIN — NOREPINEPHRINE BITARTRATE 14 MCG/MIN: 1 INJECTION, SOLUTION, CONCENTRATE INTRAVENOUS at 06:47

## 2022-01-01 RX ADMIN — CHLORHEXIDINE GLUCONATE 15 ML: 1.2 SOLUTION ORAL at 08:03

## 2022-01-01 RX ADMIN — ONDANSETRON 4 MG: 2 INJECTION INTRAMUSCULAR; INTRAVENOUS at 03:36

## 2022-01-01 RX ADMIN — VASOPRESSIN 0.04 UNITS/MIN: 20 INJECTION INTRAVENOUS at 22:36

## 2022-01-01 RX ADMIN — PANTOPRAZOLE SODIUM 40 MG: 40 INJECTION, POWDER, FOR SOLUTION INTRAVENOUS at 08:34

## 2022-01-01 RX ADMIN — HYDROMORPHONE HYDROCHLORIDE 0.5 MG: 1 INJECTION, SOLUTION INTRAMUSCULAR; INTRAVENOUS; SUBCUTANEOUS at 15:09

## 2022-01-01 RX ADMIN — CHLORHEXIDINE GLUCONATE 15 ML: 1.2 SOLUTION ORAL at 21:15

## 2022-01-01 RX ADMIN — LEVALBUTEROL HYDROCHLORIDE 0.63 MG: 0.63 SOLUTION RESPIRATORY (INHALATION) at 20:45

## 2022-01-01 RX ADMIN — HYDROMORPHONE HYDROCHLORIDE 0.5 MG: 1 INJECTION, SOLUTION INTRAMUSCULAR; INTRAVENOUS; SUBCUTANEOUS at 21:51

## 2022-01-01 RX ADMIN — HEPARIN SODIUM 5000 UNITS: 5000 INJECTION INTRAVENOUS; SUBCUTANEOUS at 21:34

## 2022-01-01 RX ADMIN — HYDROMORPHONE HYDROCHLORIDE 0.2 MG: 0.2 INJECTION, SOLUTION INTRAMUSCULAR; INTRAVENOUS; SUBCUTANEOUS at 22:00

## 2022-01-01 RX ADMIN — POTASSIUM PHOSPHATE, MONOBASIC AND POTASSIUM PHOSPHATE, DIBASIC 30 MMOL: 224; 236 INJECTION, SOLUTION, CONCENTRATE INTRAVENOUS at 01:40

## 2022-01-01 RX ADMIN — HYDROMORPHONE HYDROCHLORIDE 0.5 MG: 1 INJECTION, SOLUTION INTRAMUSCULAR; INTRAVENOUS; SUBCUTANEOUS at 17:49

## 2022-01-01 RX ADMIN — METRONIDAZOLE 500 MG: 500 INJECTION, SOLUTION INTRAVENOUS at 12:09

## 2022-01-01 RX ADMIN — POTASSIUM CHLORIDE 40 MEQ: 29.8 INJECTION, SOLUTION INTRAVENOUS at 19:45

## 2022-01-01 RX ADMIN — SODIUM CHLORIDE 1.5 MG/HR: 9 INJECTION INTRAMUSCULAR; INTRAVENOUS; SUBCUTANEOUS at 22:11

## 2022-01-01 RX ADMIN — HEPARIN SODIUM 5000 UNITS: 5000 INJECTION INTRAVENOUS; SUBCUTANEOUS at 21:50

## 2022-01-01 RX ADMIN — METRONIDAZOLE 500 MG: 500 INJECTION, SOLUTION INTRAVENOUS at 22:29

## 2022-01-01 RX ADMIN — CEFEPIME HYDROCHLORIDE 1000 MG: 1 INJECTION, POWDER, FOR SOLUTION INTRAMUSCULAR; INTRAVENOUS at 10:14

## 2022-01-01 RX ADMIN — PANTOPRAZOLE SODIUM 40 MG: 40 INJECTION, POWDER, FOR SOLUTION INTRAVENOUS at 08:08

## 2022-01-01 RX ADMIN — PANTOPRAZOLE SODIUM 40 MG: 40 INJECTION, POWDER, FOR SOLUTION INTRAVENOUS at 21:59

## 2022-01-01 RX ADMIN — PIPERACILLIN AND TAZOBACTAM 3.38 G: 3; .375 INJECTION, POWDER, FOR SOLUTION INTRAVENOUS at 23:58

## 2022-01-01 RX ADMIN — CHLORHEXIDINE GLUCONATE 15 ML: 1.2 SOLUTION ORAL at 08:13

## 2022-01-01 RX ADMIN — HYDROMORPHONE HYDROCHLORIDE 0.5 MG: 1 INJECTION, SOLUTION INTRAMUSCULAR; INTRAVENOUS; SUBCUTANEOUS at 08:31

## 2022-01-01 RX ADMIN — METOROPROLOL TARTRATE 5 MG: 5 INJECTION, SOLUTION INTRAVENOUS at 16:33

## 2022-01-01 RX ADMIN — DEXTROSE 150 MG: 50 INJECTION, SOLUTION INTRAVENOUS at 21:48

## 2022-01-01 RX ADMIN — CHLORHEXIDINE GLUCONATE 15 ML: 1.2 SOLUTION ORAL at 08:01

## 2022-01-01 RX ADMIN — HEPARIN SODIUM 5000 UNITS: 5000 INJECTION INTRAVENOUS; SUBCUTANEOUS at 13:11

## 2022-01-01 RX ADMIN — CALCIUM GLUCONATE 1 G: 20 INJECTION, SOLUTION INTRAVENOUS at 11:15

## 2022-01-01 RX ADMIN — INSULIN HUMAN 10 UNITS: 100 INJECTION, SOLUTION PARENTERAL at 03:06

## 2022-01-01 RX ADMIN — HYDROMORPHONE HYDROCHLORIDE 1 MG: 1 INJECTION, SOLUTION INTRAMUSCULAR; INTRAVENOUS; SUBCUTANEOUS at 08:57

## 2022-01-01 RX ADMIN — HYDROMORPHONE HYDROCHLORIDE 0.5 MG: 1 INJECTION, SOLUTION INTRAMUSCULAR; INTRAVENOUS; SUBCUTANEOUS at 08:15

## 2022-01-01 RX ADMIN — CHLORHEXIDINE GLUCONATE 15 ML: 1.2 SOLUTION ORAL at 20:35

## 2022-01-01 RX ADMIN — NOREPINEPHRINE BITARTRATE 3 MCG/MIN: 1 INJECTION, SOLUTION, CONCENTRATE INTRAVENOUS at 17:54

## 2022-01-01 RX ADMIN — HEPARIN SODIUM 5000 UNITS: 5000 INJECTION INTRAVENOUS; SUBCUTANEOUS at 15:32

## 2022-01-01 RX ADMIN — SODIUM CHLORIDE 1 MG/HR: 9 INJECTION INTRAMUSCULAR; INTRAVENOUS; SUBCUTANEOUS at 06:10

## 2022-01-01 RX ADMIN — METOROPROLOL TARTRATE 5 MG: 5 INJECTION, SOLUTION INTRAVENOUS at 14:41

## 2022-01-01 RX ADMIN — LEVALBUTEROL HYDROCHLORIDE 0.63 MG: 0.63 SOLUTION RESPIRATORY (INHALATION) at 20:03

## 2022-01-01 RX ADMIN — VASOPRESSIN 0.04 UNITS/MIN: 20 INJECTION INTRAVENOUS at 14:26

## 2022-01-01 RX ADMIN — PROPOFOL 35 MCG/KG/MIN: 10 INJECTION, EMULSION INTRAVENOUS at 18:25

## 2022-01-01 RX ADMIN — SODIUM BICARBONATE 200 MEQ: 84 INJECTION INTRAVENOUS at 20:30

## 2022-01-01 RX ADMIN — ALBUTEROL SULFATE 10 MG: 2.5 SOLUTION RESPIRATORY (INHALATION) at 13:57

## 2022-01-01 RX ADMIN — ONDANSETRON 4 MG: 2 INJECTION INTRAMUSCULAR; INTRAVENOUS at 18:30

## 2022-01-01 RX ADMIN — HEPARIN SODIUM 5000 UNITS: 5000 INJECTION INTRAVENOUS; SUBCUTANEOUS at 22:40

## 2022-01-01 RX ADMIN — IPRATROPIUM BROMIDE 0.5 MG: 0.5 SOLUTION RESPIRATORY (INHALATION) at 20:45

## 2022-01-01 RX ADMIN — HYDROMORPHONE HYDROCHLORIDE 0.2 MG: 0.2 INJECTION, SOLUTION INTRAMUSCULAR; INTRAVENOUS; SUBCUTANEOUS at 19:13

## 2022-01-01 RX ADMIN — PIPERACILLIN AND TAZOBACTAM 3.38 G: 3; .375 INJECTION, POWDER, FOR SOLUTION INTRAVENOUS at 22:30

## 2022-01-01 RX ADMIN — ENOXAPARIN SODIUM 40 MG: 40 INJECTION SUBCUTANEOUS at 08:56

## 2022-01-01 RX ADMIN — METOCLOPRAMIDE HYDROCHLORIDE 10 MG: 5 INJECTION INTRAMUSCULAR; INTRAVENOUS at 04:25

## 2022-01-01 RX ADMIN — EPINEPHRINE 8 MCG/MIN: 1 INJECTION INTRAMUSCULAR; INTRAVENOUS; SUBCUTANEOUS at 23:15

## 2022-01-01 RX ADMIN — PANTOPRAZOLE SODIUM 40 MG: 40 INJECTION, POWDER, FOR SOLUTION INTRAVENOUS at 20:30

## 2022-01-01 RX ADMIN — SODIUM CHLORIDE, SODIUM GLUCONATE, SODIUM ACETATE, POTASSIUM CHLORIDE, MAGNESIUM CHLORIDE, SODIUM PHOSPHATE, DIBASIC, AND POTASSIUM PHOSPHATE 500 ML: .53; .5; .37; .037; .03; .012; .00082 INJECTION, SOLUTION INTRAVENOUS at 16:12

## 2022-01-01 RX ADMIN — METOROPROLOL TARTRATE 5 MG: 5 INJECTION, SOLUTION INTRAVENOUS at 13:44

## 2022-01-01 RX ADMIN — OLANZAPINE 5 MG: 5 TABLET, ORALLY DISINTEGRATING ORAL at 20:39

## 2022-01-01 RX ADMIN — FENTANYL CITRATE 100 MCG/HR: 50 INJECTION INTRAVENOUS at 02:20

## 2022-01-01 RX ADMIN — CHLORHEXIDINE GLUCONATE 15 ML: 1.2 SOLUTION ORAL at 20:07

## 2022-01-01 RX ADMIN — DEXMEDETOMIDINE HYDROCHLORIDE 0.8 MCG/KG/HR: 400 INJECTION INTRAVENOUS at 08:34

## 2022-01-01 RX ADMIN — HEPARIN SODIUM 5000 UNITS: 5000 INJECTION INTRAVENOUS; SUBCUTANEOUS at 22:25

## 2022-01-01 RX ADMIN — METRONIDAZOLE 500 MG: 500 INJECTION, SOLUTION INTRAVENOUS at 05:43

## 2022-01-01 RX ADMIN — CHLORHEXIDINE GLUCONATE 15 ML: 1.2 SOLUTION ORAL at 20:44

## 2022-01-01 RX ADMIN — ALBUMIN (HUMAN) 25 G: 0.25 INJECTION, SOLUTION INTRAVENOUS at 23:10

## 2022-01-01 RX ADMIN — DEXMEDETOMIDINE HYDROCHLORIDE 1 MCG/KG/HR: 400 INJECTION INTRAVENOUS at 18:51

## 2022-01-01 RX ADMIN — METOROPROLOL TARTRATE 5 MG: 5 INJECTION, SOLUTION INTRAVENOUS at 14:43

## 2022-01-01 RX ADMIN — SODIUM CHLORIDE 1 MG/HR: 9 INJECTION INTRAMUSCULAR; INTRAVENOUS; SUBCUTANEOUS at 03:35

## 2022-01-01 RX ADMIN — DEXMEDETOMIDINE HYDROCHLORIDE 0.2 MCG/KG/HR: 400 INJECTION INTRAVENOUS at 10:00

## 2022-01-01 RX ADMIN — SODIUM CHLORIDE, SODIUM GLUCONATE, SODIUM ACETATE, POTASSIUM CHLORIDE, MAGNESIUM CHLORIDE, SODIUM PHOSPHATE, DIBASIC, AND POTASSIUM PHOSPHATE 125 ML/HR: .53; .5; .37; .037; .03; .012; .00082 INJECTION, SOLUTION INTRAVENOUS at 10:13

## 2022-01-01 RX ADMIN — HEPARIN SODIUM 5000 UNITS: 5000 INJECTION INTRAVENOUS; SUBCUTANEOUS at 05:19

## 2022-01-01 RX ADMIN — POTASSIUM PHOSPHATE, MONOBASIC AND POTASSIUM PHOSPHATE, DIBASIC 30 MMOL: 224; 236 INJECTION, SOLUTION, CONCENTRATE INTRAVENOUS at 06:05

## 2022-01-01 RX ADMIN — HEPARIN SODIUM 5000 UNITS: 5000 INJECTION INTRAVENOUS; SUBCUTANEOUS at 05:40

## 2022-02-07 ENCOUNTER — RA CDI HCC (OUTPATIENT)
Dept: OTHER | Facility: HOSPITAL | Age: 77
End: 2022-02-07

## 2022-02-07 NOTE — PROGRESS NOTES
[facility]  coding opportunities       Chart reviewed, no opportunity found: CHART REVIEWED, NO OPPORTUNITY FOUND                        Patients insurance company: [employer]

## 2022-02-11 ENCOUNTER — OFFICE VISIT (OUTPATIENT)
Dept: INTERNAL MEDICINE CLINIC | Facility: CLINIC | Age: 77
End: 2022-02-11

## 2022-02-11 VITALS
HEART RATE: 78 BPM | DIASTOLIC BLOOD PRESSURE: 60 MMHG | BODY MASS INDEX: 23.57 KG/M2 | TEMPERATURE: 98 F | WEIGHT: 155 LBS | SYSTOLIC BLOOD PRESSURE: 130 MMHG | OXYGEN SATURATION: 92 %

## 2022-02-11 DIAGNOSIS — I10 ESSENTIAL HYPERTENSION: Primary | ICD-10-CM

## 2022-02-11 DIAGNOSIS — E61.1 IRON DEFICIENCY: ICD-10-CM

## 2022-02-11 PROCEDURE — 99213 OFFICE O/P EST LOW 20 MIN: CPT | Performed by: INTERNAL MEDICINE

## 2022-02-11 RX ORDER — FERROUS SULFATE 325(65) MG
325 TABLET ORAL EVERY OTHER DAY
Qty: 45 TABLET | Refills: 3 | Status: SHIPPED | OUTPATIENT
Start: 2022-02-11 | End: 2022-07-26

## 2022-02-11 NOTE — PROGRESS NOTES
INTERNAL MEDICINE FOLLOW-UP OFFICE VISIT  [facility]  [address] [facility] [place], [address]    NAME: Ya Cain  AGE: 67 y o  SEX: male    DATE OF ENCOUNTER: 2/11/2022    Assessment and Plan     1  Iron deficiency  Patient has not been taking his iron tablets, follow to buy over the counter  Scripts have been sent to his pharmacy, I advised patient to take his ferrous sulfate every other day with vitamin-C or some orange juice and he verbalized understanding  2  Essential hypertension blood pressure 130/60  Will not make any changes to his blood pressure regimen  -  Continue Lasix 20 mg daily, potassium 10 mg daily, lisinopril 5 mg  Daily and Toprol XL 50 mg daily  -  Patient encouraged to check blood pressures at home and bring log to his next appointment  3 Hyperlipidemia  - Continue atorvastatin 40 mg daily     4  Tubular adenoma of colon Per GI, Pt is  due for repeat colonoscopy in 2023     5  Coronary artery disease 2000 s/p CAMACHO  Intermittent Palpitation chronic, s/p 24 hour Holter monitor 12/2020 with unremarkable results except for PVCs, recent lab work been unremarkable for electrolyte abnormalities  Most recent echo 10/09/2019 showed EF 70% with no regional wall abnormality  He denies any chest pain, palpitation , headaches , lightheadedness, nausea, vomiting , numbness tingling sensation of extremities  Patient follows up with Cardiology, he was last seen on 08/2021   Plan  - Continue metoprolol succinate 50 mg daily, aspirin 81 mg daily, atorvastatin 40 mg daily,Toprol XL 50 mg daily,  Lasix 20 mg daily with extra 20 mg p r n   For increased shortness of breath/leg swelling per Cardiology and lisinopril 5 mg daily     No orders of the defined types were placed in this encounter       - Counseling Documentation: patient was counseled regarding: diagnostic results, instructions for management, risk factor reductions, impressions, risks and benefits of treatment options and importance of compliance with treatment    Chief Complaint     Chief Complaint   Patient presents with    Follow-up     10 weeks f/u BP and medical conditions       History of Present Illness     HPI    The following portions of the patient's history were reviewed and updated as appropriate: allergies, current medications, past family history, past medical history, past social history, past surgical history and problem list     Review of Systems     Review of Systems   Constitutional: Negative for chills and fever  HENT: Negative for ear pain and sore throat  Eyes: Negative for pain and visual disturbance  Respiratory: Negative for cough and shortness of breath  Cardiovascular: Negative for chest pain and palpitations  Gastrointestinal: Negative for abdominal pain and vomiting  Genitourinary: Negative for dysuria and hematuria  Musculoskeletal: Negative for arthralgias and back pain  Skin: Negative for color change and rash  Neurological: Negative for seizures and syncope  All other systems reviewed and are negative        Active Problem List     Patient Active Problem List   Diagnosis    CAD (coronary artery disease)    Hypertension    Incisional hernia    History of incisional hernia repair    Bursitis of left shoulder    Serrated adenoma of colon    Primary osteoarthritis of right knee    Gastroesophageal reflux disease    Gastroesophageal reflux disease with esophagitis    Primary osteoarthritis of one hip, left    Tubular adenoma of colon    Abdominal aortic aneurysm (AAA) without rupture ([facility] [address] )    Follow up    Dyslipidemia    Unstable angina (HCC)    Status post cholecystectomy    Ambulatory dysfunction    Surgical wound, non healing    Palpitations    Diastolic dysfunction    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)       Objective     /60 (BP Location: Left arm, Patient Position: Sitting, Cuff Size: Large)   Pulse 78   Temp 98 °F (36 7 °C) (Temporal)   Wt 70 3 kg (155 lb)   SpO2 92%   BMI 23 57 kg/m²     Physical Exam  Vitals and nursing note reviewed  Constitutional:       Appearance: He is well-developed  HENT:      Head: Normocephalic and atraumatic  Nose: Nose normal  No congestion  Mouth/Throat:      Mouth: Mucous membranes are moist       Pharynx: Oropharynx is clear  Eyes:      General: No scleral icterus  Extraocular Movements: Extraocular movements intact  Conjunctiva/sclera: Conjunctivae normal    Cardiovascular:      Rate and Rhythm: Normal rate and regular rhythm  Heart sounds: No murmur heard  Pulmonary:      Effort: Pulmonary effort is normal  No respiratory distress  Breath sounds: Normal breath sounds  Abdominal:      Palpations: Abdomen is soft  Tenderness: There is no abdominal tenderness  Musculoskeletal:         General: No swelling  Normal range of motion  Cervical back: Neck supple  Skin:     General: Skin is warm and dry  Capillary Refill: Capillary refill takes less than 2 seconds  Neurological:      General: No focal deficit present  Mental Status: He is alert and oriented to person, place, and time        Gait: Gait abnormal        Pertinent Laboratory/Diagnostic Studies:  CBC:   Lab Results   Component Value Date/Time    WBC 8 33 08/16/2021 11:58 AM    WBC 8 14 06/18/2015 10:43 AM    RBC 4 89 08/16/2021 11:58 AM    RBC 4 64 06/18/2015 10:43 AM    HGB 12 0 08/16/2021 11:58 AM    HGB 12 7 06/18/2015 10:43 AM    HCT 39 6 08/16/2021 11:58 AM    HCT 38 7 06/18/2015 10:43 AM    MCV 81 (L) 08/16/2021 11:58 AM    MCV 83 06/18/2015 10:43 AM    MCH 24 5 (L) 08/16/2021 11:58 AM    MCH 27 4 06/18/2015 10:43 AM    MCHC 30 3 (L) 08/16/2021 11:58 AM    MCHC 32 8 06/18/2015 10:43 AM    RDW 17 1 (H) 08/16/2021 11:58 AM    RDW 13 5 06/18/2015 10:43 AM    MPV 10 5 08/16/2021 11:58 AM    MPV 8 7 (L) 06/18/2015 10:43 AM     08/16/2021 11:58 AM  06/18/2015 10:43 AM    NRBC 0 08/16/2021 11:58 AM    NEUTOPHILPCT 77 (H) 08/16/2021 11:58 AM    NEUTOPHILPCT 77 (H) 06/18/2015 10:43 AM    LYMPHOPCT 15 08/16/2021 11:58 AM    LYMPHOPCT 15 06/18/2015 10:43 AM    MONOPCT 5 08/16/2021 11:58 AM    MONOPCT 5 06/18/2015 10:43 AM    EOSPCT 2 08/16/2021 11:58 AM    EOSPCT 2 06/18/2015 10:43 AM    BASOPCT 1 08/16/2021 11:58 AM    BASOPCT 1 06/18/2015 10:43 AM    NEUTROABS 6 41 08/16/2021 11:58 AM    NEUTROABS 6 27 06/18/2015 10:43 AM    LYMPHSABS 1 21 08/16/2021 11:58 AM    LYMPHSABS 1 22 06/18/2015 10:43 AM    MONOSABS 0 43 08/16/2021 11:58 AM    MONOSABS 0 41 06/18/2015 10:43 AM    EOSABS 0 16 08/16/2021 11:58 AM    EOSABS 0 16 06/18/2015 10:43 AM     Chemistry Profile:   Lab Results   Component Value Date/Time     12/10/2015 11:43 AM    K 4 3 08/16/2021 11:58 AM    K 3 8 12/10/2015 11:43 AM     (H) 08/16/2021 11:58 AM     12/10/2015 11:43 AM    CO2 24 08/16/2021 11:58 AM    CO2 26 12/10/2015 11:43 AM    ANIONGAP 9 12/10/2015 11:43 AM    BUN 20 08/16/2021 11:58 AM    BUN 16 12/10/2015 11:43 AM    CREATININE 1 09 08/16/2021 11:58 AM    CREATININE 1 08 12/10/2015 11:43 AM    GLUC 98 12/09/2020 11:00 AM    GLUF 96 08/16/2021 11:58 AM    GLUCOSE 93 12/10/2015 11:43 AM    CALCIUM 9 1 08/16/2021 11:58 AM    CALCIUM 9 9 12/10/2015 11:43 AM    CORRECTEDCA 9 7 08/16/2021 11:58 AM    MG 1 8 10/23/2020 12:38 PM    PHOS 2 5 09/30/2020 06:32 AM    AST 16 08/16/2021 11:58 AM    AST 13 06/18/2015 10:43 AM    ALT 20 08/16/2021 11:58 AM    ALT 13 (L) 06/18/2015 10:43 AM    ALKPHOS 107 08/16/2021 11:58 AM    ALKPHOS 74 06/18/2015 10:43 AM    PROT 8 2 06/18/2015 10:43 AM    BILITOT 0 47 06/18/2015 10:43 AM    EGFR 65 08/16/2021 11:58 AM         Current Medications     Current Outpatient Medications:     aspirin 81 MG tablet, Take 81 mg by mouth daily Resume on 3/11/18 , Disp: , Rfl:     atorvastatin (LIPITOR) 40 mg tablet, Take 1 tablet (40 mg total) by mouth daily with dinner Resume next scheduled dose upon discharge from the hospital, Disp: 90 tablet, Rfl: 3    ferrous sulfate 325 (65 Fe) mg tablet, Take 1 tablet (325 mg total) by mouth every other day, Disp: 45 tablet, Rfl: 3    furosemide (LASIX) 20 mg tablet, Take 1 tablet (20 mg total) by mouth daily Can take one additional dose as needed for leg swelling or shortness of breath, Disp: 90 tablet, Rfl: 3    lisinopril (ZESTRIL) 5 mg tablet, Take 1 tablet (5 mg total) by mouth daily, Disp: 90 tablet, Rfl: 5    metoprolol succinate (TOPROL-XL) 50 mg 24 hr tablet, Take 1 tablet (50 mg total) by mouth daily Resume on 3/11/18, Disp: 90 tablet, Rfl: 5    nitroglycerin (NITROSTAT) 0 4 mg SL tablet, Place 1 tablet (0 4 mg total) under the tongue every 5 (five) minutes as needed for chest pain, Disp: 5 tablet, Rfl: 0    omeprazole (PriLOSEC) 40 MG capsule, Take 1 capsule (40 mg total) by mouth daily, Disp: 90 capsule, Rfl: 3    potassium chloride (K-DUR,KLOR-CON) 10 mEq tablet, Take 1 tablet (10 mEq total) by mouth daily, Disp: 90 tablet, Rfl: 5    Health Maintenance     Health Maintenance   Topic Date Due    Medicare Annual Wellness Visit (AWV)  04/08/2020    Colorectal Cancer Screening  05/22/2021    DTaP,Tdap,and Td Vaccines (2 - Td or Tdap) 07/11/2022    Fall Risk  02/11/2023    Depression Screening  02/11/2023    BMI: Adult  02/11/2023    Hepatitis C Screening  Completed    Pneumococcal Vaccine: 65+ Years  Completed    Influenza Vaccine  Completed    COVID-19 Vaccine  Completed    HIB Vaccine  Aged Out    Hepatitis B Vaccine  Aged Out    IPV Vaccine  Aged Out    Hepatitis A Vaccine  Aged Out    Meningococcal ACWY Vaccine  Aged Out    HPV Vaccine  Aged Out     Immunization History   Administered Date(s) Administered    COVID-19 MODERNA VACC 0 5 ML IM 03/26/2021, 04/28/2021, 11/17/2021    INFLUENZA 12/31/2017    Influenza Quadrivalent, 6-35 Months IM 01/31/2017    Influenza, high dose seasonal 0 7 mL 10/19/2018, 09/30/2019, 11/16/2020, 10/05/2021    Influenza, seasonal, injectable 11/25/2013, 10/08/2015    Influenza, seasonal, injectable, preservative free 12/31/2017    Pneumococcal Conjugate 13-Valent 03/04/2016    Pneumococcal Polysaccharide PPV23 08/08/2011    Tdap 07/11/2012    Tuberculin Skin Test-PPD Intradermal 10/05/2020       Chacorta AMAYA    Internal Medicine PGY-3  2/11/2022 2:43 PM

## 2022-02-17 ENCOUNTER — HOSPITAL ENCOUNTER (OUTPATIENT)
Dept: NON INVASIVE DIAGNOSTICS | Facility: CLINIC | Age: 77
Discharge: HOME | End: 2022-02-17
Payer: MEDICARE

## 2022-02-17 DIAGNOSIS — I71.4 ABDOMINAL AORTIC ANEURYSM (AAA) WITHOUT RUPTURE (HCC): ICD-10-CM

## 2022-02-17 PROCEDURE — 93978 VASCULAR STUDY: CPT | Performed by: SURGERY

## 2022-02-17 PROCEDURE — 93978 VASCULAR STUDY: CPT

## 2022-02-17 PROCEDURE — 93923 UPR/LXTR ART STDY 3+ LVLS: CPT

## 2022-02-28 ENCOUNTER — TELEPHONE (OUTPATIENT)
Dept: CARDIOLOGY CLINIC | Facility: CLINIC | Age: 77
End: 2022-02-28

## 2022-02-28 NOTE — TELEPHONE ENCOUNTER
Called [facility] for pt and he needs to call them back with the new RX # for Lasix  They were looking at old script  Verbally understood

## 2022-03-02 NOTE — PROGRESS NOTES
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)  Recommendations  · Recent noninvasive imaging continues to demonstrate patent graft with adequate YO's and great toe pressures  · Patient asymptomatic  · Continue ASA and statin therapy  · Continue to optimize BP control  · Continue routine surveillance with AOIL yearly  · Return to office in 1 year w/AOIL for RFM  · Instructed to contact the office in the interim with new concerns or symptoms  Hypertension  · BP well controlled  · Continue current medication regimen  · Continue lifestyle and diet modifications  · Management per PCP      Dyslipidemia  · Stable  · Continue medical optimization with atorvastatin  · Continue lifestyle and diet modifications  · Management per PCP         Diagnoses and all orders for this visit:    Abdominal aortic aneurysm (AAA) without rupture (HCC)  -     VAS ABDOMINAL AORTA/ILIACS; WITH YO'S; Future    Primary hypertension    Dyslipidemia          Subjective:      Patient ID: Alma Rosa Ochoa is a 67 y o  male  HPI:     67 y o  male with a past medical history significant for HTN, HLD, GERD, CAD, s/p PCI/stent '00 and AAA, s/p yqzfs-fs-cvlfc bypass graft by Dr Jeremy Xie 11/24/09 pressents today to discuss annual surveillance AOIL duplex and risk factor modification         Patient with prior history of 5 4 cm infrarenal AAA status post open repair in 9069 complicated by delayed postoperative wound dehiscence and evisceration requiring emergent exploration and closure 12/9/09  Recent AOIL duplex completed 2/17/22  Patient remains asymptomatic and denies claudication, rest pain, tissue loss, blue toe syndrome, abdominal pain, postprandial abdominal pain or back pain  Patient takes ASA 81 mg and atorvastatin  Pt is a former smoker  Review of Systems   Constitutional: Negative  HENT: Negative  Eyes: Negative  Respiratory: Negative  Cardiovascular: Negative  Gastrointestinal: Negative  Endocrine: Negative  Genitourinary: Negative  Musculoskeletal: Positive for gait problem (holden)  Skin: Negative  Allergic/Immunologic: Negative  Hematological: Negative  Psychiatric/Behavioral: Negative  The following portions of the patient's history were reviewed and updated as appropriate: allergies, current medications, past family history, past medical history, past social history, past surgical history, and problem list     I have reviewed and made appropriate changes to the review of systems input by the medical assistant      Vitals:    03/03/22 0958   BP: 110/60   BP Location: Left arm   Patient Position: Sitting   Cuff Size: Adult   Pulse: 55   Weight: 70 3 kg (155 lb)   Height: 5' 8" (1 727 m)       Patient Active Problem List   Diagnosis    CAD (coronary artery disease)    Hypertension    Incisional hernia    History of incisional hernia repair    Bursitis of left shoulder    Serrated adenoma of colon    Primary osteoarthritis of right knee    Gastroesophageal reflux disease    Gastroesophageal reflux disease with esophagitis    Primary osteoarthritis of one hip, left    Tubular adenoma of colon    Abdominal aortic aneurysm (AAA) without rupture ([facility] [address] )    Follow up    Dyslipidemia    Unstable angina (HCC)    Status post cholecystectomy    Ambulatory dysfunction    Surgical wound, non healing    Palpitations    Diastolic dysfunction    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 [facility])       Past Surgical History:   Procedure Laterality Date    ABDOMINAL AORTIC ANEURYSM REPAIR  2009    aortobi-iliac, dacron graft    APPENDECTOMY      open    CARDIAC SURGERY      CATARACT EXTRACTION Bilateral     CHOLECYSTECTOMY N/A 9/29/2020    Procedure: CHOLECYSTECTOMY;  Surgeon: Ting Smith MD;  [facility];  Service: General    COLONOSCOPY      CORONARY ANGIOPLASTY WITH STENT PLACEMENT      stent 2    CYSTOSCOPY      diagnostic onset nov 13 2014    EXPLORATORY LAPAROTOMY  12/09/2009    EYE SURGERY      FL INJECTION LEFT HIP (NON ARTHROGRAM)  4/2/2019    HIP SURGERY Right     INGUINAL HERNIA REPAIR Right     unilateral x2    ORCHIECTOMY Right     PA COLONOSCOPY FLX DX W/COLLJ SPEC WHEN PFRMD N/A 5/22/2018    Procedure: COLONOSCOPY;  Surgeon: Nicole Shin DO;  [facility]; Service: Gastroenterology    PA ESOPHAGOGASTRODUODENOSCOPY TRANSORAL DIAGNOSTIC N/A 2/14/2019    Procedure: ESOPHAGOGASTRODUODENOSCOPY (EGD); Surgeon: Park Casanova MD;  [facility];   Service: Gastroenterology    PA LAP,DIAGNOSTIC ABDOMEN N/A 9/29/2020    Procedure: LAPAROSCOPIC DIAGNOSTIC,   ;  Surgeon: Ting Smith MD;  [facility];  Service: General    PA REPAIR INCISIONAL HERNIA,REDUCIBLE N/A 2/23/2018    Procedure: lysis of adhesions; INCISIONAL HERNIA REPAIR WITH MESH;  Surgeon: Jones Soto MD;  [facility];  Service: General    UPPER GASTROINTESTINAL ENDOSCOPY         Family History   Problem Relation Age of Onset    Heart disease Mother     Diabetes Mother     Heart disease Father     Diabetes Sister     Cancer Brother        Social History     Socioeconomic History    Marital status:      Spouse name: Not on file    Number of children: Not on file    Years of education: Not on file    Highest education level: Not on file   Occupational History    Not on file   Tobacco Use    Smoking status: Former Smoker    Smokeless tobacco: Never Used    Tobacco comment: quit 20 years ago   Vaping Use    Vaping Use: Never used   Substance and Sexual Activity    Alcohol use: Not Currently     Comment: occasionally    Drug use: No    Sexual activity: Not Currently   Other Topics Concern    Not on file   Social History Narrative    Not on file     Social Determinants of Health     Financial Resource Strain: Low Risk     Difficulty of Paying Living Expenses: Not hard at all   Food Insecurity: No Food Insecurity    Worried About [address] in the Last Year: Never true    [address] [facility] [employer] in the Last Year: Never true   Transportation Needs: No Transportation Needs    Lack of Transportation (Medical): No    Lack of Transportation (Non-Medical):  No   Physical Activity: Inactive    Days of Exercise per Week: 0 days    Minutes of Exercise per Session: 0 min   Stress: No Stress Concern Present    Feeling of Stress : Not at all   Social Connections: Socially Isolated    Frequency of Communication with Friends and Family: More than three times a week    Frequency of Social Gatherings with Friends and Family: Twice a week    Attends Yarsani Services: Never    Active Member of Clubs or Organizations: No    Attends Club or Organization Meetings: Never    Marital Status:    Intimate Partner Violence: Not At Risk    Fear of Current or Ex-Partner: No    Emotionally Abused: No    Physically Abused: No    Sexually Abused: No   Housing Stability: Low Risk     Unable to Pay for Housing in the Last Year: No    Number of [place] in the Last Year: 1    Unstable Housing in the Last Year: No       No Known Allergies      Current Outpatient Medications:     aspirin 81 MG tablet, Take 81 mg by mouth daily Resume on 3/11/18 , Disp: , Rfl:     atorvastatin (LIPITOR) 40 mg tablet, Take 1 tablet (40 mg total) by mouth daily with dinner Resume next scheduled dose upon discharge from the [facility], Disp: 90 tablet, Rfl: 3    ferrous sulfate 325 (65 Fe) mg tablet, Take 1 tablet (325 mg total) by mouth every other day, Disp: 45 tablet, Rfl: 3    furosemide (LASIX) 20 mg tablet, Take 1 tablet (20 mg total) by mouth daily Can take one additional dose as needed for leg swelling or shortness of breath, Disp: 90 tablet, Rfl: 3    lisinopril (ZESTRIL) 5 mg tablet, Take 1 tablet (5 mg total) by mouth daily, Disp: 90 tablet, Rfl: 5    metoprolol succinate (TOPROL-XL) 50 mg 24 hr tablet, Take 1 tablet (50 mg total) by mouth daily Resume on 3/11/18, Disp: 90 tablet, Rfl: 5    nitroglycerin (NITROSTAT) 0 4 mg SL tablet, Place 1 tablet (0 4 mg total) under the tongue every 5 (five) minutes as needed for chest pain, Disp: 5 tablet, Rfl: 0    potassium chloride (K-DUR,KLOR-CON) 10 mEq tablet, Take 1 tablet (10 mEq total) by mouth daily, Disp: 90 tablet, Rfl: 5    omeprazole (PriLOSEC) 40 MG capsule, Take 1 capsule (40 mg total) by mouth daily, Disp: 90 capsule, Rfl: 3      Objective:    /60 (BP Location: Left arm, Patient Position: Sitting, Cuff Size: Adult)   Pulse 55   Ht 5' 8" (1 727 m)   Wt 70 3 kg (155 lb)   BMI 23 57 kg/m²        Physical Exam  Vitals reviewed  Constitutional:       General: He is not in acute distress  Appearance: He is well-developed  HENT:      Head: Normocephalic and atraumatic  Mouth/Throat:      Mouth: Mucous membranes are moist    Eyes:      Extraocular Movements: Extraocular movements intact  Neck:      Vascular: No carotid bruit  Cardiovascular:      Rate and Rhythm: Normal rate and regular rhythm  Pulses: Normal pulses  Carotid pulses are 2+ on the right side and 2+ on the left side  Radial pulses are 2+ on the right side and 2+ on the left side  Femoral pulses are 2+ on the right side and 2+ on the left side  Popliteal pulses are 2+ on the right side and 2+ on the left side  Heart sounds: Normal heart sounds  No murmur heard  No friction rub  No gallop  Comments: Bilateral lower extremities warm, pink, motor and sensory intact, well perfused  Pulmonary:      Effort: Pulmonary effort is normal  No respiratory distress  Breath sounds: Normal breath sounds  No wheezing, rhonchi or rales  Abdominal:      General: Bowel sounds are normal  There is no distension or abdominal bruit  Palpations: Abdomen is soft  There is no mass or pulsatile mass  Tenderness: There is no abdominal tenderness  There is no guarding or rebound  Comments: Midline incisional scar is well-healed   Musculoskeletal:         General: No deformity  Normal range of motion  Cervical back: Normal range of motion  Skin:     General: Skin is warm and dry  Capillary Refill: Capillary refill takes less than 2 seconds  Findings: No erythema, lesion or wound  Neurological:      General: No focal deficit present  Mental Status: He is alert and oriented to person, place, and time  Cranial Nerves: Cranial nerves are intact  Motor: Motor function is intact        Gait: Gait abnormal       Comments: Utilizing SPC with ambulation   Psychiatric:         Mood and Affect: Mood normal          Behavior: Behavior normal              Rafia Garcia [place]  Vascular Surgery  3/3/2022 10:27 AM

## 2022-03-03 ENCOUNTER — OFFICE VISIT (OUTPATIENT)
Dept: VASCULAR SURGERY | Facility: CLINIC | Age: 77
End: 2022-03-03
Payer: MEDICARE

## 2022-03-03 VITALS
HEART RATE: 55 BPM | HEIGHT: 68 IN | SYSTOLIC BLOOD PRESSURE: 110 MMHG | BODY MASS INDEX: 23.49 KG/M2 | WEIGHT: 155 LBS | DIASTOLIC BLOOD PRESSURE: 60 MMHG

## 2022-03-03 DIAGNOSIS — I10 ESSENTIAL HYPERTENSION: ICD-10-CM

## 2022-03-03 DIAGNOSIS — I71.4 ABDOMINAL AORTIC ANEURYSM (AAA) WITHOUT RUPTURE (HCC): Primary | ICD-10-CM

## 2022-03-03 DIAGNOSIS — E78.5 DYSLIPIDEMIA: ICD-10-CM

## 2022-03-03 DIAGNOSIS — I10 PRIMARY HYPERTENSION: ICD-10-CM

## 2022-03-03 PROCEDURE — 99214 OFFICE O/P EST MOD 30 MIN: CPT | Performed by: NURSE PRACTITIONER

## 2022-03-03 NOTE — PATIENT INSTRUCTIONS
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

## 2022-03-03 NOTE — ASSESSMENT & PLAN NOTE
· Stable     · Continue medical optimization with atorvastatin  · Continue lifestyle and diet modifications  · Management per PCP

## 2022-03-03 NOTE — ASSESSMENT & PLAN NOTE
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

## 2022-03-03 NOTE — ASSESSMENT & PLAN NOTE
· BP well controlled      · Continue current medication regimen  · Continue lifestyle and diet modifications  · Management per PCP

## 2022-03-08 RX ORDER — FUROSEMIDE 20 MG/1
20 TABLET ORAL DAILY
Qty: 90 TABLET | Refills: 3 | Status: SHIPPED | OUTPATIENT
Start: 2022-03-08

## 2022-04-04 ENCOUNTER — TELEPHONE (OUTPATIENT)
Dept: INTERNAL MEDICINE CLINIC | Facility: CLINIC | Age: 77
End: 2022-04-04

## 2022-04-04 DIAGNOSIS — T81.89XS NON-HEALING SURGICAL WOUND, SEQUELA: Primary | ICD-10-CM

## 2022-04-04 DIAGNOSIS — B35.1 ONYCHOMYCOSIS: ICD-10-CM

## 2022-04-05 ENCOUNTER — OFFICE VISIT (OUTPATIENT)
Dept: MULTI SPECIALTY CLINIC | Facility: CLINIC | Age: 77
End: 2022-04-05

## 2022-04-05 VITALS
DIASTOLIC BLOOD PRESSURE: 70 MMHG | TEMPERATURE: 98 F | WEIGHT: 153 LBS | BODY MASS INDEX: 23.26 KG/M2 | HEART RATE: 56 BPM | SYSTOLIC BLOOD PRESSURE: 139 MMHG

## 2022-04-05 DIAGNOSIS — I83.93 ASYMPTOMATIC VARICOSE VEINS OF BOTH LOWER EXTREMITIES: ICD-10-CM

## 2022-04-05 DIAGNOSIS — B35.1 ONYCHOMYCOSIS: ICD-10-CM

## 2022-04-05 DIAGNOSIS — L84 CALLUS OF FOOT: ICD-10-CM

## 2022-04-05 DIAGNOSIS — L85.3 XEROSIS CUTIS: Primary | ICD-10-CM

## 2022-04-05 PROCEDURE — 99213 OFFICE O/P EST LOW 20 MIN: CPT | Performed by: PODIATRIST

## 2022-04-05 NOTE — PROGRESS NOTES
Foot Care- Podiatry   Tawanda Lund 67 y o  male MRN: [id number]  Encounter: [id number]      Assessment/Plan        Diagnoses and all orders for this visit:    Xerosis cutis    Asymptomatic varicose veins of both lower extremities    Callus of foot    Onychomycosis           Plan:   Patient was seen/examined  All questions and concerns addressed   Nails x10 were sharply trimmed to normal length and thickness with a large nail nipper without incident (CPT 95154)   Calluses x2 on bilateral feet trimmed to normal epithelium with 10-blade without incident  (CPT 81320)   Educated patient on proper foot care; checking feet every day, wearing white socks, always wearing supportive shoes even in house, daily lotion to prevent cracking and fissures   RTC in 6 month(s)    Dr Chuy Keating was present during this entire procedure  History of Present Illness     HPI:  Tawanda Lund is a 67 y o  male who presents with painful elongated toenails and calluses  States that their nails are painful, elongated and calluses on bilateral feet are painful  They have difficulty applying their socks and shoes  The pressure within their shoe gear is painful and they have been unable to cut their nails adequately  The patient denies any nausea, vomiting, fever, chills, shortness of breath, or chest pains  Review of Systems   Constitutional: Negative  HENT: Negative  Eyes: Negative  Respiratory: Negative  Cardiovascular: Negative  Gastrointestinal: Negative  Musculoskeletal: painful nails and calluses  Skin: elongated thickened toenails, thickened calluses  Neurological: Negative          Historical Information   Past Medical History:   Diagnosis Date    Abdominal aortic aneurysm (AAA) ([facility] [address] )     last assessed nov 6 2015    Abscess of groin     last assessed oct 6 2014    Arthritis     Candidiasis, cutaneous     last assessed march 19 2014    Coronary artery disease     Dyslipidemia     Esophageal ulcer without bleeding     Gastritis     Hiatal hernia     Hx of cataract surgery, left     last assessed july 21 2014    Hyperlipidemia     Hypertension     Old myocardial infarction 2000    Recurrent right inguinal hernia     s/p right orchioectomy, mesh removal, and sinus trac removal patient being followed by surgery next appt 4/28/14 patient s/p keflex x 7 days for MSSA Cx repeat wound cx grew MSSA cx repeat wound cx grew MSSA and other armond (mixed) no MRSA identified conitunue close monitoring and local wound care, last assessed april 15 2014    Renal disorder     Wound of skin     in the groin, right     Past Surgical History:   Procedure Laterality Date    ABDOMINAL AORTIC ANEURYSM REPAIR  2009    aortobi-iliac, dacron graft    APPENDECTOMY      open    CARDIAC SURGERY      CATARACT EXTRACTION Bilateral     CHOLECYSTECTOMY N/A 9/29/2020    Procedure: CHOLECYSTECTOMY;  Surgeon: Familia Reed MD;  [facility];  Service: General    COLONOSCOPY      CORONARY ANGIOPLASTY WITH STENT PLACEMENT      stent 2    CYSTOSCOPY      diagnostic onset nov 13 2014    EXPLORATORY LAPAROTOMY  12/09/2009    EYE SURGERY      FL INJECTION LEFT HIP (NON ARTHROGRAM)  4/2/2019    HIP SURGERY Right     INGUINAL HERNIA REPAIR Right     unilateral x2    ORCHIECTOMY Right     WI COLONOSCOPY FLX DX W/COLLJ SPEC WHEN PFRMD N/A 5/22/2018    Procedure: COLONOSCOPY;  Surgeon: Marilyn Contreras DO;  [facility]; Service: Gastroenterology    WI ESOPHAGOGASTRODUODENOSCOPY TRANSORAL DIAGNOSTIC N/A 2/14/2019    Procedure: ESOPHAGOGASTRODUODENOSCOPY (EGD); Surgeon: Larissa Marcos MD;  [facility];   Service: Gastroenterology    WI LAP,DIAGNOSTIC ABDOMEN N/A 9/29/2020    Procedure: LAPAROSCOPIC DIAGNOSTIC,   ;  Surgeon: Familia Reed MD;  [facility];  Service: General    [address] [place] [address] N/A 2/23/2018    Procedure: lysis of adhesions; [address] [facility] [address] WITH MESH;  Surgeon: Deni Meyers MD;  [facility];  Service: General    UPPER GASTROINTESTINAL ENDOSCOPY       Social History   Social History     Substance and Sexual Activity   Alcohol Use Not Currently    Comment: occasionally     Social History     Substance and Sexual Activity   Drug Use No     Social History     Tobacco Use   Smoking Status Former Smoker   Smokeless Tobacco Never Used   Tobacco Comment    quit 20 years ago     Family History:   Family History   Problem Relation Age of Onset    Heart disease Mother     Diabetes Mother     Heart disease Father     Diabetes Sister     Cancer Brother        Meds/Allergies   (Not in a hospital admission)    No Known Allergies    Objective     Current Vitals:   Blood Pressure: 139/70 (04/05/22 1030)  Pulse: 56 (04/05/22 1030)  Temperature: 98 °F (36 7 °C) (04/05/22 1030)  Temp Source: Temporal (04/05/22 1030)  Weight - Scale: 69 4 kg (153 lb) (Patient cheked bp at home) (04/05/22 1030)        /70 (BP Location: Right arm, Patient Position: Sitting, Cuff Size: Standard)   Pulse 56   Temp 98 °F (36 7 °C) (Temporal)   Wt 69 4 kg (153 lb) Comment: Patient cheked bp at home  BMI 23 26 kg/m²       Lower Extremity Exam:    Vascular: Right foot DP/PT +2                   Left foot DP/PT +2                   There is no lower extremity edema bilateral       Musculoskeletal: There is 5/5 strength throughout the bilateral lower extremity  limited ankle range of motion with well maintained subtalar range of motion  There is mild tenderness over the toenails and calluses  There is foot deformities: limb length discrepency     Neurological: Sensation to 5 07 Thornton-Cathie nylon filament: intact bilaterally      Vibratory sense to distal Foot  intact bilaterally      Sharp/Dull sense is intact bilaterally      Dermatology: Skin Condition:  normal     There is not evidence of macerated tissue between toe spaces  Nail Exam: onychomycosis of the toenails  Open ulcerations: No     Calluses:  Yes

## 2022-07-12 ENCOUNTER — APPOINTMENT (EMERGENCY)
Dept: RADIOLOGY | Facility: HOSPITAL | Age: 77
DRG: 446 | End: 2022-07-12
Payer: MEDICARE

## 2022-07-12 ENCOUNTER — HOSPITAL ENCOUNTER (INPATIENT)
Facility: HOSPITAL | Age: 77
LOS: 2 days | Discharge: HOME | DRG: 446 | End: 2022-07-14
Attending: EMERGENCY MEDICINE | Admitting: SURGERY
Payer: MEDICARE

## 2022-07-12 DIAGNOSIS — K80.50 CHOLEDOCHOLITHIASIS: Primary | ICD-10-CM

## 2022-07-12 LAB
2HR DELTA HS TROPONIN: 0 NG/L
4HR DELTA HS TROPONIN: 1 NG/L
ALBUMIN SERPL BCP-MCNC: 2.8 G/DL (ref 3.5–5)
ALP SERPL-CCNC: 548 U/L (ref 46–116)
ALT SERPL W P-5'-P-CCNC: 55 U/L (ref 12–78)
ANION GAP SERPL CALCULATED.3IONS-SCNC: 6 MMOL/L (ref 4–13)
AST SERPL W P-5'-P-CCNC: 65 U/L (ref 5–45)
BASOPHILS # BLD AUTO: 0.04 THOUSANDS/ΜL (ref 0–0.1)
BASOPHILS NFR BLD AUTO: 1 % (ref 0–1)
BILIRUB SERPL-MCNC: 1.72 MG/DL (ref 0.2–1)
BUN SERPL-MCNC: 22 MG/DL (ref 5–25)
CALCIUM ALBUM COR SERPL-MCNC: 10.5 MG/DL (ref 8.3–10.1)
CALCIUM SERPL-MCNC: 9.5 MG/DL (ref 8.3–10.1)
CARDIAC TROPONIN I PNL SERPL HS: 10 NG/L
CARDIAC TROPONIN I PNL SERPL HS: 9 NG/L
CARDIAC TROPONIN I PNL SERPL HS: 9 NG/L
CHLORIDE SERPL-SCNC: 111 MMOL/L (ref 100–108)
CO2 SERPL-SCNC: 23 MMOL/L (ref 21–32)
CREAT SERPL-MCNC: 1.22 MG/DL (ref 0.6–1.3)
EOSINOPHIL # BLD AUTO: 0.02 THOUSAND/ΜL (ref 0–0.61)
EOSINOPHIL NFR BLD AUTO: 0 % (ref 0–6)
ERYTHROCYTE [DISTWIDTH] IN BLOOD BY AUTOMATED COUNT: 15.7 % (ref 11.6–15.1)
GFR SERPL CREATININE-BSD FRML MDRD: 56 ML/MIN/1.73SQ M
GLUCOSE SERPL-MCNC: 103 MG/DL (ref 65–140)
HCT VFR BLD AUTO: 49.2 % (ref 36.5–49.3)
HGB BLD-MCNC: 15.8 G/DL (ref 12–17)
IMM GRANULOCYTES # BLD AUTO: 0.03 THOUSAND/UL (ref 0–0.2)
IMM GRANULOCYTES NFR BLD AUTO: 0 % (ref 0–2)
LIPASE SERPL-CCNC: 141 U/L (ref 73–393)
LYMPHOCYTES # BLD AUTO: 0.73 THOUSANDS/ΜL (ref 0.6–4.47)
LYMPHOCYTES NFR BLD AUTO: 9 % (ref 14–44)
MCH RBC QN AUTO: 28.5 PG (ref 26.8–34.3)
MCHC RBC AUTO-ENTMCNC: 32.1 G/DL (ref 31.4–37.4)
MCV RBC AUTO: 89 FL (ref 82–98)
MONOCYTES # BLD AUTO: 0.43 THOUSAND/ΜL (ref 0.17–1.22)
MONOCYTES NFR BLD AUTO: 6 % (ref 4–12)
NEUTROPHILS # BLD AUTO: 6.58 THOUSANDS/ΜL (ref 1.85–7.62)
NEUTS SEG NFR BLD AUTO: 84 % (ref 43–75)
NRBC BLD AUTO-RTO: 0 /100 WBCS
PLATELET # BLD AUTO: 266 THOUSANDS/UL (ref 149–390)
PMV BLD AUTO: 9.2 FL (ref 8.9–12.7)
POTASSIUM SERPL-SCNC: 3.8 MMOL/L (ref 3.5–5.3)
PROT SERPL-MCNC: 9.1 G/DL (ref 6.4–8.2)
RBC # BLD AUTO: 5.55 MILLION/UL (ref 3.88–5.62)
SODIUM SERPL-SCNC: 140 MMOL/L (ref 136–145)
WBC # BLD AUTO: 7.83 THOUSAND/UL (ref 4.31–10.16)

## 2022-07-12 PROCEDURE — 99285 EMERGENCY DEPT VISIT HI MDM: CPT | Performed by: EMERGENCY MEDICINE

## 2022-07-12 PROCEDURE — 96361 HYDRATE IV INFUSION ADD-ON: CPT

## 2022-07-12 PROCEDURE — 96360 HYDRATION IV INFUSION INIT: CPT

## 2022-07-12 PROCEDURE — 83690 ASSAY OF LIPASE: CPT

## 2022-07-12 PROCEDURE — 80053 COMPREHEN METABOLIC PANEL: CPT

## 2022-07-12 PROCEDURE — 84484 ASSAY OF TROPONIN QUANT: CPT

## 2022-07-12 PROCEDURE — G1004 CDSM NDSC: HCPCS

## 2022-07-12 PROCEDURE — 99222 1ST HOSP IP/OBS MODERATE 55: CPT | Performed by: SURGERY

## 2022-07-12 PROCEDURE — 99285 EMERGENCY DEPT VISIT HI MDM: CPT

## 2022-07-12 PROCEDURE — 1124F ACP DISCUSS-NO DSCNMKR DOCD: CPT | Performed by: EMERGENCY MEDICINE

## 2022-07-12 PROCEDURE — 74176 CT ABD & PELVIS W/O CONTRAST: CPT

## 2022-07-12 PROCEDURE — 36415 COLL VENOUS BLD VENIPUNCTURE: CPT

## 2022-07-12 PROCEDURE — 85025 COMPLETE CBC W/AUTO DIFF WBC: CPT

## 2022-07-12 RX ORDER — SODIUM CHLORIDE, SODIUM LACTATE, POTASSIUM CHLORIDE, CALCIUM CHLORIDE 600; 310; 30; 20 MG/100ML; MG/100ML; MG/100ML; MG/100ML
100 INJECTION, SOLUTION INTRAVENOUS CONTINUOUS
Status: DISCONTINUED | OUTPATIENT
Start: 2022-07-12 | End: 2022-07-14

## 2022-07-12 RX ORDER — OXYCODONE HYDROCHLORIDE 5 MG/1
2.5 TABLET ORAL EVERY 6 HOURS PRN
Status: DISCONTINUED | OUTPATIENT
Start: 2022-07-12 | End: 2022-07-14 | Stop reason: HOSPADM

## 2022-07-12 RX ORDER — PANTOPRAZOLE SODIUM 40 MG/1
40 TABLET, DELAYED RELEASE ORAL
Refills: 3 | Status: DISCONTINUED | OUTPATIENT
Start: 2022-07-13 | End: 2022-07-14 | Stop reason: HOSPADM

## 2022-07-12 RX ORDER — ONDANSETRON 2 MG/ML
4 INJECTION INTRAMUSCULAR; INTRAVENOUS EVERY 4 HOURS PRN
Status: DISCONTINUED | OUTPATIENT
Start: 2022-07-12 | End: 2022-07-14 | Stop reason: HOSPADM

## 2022-07-12 RX ORDER — HEPARIN SODIUM 5000 [USP'U]/ML
5000 INJECTION, SOLUTION INTRAVENOUS; SUBCUTANEOUS EVERY 8 HOURS SCHEDULED
Status: DISCONTINUED | OUTPATIENT
Start: 2022-07-12 | End: 2022-07-14 | Stop reason: HOSPADM

## 2022-07-12 RX ORDER — ATORVASTATIN CALCIUM 40 MG/1
40 TABLET, FILM COATED ORAL
Status: DISCONTINUED | OUTPATIENT
Start: 2022-07-12 | End: 2022-07-14 | Stop reason: HOSPADM

## 2022-07-12 RX ORDER — OXYCODONE HYDROCHLORIDE 5 MG/1
5 TABLET ORAL EVERY 6 HOURS PRN
Status: DISCONTINUED | OUTPATIENT
Start: 2022-07-12 | End: 2022-07-14 | Stop reason: HOSPADM

## 2022-07-12 RX ORDER — METOPROLOL SUCCINATE 50 MG/1
50 TABLET, EXTENDED RELEASE ORAL DAILY
Status: DISCONTINUED | OUTPATIENT
Start: 2022-07-13 | End: 2022-07-14 | Stop reason: HOSPADM

## 2022-07-12 RX ORDER — ACETAMINOPHEN 325 MG/1
975 TABLET ORAL EVERY 8 HOURS SCHEDULED
Status: DISCONTINUED | OUTPATIENT
Start: 2022-07-12 | End: 2022-07-14 | Stop reason: HOSPADM

## 2022-07-12 RX ADMIN — SODIUM CHLORIDE, SODIUM LACTATE, POTASSIUM CHLORIDE, AND CALCIUM CHLORIDE 100 ML/HR: .6; .31; .03; .02 INJECTION, SOLUTION INTRAVENOUS at 18:28

## 2022-07-12 RX ADMIN — ATORVASTATIN CALCIUM 40 MG: 40 TABLET, FILM COATED ORAL at 20:08

## 2022-07-12 RX ADMIN — SODIUM CHLORIDE 1000 ML: 0.9 INJECTION, SOLUTION INTRAVENOUS at 13:48

## 2022-07-12 NOTE — ED PROCEDURE NOTE
Procedure  POC Biliary US    Date/Time: 7/12/2022 4:12 PM  Performed by: Irvine Landau, DO  Authorized by: Irvine Landau, DO     Patient location:  ED  Performed by:  Resident  Other Assisting Provider: No    Procedure details:     Exam Type:  Educational    Indications: upper right quadrant abdominal pain, nausea, abnormal total bilirubin and elevated hepatic transaminases      Scope of abdominal ultrasound: retained stones, dilated CBD  Views obtained: liver and common bile duct      Image quality: limited diagnostic      Image availability:  Images available in PACS  Comments:      Patient s/p cholecystectomy  Structure noted in the RUQ within the gallbladder fossa with hyperechoic structures which could be retained stones  Structure traced down and noted to not have blood flow, 11mm at the distal end  If this is the CBD, would be dilated and then concern for possible choledocholithiasis                        Irvine Landau, DO  07/12/22 3814

## 2022-07-12 NOTE — ED NOTES
Pt requesting to have loudspeaker turned off as he is trying to sleep, advised pt unable to turn off announcements through speaker  Offered to close door and dim lights for pt, pt declined at this time        Angel Philippe RN  07/12/22 3013

## 2022-07-12 NOTE — ED ATTENDING ATTESTATION
Alessio Cabral MD, saw and evaluated the patient  I have discussed the patient with the resident and agree with the resident's findings, Plan of Care, and MDM as documented in the resident's note, except where noted  All available labs and Radiology studies were reviewed  I was present for key portions of any procedure(s) performed by the resident and I was immediately available to provide assistance  At this point I agree with the current assessment done in the Emergency Department  I have conducted an independent evaluation of this patient a history and physical is as follows:    68 yo male with a complicated past medical history including CAD, HTN, GERD, AAA repair, and dyslipidemia presents to the ED with abdominal pain, nausea and vomiting x 1 day  The patient reports a poorly localized abdominal "cramping" pain  (+) Nausea and multiple episodes of NBNB vomiting  (+) 3 episodes of loose, watery nonbloody diarrhea today  (+) Secondary complaint of dizziness when he attempts to stand  No falls or syncopal events  The patient denies chest pain, shortness of breath, and fevers/chills  No dysuria/hematuria  He reports 2 similar episodes over the past 2 weeks --> both resolved spontaneously  No other specific complaints  ROS: per resident physician note    Gen: NAD, AA&Ox3  HEENT: PERRL, EOMI  Neck: supple  CV: RRR  Lungs: CTA B/L  Abdomen: soft, (+) diffuse abdominal tenderness, no rebound or guarding  Ext: no swelling or deformity  Neuro: 5/5 strength all extremities, sensation grossly intact  Skin: no rash    ED Course  The patient is comfortable appearing with stable vital signs  Exam is only significant for some mild generalized abdominal tenderness  Will check EKG, basic labs, troponin, lipase, and CT A/P  Will continue to monitor in the ED  Disposition per results and reassessment  CT IMPRESSION:     1  Choledocholithiasis with mild CBD dilatation      2   Cholelithiasis without evidence for cholecystitis      3  Mild splenomegaly with a small amount of perisplenic ascites      4  Colonic diverticulosis without evidence of diverticulitis  Question mild circumferential rectal wall thickening versus underdistention  Correlate with physical exam     18:25 CT as above  Case discussed with both GI and Surgery  Plan to admit to [facility] for ERCP in the morning  Inpatient bed requested      Critical Care Time  Procedures

## 2022-07-12 NOTE — H&P
Acute Care Surgery  History and Physical  Barry Jack 67 y o  male MRN: [id number]  Unit/Bed#: ED 17 Encounter: [id number]    Assessment and Plan:  Barry Jack is a 67 y o  male w/PMHx open subtotal cholecystectomy (9/29/2020) who presents with 1day h/o n/v/dizziness/diarrhea/abdominal pain with imaging c/f choledocholithiasis w/1cm dilated CBD     Plan:  Admit surgery service  GI consult for ERCP  CLD, NPOmn  IVF  Pain and nausea control PRN  SQH      History of Present Illness     HPI:  Barry Jack is a 67 y o  male w/PMHx HTN, HLD, GERD, CAD, MI s/p PCI w/stent, who presents with 1day h/o n/v/dizziness/diarrhea/abdominal pain  He states this morning he began feeling dizzy and had an episode of nonbilious, nonbloody emesis and nonbloody liquid diarrhea  He states a similar episode occurred a week ago  He additionally endorses abdominal pain which is localized to his lower abdomin  Denies fever or chills  Surgical history is significant for open subtotal cholecystectomy w/necrotic gallbladder w/dense adhesions, AAA s/p open repair w/[id number] bypass graft, incisional hernia repair w/mesh, and appendectomy  Abdomen is soft, tender to palpation infraumbilical  Labs significant for tbil 1 72, , AST 65, ALT 55  Imaging revealing dilated, saccular cystic structure in the gallbladder fossa resembling the gallbladder with stones present  No surgical clips are seen    could represent markedly dilated cystic duct remnant with retained stones; choledocholithiasis w/CBD dilatation, splenomegaly w/perisplenic ascites, diverticulosis w/questionable mild circumferential rectal wall thickening vs underdistention  Review of Systems   Constitutional: Negative  HENT: Negative  Eyes: Negative  Respiratory: Negative  Cardiovascular: Negative  Gastrointestinal: Positive for abdominal pain, diarrhea, nausea and vomiting  Endocrine: Negative  Genitourinary: Negative      Musculoskeletal: Negative  Skin: Negative  Allergic/Immunologic: Negative  Neurological: Positive for dizziness  Hematological: Negative  Psychiatric/Behavioral: Negative  All other systems reviewed and are negative        Historical Information   Past Medical History:   Diagnosis Date    Abdominal aortic aneurysm (AAA) ([address] )     last assessed nov 6 2015    Abscess of groin     last assessed oct 6 2014    Arthritis     Candidiasis, cutaneous     last assessed march 19 2014    Coronary artery disease     Dyslipidemia     Esophageal ulcer without bleeding     Gastritis     Hiatal hernia     Hx of cataract surgery, left     last assessed july 21 2014    Hyperlipidemia     Hypertension     Old myocardial infarction 2000    Recurrent right inguinal hernia     s/p right orchioectomy, mesh removal, and sinus trac removal patient being followed by surgery next appt 4/28/14 patient s/p keflex x 7 days for MSSA Cx repeat wound cx grew MSSA cx repeat wound cx grew MSSA and other armond (mixed) no MRSA identified conitunue close monitoring and local wound care, last assessed april 15 2014    Renal disorder     Wound of skin     in the groin, right     Past Surgical History:   Procedure Laterality Date    ABDOMINAL AORTIC ANEURYSM REPAIR  2009    aortobi-iliac, dacron graft    APPENDECTOMY      open    CARDIAC SURGERY      CATARACT EXTRACTION Bilateral     CHOLECYSTECTOMY N/A 9/29/2020    Procedure: CHOLECYSTECTOMY;  Surgeon: Gianni Chambers MD;  [facility];  Service: General    COLONOSCOPY      CORONARY ANGIOPLASTY WITH STENT PLACEMENT      stent 2    CYSTOSCOPY      diagnostic onset nov 13 2014    EXPLORATORY LAPAROTOMY  12/09/2009    EYE SURGERY      FL INJECTION LEFT HIP (NON ARTHROGRAM)  4/2/2019    HIP SURGERY Right     INGUINAL HERNIA REPAIR Right     unilateral x2    ORCHIECTOMY Right     MT COLONOSCOPY FLX DX W/COLLJ SPEC WHEN PFRMD N/A 5/22/2018    Procedure: COLONOSCOPY; Surgeon: Barry Toledo DO;  [facility]; Service: Gastroenterology    IN ESOPHAGOGASTRODUODENOSCOPY TRANSORAL DIAGNOSTIC N/A 2/14/2019    Procedure: ESOPHAGOGASTRODUODENOSCOPY (EGD); Surgeon: Levar Monahan MD;  [facility]: [facility]; Service: Gastroenterology    IN LAP,DIAGNOSTIC ABDOMEN N/A 9/29/2020    Procedure: LAPAROSCOPIC DIAGNOSTIC,   ;  Surgeon: Robbie Mcdonald MD;  [facility];  Service: General    [address] [place] [address] N/A 2/23/2018    Procedure: lysis of adhesions; INCISIONAL HERNIA REPAIR WITH MESH;  Surgeon: Viridiana Dent MD;  [facility];  Service: General    UPPER GASTROINTESTINAL ENDOSCOPY       Social History   Social History     Substance and Sexual Activity   Alcohol Use Not Currently    Comment: occasionally     Social History     Substance and Sexual Activity   Drug Use No     Social History     Tobacco Use   Smoking Status Former Smoker   Smokeless Tobacco Never Used   Tobacco Comment    quit 20 years ago     Family History:   Family History   Problem Relation Age of Onset    Heart disease Mother     Diabetes Mother     Heart disease Father     Diabetes Sister     Cancer Brother        Meds/Allergies   PTA meds:   Prior to Admission Medications   Prescriptions Last Dose Informant Patient Reported?  Taking?   aspirin 81 MG tablet  Self Yes No   Sig: Take 81 mg by mouth daily Resume on 3/11/18    atorvastatin (LIPITOR) 40 mg tablet   No No   Sig: Take 1 tablet (40 mg total) by mouth daily with dinner Resume next scheduled dose upon discharge from the hospital   ferrous sulfate 325 (65 Fe) mg tablet   No No   Sig: Take 1 tablet (325 mg total) by mouth every other day   furosemide (LASIX) 20 mg tablet   No No   Sig: Take 1 tablet (20 mg total) by mouth daily Can take one additional dose as needed for leg swelling or shortness of breath   lisinopril (ZESTRIL) 5 mg tablet   No No   Sig: Take 1 tablet (5 mg total) by mouth daily metoprolol succinate (TOPROL-XL) 50 mg 24 hr tablet   No No   Sig: Take 1 tablet (50 mg total) by mouth daily Resume on 3/11/18   nitroglycerin (NITROSTAT) 0 4 mg SL tablet  Self No No   Sig: Place 1 tablet (0 4 mg total) under the tongue every 5 (five) minutes as needed for chest pain   Patient not taking: Reported on 4/5/2022    omeprazole (PriLOSEC) 40 MG capsule   No No   Sig: Take 1 capsule (40 mg total) by mouth daily   potassium chloride (K-DUR,KLOR-CON) 10 mEq tablet   No No   Sig: Take 1 tablet (10 mEq total) by mouth daily      Facility-Administered Medications: None     No Known Allergies    Objective   First Vitals:   Blood Pressure: 169/75 (07/12/22 1300)  Pulse: 82 (07/12/22 1300)  Temperature: 97 5 °F (36 4 °C) (07/12/22 1320)  Temp Source: Oral (07/12/22 1320)  Respirations: 19 (07/12/22 1300)  SpO2: 92 % (07/12/22 1300)    Current Vitals:   Blood Pressure: (!) 182/79 (07/12/22 1600)  Pulse: 82 (07/12/22 1600)  Temperature: 97 5 °F (36 4 °C) (07/12/22 1320)  Temp Source: Oral (07/12/22 1320)  Respirations: 19 (07/12/22 1600)  SpO2: 94 % (07/12/22 1600)    No intake or output data in the 24 hours ending 07/12/22 1712    Invasive Devices  Report    Peripheral Intravenous Line  Duration           Peripheral IV 07/12/22 Right Antecubital <1 day                Physical Exam  Vitals reviewed  Constitutional:       Appearance: Normal appearance  He is not ill-appearing or diaphoretic  HENT:      Head: Normocephalic and atraumatic  Right Ear: External ear normal       Left Ear: External ear normal       Nose: Nose normal       Mouth/Throat:      Mouth: Mucous membranes are moist       Pharynx: Oropharynx is clear  Eyes:      Extraocular Movements: Extraocular movements intact  Conjunctiva/sclera: Conjunctivae normal    Cardiovascular:      Rate and Rhythm: Normal rate  Pulses: Normal pulses  Pulmonary:      Effort: Pulmonary effort is normal  No respiratory distress     Abdominal: Palpations: Abdomen is soft  Tenderness: There is abdominal tenderness (infraumbilical)  There is no guarding or rebound  Comments: Prior midline and kocher healed incisions   Genitourinary:     Comments: deferred  Musculoskeletal:         General: Normal range of motion  Cervical back: Normal range of motion  Skin:     General: Skin is warm and dry  Neurological:      General: No focal deficit present  Mental Status: He is alert  Cranial Nerves: No cranial nerve deficit  Motor: No weakness  Psychiatric:         Mood and Affect: Mood normal          Thought Content: Thought content normal          Lab Results:   CBC:   Lab Results   Component Value Date    WBC 7 83 07/12/2022    HGB 15 8 07/12/2022    HCT 49 2 07/12/2022    MCV 89 07/12/2022     07/12/2022    MCH 28 5 07/12/2022    MCHC 32 1 07/12/2022    RDW 15 7 (H) 07/12/2022    MPV 9 2 07/12/2022    NRBC 0 07/12/2022   , CMP:   Lab Results   Component Value Date    SODIUM 140 07/12/2022    K 3 8 07/12/2022     (H) 07/12/2022    CO2 23 07/12/2022    BUN 22 07/12/2022    CREATININE 1 22 07/12/2022    CALCIUM 9 5 07/12/2022    AST 65 (H) 07/12/2022    ALT 55 07/12/2022    ALKPHOS 548 (H) 07/12/2022    EGFR 56 07/12/2022   , Coagulation: No results found for: PT, INR, APTT, Urinalysis: No results found for: Eliane Kira, SPECGRAV, PHUR, LEUKOCYTESUR, NITRITE, PROTEINUA, GLUCOSEU, KETONESU, BILIRUBINUR, BLOODU, Amylase: No results found for: AMYLASE, Lipase:   Lab Results   Component Value Date    LIPASE 141 07/12/2022     Imaging: I have personally reviewed pertinent reports  EKG, Pathology, and Other Studies: I have personally reviewed pertinent reports  Code Status: Prior  Advance Directive and Living Will:      Power of :    POLST:      Counseling / Coordination of Care  Total floor / unit time spent today 30 minutes   This involved direct patient contact where I performed a full history and physical, reviewed previous records, and reviewed laboratory and other diagnostic studies  Greater than 50% of total time was spent with the patient and / or family counseling and / or coordination of care      Kusum Clark MD  7/12/2022

## 2022-07-12 NOTE — ED PROVIDER NOTES
History  Chief Complaint   Patient presents with    Vomiting     Started this AM  N/V/diarrhea  Dizziness  Extensive GI hx      65-year-old male patient with history of cholecystectomy, AAA with repair, appendectomy presenting with dizziness and nausea/vomiting/diarrhea associated with abdominal pain onset yesterday  Patient states that yesterday he was having abdominal pain  States last night started having episodes of vomiting  States had too many episodes of vomiting to count  Patient states 3 episodes of diarrhea that is dark brown in nature  Patient also states that he has had dizziness whenever he stands  Denies headache  Denies chest pain, worsening shortness of breath, fevers, urinary symptoms  Patient states that he had a similar episode of this a week ago and another episode prior to this  Prior to Admission Medications   Prescriptions Last Dose Informant Patient Reported?  Taking?   aspirin 81 MG tablet Past Week at Unknown time Self Yes Yes   Sig: Take 81 mg by mouth daily Resume on 3/11/18    atorvastatin (LIPITOR) 40 mg tablet Past Week at Unknown time  No Yes   Sig: Take 1 tablet (40 mg total) by mouth daily with dinner Resume next scheduled dose upon discharge from the hospital   ferrous sulfate 325 (65 Fe) mg tablet   No No   Sig: Take 1 tablet (325 mg total) by mouth every other day   furosemide (LASIX) 20 mg tablet Past Week at Unknown time  No Yes   Sig: Take 1 tablet (20 mg total) by mouth daily Can take one additional dose as needed for leg swelling or shortness of breath   lisinopril (ZESTRIL) 5 mg tablet Past Week at Unknown time  No Yes   Sig: Take 1 tablet (5 mg total) by mouth daily   metoprolol succinate (TOPROL-XL) 50 mg 24 hr tablet Past Week at Unknown time  No Yes   Sig: Take 1 tablet (50 mg total) by mouth daily Resume on 3/11/18   nitroglycerin (NITROSTAT) 0 4 mg SL tablet Past Week at Unknown time Self No Yes   Sig: Place 1 tablet (0 4 mg total) under the tongue every 5 (five) minutes as needed for chest pain   omeprazole (PriLOSEC) 40 MG capsule   No No   Sig: Take 1 capsule (40 mg total) by mouth daily   potassium chloride (K-DUR,KLOR-CON) 10 mEq tablet Past Week at Unknown time  No Yes   Sig: Take 1 tablet (10 mEq total) by mouth daily      Facility-Administered Medications: None       Past Medical History:   Diagnosis Date    Abdominal aortic aneurysm (AAA) ([facility] [address] )     last assessed nov 6 2015    Abscess of groin     last assessed oct 6 2014    Arthritis     Candidiasis, cutaneous     last assessed march 19 2014    Coronary artery disease     Dyslipidemia     Esophageal ulcer without bleeding     Gastritis     Hiatal hernia     Hx of cataract surgery, left     last assessed july 21 2014    Hyperlipidemia     Hypertension     Old myocardial infarction 2000    Recurrent right inguinal hernia     s/p right orchioectomy, mesh removal, and sinus trac removal patient being followed by surgery next appt 4/28/14 patient s/p keflex x 7 days for MSSA Cx repeat wound cx grew MSSA cx repeat wound cx grew MSSA and other armond (mixed) no MRSA identified conitunue close monitoring and local wound care, last assessed april 15 2014    Renal disorder     Wound of skin     in the groin, right       Past Surgical History:   Procedure Laterality Date    ABDOMINAL AORTIC ANEURYSM REPAIR  2009    aortobi-iliac, dacron graft    APPENDECTOMY      open    CARDIAC SURGERY      CATARACT EXTRACTION Bilateral     CHOLECYSTECTOMY N/A 9/29/2020    Procedure: CHOLECYSTECTOMY;  Surgeon: Tena Morales MD;  [facility];  Service: General    COLONOSCOPY      CORONARY ANGIOPLASTY WITH STENT PLACEMENT      stent 2    CYSTOSCOPY      diagnostic onset nov 13 2014    EXPLORATORY LAPAROTOMY  12/09/2009    EYE SURGERY      FL INJECTION LEFT HIP (NON ARTHROGRAM)  4/2/2019    HIP SURGERY Right     INGUINAL HERNIA REPAIR Right     unilateral x2    ORCHIECTOMY Right     CO COLONOSCOPY FLX DX W/COLLJ SPEC WHEN PFRMD N/A 5/22/2018    Procedure: COLONOSCOPY;  Surgeon: Hawa Rodriguez DO;  [facility]; Service: Gastroenterology    CA ESOPHAGOGASTRODUODENOSCOPY TRANSORAL DIAGNOSTIC N/A 2/14/2019    Procedure: ESOPHAGOGASTRODUODENOSCOPY (EGD); Surgeon: Mg Gilbert MD;  [facility]; Service: Gastroenterology    CA LAP,DIAGNOSTIC ABDOMEN N/A 9/29/2020    Procedure: LAPAROSCOPIC DIAGNOSTIC,   ;  Surgeon: Slava Michel MD;  [facility];  Service: General    [address] [place] [address] N/A 2/23/2018    Procedure: lysis of adhesions; INCISIONAL HERNIA REPAIR WITH MESH;  Surgeon: Niels Marinelli MD;  [facility];  Service: General    UPPER GASTROINTESTINAL ENDOSCOPY         Family History   Problem Relation Age of Onset    Heart disease Mother     Diabetes Mother     Heart disease Father     Diabetes Sister     Cancer Brother      I have reviewed and agree with the history as documented  E-Cigarette/Vaping    E-Cigarette Use Never User      E-Cigarette/Vaping Substances    Nicotine No     THC No     CBD No     Flavoring No     Other No     Unknown No      Social History     Tobacco Use    Smoking status: Former Smoker    Smokeless tobacco: Never Used    Tobacco comment: quit 20 years ago   Vaping Use    Vaping Use: Never used   Substance Use Topics    Alcohol use: Not Currently     Comment: occasionally    Drug use: No        Review of Systems   Constitutional: Negative for chills and fever  HENT: Negative for congestion, rhinorrhea and sore throat  Eyes: Negative for pain and visual disturbance  Respiratory: Positive for shortness of breath  Negative for cough and chest tightness  Cardiovascular: Negative for chest pain and leg swelling  Gastrointestinal: Positive for abdominal pain, diarrhea, nausea and vomiting  Genitourinary: Negative for difficulty urinating and dysuria     Musculoskeletal: Negative for arthralgias, back pain, myalgias and neck pain  Skin: Negative for rash and wound  Neurological: Positive for dizziness  Negative for light-headedness and headaches  All other systems reviewed and are negative  Physical Exam  ED Triage Vitals   Temperature Pulse Respirations Blood Pressure SpO2   07/12/22 1320 07/12/22 1300 07/12/22 1300 07/12/22 1300 07/12/22 1300   97 5 °F (36 4 °C) 82 19 169/75 92 %      Temp Source Heart Rate Source Patient Position - Orthostatic VS BP Location FiO2 (%)   07/12/22 1320 07/12/22 1300 07/12/22 1300 07/12/22 1300 --   Oral Monitor Lying Right arm       Pain Score       07/12/22 2322       No Pain             Orthostatic Vital Signs  Vitals:    07/14/22 1036 07/14/22 1037 07/14/22 1038 07/14/22 1523   BP: 151/72 101/68 106/70 128/61   Pulse:       Patient Position - Orthostatic VS: Lying - Orthostatic VS Sitting - Orthostatic VS Standing - Orthostatic VS        Physical Exam  Vitals reviewed  Constitutional:       Appearance: Normal appearance  HENT:      Head: Normocephalic and atraumatic  Nose: Nose normal       Mouth/Throat:      Mouth: Mucous membranes are moist       Pharynx: Oropharynx is clear  Eyes:      Extraocular Movements: Extraocular movements intact  Conjunctiva/sclera: Conjunctivae normal    Cardiovascular:      Rate and Rhythm: Normal rate and regular rhythm  Pulses: Normal pulses  Heart sounds: Normal heart sounds  Pulmonary:      Effort: Pulmonary effort is normal       Breath sounds: Normal breath sounds  Abdominal:      General: Bowel sounds are normal       Palpations: Abdomen is soft  Tenderness: There is abdominal tenderness (Generalized abdominal tenderness worse in lower abdomen)  Musculoskeletal:         General: Normal range of motion  Cervical back: Normal range of motion  Skin:     General: Skin is warm and dry  Neurological:      General: No focal deficit present        Mental Status: He is alert and oriented to person, place, and time  Mental status is at baseline  Cranial Nerves: No cranial nerve deficit  Sensory: No sensory deficit  Motor: No weakness        Coordination: Coordination normal          ED Medications  Medications   sodium chloride 0 9 % bolus 1,000 mL (0 mL Intravenous Stopped 7/12/22 1827)   Gadobutrol injection (SINGLE-DOSE) SOLN 10 mL (10 mL Other Given by Other 7/13/22 0936)       Diagnostic Studies  Results Reviewed     Procedure Component Value Units Date/Time    CBC and differential [[id number]]  (Abnormal) Collected: 07/13/22 0548    Lab Status: Final result Specimen: Blood from Arm, Left Updated: 07/13/22 0621     WBC 4 69 Thousand/uL      RBC 4 43 Million/uL      Hemoglobin 12 4 g/dL      Hematocrit 38 7 %      MCV 87 fL      MCH 28 0 pg      MCHC 32 0 g/dL      RDW 15 4 %      MPV 9 2 fL      Platelets 280 Thousands/uL      nRBC 0 /100 WBCs      Neutrophils Relative 61 %      Immat GRANS % 0 %      Lymphocytes Relative 27 %      Monocytes Relative 8 %      Eosinophils Relative 3 %      Basophils Relative 1 %      Neutrophils Absolute 2 87 Thousands/µL      Immature Grans Absolute 0 01 Thousand/uL      Lymphocytes Absolute 1 26 Thousands/µL      Monocytes Absolute 0 37 Thousand/µL      Eosinophils Absolute 0 12 Thousand/µL      Basophils Absolute 0 06 Thousands/µL     Comprehensive metabolic panel [[id number]]  (Abnormal) Collected: 07/13/22 0510    Lab Status: Final result Specimen: Blood from Arm, Left Updated: 07/13/22 0605     Sodium 140 mmol/L      Potassium 3 6 mmol/L      Chloride 112 mmol/L      CO2 21 mmol/L      ANION GAP 7 mmol/L      BUN 18 mg/dL      Creatinine 0 82 mg/dL      Glucose 69 mg/dL      Calcium 8 6 mg/dL      Corrected Calcium 10 1 mg/dL      AST 49 U/L      ALT 40 U/L      Alkaline Phosphatase 400 U/L      Total Protein 6 9 g/dL      Albumin 2 1 g/dL      Total Bilirubin 1 16 mg/dL      eGFR 85 ml/min/1 73sq m     Narrative: National Kid[employer] Disease Foundation guidelines for Chronic Kidney Disease (CKD):     Stage 1 with normal or high GFR (GFR > 90 mL/min/1 73 square meters)    Stage 2 Mild CKD (GFR = 60-89 mL/min/1 73 square meters)    Stage 3A Moderate CKD (GFR = 45-59 mL/min/1 73 square meters)    Stage 3B Moderate CKD (GFR = 30-44 mL/min/1 73 square meters)    Stage 4 Severe CKD (GFR = 15-29 mL/min/1 73 square meters)    Stage 5 End Stage CKD (GFR <15 mL/min/1 73 square meters)  Note: GFR calculation is accurate only with a steady state creatinine    HS Troponin I 4hr [[id number]]  (Normal) Collected: 07/12/22 1839    Lab Status: Final result Specimen: Blood from Arm, Right Updated: 07/12/22 1948     hs TnI 4hr 10 ng/L      Delta 4hr hsTnI 1 ng/L     HS Troponin I 2hr [[id number]]  (Normal) Collected: 07/12/22 1546    Lab Status: Final result Specimen: Blood from Arm, Right Updated: 07/12/22 1637     hs TnI 2hr 9 ng/L      Delta 2hr hsTnI 0 ng/L     HS Troponin 0hr (reflex protocol) [[id number]]  (Normal) Collected: 07/12/22 1325    Lab Status: Final result Specimen: Blood from Arm, Right Updated: 07/12/22 1417     hs TnI 0hr 9 ng/L     Comprehensive metabolic panel [[id number]]  (Abnormal) Collected: 07/12/22 1325    Lab Status: Final result Specimen: Blood from Arm, Right Updated: 07/12/22 1409     Sodium 140 mmol/L      Potassium 3 8 mmol/L      Chloride 111 mmol/L      CO2 23 mmol/L      ANION GAP 6 mmol/L      BUN 22 mg/dL      Creatinine 1 22 mg/dL      Glucose 103 mg/dL      Calcium 9 5 mg/dL      Corrected Calcium 10 5 mg/dL      AST 65 U/L      ALT 55 U/L      Alkaline Phosphatase 548 U/L      Total Protein 9 1 g/dL      Albumin 2 8 g/dL      Total Bilirubin 1 72 mg/dL      eGFR 56 ml/min/1 73sq m     Narrative:      [place] guidelines for Chronic Kidney Disease (CKD):     Stage 1 with normal or high GFR (GFR > 90 mL/min/1 73 square meters)    Stage 2 Mild CKD (GFR = 60-89 mL/min/1 73 square meters)    Stage 3A Moderate CKD (GFR = 45-59 mL/min/1 73 square meters)    Stage 3B Moderate CKD (GFR = 30-44 mL/min/1 73 square meters)    Stage 4 Severe CKD (GFR = 15-29 mL/min/1 73 square meters)    Stage 5 End Stage CKD (GFR <15 mL/min/1 73 square meters)  Note: GFR calculation is accurate only with a steady state creatinine    Lipase [[id number]]  (Normal) Collected: 07/12/22 1325    Lab Status: Final result Specimen: Blood from Arm, Right Updated: 07/12/22 1409     Lipase 141 u/L     CBC and differential [[id number]]  (Abnormal) Collected: 07/12/22 1325    Lab Status: Final result Specimen: Blood from Arm, Right Updated: 07/12/22 1339     WBC 7 83 Thousand/uL      RBC 5 55 Million/uL      Hemoglobin 15 8 g/dL      Hematocrit 49 2 %      MCV 89 fL      MCH 28 5 pg      MCHC 32 1 g/dL      RDW 15 7 %      MPV 9 2 fL      Platelets 890 Thousands/uL      nRBC 0 /100 WBCs      Neutrophils Relative 84 %      Immat GRANS % 0 %      Lymphocytes Relative 9 %      Monocytes Relative 6 %      Eosinophils Relative 0 %      Basophils Relative 1 %      Neutrophils Absolute 6 58 Thousands/µL      Immature Grans Absolute 0 03 Thousand/uL      Lymphocytes Absolute 0 73 Thousands/µL      Monocytes Absolute 0 43 Thousand/µL      Eosinophils Absolute 0 02 Thousand/µL      Basophils Absolute 0 04 Thousands/µL                  FL ERCP biliary only   Final Result by Ramana Ramirez MD (07/14 1515)      Fluoroscopic guidance was provided for performance of ERCP  Please see separate procedure report for further details  Workstation performed: [id number]         CT abdomen pelvis wo contrast   Final Result by Subhash Mason DO (07/12 1517)   Addendum 1 of 1 by Subhash Mason DO (07/12 1517)   ADDENDUM:      The patient has apparent history of cholecystectomy 9/29/2020 There is a    dilated, saccular cystic structure in the gallbladder fossa resembling the    gallbladder with stones present    No surgical clips are seen  If above    history is accurate, this finding    could represent markedly dilated cystic duct remnant with retained stones  Final      1  Choledocholithiasis with mild CBD dilatation  2  Cholelithiasis without evidence for cholecystitis  3  Mild splenomegaly with a small amount of perisplenic ascites  4  Colonic diverticulosis without evidence of diverticulitis  Question mild circumferential rectal wall thickening versus underdistention  Correlate with physical exam       Additional incidental findings as above  Limited study without IV or oral contrast       Workstation performed: [id number]               Procedures  Procedures      ED Course  ED Course as of 07/15/22 1515   Tue Jul 12, 2022   1421 EKG: normal EKG, normal sinus rhythm, unchanged from previous tracings  T wave inversion in v2                                         MDM  Number of Diagnoses or Management Options  Choledocholithiasis  Diagnosis management comments: 66 y/o male patient with hx of cholecystectomy, appendectomy, AAA repair, presenting with abd pain a/w n/v/d  Exam shows generalized tenderness  Labs positive for mildly elevated LFTs  CT positive for CBD dilatation wit choledocholithiasis  Spoke with GI, plan for ERCP tomorrow  Will admit for choledocholithiasis to surgery          Amount and/or Complexity of Data Reviewed  Clinical lab tests: ordered and reviewed  Tests in the radiology section of CPT®: ordered and reviewed        Disposition  Final diagnoses:   Choledocholithiasis     Time reflects when diagnosis was documented in both MDM as applicable and the Disposition within this note     Time User Action Codes Description Comment    7/12/2022  5:09 PM  Larger Add [K80 50] Choledocholithiasis       ED Disposition     ED Disposition   Admit    Condition   Stable    Date/Time   Tue Jul 12, 2022  6:27 PM    Comment   Case was discussed with Dr Prosper Salazar and the patient's admission status was agreed to be Admission Status: inpatient status to the service of Dr Roger Orosco   Follow-up Information     Follow up With Specialties Details Why Contact Info    [employer]  Schedule an appointment as soon as possible for a visit in 1 week(s) Please call to establish care with a primary care provider (PCP)  Follow up with PCP to discuss incidental findings   [phone]            Discharge Medication List as of 7/14/2022  3:46 PM      CONTINUE these medications which have NOT CHANGED    Details   aspirin 81 MG tablet Take 81 mg by mouth daily Resume on 3/11/18 , Starting Fri 7/22/2016, Historical Med      atorvastatin (LIPITOR) 40 mg tablet Take 1 tablet (40 mg total) by mouth daily with dinner Resume next scheduled dose upon discharge from the hospital, Starting Tue 12/7/2021, Normal      furosemide (LASIX) 20 mg tablet Take 1 tablet (20 mg total) by mouth daily Can take one additional dose as needed for leg swelling or shortness of breath, Starting Tue 3/8/2022, Normal      lisinopril (ZESTRIL) 5 mg tablet Take 1 tablet (5 mg total) by mouth daily, Starting Tue 12/7/2021, Normal      metoprolol succinate (TOPROL-XL) 50 mg 24 hr tablet Take 1 tablet (50 mg total) by mouth daily Resume on 3/11/18, Starting Tue 12/7/2021, Normal      nitroglycerin (NITROSTAT) 0 4 mg SL tablet Place 1 tablet (0 4 mg total) under the tongue every 5 (five) minutes as needed for chest pain, Starting Sat 9/26/2020, Normal      potassium chloride (K-DUR,KLOR-CON) 10 mEq tablet Take 1 tablet (10 mEq total) by mouth daily, Starting Tue 12/7/2021, Normal      ferrous sulfate 325 (65 Fe) mg tablet Take 1 tablet (325 mg total) by mouth every other day, Starting Fri 2/11/2022, Until Thu 5/12/2022, Normal      omeprazole (PriLOSEC) 40 MG capsule Take 1 capsule (40 mg total) by mouth daily, Starting Mon 10/4/2021, Until Tue 4/5/2022, Normal           Outpatient Discharge Orders   Discharge Diet     Activity as tolerated Call provider for:  persistent nausea or vomiting     Call provider for:  severe uncontrolled pain     Call provider for:  redness, tenderness, or signs of infection (pain, swelling, redness, odor or green/yellow discharge around incision site)     Call provider for: active or persistent bleeding     Call provider for:  difficulty breathing, headache or visual disturbances     No dressing needed       PDMP Review     None           ED Provider  Attending physically available and evaluated Alma Rosa Ochoa I managed the patient along with the ED Attending      Electronically Signed by         Amira Montaño MD  07/15/22 3178

## 2022-07-13 ENCOUNTER — ANESTHESIA EVENT (OUTPATIENT)
Dept: ANESTHESIOLOGY | Facility: HOSPITAL | Age: 77
End: 2022-07-13

## 2022-07-13 ENCOUNTER — ANESTHESIA (INPATIENT)
Dept: GASTROENTEROLOGY | Facility: HOSPITAL | Age: 77
DRG: 446 | End: 2022-07-13
Payer: MEDICARE

## 2022-07-13 ENCOUNTER — APPOINTMENT (INPATIENT)
Dept: GASTROENTEROLOGY | Facility: HOSPITAL | Age: 77
DRG: 446 | End: 2022-07-13
Payer: MEDICARE

## 2022-07-13 ENCOUNTER — ANESTHESIA EVENT (INPATIENT)
Dept: GASTROENTEROLOGY | Facility: HOSPITAL | Age: 77
DRG: 446 | End: 2022-07-13
Payer: MEDICARE

## 2022-07-13 ENCOUNTER — ANESTHESIA (OUTPATIENT)
Dept: ANESTHESIOLOGY | Facility: HOSPITAL | Age: 77
End: 2022-07-13

## 2022-07-13 ENCOUNTER — APPOINTMENT (INPATIENT)
Dept: RADIOLOGY | Facility: HOSPITAL | Age: 77
DRG: 446 | End: 2022-07-13
Payer: MEDICARE

## 2022-07-13 PROBLEM — K80.50 CHOLEDOCHOLITHIASIS: Status: ACTIVE | Noted: 2022-07-13

## 2022-07-13 LAB
ALBUMIN SERPL BCP-MCNC: 2.1 G/DL (ref 3.5–5)
ALP SERPL-CCNC: 400 U/L (ref 46–116)
ALT SERPL W P-5'-P-CCNC: 40 U/L (ref 12–78)
ANION GAP SERPL CALCULATED.3IONS-SCNC: 7 MMOL/L (ref 4–13)
AST SERPL W P-5'-P-CCNC: 49 U/L (ref 5–45)
BASOPHILS # BLD AUTO: 0.06 THOUSANDS/ΜL (ref 0–0.1)
BASOPHILS NFR BLD AUTO: 1 % (ref 0–1)
BILIRUB SERPL-MCNC: 1.16 MG/DL (ref 0.2–1)
BUN SERPL-MCNC: 18 MG/DL (ref 5–25)
CALCIUM ALBUM COR SERPL-MCNC: 10.1 MG/DL (ref 8.3–10.1)
CALCIUM SERPL-MCNC: 8.6 MG/DL (ref 8.3–10.1)
CHLORIDE SERPL-SCNC: 112 MMOL/L (ref 100–108)
CO2 SERPL-SCNC: 21 MMOL/L (ref 21–32)
CREAT SERPL-MCNC: 0.82 MG/DL (ref 0.6–1.3)
EOSINOPHIL # BLD AUTO: 0.12 THOUSAND/ΜL (ref 0–0.61)
EOSINOPHIL NFR BLD AUTO: 3 % (ref 0–6)
ERYTHROCYTE [DISTWIDTH] IN BLOOD BY AUTOMATED COUNT: 15.4 % (ref 11.6–15.1)
GFR SERPL CREATININE-BSD FRML MDRD: 85 ML/MIN/1.73SQ M
GLUCOSE SERPL-MCNC: 69 MG/DL (ref 65–140)
HCT VFR BLD AUTO: 38.7 % (ref 36.5–49.3)
HGB BLD-MCNC: 12.4 G/DL (ref 12–17)
IMM GRANULOCYTES # BLD AUTO: 0.01 THOUSAND/UL (ref 0–0.2)
IMM GRANULOCYTES NFR BLD AUTO: 0 % (ref 0–2)
LYMPHOCYTES # BLD AUTO: 1.26 THOUSANDS/ΜL (ref 0.6–4.47)
LYMPHOCYTES NFR BLD AUTO: 27 % (ref 14–44)
MCH RBC QN AUTO: 28 PG (ref 26.8–34.3)
MCHC RBC AUTO-ENTMCNC: 32 G/DL (ref 31.4–37.4)
MCV RBC AUTO: 87 FL (ref 82–98)
MONOCYTES # BLD AUTO: 0.37 THOUSAND/ΜL (ref 0.17–1.22)
MONOCYTES NFR BLD AUTO: 8 % (ref 4–12)
NEUTROPHILS # BLD AUTO: 2.87 THOUSANDS/ΜL (ref 1.85–7.62)
NEUTS SEG NFR BLD AUTO: 61 % (ref 43–75)
NRBC BLD AUTO-RTO: 0 /100 WBCS
PLATELET # BLD AUTO: 152 THOUSANDS/UL (ref 149–390)
PMV BLD AUTO: 9.2 FL (ref 8.9–12.7)
POTASSIUM SERPL-SCNC: 3.6 MMOL/L (ref 3.5–5.3)
PROT SERPL-MCNC: 6.9 G/DL (ref 6.4–8.2)
RBC # BLD AUTO: 4.43 MILLION/UL (ref 3.88–5.62)
SODIUM SERPL-SCNC: 140 MMOL/L (ref 136–145)
WBC # BLD AUTO: 4.69 THOUSAND/UL (ref 4.31–10.16)

## 2022-07-13 PROCEDURE — A9585 GADOBUTROL INJECTION: HCPCS | Performed by: INTERNAL MEDICINE

## 2022-07-13 PROCEDURE — 43262 ENDO CHOLANGIOPANCREATOGRAPH: CPT | Performed by: INTERNAL MEDICINE

## 2022-07-13 PROCEDURE — 85025 COMPLETE CBC W/AUTO DIFF WBC: CPT | Performed by: STUDENT IN AN ORGANIZED HEALTH CARE EDUCATION/TRAINING PROGRAM

## 2022-07-13 PROCEDURE — 0FC98ZZ EXTIRPATION OF MATTER FROM COMMON BILE DUCT, VIA NATURAL OR ARTIFICIAL OPENING ENDOSCOPIC: ICD-10-PCS | Performed by: SURGERY

## 2022-07-13 PROCEDURE — 74328 X-RAY BILE DUCT ENDOSCOPY: CPT

## 2022-07-13 PROCEDURE — 80053 COMPREHEN METABOLIC PANEL: CPT | Performed by: STUDENT IN AN ORGANIZED HEALTH CARE EDUCATION/TRAINING PROGRAM

## 2022-07-13 PROCEDURE — 99232 SBSQ HOSP IP/OBS MODERATE 35: CPT | Performed by: SURGERY

## 2022-07-13 PROCEDURE — 99222 1ST HOSP IP/OBS MODERATE 55: CPT | Performed by: INTERNAL MEDICINE

## 2022-07-13 PROCEDURE — 36415 COLL VENOUS BLD VENIPUNCTURE: CPT | Performed by: STUDENT IN AN ORGANIZED HEALTH CARE EDUCATION/TRAINING PROGRAM

## 2022-07-13 PROCEDURE — 43264 ERCP REMOVE DUCT CALCULI: CPT | Performed by: INTERNAL MEDICINE

## 2022-07-13 PROCEDURE — C1769 GUIDE WIRE: HCPCS

## 2022-07-13 RX ORDER — DEXAMETHASONE SODIUM PHOSPHATE 10 MG/ML
INJECTION, SOLUTION INTRAMUSCULAR; INTRAVENOUS AS NEEDED
Status: DISCONTINUED | OUTPATIENT
Start: 2022-07-13 | End: 2022-07-13

## 2022-07-13 RX ORDER — LIDOCAINE HYDROCHLORIDE 20 MG/ML
INJECTION, SOLUTION EPIDURAL; INFILTRATION; INTRACAUDAL; PERINEURAL AS NEEDED
Status: DISCONTINUED | OUTPATIENT
Start: 2022-07-13 | End: 2022-07-13

## 2022-07-13 RX ORDER — SUCCINYLCHOLINE/SOD CL,ISO/PF 100 MG/5ML
SYRINGE (ML) INTRAVENOUS AS NEEDED
Status: DISCONTINUED | OUTPATIENT
Start: 2022-07-13 | End: 2022-07-13

## 2022-07-13 RX ORDER — PROPOFOL 10 MG/ML
INJECTION, EMULSION INTRAVENOUS AS NEEDED
Status: DISCONTINUED | OUTPATIENT
Start: 2022-07-13 | End: 2022-07-13

## 2022-07-13 RX ORDER — FENTANYL CITRATE 50 UG/ML
INJECTION, SOLUTION INTRAMUSCULAR; INTRAVENOUS AS NEEDED
Status: DISCONTINUED | OUTPATIENT
Start: 2022-07-13 | End: 2022-07-13

## 2022-07-13 RX ADMIN — SODIUM CHLORIDE, SODIUM LACTATE, POTASSIUM CHLORIDE, AND CALCIUM CHLORIDE 100 ML/HR: .6; .31; .03; .02 INJECTION, SOLUTION INTRAVENOUS at 14:11

## 2022-07-13 RX ADMIN — DEXAMETHASONE SODIUM PHOSPHATE 10 MG: 10 INJECTION, SOLUTION INTRAMUSCULAR; INTRAVENOUS at 09:24

## 2022-07-13 RX ADMIN — GADOBUTROL 10 ML: 604.72 INJECTION INTRAVENOUS at 09:36

## 2022-07-13 RX ADMIN — HEPARIN SODIUM 5000 UNITS: 5000 INJECTION INTRAVENOUS; SUBCUTANEOUS at 14:17

## 2022-07-13 RX ADMIN — PROPOFOL 150 MG: 10 INJECTION, EMULSION INTRAVENOUS at 09:13

## 2022-07-13 RX ADMIN — HEPARIN SODIUM 5000 UNITS: 5000 INJECTION INTRAVENOUS; SUBCUTANEOUS at 21:38

## 2022-07-13 RX ADMIN — FENTANYL CITRATE 50 MCG: 50 INJECTION INTRAMUSCULAR; INTRAVENOUS at 09:13

## 2022-07-13 RX ADMIN — PROPOFOL 50 MG: 10 INJECTION, EMULSION INTRAVENOUS at 09:32

## 2022-07-13 RX ADMIN — HEPARIN SODIUM 5000 UNITS: 5000 INJECTION INTRAVENOUS; SUBCUTANEOUS at 05:11

## 2022-07-13 RX ADMIN — ONDANSETRON 4 MG: 2 INJECTION INTRAMUSCULAR; INTRAVENOUS at 09:45

## 2022-07-13 RX ADMIN — Medication 100 MCG: at 09:41

## 2022-07-13 RX ADMIN — PANTOPRAZOLE SODIUM 40 MG: 40 TABLET, DELAYED RELEASE ORAL at 05:11

## 2022-07-13 RX ADMIN — LIDOCAINE HYDROCHLORIDE 100 MG: 20 INJECTION, SOLUTION EPIDURAL; INFILTRATION; INTRACAUDAL; PERINEURAL at 09:13

## 2022-07-13 RX ADMIN — SODIUM CHLORIDE, SODIUM LACTATE, POTASSIUM CHLORIDE, AND CALCIUM CHLORIDE 100 ML/HR: .6; .31; .03; .02 INJECTION, SOLUTION INTRAVENOUS at 07:42

## 2022-07-13 RX ADMIN — ATORVASTATIN CALCIUM 40 MG: 40 TABLET, FILM COATED ORAL at 17:35

## 2022-07-13 RX ADMIN — Medication 100 MG: at 09:13

## 2022-07-13 NOTE — PROGRESS NOTES
Progress Note - Mesfin Regalado 67 y o  male MRN: [id number]    Unit/Bed#: -01 Encounter: [id number]      Assessment:  Mesfin Regalado is a 67 y o  male s/p open subtotal cholecystectomy (9/2020) p/w choledocholithiasis w/1cm dilated CBD    VSS  tbil 1 16 from 1 72  LFTs: 49/40/400 from 65/55/548    Plan:  GI consulted re: ERCP for today  NPO  IVF  SQH  No acute surgical intervention or plan for OR      Subjective:   Colicky pain overnight overall improving  No nausea or emesis  No diarrhea episodes  No acute overnight events or new symptoms  No fevers or chills  Objective:     Vitals: Blood pressure 125/70, pulse 67, temperature 97 5 °F (36 4 °C), temperature source Oral, resp  rate 19, SpO2 94 %  ,There is no height or weight on file to calculate BMI  Intake/Output Summary (Last 24 hours) at 7/13/2022 0847  Last data filed at 7/12/2022 2300  Gross per 24 hour   Intake 1000 ml   Output 300 ml   Net 700 ml       Physical Exam:   General - no acute distress, responsive and cooperative  CV - warm, regular rate  Pulm - normal work of breathing, no respiratory distress  Abd - soft, nondistended, tender RUQ +[place], no rebound or guarding  Neuro - m/s grossly intact, cn grossly intact  Ext - moving all extremities       Invasive Devices  Report    Peripheral Intravenous Line  Duration           Peripheral IV 07/12/22 Right Antecubital <1 day                Lab, Imaging and other studies: I have personally reviewed pertinent reports      VTE Pharmacologic Prophylaxis: Heparin  VTE Mechanical Prophylaxis: sequential compression device

## 2022-07-13 NOTE — ANESTHESIA PREPROCEDURE EVALUATION
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)   (+) Unstable angina (HCC)      GI/HEPATIC   (+) Gastroesophageal reflux disease   (+) Gastroesophageal reflux disease with esophagitis      MUSCULOSKELETAL   (+) Primary osteoarthritis of one hip, left   (+) Primary osteoarthritis of right knee        Physical Exam    Airway    Mallampati score: II  TM Distance: >3 FB  Neck ROM: limited     Dental   upper dentures and lower dentures,     Cardiovascular  Cardiovascular exam normal    Pulmonary  Pulmonary exam normal     Other Findings        Anesthesia Plan  ASA Score- 3 Emergent    Anesthesia Type- general with ASA Monitors  Additional Monitors:   Airway Plan: ETT  Comment: Good cardiac fxn post stent  No respiratory complaints  No problems with previous anesthetics          Plan Factors-Exercise tolerance (METS): >4 METS  Chart reviewed  EKG reviewed  Imaging results reviewed  Existing labs reviewed  Patient summary reviewed  Patient is not a current smoker  Induction- intravenous  Postoperative Plan- Plan for postoperative opioid use  Planned trial extubation    Informed Consent- Anesthetic plan and risks discussed with patient  I personally reviewed this patient with the CRNA  Discussed and agreed on the Anesthesia Plan with the ASHLEIGH Treadwell

## 2022-07-13 NOTE — PLAN OF CARE
Problem: Prexisting or High Potential for Compromised Skin Integrity  Goal: Skin integrity is maintained or improved  Description: INTERVENTIONS:  - Identify patients at risk for skin breakdown  - Assess and monitor skin integrity  - Assess and monitor nutrition and hydration status  - Monitor labs   - Assess for incontinence   - Turn and reposition patient  - Assist with mobility/ambulation  - Relieve pressure over bony prominences  - Avoid friction and shearing  - Provide appropriate hygiene as needed including keeping skin clean and dry  - Evaluate need for skin moisturizer/barrier cream  - Collaborate with interdisciplinary team   - Patient/family teaching  - Consider wound care consult   Outcome: Progressing     Problem: Potential for Falls  Goal: Patient will remain free of falls  Description: INTERVENTIONS:  - Educate patient/family on patient safety including physical limitations  - Instruct patient to call for assistance with activity   - Consult OT/PT to assist with strengthening/mobility   - Keep Call bell within reach  - Keep bed low and locked with side rails adjusted as appropriate  - Keep care items and personal belongings within reach  - Initiate and maintain comfort rounds  - Make Fall Risk Sign visible to staff  - Apply yellow socks and bracelet for high fall risk patients  - Consider moving patient to room near nurses station  Outcome: Progressing     Problem: MOBILITY - ADULT  Goal: Maintain or return to baseline ADL function  Description: INTERVENTIONS:  -  Assess patient's ability to carry out ADLs; assess patient's baseline for ADL function and identify physical deficits which impact ability to perform ADLs (bathing, care of mouth/teeth, toileting, grooming, dressing, etc )  - Assess/evaluate cause of self-care deficits   - Assess range of motion  - Assess patient's mobility; develop plan if impaired  - Assess patient's need for assistive devices and provide as appropriate  - Encourage maximum independence but intervene and supervise when necessary  - Involve family in performance of ADLs  - Assess for home care needs following discharge   - Consider OT consult to assist with ADL evaluation and planning for discharge  - Provide patient education as appropriate  Outcome: Progressing  Goal: Maintains/Returns to pre admission functional level  Description: INTERVENTIONS:  - Perform BMAT or MOVE assessment daily    - Set and communicate daily mobility goal to care team and patient/family/caregiver     - Collaborate with rehabilitation services on mobility goals if consulted  - Out of bed for toileting  - Record patient progress and toleration of activity level   Outcome: Progressing     Problem: PAIN - ADULT  Goal: Verbalizes/displays adequate comfort level or baseline comfort level  Description: Interventions:  - Encourage patient to monitor pain and request assistance  - Assess pain using appropriate pain scale  - Administer analgesics based on type and severity of pain and evaluate response  - Implement non-pharmacological measures as appropriate and evaluate response  - Consider cultural and social influences on pain and pain management  - Notify physician/advanced practitioner if interventions unsuccessful or patient reports new pain  Outcome: Progressing     Problem: INFECTION - ADULT  Goal: Absence or prevention of progression during hospitalization  Description: INTERVENTIONS:  - Assess and monitor for signs and symptoms of infection  - Monitor lab/diagnostic results  - Monitor all insertion sites, i e  indwelling lines, tubes, and drains  - Monitor endotracheal if appropriate and nasal secretions for changes in amount and color  - [place] appropriate cooling/warming therapies per order  - Administer medications as ordered  - Instruct and encourage patient and family to use good hand hygiene technique  - Identify and instruct in appropriate isolation precautions for identified infection/condition  Outcome: Progressing  Goal: Absence of fever/infection during neutropenic period  Description: INTERVENTIONS:  - Monitor WBC    Outcome: Progressing     Problem: SAFETY ADULT  Goal: Patient will remain free of falls  Description: INTERVENTIONS:  - Educate patient/family on patient safety including physical limitations  - Instruct patient to call for assistance with activity   - Consult OT/PT to assist with strengthening/mobility   - Keep Call bell within reach  - Keep bed low and locked with side rails adjusted as appropriate  - Keep care items and personal belongings within reach  - Initiate and maintain comfort rounds  - Make Fall Risk Sign visible to staff  - Apply yellow socks and bracelet for high fall risk patients  - Consider moving patient to room near nurses station  Outcome: Progressing  Goal: Maintain or return to baseline ADL function  Description: INTERVENTIONS:  -  Assess patient's ability to carry out ADLs; assess patient's baseline for ADL function and identify physical deficits which impact ability to perform ADLs (bathing, care of mouth/teeth, toileting, grooming, dressing, etc )  - Assess/evaluate cause of self-care deficits   - Assess range of motion  - Assess patient's mobility; develop plan if impaired  - Assess patient's need for assistive devices and provide as appropriate  - Encourage maximum independence but intervene and supervise when necessary  - Involve family in performance of ADLs  - Assess for home care needs following discharge   - Consider OT consult to assist with ADL evaluation and planning for discharge  - Provide patient education as appropriate  Outcome: Progressing  Goal: Maintains/Returns to pre admission functional level  Description: INTERVENTIONS:  - Perform BMAT or MOVE assessment daily    - Set and communicate daily mobility goal to care team and patient/family/caregiver     - Collaborate with rehabilitation services on mobility goals if consulted  - Out of bed for toileting  - Record patient progress and toleration of activity level   Outcome: Progressing     Problem: DISCHARGE PLANNING  Goal: Discharge to home or other facility with appropriate resources  Description: INTERVENTIONS:  - Identify barriers to discharge w/patient and caregiver  - Arrange for needed discharge resources and transportation as appropriate  - Identify discharge learning needs (meds, wound care, etc )  - Arrange for interpretive services to assist at discharge as needed  - Refer to Case Management Department for coordinating discharge planning if the patient needs post-hospital services based on physician/advanced practitioner order or complex needs related to functional status, cognitive ability, or social support system  Outcome: Progressing     Problem: Knowledge Deficit  Goal: Patient/family/caregiver demonstrates understanding of disease process, treatment plan, medications, and discharge instructions  Description: Complete learning assessment and assess knowledge base    Interventions:  - Provide teaching at level of understanding  - Provide teaching via preferred learning methods  Outcome: Progressing

## 2022-07-13 NOTE — ANESTHESIA POSTPROCEDURE EVALUATION
Post-Op Assessment Note    CV Status:  Stable  Pain Score: 0    Pain management: adequate     Mental Status:  Alert   Hydration Status:  Euvolemic   PONV Controlled:  None   Airway Patency:  Patent      Post Op Vitals Reviewed: Yes      Staff: CRNA         No complications documented      BP (!) 172/74 (07/13/22 0955)    Temp (!) 97 2 °F (36 2 °C) (07/13/22 0955)    Pulse 84 (07/13/22 0955)   Resp 18 (07/13/22 0955)    SpO2 96 % (07/13/22 0955)

## 2022-07-13 NOTE — CONSULTS
Consult Service: Gastroenterology      PATIENT INFORMATION      Elissa Nielsen 67 y o  male MRN: [id number]  Unit/Bed#: -01 Encounter: [id number]  PCP: Gely Vivar MD  Date of Admission:  7/12/2022  Date of Consultation: 07/13/22  Requesting Physician: Yanna Montoya MD       ASSESSMENTS & PLAN   Elissa Nielsen is a 67 y o  old male with PMH of GERD, CAD s/p PCI on aspirin (now held),  Hyperlipidemia, cholecystitis s/p subtotal cholecystectomy who presents with abdominal pain, dizziness, diarrhea    Gastroenterology has been consulted for assistance with management of choledocholithiasis    Choledocholithiasis  · Dilated CBD seen on imaging with 6mm calculus   · Cystic duct remnant seen that is dilated vs partial gall bladder with stones  · Patient will need ERCP today for CBD stone removal  · Will likely need surgery for cystic duct stones  · Keep NPO     Rectal wall thickening/diarrhea  · Unclear if true thickening vs underdistention  · Last colonoscopy done in 2018 which showed one polyp in the descending colon and diverticulosis  · No rectal masses were seen  · Repeat colonoscopy in 2023  · Check stool studies for diarrhea  · Bentyl for abdominal discomfort/IBS type diarrhea    GERD with esophagitis  · EGD in 2018 with severe esophagitis  · Continue PPI         HISTORY OF PRESENT ILLNESS      Elissa Nielsen is a 67 y o  male who is originally admitted for dizziness, abdominal pain, diarrhea and is being consulted for the same  Patient has been dealing with diarrhea chronically but has acutely worsened  Patient reports that he has been having some epigastric and right upper quadrant pain and lower abdominal cramps  He also reports that he has heartburn  He had 1 episode of vomiting that was brown in color  He reported some dizziness as well  His symptoms have been ongoing for the past few days  He denies any blood in his stools  He denies any dysphagia or weight loss        REVIEW OF SYSTEMS     A thorough 12-point review of systems has been conducted  Pertinent positives and negatives are mentioned in the history of present illness         PAST MEDICAL & SURGICAL HISTORY      Past Medical History:   Diagnosis Date    Abdominal aortic aneurysm (AAA) ([address] )     last assessed nov 6 2015    Abscess of groin     last assessed oct 6 2014    Arthritis     Candidiasis, cutaneous     last assessed march 19 2014    Coronary artery disease     Dyslipidemia     Esophageal ulcer without bleeding     Gastritis     Hiatal hernia     Hx of cataract surgery, left     last assessed july 21 2014    Hyperlipidemia     Hypertension     Old myocardial infarction 2000    Recurrent right inguinal hernia     s/p right orchioectomy, mesh removal, and sinus trac removal patient being followed by surgery next appt 4/28/14 patient s/p keflex x 7 days for MSSA Cx repeat wound cx grew MSSA cx repeat wound cx grew MSSA and other armond (mixed) no MRSA identified conitunue close monitoring and local wound care, last assessed april 15 2014    Renal disorder     Wound of skin     in the groin, right       Past Surgical History:   Procedure Laterality Date    ABDOMINAL AORTIC ANEURYSM REPAIR  2009    aortobi-iliac, dacron graft    APPENDECTOMY      open    CARDIAC SURGERY      CATARACT EXTRACTION Bilateral     CHOLECYSTECTOMY N/A 9/29/2020    Procedure: CHOLECYSTECTOMY;  Surgeon: Marisel Boothe MD;  [facility];  Service: General    COLONOSCOPY      CORONARY ANGIOPLASTY WITH STENT PLACEMENT      stent 2    CYSTOSCOPY      diagnostic onset nov 13 2014    EXPLORATORY LAPAROTOMY  12/09/2009    EYE SURGERY      FL INJECTION LEFT HIP (NON ARTHROGRAM)  4/2/2019    HIP SURGERY Right     INGUINAL HERNIA REPAIR Right     unilateral x2    ORCHIECTOMY Right     GA COLONOSCOPY FLX DX W/COLLJ SPEC WHEN PFRMD N/A 5/22/2018    Procedure: COLONOSCOPY;  Surgeon: Lor Valdivia DO;  [facility];  Service: Gastroenterology    AL ESOPHAGOGASTRODUODENOSCOPY TRANSORAL DIAGNOSTIC N/A 2/14/2019    Procedure: ESOPHAGOGASTRODUODENOSCOPY (EGD); Surgeon: Aleksey Kwan MD;  [facility]; Service: Gastroenterology    AL LAP,DIAGNOSTIC ABDOMEN N/A 9/29/2020    Procedure: LAPAROSCOPIC DIAGNOSTIC,   ;  Surgeon: Candelario Gonzalez MD;  [facility];  Service: General    [address] N/A 2/23/2018    Procedure: lysis of adhesions; INCISIONAL HERNIA REPAIR WITH MESH;  Surgeon: Werner Hill MD;  [facility];  Service: General    UPPER GASTROINTESTINAL ENDOSCOPY           MEDICATIONS & ALLERGIES       Medications:   Prior to Admission medications    Medication Sig Start Date End Date Taking?  Authorizing Provider   aspirin 81 MG tablet Take 81 mg by mouth daily Resume on 3/11/18  7/22/16  Yes Historical Provider, MD   atorvastatin (LIPITOR) 40 mg tablet Take 1 tablet (40 mg total) by mouth daily with dinner Resume next scheduled dose upon discharge from the hospital 12/7/21  Yes Skye Barry DO   furosemide (LASIX) 20 mg tablet Take 1 tablet (20 mg total) by mouth daily Can take one additional dose as needed for leg swelling or shortness of breath 3/8/22  Yes Cb Reilly MD   lisinopril (ZESTRIL) 5 mg tablet Take 1 tablet (5 mg total) by mouth daily 12/7/21  Yes Skye Barry DO   metoprolol succinate (TOPROL-XL) 50 mg 24 hr tablet Take 1 tablet (50 mg total) by mouth daily Resume on 3/11/18 12/7/21  Yes Blessing Xenia Mohs, DO   nitroglycerin (NITROSTAT) 0 4 mg SL tablet Place 1 tablet (0 4 mg total) under the tongue every 5 (five) minutes as needed for chest pain 9/26/20  Yes Varsha Dang MD   potassium chloride (K-DUR,KLOR-CON) 10 mEq tablet Take 1 tablet (10 mEq total) by mouth daily 12/7/21  Yes Loretta Erazo, DO   ferrous sulfate 325 (65 Fe) mg tablet Take 1 tablet (325 mg total) by mouth every other day 2/11/22 5/12/22  Skye Barry DO omeprazole (PriLOSEC) 40 MG capsule Take 1 capsule (40 mg total) by mouth daily 10/4/21 4/5/22  Ike Borja DO       Allergies: No Known Allergies      SOCIAL HISTORY      Marital Status:     Substance Use History:   Social History     Substance and Sexual Activity   Alcohol Use Not Currently    Comment: occasionally     Social History     Tobacco Use   Smoking Status Former Smoker   Smokeless Tobacco Never Used   Tobacco Comment    quit 20 years ago     Social History     Substance and Sexual Activity   Drug Use No         FAMILY HISTORY      Non-Contributory      PHYSICAL EXAM     Vitals:   Blood Pressure: 125/70 (07/13/22 0730)  Pulse: 67 (07/13/22 0730)  Temperature: 97 5 °F (36 4 °C) (07/12/22 1320)  Temp Source: Oral (07/12/22 1320)  Respirations: 19 (07/13/22 0500)  SpO2: 94 % (07/13/22 0730)    Physical Exam:   GENERAL: NAD  HEENT:  NC/AT, MMM  CARDIAC:  RRR, +S1/S2, no S3/S4 heard  PULMONARY:  CTA B/L, no wheezing/rales/rhonci, non-labored breathing  ABDOMEN:  Epigastric tenderness, lower abdominal tenderness  DIGITAL RECTAL EXAM: not indicated   NEUROLOGIC:  Alert/oriented x3  SKIN:  No rashes or erythema       ADDITIONAL DATA     Lab Results:     Results from last 7 days   Lab Units 07/13/22  0548   WBC Thousand/uL 4 69   HEMOGLOBIN g/dL 12 4   HEMATOCRIT % 38 7   PLATELETS Thousands/uL 152   NEUTROS PCT % 61   LYMPHS PCT % 27   MONOS PCT % 8   EOS PCT % 3     Results from last 7 days   Lab Units 07/13/22  0510   POTASSIUM mmol/L 3 6   CHLORIDE mmol/L 112*   CO2 mmol/L 21   BUN mg/dL 18   CREATININE mg/dL 0 82   CALCIUM mg/dL 8 6   ALK PHOS U/L 400*   ALT U/L 40   AST U/L 49*           Imaging:    CT abdomen pelvis wo contrast    Addendum Date: 7/12/2022 Addendum:   ADDENDUM: The patient has apparent history of cholecystectomy 9/29/2020 There is a dilated, saccular cystic structure in the gallbladder fossa resembling the gallbladder with stones present  No surgical clips are seen    If above history is accurate, this finding could represent markedly dilated cystic duct remnant with retained stones  Result Date: 7/12/2022  Narrative: CT ABDOMEN AND PELVIS WITHOUT IV CONTRAST INDICATION:  Abdominal pain, acute, nonlocalized, n/v/d  COMPARISON:  CT abdomen and pelvis 10/21/2009, MRI abdomen 9/28/2020 TECHNIQUE:  CT examination of the abdomen and pelvis was performed without intravenous contrast  This examination was performed without intravenous contrast in the context of the critical nationwide Omnipaque shortage  Axial, sagittal, and coronal 2D reformatted images were created from the source data and submitted for interpretation  Radiation dose length product (DLP) for this visit:  398 44 mGy-cm  This examination, like all CT scans performed in the [facility], was performed utilizing techniques to minimize radiation dose exposure, including the use of iterative reconstruction and automated exposure control  Enteric contrast was not administered  FINDINGS: ABDOMEN Evaluation of the solid organs is limited without IV contrast  LOWER CHEST:  Dependent hypoventilatory changes, left greater than right  LIVER/BILIARY TREE:  Foci of high attenuation within the CBD suggesting choledocholithiasis (series 601/64)  CBD calculus measuring up to 6 mm noted  Mild CBD dilatation suggested up to 1 cm  No focal hepatic lesions within limitations  GALLBLADDER:  Cholelithiasis  No pericholecystic inflammatory change  SPLEEN:  The spleen is enlarged, measuring 14 1 cm  PANCREAS:  Moderately severe fatty involution without acute findings  ADRENAL GLANDS:  Unremarkable  KIDNEYS/URETERS:  Unremarkable  No hydronephrosis  STOMACH AND BOWEL:  Limited evaluation of the bowel without oral or IV contrast  Large hiatal hernia  Fluid-filled mildly dilated small bowel loop in the anterior upper pelvis  This could be related to transient peristalsis given absence of convincing transition    Remaining small bowel appears normal caliber  Descending and sigmoid colon diverticulosis without evidence of diverticulitis  Question mild circumferential rectal wall thickening versus underdistention  Correlate with physical exam  APPENDIX:  Absent per surgical history  ABDOMINOPELVIC CAVITY:  Small amount of perisplenic ascites  No pneumoperitoneum  Evaluation of lymphadenopathy is limited in the absence of intravenous contrast  No bulky adenopathy detected on this noncontrast study  VESSELS: Status post abdominal aortic aneurysm repair without evidence for recurrent aneurysm  PELVIS REPRODUCTIVE ORGANS:  Mildly prominent prostate  URINARY BLADDER:  The bladder is displaced toward the right anteriorly touching upon right inguinal postsurgical site, possibly due to adhesion  No intraluminal calculus  ABDOMINAL WALL/INGUINAL REGIONS:  Footprint of previous ventral abdominal wall hernia repair  Atrophy of the right rectus musculature with protuberance of the ventral abdominal wall  Right inguinal scarring  OSSEOUS STRUCTURES:  No acute fracture or destructive osseous lesion  Bony ankylosis of the right hip with metallic nail-like density in place  Moderately advanced left hip osteoarthritis  Levoscoliosis with multilevel degenerative changes of the spine  Small sclerotic focus within the left posterior 9th rib, not seen previously but possibly a bone island  Impression: 1  Choledocholithiasis with mild CBD dilatation  2  Cholelithiasis without evidence for cholecystitis  3  Mild splenomegaly with a small amount of perisplenic ascites  4  Colonic diverticulosis without evidence of diverticulitis  Question mild circumferential rectal wall thickening versus underdistention  Correlate with physical exam  Additional incidental findings as above   Limited study without IV or oral contrast  Workstation performed: [id number]     US bedside procedure    Result Date: 7/12/2022  Narrative: 1 2 [id number]  2 446 246 [id number] 200 1      EKG, Pathology, and Other Studies Reviewed on Admission:   · EKG: Reviewed      Counseling / Coordination of Care Time: 30 total mins spent n consult  Greater than 50% of total time spent on patient counseling and coordination of care  Edith Flores MD   PGY-5,   Gastroenterology         ** Please Note: This note is constructed using a voice recognition dictation system   **

## 2022-07-14 VITALS
RESPIRATION RATE: 18 BRPM | OXYGEN SATURATION: 90 % | TEMPERATURE: 97.5 F | DIASTOLIC BLOOD PRESSURE: 61 MMHG | SYSTOLIC BLOOD PRESSURE: 128 MMHG | HEART RATE: 79 BPM

## 2022-07-14 LAB
ALBUMIN SERPL BCP-MCNC: 2.1 G/DL (ref 3.5–5)
ALP SERPL-CCNC: 375 U/L (ref 46–116)
ALT SERPL W P-5'-P-CCNC: 35 U/L (ref 12–78)
ANION GAP SERPL CALCULATED.3IONS-SCNC: 6 MMOL/L (ref 4–13)
AST SERPL W P-5'-P-CCNC: 39 U/L (ref 5–45)
BASOPHILS # BLD AUTO: 0 THOUSANDS/ΜL (ref 0–0.1)
BASOPHILS NFR BLD AUTO: 0 % (ref 0–1)
BILIRUB SERPL-MCNC: 0.99 MG/DL (ref 0.2–1)
BUN SERPL-MCNC: 15 MG/DL (ref 5–25)
CALCIUM ALBUM COR SERPL-MCNC: 10.1 MG/DL (ref 8.3–10.1)
CALCIUM SERPL-MCNC: 8.6 MG/DL (ref 8.3–10.1)
CHLORIDE SERPL-SCNC: 112 MMOL/L (ref 100–108)
CO2 SERPL-SCNC: 24 MMOL/L (ref 21–32)
CREAT SERPL-MCNC: 0.72 MG/DL (ref 0.6–1.3)
EOSINOPHIL # BLD AUTO: 0 THOUSAND/ΜL (ref 0–0.61)
EOSINOPHIL NFR BLD AUTO: 0 % (ref 0–6)
ERYTHROCYTE [DISTWIDTH] IN BLOOD BY AUTOMATED COUNT: 14.6 % (ref 11.6–15.1)
GFR SERPL CREATININE-BSD FRML MDRD: 89 ML/MIN/1.73SQ M
GLUCOSE SERPL-MCNC: 87 MG/DL (ref 65–140)
HCT VFR BLD AUTO: 38.3 % (ref 36.5–49.3)
HGB BLD-MCNC: 12.9 G/DL (ref 12–17)
IMM GRANULOCYTES # BLD AUTO: 0.01 THOUSAND/UL (ref 0–0.2)
IMM GRANULOCYTES NFR BLD AUTO: 0 % (ref 0–2)
LYMPHOCYTES # BLD AUTO: 0.65 THOUSANDS/ΜL (ref 0.6–4.47)
LYMPHOCYTES NFR BLD AUTO: 22 % (ref 14–44)
MCH RBC QN AUTO: 29.5 PG (ref 26.8–34.3)
MCHC RBC AUTO-ENTMCNC: 33.7 G/DL (ref 31.4–37.4)
MCV RBC AUTO: 88 FL (ref 82–98)
MONOCYTES # BLD AUTO: 0.16 THOUSAND/ΜL (ref 0.17–1.22)
MONOCYTES NFR BLD AUTO: 5 % (ref 4–12)
NEUTROPHILS # BLD AUTO: 2.12 THOUSANDS/ΜL (ref 1.85–7.62)
NEUTS SEG NFR BLD AUTO: 73 % (ref 43–75)
NRBC BLD AUTO-RTO: 0 /100 WBCS
PLATELET # BLD AUTO: 146 THOUSANDS/UL (ref 149–390)
PMV BLD AUTO: 10.3 FL (ref 8.9–12.7)
POTASSIUM SERPL-SCNC: 3.7 MMOL/L (ref 3.5–5.3)
PROT SERPL-MCNC: 6.8 G/DL (ref 6.4–8.2)
RBC # BLD AUTO: 4.37 MILLION/UL (ref 3.88–5.62)
SODIUM SERPL-SCNC: 142 MMOL/L (ref 136–145)
WBC # BLD AUTO: 2.94 THOUSAND/UL (ref 4.31–10.16)

## 2022-07-14 PROCEDURE — 80053 COMPREHEN METABOLIC PANEL: CPT | Performed by: SURGERY

## 2022-07-14 PROCEDURE — 85025 COMPLETE CBC W/AUTO DIFF WBC: CPT | Performed by: SURGERY

## 2022-07-14 PROCEDURE — 99238 HOSP IP/OBS DSCHRG MGMT 30/<: CPT | Performed by: SURGERY

## 2022-07-14 PROCEDURE — NC001 PR NO CHARGE: Performed by: SURGERY

## 2022-07-14 PROCEDURE — 99232 SBSQ HOSP IP/OBS MODERATE 35: CPT | Performed by: INTERNAL MEDICINE

## 2022-07-14 RX ADMIN — METOPROLOL SUCCINATE 50 MG: 50 TABLET, EXTENDED RELEASE ORAL at 08:39

## 2022-07-14 RX ADMIN — HEPARIN SODIUM 5000 UNITS: 5000 INJECTION INTRAVENOUS; SUBCUTANEOUS at 06:11

## 2022-07-14 RX ADMIN — PANTOPRAZOLE SODIUM 40 MG: 40 TABLET, DELAYED RELEASE ORAL at 06:11

## 2022-07-14 RX ADMIN — SODIUM CHLORIDE, SODIUM LACTATE, POTASSIUM CHLORIDE, AND CALCIUM CHLORIDE 100 ML/HR: .6; .31; .03; .02 INJECTION, SOLUTION INTRAVENOUS at 02:29

## 2022-07-14 RX ADMIN — HEPARIN SODIUM 5000 UNITS: 5000 INJECTION INTRAVENOUS; SUBCUTANEOUS at 14:49

## 2022-07-14 NOTE — PLAN OF CARE
Problem: Prexisting or High Potential for Compromised Skin Integrity  Goal: Skin integrity is maintained or improved  Description: INTERVENTIONS:  - Identify patients at risk for skin breakdown  - Assess and monitor skin integrity  - Assess and monitor nutrition and hydration status  - Monitor labs   - Assess for incontinence   - Turn and reposition patient  - Assist with mobility/ambulation  - Relieve pressure over bony prominences  - Avoid friction and shearing  - Provide appropriate hygiene as needed including keeping skin clean and dry  - Evaluate need for skin moisturizer/barrier cream  - Collaborate with interdisciplinary team   - Patient/family teaching  - Consider wound care consult   Outcome: Progressing     Problem: Potential for Falls  Goal: Patient will remain free of falls  Description: INTERVENTIONS:  - Educate patient/family on patient safety including physical limitations  - Instruct patient to call for assistance with activity   - Consult OT/PT to assist with strengthening/mobility   - Keep Call bell within reach  - Keep bed low and locked with side rails adjusted as appropriate  - Keep care items and personal belongings within reach  - Initiate and maintain comfort rounds  - Make Fall Risk Sign visible to staff  - Apply yellow socks and bracelet for high fall risk patients  - Consider moving patient to room near nurses station  Outcome: Progressing     Problem: MOBILITY - ADULT  Goal: Maintain or return to baseline ADL function  Description: INTERVENTIONS:  -  Assess patient's ability to carry out ADLs; assess patient's baseline for ADL function and identify physical deficits which impact ability to perform ADLs (bathing, care of mouth/teeth, toileting, grooming, dressing, etc )  - Assess/evaluate cause of self-care deficits   - Assess range of motion  - Assess patient's mobility; develop plan if impaired  - Assess patient's need for assistive devices and provide as appropriate  - Encourage maximum independence but intervene and supervise when necessary  - Involve family in performance of ADLs  - Assess for home care needs following discharge   - Consider OT consult to assist with ADL evaluation and planning for discharge  - Provide patient education as appropriate  Outcome: Progressing  Goal: Maintains/Returns to pre admission functional level  Description: INTERVENTIONS:  - Perform BMAT or MOVE assessment daily    - Set and communicate daily mobility goal to care team and patient/family/caregiver     - Collaborate with rehabilitation services on mobility goals if consulted  - Out of bed for toileting  - Record patient progress and toleration of activity level   Outcome: Progressing     Problem: PAIN - ADULT  Goal: Verbalizes/displays adequate comfort level or baseline comfort level  Description: Interventions:  - Encourage patient to monitor pain and request assistance  - Assess pain using appropriate pain scale  - Administer analgesics based on type and severity of pain and evaluate response  - Implement non-pharmacological measures as appropriate and evaluate response  - Consider cultural and social influences on pain and pain management  - Notify physician/advanced practitioner if interventions unsuccessful or patient reports new pain  Outcome: Progressing     Problem: INFECTION - ADULT  Goal: Absence or prevention of progression during hospitalization  Description: INTERVENTIONS:  - Assess and monitor for signs and symptoms of infection  - Monitor lab/diagnostic results  - Monitor all insertion sites, i e  indwelling lines, tubes, and drains  - Monitor endotracheal if appropriate and nasal secretions for changes in amount and color  - [employer] appropriate cooling/warming therapies per order  - Administer medications as ordered  - Instruct and encourage patient and family to use good hand hygiene technique  - Identify and instruct in appropriate isolation precautions for identified infection/condition  Outcome: Progressing  Goal: Absence of fever/infection during neutropenic period  Description: INTERVENTIONS:  - Monitor WBC    Outcome: Progressing     Problem: SAFETY ADULT  Goal: Patient will remain free of falls  Description: INTERVENTIONS:  - Educate patient/family on patient safety including physical limitations  - Instruct patient to call for assistance with activity   - Consult OT/PT to assist with strengthening/mobility   - Keep Call bell within reach  - Keep bed low and locked with side rails adjusted as appropriate  - Keep care items and personal belongings within reach  - Initiate and maintain comfort rounds  - Make Fall Risk Sign visible to staff  - Apply yellow socks and bracelet for high fall risk patients  - Consider moving patient to room near nurses station  Outcome: Progressing  Goal: Maintain or return to baseline ADL function  Description: INTERVENTIONS:  -  Assess patient's ability to carry out ADLs; assess patient's baseline for ADL function and identify physical deficits which impact ability to perform ADLs (bathing, care of mouth/teeth, toileting, grooming, dressing, etc )  - Assess/evaluate cause of self-care deficits   - Assess range of motion  - Assess patient's mobility; develop plan if impaired  - Assess patient's need for assistive devices and provide as appropriate  - Encourage maximum independence but intervene and supervise when necessary  - Involve family in performance of ADLs  - Assess for home care needs following discharge   - Consider OT consult to assist with ADL evaluation and planning for discharge  - Provide patient education as appropriate  Outcome: Progressing  Goal: Maintains/Returns to pre admission functional level  Description: INTERVENTIONS:  - Perform BMAT or MOVE assessment daily    - Set and communicate daily mobility goal to care team and patient/family/caregiver     - Collaborate with rehabilitation services on mobility goals if consulted  - Out of bed for toileting  - Record patient progress and toleration of activity level   Outcome: Progressing     Problem: DISCHARGE PLANNING  Goal: Discharge to home or other facility with appropriate resources  Description: INTERVENTIONS:  - Identify barriers to discharge w/patient and caregiver  - Arrange for needed discharge resources and transportation as appropriate  - Identify discharge learning needs (meds, wound care, etc )  - Arrange for interpretive services to assist at discharge as needed  - Refer to Case Management Department for coordinating discharge planning if the patient needs post-hospital services based on physician/advanced practitioner order or complex needs related to functional status, cognitive ability, or social support system  Outcome: Progressing     Problem: Knowledge Deficit  Goal: Patient/family/caregiver demonstrates understanding of disease process, treatment plan, medications, and discharge instructions  Description: Complete learning assessment and assess knowledge base  Interventions:  - Provide teaching at level of understanding  - Provide teaching via preferred learning methods  Outcome: Progressing     Problem: Nutrition/Hydration-ADULT  Goal: Nutrient/Hydration intake appropriate for improving, restoring or maintaining nutritional needs  Description: Monitor and assess patient's nutrition/hydration status for malnutrition  Collaborate with interdisciplinary team and initiate plan and interventions as ordered  Monitor patient's weight and dietary intake as ordered or per policy  Utilize nutrition screening tool and intervene as necessary  Determine patient's food preferences and provide high-protein, high-caloric foods as appropriate       INTERVENTIONS:  - Monitor oral intake, urinary output, labs, and treatment plans  - Assess nutrition and hydration status and recommend course of action  - Evaluate amount of meals eaten  - Assist patient with eating if necessary   - Allow adequate time for meals  - Recommend/ encourage appropriate diets, oral nutritional supplements, and vitamin/mineral supplements  - Order, calculate, and assess calorie counts as needed  - Recommend, monitor, and adjust tube feedings and TPN/PPN based on assessed needs  - Assess need for intravenous fluids  - Provide specific nutrition/hydration education as appropriate  - Include patient/family/caregiver in decisions related to nutrition  Outcome: Progressing

## 2022-07-14 NOTE — PROGRESS NOTES
Gastroenterology Progress Note      PATIENT INFORMATION      Patient: Lizeth Underwood 67 y o  male   MRN: [id number]  PCP: Tex Mendes MD  Unit/Bed#: -77 Encounter: [id number]  Date Of Visit: 22  Current Length of Stay: 2 day(s)     Andrey Beatty is a 67 y o  old male with PMH of GERD, CAD s/p PCI on aspirin (now held),  Hyperlipidemia, cholecystitis s/p subtotal cholecystectomy who presents with abdominal pain, dizziness, diarrhea    Gastroenterology has been consulted for assistance with management of choledocholithiasis     Choledocholithiasis  · Dilated CBD seen on imaging with 6mm calculus   · Cystic duct remnant seen that is dilated vs partial gall bladder with stones  · S/p ERCP  with sphincterotomy and removal of stones  · No post ercp complications, lft's improved  · Tolerating diet  · Advised low fat diet  · Ok for discharge from GI standpoint     Rectal wall thickening/diarrhea  · Unclear if true thickening vs underdistention  · Last colonoscopy done in  which showed one polyp in the descending colon and diverticulosis  · No rectal masses were seen  · Repeat colonoscopy in   · Check stool studies for diarrhea  · Bentyl for abdominal discomfort/IBS type diarrhea    GERD with esophagitis  · EGD in 2018 with severe esophagitis  · Continue PPI       Disposition: We will sign off, okay for discharge      SUBJECTIVE     Patient denies abdominal pain, nausea, vomiting, heartburn, dysphagia, constipation, diarrhea, blood in stools  OBJECTIVE     Vitals:   Temp (24hrs), Av 6 °F (36 4 °C), Min:97 2 °F (36 2 °C), Max:97 9 °F (36 6 °C)    Temp:  [97 2 °F (36 2 °C)-97 9 °F (36 6 °C)] 97 9 °F (36 6 °C)  HR:  [49-84] 79  Resp:  [18-22] 18  BP: (121-187)/(56-78) 133/78  SpO2:  [88 %-96 %] 90 %  There is no height or weight on file to calculate BMI  Input and Output Summary (last 24 hours):        Intake/Output Summary (Last 24 hours) at 2022 2986  Last data filed at 7/14/2022 0229  Gross per 24 hour   Intake 1830 ml   Output 1030 ml   Net 800 ml       Physical Exam:   GENERAL: Non toxic appearing  HEENT:  Normocephalic, atraumatic, pupils bilaterally reactive to light  CARDIAC:  Regular rate and rhythm, S1, S2 heard, no murmurs  PULMONARY:  CTA B/L, no wheezing/rales/rhonci, non-labored breathing  ABDOMEN:  Soft, NT/ND, +BS, no rebound/guarding/rigidity  Extremities:  2+ Pulses in DP/PT  No edema, cyanosis, or clubbing  NEUROLOGIC:  Alert/oriented x3  SKIN:  No rashes or erythema          ADDITIONAL DATA     Labs & Recent Cultures:     Results from last 7 days   Lab Units 07/14/22  0529   WBC Thousand/uL 2 94*   HEMOGLOBIN g/dL 12 9   HEMATOCRIT % 38 3   PLATELETS Thousands/uL 146*   NEUTROS PCT % 73   LYMPHS PCT % 22   MONOS PCT % 5   EOS PCT % 0     Results from last 7 days   Lab Units 07/14/22  0529   POTASSIUM mmol/L 3 7   CHLORIDE mmol/L 112*   CO2 mmol/L 24   BUN mg/dL 15   CREATININE mg/dL 0 72   CALCIUM mg/dL 8 6   ALK PHOS U/L 375*   ALT U/L 35   AST U/L 39                     Nutrition:  Diet GI; Lo Fat  Radiology Results:   CT abdomen pelvis wo contrast   Final Result by Yehuda Blackman DO (07/12 1517)   Addendum 1 of 1 by Yehuda Blackman DO (07/12 1517)   ADDENDUM:      The patient has apparent history of cholecystectomy 9/29/2020 There is a    dilated, saccular cystic structure in the gallbladder fossa resembling the    gallbladder with stones present  No surgical clips are seen  If above    history is accurate, this finding    could represent markedly dilated cystic duct remnant with retained stones  Final      1  Choledocholithiasis with mild CBD dilatation  2  Cholelithiasis without evidence for cholecystitis  3  Mild splenomegaly with a small amount of perisplenic ascites  4  Colonic diverticulosis without evidence of diverticulitis   Question mild circumferential rectal wall thickening versus underdistention  Correlate with physical exam       Additional incidental findings as above  Limited study without IV or oral contrast       Workstation performed: [id number]         [place] ERCP biliary only    (Results Pending)     Scheduled Medications:  acetaminophen, 975 mg, Q8H [address]  atorvastatin, 40 mg, Daily With Dinner  heparin (porcine), 5,000 Units, Q8H [address]  metoprolol succinate, 50 mg, Daily  pantoprazole, 40 mg, Early Morning        PRN MEDS:  naloxone, 0 04 mg, Q1MIN PRN  ondansetron, 4 mg, Q4H PRN  oxyCODONE, 2 5 mg, Q6H PRN  oxyCODONE, 5 mg, Q6H PRN        Last 24 Hours Medication List:   Current Facility-Administered Medications   Medication Dose Route Frequency Provider Last Rate    acetaminophen  975 mg Oral Q8H Abraham Hartman MD      atorvastatin  40 mg Oral Daily With Tevin Coulter MD      heparin (porcine)  5,000 Units Subcutaneous [facility] Nimisha Miner MD      lactated ringers  100 mL/hr Intravenous Continuous Nimisha Miner  mL/hr (07/14/22 0229)    metoprolol succinate  50 mg Oral Daily Nimisha Miner MD      naloxone  0 04 mg Intravenous Q1MIN PRN Nimisha Miner MD      ondansetron  4 mg Intravenous Q4H PRN Nimisha Miner MD      oxyCODONE  2 5 mg Oral Q6H PRN Nimisha Miner MD      oxyCODONE  5 mg Oral Q6H PRN Nimisha Miner MD      pantoprazole  40 mg Oral Early Morning Nimisha Miner MD            Time Spent for Care: 30 mins spent in total   More than 50% of total time spent on counseling and coordination of care as described above  Current Length of Stay: 2 day(s)      Code Status: Level 1 - Full Code          ** Please Note: This note is constructed using a voice recognition dictation system   **

## 2022-07-14 NOTE — DISCHARGE SUMMARY
Discharge Summary - General Surgery   Miguel Angel Chu 67 y o  male MRN: [id number]  Unit/Bed#: -01 Encounter: [id number]    Admission Date: 7/12/2022     Discharge Date: 7/14/2022    Admitting Diagnosis: Choledocholithiasis [K80 50]  Dizziness [R42]    Discharge Diagnosis: Choledocholithiasis s/p ERCP    Attending and Service: Dr Harvis Phalen  Consulting Physician(s): GI    Imaging and Procedures Performed:   Orders Placed This Encounter   Procedures    POC Biliary US       ERCP    Result Date: 7/13/2022  Impression: ERCP with sphincterotomy and extraction of common bile duct stones  RECOMMENDATION: Return to hospital [place] for ongoing care Follow LFTs Further recommendations per surgical team Afshin Graves MD     CT abdomen pelvis wo contrast    Addendum Date: 7/12/2022    ADDENDUM: The patient has apparent history of cholecystectomy 9/29/2020 There is a dilated, saccular cystic structure in the gallbladder fossa resembling the gallbladder with stones present  No surgical clips are seen  If above history is accurate, this finding could represent markedly dilated cystic duct remnant with retained stones  Result Date: 7/12/2022  Impression: 1  Choledocholithiasis with mild CBD dilatation  2  Cholelithiasis without evidence for cholecystitis  3  Mild splenomegaly with a small amount of perisplenic ascites  4  Colonic diverticulosis without evidence of diverticulitis  Question mild circumferential rectal wall thickening versus underdistention  Correlate with physical exam  Additional incidental findings as above  Limited study without IV or oral contrast  Workstation performed: [id number]     [place] ERCP biliary only    Result Date: 7/14/2022  Impression: Fluoroscopic guidance was provided for performance of ERCP  Please see separate procedure report for further details   Workstation performed: [employer] [place] Course: Miguel Angel Chu is a 76-year-old male with PMHx of AAA s/p open repair, CAD s/p stents x2, HLD, HTN, gastritis, hiatal hernia, open appy, lap converted to open marly Beau Thomas 2020), incisional hernia repair w mesh ([place] 2018), RIHR, R orchiectomy who presented with dizziness, vomiting, and diarrhea  He was admitted to general surgery team for GI consult and subsequent ERCP  He underwent ERCP on 7/13 with findings of ERCP with sphincterotomy and extraction of common bile duct stones  He was started on a CLD post ERCP  On 7/14 his diet was advanced to Low Fat diet diet and tolerated this well  Orthostatics were taken prior to leaving and patient was ambulated with nurse and PT without difficulty or dizziness  Discharge instructions discussed with the patient and he is in agreement with plan  On discharge, the patient is instructed to follow-up with the patient's primary care provider within the next 2 weeks to review the events of the recent hospitalization  The patient is instructed to follow-up with the Acute Care Surgical Services as scheduled on 8/4 at 10:15AM  The patient is instructed to follow the provided discharge instructions  Condition at Discharge: fair     Discharge instructions/Information to patient and family:   See after visit summary for information provided to patient and family  Provisions for Follow-Up Care:  See after visit summary for information related to follow-up care and any pertinent home health orders  Disposition: Home    Planned Readmission: No    Discharge Statement   I spent 30 minutes discharging the patient  This time was spent on the day of discharge  I had direct contact with the patient on the day of discharge  Additional documentation is required if more than 30 minutes were spent on discharge  Discharge Medications:  See after visit summary for reconciled discharge medications provided to patient and family      Edna Mosher PA-C  7/14/2022  3:31 PM

## 2022-07-14 NOTE — PROGRESS NOTES
Progress Note - General Surgery   Krystina Currie 67 y o  male MRN: [id number]  Unit/Bed#: -01 Encounter: [id number]    Assessment:  67yo M with history of subtotal cholecystectomy 2020 who now has choledocholithiasis s/p ERCP 7/13    Plan:  - low fat diet  - discontinue IVF  - discharge home today    Subjective/Objective   Subjective:   Pain is well controlled per patient, improved from pain prior to ERCP  Has not tried eating anything, but has been tolerating liquids without n/v     Objective:     Blood pressure 121/60, pulse (!) 53, temperature 97 7 °F (36 5 °C), resp  rate 18, SpO2 95 %  ,There is no height or weight on file to calculate BMI        Intake/Output Summary (Last 24 hours) at 7/14/2022 0618  Last data filed at 7/14/2022 0229  Gross per 24 hour   Intake 1830 ml   Output 430 ml   Net 1400 ml       Invasive Devices  Report    Peripheral Intravenous Line  Duration           Peripheral IV 07/12/22 Right Antecubital 1 day                Physical Exam:  General: NAD  Neuro: A&Ox3  HEENT: Normocephalic, atraumatic, moist mucus membranes  CV: regular rate  Lung: normal work of breathing  Abd: soft, non-distended, multiple well-healed scars from previous surgeries, mildly TTP   MSK: normal ROM      Art Cruz MD  General Surgery PGY1

## 2022-07-14 NOTE — INCIDENTAL FINDINGS
The following findings require follow up:  Radiographic finding   Finding:   SPLEEN:  The spleen is enlarged, measuring 14 1 cm  STOMACH AND BOWEL: Large hiatal hernia  Descending and sigmoid colon diverticulosis without evidence of diverticulitis  Question mild circumferential rectal wall thickening versus underdistention  Correlate with physical exam   REPRODUCTIVE ORGANS:  Mildly prominent prostate  Follow up required: Follow up with primary care physician  Follow up should be done within 3 month(s)    Please notify the following clinician to assist with the follow up:   Dr Josh Gonzalez or establish care with primary care physician

## 2022-07-18 ENCOUNTER — TELEPHONE (OUTPATIENT)
Dept: INTERNAL MEDICINE CLINIC | Facility: CLINIC | Age: 77
End: 2022-07-18

## 2022-07-18 ENCOUNTER — TRANSITIONAL CARE MANAGEMENT (OUTPATIENT)
Dept: INTERNAL MEDICINE CLINIC | Facility: CLINIC | Age: 77
End: 2022-07-18

## 2022-07-18 NOTE — TELEPHONE ENCOUNTER
I scheduled patient for 7/26/22 for his TCM  No one was available for the call  Please call to follow up

## 2022-07-19 ENCOUNTER — TRANSITIONAL CARE MANAGEMENT (OUTPATIENT)
Dept: INTERNAL MEDICINE CLINIC | Facility: CLINIC | Age: 77
End: 2022-07-19

## 2022-07-26 ENCOUNTER — OFFICE VISIT (OUTPATIENT)
Dept: INTERNAL MEDICINE CLINIC | Facility: CLINIC | Age: 77
End: 2022-07-26

## 2022-07-26 VITALS — SYSTOLIC BLOOD PRESSURE: 167 MMHG | DIASTOLIC BLOOD PRESSURE: 73 MMHG | TEMPERATURE: 98 F | HEART RATE: 55 BPM

## 2022-07-26 DIAGNOSIS — I25.10 CORONARY ARTERY DISEASE INVOLVING NATIVE HEART WITHOUT ANGINA PECTORIS, UNSPECIFIED VESSEL OR LESION TYPE: ICD-10-CM

## 2022-07-26 DIAGNOSIS — I10 PRIMARY HYPERTENSION: ICD-10-CM

## 2022-07-26 DIAGNOSIS — G47.9 SLEEP DISTURBANCE: ICD-10-CM

## 2022-07-26 DIAGNOSIS — Z76.89 ENCOUNTER FOR SUPPORT AND COORDINATION OF TRANSITION OF CARE: Primary | ICD-10-CM

## 2022-07-26 DIAGNOSIS — H81.10 BENIGN PAROXYSMAL POSITIONAL VERTIGO, UNSPECIFIED LATERALITY: ICD-10-CM

## 2022-07-26 DIAGNOSIS — K80.50 CHOLEDOCHOLITHIASIS: ICD-10-CM

## 2022-07-26 DIAGNOSIS — R16.1 SPLENOMEGALY: ICD-10-CM

## 2022-07-26 PROCEDURE — 99495 TRANSJ CARE MGMT MOD F2F 14D: CPT | Performed by: INTERNAL MEDICINE

## 2022-07-26 RX ORDER — MECLIZINE HYDROCHLORIDE 25 MG/1
25 TABLET ORAL 3 TIMES DAILY PRN
Qty: 30 TABLET | Refills: 0 | Status: SHIPPED | OUTPATIENT
Start: 2022-07-26 | End: 2022-10-08

## 2022-07-26 NOTE — PROGRESS NOTES
[facility]  INTERNAL MEDICINE OFFICE VISIT     PATIENT INFORMATION     Iris Arcos   67 y o  male   MRN: [id number]    ASSESSMENT/PLAN     1  Encounter for support and coordination of transition of care    2  Choledocholithiasis  - Patient recently hospitalized from 07/12-07/14 for choledocholithiasis  - Patient presented to ER on 07/12 with dizziness, nausea, vomiting, diarrhea  Admitted to general surgery service for choledocholithiasis  - No surgical intervention required  Patient underwent ERCP on 07/13 s/p sphincterectomy and extraction of common bile duct stones  - Patient subsequently discharged next day on 07/14  - Patient reports resolution of vomiting and diarrhea since discharge  - Outpatient surgery follow-up scheduled for 08/04/2022    3  Coronary artery disease involving native heart without angina pectoris, unspecified vessel or lesion type  - Stable  - Continue atorvastatin 40, aspirin 81, metoprolol XL 50 daily  - Outpatient cardiology appointment scheduled for 08/18/2022    4  Primary hypertension  - BP in office today 167/73, however patient did not take his Lasix today in anticipation of being outside house and in public for appointments to avoid frequent urination   - Continue lasix 20 mg daily, lisinopril 5 mg daily, metoprolol XL 50 daily    5  Benign paroxysmal positional vertigo, unspecified laterality  -     meclizine (ANTIVERT) 25 mg tablet; Take 1 tablet (25 mg total) by mouth 3 (three) times a day as needed for dizziness  - Patient with persistent dizziness after hospitalization  - He states that he had an episode of dizziness on 07/16 that lasted approximately 10 minutes  - Patient was in his recliner when the room started spinning  He denies any palpitations at the time  - Patient states that he has noticed decreased hearing in his left ear over the past couple years    Patient states that he noticed the dizziness started approximately 1 year ago  - No ringing sensations in ear, and no ear pain  - Patient does say that the dizziness is often associated with a feeling in his head that he describes as "something that's in there", but does not describe it as a headache    - He does not believe dizziness to be associated with any position in particular  - Albino Halpike maneuver performed in office  When patient's head was turned to left and he was moved into lying position, patient immediately experienced vertigo and described the room as spinning  This lasted a few seconds and then resolved  No nystagmus noted  - Will treat with meclizine 25 mg PRN  If symptoms increase in frequency or worsen, can consider vestibular therapy  6  Splenomegaly  - CT abdomen pelvis w/o contrast on 07/12/2022 noted "Mild splenomegaly with a small amount of perisplenic ascites  "  - Patient denied abdominal pain, abdominal bloating/distention, early satiety, referred pain to chest/shoulder  Patient does state that approximately 1 month ago he awoke in the night in a sweat, no other occurrences  - CBC on 07/14 showed leukopenia of 2 94 and thrombocytopenia of 146  Previous CBC's normal    - Low suspicion for acute process  Would recommend repeating CBC in 6 months at next [facility] Annual Wellness visit       7  Sleep disturbance  - Patient with complain of trouble falling asleep at night  - Patient has poor sleep hygiene, counseled patient on proper sleep hygiene including avoidance of watching TV before bedtime  - Recommended for patient to try melatonin 3-5 mg over the counter and to take 1/2 hour before bedtime      HEALTH MAINTENANCE     Immunization History   Administered Date(s) Administered    COVID-19 MODERNA VACC 0 5 ML IM 03/26/2021, 04/28/2021, 11/17/2021, 05/23/2022    INFLUENZA 12/31/2017    Influenza Quadrivalent, 6-35 Months IM 01/31/2017    Influenza, high dose seasonal 0 7 mL 10/19/2018, 09/30/2019, 11/16/2020, 10/05/2021    Influenza, seasonal, injectable 11/25/2013, 10/08/2015    Influenza, seasonal, injectable, preservative free 12/31/2017    Pneumococcal Conjugate 13-Valent 03/04/2016    Pneumococcal Polysaccharide PPV23 08/08/2011    Tdap 07/11/2012    Tuberculin Skin Test-PPD Intradermal 10/05/2020     Immunizations:  · None at this time  Screening:  · Per chart review patient due for repeat colonoscopy in 2023, 5 years after last one in 2018  Care Gaps:      CHIEF COMPLAINT     Chief Complaint   Patient presents with    Transition of Care Management      HISTORY OF PRESENT ILLNESS     Mr Marii Pinedo is a 48-year-old male with past medical history of abdominal aortic aneurysm status post open repair, CAD status post stents x2, hyperlipidemia, hypertension, gastritis, choledocholithiasis  Patient had recent hospitalization on 07/12 through 07/14 for choledocholithiasis  Patient presented to emergency department with dizziness, vomiting, and diarrhea  He was admitted to general surgery team   Patient underwent ERCP on 07/13 status post sphincterectomy and extraction of common bile duct stones  Patient was subsequent discharged next day on 07/14  Patient is scheduled for outpatient surgery follow-up on 08/04/2022  Patient presents in office today for a TCM visit following his recent hospitalization  Patient states that since he has been discharged, his vomiting and diarrhea has resolved and is no longer having issues with his gallbladder  He states that his dizziness is seem to persist   He states that he had an episode of dizziness on 07/16 that lasted approximately 10 minutes  Patient was in his recliner when the room started spinning  He denies any palpitations at the time  Patient states that he has noticed decreased hearing in his left ear over the past couple years  Patient states that he noticed the dizziness started approximately 1 year ago  No ringing sensations in ear, and no ear pain    Patient does say that the dizziness is often associated with a feeling in his head that he describes as "something that's in there", but does not describe it as a headache  He does not believe dizziness to be associated with any position in particular  Patient also complains of difficulty sleeping at night  Describes poor sleep hygiene (watches television before bed)  He presently denies any fever, chills, nausea, vomiting, diarrhea, constipation, chest pain, shortness a breath  REVIEW OF SYSTEMS     Review of Systems   Constitutional: Negative for chills, fatigue, fever and unexpected weight change  HENT: Positive for hearing loss (chronic, left side)  Negative for congestion, ear pain, rhinorrhea and sore throat  Eyes: Negative for pain and redness  Respiratory: Negative for cough, shortness of breath and wheezing  Cardiovascular: Negative for chest pain and palpitations  Gastrointestinal: Negative for abdominal distention, constipation, diarrhea, nausea and vomiting  Genitourinary: Negative for difficulty urinating, dysuria and frequency  Neurological: Positive for dizziness  Negative for syncope, weakness, light-headedness, numbness and headaches  Psychiatric/Behavioral: Negative for agitation, behavioral problems and confusion  OBJECTIVE     Vitals:    07/26/22 0855   BP: 167/73   BP Location: Right arm   Patient Position: Sitting   Cuff Size: Standard   Pulse: 55   Temp: 98 °F (36 7 °C)   TempSrc: Temporal     Physical Exam  Constitutional:       General: He is not in acute distress  Appearance: Normal appearance  HENT:      Head: Normocephalic and atraumatic  Comments: Josué Moseley maneuver was performed in office  Patient experienced vertigo with room spinning when looking towards left and lying down  Sensation resolved within a few seconds  No nystagmus  Right Ear: Tympanic membrane and ear canal normal  There is no impacted cerumen        Left Ear: Tympanic membrane and ear canal normal  There is no impacted cerumen  Nose: Nose normal       Mouth/Throat:      Mouth: Mucous membranes are moist       Pharynx: Oropharynx is clear  Eyes:      Extraocular Movements: Extraocular movements intact  Conjunctiva/sclera: Conjunctivae normal       Pupils: Pupils are equal, round, and reactive to light  Cardiovascular:      Rate and Rhythm: Normal rate and regular rhythm  Pulses: Normal pulses  Heart sounds: Normal heart sounds  No murmur heard  Pulmonary:      Effort: Pulmonary effort is normal  No respiratory distress  Breath sounds: Normal breath sounds  No wheezing, rhonchi or rales  Abdominal:      General: Abdomen is flat  Bowel sounds are normal  There is no distension  Palpations: Abdomen is soft  Tenderness: There is no abdominal tenderness  Skin:     General: Skin is warm and dry  Neurological:      Mental Status: He is alert and oriented to person, place, and time  Gait: Gait abnormal (Leg lenght discrepency  Walks with cane)  Psychiatric:         Mood and Affect: Mood normal          Behavior: Behavior normal          Thought Content:  Thought content normal        CURRENT MEDICATIONS     Current Outpatient Medications:     aspirin 81 MG tablet, Take 81 mg by mouth daily Resume on 3/11/18 , Disp: , Rfl:     atorvastatin (LIPITOR) 40 mg tablet, Take 1 tablet (40 mg total) by mouth daily with dinner Resume next scheduled dose upon discharge from the hospital, Disp: 90 tablet, Rfl: 3    ferrous sulfate 325 (65 Fe) mg tablet, Take 1 tablet (325 mg total) by mouth every other day, Disp: 45 tablet, Rfl: 3    furosemide (LASIX) 20 mg tablet, Take 1 tablet (20 mg total) by mouth daily Can take one additional dose as needed for leg swelling or shortness of breath, Disp: 90 tablet, Rfl: 3    lisinopril (ZESTRIL) 5 mg tablet, Take 1 tablet (5 mg total) by mouth daily, Disp: 90 tablet, Rfl: 5    meclizine (ANTIVERT) 25 mg tablet, Take 1 tablet (25 mg total) by mouth 3 (three) times a day as needed for dizziness, Disp: 30 tablet, Rfl: 0    metoprolol succinate (TOPROL-XL) 50 mg 24 hr tablet, Take 1 tablet (50 mg total) by mouth daily Resume on 3/11/18, Disp: 90 tablet, Rfl: 5    nitroglycerin (NITROSTAT) 0 4 mg SL tablet, Place 1 tablet (0 4 mg total) under the tongue every 5 (five) minutes as needed for chest pain, Disp: 5 tablet, Rfl: 0    omeprazole (PriLOSEC) 40 MG capsule, Take 1 capsule (40 mg total) by mouth daily, Disp: 90 capsule, Rfl: 3    potassium chloride (K-DUR,KLOR-CON) 10 mEq tablet, Take 1 tablet (10 mEq total) by mouth daily, Disp: 90 tablet, Rfl: 5    PAST MEDICAL & SURGICAL HISTORY     Past Medical History:   Diagnosis Date    Abdominal aortic aneurysm (AAA) ([facility] [address] )     last assessed nov 6 2015    Abscess of groin     last assessed oct 6 2014    Arthritis     Candidiasis, cutaneous     last assessed march 19 2014    Coronary artery disease     Dyslipidemia     Esophageal ulcer without bleeding     Gastritis     Hiatal hernia     Hx of cataract surgery, left     last assessed july 21 2014    Hyperlipidemia     Hypertension     Old myocardial infarction 2000    Recurrent right inguinal hernia     s/p right orchioectomy, mesh removal, and sinus trac removal patient being followed by surgery next appt 4/28/14 patient s/p keflex x 7 days for MSSA Cx repeat wound cx grew MSSA cx repeat wound cx grew MSSA and other armond (mixed) no MRSA identified conitunue close monitoring and local wound care, last assessed april 15 2014    Renal disorder     Wound of skin     in the groin, right     Past Surgical History:   Procedure Laterality Date    ABDOMINAL AORTIC ANEURYSM REPAIR  2009    aortobi-iliac, dacron graft    APPENDECTOMY      open    CARDIAC SURGERY      CATARACT EXTRACTION Bilateral     CHOLECYSTECTOMY N/A 9/29/2020    Procedure: CHOLECYSTECTOMY;  Surgeon: Vikki Martinez MD;  [facility];  Service: Counts include [facility] beds at the [facility] COLONOSCOPY      CORONARY ANGIOPLASTY WITH STENT PLACEMENT      stent 2    CYSTOSCOPY      diagnostic onset nov 13 2014    EXPLORATORY LAPAROTOMY  12/09/2009    EYE SURGERY      FL INJECTION LEFT HIP (NON ARTHROGRAM)  4/2/2019    HIP SURGERY Right     INGUINAL HERNIA REPAIR Right     unilateral x2    ORCHIECTOMY Right     MN COLONOSCOPY FLX DX W/COLLJ SPEC WHEN PFRMD N/A 5/22/2018    Procedure: COLONOSCOPY;  Surgeon: Rohit Macias DO;  [facility]; Service: Gastroenterology    MN ESOPHAGOGASTRODUODENOSCOPY TRANSORAL DIAGNOSTIC N/A 2/14/2019    Procedure: ESOPHAGOGASTRODUODENOSCOPY (EGD); Surgeon: Abimael George MD;  [facility];   Service: Gastroenterology    MN LAP,DIAGNOSTIC ABDOMEN N/A 9/29/2020    Procedure: LAPAROSCOPIC DIAGNOSTIC,   ;  Surgeon: Toshia Manzano MD;  [facility];  Service: General    [address] [place] [address] N/A 2/23/2018    Procedure: lysis of adhesions; INCISIONAL HERNIA REPAIR WITH MESH;  Surgeon: Salena Coello MD;  [facility];  Service: General    UPPER GASTROINTESTINAL ENDOSCOPY       SOCIAL & FAMILY HISTORY     Social History     Socioeconomic History    Marital status:      Spouse name: Not on file    Number of children: Not on file    Years of education: Not on file    Highest education level: Not on file   Occupational History    Not on file   Tobacco Use    Smoking status: Former Smoker    Smokeless tobacco: Never Used    Tobacco comment: quit 20 years ago   Vaping Use    Vaping Use: Never used   Substance and Sexual Activity    Alcohol use: Not Currently     Comment: occasionally    Drug use: No    Sexual activity: Not Currently   Other Topics Concern    Not on file   Social History Narrative    Not on file     Social Determinants of Health     Financial Resource Strain: Low Risk     Difficulty of Paying Living Expenses: Not hard at all   Food Insecurity: No Food Insecurity    Worried About Running Out of Food in the Last Year: Never true    Ran Out of Food in the Last Year: Never true   Transportation Needs: No Transportation Needs    Lack of Transportation (Medical): No    Lack of Transportation (Non-Medical): No   Physical Activity: Inactive    Days of Exercise per Week: 0 days    Minutes of Exercise per Session: 0 min   Stress: No Stress Concern Present    Feeling of Stress : Not at all   Social Connections: Socially Isolated    Frequency of Communication with Friends and Family: More than three times a week    Frequency of Social Gatherings with Friends and Family: Twice a week    Attends [employer]ist Services: Never    Active Member of Clubs or Organizations: No    Attends Club or Organization Meetings: Never    Marital Status:    Intimate Partner Violence: Not At Risk    Fear of Current or Ex-Partner: No    Emotionally Abused: No    Physically Abused: No    Sexually Abused: No   Housing Stability: Low Risk     Unable to Pay for Housing in the Last Year: No    Number of [place] in the Last Year: 1    Unstable Housing in the Last Year: No     Social History     Substance and Sexual Activity   Alcohol Use Not Currently    Comment: occasionally     Substance and Sexual Activity   Alcohol Use Not Currently    Comment: occasionally        Substance and Sexual Activity   Drug Use No     Social History     Tobacco Use   Smoking Status Former Smoker   Smokeless Tobacco Never Used   Tobacco Comment    quit 20 years ago     Family History   Problem Relation Age of Onset    Heart disease Mother     Diabetes Mother     Heart disease Father     Diabetes Sister     Cancer Brother      ==  Brenda Cohen,   Internal Medicine Residency, PGY-2  [address]  [facility] [address]   [place] [id number] [address] , [address], [address] [facility]  Office: [phone]  Fax: [phone]

## 2022-08-01 ENCOUNTER — TELEPHONE (OUTPATIENT)
Dept: INTERNAL MEDICINE CLINIC | Facility: CLINIC | Age: 77
End: 2022-08-01

## 2022-08-01 NOTE — TELEPHONE ENCOUNTER
"[place] Halpike maneuver was performed in office  Patient experienced vertigo with room spinning when looking towards left and lying down  Sensation resolved within a few seconds  No nystagmus "    Patient called to have therapy scheduled - did not see any referral  Patient states that he had dizzy spells and vomiting 8/1/22     Patient seen by Dr Viviane Santa 7/26/22 for TCM     Please check into this prepare referral for therapy and call the patient to advise      thanks

## 2022-08-04 DIAGNOSIS — H81.10 BENIGN PAROXYSMAL POSITIONAL VERTIGO, UNSPECIFIED LATERALITY: Primary | ICD-10-CM

## 2022-08-04 NOTE — TELEPHONE ENCOUNTER
Patient called in again asking if message can be sent to Dr Fernanda Solano as he saw him in the office on 7/26/22 and has been waiting 3 days

## 2022-08-08 ENCOUNTER — EVALUATION (OUTPATIENT)
Dept: PHYSICAL THERAPY | Facility: CLINIC | Age: 77
End: 2022-08-08
Payer: MEDICARE

## 2022-08-08 DIAGNOSIS — H81.12 BENIGN PAROXYSMAL POSITIONAL VERTIGO OF LEFT EAR: ICD-10-CM

## 2022-08-08 PROCEDURE — 95992 CANALITH REPOSITIONING PROC: CPT | Performed by: PHYSICAL THERAPIST

## 2022-08-08 PROCEDURE — 97161 PT EVAL LOW COMPLEX 20 MIN: CPT | Performed by: PHYSICAL THERAPIST

## 2022-08-08 NOTE — PROGRESS NOTES
PT Evaluation     Today's date: 2022  Patient name: Alma Rosa Ochoa  : [birth year]  MRN: [id number]  Referring provider: Maribeth Adler DO  Dx:   Encounter Diagnosis     ICD-10-CM    1  Benign paroxysmal positional vertigo, unspecified laterality  H81 10 Ambulatory Referral to Physical Therapy                  Assessment  Assessment details: Patient reports to physical therapy with a diagnosis of BPPV  At this time he has positive testing for left posterior canalithiasis  Completed epley maneuver x 2 this session as well as repeat yasmeen hallpike which remained positive  Pt will continue to benefit from skilled therapy intervention at 2x/week for 2 more weeks or until BPPV is resolved  Understanding of Dx/Px/POC: fair   Prognosis: good    Goals  STG:  Pt will have negative yasmeen hallpike test within 4 visits  Pt will be able to lie down in reclinver without dizziness in 4 visits    LTG  Pt will report no dizziness in 4 weeks    Plan  Patient would benefit from: PT eval  Planned therapy interventions: patient education, home exercise program, canalith repositioning and neuromuscular re-education  Frequency: 2x week  Duration in weeks: 4  Plan of Care beginning date: 2022  Plan of Care expiration date: 10/8/2022  Treatment plan discussed with: patient        Subjective Evaluation    History of Present Illness  Mechanism of injury: Pt reports feeling dizzy on and off for several months  He was in the hospital a few weeks ago due to having his gall bladder out but he did get dizzy when in the hospital  He reports vomiting and having diarrea when it does happen  When he gets dizzy he reprots it feeling like spinning and sometimes it lasts for hours  He reports feeling the dizziness the last time on Thursday   He rpeorts bending over, lying back in recliner, lying down have caused it to happen in the past    Pain  No pain reported    Social Support    Employment status: not working  Patient Goals  Patient goal: decrease dizziness        Objective   Neuro Exam:     Cervical exam   Ligament Laxity Testing   Alar ligament: WNL  Sharp Isaias:  WNL  Modified VBI   Left: asymptomatic  Right: asymptomatic    Positional testing   Albino-Hallpike   Left posterior canal: symptomatic, torsional and upbeating  Right posterior canal: WNL  Roll test   Left horizontal canal: WNL  Right horizontal canal: WNL             Precautions: fall risk      Manuals                                                                 Neuro Re-Ed                                                                                                        Ther Ex                                                                                                                     Ther Activity                                       Gait Training                                       Modalities

## 2022-08-10 ENCOUNTER — OFFICE VISIT (OUTPATIENT)
Dept: PHYSICAL THERAPY | Facility: CLINIC | Age: 77
End: 2022-08-10
Payer: MEDICARE

## 2022-08-10 DIAGNOSIS — H81.12 BENIGN PAROXYSMAL POSITIONAL VERTIGO OF LEFT EAR: Primary | ICD-10-CM

## 2022-08-10 PROCEDURE — 95992 CANALITH REPOSITIONING PROC: CPT | Performed by: PHYSICAL THERAPIST

## 2022-08-12 NOTE — PROGRESS NOTES
Daily Note     Today's date: 2022  Patient name: Lizabeth Arredondo  :   MRN: [id number]  Referring provider: Nitza Bean DO  Dx:   Encounter Diagnosis     ICD-10-CM    1  Benign paroxysmal positional vertigo of left ear  H81 12                   Subjective: pt reports having no major bouts of dizziness in the last two days  Does still get slight dizziness when lying down and sitting up  Objective: See treatment diary below    Left yasmeen hallpike : positive x 2 for slight dizziness      Assessment: Completed yasmeen hllpike test which pt did not have nystagmus but was dizzy  Due to overall difficulty with correct head and neck positioning due to orthopedic limitations continued with a few repetitions of epley and will reassess once again due to continued feelings of dizziness  Plan: Continue per plan of care        Precautions: fall risk      Manuals                                                                 Neuro Re-Ed                                                                                                        Ther Ex                                                                                                                     Ther Activity                                       Gait Training                                       Modalities

## 2022-08-16 ENCOUNTER — OFFICE VISIT (OUTPATIENT)
Dept: PHYSICAL THERAPY | Facility: CLINIC | Age: 77
End: 2022-08-16
Payer: MEDICARE

## 2022-08-16 DIAGNOSIS — H81.12 BENIGN PAROXYSMAL POSITIONAL VERTIGO OF LEFT EAR: Primary | ICD-10-CM

## 2022-08-16 PROCEDURE — 97112 NEUROMUSCULAR REEDUCATION: CPT | Performed by: PHYSICAL THERAPIST

## 2022-08-18 ENCOUNTER — OFFICE VISIT (OUTPATIENT)
Dept: CARDIOLOGY CLINIC | Facility: CLINIC | Age: 77
End: 2022-08-18
Payer: MEDICARE

## 2022-08-18 VITALS
HEART RATE: 72 BPM | BODY MASS INDEX: 20.83 KG/M2 | DIASTOLIC BLOOD PRESSURE: 52 MMHG | OXYGEN SATURATION: 94 % | SYSTOLIC BLOOD PRESSURE: 108 MMHG | WEIGHT: 137 LBS

## 2022-08-18 DIAGNOSIS — I25.10 CORONARY ARTERY DISEASE INVOLVING NATIVE HEART WITHOUT ANGINA PECTORIS, UNSPECIFIED VESSEL OR LESION TYPE: Primary | ICD-10-CM

## 2022-08-18 DIAGNOSIS — I25.118 ATHEROSCLEROTIC HEART DISEASE OF NATIVE CORONARY ARTERY WITH OTHER FORMS OF ANGINA PECTORIS (HCC): ICD-10-CM

## 2022-08-18 DIAGNOSIS — I10 ESSENTIAL HYPERTENSION: ICD-10-CM

## 2022-08-18 PROCEDURE — 93000 ELECTROCARDIOGRAM COMPLETE: CPT | Performed by: INTERNAL MEDICINE

## 2022-08-18 PROCEDURE — 99214 OFFICE O/P EST MOD 30 MIN: CPT | Performed by: INTERNAL MEDICINE

## 2022-08-18 NOTE — PROGRESS NOTES
Cardiology Follow Up    Reynold Casillas  [id number]  1945  HEART [facility]ASCULAR [facility] [address]      1  Coronary artery disease involving native heart without angina pectoris, unspecified vessel or lesion type  POCT ECG   2  Essential hypertension  POCT ECG   3  Atherosclerotic heart disease of native coronary artery with other forms of angina pectoris [facility])  POCT ECG       Discussion/Summary: 1  Hypertension  2  Diastolic dysfunction  3  Coronary artery disease with history of PCI in 2000  4  History of abdominal aortic aneurysm s/p repair    Blood pressure controlled  Patient on aspirin and statin for CAD  Leg swellings improved with low-dose Lasix  Recent admissions for choledocholithiasis status post ERCP  Patient gets physical therapy for vertigo  Patient denies any limiting chest pain or shortness of breath        Plan:  -   Continue aspirin and atorvastatin  -  Continue metoprolol  -  Continue lisinopril  -  Continue Lasix 20 mg daily        Patient Active Problem List   Diagnosis    CAD (coronary artery disease)    Hypertension    Incisional hernia    History of incisional hernia repair    Bursitis of left shoulder    Serrated adenoma of colon    Primary osteoarthritis of right knee    Gastroesophageal reflux disease    Gastroesophageal reflux disease with esophagitis    Primary osteoarthritis of one hip, left    Tubular adenoma of colon    Abdominal aortic aneurysm (AAA) without rupture ([address] )    Follow up    Dyslipidemia    Unstable angina (HCC)    Status post cholecystectomy    Ambulatory dysfunction    Surgical wound, non healing    Palpitations    Diastolic dysfunction    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)    Choledocholithiasis     Past Medical History:   Diagnosis Date    Abdominal aortic aneurysm (AAA) ([address] )     last assessed nov 6 2015    Abscess of groin     last assessed oct 6 2014    Arthritis     Candidiasis, cutaneous     last assessed march 19 2014    Coronary artery disease     Dyslipidemia     Esophageal ulcer without bleeding     Gastritis     Hiatal hernia     Hx of cataract surgery, left     last assessed july 21 2014    Hyperlipidemia     Hypertension     Old myocardial infarction 2000    Recurrent right inguinal hernia     s/p right orchioectomy, mesh removal, and sinus trac removal patient being followed by surgery next appt 4/28/14 patient s/p keflex x 7 days for MSSA Cx repeat wound cx grew MSSA cx repeat wound cx grew MSSA and other armond (mixed) no MRSA identified conitunue close monitoring and local wound care, last assessed april 15 2014    Renal disorder     Wound of skin     in the groin, right     Social History     Socioeconomic History    Marital status:      Spouse name: Not on file    Number of children: Not on file    Years of education: Not on file    Highest education level: Not on file   Occupational History    Not on file   Tobacco Use    Smoking status: Former Smoker    Smokeless tobacco: Never Used    Tobacco comment: quit 20 years ago   Vaping Use    Vaping Use: Never used   Substance and Sexual Activity    Alcohol use: Not Currently     Comment: occasionally    Drug use: No    Sexual activity: Not Currently   Other Topics Concern    Not on file   Social History Narrative    Not on file     Social Determinants of Health     Financial Resource Strain: Not on file   Food Insecurity: Not on file   Transportation Needs: Not on file   Physical Activity: Not on file   Stress: Not on file   Social Connections: Not on file   Intimate Partner Violence: Not on file   Housing Stability: Not on file      Family History   Problem Relation Age of Onset    Heart disease Mother     Diabetes Mother     Heart disease Father     Diabetes Sister     Cancer Brother      Past Surgical History:   Procedure Laterality Date  ABDOMINAL AORTIC ANEURYSM REPAIR  2009    aortobi-iliac, dacron graft    APPENDECTOMY      open    CARDIAC SURGERY      CATARACT EXTRACTION Bilateral     CHOLECYSTECTOMY N/A 9/29/2020    Procedure: CHOLECYSTECTOMY;  Surgeon: Andrew Healy MD;  [facility];  Service: General    COLONOSCOPY      CORONARY ANGIOPLASTY WITH STENT PLACEMENT      stent 2    CYSTOSCOPY      diagnostic onset nov 13 2014    EXPLORATORY LAPAROTOMY  12/09/2009    EYE SURGERY      FL INJECTION LEFT HIP (NON ARTHROGRAM)  4/2/2019    HIP SURGERY Right     INGUINAL HERNIA REPAIR Right     unilateral x2    ORCHIECTOMY Right     IA COLONOSCOPY FLX DX W/COLLJ SPEC WHEN PFRMD N/A 5/22/2018    Procedure: COLONOSCOPY;  Surgeon: Julio C Pinedo DO;  [facility]; Service: Gastroenterology    IA ESOPHAGOGASTRODUODENOSCOPY TRANSORAL DIAGNOSTIC N/A 2/14/2019    Procedure: ESOPHAGOGASTRODUODENOSCOPY (EGD); Surgeon: Elia Rick MD;  [facility];   Service: Gastroenterology    IA LAP,DIAGNOSTIC ABDOMEN N/A 9/29/2020    Procedure: LAPAROSCOPIC DIAGNOSTIC,   ;  Surgeon: Andrew Healy MD;  [facility];  Service: General    IA REPAIR INCISIONAL HERNIA,REDUCIBLE N/A 2/23/2018    Procedure: lysis of adhesions; INCISIONAL HERNIA REPAIR WITH MESH;  Surgeon: Cristiano Armenta MD;  [facility];  Service: General    UPPER GASTROINTESTINAL ENDOSCOPY         Current Outpatient Medications:     aspirin 81 MG tablet, Take 81 mg by mouth daily Resume on 3/11/18 , Disp: , Rfl:     atorvastatin (LIPITOR) 40 mg tablet, Take 1 tablet (40 mg total) by mouth daily with dinner Resume next scheduled dose upon discharge from the hospital, Disp: 90 tablet, Rfl: 3    ferrous sulfate 325 (65 Fe) mg tablet, Take 1 tablet (325 mg total) by mouth every other day, Disp: 45 tablet, Rfl: 3    furosemide (LASIX) 20 mg tablet, Take 1 tablet (20 mg total) by mouth daily Can take one additional dose as needed for leg swelling or shortness of breath, Disp: 90 tablet, Rfl: 3    lisinopril (ZESTRIL) 5 mg tablet, Take 1 tablet (5 mg total) by mouth daily, Disp: 90 tablet, Rfl: 5    meclizine (ANTIVERT) 25 mg tablet, Take 1 tablet (25 mg total) by mouth 3 (three) times a day as needed for dizziness, Disp: 30 tablet, Rfl: 0    metoprolol succinate (TOPROL-XL) 50 mg 24 hr tablet, Take 1 tablet (50 mg total) by mouth daily Resume on 3/11/18, Disp: 90 tablet, Rfl: 5    omeprazole (PriLOSEC) 40 MG capsule, Take 1 capsule (40 mg total) by mouth daily, Disp: 90 capsule, Rfl: 3    potassium chloride (K-DUR,KLOR-CON) 10 mEq tablet, Take 1 tablet (10 mEq total) by mouth daily, Disp: 90 tablet, Rfl: 5    nitroglycerin (NITROSTAT) 0 4 mg SL tablet, Place 1 tablet (0 4 mg total) under the tongue every 5 (five) minutes as needed for chest pain (Patient not taking: Reported on 8/18/2022), Disp: 5 tablet, Rfl: 0  No Known Allergies      Labs:  Admission on 07/12/2022, Discharged on 07/14/2022   Component Date Value    WBC 07/12/2022 7 83     RBC 07/12/2022 5 55     Hemoglobin 07/12/2022 15 8     Hematocrit 07/12/2022 49 2     MCV 07/12/2022 89     MCH 07/12/2022 28 5     MCHC 07/12/2022 32 1     RDW 07/12/2022 15 7 (A)    MPV 07/12/2022 9 2     Platelets 80/16/4997 266     nRBC 07/12/2022 0     Neutrophils Relative 07/12/2022 84 (A)    Immat GRANS % 07/12/2022 0     Lymphocytes Relative 07/12/2022 9 (A)    Monocytes Relative 07/12/2022 6     Eosinophils Relative 07/12/2022 0     Basophils Relative 07/12/2022 1     Neutrophils Absolute 07/12/2022 6 58     Immature Grans Absolute 07/12/2022 0 03     Lymphocytes Absolute 07/12/2022 0 73     Monocytes Absolute 07/12/2022 0 43     Eosinophils Absolute 07/12/2022 0 02     Basophils Absolute 07/12/2022 0 04     Sodium 07/12/2022 140     Potassium 07/12/2022 3 8     Chloride 07/12/2022 111 (A)    CO2 07/12/2022 23     ANION GAP 07/12/2022 6     BUN 07/12/2022 22     Creatinine 07/12/2022 1 22     Glucose 07/12/2022 103     Calcium 07/12/2022 9 5     Corrected Calcium 07/12/2022 10 5 (A)    AST 07/12/2022 65 (A)    ALT 07/12/2022 55     Alkaline Phosphatase 07/12/2022 548 (A)    Total Protein 07/12/2022 9 1 (A)    Albumin 07/12/2022 2 8 (A)    Total Bilirubin 07/12/2022 1 72 (A)    eGFR 07/12/2022 56     hs TnI 0hr 07/12/2022 9     Lipase 07/12/2022 141     hs TnI 2hr 07/12/2022 9     Delta 2hr hsTnI 07/12/2022 0     hs TnI 4hr 07/12/2022 10     Delta 4hr hsTnI 07/12/2022 1     Sodium 07/13/2022 140     Potassium 07/13/2022 3 6     Chloride 07/13/2022 112 (A)    CO2 07/13/2022 21     ANION GAP 07/13/2022 7     BUN 07/13/2022 18     Creatinine 07/13/2022 0 82     Glucose 07/13/2022 69     Calcium 07/13/2022 8 6     Corrected Calcium 07/13/2022 10 1     AST 07/13/2022 49 (A)    ALT 07/13/2022 40     Alkaline Phosphatase 07/13/2022 400 (A)    Total Protein 07/13/2022 6 9     Albumin 07/13/2022 2 1 (A)    Total Bilirubin 07/13/2022 1 16 (A)    eGFR 07/13/2022 85     WBC 07/13/2022 4 69     RBC 07/13/2022 4 43     Hemoglobin 07/13/2022 12 4     Hematocrit 07/13/2022 38 7     MCV 07/13/2022 87     MCH 07/13/2022 28 0     MCHC 07/13/2022 32 0     RDW 07/13/2022 15 4 (A)    MPV 07/13/2022 9 2     Platelets 51/59/7418 152     nRBC 07/13/2022 0     Neutrophils Relative 07/13/2022 61     Immat GRANS % 07/13/2022 0     Lymphocytes Relative 07/13/2022 27     Monocytes Relative 07/13/2022 8     Eosinophils Relative 07/13/2022 3     Basophils Relative 07/13/2022 1     Neutrophils Absolute 07/13/2022 2 87     Immature Grans Absolute 07/13/2022 0 01     Lymphocytes Absolute 07/13/2022 1 26     Monocytes Absolute 07/13/2022 0 37     Eosinophils Absolute 07/13/2022 0 12     Basophils Absolute 07/13/2022 0 06     WBC 07/14/2022 2 94 (A)    RBC 07/14/2022 4 37     Hemoglobin 07/14/2022 12 9     Hematocrit 07/14/2022 38 3     MCV 07/14/2022 88     MCH 07/14/2022 29 5     MCHC 07/14/2022 33 7     RDW 07/14/2022 14 6     MPV 07/14/2022 10 3     Platelets 75/01/9981 146 (A)    nRBC 07/14/2022 0     Neutrophils Relative 07/14/2022 73     Immat GRANS % 07/14/2022 0     Lymphocytes Relative 07/14/2022 22     Monocytes Relative 07/14/2022 5     Eosinophils Relative 07/14/2022 0     Basophils Relative 07/14/2022 0     Neutrophils Absolute 07/14/2022 2 12     Immature Grans Absolute 07/14/2022 0 01     Lymphocytes Absolute 07/14/2022 0 65     Monocytes Absolute 07/14/2022 0 16 (A)    Eosinophils Absolute 07/14/2022 0 00     Basophils Absolute 07/14/2022 0 00     Sodium 07/14/2022 142     Potassium 07/14/2022 3 7     Chloride 07/14/2022 112 (A)    CO2 07/14/2022 24     ANION GAP 07/14/2022 6     BUN 07/14/2022 15     Creatinine 07/14/2022 0 72     Glucose 07/14/2022 87     Calcium 07/14/2022 8 6     Corrected Calcium 07/14/2022 10 1     AST 07/14/2022 39     ALT 07/14/2022 35     Alkaline Phosphatase 07/14/2022 375 (A)    Total Protein 07/14/2022 6 8     Albumin 07/14/2022 2 1 (A)    Total Bilirubin 07/14/2022 0 99     eGFR 07/14/2022 89         Imaging: No results found  Review of Systems:  Review of Systems   Constitutional: Negative for diaphoresis and fever  HENT: Negative for trouble swallowing and voice change            Vitals:    08/18/22 1050   BP: 108/52   BP Location: Left arm   Patient Position: Sitting   Cuff Size: Standard   Pulse: 72   SpO2: 94%   Weight: 62 1 kg (137 lb)     Vitals:    08/18/22 1050   Weight: 62 1 kg (137 lb)           Physical Exam:  General: alert, oriented X 3 , comfortable  Neck: No JVD  Cardiac: normal S1, S2, no m/r/g  Respiratory: normal breath sounds, no wheezes or crackles  Abdomen: soft and non-tender  Extremities: warm, no cyanosis, no lower extremity edema

## 2022-08-19 NOTE — PROGRESS NOTES
Daily Note     Today's date: 2022  Patient name: Reynold Casillas  : [id number]  MRN: [id number]  Referring provider: Norma Alicea DO  Dx:   Encounter Diagnosis     ICD-10-CM    1  Benign paroxysmal positional vertigo of left ear  H81 12                   Subjective: Patient reports having dizziness over the weekend for a day but still not quite as severe as ithad been      Objective: See treatment diary below    Positional testing: no nystagmus, slight dizziness in left posterior    Assessment: Pt did not have any positive testing but continues to have slight dizziness with maneuvers and testing  Significant difficulty with positioning with patient due to resistance to head turning and dificulty with transfers  Discussed with patient at this time that likely he resolved BPPV but will follow up in 2 weeks to see if incidences of dizziness have resolved  Pt is to call earlier if extreme spinning dizziness returns  Plan: Continue per plan of care        Precautions: fall risk      Manuals                                                                 Neuro Re-Ed             semont x2             epley x 1             Li x 1                                                                 Ther Ex                                                                                                                     Ther Activity                                       Gait Training                                       Modalities

## 2022-08-25 ENCOUNTER — CONSULT (OUTPATIENT)
Dept: SURGERY | Facility: CLINIC | Age: 77
End: 2022-08-25
Payer: MEDICARE

## 2022-08-25 VITALS
SYSTOLIC BLOOD PRESSURE: 152 MMHG | DIASTOLIC BLOOD PRESSURE: 63 MMHG | OXYGEN SATURATION: 97 % | HEART RATE: 72 BPM | WEIGHT: 140 LBS | BODY MASS INDEX: 21.22 KG/M2 | HEIGHT: 68 IN | TEMPERATURE: 87.7 F

## 2022-08-25 DIAGNOSIS — K80.50 CHOLEDOCHOLITHIASIS: Primary | ICD-10-CM

## 2022-08-25 PROCEDURE — 99202 OFFICE O/P NEW SF 15 MIN: CPT | Performed by: STUDENT IN AN ORGANIZED HEALTH CARE EDUCATION/TRAINING PROGRAM

## 2022-08-25 PROCEDURE — 99212 OFFICE O/P EST SF 10 MIN: CPT | Performed by: STUDENT IN AN ORGANIZED HEALTH CARE EDUCATION/TRAINING PROGRAM

## 2022-08-25 NOTE — PROGRESS NOTES
Office Visit - General Surgery  Aarti Gamino MRN: [id number]  Encounter: [id number]    Assessment and Plan  Problem List Items Addressed This Visit        Digestive    Choledocholithiasis - Primary        -Though patient has had multiple abdominal surgeries including an open cholecystectomy in 2020 it is reasonable to offer cholecystectomy  On his CT imaging he has a gallbladder with multiple stones    -Discussed surgery with patient including the high risk of this going open due to his prior surgical history vs  Managing conservatively  The risks of conservative management include recurrence of choledocholithiasis and possible gallstone pancreatitis   He understands this and believes he would like to undergo surgery    -Will schedule for an elective laparoscopic possible open cholecystectomy possible ex lap  Chief Complaint:  Aarti Gamino is a 65 y o  male who presents for Cholelithiasis (Consult gallbladder )    Subjective  This is a 50-year-old male who has a history of an open subtotal cholecystectomy in 2020 that the remnant was closed who was recently admitted 07/12 02/07/2014 for choledocholithiasis  Patient underwent ERCP and sphincterotomy 7/13/2020  Since that time he has been doing well, he denies abdominal pain  His surgical history is also significant for an open AAA repair with aorto bi-iliac bypass graft, incisional hernia repair with mesh 2/2018 using a 19x24 cm ventrio hernia patch and appendectomy  He was seen recently by cardiology  Last TTE 9/2020 with EF 70%       Past Medical History:   Diagnosis Date    Abdominal aortic aneurysm (AAA) ([address] )     last assessed nov 6 2015    Abscess of groin     last assessed oct 6 2014    Arthritis     Candidiasis, cutaneous     last assessed march 19 2014    Coronary artery disease     Dyslipidemia     Esophageal ulcer without bleeding     Gastritis     Hiatal hernia     Hx of cataract surgery, left     last assessed july 21 2014    Hyperlipidemia     Hypertension     Old myocardial infarction 2000    Recurrent right inguinal hernia     s/p right orchioectomy, mesh removal, and sinus trac removal patient being followed by surgery next appt 4/28/14 patient s/p keflex x 7 days for MSSA Cx repeat wound cx grew MSSA cx repeat wound cx grew MSSA and other armond (mixed) no MRSA identified conitunue close monitoring and local wound care, last assessed april 15 2014    Renal disorder     Wound of skin     in the groin, right       Past Surgical History:   Procedure Laterality Date    ABDOMINAL AORTIC ANEURYSM REPAIR  2009    aortobi-iliac, dacron graft    APPENDECTOMY      open    CARDIAC SURGERY      CATARACT EXTRACTION Bilateral     CHOLECYSTECTOMY N/A 9/29/2020    Procedure: CHOLECYSTECTOMY;  Surgeon: Ricardo Augustine MD;  [facility];  Service: General    COLONOSCOPY      CORONARY ANGIOPLASTY WITH STENT PLACEMENT      stent 2    CYSTOSCOPY      diagnostic onset nov 13 2014    EXPLORATORY LAPAROTOMY  12/09/2009    EYE SURGERY      FL INJECTION LEFT HIP (NON ARTHROGRAM)  4/2/2019    HIP SURGERY Right     INGUINAL HERNIA REPAIR Right     unilateral x2    ORCHIECTOMY Right     CT COLONOSCOPY FLX DX W/COLLJ SPEC WHEN PFRMD N/A 5/22/2018    Procedure: COLONOSCOPY;  Surgeon: Jessica Burleson DO;  [facility]; Service: Gastroenterology    CT ESOPHAGOGASTRODUODENOSCOPY TRANSORAL DIAGNOSTIC N/A 2/14/2019    Procedure: ESOPHAGOGASTRODUODENOSCOPY (EGD); Surgeon: Rosmery Espino MD;  [facility];   Service: Gastroenterology    CT LAP,DIAGNOSTIC ABDOMEN N/A 9/29/2020    Procedure: LAPAROSCOPIC DIAGNOSTIC,   ;  Surgeon: Ricardo Augustine MD;  [facility];  Service: General    [address] [place] [address] N/A 2/23/2018    Procedure: lysis of adhesions; INCISIONAL HERNIA REPAIR WITH MESH;  Surgeon: Everett Valdez MD;  [facility];  Service: General    UPPER GASTROINTESTINAL ENDOSCOPY Family History   Problem Relation Age of Onset    Heart disease Mother     Diabetes Mother     Heart disease Father     Diabetes Sister     Cancer Brother        Social History     Tobacco Use    Smoking status: Former Smoker    Smokeless tobacco: Never Used    Tobacco comment: quit 20 years ago   Vaping Use    Vaping Use: Never used   Substance Use Topics    Alcohol use: Not Currently     Comment: occasionally    Drug use: No        Medications  Current Outpatient Medications on File Prior to Visit   Medication Sig Dispense Refill    aspirin 81 MG tablet Take 81 mg by mouth daily Resume on 3/11/18       atorvastatin (LIPITOR) 40 mg tablet Take 1 tablet (40 mg total) by mouth daily with dinner Resume next scheduled dose upon discharge from the hospital 90 tablet 3    furosemide (LASIX) 20 mg tablet Take 1 tablet (20 mg total) by mouth daily Can take one additional dose as needed for leg swelling or shortness of breath 90 tablet 3    lisinopril (ZESTRIL) 5 mg tablet Take 1 tablet (5 mg total) by mouth daily 90 tablet 5    meclizine (ANTIVERT) 25 mg tablet Take 1 tablet (25 mg total) by mouth 3 (three) times a day as needed for dizziness 30 tablet 0    metoprolol succinate (TOPROL-XL) 50 mg 24 hr tablet Take 1 tablet (50 mg total) by mouth daily Resume on 3/11/18 90 tablet 5    nitroglycerin (NITROSTAT) 0 4 mg SL tablet Place 1 tablet (0 4 mg total) under the tongue every 5 (five) minutes as needed for chest pain 5 tablet 0    potassium chloride (K-DUR,KLOR-CON) 10 mEq tablet Take 1 tablet (10 mEq total) by mouth daily 90 tablet 5    ferrous sulfate 325 (65 Fe) mg tablet Take 1 tablet (325 mg total) by mouth every other day 45 tablet 3    omeprazole (PriLOSEC) 40 MG capsule Take 1 capsule (40 mg total) by mouth daily 90 capsule 3     No current facility-administered medications on file prior to visit  Allergies  No Known Allergies    Review of Systems   Constitutional: Negative  Respiratory: Negative  Gastrointestinal: Negative  Skin: Negative  Neurological: Negative  Hematological: Negative  Objective  Vitals:    08/25/22 1044   BP: 152/63   Pulse: 72   Temp: (!) 87 7 °F (30 9 °C)   SpO2: 97%       Physical Exam  Constitutional:       Appearance: Normal appearance  HENT:      Nose: Nose normal       Mouth/Throat:      Mouth: Mucous membranes are moist    Cardiovascular:      Rate and Rhythm: Normal rate and regular rhythm  Pulmonary:      Effort: Pulmonary effort is normal    Abdominal:      Comments: Soft, non tender, non distended, well healed midline and kocher incisions   Musculoskeletal:         General: Normal range of motion  Skin:     General: Skin is warm and dry  Neurological:      General: No focal deficit present  Mental Status: He is alert     Psychiatric:         Mood and Affect: Mood normal

## 2022-08-30 ENCOUNTER — APPOINTMENT (OUTPATIENT)
Dept: PHYSICAL THERAPY | Facility: CLINIC | Age: 77
End: 2022-08-30
Payer: MEDICARE

## 2022-09-06 ENCOUNTER — TELEPHONE (OUTPATIENT)
Dept: SURGERY | Facility: CLINIC | Age: 77
End: 2022-09-06

## 2022-09-06 NOTE — TELEPHONE ENCOUNTER
Patient called stating the sons are asking for more details about the surgery   Brandy Bailon, son, phone number is [phone]& Neeru Avila [phone]

## 2022-09-07 ENCOUNTER — APPOINTMENT (OUTPATIENT)
Dept: LAB | Age: 77
End: 2022-09-07
Payer: MEDICARE

## 2022-09-07 ENCOUNTER — LAB REQUISITION (OUTPATIENT)
Dept: LAB | Facility: HOSPITAL | Age: 77
End: 2022-09-07
Payer: MEDICARE

## 2022-09-07 DIAGNOSIS — K81.9 CHOLECYSTITIS: ICD-10-CM

## 2022-09-07 DIAGNOSIS — I25.10 CORONARY ARTERY DISEASE WITHOUT ANGINA PECTORIS, UNSPECIFIED VESSEL OR LESION TYPE, UNSPECIFIED WHETHER NATIVE OR TRANSPLANTED HEART: ICD-10-CM

## 2022-09-07 DIAGNOSIS — R07.9 ACUTE NONSPECIFIC CHEST PAIN WITH LOW RISK OF CORONARY ARTERY DISEASE: ICD-10-CM

## 2022-09-07 DIAGNOSIS — K81.9 CHOLECYSTITIS, UNSPECIFIED: ICD-10-CM

## 2022-09-07 DIAGNOSIS — Z91.89 ACUTE NONSPECIFIC CHEST PAIN WITH LOW RISK OF CORONARY ARTERY DISEASE: ICD-10-CM

## 2022-09-07 LAB
ABO GROUP BLD: NORMAL
ALBUMIN SERPL BCP-MCNC: 3.2 G/DL (ref 3.5–5)
ALP SERPL-CCNC: 140 U/L (ref 46–116)
ALT SERPL W P-5'-P-CCNC: 36 U/L (ref 12–78)
ANION GAP SERPL CALCULATED.3IONS-SCNC: 2 MMOL/L (ref 4–13)
APTT PPP: 32 SECONDS (ref 23–37)
AST SERPL W P-5'-P-CCNC: 28 U/L (ref 5–45)
BASOPHILS # BLD AUTO: 0.09 THOUSANDS/ΜL (ref 0–0.1)
BASOPHILS NFR BLD AUTO: 1 % (ref 0–1)
BILIRUB SERPL-MCNC: 0.78 MG/DL (ref 0.2–1)
BLD GP AB SCN SERPL QL: NEGATIVE
BUN SERPL-MCNC: 17 MG/DL (ref 5–25)
CALCIUM ALBUM COR SERPL-MCNC: 9.5 MG/DL (ref 8.3–10.1)
CALCIUM SERPL-MCNC: 8.9 MG/DL (ref 8.3–10.1)
CHLORIDE SERPL-SCNC: 112 MMOL/L (ref 96–108)
CO2 SERPL-SCNC: 28 MMOL/L (ref 21–32)
CREAT SERPL-MCNC: 1.04 MG/DL (ref 0.6–1.3)
EOSINOPHIL # BLD AUTO: 0.16 THOUSAND/ΜL (ref 0–0.61)
EOSINOPHIL NFR BLD AUTO: 3 % (ref 0–6)
ERYTHROCYTE [DISTWIDTH] IN BLOOD BY AUTOMATED COUNT: 14.8 % (ref 11.6–15.1)
GFR SERPL CREATININE-BSD FRML MDRD: 68 ML/MIN/1.73SQ M
GLUCOSE P FAST SERPL-MCNC: 77 MG/DL (ref 65–99)
HCT VFR BLD AUTO: 46.8 % (ref 36.5–49.3)
HGB BLD-MCNC: 14.6 G/DL (ref 12–17)
IMM GRANULOCYTES # BLD AUTO: 0.03 THOUSAND/UL (ref 0–0.2)
IMM GRANULOCYTES NFR BLD AUTO: 1 % (ref 0–2)
INR PPP: 1.09 (ref 0.84–1.19)
LYMPHOCYTES # BLD AUTO: 1.47 THOUSANDS/ΜL (ref 0.6–4.47)
LYMPHOCYTES NFR BLD AUTO: 24 % (ref 14–44)
MCH RBC QN AUTO: 28.8 PG (ref 26.8–34.3)
MCHC RBC AUTO-ENTMCNC: 31.2 G/DL (ref 31.4–37.4)
MCV RBC AUTO: 92 FL (ref 82–98)
MONOCYTES # BLD AUTO: 0.4 THOUSAND/ΜL (ref 0.17–1.22)
MONOCYTES NFR BLD AUTO: 6 % (ref 4–12)
NEUTROPHILS # BLD AUTO: 4.07 THOUSANDS/ΜL (ref 1.85–7.62)
NEUTS SEG NFR BLD AUTO: 65 % (ref 43–75)
NRBC BLD AUTO-RTO: 0 /100 WBCS
PLATELET # BLD AUTO: 203 THOUSANDS/UL (ref 149–390)
PMV BLD AUTO: 9.7 FL (ref 8.9–12.7)
POTASSIUM SERPL-SCNC: 4.3 MMOL/L (ref 3.5–5.3)
PROT SERPL-MCNC: 8 G/DL (ref 6.4–8.4)
PROTHROMBIN TIME: 14.3 SECONDS (ref 11.6–14.5)
RBC # BLD AUTO: 5.07 MILLION/UL (ref 3.88–5.62)
RH BLD: POSITIVE
SODIUM SERPL-SCNC: 142 MMOL/L (ref 135–147)
SPECIMEN EXPIRATION DATE: NORMAL
WBC # BLD AUTO: 6.22 THOUSAND/UL (ref 4.31–10.16)

## 2022-09-07 PROCEDURE — 80053 COMPREHEN METABOLIC PANEL: CPT

## 2022-09-07 PROCEDURE — 36415 COLL VENOUS BLD VENIPUNCTURE: CPT

## 2022-09-07 PROCEDURE — 86850 RBC ANTIBODY SCREEN: CPT | Performed by: STUDENT IN AN ORGANIZED HEALTH CARE EDUCATION/TRAINING PROGRAM

## 2022-09-07 PROCEDURE — 86901 BLOOD TYPING SEROLOGIC RH(D): CPT | Performed by: STUDENT IN AN ORGANIZED HEALTH CARE EDUCATION/TRAINING PROGRAM

## 2022-09-07 PROCEDURE — 86900 BLOOD TYPING SEROLOGIC ABO: CPT | Performed by: STUDENT IN AN ORGANIZED HEALTH CARE EDUCATION/TRAINING PROGRAM

## 2022-09-07 PROCEDURE — 85025 COMPLETE CBC W/AUTO DIFF WBC: CPT

## 2022-09-07 PROCEDURE — 85610 PROTHROMBIN TIME: CPT

## 2022-09-07 PROCEDURE — 85730 THROMBOPLASTIN TIME PARTIAL: CPT

## 2022-09-20 ENCOUNTER — ANESTHESIA (INPATIENT)
Dept: PERIOP | Facility: HOSPITAL | Age: 77
DRG: 853 | End: 2022-09-20
Payer: MEDICARE

## 2022-09-20 ENCOUNTER — ANESTHESIA EVENT (INPATIENT)
Dept: PERIOP | Facility: HOSPITAL | Age: 77
DRG: 853 | End: 2022-09-20
Payer: MEDICARE

## 2022-09-20 PROBLEM — K56.609 SBO (SMALL BOWEL OBSTRUCTION) (HCC): Status: ACTIVE | Noted: 2022-09-20

## 2022-09-20 RX ORDER — MIDAZOLAM HYDROCHLORIDE 2 MG/2ML
INJECTION, SOLUTION INTRAMUSCULAR; INTRAVENOUS AS NEEDED
Status: DISCONTINUED | OUTPATIENT
Start: 2022-09-20 | End: 2022-09-20

## 2022-09-20 RX ORDER — SUCCINYLCHOLINE/SOD CL,ISO/PF 100 MG/5ML
SYRINGE (ML) INTRAVENOUS AS NEEDED
Status: DISCONTINUED | OUTPATIENT
Start: 2022-09-20 | End: 2022-09-20

## 2022-09-20 RX ORDER — PROPOFOL 10 MG/ML
INJECTION, EMULSION INTRAVENOUS CONTINUOUS PRN
Status: DISCONTINUED | OUTPATIENT
Start: 2022-09-20 | End: 2022-09-20

## 2022-09-20 RX ORDER — KETAMINE HCL IN NACL, ISO-OSM 100MG/10ML
SYRINGE (ML) INJECTION AS NEEDED
Status: DISCONTINUED | OUTPATIENT
Start: 2022-09-20 | End: 2022-09-20

## 2022-09-20 RX ORDER — LIDOCAINE HYDROCHLORIDE 10 MG/ML
INJECTION, SOLUTION EPIDURAL; INFILTRATION; INTRACAUDAL; PERINEURAL AS NEEDED
Status: DISCONTINUED | OUTPATIENT
Start: 2022-09-20 | End: 2022-09-20

## 2022-09-20 RX ORDER — PROPOFOL 10 MG/ML
INJECTION, EMULSION INTRAVENOUS AS NEEDED
Status: DISCONTINUED | OUTPATIENT
Start: 2022-09-20 | End: 2022-09-20

## 2022-09-20 RX ORDER — GLYCOPYRROLATE 0.2 MG/ML
INJECTION INTRAMUSCULAR; INTRAVENOUS AS NEEDED
Status: DISCONTINUED | OUTPATIENT
Start: 2022-09-20 | End: 2022-09-20

## 2022-09-20 RX ORDER — ONDANSETRON 2 MG/ML
INJECTION INTRAMUSCULAR; INTRAVENOUS AS NEEDED
Status: DISCONTINUED | OUTPATIENT
Start: 2022-09-20 | End: 2022-09-20

## 2022-09-20 RX ORDER — ROCURONIUM BROMIDE 10 MG/ML
INJECTION, SOLUTION INTRAVENOUS AS NEEDED
Status: DISCONTINUED | OUTPATIENT
Start: 2022-09-20 | End: 2022-09-20

## 2022-09-20 RX ORDER — ALBUMIN, HUMAN INJ 5% 5 %
SOLUTION INTRAVENOUS CONTINUOUS PRN
Status: DISCONTINUED | OUTPATIENT
Start: 2022-09-20 | End: 2022-09-20

## 2022-09-20 RX ORDER — FENTANYL CITRATE 50 UG/ML
INJECTION, SOLUTION INTRAMUSCULAR; INTRAVENOUS AS NEEDED
Status: DISCONTINUED | OUTPATIENT
Start: 2022-09-20 | End: 2022-09-20

## 2022-09-20 RX ORDER — SODIUM CHLORIDE 9 MG/ML
INJECTION, SOLUTION INTRAVENOUS CONTINUOUS PRN
Status: DISCONTINUED | OUTPATIENT
Start: 2022-09-20 | End: 2022-09-20

## 2022-09-20 RX ORDER — CALCIUM CHLORIDE 100 MG/ML
INJECTION INTRAVENOUS; INTRAVENTRICULAR AS NEEDED
Status: DISCONTINUED | OUTPATIENT
Start: 2022-09-20 | End: 2022-09-20

## 2022-09-20 RX ADMIN — ALBUMIN (HUMAN): 12.5 INJECTION, SOLUTION INTRAVENOUS at 16:15

## 2022-09-20 RX ADMIN — ALBUMIN (HUMAN): 12.5 INJECTION, SOLUTION INTRAVENOUS at 12:43

## 2022-09-20 RX ADMIN — ROCURONIUM BROMIDE 20 MG: 50 INJECTION INTRAVENOUS at 14:36

## 2022-09-20 RX ADMIN — CALCIUM CHLORIDE 0.5 G: 100 INJECTION INTRAVENOUS; INTRAVENTRICULAR at 12:38

## 2022-09-20 RX ADMIN — ALBUMIN (HUMAN): 12.5 INJECTION, SOLUTION INTRAVENOUS at 12:50

## 2022-09-20 RX ADMIN — FENTANYL CITRATE 100 MCG: 50 INJECTION INTRAMUSCULAR; INTRAVENOUS at 12:57

## 2022-09-20 RX ADMIN — SODIUM CHLORIDE 0.4 MCG/KG/HR: 900 INJECTION INTRAVENOUS at 12:51

## 2022-09-20 RX ADMIN — ROCURONIUM BROMIDE 50 MG: 50 INJECTION INTRAVENOUS at 12:35

## 2022-09-20 RX ADMIN — ALBUMIN (HUMAN): 12.5 INJECTION, SOLUTION INTRAVENOUS at 15:41

## 2022-09-20 RX ADMIN — ALBUMIN (HUMAN): 12.5 INJECTION, SOLUTION INTRAVENOUS at 13:49

## 2022-09-20 RX ADMIN — METRONIDAZOLE: 500 INJECTION, SOLUTION INTRAVENOUS at 12:52

## 2022-09-20 RX ADMIN — SODIUM BICARBONATE 50 MEQ: 84 INJECTION, SOLUTION INTRAVENOUS at 13:59

## 2022-09-20 RX ADMIN — LIDOCAINE HYDROCHLORIDE 50 MG: 10 INJECTION, SOLUTION EPIDURAL; INFILTRATION; INTRACAUDAL; PERINEURAL at 12:21

## 2022-09-20 RX ADMIN — ROCURONIUM BROMIDE 30 MG: 50 INJECTION INTRAVENOUS at 13:56

## 2022-09-20 RX ADMIN — MIDAZOLAM 1 MG: 1 INJECTION INTRAMUSCULAR; INTRAVENOUS at 12:55

## 2022-09-20 RX ADMIN — ALBUMIN (HUMAN): 12.5 INJECTION, SOLUTION INTRAVENOUS at 14:23

## 2022-09-20 RX ADMIN — SODIUM BICARBONATE 50 MEQ: 84 INJECTION, SOLUTION INTRAVENOUS at 14:01

## 2022-09-20 RX ADMIN — CALCIUM CHLORIDE 0.5 G: 100 INJECTION INTRAVENOUS; INTRAVENTRICULAR at 12:28

## 2022-09-20 RX ADMIN — ROCURONIUM BROMIDE 30 MG: 50 INJECTION INTRAVENOUS at 16:20

## 2022-09-20 RX ADMIN — NOREPINEPHRINE BITARTRATE 5 MCG/MIN: 1 INJECTION INTRAVENOUS at 12:37

## 2022-09-20 RX ADMIN — GLYCOPYRROLATE 0.1 MG: 0.2 INJECTION, SOLUTION INTRAMUSCULAR; INTRAVENOUS at 12:05

## 2022-09-20 RX ADMIN — SODIUM CHLORIDE, SODIUM LACTATE, POTASSIUM CHLORIDE, AND CALCIUM CHLORIDE: .6; .31; .03; .02 INJECTION, SOLUTION INTRAVENOUS at 13:05

## 2022-09-20 RX ADMIN — SODIUM BICARBONATE 50 MEQ: 84 INJECTION, SOLUTION INTRAVENOUS at 17:20

## 2022-09-20 RX ADMIN — SODIUM BICARBONATE 50 MEQ: 84 INJECTION, SOLUTION INTRAVENOUS at 15:21

## 2022-09-20 RX ADMIN — PROPOFOL 50 MCG/KG/MIN: 10 INJECTION, EMULSION INTRAVENOUS at 17:33

## 2022-09-20 RX ADMIN — Medication 10 MG: at 17:39

## 2022-09-20 RX ADMIN — FENTANYL CITRATE 100 MCG: 50 INJECTION INTRAMUSCULAR; INTRAVENOUS at 12:21

## 2022-09-20 RX ADMIN — Medication 20 MG: at 17:24

## 2022-09-20 RX ADMIN — SODIUM CHLORIDE: 0.9 INJECTION, SOLUTION INTRAVENOUS at 12:28

## 2022-09-20 RX ADMIN — PROPOFOL 150 MG: 10 INJECTION, EMULSION INTRAVENOUS at 12:21

## 2022-09-20 RX ADMIN — MIDAZOLAM 1 MG: 1 INJECTION INTRAMUSCULAR; INTRAVENOUS at 12:05

## 2022-09-20 RX ADMIN — Medication 20 MG: at 17:30

## 2022-09-20 RX ADMIN — SODIUM BICARBONATE 50 MEQ: 84 INJECTION, SOLUTION INTRAVENOUS at 17:16

## 2022-09-20 RX ADMIN — Medication 100 MG: at 12:21

## 2022-09-20 RX ADMIN — CALCIUM CHLORIDE 0.5 G: 100 INJECTION INTRAVENOUS; INTRAVENTRICULAR at 13:52

## 2022-09-20 RX ADMIN — ONDANSETRON 4 MG: 2 INJECTION INTRAMUSCULAR; INTRAVENOUS at 12:05

## 2022-09-20 NOTE — PROCEDURES
Central Line Insertion    Date/Time: 9/20/2022 7:57 PM  Performed by: Efe Thorpe DO  Authorized by: Efe Thorpe DO     Patient location:  ICU  Consent:     Consent obtained:  Emergent situation    Alternatives discussed:  No treatment and delayed treatment  Universal protocol:     Patient identity confirmed:  Arm band  Pre-procedure details:     Hand hygiene: Hand hygiene performed prior to insertion      Sterile barrier technique: All elements of maximal sterile technique followed      Skin preparation:  ChloraPrep    Skin preparation agent: Skin preparation agent completely dried prior to procedure    Indications:     Central line indications: medications requiring central line and hemodynamic monitoring    Sedation:     Sedation type: Anxiolysis  Anesthesia (see MAR for exact dosages): Anesthesia method:  None  Procedure details:     Location:  Left internal jugular    Vessel type: vein      Laterality:  Left    Approach: open technique used      Patient position:  Flat    Catheter type:  Triple lumen    Catheter size:  7 Fr    Landmarks identified: yes      Ultrasound guidance: yes      Ultrasound image availability:  Not saved    Sterile ultrasound techniques: Sterile gel and sterile probe covers were used      Manometry confirmation: no      Number of attempts:  1  Post-procedure details:     Post-procedure:  Dressing applied    Assessment:  Blood return through all ports, no pneumothorax on x-ray, placement verified by x-ray and free fluid flow    Post-procedure complications: none      Patient tolerance of procedure:   Tolerated well, no immediate complications

## 2022-09-20 NOTE — OP NOTE
OPERATIVE REPORT  PATIENT NAME: Wendy Santiago    :  [id number]  MRN: [id number]  Pt Location: [facility] OR ROOM 09    SURGERY DATE: 2022    Surgeon(s) and Role:     * Farida Rodrigez DO - Primary     * Darwin Nova MD - Assisting     * Roslyn Cheryle Benne, DO - Assisting     * Sherral Cheadle, MD - Assisting    Preop Diagnosis:  Small bowel obstruction ([address] ) [K09 [id number]]    Post-Op Diagnosis Codes:     * Small bowel obstruction ([address] ) [W08 [id number]]    Procedure(s) (LRB):  LAPAROTOMY EXPLORATORY, MESH EXPLANTATION, COMPLETION CHOLECYSTECTOMY, SMALL BOWEL RESECTION WITH PRIMARY ANASTOMOSIS, RESECTION OF DUODENUM WITH DUO-DUODENOSTOMY ANASTOMOSIS, REDUCTION OF INTERNAL HERNIA, CLOSURE OF MESENTERIC DEFECT, EXTENSIVE LYSIS OF ADHESIONS, APPLICATION OF NEGATIVE PRESSURE WOUND THERAPY (N/A)    Specimen(s):  ID Type Source Tests Collected by Time Destination   1 : Gallbladder Tissue Gallbladder TISSUE EXAM Farida Rodrigez DO 2022 1510    2 : Portion of duodenum Tissue Duodenum TISSUE EXAM Farida Rodrigez DO 2022 1540    3 : Small bowel Tissue Small Bowel, NOS TISSUE EXAM Farida Rodrigez DO 2022 1613        Estimated Blood Loss:   600 mL    Drains:  NG/OG/Enteral Tube Nasogastric 18 Fr Left nare (Active)   Placement Reverification Aspiration 22 0800   Status Suction-low intermittent 22 0800   Drainage Appearance Brown 22 0800   Output (mL) 750 mL 22 0800   Number of days: 0       Urethral Catheter Latex 16 Fr  (Active)   Number of days: 0       Anesthesia Type:   Choice    Operative Indications:  Small bowel obstruction ([address] ) [K56 [id number]]      Operative Findings:  - Mesh erosion into small bowel and extensive small bowel adhesions (adhesiolysis for >3 hours)  necessitating resection of approximately 90 cm of small bowel  Stapled side to side, functional end to end jejunal-jejunal anastomosis performed  Mesh explantation also performed       - Questionable area in the jejunum proximal to our anastomosis that was oversewn and somewhat strictured - left in place     - Closed loop obstruction of distal D2 and proximal D3 (presumed kocherization from prior aortobiliac bypass) through a mesenteric defect in the transverse colon mesentery  This was resected  Hand sewn duodenoduodenostomy performed     - Given the long duration of the procedure (> 5 hours), questionable area in jejunum (155 cm left of small bowel after resection), and need for abdominal wall reconstruction for closure, the decision was made to apply temporary abdominal closure with Abthera and return to the operating room tomorrow for a second look  Complications:   None    Procedure and Technique:  The patient was brought to the operating room and identified verbally and via wristband  He was transferred to the operating room table and positioned supine  General endotracheal anesthesia was induced successfully  The patient's abdomen was prepped and draped in the usual sterile fashion  Pre-operative antibiotics were administered  A time out was performed confirming the patient, procedure, and site  All parties were in agreement  Attention was turned to the abdomen where a midline laparotomy incision was made with a 10 blade scalpel along the scar from the prior laparotomy  Dissection was taken down through the subcutaneous tissue with bovie electrocautery  Care was taken to avoid injuring underlying structures, knowing there was a mesh in place from a prior surgery  The mesh was exposed and surrounding tissues were dissected away from it  The mesh was grasped with kochers and incised with [employer]  There was a loop of small bowel adhered to most of the underside of the mesh  Unavoidable injury to the small bowel was made entering the abdomen 2/2 mesh erosion into the bowel  We performed extensive lysis of adhesions in this area to free the bowel from the mesh   Multiple unavoidable injuries were made to the small bowel throughout this process with moderate spillage of bilious contents  We did explant the mesh as it was stained with bilious contents and had multiple tears/holes in the mesh from extensive dissection/adhesiolysis  Once the bowel was dissected free from the mesh, we attempted to run the bowel  We initially had a difficult time identifying the ligament of Treitz  We were able to easily identify terminal ileum and cecum  There was a loop of bowel stuck in the pelvis which appeared dusky on inspection, which corresponded to the concerning and fecalized loop of bowel initially seen radiographically on CT  Careful dissection was performed to further examine this loop  Once this loop was dissected free, we recognized that this was traveling posteriorly and proximally towards the RUQ, raising concern that this nonviable bowel was duodenum  We mobilized the hepatic flexure and dissected the ascending colon off the white line of toldt in order to fully visualize our abdominal structures  This allowed us to fully trace the nonviable loop proximally and follow it's course, revealing that it was herniating through a defect in the transverse colon mesentery causing a closed loop obstruction  At this point, we retraced our anatomy and identified this as distal D2 and proximal D3  We then performed a duodenectomy, resection approximately 7 cm of duodenum, distal to the ampulla  We then performed a hand sewn end to end duodenoduodenostomy using 3-0 PDS suture  We imbricated the anastomosis with 3-0 silk suture  We closed the defect in the transverse colon mesentery, through which the duodenum internally herniated, with 0 vicryl suture in a running fashion  We then turned our attention to the small bowel with multiple unavoidable enterotomies  This segment was determined to measure 90 cm in length which was measured at 75 cm distal to the ligament of Treitz   We resected this 90 cm of small bowel and performed a side to side functional end to end jejunal-jejunal anastomosis, taking care orient our limbs in proper tension free positioning and wihtout torsion of the mesentery  We imbricated the staple line with 3-0 silk suture in an interrupted fashion  We incised the mesentery with an Enseal device and closed the mesenteric defect with running 0 vicryl suture  We also closed a hole in the omentum with 0 vicryl suture in a running fashion, as we did not want this hole to be the source of an additional internal hernia  We then turned our attention to the RUQ, where we visualized the remnant gallbladder  We grasped the remnant gallbladder with babcocks and dissected the gallbladder free from the liver using bovie electrocautery  We identified the cystic duct and cystic artery, taking care to not injury underlying structures  We clipped and incised both the duct and artery  The remnant gallbladder was handed off the field  Hemostasis was achieved with bovie electrocautery  We irrigated the abdomen with 3 L warmed normal saline  We ran the bowel again from the ligament of Treitz to the terminal ileum  Remaining bowel in the abdomen measured 155 cm  At approximately 50 cm distal the ligament of Treitz, there was a questionable area of small bowel with a serosal tear  We oversewed this with a 3-0 silk suture, which caused some stricturing of the bowel  We discussed resecting this portion, but ultimately decided to leave it in place  We then had a discussion regarding abdominal closure  We weighed the risks vs benefits of closing  We ultimately decided to leave the patient open and return to the operating room tomorrow to assess this segment of small bowel that we left in place   We also discussed needing to perform an abdominal wall reconstruction and since the patient had already been in the operating room for > 5 hours, we felt it was best for the patient to return to the operating room tomorrow for a second look and abdominal wall reconstruction  We therefore subsequently applied an abthera wound vac in standard fashion  The patient remained intubated and was transferred to the ICU having tolerated the procedure well on 4 mcg/min of levophed  All instrument, needle, and sponge counts were correct at the end of the case  Radiofrequency detection was negative      Dr Jabari Lama was present for the entire procedure    Patient Disposition:  [address]        [address] [place] [address],   DATE: September 20, 2022  TIME: 5:22 PM

## 2022-09-20 NOTE — H&P
H&P - General Surgery  Karen Manuel 65 y o  male MRN: [id number]  Unit/Bed#: ED 03 Encounter: [id number]        Assessment/Plan     Assessment:  Mr Marii Pinedo is a 64 yo male who presents with vomiting and abdominal pain in the setting of SBO  Plan:  -NPO  -IV fluids  -IV abx  -Type and screen  -Prepare 1 unit of PRBC  -PRN anti pain meds  -PRN ani nausea meds  -Plan to proceed to OR for ex-lap, possible small bowel resection, possible ostomy, possible cholecystectomy    History of Present Illness     HPI:  Karen Manuel is a 65 y o  male who presents with vomiting and abdominal pain  The patient states he started vomiting yesterday afternoon and has vomited a total of 10 times since yesterday  The states his abdominal pain is mainly in his lower abdomen  He has not been able to tolerate a PO diet  The patient endorses still having bowel movements and passing flatus  He denies having fever and chills  The patient was originally schedule for laparoscopic cholecystectomy tomorrow however his persistent nausea was the reason he presented to the ED  On presentation to the ED the patient was hemodynamically stable however he had an elevated WBC of 18 and a lactic acid of 6 5  The patient has a PSHX which includes multipel abdominal surgeries such as an appendectomy, AAA repair, and subtotal cholecystectomy  CT AP w/o con demonstrated small bowel obstruction  Review of Systems   Constitutional: Negative for chills and fever  HENT: Negative for ear pain and sore throat  Eyes: Negative for pain and visual disturbance  Respiratory: Negative for cough and shortness of breath  Cardiovascular: Negative for chest pain and palpitations  Gastrointestinal: Positive for abdominal pain, diarrhea, nausea and vomiting  Genitourinary: Negative for dysuria and hematuria  Musculoskeletal: Negative for arthralgias and back pain  Skin: Negative for color change and rash     Neurological: Negative for seizures and syncope  All other systems reviewed and are negative        Historical Information   Past Medical History:   Diagnosis Date   • Abdominal aortic aneurysm (AAA) ([address] )     last assessed nov 6 2015   • Abscess of groin     last assessed oct 6 2014   • Arthritis    • Candidiasis, cutaneous     last assessed march 19 2014   • Coronary artery disease    • Dyslipidemia    • Esophageal ulcer without bleeding    • Gastritis    • GERD (gastroesophageal reflux disease)    • Hiatal hernia    • History of transfusion    • Hx of cataract surgery, left     last assessed july 21 2014   • Hyperlipidemia    • Hypertension    • Myocardial infarction [facility])    • Old myocardial infarction 2000   • Recurrent right inguinal hernia     s/p right orchioectomy, mesh removal, and sinus trac removal patient being followed by surgery next appt 4/28/14 patient s/p keflex x 7 days for MSSA Cx repeat wound cx grew MSSA cx repeat wound cx grew MSSA and other armond (mixed) no MRSA identified conitunue close monitoring and local wound care, last assessed april 15 2014   • Renal disorder    • Wound of skin     in the groin, right     Past Surgical History:   Procedure Laterality Date   • ABDOMINAL AORTIC ANEURYSM REPAIR  2009    aortobi-iliac, dacron graft   • APPENDECTOMY      open   • CARDIAC SURGERY     • CATARACT EXTRACTION Bilateral    • CHOLECYSTECTOMY N/A 9/29/2020    Procedure: CHOLECYSTECTOMY;  Surgeon: Soila Ames MD;  Location: [facility];  Service: General   • COLONOSCOPY     • CORONARY ANGIOPLASTY WITH STENT PLACEMENT      stent 2   • CYSTOSCOPY      diagnostic onset nov 13 2014   • EXPLORATORY LAPAROTOMY  12/09/2009   • EYE SURGERY     • FL INJECTION LEFT HIP (NON ARTHROGRAM)  4/2/2019   • HIP SURGERY Right    • INGUINAL HERNIA REPAIR Right     unilateral x2   • ORCHIECTOMY Right    • OR COLONOSCOPY FLX DX W/COLLJ SPEC WHEN PFRMD N/A 5/22/2018    Procedure: COLONOSCOPY;  Surgeon: Regina Carlisle DO;  Location: [facility]; Service: Gastroenterology   • WA ESOPHAGOGASTRODUODENOSCOPY TRANSORAL DIAGNOSTIC N/A 2/14/2019    Procedure: ESOPHAGOGASTRODUODENOSCOPY (EGD); Surgeon: Marelyn Hammans, MD;  Location: [facility];   Service: Gastroenterology   • WA LAP,DIAGNOSTIC ABDOMEN N/A 9/29/2020    Procedure: LAPAROSCOPIC DIAGNOSTIC,   ;  Surgeon: Marvin Egan MD;  Location[facility];  Service: General   • WA REPAIR INCISIONAL HERNIA,REDUCIBLE N/A 2/23/2018    Procedure: lysis of adhesions; INCISIONAL HERNIA REPAIR WITH MESH;  Surgeon: Jayla Ariza MD;  Location[facility];  Service: General   • UPPER GASTROINTESTINAL ENDOSCOPY       Social History   Social History     Substance and Sexual Activity   Alcohol Use Not Currently    Comment: occasionally     Social History     Substance and Sexual Activity   Drug Use No     Social History     Tobacco Use   Smoking Status Former Smoker   Smokeless Tobacco Never Used   Tobacco Comment    quit 20 + years ago     Family History: non-contributory    Meds/Allergies   all medications and allergies reviewed  No Known Allergies    Objective   First Vitals:   Blood Pressure: 143/64 (09/20/22 0154)  Pulse: 78 (09/20/22 0154)  Temperature: 97 6 °F (36 4 °C) (09/20/22 0154)  Temp Source: Axillary (09/20/22 0154)  Respirations: 20 (09/20/22 0154)  Height: 5' 8" (172 7 cm) (09/20/22 0630)  Weight - Scale: 63 5 kg (139 lb 15 9 oz) (09/20/22 0630)  SpO2: 94 % (09/20/22 0154)    Current Vitals:   Blood Pressure: 136/60 (09/20/22 0800)  Pulse: 104 (09/20/22 0800)  Temperature: 97 6 °F (36 4 °C) (09/20/22 0154)  Temp Source: Axillary (09/20/22 0154)  Respirations: 18 (09/20/22 0800)  Height: 5' 8" (172 7 cm) (09/20/22 0630)  Weight - Scale: 63 5 kg (139 lb 15 9 oz) (09/20/22 0630)  SpO2: 90 % (09/20/22 0800)      Intake/Output Summary (Last 24 hours) at 9/20/2022 0828  Last data filed at 9/20/2022 0800  Gross per 24 hour   Intake 800 ml   Output 750 ml   Net 50 ml       Invasive Devices  Report    Peripheral Intravenous Line  Duration           Peripheral IV 09/20/22 Left Antecubital <1 day    Peripheral IV 09/20/22 Left Forearm <1 day          Drain  Duration           NG/OG/Enteral Tube Nasogastric 18 Fr Left nare <1 day                Physical Exam:  General: No acute distress  Neuro: Alert, oriented to person and place  Eyes: Extraocular movements intact  CV: Well perfused, regular rate and rhythm  Lungs: Normal work of breathing, no increased respiratory effort  Abdomen: Mildly distended  Tender to palpation in the RLQ and LLQ  Extremities: No edema, clubbing or cyanosis  Skin: Warm, dry  Lymph: No palpable lymph nodes  Psych: Normal mentation      Lab Results:   I have personally reviewed pertinent lab results  , CBC:   Lab Results   Component Value Date    WBC 18 08 (H) 09/20/2022    HGB 18 0 (H) 09/20/2022    HCT 56 7 (H) 09/20/2022    MCV 92 09/20/2022     09/20/2022    MCH 29 3 09/20/2022    MCHC 31 7 09/20/2022    RDW 14 5 09/20/2022    MPV 9 6 09/20/2022    NRBC 0 09/20/2022   , CMP:   Lab Results   Component Value Date    SODIUM 138 09/20/2022    K 4 0 09/20/2022     09/20/2022    CO2 21 09/20/2022    BUN 22 09/20/2022    CREATININE 1 89 (H) 09/20/2022    CALCIUM 10 5 (H) 09/20/2022    AST 25 09/20/2022    ALT 40 09/20/2022    ALKPHOS 158 (H) 09/20/2022    EGFR 33 09/20/2022   , Coagulation: No results found for: PT, INR, APTT, Urinalysis: No results found for: COLORU, CLARITYU, SPECGRAV, PHUR, LEUKOCYTESUR, NITRITE, PROTEINUA, GLUCOSEU, KETONESU, BILIRUBINUR, BLOODU, Amylase: No results found for: AMYLASE, Lipase: No results found for: LIPASE  Imaging: I have personally reviewed pertinent reports  CT abdomen pelvis wo contrast    Result Date: 9/20/2022  Impression: Findings suggesting early versus partial small bowel obstruction, likely on the basis of adhesions, as described above  Please see discussion  Surgical consultation and follow-up is recommended   There has been prior subtotal cholecystectomy  The remaining gallbladder is partially contracted and contains several gallstones  There is a 6 mm gallstone in the region of the gallbladder neck  There is no evidence of pericholecystic inflammatory change and no definite common bile duct stones are identified, however, there is some infiltration of the fat surrounding the mid to distal common bile duct  Surgical consultation and follow-up is recommended  There is opacity in the lower left lung suspicious for pneumonia  Follow-up after treatment to ensure complete resolution is recommended  The prostate is somewhat prominent  Clinical and laboratory correlation is recommended  Other nonemergent findings, as described above  Please see discussion  This examination demonstrates findings for which clinical, laboratory, and imaging follow-up is recommended and was logged as such in [address] [place] [address] Rd  I personally discussed this study with Teri Bravo on 9/20/2022 at 5:13 AM  Workstation performed: [id number]     EKG, Pathology, and Other Studies: I have personally reviewed pertinent reports        Code Status: Level 1 - Full Code  Advance Directive and Living Will:      Power of :    POLST:      Amy Garcia MD  General Surgery Resident

## 2022-09-20 NOTE — RESPIRATORY THERAPY NOTE
09/20/22 1755   Respiratory Assessment   Assessment Type Assess only   General Appearance Sedated   Respiratory Pattern Assisted   Chest Assessment Chest expansion symmetrical   Resp Comments Pt arrived from OR and placed on anesthesia settings  istat obtained per AP request  ETT bliss exchanged from anesthesia tape  O2 Device 840   Vent Information   Vent ID [id number]   Vent type     Vent Mode AC/VC   $ Vent Charge-INITIAL Yes   Ventilator Start Yes   $ Pulse Oximetry Spot Check Charge Completed   AC/VC Settings   Resp Rate (BPM) 20 BPM   Vt (mL) 450 mL   FIO2 (%) 60 %   PEEP (cmH2O) 6 cmH2O   Flow Pattern (LPM) 60 L/min   Trigger Sensitivity Flow (lpm) -3 %   Humidification Heater   Heater Temperature (Set) 98 6 °F (37 °C)   AC/VC Actuals   Resp Rate (BPM) 20 BPM   VT (mL) 527   MV 9 33   MAP (cmH2O) 10 cmH2O   Peak Pressure (cmH2O) 18 cmH2O   I/E Ratio (Obs) 1:2 7   Heater Temperature (Obs) 98 6 °F (37 °C)   Static Compliance (mL/cmH20) 14 mL/cmH2O   Plateau Pressure (cm H2O) 69 cm H2O   AC/VC Alarms   High Peak Pressure (cmH2O) 40   High Resp Rate (BPM) 40 BPM   High MV (L/min) 20 L/min   Low MV (L/min) 4 L/min   Vt High (mL) 900 mL   Vt Low (mL) 250 mL   AC/VC Apnea Settings   Resp Rate (BPM) 20 BPM   VT (mL) 450 mL   FIO2 (%) 100 %   Apnea Time (s) 20 S   Apnea Flow (L/min) 60 L/min   Maintenance   Alarm (pink) cable attached Yes   Resuscitation bag with peep valve at bedside Yes   Water bag changed No   Circuit changed No   ETT  Hi-Lo; Cuffed;Oral 7 5 mm   Placement Date/Time: 09/20/22 1222   Mask Ventilation: Mask ventilation not attempted (0)  Preoxygenated: Yes  Technique: Rapid sequence;Video laryngoscopy;bougie  Type: Hi-Lo; Cuffed;Oral  Tube Size: 7 5 mm  Laryngoscope: Acosta  Blade Size: 3  Locat       Secured at (cm) 23   Measured from Lips   Secured Location Right   Secured by Commercial tube bliss   Site Condition Dry   Cuff Pressure (cm H2O) 24 cm H2O

## 2022-09-20 NOTE — ANESTHESIA POSTPROCEDURE EVALUATION
Post-Op Assessment Note    CV Status:  Stable  Pain scale: Unable to assess, due presence of ETT  Pain management: adequate (Unable to fully assess, due to presence of ETT )     Mental Status:  Unresponsive   Hydration Status:  Stable   PONV Controlled:  None   Airway Patency:  Patent  Airway: intubated      Post Op Vitals Reviewed: Yes      Staff: CRNA         No complications documented      BP (!) 158/46 (09/20/22 1805)    Temp 98 1 °F (36 7 °C) (09/20/22 1805)    Pulse 104 (09/20/22 1805)   Resp 20 (09/20/22 1805)    SpO2 98 % (09/20/22 1805)

## 2022-09-20 NOTE — ED ATTENDING ATTESTATION
9/20/2022  IMaryam MD, saw and evaluated the patient  I have discussed the patient with the resident/non-physician practitioner and agree with the resident's/non-physician practitioner's findings, Plan of Care, and MDM as documented in the resident's/non-physician practitioner's note, except where noted  All available labs and Radiology studies were reviewed  I was present for key portions of any procedure(s) performed by the resident/non-physician practitioner and I was immediately available to provide assistance  At this point I agree with the current assessment done in the Emergency Department  I have conducted an independent evaluation of this patient a history and physical is as follows:    ED Course  ED Course as of 09/20/22 0239   Tue Sep 20, 2022   0218 Per resident h&p 66 YO M presents for abdominal pain recent choledocholithiasis last summer; vomiting; diarrhea; felt weak after vomiting; no hematochezia; +abdominal pain; N; decreased PE suprapubic abdominal discomfort;      Emergency Department Note- Judd Guevara 65 y o  male MRN: [id number]    Unit/Bed#: ED 03 Encounter: [id number]    Judd Guevara is a 65 y o  male who presents with   Chief Complaint   Patient presents with   • Abdominal Pain     Pt had part of his gallbladder removed one year ago, started to grow back and develop gall stones  Patient started today with nausea,vomiting and diarrhea  Per son, patient's vomit was dark brown  Patient feeling weaker and fell today  Denies hitting head  Is on ASA, but stopped taking it due to having his gall bladder removed this Wednesday  History of Present Illness   HPI:  Judd Guevara is a 65 y o  male who presents for evaluation of:  Recrudescence of abdominal pain, nausea, and vomiting  The patient has reportedly had a cholecystectomy in the past and had a recurrent episode of choledocholithiasis last summer    The patient is scheduled to have a repeat cholecystectomy on Wednesday by the [facility] general [facility]  Pain is reminiscent of prior episodes of choledocholithiasis and cholelithiasis pain  His vomitus has been nonbloody  The pain is epigastric, radiates to right upper quadrant; moderate intensity; worsened by nausea and vomiting  The patient also notes some associated diarrhea  Review of Systems   Constitutional: Negative for chills and fever  HENT: Negative for congestion and rhinorrhea  Respiratory: Negative for cough and shortness of breath  Cardiovascular: Negative for chest pain and palpitations  Gastrointestinal: Positive for abdominal pain, diarrhea, nausea and vomiting  Musculoskeletal: Negative for back pain and neck pain  Neurological: Negative for light-headedness and headaches  All other systems reviewed and are negative        Historical Information   Past Medical History:   Diagnosis Date   • Abdominal aortic aneurysm (AAA) ([facility] [address] )     last assessed nov 6 2015   • Abscess of groin     last assessed oct 6 2014   • Arthritis    • Candidiasis, cutaneous     last assessed march 19 2014   • Coronary artery disease    • Dyslipidemia    • Esophageal ulcer without bleeding    • Gastritis    • GERD (gastroesophageal reflux disease)    • Hiatal hernia    • History of transfusion    • Hx of cataract surgery, left     last assessed july 21 2014   • Hyperlipidemia    • Hypertension    • Myocardial infarction [facility])    • Old myocardial infarction 2000   • Recurrent right inguinal hernia     s/p right orchioectomy, mesh removal, and sinus trac removal patient being followed by surgery next appt 4/28/14 patient s/p keflex x 7 days for MSSA Cx repeat wound cx grew MSSA cx repeat wound cx grew MSSA and other armond (mixed) no MRSA identified conitunue close monitoring and local wound care, last assessed april 15 2014   • Renal disorder    • Wound of skin     in the groin, right     Past Surgical History:   Procedure Laterality Date   • ABDOMINAL AORTIC ANEURYSM REPAIR  2009    aortobi-iliac, dacron graft   • APPENDECTOMY      open   • CARDIAC SURGERY     • CATARACT EXTRACTION Bilateral    • CHOLECYSTECTOMY N/A 9/29/2020    Procedure: CHOLECYSTECTOMY;  Surgeon: Catracho Munoz MD;  [facility]: [facility];  Service: General   • COLONOSCOPY     • CORONARY ANGIOPLASTY WITH STENT PLACEMENT      stent 2   • CYSTOSCOPY      diagnostic onset nov 13 2014   • EXPLORATORY LAPAROTOMY  12/09/2009   • EYE SURGERY     • FL INJECTION LEFT HIP (NON ARTHROGRAM)  4/2/2019   • HIP SURGERY Right    • INGUINAL HERNIA REPAIR Right     unilateral x2   • ORCHIECTOMY Right    • OK COLONOSCOPY FLX DX W/COLLJ SPEC WHEN PFRMD N/A 5/22/2018    Procedure: COLONOSCOPY;  Surgeon: Yvon Peoples DO;  [facility]: [facility]; Service: Gastroenterology   • OK ESOPHAGOGASTRODUODENOSCOPY TRANSORAL DIAGNOSTIC N/A 2/14/2019    Procedure: ESOPHAGOGASTRODUODENOSCOPY (EGD); Surgeon: Gurdeep Skaggs MD;  Location: [facility];   Service: Gastroenterology   • OK LAP,DIAGNOSTIC ABDOMEN N/A 9/29/2020    Procedure: LAPAROSCOPIC DIAGNOSTIC,   ;  Surgeon: Catracho Munoz MD;  [facility];  Service: General   • OK REPAIR INCISIONAL HERNIA,REDUCIBLE N/A 2/23/2018    Procedure: lysis of adhesions; INCISIONAL HERNIA REPAIR WITH MESH;  Surgeon: Maris Gil MD;  Location[facility];  Service: General   • UPPER GASTROINTESTINAL ENDOSCOPY       Social History   Social History     Substance and Sexual Activity   Alcohol Use Not Currently    Comment: occasionally     Social History     Substance and Sexual Activity   Drug Use No     Social History     Tobacco Use   Smoking Status Former Smoker   Smokeless Tobacco Never Used   Tobacco Comment    quit 20 + years ago     Family History:   Family History   Problem Relation Age of Onset   • Heart disease Mother    • Diabetes Mother    • Heart disease Father    • Diabetes Sister    • Cancer Brother        Meds/Allergies   PTA meds:   Prior to Admission Medications   Prescriptions Last Dose Informant Patient Reported?  Taking?   aspirin 81 MG tablet  Self Yes No   Sig: Take 81 mg by mouth daily   atorvastatin (LIPITOR) 40 mg tablet  Self No No   Sig: Take 1 tablet (40 mg total) by mouth daily with dinner Resume next scheduled dose upon discharge from the hospital   ferrous sulfate 325 (65 Fe) mg tablet  Self No No   Sig: Take 1 tablet (325 mg total) by mouth every other day   furosemide (LASIX) 20 mg tablet  Self No No   Sig: Take 1 tablet (20 mg total) by mouth daily Can take one additional dose as needed for leg swelling or shortness of breath   lisinopril (ZESTRIL) 5 mg tablet  Self No No   Sig: Take 1 tablet (5 mg total) by mouth daily   Patient taking differently: Take 5 mg by mouth every morning   meclizine (ANTIVERT) 25 mg tablet  Self No No   Sig: Take 1 tablet (25 mg total) by mouth 3 (three) times a day as needed for dizziness   metoprolol succinate (TOPROL-XL) 50 mg 24 hr tablet  Self No No   Sig: Take 1 tablet (50 mg total) by mouth daily Resume on 3/11/18   Patient taking differently: Take 50 mg by mouth every morning   nitroglycerin (NITROSTAT) 0 4 mg SL tablet   No No   Sig: Place 1 tablet (0 4 mg total) under the tongue every 5 (five) minutes as needed for chest pain   omeprazole (PriLOSEC) 40 MG capsule  Self No No   Sig: Take 1 capsule (40 mg total) by mouth daily   Patient taking differently: Take 40 mg by mouth every morning   potassium chloride (K-DUR,KLOR-CON) 10 mEq tablet  Self No No   Sig: Take 1 tablet (10 mEq total) by mouth daily      Facility-Administered Medications: None     No Known Allergies    Objective   First Vitals:   Blood Pressure: 143/64 (09/20/22 0154)  Pulse: 78 (09/20/22 0154)  Temperature: 97 6 °F (36 4 °C) (09/20/22 0154)  Temp Source: Axillary (09/20/22 0154)  Respirations: 20 (09/20/22 0154)  SpO2: 94 % (09/20/22 0154)    Current Vitals:   Blood Pressure: 143/64 (09/20/22 0154)  Pulse: 78 (09/20/22 0154)  Temperature: 97 6 °F (36 4 °C) (22 0154)  Temp Source: Axillary (22 0154)  Respirations: 20 (22 0154)  SpO2: 94 % (22)    No intake or output data in the 24 hours ending 22 0239    Invasive Devices  Report    Peripheral Intravenous Line  Duration           Peripheral IV 22 Left Antecubital <1 day    Peripheral IV 22 Left Forearm <1 day                Physical Exam  Vitals and nursing note reviewed  Constitutional:       General: He is not in acute distress  Appearance: Normal appearance  He is well-developed  HENT:      Head: Normocephalic and atraumatic  Right Ear: External ear normal       Left Ear: External ear normal       Nose: Nose normal       Mouth/Throat:      Pharynx: No oropharyngeal exudate  Eyes:      Conjunctiva/sclera: Conjunctivae normal       Pupils: Pupils are equal, round, and reactive to light  Cardiovascular:      Rate and Rhythm: Normal rate and regular rhythm  Pulmonary:      Effort: Pulmonary effort is normal  No respiratory distress  Abdominal:      General: Abdomen is flat  There is no distension  Palpations: Abdomen is soft  Tenderness: There is abdominal tenderness in the epigastric area  Musculoskeletal:         General: No deformity  Normal range of motion  Cervical back: Normal range of motion and neck supple  Skin:     General: Skin is warm and dry  Capillary Refill: Capillary refill takes less than 2 seconds  Neurological:      General: No focal deficit present  Mental Status: He is alert and oriented to person, place, and time  Mental status is at baseline  Coordination: Coordination normal    Psychiatric:         Mood and Affect: Mood normal          Behavior: Behavior normal          Thought Content: Thought content normal          Judgment: Judgment normal            Medical Decision Makin   Acute abdominal pain:  CT abdomen and pelvis; CBC; CMP; lipase; GGT; pain control as needed; antiemetics  No results found for this or any previous visit (from the past 36 hour(s))  CT abdomen pelvis w contrast    (Results Pending)         Portions of the record may have been created with voice recognition software  Occasional wrong word or "sound a like" substitutions may have occurred due to the inherent limitations of voice recognition software  Read the chart carefully and recognize, using context, where substitutions have occurred          Critical Care Time  CriticalCare Time  Performed by: Genet Nicholas MD  Authorized by: Genet Nicholas MD     Critical care provider statement:     Critical care time (minutes):  32    Critical care time was exclusive of:  Separately billable procedures and treating other patients and teaching time    Critical care was necessary to treat or prevent imminent or life-threatening deterioration of the following conditions:  Sepsis    Critical care was time spent personally by me on the following activities:  Obtaining history from patient or surrogate, development of treatment plan with patient or surrogate, discussions with consultants, evaluation of patient's response to treatment, examination of patient, ordering and performing treatments and interventions, ordering and review of laboratory studies, ordering and review of radiographic studies, re-evaluation of patient's condition and review of old charts    I assumed direction of critical care for this patient from another provider in my specialty: no    Comments:      SIRS and sepsis criteria noted; ceftriaxone IV administered; NSS 30 cc/kg administered; general surgery consulted

## 2022-09-20 NOTE — ED PROVIDER NOTES
History  Chief Complaint   Patient presents with   • Abdominal Pain     Pt had part of his gallbladder removed one year ago, started to grow back and develop gall stones  Patient started today with nausea,vomiting and diarrhea  Per son, patient's vomit was dark brown  Patient feeling weaker and fell today  Denies hitting head  Is on ASA, but stopped taking it due to having his gall bladder removed this Wednesday  26-year-old male past medical history of CAD, hypertension, subtotal cholecystectomy (in 3463) complicated by apparent growth of gallbladder remnant, recent bout of choledocholithiasis (July 2022), presents to ED complaining of 1 day of nausea vomiting diarrhea  Patient began feeling unwell this morning when he developed nausea and vomiting  He describes the vomitus as dark in color with dark diarrhea  He has been vomiting throughout the day leading to an episode of weakness and fall in his kitchen  This prompted him to call his son who brought patient to the ED  He denies hitting his head, remembers the entire episode  Patient states that he feels similar to when he had choledocholithiasis diagnosed here in July of 2022  At that time, he was found to have a remnant of what looked like full gallbladder with gallstones on CT scan  He was offered repeat cholecystectomy and this was scheduled to be completed outpatient tomorrow  He denies any fevers chills he endorses left lower quadrant abdominal pain no chest pain  Endorses decreased amount of urine, no hematuria dysuria  Prior to Admission Medications   Prescriptions Last Dose Informant Patient Reported?  Taking?   aspirin 81 MG tablet  Self Yes No   Sig: Take 81 mg by mouth daily   atorvastatin (LIPITOR) 40 mg tablet  Self No No   Sig: Take 1 tablet (40 mg total) by mouth daily with dinner Resume next scheduled dose upon discharge from the hospital   ferrous sulfate 325 (65 Fe) mg tablet  Self No No   Sig: Take 1 tablet (325 mg total) by mouth every other day   furosemide (LASIX) 20 mg tablet  Self No No   Sig: Take 1 tablet (20 mg total) by mouth daily Can take one additional dose as needed for leg swelling or shortness of breath   lisinopril (ZESTRIL) 5 mg tablet  Self No No   Sig: Take 1 tablet (5 mg total) by mouth daily   Patient taking differently: Take 5 mg by mouth every morning   meclizine (ANTIVERT) 25 mg tablet  Self No No   Sig: Take 1 tablet (25 mg total) by mouth 3 (three) times a day as needed for dizziness   metoprolol succinate (TOPROL-XL) 50 mg 24 hr tablet  Self No No   Sig: Take 1 tablet (50 mg total) by mouth daily Resume on 3/11/18   Patient taking differently: Take 50 mg by mouth every morning   nitroglycerin (NITROSTAT) 0 4 mg SL tablet   No No   Sig: Place 1 tablet (0 4 mg total) under the tongue every 5 (five) minutes as needed for chest pain   omeprazole (PriLOSEC) 40 MG capsule  Self No No   Sig: Take 1 capsule (40 mg total) by mouth daily   Patient taking differently: Take 40 mg by mouth every morning   potassium chloride (K-DUR,KLOR-CON) 10 mEq tablet  Self No No   Sig: Take 1 tablet (10 mEq total) by mouth daily      Facility-Administered Medications: None       Past Medical History:   Diagnosis Date   • Abdominal aortic aneurysm (AAA) ([facility] [place] [address] )     last assessed nov 6 2015   • Abscess of groin     last assessed oct 6 2014   • Arthritis    • Candidiasis, cutaneous     last assessed march 19 2014   • Coronary artery disease    • Dyslipidemia    • Esophageal ulcer without bleeding    • Gastritis    • GERD (gastroesophageal reflux disease)    • Hiatal hernia    • History of transfusion    • Hx of cataract surgery, left     last assessed july 21 2014   • Hyperlipidemia    • Hypertension    • Myocardial infarction [facility])    • Old myocardial infarction 2000   • Recurrent right inguinal hernia     s/p right orchioectomy, mesh removal, and sinus trac removal patient being followed by surgery next appt 4/28/14 patient s/p keflex x 7 days for MSSA Cx repeat wound cx grew MSSA cx repeat wound cx grew MSSA and other armond (mixed) no MRSA identified conitunue close monitoring and local wound care, last assessed april 15 2014   • Renal disorder    • Wound of skin     in the groin, right       Past Surgical History:   Procedure Laterality Date   • ABDOMINAL AORTIC ANEURYSM REPAIR  2009    aortobi-iliac, dacron graft   • APPENDECTOMY      open   • CARDIAC SURGERY     • CATARACT EXTRACTION Bilateral    • CHOLECYSTECTOMY N/A 9/29/2020    Procedure: CHOLECYSTECTOMY;  Surgeon: Monica Bishop MD;  [facility];  Service: General   • COLONOSCOPY     • CORONARY ANGIOPLASTY WITH STENT PLACEMENT      stent 2   • CYSTOSCOPY      diagnostic onset nov 13 2014   • EXPLORATORY LAPAROTOMY  12/09/2009   • EYE SURGERY     • FL INJECTION LEFT HIP (NON ARTHROGRAM)  4/2/2019   • HIP SURGERY Right    • INGUINAL HERNIA REPAIR Right     unilateral x2   • ORCHIECTOMY Right    • NJ COLONOSCOPY FLX DX W/COLLJ SPEC WHEN PFRMD N/A 5/22/2018    Procedure: COLONOSCOPY;  Surgeon: Jonas Colby DO;  Location: [facility]; Service: Gastroenterology   • NJ ESOPHAGOGASTRODUODENOSCOPY TRANSORAL DIAGNOSTIC N/A 2/14/2019    Procedure: ESOPHAGOGASTRODUODENOSCOPY (EGD); Surgeon: Kyle Bolivar MD;  Location: [facility];   Service: Gastroenterology   • NJ LAP,DIAGNOSTIC ABDOMEN N/A 9/29/2020    Procedure: LAPAROSCOPIC DIAGNOSTIC,   ;  Surgeon: Monica Bishop MD;  [facility];  Service: General   • NJ REPAIR INCISIONAL HERNIA,REDUCIBLE N/A 2/23/2018    Procedure: lysis of adhesions; INCISIONAL HERNIA REPAIR WITH MESH;  Surgeon: Mariely Carter MD;  [facility];  Service: General   • UPPER GASTROINTESTINAL ENDOSCOPY         Family History   Problem Relation Age of Onset   • Heart disease Mother    • Diabetes Mother    • Heart disease Father    • Diabetes Sister    • Cancer Brother      I have reviewed and agree with the history as documented  E-Cigarette/Vaping   • E-Cigarette Use Never User      E-Cigarette/Vaping Substances   • Nicotine No    • THC No    • CBD No    • Flavoring No    • Other No    • Unknown No      Social History     Tobacco Use   • Smoking status: Former Smoker   • Smokeless tobacco: Never Used   • Tobacco comment: quit 20 + years ago   Vaping Use   • Vaping Use: Never used   Substance Use Topics   • Alcohol use: Not Currently     Comment: occasionally   • Drug use: No        Review of Systems   Constitutional: Negative for chills and fever  HENT: Negative for ear pain and sore throat  Eyes: Negative for pain and visual disturbance  Respiratory: Negative for cough and shortness of breath  Cardiovascular: Negative for chest pain and palpitations  Gastrointestinal: Positive for abdominal pain, diarrhea, nausea and vomiting  Negative for anal bleeding and blood in stool  Genitourinary: Positive for decreased urine volume  Negative for dysuria and hematuria  Musculoskeletal: Negative for arthralgias and back pain  Skin: Negative for color change and rash  Neurological: Negative for seizures, syncope and headaches  Psychiatric/Behavioral: Negative for agitation and confusion  All other systems reviewed and are negative  Physical Exam  ED Triage Vitals   Temperature Pulse Respirations Blood Pressure SpO2   09/20/22 0154 09/20/22 0154 09/20/22 0154 09/20/22 0154 09/20/22 0154   97 6 °F (36 4 °C) 78 20 143/64 94 %      Temp Source Heart Rate Source Patient Position - Orthostatic VS BP Location FiO2 (%)   09/20/22 0154 09/20/22 0154 09/20/22 0154 09/20/22 0154 09/20/22 1805   Axillary Monitor Lying Left arm 100      Pain Score       09/20/22 0154       5             Orthostatic Vital Signs  Vitals:    09/20/22 2000 09/20/22 2008 09/20/22 2009 09/20/22 2015   BP:   (!) 95/44    Pulse: 84 84 85 84   Patient Position - Orthostatic VS:           Physical Exam  Vitals and nursing note reviewed  Constitutional:       Appearance: He is well-developed  He is ill-appearing  He is not diaphoretic  HENT:      Head: Normocephalic and atraumatic  Eyes:      Conjunctiva/sclera: Conjunctivae normal    Cardiovascular:      Rate and Rhythm: Regular rhythm  Tachycardia present  Heart sounds: No murmur heard  Pulmonary:      Effort: Pulmonary effort is normal  No respiratory distress  Breath sounds: Normal breath sounds  Abdominal:      Palpations: Abdomen is soft  Tenderness: There is abdominal tenderness in the suprapubic area and left lower quadrant  There is no right CVA tenderness or left CVA tenderness  Hernia: A hernia is present  Musculoskeletal:      Cervical back: Neck supple  Skin:     General: Skin is warm and dry  Capillary Refill: Capillary refill takes less than 2 seconds  Neurological:      Mental Status: He is alert and oriented to person, place, and time     Psychiatric:         Mood and Affect: Mood normal          Behavior: Behavior normal          ED Medications  Medications   metroNIDAZOLE (FLAGYL) IVPB (premix) 500 mg 100 mL ( Intravenous MAR Unhold 9/20/22 1755)   meclizine (ANTIVERT) tablet 25 mg (has no administration in time range)   ondansetron (ZOFRAN) injection 4 mg ( Intravenous MAR Unhold 9/20/22 1755)   naloxone (NARCAN) 0 04 mg/mL syringe 0 04 mg ( Intravenous MAR Unhold 9/20/22 1755)   cefepime (MAXIPIME) 1,000 mg in dextrose 5 % 50 mL IVPB ( Intravenous MAR Unhold 9/20/22 1755)   chlorhexidine (PERIDEX) 0 12 % oral rinse 15 mL (15 mL Mouth/Throat Given 9/20/22 2051)   heparin (porcine) subcutaneous injection 5,000 Units (has no administration in time range)   pantoprazole (PROTONIX) injection 40 mg (40 mg Intravenous Given 9/20/22 2052)   fentanyl citrate (PF) 100 MCG/2ML 50 mcg (has no administration in time range)   fentaNYL 1000 mcg in sodium chloride 0 9% 100mL infusion (100 mcg/hr Intravenous New Bag 9/20/22 1826)   propofol (DIPRIVAN) 1000 mg in 100 mL infusion (premix) (20 mcg/kg/min × 63 5 kg Intravenous Rate/Dose Change 9/20/22 2051)   HYDROmorphone (DILAUDID) injection 0 5 mg (has no administration in time range)   multi-electrolyte (PLASMALYTE-A/ISOLYTE-S PH 7 4) IV solution (125 mL/hr Intravenous New Bag 9/20/22 2053)   norepinephrine (LEVOPHED) 1 mg/mL injection **ADS Override Pull** (has no administration in time range)   ondansetron (ZOFRAN) injection 4 mg (4 mg Intravenous Given 9/20/22 0336)   sodium chloride 0 9 % bolus 1,000 mL (0 mL Intravenous Stopped 9/20/22 0527)   ceftriaxone (ROCEPHIN) 2 g/50 mL in dextrose IVPB (0 mg Intravenous Stopped 9/20/22 0423)   sodium chloride 0 9 % bolus 800 mL (0 mL Intravenous Stopped 9/20/22 0731)   multi-electrolyte (PLASMALYTE-A/ISOLYTE-S PH 7 4) IV solution 1,000 mL (1,000 mL Intravenous New Bag 9/20/22 1948)       Diagnostic Studies  Results Reviewed     Procedure Component Value Units Date/Time    TEG 6 Global Hemostasis with Lysis [[id number]]  (Normal) Collected: 09/20/22 0935    Lab Status: Final result Specimen: Blood from Arm, Left Updated: 09/20/22 1051     CKR(REACTION TIME) 5 8 Min      CKLY30 0 0 %      CRTMA(RAPIDTEG MAX AMPLITUDE) 63 mm      CFFMA (FUNCTIONAL FIBRINOGEN MAX AMPLITUDE) 19 5 mm     Blood culture [[id number]] Collected: 09/20/22 0232    Lab Status: Preliminary result Specimen: Blood from Arm, Right Updated: 09/20/22 0804     Blood Culture Received in Microbiology Lab  Culture in Progress  Blood culture [[id number]] Collected: 09/20/22 0230    Lab Status: Preliminary result Specimen: Blood from Arm, Left Updated: 09/20/22 0804     Blood Culture Received in Microbiology Lab  Culture in Progress  Lactic acid 2 Hours [[id number]]  (Abnormal) Collected: 09/20/22 0424    Lab Status: Final result Specimen: Blood from Arm, Left Updated: 09/20/22 0522     LACTIC ACID 4 6 mmol/L     Narrative:      Result may be elevated if tourniquet was used during collection      Gamma GT [[id number]]  (Abnormal) Collected: 09/20/22 0336    Lab Status: Final result Specimen: Blood from Arm, Left Updated: 09/20/22 0357      U/L     Lactic acid, plasma [[id number]]  (Abnormal) Collected: 09/20/22 0230    Lab Status: Final result Specimen: Blood from Arm, Left Updated: 09/20/22 0324     LACTIC ACID 6 5 mmol/L     Narrative:      Result may be elevated if tourniquet was used during collection      Comprehensive metabolic panel [[id number]]  (Abnormal) Collected: 09/20/22 0232    Lab Status: Final result Specimen: Blood from Arm, Left Updated: 09/20/22 0320     Sodium 138 mmol/L      Potassium 4 0 mmol/L      Chloride 104 mmol/L      CO2 21 mmol/L      ANION GAP 13 mmol/L      BUN 22 mg/dL      Creatinine 1 89 mg/dL      Glucose 134 mg/dL      Calcium 10 5 mg/dL      AST 25 U/L      ALT 40 U/L      Alkaline Phosphatase 158 U/L      Total Protein 9 9 g/dL      Albumin 4 2 g/dL      Total Bilirubin 1 25 mg/dL      eGFR 33 ml/min/1 73sq m     Narrative:      [place] guidelines for Chronic Kidney Disease (CKD):   •  Stage 1 with normal or high GFR (GFR > 90 mL/min/1 73 square meters)  •  Stage 2 Mild CKD (GFR = 60-89 mL/min/1 73 square meters)  •  Stage 3A Moderate CKD (GFR = 45-59 mL/min/1 73 square meters)  •  Stage 3B Moderate CKD (GFR = 30-44 mL/min/1 73 square meters)  •  Stage 4 Severe CKD (GFR = 15-29 mL/min/1 73 square meters)  •  Stage 5 End Stage CKD (GFR <15 mL/min/1 73 square meters)  Note: GFR calculation is accurate only with a steady state creatinine    CBC and differential [[id number]]  (Abnormal) Collected: 09/20/22 0230    Lab Status: Final result Specimen: Blood from Arm, Left Updated: 09/20/22 0244     WBC 18 08 Thousand/uL      RBC 6 14 Million/uL      Hemoglobin 18 0 g/dL      Hematocrit 56 7 %      MCV 92 fL      MCH 29 3 pg      MCHC 31 7 g/dL      RDW 14 5 %      MPV 9 6 fL      Platelets 030 Thousands/uL      nRBC 0 /100 WBCs      Neutrophils Relative 89 %      Immat GRANS % 0 %      Lymphocytes Relative 4 %      Monocytes Relative 6 %      Eosinophils Relative 1 %      Basophils Relative 0 %      Neutrophils Absolute 15 87 Thousands/µL      Immature Grans Absolute 0 06 Thousand/uL      Lymphocytes Absolute 0 80 Thousands/µL      Monocytes Absolute 1 16 Thousand/µL      Eosinophils Absolute 0 14 Thousand/µL      Basophils Absolute 0 05 Thousands/µL                  CT abdomen pelvis wo contrast   Final Result by Nakul Montes MD (09/20 2450)      Findings suggesting early versus partial small bowel obstruction, likely on the basis of adhesions, as described above  Please see discussion  Surgical consultation and follow-up is recommended  There has been prior subtotal cholecystectomy  The remaining gallbladder is partially contracted and contains several gallstones  There is a 6 mm gallstone in the region of the gallbladder neck  There is no evidence of pericholecystic inflammatory    change and no definite common bile duct stones are identified, however, there is some infiltration of the fat surrounding the mid to distal common bile duct  Surgical consultation and follow-up is recommended  There is opacity in the lower left lung suspicious for pneumonia  Follow-up after treatment to ensure complete resolution is recommended  The prostate is somewhat prominent  Clinical and laboratory correlation is recommended  Other nonemergent findings, as described above  Please see discussion  This examination demonstrates findings for which clinical, laboratory, and imaging follow-up is recommended and was logged as such in [address] [place] [address] Rd               I personally discussed this study with Gary Ponce on 9/20/2022 at 5:13 AM                      Workstation performed: [id number]         XR chest portable ICU    (Results Pending)   XR chest portable ICU    (Results Pending)   XR chest portable ICU    (Results Pending) Procedures  Procedures      ED Course  ED Course as of 09/20/22 2105   e Sep 20, 2022   0226 Contacted red surgery resident for evaluation  0330 Will obtain CT AP wo contrasts as no US availability and patient's GFR significantly decreased, precluding IV contrast at this time  5098 Radiology: CT read: has early small bowel obstruction per adhesions L>R  3 8 cm bowel loops, one loop has cecalization of small bowel contents, gallbladder more contracted with stones, no infiltrations of fat, infiltration of fat mid-distal CBD    5001 Contacted surgery to make them aware  Initial Sepsis Screening     Row Name 09/20/22 0331                Is the patient's history suggestive of a new or worsening infection? Yes (Proceed)  -AM        Suspected source of infection acute abdominal infection  -AM        Are two or more of the following signs & symptoms of infection both present and new to the patient? No  -AM        Indicate SIRS criteria Leukocytosis (WBC > 53278 IJL)  -AM        If the answer is yes to both questions, suspicion of sepsis is present --        If severe sepsis is present AND tissue hypoperfusion perists in the hour after fluid resuscitation or lactate > 4, the patient meets criteria for SEPTIC SHOCK --        Are any of the following organ dysfunction criteria present within 6 hours of suspected infection and SIRS criteria that are NOT considered to be chronic conditions? Yes  cr elevation  -AM        Organ dysfunction --        Date of presentation of severe sepsis --        Time of presentation of severe sepsis --        Tissue hypoperfusion persists in the hour after crystalloid fluid administration, evidenced, by either: --        Was hypotension present within one hour of the conclusion of crystalloid fluid administration?  No  -AM        Date of presentation of septic shock --        Time of presentation of septic shock --              User Key  (r) = Recorded By, (t) = Taken By, (c) = Cosigned By    [address] Name Provider Type    AM Casey Tellez MD Resident              Default Flowsheet Data (last 720 hours)     Sepsis Reassess     Row Name 09/20/22 0356                   Repeat Volume Status and Tissue Perfusion Assessment Performed    Repeat Volume Status and Tissue Perfusion Assessment Performed Yes  -AM                  Volume Status and Tissue Perfusion Post Fluid Resuscitation * Must Document All *    Vital Signs Reviewed (HR, RR, BP, T) Yes  -AM        Shock Index Reviewed --        Arterial Oxygen Saturation Reviewed (POx, SaO2 or SpO2) Yes (comment %)  94%  -AM        Cardio Normal S1/S2; Regular rate and rhythm  -AM        Pulmonary Clear to auscultation  -AM        Capillary Refill Brisk  -AM        Peripheral Pulses Radial  -AM        Peripheral Pulse +2  -AM        Skin Warm  -AM        Urine output assessed Adequate  -AM                  *OR*   Intensive Monitoring- Must Document One of the Following Four *:    Vital Signs Reviewed --        * Central Venous Pressure (CVP or RAP) --        * Central Venous Oxygen (SVO2, ScvO2 or Oxygen saturation via central catheter) --        * Bedside Cardiovascular US in IVC diameter and % collapse --        * Passive Leg Raise OR Crystalloid Challenge --              User Key  (r) = Recorded By, (t) = Taken By, (c) = Cosigned By    Initials Name Provider Type    AM Casey Tellez MD Resident              SBIRT 22yo+    Flowsheet Row Most Recent Value   SBIRT (25 yo +)    In order to provide better care to our patients, we are screening all of our patients for alcohol and drug use  Would it be okay to ask you these screening questions?  No Filed at: 09/20/2022 0910                MDM  Number of Diagnoses or Management Options  Diagnosis management comments: 76-year-old male past medical history of CAD, hypertension, subtotal cholecystectomy (in 4554) complicated by apparent growth of gallbladder remnant, recent bout of choledocholithiasis (July 2022), presents to ED complaining of 1 day of nausea vomiting diarrhea  DDx:  Cholecystitis, choledocholithiasis, ascending cholangitis (less likely in clinical picture)  Labs obtained, surgery consult at early to discuss form of imaging  Recommending right upper quadrant ultrasound however not available overnight, will proceed with CT without contrast considering patient's decreased GFR  Lab significant for elevated WBC count and lactate  Will treat patient along sepsis pathway covering for gut anaerobes, ceftriaxone and Flagyl  30 cc/kilos normal saline administered  CT with pertinent findings of small-bowel obstruction early versus partial small bowel obstruction  Surgery aware of CT findings will plan to admit to Surgical Service        Disposition  Final diagnoses:   Small bowel obstruction ([facility] [address] )     Time reflects when diagnosis was documented in both MDM as applicable and the Disposition within this note     Time User Action Codes Description Comment    9/20/2022  8:16 AM Veverly Neighbours Add [V52 172] Small bowel obstruction ([facility] [address] )     9/20/2022  3:11 PM Tavon Palma Modify [K56 609] Small bowel obstruction [facility])       ED Disposition     None      Follow-up Information    None         Current Discharge Medication List      CONTINUE these medications which have NOT CHANGED    Details   aspirin 81 MG tablet Take 81 mg by mouth daily      atorvastatin (LIPITOR) 40 mg tablet Take 1 tablet (40 mg total) by mouth daily with dinner Resume next scheduled dose upon discharge from the hospital  Qty: 90 tablet, Refills: 3    Associated Diagnoses: Hypertension      ferrous sulfate 325 (65 Fe) mg tablet Take 1 tablet (325 mg total) by mouth every other day  Qty: 45 tablet, Refills: 3    Associated Diagnoses: Iron deficiency      furosemide (LASIX) 20 mg tablet Take 1 tablet (20 mg total) by mouth daily Can take one additional dose as needed for leg swelling or shortness of breath  Qty: 90 tablet, Refills: 3    Associated Diagnoses: Essential hypertension      lisinopril (ZESTRIL) 5 mg tablet Take 1 tablet (5 mg total) by mouth daily  Qty: 90 tablet, Refills: 5    Associated Diagnoses: Essential hypertension      meclizine (ANTIVERT) 25 mg tablet Take 1 tablet (25 mg total) by mouth 3 (three) times a day as needed for dizziness  Qty: 30 tablet, Refills: 0    Associated Diagnoses: Benign paroxysmal positional vertigo, unspecified laterality      metoprolol succinate (TOPROL-XL) 50 mg 24 hr tablet Take 1 tablet (50 mg total) by mouth daily Resume on 3/11/18  Qty: 90 tablet, Refills: 5    Associated Diagnoses: Hypertension      nitroglycerin (NITROSTAT) 0 4 mg SL tablet Place 1 tablet (0 4 mg total) under the tongue every 5 (five) minutes as needed for chest pain  Qty: 5 tablet, Refills: 0    Associated Diagnoses: Chest pain      omeprazole (PriLOSEC) 40 MG capsule Take 1 capsule (40 mg total) by mouth daily  Qty: 90 capsule, Refills: 3    Associated Diagnoses: Gastroesophageal reflux disease without esophagitis      potassium chloride (K-DUR,KLOR-CON) 10 mEq tablet Take 1 tablet (10 mEq total) by mouth daily  Qty: 90 tablet, Refills: 5    Associated Diagnoses: Essential hypertension           No discharge procedures on file  PDMP Review     None           ED Provider  Attending physically available and evaluated Barry Marcie ACUNA managed the patient along with the ED Attending      Electronically Signed by         Les Romero MD  09/20/22 1057

## 2022-09-20 NOTE — CONSULTS
Consult - Critical Care   Dara Parisi 65 y o  male MRN: [id number]  Unit/Bed#: OR POOL Encounter: [id number]      -------------------------------------------------------------------------------------------------------------  Chief Complaint: SBO s/p SBR w/ D-D anastomosis and J-J anastomosis    History of Present Illness   HX and PE limited by: ETT/sedation  Dara Parisi is a 65 y o  male w/ PMHx of CAD s/p PCI in 2000, recent choledocholithiasis s/p ERCP on 7/14 from remnant GB, AAA s/p open Hpjzy-qu-mamqo grafting in 2009, HTN, HLD  Prior surgical Hx includes open aortic repair, open cholecystectomy and ventral hernia repair with mesh in place  Pt presented to the ED w 3 days of worsening abdominal pain/N/V where w/u revealed a LA of 6 5, CT findings c/w SBO with fecalization of SB  SICU for post-op management due to high risk of needing multiple surgeries  History obtained from chart review    -------------------------------------------------------------------------------------------------------------  Assessment and Plan:    Neuro:   • Diagnosis: PAD  o Plan:   o Sedation w/ Fentanyl/Propofol for goal RASS 0 to -1  o Delirium ppx  o CAM ICU daily    CV:   • Diagnosis: Shock-distributive/hypovolemic, HFpEF, HLD, HTN  o Plan:   o Wean Levo as able for MAP >65  o Tranfuse 1u PRBCs for EBL of >1L  o Last TTE in 2020 w/ LVEF of 70%, G1DD,   o Holding home lopressor 50 daily, PRN lasix, lisinopril, lipitor      Pulm:  • Diagnosis: AHRF  o Plan:   o Left intubated post procedure for planned return to OR amidst surgical course  o ACVC 14/400/6/1 0, wean FiO2 as tolerated    GI:   • Diagnosis: SBO 2/2 adhesions now s/p ex-lap w/ SBR w/ extensive MARK  o Plan:   o 90 cm of bowel resected, J-J anastomosis, primary Duodenal anastomosis, cholecystectomy, removal of prior ventral hernia repair mesh  o Left as open abdomen w/ abthera placement on 9/20    o Open abdomen precautions  o Trend I/Os strictly, wean pressors as able  o NPO    :   • Diagnosis: JOVANNI, Lactic acidosis   o Plan:   o Baseline appears to be 0 78-1 0  o Cr on admission 1 25  - Post-op pending  o Post-op re-check  o Takes PRN lasix at home for leg swelling-if low UOP consider gentle diuresis, IF sufficiently resuscitated  F/E/N:   • Plan:   o F: LR @ 125/hr  o E: Replete PRN K>4 0, Phos>3 0, Mg>2 0  o N: NPO, consider early TPN      Heme/Onc:   • Diagnosis: Leukocytosis, ABLA  o Plan:   o Post-op check pending  o Transfuse 1u PRBCs for acidosis w/ base deficit  o Recheck Hb, post transfusion    Endo:   • Diagnosis: No acute issues  o Plan:   o Continue ISS for goal -180    ID:   • Diagnosis: SB perforation  o Plan:   o Cefepime/Flagyl  - Consider fungal coverage for D-D anastomosis  o Trend fever/WBC curve    MSK/Skin:   • Diagnosis: No acute issues  o Plan:   o freq repositioning     Disposition: Admit to Critical Care   Code Status: Level 1 - Full Code  --------------------------------------------------------------------------------------------------------------  Review of Systems    Review of systems was unable to be performed secondary to ETT/sedation    Physical Exam  Constitutional:       General: He is not in acute distress  Appearance: He is ill-appearing  He is not toxic-appearing  Interventions: He is sedated, chemically paralyzed and intubated  HENT:      Head: Normocephalic and atraumatic  Eyes:      General: Lids are normal    Cardiovascular:      Rate and Rhythm: Normal rate and regular rhythm  Pulmonary:      Effort: Pulmonary effort is normal  He is intubated  Breath sounds: Normal breath sounds  Abdominal:      General: Abdomen is flat  There is no distension  Tenderness: There is no abdominal tenderness  Comments: Wound vac in place  Skin:     General: Skin is warm and dry  Capillary Refill: Capillary refill takes less than 2 seconds  Neurological:      GCS: GCS eye subscore is 1   GCS verbal subscore is 1  GCS motor subscore is 1  Comments: Paralyzed on exam    Psychiatric:         Behavior: Behavior is cooperative        --------------------------------------------------------------------------------------------------------------  Vitals:   Vitals:    09/20/22 0600 09/20/22 0630 09/20/22 0800 09/20/22 1000   BP: (!) 110/49 120/58 136/60 143/65   BP Location: Right arm Right arm Right arm Right arm   Pulse: 98 98 104 104   Resp: 18 18 18 18   Temp:       TempSrc:       SpO2: 92% 92% 90% 90%   Weight:  63 5 kg (139 lb 15 9 oz)     Height:  5' 8" (1 727 m)       Temp  Min: 97 6 °F (36 4 °C)  Max: 97 6 °F (36 4 °C)  IBW (Ideal Body Weight): 68 4 kg  Height: 5' 8" (172 7 cm)  Body mass index is 21 29 kg/m²  Laboratory and Diagnostics:  Results from last 7 days   Lab Units 09/20/22  0230   WBC Thousand/uL 18 08*   HEMOGLOBIN g/dL 18 0*   HEMATOCRIT % 56 7*   PLATELETS Thousands/uL 328   NEUTROS PCT % 89*   MONOS PCT % 6     Results from last 7 days   Lab Units 09/20/22  0232   SODIUM mmol/L 138   POTASSIUM mmol/L 4 0   CHLORIDE mmol/L 104   CO2 mmol/L 21   ANION GAP mmol/L 13   BUN mg/dL 22   CREATININE mg/dL 1 89*   CALCIUM mg/dL 10 5*   GLUCOSE RANDOM mg/dL 134   ALT U/L 40   AST U/L 25   ALK PHOS U/L 158*   ALBUMIN g/dL 4 2   TOTAL BILIRUBIN mg/dL 1 25*                   Results from last 7 days   Lab Units 09/20/22  0424 09/20/22  0230   LACTIC ACID mmol/L 4 6* 6 5*     ABG:    VBG:          Micro:  Results from last 7 days   Lab Units 09/20/22  0232 09/20/22  0230   BLOOD CULTURE  Received in Microbiology Lab  Culture in Progress  Received in Microbiology Lab  Culture in Progress  EKG: NSR on tele, rate 91  Imaging: I have personally reviewed pertinent reports        Historical Information   Past Medical History:   Diagnosis Date   • Abdominal aortic aneurysm (AAA) ([facility] [address] )     last assessed nov 6 2015   • Abscess of groin     last assessed oct 6 2014   • Arthritis    • Candidiasis, cutaneous     last assessed march 19 2014   • Coronary artery disease    • Dyslipidemia    • Esophageal ulcer without bleeding    • Gastritis    • GERD (gastroesophageal reflux disease)    • Hiatal hernia    • History of transfusion    • Hx of cataract surgery, left     last assessed july 21 2014   • Hyperlipidemia    • Hypertension    • Myocardial infarction [facility])    • Old myocardial infarction 2000   • Recurrent right inguinal hernia     s/p right orchioectomy, mesh removal, and sinus trac removal patient being followed by surgery next appt 4/28/14 patient s/p keflex x 7 days for MSSA Cx repeat wound cx grew MSSA cx repeat wound cx grew MSSA and other armond (mixed) no MRSA identified conitunue close monitoring and local wound care, last assessed april 15 2014   • Renal disorder    • Wound of skin     in the groin, right     Past Surgical History:   Procedure Laterality Date   • ABDOMINAL AORTIC ANEURYSM REPAIR  2009    aortobi-iliac, dacron graft   • APPENDECTOMY      open   • CARDIAC SURGERY     • CATARACT EXTRACTION Bilateral    • CHOLECYSTECTOMY N/A 9/29/2020    Procedure: CHOLECYSTECTOMY;  Surgeon: Cyndie Shin MD;  Location: [facility];  Service: General   • COLONOSCOPY     • CORONARY ANGIOPLASTY WITH STENT PLACEMENT      stent 2   • CYSTOSCOPY      diagnostic onset nov 13 2014   • EXPLORATORY LAPAROTOMY  12/09/2009   • EYE SURGERY     • FL INJECTION LEFT HIP (NON ARTHROGRAM)  4/2/2019   • HIP SURGERY Right    • INGUINAL HERNIA REPAIR Right     unilateral x2   • ORCHIECTOMY Right    • MO COLONOSCOPY FLX DX W/COLLJ SPEC WHEN PFRMD N/A 5/22/2018    Procedure: COLONOSCOPY;  Surgeon: Alyse Tilley DO;  Location: [facility]; Service: Gastroenterology   • MO ESOPHAGOGASTRODUODENOSCOPY TRANSORAL DIAGNOSTIC N/A 2/14/2019    Procedure: ESOPHAGOGASTRODUODENOSCOPY (EGD); Surgeon: Malathi Carlin MD;  Location: [facility];   Service: Gastroenterology   • MO LAP,DIAGNOSTIC ABDOMEN N/A 9/29/2020    Procedure: LAPAROSCOPIC DIAGNOSTIC,   ;  Surgeon: Gianni Chambers MD;  Location: [facility];  Service: General   • CA REPAIR INCISIONAL HERNIA,REDUCIBLE N/A 2/23/2018    Procedure: lysis of adhesions; INCISIONAL HERNIA REPAIR WITH MESH;  Surgeon: Patrice Goldman MD;  Location: [facility];  Service: General   • UPPER GASTROINTESTINAL ENDOSCOPY       Social History   Social History     Substance and Sexual Activity   Alcohol Use Not Currently    Comment: occasionally     Social History     Substance and Sexual Activity   Drug Use No     Social History     Tobacco Use   Smoking Status Former Smoker   Smokeless Tobacco Never Used   Tobacco Comment    quit 20 + years ago     Exercise History: Ambulates without assistance at baseline    Family History:   Family History   Problem Relation Age of Onset   • Heart disease Mother    • Diabetes Mother    • Heart disease Father    • Diabetes Sister    • Cancer Brother      I have reviewed this patient's family history and commented on sigificant items within the HPI      Medications:  Current Facility-Administered Medications   Medication Dose Route Frequency   • [MAR Hold] acetaminophen (TYLENOL) tablet 975 mg  975 mg Oral Q8H [address]   • atorvastatin (LIPITOR) tablet 40 mg  40 mg Oral Daily With Dinner   • [MAR Hold] cefepime (MAXIPIME) 1,000 mg in dextrose 5 % 50 mL IVPB  1,000 mg Intravenous Q24H   • heparin (porcine) subcutaneous injection 5,000 Units  5,000 Units Subcutaneous Q8H [address]   • [MAR Hold] HYDROmorphone HCl (DILAUDID) injection 0 2 mg  0 2 mg Intravenous Q4H PRN   • lactated ringers infusion  125 mL/hr Intravenous Continuous   • meclizine (ANTIVERT) tablet 25 mg  25 mg Oral TID PRN   • metoprolol succinate (TOPROL-XL) 24 hr tablet 50 mg  50 mg Oral Daily   • [MAR Hold] metroNIDAZOLE (FLAGYL) IVPB (premix) 500 mg 100 mL  500 mg Intravenous Q8H   • [MAR Hold] naloxone (NARCAN) 0 04 mg/mL syringe 0 04 mg  0 04 mg Intravenous Q1MIN PRN   • [MAR Hold] ondansetron (ZOFRAN) injection 4 mg  4 mg Intravenous Q4H PRN   • [MAR Hold] oxyCODONE (ROXICODONE) IR tablet 2 5 mg  2 5 mg Oral Q4H PRN   • [MAR Hold] oxyCODONE (ROXICODONE) IR tablet 5 mg  5 mg Oral Q4H PRN   • pantoprazole (PROTONIX) EC tablet 40 mg  40 mg Oral Early Morning   • sodium chloride 0 9 % irrigation solution    PRN     Facility-Administered Medications Ordered in Other Encounters   Medication Dose Route Frequency   • albumin human (FLEXBUMIN) 5 % injection   Intravenous Continuous PRN   • calcium chloride 10 % injection   Intravenous PRN   • dexmedeTOMIDine (Precedex) 200 mcg in sodium chloride 0 9 % 50 mL infusion   Intravenous Continuous PRN   • fentanyl citrate (PF) 100 MCG/2ML   Intravenous PRN   • glycopyrrolate (ROBINUL) injection   Intravenous PRN   • lidocaine (PF) (XYLOCAINE-MPF) 1 % injection   Intravenous PRN   • midazolam (VERSED) injection   Intravenous PRN   • norepinephrine (LEVOPHED) 4 mg (STANDARD CONCENTRATION) IV in sodium chloride 0 9% 250 mL   Intravenous Continuous PRN   • norepinephrine (LEVOPHED) bolus from bag   Intravenous PRN   • ondansetron (ZOFRAN) injection   Intravenous PRN   • propofol (DIPRIVAN) 200 MG/20ML bolus injection   Intravenous PRN   • ROCuronium (ZEMURON) injection   Intravenous PRN   • sodium bicarbonate 8 4 % injection   Intravenous PRN   • sodium chloride 0 9 % infusion   Intravenous Continuous PRN   • Succinylcholine Chloride 100 mg/5 mL syringe   Intravenous PRN     Home medications:  Prior to Admission Medications   Prescriptions Last Dose Informant Patient Reported?  Taking?   aspirin 81 MG tablet  Self Yes No   Sig: Take 81 mg by mouth daily   atorvastatin (LIPITOR) 40 mg tablet  Self No No   Sig: Take 1 tablet (40 mg total) by mouth daily with dinner Resume next scheduled dose upon discharge from the hospital   ferrous sulfate 325 (65 Fe) mg tablet  Self No No   Sig: Take 1 tablet (325 mg total) by mouth every other day   furosemide (LASIX) 20 mg tablet  Self No No   Sig: Take 1 tablet (20 mg total) by mouth daily Can take one additional dose as needed for leg swelling or shortness of breath   lisinopril (ZESTRIL) 5 mg tablet  Self No No   Sig: Take 1 tablet (5 mg total) by mouth daily   Patient taking differently: Take 5 mg by mouth every morning   meclizine (ANTIVERT) 25 mg tablet  Self No No   Sig: Take 1 tablet (25 mg total) by mouth 3 (three) times a day as needed for dizziness   metoprolol succinate (TOPROL-XL) 50 mg 24 hr tablet  Self No No   Sig: Take 1 tablet (50 mg total) by mouth daily Resume on 3/11/18   Patient taking differently: Take 50 mg by mouth every morning   nitroglycerin (NITROSTAT) 0 4 mg SL tablet   No No   Sig: Place 1 tablet (0 4 mg total) under the tongue every 5 (five) minutes as needed for chest pain   omeprazole (PriLOSEC) 40 MG capsule  Self No No   Sig: Take 1 capsule (40 mg total) by mouth daily   Patient taking differently: Take 40 mg by mouth every morning   potassium chloride (K-DUR,KLOR-CON) 10 mEq tablet  Self No No   Sig: Take 1 tablet (10 mEq total) by mouth daily      Facility-Administered Medications: None     Allergies:  No Known Allergies  ------------------------------------------------------------------------------------------------------------  Advance Directive and Living Will:      Power of :    POLST:    ------------------------------------------------------------------------------------------------------------  Anticipated Length of Stay is > 2 midnights    Care Time Delivered:   Upon my evaluation, this patient had a high probability of imminent or life-threatening deterioration due to Shock-distributive + hypovolemic, which required my direct attention, intervention, and personal management  I have personally provided 45 minutes ([phone]) of critical care time, exclusive of procedures, teaching, family meetings, and any prior time recorded by providers other than myself         [place] PA-C        Portions of the record may have been created with voice recognition software  Occasional wrong word or "sound a like" substitutions may have occurred due to the inherent limitations of voice recognition software    Read the chart carefully and recognize, using context, where substitutions have occurred

## 2022-09-20 NOTE — PROGRESS NOTES
Had extensive discussion with ann Wallace and Lisa Adam regarding findings and procedures performed in the operating room  Discussed plans to return to the OR tomorrow for second look, possible closure  All questions answered  Will continue to update       Eldon Martinez, DO

## 2022-09-21 ENCOUNTER — ANESTHESIA (INPATIENT)
Dept: PERIOP | Facility: HOSPITAL | Age: 77
DRG: 853 | End: 2022-09-21
Payer: MEDICARE

## 2022-09-21 ENCOUNTER — ANESTHESIA EVENT (INPATIENT)
Dept: PERIOP | Facility: HOSPITAL | Age: 77
DRG: 853 | End: 2022-09-21
Payer: MEDICARE

## 2022-09-21 RX ORDER — HYDROMORPHONE HCL/PF 1 MG/ML
SYRINGE (ML) INJECTION AS NEEDED
Status: DISCONTINUED | OUTPATIENT
Start: 2022-09-21 | End: 2022-09-21

## 2022-09-21 RX ORDER — ALBUMIN, HUMAN INJ 5% 5 %
SOLUTION INTRAVENOUS CONTINUOUS PRN
Status: DISCONTINUED | OUTPATIENT
Start: 2022-09-21 | End: 2022-09-21

## 2022-09-21 RX ORDER — ROCURONIUM BROMIDE 10 MG/ML
INJECTION, SOLUTION INTRAVENOUS AS NEEDED
Status: DISCONTINUED | OUTPATIENT
Start: 2022-09-21 | End: 2022-09-21

## 2022-09-21 RX ADMIN — ALBUMIN (HUMAN): 12.5 INJECTION, SOLUTION INTRAVENOUS at 14:55

## 2022-09-21 RX ADMIN — HYDROMORPHONE HYDROCHLORIDE 0.5 MG: 1 INJECTION, SOLUTION INTRAMUSCULAR; INTRAVENOUS; SUBCUTANEOUS at 16:22

## 2022-09-21 RX ADMIN — ROCURONIUM BROMIDE 50 MG: 50 INJECTION, SOLUTION INTRAVENOUS at 14:19

## 2022-09-21 RX ADMIN — HYDROMORPHONE HYDROCHLORIDE 0.5 MG: 1 INJECTION, SOLUTION INTRAMUSCULAR; INTRAVENOUS; SUBCUTANEOUS at 14:34

## 2022-09-21 RX ADMIN — SODIUM CHLORIDE, SODIUM GLUCONATE, SODIUM ACETATE, POTASSIUM CHLORIDE, MAGNESIUM CHLORIDE, SODIUM PHOSPHATE, DIBASIC, AND POTASSIUM PHOSPHATE: .53; .5; .37; .037; .03; .012; .00082 INJECTION, SOLUTION INTRAVENOUS at 16:24

## 2022-09-21 NOTE — OP NOTE
OPERATIVE REPORT  PATIENT NAME: Siri Wall    :    MRN: [id number]  Pt Location: [facility] OR ROOM 03    SURGERY DATE: 2022    Surgeon(s) and Role:     Hilario Carbajal, DO - Primary     * Niles Nicholas DO - Assisting    Preop Diagnosis:  Small bowel obstruction ([address] ) [E49 273]    Post-Op Diagnosis Codes:     * Small bowel obstruction ([address] ) [W32 001]    Procedure(s) (LRB):  LAPAROTOMY EXPLORATORY, [address] OUT, DRAIN PLACEMENT, ATTEMPTED KEOFEED PLACEMENT AND MESH WITH CLOSURE (N/A)    Specimen(s):  * No specimens in log *    Estimated Blood Loss:   200 mL    Drains:  Closed/Suction Drain Anterior;Right;Lateral RLQ Bulb 19 Fr  (Active)   Number of days: 0       Closed/Suction Drain Left LLQ Bulb 19 Fr  (Active)   Number of days: 0       NG/OG/Enteral Tube Nasogastric 18 Fr Left nare (Active)   Placement Reverification Aspiration 22 1200   Site Assessment Clean;Dry; Intact 22 1200   Status Suction-low intermittent 22 1200   Drainage Appearance Brown;Bile 22 1200   Intake (mL) 0 mL 22 0800   Output (mL) 40 mL 22 1400   Number of days: 1       Urethral Catheter Latex 16 Fr  (Active)   Reasons to continue Urinary Catheter  Accurate I&O assessment in critically ill patients (48 hr  max) 22 1200   Goal for Removal Remove after 48 hrs of I/O monitoring 22 1200   Site Assessment Clean;Skin intact 22 1200   [place] Care Done 22 0800   Collection Container Standard drainage bag 22 1200   Output (mL) 150 mL 22 1400   Number of days: 1       Anesthesia Type:   Choice    Operative Indications:  Small bowel obstruction ([address] ) [K56 609]    Operative Findings:  Viable small bowel  Duodenoduodenostomy intact  Jejunojejunostomy intact  Complications:   None    Procedure and Technique:  The patient was brought to the operating room intubated and identified via wristband  He was transferred to the operating room table and positioned supine   General anesthesia was induced successfully  The adhesive and sponge from the abthera were removed  The patient's abdomen was prepped and draped in the usual sterile fashion  Pre-operative antibiotics were administered  A time out was performed confirming the patient, procedure, and site  All parties were in agreement  Attention was turned to the abdomen where the octopus from the abthera was removed  The small bowel was then eviscerated and closely examined  The duodenal anastomosis was evaluated and was noted to be intact and viable  The jejunal anastomosis was also evaluated and noted to be intact and viable  The gallbladder fossa was examined and noted to be dry with the clips still in place  The segment of bowel approximately 50 cm distal to the ligament Treitz where there was a concern for stricture was re-examined  Fluid passed freely through this segment and therefore we left this in place  We did notice a segment of a serosal tear just proximal to this and decided to leave this alone as we felt that imbricating it would likely stricture of the bowel  We weighed the risks versus benefits of performing an additional resection and an additional anastomosis  We felt that the risk of having a 3rd anastomosis outweighed the benefit of resection  Omentum was placed over this segment  At this point we irrigated the abdomen with 2 L of warmed normal saline  We then placed a 23 [place] DILLON drain adjacent to, but not overlying the duodenal anastomosis  At this point, we discussed feeding access and attempted fluoro guided placement of a Keofed feeding tube  We attempted 3 times, but were ultimately unsuccessful In passing the Keofed beyond the anastomosis  We therefore opted to not place feeding access  We then closed the most inferior and superior portions of the abdominal fascia with 1 PDS suture in an interrupted fashion    The patient had large loss of domain requiring the placement of a bridging Phasix mesh  Total defect measured 15 cm x 13 cm  We placed a 25 cm x 30 cm Phasix mesh trimmed appropriately to 4 cm overlap circumferentially  We fastened the mesh to the abdominal fascia throughout its course with 1 PDS suture in an interrupted fashion  A 19 [place] DILLON drain was placed above the mesh exiting the left side of the abdominal wall  The subcutaneous tissue was then closed with 0 Vicryl over the mesh in an interrupted fashion  The skin was then closed with 0 Prolene suture in an interrupted fashion  A sterile dressing was applied  The patient was then transferred back to the ICU intubated having tolerated the procedure well on 8 mcg/min of levophed  All instrument, needle, and sponge counts were correct at the end of the case  Radiofrequency detection was negative  Dr Joshua Guerrero was present for the entire procedure      Patient Disposition:  Critical Care Unit        SIGNATURE: Sparkle Gaviria DO  DATE: September 21, 2022  TIME: 6:28 PM

## 2022-09-21 NOTE — QUICK NOTE
ICU Post-Op Check   65 M s/p Ex lap, washout, Drain placement, bridging mesh (Phasix) placement with abdominal closure  Attempted Keofeed placement aborted intra-operatively  Afebrile, HR60s MAPS 58-64, [id number], DBP 30s-40s, 99% sPO2    UOP 1 07L over day, curr 50-70cc/hr    EXAM  NAD  Normocephalic, ETT in place, NGT in place  RRR  No distress, mechanically venitlated   Abd soft, non-distended, drains with s/s seffluent b/l (R paraduodenal, L subcutaneous)  Indwelling centeno catheter draining clear yellow urine  GCS 11T  Warm, dry skin     Results Reviewed     Procedure Component Value Units Date/Time    Blood culture [[id number]] Collected: 09/20/22 0232    Lab Status: Preliminary result Specimen: Blood from Arm, Right Updated: 09/21/22 0703     Blood Culture No Growth at 24 hrs  Blood culture [[id number]] Collected: 09/20/22 0230    Lab Status: Preliminary result Specimen: Blood from Arm, Left Updated: 09/21/22 0703     Blood Culture No Growth at 24 hrs  TEG 6 Global Hemostasis with Lysis [[id number]]  (Normal) Collected: 09/20/22 0935    Lab Status: Final result Specimen: Blood from Arm, Left Updated: 09/20/22 1051     CKR(REACTION TIME) 5 8 Min      CKLY30 0 0 %      CRTMA(RAPIDTEG MAX AMPLITUDE) 63 mm      CFFMA (FUNCTIONAL FIBRINOGEN MAX AMPLITUDE) 19 5 mm     Lactic acid 2 Hours [[id number]]  (Abnormal) Collected: 09/20/22 0424    Lab Status: Final result Specimen: Blood from Arm, Left Updated: 09/20/22 0522     LACTIC ACID 4 6 mmol/L     Narrative:      Result may be elevated if tourniquet was used during collection      Gamma GT [id number]  (Abnormal) Collected: 09/20/22 0336    Lab Status: Final result Specimen: Blood from Arm, Left Updated: 09/20/22 0357      U/L     Lactic acid, plasma [[id number]]  (Abnormal) Collected: 09/20/22 0230    Lab Status: Final result Specimen: Blood from Arm, Left Updated: 09/20/22 0324     LACTIC ACID 6 5 mmol/L     Narrative:      Result may be elevated if tourniquet was used during collection      Comprehensive metabolic panel [[id number]]  (Abnormal) Collected: 09/20/22 0232    Lab Status: Final result Specimen: Blood from Arm, Left Updated: 09/20/22 0320     Sodium 138 mmol/L      Potassium 4 0 mmol/L      Chloride 104 mmol/L      CO2 21 mmol/L      ANION GAP 13 mmol/L      BUN 22 mg/dL      Creatinine 1 89 mg/dL      Glucose 134 mg/dL      Calcium 10 5 mg/dL      AST 25 U/L      ALT 40 U/L      Alkaline Phosphatase 158 U/L      Total Protein 9 9 g/dL      Albumin 4 2 g/dL      Total Bilirubin 1 25 mg/dL      eGFR 33 ml/min/1 73sq m     Narrative:      National Kidney Disease Foundation guidelines for Chronic Kidney Disease (CKD):   •  Stage 1 with normal or high GFR (GFR > 90 mL/min/1 73 square meters)  •  Stage 2 Mild CKD (GFR = 60-89 mL/min/1 73 square meters)  •  Stage 3A Moderate CKD (GFR = 45-59 mL/min/1 73 square meters)  •  Stage 3B Moderate CKD (GFR = 30-44 mL/min/1 73 square meters)  •  Stage 4 Severe CKD (GFR = 15-29 mL/min/1 73 square meters)  •  Stage 5 End Stage CKD (GFR <15 mL/min/1 73 square meters)  Note: GFR calculation is accurate only with a steady state creatinine    CBC and differential [[id number]]  (Abnormal) Collected: 09/20/22 0230    Lab Status: Final result Specimen: Blood from Arm, Left Updated: 09/20/22 0244     WBC 18 08 Thousand/uL      RBC 6 14 Million/uL      Hemoglobin 18 0 g/dL      Hematocrit 56 7 %      MCV 92 fL      MCH 29 3 pg      MCHC 31 7 g/dL      RDW 14 5 %      MPV 9 6 fL      Platelets 048 Thousands/uL      nRBC 0 /100 WBCs      Neutrophils Relative 89 %      Immat GRANS % 0 %      Lymphocytes Relative 4 %      Monocytes Relative 6 %      Eosinophils Relative 1 %      Basophils Relative 0 %      Neutrophils Absolute 15 87 Thousands/µL      Immature Grans Absolute 0 06 Thousand/uL      Lymphocytes Absolute 0 80 Thousands/µL      Monocytes Absolute 1 16 Thousand/µL      Eosinophils Absolute 0 14 Thousand/µL      Basophils Absolute 0 05 Thousands/µL           PLAN  Maintain intubated overnight, plan to wean for extubation in AM  ongoing IVF  NPO/NGT, commence TPN when able  Trend endpoints of resuscitation Q6h  500cc isolyte bolus for LA 2 2 post-op, will consider use of colloid   Wean pressors, Goal SBP>90mmHg  Continue ICU level of care  Maintain surgical drains  -pain control

## 2022-09-21 NOTE — RESPIRATORY THERAPY NOTE
RT Ventilator Management Note  Jevon Chambers 65 y o  male MRN: [id number]  Unit/Bed#: [facility] 13 Encounter: [id number]      Daily Screen    No data found in the last 10 encounters  Physical Exam:   Assessment Type: Assess only  General Appearance: Awake  Respiratory Pattern: Assisted  Chest Assessment: Chest expansion symmetrical  Bilateral Breath Sounds: Diminished, Clear  Cough: Non-productive  Suction: ET Tube  O2 Device: Ventilator      Resp Comments: Pt  remains on AC mode  No changes at this time  Will continue to assess and monitor pt per protocol

## 2022-09-21 NOTE — ANESTHESIA PREPROCEDURE EVALUATION
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)   (+) Unstable angina (HCC)      GI/HEPATIC   (+) Gastroesophageal reflux disease   (+) Gastroesophageal reflux disease with esophagitis   (+) SBO (small bowel obstruction) (HCC)      MUSCULOSKELETAL   (+) Primary osteoarthritis of one hip, left   (+) Primary osteoarthritis of right knee        Physical Exam    Airway    Mallampati score: II    Neck ROM: full     Dental   upper dentures and lower dentures,     Cardiovascular  Rhythm: regular, Rate: normal, Cardiovascular exam normal    Pulmonary  Pulmonary exam normal Breath sounds clear to auscultation,     Other Findings        Anesthesia Plan  ASA Score- 4 Emergent    Anesthesia Type- general with ASA Monitors  Additional Monitors: arterial line and central venous line  Airway Plan: ETT  Plan Factors-Exercise tolerance (METS): <4 METS  Chart reviewed  EKG reviewed  Imaging results reviewed  Existing labs reviewed  Patient summary reviewed  Patient is not a current smoker  Obstructive sleep apnea risk education given perioperatively  Induction- intravenous  Postoperative Plan- Plan for postoperative opioid use  Informed Consent- Anesthetic plan and risks discussed with patient and son  I personally reviewed this patient with the CRNA  Discussed and agreed on the Anesthesia Plan with the CRNA  Miki De Santiago

## 2022-09-21 NOTE — ANESTHESIA PREPROCEDURE EVALUATION
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)   (+) Unstable angina (HCC)      GI/HEPATIC   (+) Gastroesophageal reflux disease   (+) Gastroesophageal reflux disease with esophagitis   (+) SBO (small bowel obstruction) (HCC)      MUSCULOSKELETAL   (+) Primary osteoarthritis of one hip, left   (+) Primary osteoarthritis of right knee        Physical Exam    Airway       Dental       Cardiovascular  Cardiovascular exam normal    Pulmonary  Pulmonary exam normal     Other Findings        Anesthesia Plan  ASA Score- 4     Anesthesia Type- general with ASA Monitors  Additional Monitors:   Airway Plan:     Comment: S/P Exlap yesterday  Now for reop  Still intubated/sedated/on pressors  Continue care as per MICU  To remain intubated for return to MICU  Plan Factors-    Chart reviewed  EKG reviewed  Imaging results reviewed  Existing labs reviewed  Patient summary reviewed  Induction- intravenous and inhalational     Postoperative Plan-     Informed Consent- Anesthetic plan and risks discussed with son  I personally reviewed this patient with the CRNA  Discussed and agreed on the Anesthesia Plan with the CRNA  Kathrine Steward

## 2022-09-21 NOTE — PROGRESS NOTES
Daily Progress Note - Critical Care   Nohemi Graham 65 y o  male MRN: [id number]  Unit/Bed#: MICU 13 Encounter: [id number]        ----------------------------------------------------------------------------------------  HPI/24hr events: Nohemi Graham is a 65 y o  male w/ PMHx of CAD s/p PCI in 2000, recent choledocholithiasis s/p ERCP on 7/14 from remnant GB, AAA s/p open Sjywr-ce-rnduc grafting in 2009, HTN, HLD  Prior surgical Hx includes open aortic repair, open cholecystectomy and ventral hernia repair with mesh in place       Pt presented to the ED w 3 days of worsening abdominal pain/N/V where w/u revealed a LA of 6 5, CT findings c/w SBO with fecalization of SB  SICU for post-op management due to high risk of needing multiple surgeries  24h: Up and down on levo overnight, up to 14 in AM  Given 1u PRBCs post op for Hb of 7 1 with good response  CVC placed o/n      ---------------------------------------------------------------------------------------  SUBJECTIVE      Review of Systems   Unable to perform ROS: Other     Review of systems was unable to be performed secondary to ETT  ---------------------------------------------------------------------------------------  Assessment and Plan:    Neuro:   · Diagnosis: PAD  ? Plan:   ? Sedation w/ Fentanyl/Precedex for goal RASS 0 to -1  ? Delirium ppx  ? CAM ICU daily    CV:   · Diagnosis: Shock-distributive/hypovolemic, HFpEF, HLD, HTN  ? Plan:   ? Wean Levo as able for SBP >90  ? Tranfuse 1u PRBCs for EBL of >1L  ? Last TTE in 2020 w/ LVEF of 70%, G1DD, repeat TTE   ? Holding home lopressor 50 daily, PRN lasix, lisinopril, lipitor      Pulm:  · Diagnosis: AHRF  ? Plan:   ? Left intubated post procedure for planned return to OR amidst surgical course  ? ACVC 20/450/6/0 5, wean FiO2 as tolerated    GI:   · Diagnosis: SBO 2/2 adhesions now s/p ex-lap w/ SBR w/ extensive MARK  ?  Plan:   ? 90 cm of bowel resected, J-J anastomosis, primary Duodenal anastomosis, cholecystectomy, removal of prior ventral hernia repair mesh  ? Left as open abdomen w/ abthera placement on 9/20    ? Planned return to OR today for abdominal wall reconstruction and possible closure  ? Open abdomen precautions  ? Trend I/Os strictly, wean pressors as able  ? NPO, consider Early TPN     :   · Diagnosis: JOVANNI, Lactic acidosis   ? Plan:   ? Baseline appears to be 0 78-1 0  ? Cr on admission 1 25  § Post-op pending  ? Post-op re-check  ? Takes PRN lasix at home for leg swelling-if low UOP consider gentle diuresis, IF sufficiently resuscitated  F/E/N:   • Plan:   o F: LR @125/hr  o E: Replete PRN K>4 0, Phos>3 0, Mg>2 0  o N: NPO      Heme/Onc:   • Diagnosis: Leukocytosis, ABLA  o Plan:   o AM Hb stable, transfuse for Hb <7 or instability  o S/p 1u PRBC yesterday with good response Hb 9 in AM    Endo:   · Diagnosis: No acute issues  ? Plan:   ? Continue ISS for goal -180     ID:   · Diagnosis: SB perforation  ? Plan:   ? Cefepime/Flagyl Day 2  ? Trend fever/WBC curve     MSK/Skin:   · Diagnosis: No acute issues  ? Plan:   ? freq repositioning     Patient appropriate for transfer out of the ICU today?: No  Disposition: Continue Critical Care   Code Status: Level 1 - Full Code  ---------------------------------------------------------------------------------------  ICU CORE MEASURES    Prophylaxis   VTE Pharmacologic Prophylaxis: Heparin  VTE Mechanical Prophylaxis: sequential compression device  Stress Ulcer Prophylaxis: Pantoprazole IV     ABCDE Protocol (if indicated)  Plan to perform spontaneous awakening trial today? Yes  Plan to perform spontaneous breathing trial today? Yes  Obvious barriers to extubation?  Yes  CAM-ICU: Negative    Invasive Devices Review  Invasive Devices  Report    Central Venous Catheter Line  Duration           CVC Central Lines 09/20/22 <1 day          Peripheral Intravenous Line  Duration           Peripheral IV 09/20/22 Left Antecubital 1 day    Peripheral IV 22 Left Forearm 1 day    Peripheral IV 22 Right Antecubital <1 day          Arterial Line  Duration           Arterial Line 22 Right Radial <1 day          Drain  Duration           NG/OG/Enteral Tube Nasogastric 18 Fr Left nare <1 day    Urethral Catheter Latex 16 Fr  <1 day          Airway  Duration           ETT  Hi-Lo; Cuffed;Oral 7 5 mm <1 day              Can any invasive devices be discontinued today? No  ---------------------------------------------------------------------------------------  OBJECTIVE    Vitals   Vitals:    22 0515 22 0615 22 0645 22 0655   BP: 133/50 (!) 86/44 (!) 83/39 113/55   Pulse: 80 88 78 76   Resp: 19 (!) 23 19 16   Temp:       TempSrc:       SpO2: 99% 99% 99% 99%   Weight:       Height:         Temp (24hrs), Av 9 °F (36 6 °C), Min:97 8 °F (36 6 °C), Max:98 1 °F (36 7 °C)  Current: Temperature: 97 9 °F (36 6 °C)  Temp:  [97 8 °F (36 6 °C)-98 1 °F (36 7 °C)] 97 9 °F (36 6 °C)  HR:  [] 78  Resp:  [16-32] 19  BP: ()/(39-65) 106/50  Arterial Line BP: ()/(26-46) 130/42  FiO2 (%):  [] 50      Respiratory:  ACVC  20/450/6/0 5    Invasive/non-invasive ventilation settings   Respiratory  Report   Lab Data (Last 4 hours)       0632            pH, Arterial       7 427             pCO2, Arterial       42 2             pO2, Arterial       136 1             HCO3, Arterial       27 2             Base Excess, Arterial       2 6                  O2/Vent Data           Most Recent         Vent Mode   AC/VC      Resp Rate (BPM) (BPM)   20      Vt (mL) (mL)   450      FIO2 (%) (%)   50      PEEP (cmH2O) (cmH2O)   6      MV   12 7                  Physical Exam  Constitutional:       General: He is not in acute distress  Appearance: He is not ill-appearing  HENT:      Head: Normocephalic and atraumatic     Eyes:      Conjunctiva/sclera: Conjunctivae normal    Neck:      Comments: LIJ   Cardiovascular:      Rate and Rhythm: Normal rate and regular rhythm  Heart sounds: Normal heart sounds  Pulmonary:      Effort: Pulmonary effort is normal       Breath sounds: Normal breath sounds  Abdominal:      General: Abdomen is flat  There is no distension  Palpations: Abdomen is soft  Tenderness: There is generalized abdominal tenderness  Comments: Wound vac in place midline  Skin:     General: Skin is warm and dry  Capillary Refill: Capillary refill takes more than 3 seconds  Neurological:      General: No focal deficit present  Mental Status: He is alert  GCS: GCS eye subscore is 3  GCS verbal subscore is 1  GCS motor subscore is 6  Psychiatric:         Behavior: Behavior is cooperative               Laboratory and Diagnostics:  Results from last 7 days   Lab Units 09/21/22 0451 09/20/22  2301 09/20/22 1943 09/20/22  1814 09/20/22  1702 09/20/22  1514 09/20/22  1358 09/20/22  0230   WBC Thousand/uL 8 74 4 50 3 25*  --   --   --   --  18 08*   HEMOGLOBIN g/dL 9 0* 9 5* 8 3*  --   --   --   --  18 0*   I STAT HEMOGLOBIN g/dl  --   --   --  7 1* 7 5* 9 9* 11 6*  --    HEMATOCRIT % 27 5* 29 3* 25 8*  --   --   --   --  56 7*   HEMATOCRIT, ISTAT %  --   --   --  21* 22* 29* 34*  --    PLATELETS Thousands/uL 128* 119* 141*  --   --   --   --  328   NEUTROS PCT %  --   --   --   --   --   --   --  89*   BANDS PCT %  --   --  19*  --   --   --   --   --    MONOS PCT %  --   --   --   --   --   --   --  6   MONO PCT %  --   --  5  --   --   --   --   --      Results from last 7 days   Lab Units 09/21/22 0451 09/20/22 1827 09/20/22 1814 09/20/22  1702 09/20/22  1514 09/20/22  1358 09/20/22  0232   SODIUM mmol/L 150* 151*  --   --   --   --  138   POTASSIUM mmol/L 3 3* 3 5  --   --   --   --  4 0   CHLORIDE mmol/L 115* 116*  --   --   --   --  104   CO2 mmol/L 26 24  --   --   --   --  21   CO2, I-STAT mmol/L  --   --  27 23 27 23  --    ANION GAP mmol/L 9 11  --   --   --   --  13   BUN mg/dL 23 25  --   --   --   --  22   CREATININE mg/dL 1 15 1 37*  --   --   --   --  1 89*   CALCIUM mg/dL 8 4 8 7  --   --   --   --  10 5*   GLUCOSE RANDOM mg/dL 95 116  --   --   --   --  134   ALT U/L 37  --   --   --   --   --  40   AST U/L 39  --   --   --   --   --  25   ALK PHOS U/L 25*  --   --   --   --   --  158*   ALBUMIN g/dL 3 4*  --   --   --   --   --  4 2   TOTAL BILIRUBIN mg/dL 2 39*  --   --   --   --   --  1 25*     Results from last 7 days   Lab Units 09/21/22  0451 09/20/22  2303   MAGNESIUM mg/dL 1 9 1 8   PHOSPHORUS mg/dL 2 2* 1 7*               Results from last 7 days   Lab Units 09/21/22  0312 09/20/22  2100 09/20/22  1827 09/20/22  0424 09/20/22  0230   LACTIC ACID mmol/L 5 0* 8 2* 9 3* 4 6* 6 5*     ABG:  Results from last 7 days   Lab Units 09/21/22  0632   PH ART  7 427   PCO2 ART mm Hg 42 2   PO2 ART mm Hg 136 1*   HCO3 ART mmol/L 27 2   BASE EXC ART mmol/L 2 6   ABG SOURCE  Line, Arterial     VBG:  Results from last 7 days   Lab Units 09/21/22  0632 09/20/22  2358   PH JOSE   --  7 330   PCO2 JOSE mm Hg  --  48 5   PO2 JOSE mm Hg  --  36 5   HCO3 JOSE mmol/L  --  25 0   BASE EXC JOSE mmol/L  --  -1 1   ABG SOURCE  Line, Arterial  --            Micro  Results from last 7 days   Lab Units 09/20/22  0232 09/20/22  0230   BLOOD CULTURE  Received in Microbiology Lab  Culture in Progress  Received in Microbiology Lab  Culture in Progress  EKG: NSR on tele, rate 81  Imaging:  I have personally reviewed pertinent reports        Intake and Output  I/O       09/19 0701 09/20 0700 09/20 0701 09/21 0700 09/21 0701 09/22 0700    I V  (mL/kg)  5754 (90 6)     Blood  453     IV Piggyback  3221 9     Total Intake(mL/kg)  9428 8 (148 5)     Urine (mL/kg/hr)  850 (0 6)     Emesis/NG output  900     Drains  950     Blood  600     Total Output  3300     Net  +6128 8                UOP: 0 6 ml/kg/hr     Height and Weights   Height: 5' 8" (172 7 cm)  IBW (Ideal Body Weight): 68 4 kg  Body mass index is 21 29 kg/m²  Weight (last 2 days)     Date/Time Weight    09/20/22 0630 63 5 (139 99)            Nutrition       Diet Orders   (From admission, onward)             Start     Ordered    09/20/22 1751  Diet NPO  Diet effective now        References:    Nutrtion Support Algorithm Enteral vs  Parenteral   Question Answer Comment   Diet Type NPO    RD to adjust diet per protocol?  No        09/20/22 1752                Active Medications  Scheduled Meds:  Current Facility-Administered Medications   Medication Dose Route Frequency Provider Last Rate   • albumin human  25 g Intravenous Once Willy Read Kira     • cefepime  1,000 mg Intravenous Q24H Gaines Pronto Colby, DO 1,000 mg (09/20/22 1009)   • chlorhexidine  15 mL Mouth/Throat Q12H [address] Willy Read Kira     • dexmedetomidine  0 1-0 7 mcg/kg/hr Intravenous Titrated Denise Anthony PerezPalladino, DO 0 5 mcg/kg/hr (09/21/22 0353)   • fentaNYL  50 mcg/hr Intravenous Continuous Willy G Rj 100 mcg/hr (09/21/22 0220)   • fentanyl citrate (PF)  50 mcg Intravenous Q1H PRN Kiera Sonya     • heparin (porcine)  5,000 Units Subcutaneous Q8H [address] Willy Read Kira     • HYDROmorphone  0 5 mg Intravenous Q1H PRN Kiera Sonya     • meclizine  25 mg Oral TID PRN Theresia Bosworth, DO     • metroNIDAZOLE  500 mg Intravenous Q8H Regina Bowman  mg (09/21/22 0543)   • multi-electrolyte  125 mL/hr Intravenous Continuous Lashaun Davis  mL/hr (09/21/22 0200)   • naloxone  0 04 mg Intravenous Q1MIN PRN Theresia Bosworth, DO     • norepinephrine          • norepinephrine  1-30 mcg/min Intravenous Titrated Lashaun Davis MD 14 mcg/min (09/21/22 0617)   • ondansetron  4 mg Intravenous Q4H PRN Theresia Bosworth, DO     • pantoprazole  40 mg Intravenous Q12H [address] Willy G Rj       Continuous Infusions:  dexmedetomidine, 0 1-0 7 mcg/kg/hr, Last Rate: 0 5 mcg/kg/hr (09/21/22 0353)  fentaNYL, 50 mcg/hr, Last Rate: 100 mcg/hr (09/21/22 0220)  multi-electrolyte, 125 mL/hr, Last Rate: 125 mL/hr (09/21/22 0200)  norepinephrine, 1-30 mcg/min, Last Rate: 14 mcg/min (09/21/22 0647)      PRN Meds:   fentanyl citrate (PF), 50 mcg, Q1H PRN  HYDROmorphone, 0 5 mg, Q1H PRN  meclizine, 25 mg, TID PRN  naloxone, 0 04 mg, Q1MIN PRN  ondansetron, 4 mg, Q4H PRN        Allergies   No Known Allergies  ---------------------------------------------------------------------------------------  Advance Directive and Living Will:      Power of :    POLST:    ---------------------------------------------------------------------------------------  Care Time Delivered:   Upon my evaluation, this patient had a high probability of imminent or life-threatening deterioration due to Distributive shock, which required my direct attention, intervention, and personal management  I have personally provided 30 minutes (0700 to 0730) of critical care time, exclusive of procedures, teaching, family meetings, and any prior time recorded by providers other than myself  Shaila Hernandez PA-C      Portions of the record may have been created with voice recognition software  Occasional wrong word or "sound a like" substitutions may have occurred due to the inherent limitations of voice recognition software    Read the chart carefully and recognize, using context, where substitutions have occurred

## 2022-09-21 NOTE — ANESTHESIA POSTPROCEDURE EVALUATION
Post-Op Assessment Note    CV Status:  Stable  Pain Score: 0    Pain management: adequate     Mental Status:  Arousable   PONV Controlled:  None   Airway Patency:  Patent  Airway: intubated      Post Op Vitals Reviewed: Yes      Staff: CRNA         No complications documented      BP  120/41   Temp   98 0   Pulse  72   Resp   20   SpO2   97

## 2022-09-21 NOTE — ANESTHESIA PROCEDURE NOTES
Arterial Line Insertion  Performed by: Raul Andrew MD  Authorized by: Raul Andrew MD   Consent: Verbal consent obtained  Written consent obtained  Risks and benefits: risks, benefits and alternatives were discussed  Consent given by: patient  Patient understanding: patient states understanding of the procedure being performed  Patient consent: the patient's understanding of the procedure matches consent given  Procedure consent: procedure consent matches procedure scheduled  Relevant documents: relevant documents present and verified  Test results: test results available and properly labeled  Site marked: the operative site was marked  Radiology Images: Radiology Images displayed and confirmed  If images not available, report reviewed  Required items: required blood products, implants, devices, and special equipment available  Patient identity confirmed: verbally with patient and arm band  Time out: Immediately prior to procedure a "time out" was called to verify the correct patient, procedure, equipment, support staff and site/side marked as required  Preparation: Patient was prepped and draped in the usual sterile fashion    Indications: hemodynamic monitoring  Orientation:  Right  Location: radial artery  Procedure Details:  Leon's test normal: yes  Needle gauge: 20  Seldinger technique: Seldinger technique used  Number of attempts: 1    Post-procedure:  Post-procedure: chlorhexidine patch applied and dressing applied  Waveform: good waveform  Post-procedure CNS: normal  Patient tolerance: patient tolerated the procedure well with no immediate complications

## 2022-09-21 NOTE — PLAN OF CARE
See flow sheet for assessment details  Pt SR/ST with PVC's intermittently throughout night  Awake on sedation this am writing on clipboard and following commands   Report given to oncoming RN [facility]

## 2022-09-21 NOTE — PROGRESS NOTES
Progress Note - General Surgery   Casey Coleman 65 y o  male MRN: [id number]  [facility]: [id number]    Assessment:  65 y o  M with prior history of a subtotal cholecystectomy and prior aorto bi-iliac bypass (2009), who presented with a closed loop small bowel obstruction, now s/p ex lap, SBR (jejunum) with primary anastomosis, duodenectomy with duodenoduodenostomy (hand sewn), extensive MARK (>3 hrs), explantation of mesh, completion cholecystectomy, and abthera placement 9/20    Afebrile overnight  Has required levophed to maintain MAPs > 65 overnight (max 15 mcg/min)  Lactic trending down to 3 4 this morning from 9 3 post op      UO: 850 cc  Abthera: 950 cc serosanguinous   NGT: 900 bilious     Plan: To OR today for re-exploration, possible closure  NPO/NGT  Maintain ETT  Wean pressors as able  Sedation/analgesia per ICU  Continue IV abx - Cefepime/Flagyl  DVT ppx     Subjective/Objective      Subjective: Patient required increasing amounts of levophed overnight  He is otherwise intubated, sedated and complaining of pain in his abdomen  Objective:     Blood pressure (!) 86/44, pulse 88, temperature 97 9 °F (36 6 °C), temperature source Axillary, resp  rate (!) 23, height 5' 8" (1 727 m), weight 63 5 kg (139 lb 15 9 oz), SpO2 99 %  ,Body mass index is 21 29 kg/m²        Intake/Output Summary (Last 24 hours) at 9/21/2022 3532  Last data filed at 9/21/2022 0543  Gross per 24 hour   Intake 9428 83 ml   Output 3300 ml   Net 6128 83 ml       Invasive Devices  Report    Central Venous Catheter Line  Duration           CVC Central Lines 09/20/22 <1 day          Peripheral Intravenous Line  Duration           Peripheral IV 09/20/22 Left Antecubital 1 day    Peripheral IV 09/20/22 Left Forearm 1 day    Peripheral IV 09/20/22 Right Antecubital <1 day          Arterial Line  Duration           Arterial Line 09/20/22 Right Radial <1 day          Drain  Duration           NG/OG/Enteral Tube Nasogastric 18 Fr Left nare <1 day    Urethral Catheter Latex 16 Fr  <1 day          Airway  Duration           ETT  Hi-Lo; Cuffed;Oral 7 5 mm <1 day                Physical Exam:   NAD, alert  Normocephalic, atraumatic  NGT with bilious drainage   ETT in place   Regular rate  Abd soft, nondistended, tender, abthera in place with serosanguinous drainage   Moscoso in place with clear yellow   No calf tenderness or peripheral edema  CN grossly intact   Skin is warm and dry      Lab, Imaging and other studies:  CBC:   Lab Results   Component Value Date    WBC 8 74 09/21/2022    HGB 9 0 (L) 09/21/2022    HCT 27 5 (L) 09/21/2022    MCV 92 09/21/2022     (L) 09/21/2022    MCH 30 0 09/21/2022    MCHC 32 7 09/21/2022    RDW 15 2 (H) 09/21/2022    MPV 9 7 09/21/2022   , CMP:   Lab Results   Component Value Date    SODIUM 150 (H) 09/21/2022    K 3 3 (L) 09/21/2022     (H) 09/21/2022    CO2 26 09/21/2022    CO2 27 09/20/2022    BUN 23 09/21/2022    CREATININE 1 15 09/21/2022    GLUCOSE 115 09/20/2022    CALCIUM 8 4 09/21/2022    AST 39 09/21/2022    ALT 37 09/21/2022    ALKPHOS 25 (L) 09/21/2022    EGFR 60 09/21/2022     VTE Pharmacologic Prophylaxis: Heparin  VTE Mechanical Prophylaxis: sequential compression device

## 2022-09-21 NOTE — QUICK NOTE
PGY4 Post-Op Check Note    S: Patient is s/p ex lap, washout, drain placement adjacent to duodenal anastomosis, attempted keofed placement, bridging Phasix mesh placement, abdominal closure  He tolerated the procedure well without any complications  He was transported back to the ICU on 8 mcg/min of levophed  O:   Vitals:    09/21/22 1815   BP:    Pulse: 68   Resp: 19   Temp: 98 °F (36 7 °C)   SpO2: 97%       I/O last 3 completed shifts: In: 9765 3 [I V :5912 3; Blood:453; IV Piggyback:3400]  Out: 0 [Urine:850; Emesis/NG output:900; Drains:950; Blood:600]  I/O this shift: In: 4975 4 [I V :3375 4; IV Piggyback:1600]  Out: 2050 [AGSLV:7342; Emesis/NG output:190; Drains:590; Blood:200]    PE:  NAD  Normocephalic, atraumatic  NGT in place with bilious drainage   ETT in place   Regular rate  Abd soft, nondistended, incision dressing c/d/i  R abdominal DILLON drain with serosanguinous drainage  L abdominal DILLON drain with serosanguinous drainage    Moscoso in place with clear yellow urine   No calf tenderness or peripheral edema  Skin is warm and dry        Lab Results   Component Value Date    WBC 8 30 09/21/2022    HGB 8 3 (L) 09/21/2022    HCT 24 9 (L) 09/21/2022    MCV 91 09/21/2022     (L) 09/21/2022     Lab Results   Component Value Date    GLUCOSE 115 09/20/2022    CALCIUM 7 7 (L) 09/21/2022     12/10/2015    K 4 3 09/21/2022    CO2 25 09/21/2022     (H) 09/21/2022    BUN 19 09/21/2022    CREATININE 0 96 09/21/2022         A/P: 65 y o  M with prior history of a subtotal cholecystectomy and prior aorto bi-iliac bypass (2009), who presented with a closed loop small bowel obstruction, s/p ex lap, SBR (jejunum) with primary anastomosis, duodenectomy with duodenoduodenostomy (hand sewn), extensive MARK (>3 hrs), explantation of mesh, completion cholecystectomy, and abthera placement 9/20, now s/p ex lap, washout, drain placement adjacent to duodenal anastomosis, attempted keofed placement, bridging Phasix mesh placement, abdominal closure      - Wean to extubate  - NPO/NGT  - [email or web address]  - aggressively wean pressors as able   - Maintain DILLON drains, monitor output  - Maintain centeno, strict I/Os  - Continue IV abx through 9/25  - DVT ppx  - Supportive care per ICU     Fay Bauer DO

## 2022-09-21 NOTE — PROGRESS NOTES
Updated sons Gayathri Tovar and Keira Wooten regarding findings in surgery  All questions answered       Galo Green, DO

## 2022-09-22 NOTE — ANESTHESIA POSTPROCEDURE EVALUATION
Post-Op Assessment Note             Reason for prolonged intubation > 24 hours:  Open abdomen      No complications documented      BP     Temp      Pulse     Resp      SpO2

## 2022-09-22 NOTE — PROGRESS NOTES
Daily Progress Note - Critical Care   Reynold Casillas 65 y o  male MRN: [id number]  Unit/Bed#: MICU 13 Encounter: [id number]        ----------------------------------------------------------------------------------------  HPI/24hr events:  Rene bauman 65 y  o  male w/ PMHx of CAD s/p PCI in 2000, recent choledocholithiasis s/p ERCP on 7/14 from remnant GB, AAA s/p open Iccvr-wj-hxgol grafting in 2009, HTN, HLD  Prior surgical Hx includes open aortic repair, open cholecystectomy and ventral hernia repair with mesh in place       Pt presented to the ED w 3 days of worsening abdominal pain/N/V where w/u revealed a LA of 6 5, CT findings c/w SBO with fecalization of SB  SICU for post-op management due to high risk of needing multiple surgeries  9/20 Ex lap, SBR with mid jejunal anastomosis, duodenal resection/anastomosis, abthera, remnant cholecystectomy  9/21 abdominal washout, drain placement closure with bridging phasix mesh      24h: Decreasing pressor requirements overnight, received 25g albumin, currently to receive 1U p Rbc for hemoglobin 7 2    ---------------------------------------------------------------------------------------  SUBJECTIVE  Intubated     Review of Systems  Review of systems was unable to be performed secondary to intubation, sedation  ---------------------------------------------------------------------------------------  Assessment and Plan:    Neuro:   · Diagnosis: PAD  ? Plan:   ? Sedation w/ Fentanyl/Precedex for goal RASS 0 to -1  ? Delirium ppx  ? CAM ICU daily     CV:   · Diagnosis: Shock-distributive/hypovolemic, HFpEF, HLD, HTN  ? Plan:   ? Wean Levo as able for SBP >90  ? Tranfuse 1u PRBCs for Hb 7 2 and ABLA  ? repeat TTE obtained, EF 65%, significant TR  ? Holding home lopressor 50 daily, PRN lasix, lisinopril, lipitor        Pulm:  · Diagnosis: AHRF  ? Plan:   ? SBT today  ? Wean to extubation as able  ? ABG monitoring   ?  ACVC 20/450/6/50%, wean FiO2 as tolerated     GI: · Diagnosis: SBO 2/2 adhesions now s/p ex-lap w/ SBR w/ extensive MARK, anastomsoes od duo/duo and jejunojejunal, and drain palcement w/ closure   ? Plan:   ? 90 cm of bowel resected, J-J anastomosis, primary Duodenal anastomosis, cholecystectomy, removal of prior ventral hernia repair mesh  ? Maintain surgical drains, record output volume and character  ? NPO/NGT  ? Trend I/Os strictly, wean pressors as able  ? NPO, consider Early TPN      :   · Diagnosis: JOVANNI, Lactic acidosis   ? Plan:   ? Baseline appears to be 0 78-1 0  ? Trend Cr, currently returned to baseline  ? Takes PRN lasix at home for leg swelling-if low UOP consider gentle diuresis, IF sufficiently resuscitated       F/E/N:   · Plan:   ? F: Isolyte @125/hr  ? E: Replace  PRN K>4 0, Phos>3 0, Mg>2 0  ? N: NPO        Heme/Onc:   · Diagnosis: Leukocytosis, ABLA  ? Plan:   ? Txfuse 1U pRBC tis AM for labuile BP, undulating pressor requirements   ? S/p 1u PRBC yesterday with good response Hb 9 in AM     Endo:   · Diagnosis: No acute issues  ? Plan:   ? Continue ISS for goal -180     ID:   · Diagnosis: SB perforation  ? Plan:   ? Cefepime/Flagyl, Total of 5d treatment   ? Trend fever/WBC curve     MSK/Skin:   · Diagnosis: No acute issues  ? Plan:   ? freq repositioning         Patient appropriate for transfer out of the ICU today?: No  Disposition: Continue Critical Care   Code Status: Level 1 - Full Code  ---------------------------------------------------------------------------------------  ICU CORE MEASURES    Prophylaxis   VTE Pharmacologic Prophylaxis: Heparin  VTE Mechanical Prophylaxis: sequential compression device  Stress Ulcer Prophylaxis: Pantoprazole IV     ABCDE Protocol (if indicated)  Plan to perform spontaneous awakening trial today? Yes  Plan to perform spontaneous breathing trial today? Yes  Obvious barriers to extubation?  No  CAM-ICU: Negative    Invasive Devices Review  Invasive Devices  Report    Central Venous Catheter Line Duration           CVC Central Lines 22 1 day          Peripheral Intravenous Line  Duration           Peripheral IV 22 Left Forearm 2 days    Peripheral IV 22 Right Antecubital 1 day          Arterial Line  Duration           Arterial Line 22 Right Radial 1 day          Drain  Duration           NG/OG/Enteral Tube Nasogastric 18 Fr Left nare 1 day    Urethral Catheter Latex 16 Fr  1 day    Closed/Suction Drain Anterior;Right;Lateral RLQ Bulb 19 Fr  <1 day    Closed/Suction Drain Left LLQ Bulb 19 Fr  <1 day          Airway  Duration           ETT  Hi-Lo; Cuffed;Oral 7 5 mm 1 day              Can any invasive devices be discontinued today?  Yes  ---------------------------------------------------------------------------------------  OBJECTIVE    Vitals   Vitals:    22 0400 22 0500 22 0540 22 0545   BP:       BP Location:       Pulse: 72 72  86   Resp: 20 20  20   Temp: 99 1 °F (37 3 °C)      TempSrc: Axillary      SpO2: 96% 96%  96%   Weight:   71 5 kg (157 lb 10 1 oz)    Height:         Temp (24hrs), Av 3 °F (36 8 °C), Min:98 °F (36 7 °C), Max:99 1 °F (37 3 °C)  Current: Temperature: 99 1 °F (37 3 °C)  HR: 86  BP: 96/36  RR: 20  SpO2: 96%    Respiratory:  SpO2: SpO2: 96 %  Nasal Cannula O2 Flow Rate (L/min): 4 L/min    Invasive/non-invasive ventilation settings   Respiratory  Report   Lab Data (Last 4 hours)       0420            pH, Arterial       7 440             pCO2, Arterial       34 5             pO2, Arterial       103 2             HCO3, Arterial       22 9             Base Excess, Arterial       -1 0                  O2/Vent Data     None                Physical Exam        Laboratory and Diagnostics:  Results from last 7 days   Lab Units 22  0529 22  1818 22  0451 22  2301 09/20/22  1814 22  1702 22  1358 22  0230   WBC Thousand/uL 3 57* 8 30 8 74 4 50 3 25*  --   --   --  18 08*   HEMOGLOBIN g/dL 7 2* 8 3* 9 0* 9 5* 8 3*  --   --   --  18 0*   I STAT HEMOGLOBIN g/dl  --   --   --   --   --  7 1* 7 5*   < >  --    HEMATOCRIT % 22 8* 24 9* 27 5* 29 3* 25 8*  --   --   --  56 7*   HEMATOCRIT, ISTAT %  --   --   --   --   --  21* 22*   < >  --    PLATELETS Thousands/uL  --  103* 128* 119* 141*  --   --   --  328   NEUTROS PCT %  --   --   --   --   --   --   --   --  89*   BANDS PCT %  --   --   --   --  19*  --   --   --   --    MONOS PCT %  --   --   --   --   --   --   --   --  6   MONO PCT %  --   --   --   --  5  --   --   --   --     < > = values in this interval not displayed  Results from last 7 days   Lab Units 09/22/22  0420 09/22/22  0015 09/21/22  1800 09/21/22  1205 09/21/22  0451 09/20/22  1827 09/20/22  1814 09/20/22  1358 09/20/22  0232   SODIUM mmol/L 145 146 147 147 150* 151*  --   --  138   POTASSIUM mmol/L 4 2 3 9 4 2 4 3 3 3* 3 5  --   --  4 0   CHLORIDE mmol/L 116* 115* 119* 116* 115* 116*  --   --  104   CO2 mmol/L 23 25 24 25 26 24  --   --  21   CO2, I-STAT mmol/L  --   --   --   --   --   --  27   < >  --    ANION GAP mmol/L 6 6 4 6 9 11  --   --  13   BUN mg/dL 17 16 16 19 23 25  --   --  22   CREATININE mg/dL 0 75 0 72 0 76 0 96 1 15 1 37*  --   --  1 89*   CALCIUM mg/dL 7 4* 7 4* 7 1* 7 7* 8 4 8 7  --   --  10 5*   GLUCOSE RANDOM mg/dL 67 77 85 88 95 116  --   --  134   ALT U/L  --   --   --   --  37  --   --   --  40   AST U/L  --   --   --   --  39  --   --   --  25   ALK PHOS U/L  --   --   --   --  25*  --   --   --  158*   ALBUMIN g/dL  --   --   --   --  3 4*  --   --   --  4 2   TOTAL BILIRUBIN mg/dL  --   --   --   --  2 39*  --   --   --  1 25*    < > = values in this interval not displayed       Results from last 7 days   Lab Units 09/22/22  0527 09/21/22  1800 09/21/22  0451 09/20/22  2303   MAGNESIUM mg/dL 2 5 2 5 1 9 1 8   PHOSPHORUS mg/dL 1 8* 3 0 2 2* 1 7*               Results from last 7 days   Lab Units 09/22/22  0420 09/22/22  0015 09/21/22  1800 09/21/22  1205 09/21/22  8808 09/21/22  0312 09/20/22  2100   LACTIC ACID mmol/L 1 1 0 8 2 2* 2 1* 3 4* 5 0* 8 2*     ABG:  Results from last 7 days   Lab Units 09/22/22  0420   PH ART  7 440   PCO2 ART mm Hg 34 5*   PO2 ART mm Hg 103 2   HCO3 ART mmol/L 22 9   BASE EXC ART mmol/L -1 0   ABG SOURCE  Line, Arterial     VBG:  Results from last 7 days   Lab Units 09/22/22  0420 09/21/22  1206 09/21/22  0712   PH JOSE   --   --  7 411*   PCO2 JOSE mm Hg  --   --  38 6*   PO2 JOSE mm Hg  --   --  38 8   HCO3 JOSE mmol/L  --   --  24 0   BASE EXC JOSE mmol/L  --   --  -0 5   ABG SOURCE  Line, Arterial   < >  --     < > = values in this interval not displayed  Micro  Results from last 7 days   Lab Units 09/20/22  0232 09/20/22  0230   BLOOD CULTURE  No Growth at 24 hrs  No Growth at 24 hrs  EKG: Nonspecific ST changes, sinus tach with occasional PVCs  Imaging: No new results this AM     Intake and Output  I/O       09/20 0701 09/21 0700 09/21 0701 09/22 0700    I V  (mL/kg) 5912 3 (93 1) 4822 2 (67 4)    Blood 453     NG/GT 0 0    IV Piggyback 3400 1850    Total Intake(mL/kg) 9765 3 (153 8) 6672 2 (93 3)    Urine (mL/kg/hr) 850 (0 6) 1695 (1)    Emesis/NG output 900 340    Drains 950 1145    Blood 600 200    Total Output 3300 3380    Net +6465 3 +3292 2              UOP: 60-65 ml/hr     Height and Weights   Height: 5' 8" (172 7 cm)  IBW (Ideal Body Weight): 68 4 kg  Body mass index is 23 97 kg/m²  Weight (last 2 days)     Date/Time Weight    09/22/22 0540 71 5 (157 63)    09/21/22 0715 63 (139)    09/20/22 0630 63 5 (139 99)            Nutrition       Diet Orders   (From admission, onward)             Start     Ordered    09/20/22 1751  Diet NPO  Diet effective now        References:    Nutrtion Support Algorithm Enteral vs  Parenteral   Question Answer Comment   Diet Type NPO    RD to adjust diet per protocol?  No        09/20/22 1752                  Active Medications  Scheduled Meds:  Current Facility-Administered Medications   Medication Dose Route Frequency Provider Last Rate   • calcium gluconate  1 g Intravenous Once Jalyn Mercado MD     • cefepime  1,000 mg Intravenous Q24H Jalyn Mercado MD 1,000 mg (09/21/22 1010)   • chlorhexidine  15 mL Mouth/Throat Q12H [address] MD     • dexmedetomidine  0 1-0 7 mcg/kg/hr Intravenous Titrated Jalyn Mercado MD 0 5 mcg/kg/hr (09/22/22 0011)   • fentaNYL  50 mcg/hr Intravenous Continuous Jalyn Mercado MD 50 mcg/hr (09/21/22 1414)   • fentanyl citrate (PF)  50 mcg Intravenous Q1H PRN Jalyn Mercado MD     • heparin (porcine)  5,000 Units Subcutaneous [facility] Jalyn Mercado MD     • HYDROmorphone  0 5 mg Intravenous Q1H PRN Jalyn Mercado MD     • meclizine  25 mg Oral TID PRN Jalyn Mercado MD     • metroNIDAZOLE  500 mg Intravenous Q8H Jalyn Mercado  mg (09/22/22 0540)   • multi-electrolyte  125 mL/hr Intravenous Continuous Jalyn Mercado  mL/hr (09/22/22 0221)   • naloxone  0 04 mg Intravenous Q1MIN PRN Jalyn Mercado MD     • norepinephrine  1-30 mcg/min Intravenous Titrated Jalyn Mercado MD 4 mcg/min (09/22/22 0545)   • ondansetron  4 mg Intravenous Q4H PRN Jalyn Mercado MD     • pantoprazole  40 mg Intravenous Q12H [facility] Jalyn Mercado MD     • potassium phosphate  12 mmol Intravenous Once Jalyn Mercado MD     • sodium phosphate  12 mmol Intravenous Once Jalyn Mercado MD       Continuous Infusions:  dexmedetomidine, 0 1-0 7 mcg/kg/hr, Last Rate: 0 5 mcg/kg/hr (09/22/22 0011)  fentaNYL, 50 mcg/hr, Last Rate: 50 mcg/hr (09/21/22 1414)  multi-electrolyte, 125 mL/hr, Last Rate: 125 mL/hr (09/22/22 0221)  norepinephrine, 1-30 mcg/min, Last Rate: 4 mcg/min (09/22/22 0545)      PRN Meds:   fentanyl citrate (PF), 50 mcg, Q1H PRN  HYDROmorphone, 0 5 mg, Q1H PRN  meclizine, 25 mg, TID PRN  naloxone, 0 04 mg, Q1MIN PRN  ondansetron, 4 mg, Q4H PRN        Allergies   No Known Allergies  ---------------------------------------------------------------------------------------  Advance Directive and Living Will:      Power of :    POLST:    ---------------------------------------------------------------------------------------  Care Time Delivered:   See attestation    Suhail Torres MD      Portions of the record may have been created with voice recognition software  Occasional wrong word or "sound a like" substitutions may have occurred due to the inherent limitations of voice recognition software    Read the chart carefully and recognize, using context, where substitutions have occurred

## 2022-09-22 NOTE — PROCEDURES
Insert PICC line    Date/Time: 9/22/2022 11:35 AM  Performed by: Efe Pa RN  Authorized by: Mason Feng     Patient location:  Bedside  Other Assisting Provider: Yes (comment) (SANDEEP Patricia)    Consent:     Consent obtained:  Verbal (Obtained by provider)    Consent given by:  Healthcare agent (Son)    Risks discussed:  Arterial puncture, bleeding, infection, incorrect placement, nerve damage and pneumothorax (Discussed by provider)    Alternatives discussed: Discussed by provider  Universal protocol:     Procedure explained and questions answered to patient or proxy's satisfaction: yes      Relevant documents present and verified: yes      Test results available and properly labeled: yes      Radiology Images displayed and confirmed  If images not available, report reviewed: yes      Required blood products, implants, devices, and special equipment available: yes      Site/side marked: yes      Immediately prior to procedure, a time out was called: yes      Patient identity confirmed:  Verbally with patient, arm band, provided demographic data and hospital-assigned identification number  Pre-procedure details:     Hand hygiene: Hand hygiene performed prior to insertion      Sterile barrier technique: All elements of maximal sterile technique followed      Skin preparation:  ChloraPrep    Skin preparation agent: Skin preparation agent completely dried prior to procedure    Indications:     PICC line indications: total parenteral nutrition    Sedation:     Sedation type: None  Anesthesia (see MAR for exact dosages):      Anesthesia method:  Local infiltration    Local anesthetic:  Lidocaine 1% w/o epi (3 mL administered)  Procedure details:     Location:  Basilic    Vessel type: vein      Laterality:  Right    Site selection rationale:  Largest, most patent vessel    Approach: percutaneous technique used      Patient position:  Flat    Procedural supplies:  Double lumen    Catheter size:  5 Fr Landmarks identified: yes      Ultrasound guidance: yes      Ultrasound image availability:  Not saved (34% vessel occupancy  Uable to save image to Epic )    Sterile ultrasound techniques: Sterile gel and sterile probe covers were used      Number of attempts:  1    Successful placement: yes      Vessel of catheter tip end:  Sherlock 3CG confirmed (OK to use  Sherlock 3CG confirmed placement  Results saved to chart )    Total catheter length (cm):  42    Catheter out on skin (cm):  0    Max flow rate:  999 ml/hr    Arm circumference:  27  Post-procedure details:     Post-procedure:  Dressing applied and securement device placed    Assessment:  Blood return through all ports and free fluid flow    Post-procedure complications: none      Patient tolerance of procedure:   Tolerated well, no immediate complications

## 2022-09-22 NOTE — QUICK NOTE
Patient consented for blood at this time  Was unable to reach son at phone number listed in chart  Voicemail was left  Patient was unable to sign for himself given status of intubation and on sedation

## 2022-09-22 NOTE — CONSULTS
09/22/22 0959   Recommendations/Interventions   Summary Pt about to be extubated at time of visit  Per surgery note, plan for PICC placement and start of TPN today  Standard TPN ordered and appropriate for initial bag  Given SBO, pt appropriate for TPN if expected to be needed for > /= 7 days   Recommendations to Provider 1  Continue with standard TPN for day 1   2  RD to follow up with goal TPN regimen once appropriate to advance formula  3  On day 1 of TPN check BMP, Mg, Phos, LFT, TG, ionized Ca, start glucose POC q 6 hrs  4  Replace low phos prior to initiation of nutrition support  5  Recommend starting 100mg IV thiamine TID x 5 days  6   Do not discontinue TPN until pt is tolerating >/=60% of goal diet   RD consult appreciated

## 2022-09-22 NOTE — RESPIRATORY THERAPY NOTE
RT Ventilator Management Note  Nohemi Graham 65 y o  male MRN: [id number]  Unit/Bed#: [facility] 13 Encounter: [id number]      Daily Screen         9/21/2022  5706             Patient safety screen outcome[de-identified] Failed    Not Ready for Weaning due to[de-identified] Poor inspiratory effort; Failed SAT screen    Spont breathing trial % for 30 min: Yes    Spont breathing trial outcome[de-identified] Failed    Spont breathing trial reason failed: Tidal volume < 4ml/Kg of ideal body weight or VE <5 or  >15 LPM;Dyspnea;RSBI > 100              Physical Exam:   Assessment Type: (P) Assess only  General Appearance: (P) Sedated  Respiratory Pattern: (P) Symmetrical, Assisted  Chest Assessment: (P) Chest expansion symmetrical  Bilateral Breath Sounds: (P) Diminished  Cough: (P) None  O2 Device: (P) vent      Resp Comments: (P) Pt is stable at this time, no changes made  WIll cont to  monitor pt per protocol overnight

## 2022-09-22 NOTE — RESPIRATORY THERAPY NOTE
Extubation Note         09/22/22 1004   Respiratory Assessment   Resp Comments Just completed uneventful extubation post order after about 60 mins on SBT  Pt placed on 6 lpm NC with SpO2 of 92%  Pt immediately able to phonate clearlyand cuff leak test performed pre extubation  Nrsg present     O2 Device NC 6 lpm   Vent Information   Vent ID [id number]   Ventilator End Yes   $ Pulse Oximetry Spot Check Charge Completed   Daily Screen   Patient safety screen outcome: Passed   Spont breathing trial outcome: Passed   Name of Medical Team Notified: [address] Team Hossein VEGA   Preparing to extubate/ Notify Nurse Yes   Extubation order obtained Yes   Consider Cuff Test Yes   Patient extubated Yes

## 2022-09-22 NOTE — RESPIRATORY THERAPY NOTE
RT Ventilator Management Note  Dana Ceja 65 y o  male MRN: [id number]  Unit/Bed#: MICU 13 Encounter: [id number]       09/22/22 0825   Respiratory Assessment   Assessment Type Assess only   General Appearance Awake; Alert   Respiratory Pattern Assisted   Chest Assessment Chest expansion symmetrical   Bilateral Breath Sounds Diminished;Clear   Cough None   Suction ET Tube   Resp Comments Routine morning first vent check  Found pt on on ordered vent settings  Vent alarms on, tested and functional   Holding on morning sbt with RN advised  Will follow up with MDs since weaning and extub protocol is on order  Will continue to monitor  O2 Device  vent   Vent Information   Vent ID [id number]   Vent type     Vent Mode AC/VC   $ Vent Daily Charge-Subsequent Yes   AC/VC Settings   Resp Rate (BPM) 20 BPM   Vt (mL) 450 mL   FIO2 (%) 50 %   PEEP (cmH2O) 6 cmH2O   Flow Pattern (LPM) 60 L/min   Trigger Sensitivity Flow (lpm) 3 %   Humidification Heater   Heater Temperature (Set) 98 4 °F (36 9 °C)   AC/VC Actuals   Resp Rate (BPM) 21 BPM   VT (mL) 431   MV 12 2   MAP (cmH2O) 11 cmH2O   Peak Pressure (cmH2O) 20 cmH2O   I/E Ratio (Obs) !:2 7   Heater Temperature (Obs) 98 4 °F (36 9 °C)   Static Compliance (mL/cmH20) 49 mL/cmH2O   Plateau Pressure (cm H2O) 17 cm H2O   AC/VC Alarms   High Peak Pressure (cmH2O) 40   High Resp Rate (BPM) 40 BPM   High MV (L/min) 20 L/min   Low MV (L/min) 4 L/min   Vt High (mL) 1200 mL   Vt Low (mL) 200 mL   AC/VC Apnea Settings   Resp Rate (BPM) 20 BPM   VT (mL) 450 mL   FIO2 (%) 100 %   Apnea Time (s) 20 S   Apnea Flow (L/min) 60 L/min   Maintenance   Alarm (pink) cable attached Yes   Resuscitation bag with peep valve at bedside Yes   Water bag changed No   Circuit changed No   IHI Ventilator Associated Pneumonia Bundle   Head of Bed Elevated HOB 30   ETT  Hi-Lo; Cuffed;Oral 7 5 mm   Placement Date/Time: 09/20/22 1222   Mask Ventilation: Mask ventilation not attempted (0) Preoxygenated: Yes  Technique: Rapid sequence;Video laryngoscopy;bougie  Type: Hi-Lo; Cuffed;Oral  Tube Size: 7 5 mm  Laryngoscope: Imagiin.  Blade Size: 3  Locat    Secured at (cm) 23   Measured from Lips   Secured Location Right   Repositioned Left to Right   Secured by Commercial tube bliss   Site Condition Dry   Cuff Pressure (cm H2O) 20 cm H2O   HI-LO Suction  Intermittent suction   HI-LO Secretions Scant   HI-LO Intervention Patent       Daily Screen         9/21/2022  2504             Patient safety screen outcome[de-identified] Failed    Not Ready for Weaning due to[de-identified] Poor inspiratory effort; Failed SAT screen    Spont breathing trial % for 30 min: Yes    Spont breathing trial outcome[de-identified] Failed    Spont breathing trial reason failed: Tidal volume < 4ml/Kg of ideal body weight or VE <5 or  >15 LPM;Dyspnea;RSBI > 100              Physical Exam:   Assessment Type: (P) Assess only  General Appearance: (P) Awake, Alert  Respiratory Pattern: (P) Assisted  Chest Assessment: (P) Chest expansion symmetrical  Bilateral Breath Sounds: (P) Diminished, Clear  Cough: (P) None  Suction: (P) ET Tube  O2 Device: (P)  vent      Resp Comments: (P) Routine morning first vent check  Found pt on on ordered vent settings  Vent alarms on, tested and functional   Holding on morning sbt with RN advised  Will follow up with MDs since weaning and extub protocol is on order  Will continue to monitor

## 2022-09-22 NOTE — RESPIRATORY THERAPY NOTE
Spont Breathing Trial Initiated         09/22/22 0852   Respiratory Assessment   Resp Comments Initiated morning SBT with pt appearing to be fully at rest and easily responsive to verbal stimuli  RN made aware  Will document opening parameters and return in 30 mins for update     O2 Device  Vent   Vent Information   Vent ID [id number]    Vent Mode (S)  CPAP/PS Spont   CPAP/PS Spont Settings   FIO2 (%) 50 %   PEEP (cmH2O) 6 cmH2O   Pressure Support (cmH2O) 8 cmH20   Trigger Sensitivity Flow (lpm) 3 LPM   Rise Time (%) 50 %   Esens % 25 %   Humidification Heater   Heater Temp 98 4 °F (36 9 °C)   CPAP/PS Spont Actuals   Resp Rate (BPM) 16 BPM   VT (mL) 630 mL   MV (Obs) 9 44   I/E Ratio (Obs) 1:2 9   RSBI 30   CPAP/PS Spont Alarms   High Peak Pressure (cmH20) 40 cmH2O   High Resp Rate (BPM) 30 BPM   High MV (L/min) 50 L/min   Low MV (L/min) 4 L/min   High Priscilla VTE (mL) 1200 mL   Low Priscilla VTE (mL) 300 mL   High SPONT VTE (mL) 1200 mL   Low Spont VTE (mL) 300 mL   CPAP/PS Spont Apnea Settings   Resp Rate (BPM) 20 BPM   VT (mL) 450 mL   FIO2 (%) 100 %   Apnea Time (s) 20 S

## 2022-09-22 NOTE — CASE MANAGEMENT
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 ([email or web address] ) 2021   • Palpitations    • Diastolic dysfunction    • Surgical wound, non healing 2020   • Status post cholecystectomy 10/04/2020   • Ambulatory dysfunction 10/04/2020   • Unstable angina ([email or web address] ) 2020   • Dyslipidemia 06/10/2020   • Follow up 2020   • Abdominal aortic aneurysm (AAA) without rupture ([place] [address] ) 2020   • Tubular adenoma of colon 2019   • Primary osteoarthritis of one hip, left 2019   • Gastroesophageal reflux disease with esophagitis 2019   • Gastroesophageal reflux disease 2018   • Primary osteoarthritis of right knee 2018   • History of incisional hernia repair 2018   • Serrated adenoma of colon 2018   • Incisional hernia 2018   • CAD (coronary artery disease) 2018   • Hypertension 2018   • Bursitis of left shoulder 2017      LOS (days): 2  Geometric Mean LOS (GMLOS) (days): 10 30  Days to GMLOS:8     OBJECTIVE:    Risk of Unplanned Readmission Score: 16 86         Current admission status: Inpatient       Preferred Pharmacy:   [id number], [address]  Phone: [phone] Fax: [phone]    Primary Care Provider: Gely Vivar MD    Primary Insurance: MEDICARE  Secondary Insurance:     ASSESSMENT:  Rashawn Christine Proxies    There are no active Health Care Proxies on file                   Readmission Root Cause  30 Day Readmission: No    Patient Information  Admitted from[de-identified] Home  Mental Status: Intubated, Sedated  During Assessment patient was accompanied by: Not accompanied during assessment  Assessment information provided by[de-identified] Son  Primary Caregiver: Self  Support Systems: Self, [address] of Residence: [address] [facility] do you [place] [address] entry access options   Select all that apply : Stairs  Number of steps to enter home : 1  Type of Current Residence: Apartment  Floor Level: 1 (basement level)  Upon entering residence, is there a bedroom on the main floor (no further steps)?: Yes  Upon entering residence, is there a bathroom on the main floor (no further steps)?: Yes  In the last 12 months, was there a time when you were not able to pay the mortgage or rent on time?: No  In the last 12 months, was there a time when you did not have a steady place to sleep or slept in a shelter (including now)?: No  Homeless/housing insecurity resource given?: N/A  Living Arrangements: Lives Alone  Is patient a ?: No    Activities of Daily Living Prior to Admission  Functional Status: Independent  Completes ADLs independently?: Yes  Ambulates independently?: Yes  Does patient use assisted devices?: Yes  Assisted Devices (DME) used: Straight Cane  Does patient currently own DME?: Yes  What DME does the patient currently own?: Straight Cane  Does patient have a history of Outpatient Therapy (PT/OT)?: No  Does the patient have a history of Short-Term Rehab?: Yes ([facility], [facility], [facility])  Does patient have a history of HHC?: Yes ([facility])  Does patient currently have [place] [address]?: No         Patient Information Continued  Income Source: Pension/[employer]  Within the past 12 months, you worried that your food would run out before you got the money to buy more : Never true  Within the past 12 months, the food you bought just didn't last and you didn't have money to get more : Never true  Food insecurity resource given?: N/A  Does patient receive dialysis treatments?: No  Does patient have a history of substance abuse?: No  Does patient have a history of Mental Health Diagnosis?: No         Means of Transportation  Means of Transport to Appts[de-identified] Drives Self  In the past 12 months, has lack of transportation kept you from medical appointments or from getting medications?: No  In the past 12 months, has lack of transportation kept you from meetings, work, or from getting things needed for daily living?: No  Was application for public transport provided?: N/A        DISCHARGE DETAILS:    Discharge planning discussed with[de-identified] patient's son Daysi Cote via phone  Freedom of Choice: Yes     CM contacted family/caregiver?: Yes  Were Treatment Team discharge recommendations reviewed with patient/caregiver?: Yes  Did patient/caregiver verbalize understanding of patient care needs?: Yes  Were patient/caregiver advised of the risks associated with not following Treatment Team discharge recommendations?: Yes    Contacts  Patient Contacts: Isaiah Colon, jayant  Relationship to Patient[de-identified] Family  Contact Method: Phone  Phone Number: [phone]  Reason/Outcome: Continuity of Care, Emergency Contact, Discharge Planning              Other Referral/Resources/Interventions Provided:  Interventions: Short Term Rehab         Treatment Team Recommendation: Other (TBD -- likely STR)  Discharge Destination Plan[de-identified] Other (TBD -- likely STR)  Transport at Discharge : Other (Comment) (TBD)                Patient/caregiver received discharge checklist   Content reviewed  Patient/caregiver encouraged to participate in discharge plan of care prior to discharge home    CM reviewed d/c planning process including the following: identifying help at home, patient preference for d/c planning needs, Discharge [facility], [facility] program, availability of treatment team to discuss questions or concerns patient and/or family may have regarding understanding medications and recognizing signs and symptoms once discharged  CM also encouraged patient to follow up with all recommended appointments after discharge  Patient advised of importance for patient and family to participate in managing patient’s medical well being  Additional Comments: Patient vented/sedated, initial assessment completed with son Rafiq Alford via phone  As per Rafiq Alford, patient independent at baseline, has had difficulty walking lately though, and will likely require rehab post-discharge  Patient has been to [facility], [place] [address], and [facility] in the past for rehab, and has had [facility] at home  CM will continue to follow for d/c needs

## 2022-09-22 NOTE — PROGRESS NOTES
Progress Note - General Surgery   Victor Manuel Hernandez 65 y o  male MRN: [id number]  [facility] [id number]    Assessment:  65 y o  M with prior history of a subtotal cholecystectomy and prior aorto bi-iliac bypass (2009), who presented with a closed loop small bowel obstruction, s/p ex lap, SBR (jejunum) with primary anastomosis, duodenectomy with duodenoduodenostomy (hand sewn), extensive MARK (>3 hrs), explantation of mesh, completion cholecystectomy, and abthera placement 9/20  Now s/p ex lap, washout, drain placement adjacent to duodenal anastomosis, attempted keofed placement, bridging Phasix mesh placement, abdominal closure 9/21    Afebrile  On levophed @ 3mcg/min  Current vent settings: 20/450/50%/6    UO: 1 6 L  NGT: 340 cc bilious   R abdominal drain: 155 cc serous drainage  LLQ abdominal drain: 490 cc serosanguinous drainage     Plan:  Wean to extubate   NPO/NGT, NGT to low continuous suction  [email or web address]  PICC/TPN today   Wean pressors as able  Maintain Joni drains, monitor output   Sedation/analgesia per ICU  Continue IV abx - Cefepime/Flagyl  DVT ppx   Supportive care per ICU      Subjective/Objective      Subjective: No acute events overnight  He is intubated, sedated and resting comfortably in bed  Objective:     Blood pressure (!) 92/38, pulse 82, temperature 99 1 °F (37 3 °C), temperature source Axillary, resp  rate 20, height 5' 8" (1 727 m), weight 71 5 kg (157 lb 10 1 oz), SpO2 96 %  ,Body mass index is 23 97 kg/m²        Intake/Output Summary (Last 24 hours) at 9/22/2022 2342  Last data filed at 9/22/2022 0615  Gross per 24 hour   Intake 7726 16 ml   Output 3380 ml   Net 4346 16 ml       Invasive Devices  Report    Central Venous Catheter Line  Duration           CVC Central Lines 09/20/22 1 day          Peripheral Intravenous Line  Duration           Peripheral IV 09/20/22 Left Forearm 2 days    Peripheral IV 09/20/22 Right Antecubital 1 day          Arterial Line  Duration Arterial Line 09/20/22 Right Radial 1 day          Drain  Duration           NG/OG/Enteral Tube Nasogastric 18 Fr Left nare 1 day    Urethral Catheter Latex 16 Fr  1 day    Closed/Suction Drain Anterior;Right;Lateral RLQ Bulb 19 Fr  <1 day    Closed/Suction Drain Left LLQ Bulb 19 Fr  <1 day          Airway  Duration           ETT  Hi-Lo; Cuffed;Oral 7 5 mm 1 day                Physical Exam:   NAD, alert   Normocephalic, atraumatic  NGT with bilious drainage   Moist mucous membranes   ETT in place   Regular rate  Abd soft, nondistended, appropriately tender, incision dressing c/d/i   LLQ DILLON with serosanguinous drainage  RLQ DILLON with serous drainage   Moscoso in place with clear yellow urine  No calf tenderness or peripheral edema  Skin is warm and dry       Lab, Imaging and other studies:  CBC:   Lab Results   Component Value Date    WBC 3 57 (L) 09/22/2022    HGB 7 2 (L) 09/22/2022    HCT 22 8 (L) 09/22/2022    MCV 94 09/22/2022     (L) 09/21/2022    MCH 29 8 09/22/2022    MCHC 31 6 09/22/2022    RDW 15 5 (H) 09/22/2022    MPV 9 5 09/21/2022   , CMP:   Lab Results   Component Value Date    SODIUM 145 09/22/2022    K 4 2 09/22/2022     (H) 09/22/2022    CO2 23 09/22/2022    BUN 17 09/22/2022    CREATININE 0 75 09/22/2022    CALCIUM 7 4 (L) 09/22/2022    EGFR 87 09/22/2022     VTE Pharmacologic Prophylaxis: Heparin  VTE Mechanical Prophylaxis: sequential compression device

## 2022-09-22 NOTE — PROGRESS NOTES
The patient’s standard-infusion Piperacillin-Tazobactam / Zosyn (infused over 30-60 minutes) has been converted to extended-infusion (infused over 4 hours) per [facility], [facility] Extended-Infusion Piperacillin-Tazobactam Protocol for Adults as approved by the Pharmacy and Therapeutics Committee (accessible here on MyNET)       The patient met ALL eligible criteria:    Age >= 25years old   Critical Care patient    And did NOT have ANY exclusions:     Emergency Department or Operating Room patient  Drug incompatibilities that could NOT be avoided with timing or separate line administration    The following are reminders for Nursing regarding administration:  Infuse the first dose of Zosyn over 30min as a load (if new start), and then all subsequent doses will be given as an extended-infusion over 4 hours (see dosing below)  Use primary tubing as an intermittent infusion; change out primary tubing every 24 hours   Ensure full dose of the medication is given at the appropriate rate  Most incompatible drugs can be scheduled during times when the Zosyn is not being infused; however, if one requires administration during the same time, a separate site or lumen MUST be used  If access is limited and an incompatible medication urgently needs to be given, the Zosyn extended-infusion can be held for up to 30min (remember to flush line before/after)  Extended-infusion Zosyn does NOT require special timing around hemodialysis (it can even be given simultaneously)  If a patient needs an urgent MRI while Zosyn is infusing and there is not a MRI-compatible pump available for use, finish the infusion over the traditional length (30min) and ask Pharmacy to reschedule the next doses so that they start a few hours earlier  Pharmacy will assist nursing in troubleshooting other administration issues as they arise  Dosing for Piperacillin-Tazobactam  CrCl (mL/min) Traditional Dosing Extended-Infusion Dosing #   CrCl > 40 High-Dose  CrCl > 40 Low-Dose 4 5g Q6H (over 30min)  3 375g IV Q6H (over 30min) 3 375g IV Q8H (over 4hr)*    *1st dose loaded over 30min, then start extended-infusion dosing 4hr later   CrCl 20-40 High-Dose  CrCl 20-40 Low-Dose 3 375g IV Q6H (over 30min)  2 25g IV Q6H (over 30min)    CrCl < 20 High-Dose  CrCl < 20 Low-Dose 2 25g IV Q6H (over 30min)  2 25g IV Q8H (over 30min) 3 375g IV Q12H (over 4hr)*    *1st dose loaded over 30min, then start extended-infusion dosing 6hr later   Hemo/Peritoneal Dialysis High-Dose  Hemo/Peritoneal Dialysis Low-Dose 2 25g IV Q6H (over 30min)  2 25g IV Q8H (over 30min)    CVVH/D High-Dose  CVVH/D Low-Dose 3 375g IV Q6H (over 30min)  2 25 IV Q6H (over 30min) 3 375g IV Q8H (over 4hr)*    *1st dose loaded over 30min, then start extended-infusion dosing 4hr later   # = Use 4 5g dosing (same interval) if morbidly obese (BMI ?40)    Please call the Pharmacy with any questions or concerns

## 2022-09-22 NOTE — PLAN OF CARE
Problem: Potential for Falls  Goal: Patient will remain free of falls  Description: INTERVENTIONS:  - Educate patient/family on patient safety including physical limitations  - Instruct patient to call for assistance with activity   - Consult OT/PT to assist with strengthening/mobility   - Keep Call bell within reach  - Keep bed low and locked with side rails adjusted as appropriate  - Keep care items and personal belongings within reach  - Initiate and maintain comfort rounds  - Make Fall Risk Sign visible to staff  - Offer Toileting every n/a Hours, in advance of need  - Initiate/Maintain bed alarm  - Obtain necessary fall risk management equipment: bed alarm  - Apply yellow socks and bracelet for high fall risk patients  - Consider moving patient to room near nurses station  Outcome: Progressing     Problem: MOBILITY - ADULT  Goal: Maintain or return to baseline ADL function  Description: INTERVENTIONS:  -  Assess patient's ability to carry out ADLs; assess patient's baseline for ADL function and identify physical deficits which impact ability to perform ADLs (bathing, care of mouth/teeth, toileting, grooming, dressing, etc )  - Assess/evaluate cause of self-care deficits   - Assess range of motion  - Assess patient's mobility; develop plan if impaired  - Assess patient's need for assistive devices and provide as appropriate  - Encourage maximum independence but intervene and supervise when necessary  - Involve family in performance of ADLs  - Assess for home care needs following discharge   - Consider OT consult to assist with ADL evaluation and planning for discharge  - Provide patient education as appropriate  Outcome: Progressing  Goal: Maintains/Returns to pre admission functional level  Description: INTERVENTIONS:  - Perform BMAT or MOVE assessment daily    - Set and communicate daily mobility goal to care team and patient/family/caregiver     - Collaborate with rehabilitation services on mobility goals if consulted  - Perform Range of Motion 3 times a day  - Reposition patient every 2 hours    - Dangle patient 0 times a day  - Stand patient 0 times a day  - Ambulate patient 0 times a day  - Out of bed to chair 0 times a day   - Out of bed for meals 0 times a day  - Out of bed for toileting  - Record patient progress and toleration of activity level   Outcome: Progressing     Problem: SAFETY,RESTRAINT: NV/NON-SELF DESTRUCTIVE BEHAVIOR  Goal: Remains free of harm/injury (restraint for non violent/non self-detsructive behavior)  Description: INTERVENTIONS:  - Instruct patient/family regarding restraint use   - Assess and monitor physiologic and psychological status   - Provide interventions and comfort measures to meet assessed patient needs   - Identify and implement measures to help patient regain control  - Assess readiness for release of restraint   Outcome: Progressing  Goal: Returns to optimal restraint-free functioning  Description: INTERVENTIONS:  - Assess the patient's behavior and symptoms that indicate continued need for restraint  - Identify and implement measures to help patient regain control  - Assess readiness for release of restraint   Outcome: Progressing     Problem: PAIN - ADULT  Goal: Verbalizes/displays adequate comfort level or baseline comfort level  Description: Interventions:  - Encourage patient to monitor pain and request assistance  - Assess pain using appropriate pain scale  - Administer analgesics based on type and severity of pain and evaluate response  - Implement non-pharmacological measures as appropriate and evaluate response  - Consider cultural and social influences on pain and pain management  - Notify physician/advanced practitioner if interventions unsuccessful or patient reports new pain  Outcome: Progressing     Problem: INFECTION - ADULT  Goal: Absence or prevention of progression during hospitalization  Description: INTERVENTIONS:  - Assess and monitor for signs and symptoms of infection  - Monitor lab/diagnostic results  - Monitor all insertion sites, i e  indwelling lines, tubes, and drains  - Monitor endotracheal if appropriate and nasal secretions for changes in amount and color  - [place] appropriate cooling/warming therapies per order  - Administer medications as ordered  - Instruct and encourage patient and family to use good hand hygiene technique  - Identify and instruct in appropriate isolation precautions for identified infection/condition  Outcome: Progressing  Goal: Absence of fever/infection during neutropenic period  Description: INTERVENTIONS:  - Monitor WBC    Outcome: Progressing     Problem: DISCHARGE PLANNING  Goal: Discharge to home or other facility with appropriate resources  Description: INTERVENTIONS:  - Identify barriers to discharge w/patient and caregiver  - Arrange for needed discharge resources and transportation as appropriate  - Identify discharge learning needs (meds, wound care, etc )  - Arrange for interpretive services to assist at discharge as needed  - Refer to Case Management Department for coordinating discharge planning if the patient needs post-hospital services based on physician/advanced practitioner order or complex needs related to functional status, cognitive ability, or social support system  Outcome: Progressing     Problem: Knowledge Deficit  Goal: Patient/family/caregiver demonstrates understanding of disease process, treatment plan, medications, and discharge instructions  Description: Complete learning assessment and assess knowledge base    Interventions:  - Provide teaching at level of understanding  - Provide teaching via preferred learning methods  Outcome: Progressing     Problem: CARDIOVASCULAR - ADULT  Goal: Maintains optimal cardiac output and hemodynamic stability  Description: INTERVENTIONS:  - Monitor I/O, vital signs and rhythm  - Monitor for S/S and trends of decreased cardiac output  - Administer and titrate ordered vasoactive medications to optimize hemodynamic stability  - Assess quality of pulses, skin color and temperature  - Assess for signs of decreased coronary artery perfusion  - Instruct patient to report change in severity of symptoms  Outcome: Progressing  Goal: Absence of cardiac dysrhythmias or at baseline rhythm  Description: INTERVENTIONS:  - Continuous cardiac monitoring, vital signs, obtain 12 lead EKG if ordered  - Administer antiarrhythmic and heart rate control medications as ordered  - Monitor electrolytes and administer replacement therapy as ordered  Outcome: Progressing     Problem: Prexisting or High Potential for Compromised Skin Integrity  Goal: Skin integrity is maintained or improved  Description: INTERVENTIONS:  - Identify patients at risk for skin breakdown  - Assess and monitor skin integrity  - Assess and monitor nutrition and hydration status  - Monitor labs   - Assess for incontinence   - Turn and reposition patient  - Assist with mobility/ambulation  - Relieve pressure over bony prominences  - Avoid friction and shearing  - Provide appropriate hygiene as needed including keeping skin clean and dry  - Evaluate need for skin moisturizer/barrier cream  - Collaborate with interdisciplinary team   - Patient/family teaching  - Consider wound care consult   Outcome: Progressing

## 2022-09-23 NOTE — OCCUPATIONAL THERAPY NOTE
Occupational Therapy         Patient Name: Sherine Galindo  NRPQD'N Date: 9/23/2022 09/23/22 1039   OT Last Visit   OT Visit Date 09/23/22   Note Type   Note type Cancelled Session   Cancel Reasons Refusal     OT orders received  Chart reviewed  Pt reports he is very tired and does not want to complete therapy despite encouragement  OT will cont to follow        TAE Reddy/LELO

## 2022-09-23 NOTE — PROGRESS NOTES
Daily Progress Note - Critical Care   Karen Manuel 65 y o  male MRN: [id number]  Unit/Bed#: MICU 13 Encounter: [id number]        ----------------------------------------------------------------------------------------  HPI: Ana bauman 65 y  o  male w/ PMHx of CAD s/p PCI in 2000, recent choledocholithiasis s/p ERCP on 7/14 from remnant GB, AAA s/p open Hqjxf-jj-sksli grafting in 2009, HTN, HLD  Prior surgical Hx includes open aortic repair, open cholecystectomy and ventral hernia repair with mesh in place  Pt presented to the ED w 3 days of worsening abdominal pain/N/V where w/u revealed a LA of 6 5, CT findings c/w SBO with fecalization of SB  SICU for post-op management due to high risk of needing multiple surgeries      9/20 Ex lap, SBR with mid jejunal anastomosis, duodenal resection/anastomosis, abthera, remnant cholecystectomy  9/21 abdominal washout, drain placement closure with bridging phasix mesh     24hr events: Extubated yesterday to HFNC, currently maintained on 80%/50L  Weaned off norepinephrine yesterday evening  Episode of new onset A-fib RVR overnight (HR 140s)  Given 5mg IV lopressor x1 with mild improvement, however slight hypotension following  Recurrent rates in 140s, given amiodarone 150mg x2 with conversion to NSR in 80s-90s  PICC line placed for TPN  Drains:  Anterior/lateral RLQ: 15cc/8hrs and 110cc/24hrs  LLQ: 100cc/8hrs and 690cc/24hrs  NGT: 25cc/8hrs and 25cc/24hrs    ---------------------------------------------------------------------------------------  SUBJECTIVE  Report some positional vertigo/dizziness this AM (normally on meclizine)  Denies and NVD  Reports abdominal pain with coughing or palpation, otherwise no pain at rest  Using PCA  Denies CP, palpitations, SOB      Review of Systems  Review of systems was reviewed and negative unless stated above in HPI/24-hour events ---------------------------------------------------------------------------------------  Assessment and Plan:    Neuro:   · Diagnosis: PAD  ? Plan:   ? Delirium precautions, CAM ICU per protocol  ? Pain mgmt:  ? Dilaudid PCA  ? No continuous rate  ? Dose: 0 2mg  ? Lock-out: 5 min  ? 1hr limit: 2 2mg     CV:   · Diagnosis: Shock-distributive/hypovolemic (resolved), new onset A-fib, HFpEF, HLD, HTN  ? Plan:   ? Now maintained off vasopressors  ? 9/21 TTE: LVEF 65%, G1DD, mild TR, IVC dilated with absent respirophasic changes  ? Holding home lopressor 50mg daily, lisinopril, lipitor  ? Consider scheduling IV lopressor RTC (5mg q6hr)  ? Optimize electrolytes as below  ? Given clearance of resuscitative endpoints, TTE findings above, and significant positive fluid balance (2 7L/24hrs, 13 3L/admission) with hypoxia, would trial diuresis today (40mg IV x1 as patient is not lasix naive)        Pulm:  · Diagnosis: AHRF  ? Plan:   ? Liberated from mechanical ventilation on 9/22  ? Currently requiring HFNC 80%/50L  ? Wean as able to maintain SpO2 >90%  ? Aggressive pulmonary hygiene with IS/OPEP, OOBTC, PT/OT  ? Consideration for diuresis as above     GI:   · Diagnosis: SBO 2/2 adhesions now s/p ex-lap w/ SBR w/ extensive MARK, anastomsoes od duo/duo and jejunojejunal, and drain palcement w/ closure   ? Plan:   ? 90 cm of bowel resected, J-J anastomosis, primary Duodenal anastomosis, cholecystectomy, removal of prior ventral hernia repair mesh  ? Maintain surgical drains, record output volume and character  ? NPO/NGT  ? Trend I/Os strictly  ? NPO, TPN initiated      :   · Diagnosis: JOVANNI, Lactic acidosis (resolved)  ? Plan:   ? Baseline appears to be 0 78-1 0  ? Trend Cr, currently returned to baseline  ? Consideration for diuresis as above     F/E/N:   · Plan:   ? F: none  ? E: Replace  PRN K>4 0, Phos>3 0, Mg>2 0  ? N: TPN        Heme/Onc:   · Diagnosis: Leukocytosis, ABLA  ?  Plan:   ? S/p 1u PRBC yesterday with good response Hb 9 3 this AM     Endo:   · Diagnosis: No acute issues  ? Plan:   ? Goal -180     ID:   · Diagnosis: SB perforation  ? Plan:   ? Zosyn D2 (total abx D4)  ? Trend fever/WBC curve     MSK/Skin:   · Diagnosis: No acute issues  ? Plan:   ? Early mobility as able, OOBTC, PT/OT     D/c arterial line, D/c CVC    Patient appropriate for transfer out of the ICU today?: No  Disposition: Continue Critical Care   Code Status: Level 1 - Full Code  ---------------------------------------------------------------------------------------  ICU CORE MEASURES    Prophylaxis   VTE Pharmacologic Prophylaxis: Heparin  VTE Mechanical Prophylaxis: sequential compression device  Stress Ulcer Prophylaxis: Pantoprazole IV       Invasive Devices Review  Invasive Devices  Report    Peripherally Inserted Central Catheter Line  Duration           PICC Line 77/58/90 Right Basilic <1 day          Central Venous Catheter Line  Duration           CVC Central Lines 22 2 days          Arterial Line  Duration           Arterial Line 22 Right Radial 2 days          Drain  Duration           NG/OG/Enteral Tube Nasogastric 18 Fr Left nare 2 days    Urethral Catheter Latex 16 Fr  2 days    Closed/Suction Drain Anterior;Right;Lateral RLQ Bulb 19 Fr  1 day    Closed/Suction Drain Left LLQ Bulb 19 Fr  1 day              Can any invasive devices be discontinued today? D/c arterial line and CVC  ---------------------------------------------------------------------------------------  OBJECTIVE    Vitals   Vitals:    22 0500 22 0545 22 0600 22 0700   BP:       BP Location:       Pulse: (!) 110  100 96   Resp: 18  (!) 24 17   Temp:       TempSrc:       SpO2: 96%  97% 97%   Weight:  72 6 kg (160 lb 0 9 oz)     Height:         Temp (24hrs), Av °F (37 2 °C), Min:97 8 °F (36 6 °C), Max:101 4 °F (38 6 °C)  Current: Temperature: 98 4 °F (36 9 °C)    Respiratory:  SpO2: SpO2: 97 %, SpO2 Activity: SpO2 Activity:  At Rest, SpO2 Device: O2 Device: High flow nasal cannula  Nasal Cannula O2 Flow Rate (L/min): 50 L/min    Invasive/non-invasive ventilation settings   Respiratory  Report   Lab Data (Last 4 hours)      09/23 0400            pH, Arterial       7 401             pCO2, Arterial       36 2             pO2, Arterial       54 3             HCO3, Arterial       22 0             Base Excess, Arterial       -2 4                  O2/Vent Data       09/23 0247   Most Recent        Non-Invasive Ventilation Mode HFNC (High flow)  HFNC (High flow)                  Physical Exam  Vitals and nursing note reviewed  Constitutional:       General: He is not in acute distress  Appearance: He is well-developed  HENT:      Head: Normocephalic and atraumatic  Eyes:      Conjunctiva/sclera: Conjunctivae normal    Cardiovascular:      Rate and Rhythm: Regular rhythm  Tachycardia present  Heart sounds: No murmur heard  No friction rub  Pulmonary:      Effort: Pulmonary effort is normal  No respiratory distress  Breath sounds: Normal breath sounds  No wheezing, rhonchi or rales  Abdominal:      General: There is distension  Palpations: Abdomen is soft  Tenderness: There is abdominal tenderness (diffuse non-specific)  There is no guarding  Comments: Abdominal dressing in place, C/D/I  Musculoskeletal:      Cervical back: Neck supple  Right lower leg: Edema (trace) present  Left lower leg: Edema (trace) present  Skin:     General: Skin is warm and dry  Neurological:      General: No focal deficit present  Mental Status: He is alert and oriented to person, place, and time               Laboratory and Diagnostics:  Results from last 7 days   Lab Units 09/23/22  0410 09/22/22  1154 09/22/22  0723 09/21/22  1818 09/21/22  0451 09/20/22  2301 09/20/22  1943 09/20/22  1814 09/20/22  1358 09/20/22  0230   WBC Thousand/uL 6 90  --  3 57* 8 30 8 74 4 50 3 25*  --   --  18 08*   HEMOGLOBIN g/dL 9  3* 9 1* 7 2* 8 3* 9 0* 9 5* 8 3*  --   --  18 0*   I STAT HEMOGLOBIN g/dl  --   --   --   --   --   --   --  7 1*   < >  --    HEMATOCRIT % 29 2*  --  22 8* 24 9* 27 5* 29 3* 25 8*  --   --  56 7*   HEMATOCRIT, ISTAT %  --   --   --   --   --   --   --  21*   < >  --    PLATELETS Thousands/uL 109*  --  75* 103* 128* 119* 141*  --   --  328   NEUTROS PCT %  --   --   --   --   --   --   --   --   --  89*   BANDS PCT %  --   --   --   --   --   --  19*  --   --   --    MONOS PCT %  --   --   --   --   --   --   --   --   --  6   MONO PCT % 3*  --   --   --   --   --  5  --   --   --     < > = values in this interval not displayed  Results from last 7 days   Lab Units 09/23/22  0000 09/22/22  1802 09/22/22  1154 09/22/22  0420 09/22/22  0015 09/21/22  1800 09/21/22  1205 09/21/22  0451 09/20/22  1358 09/20/22  0232   SODIUM mmol/L 144 146 147 145 146 147 147 150*   < > 138   POTASSIUM mmol/L 4 1 3 9 4 0 4 2 3 9 4 2 4 3 3 3*   < > 4 0   CHLORIDE mmol/L 118* 116* 117* 116* 115* 119* 116* 115*   < > 104   CO2 mmol/L 22 21 21 23 25 24 25 26   < > 21   CO2, I-STAT   --   --   --   --   --   --   --   --    < >  --    ANION GAP mmol/L 4 9 9 6 6 4 6 9   < > 13   BUN mg/dL 17 17 17 17 16 16 19 23   < > 22   CREATININE mg/dL 0 74 0 73 0 75 0 75 0 72 0 76 0 96 1 15   < > 1 89*   CALCIUM mg/dL 7 4* 7 4* 7 6* 7 4* 7 4* 7 1* 7 7* 8 4   < > 10 5*   GLUCOSE RANDOM mg/dL 125 73 68 67 77 85 88 95   < > 134   ALT U/L 25  --   --   --   --   --   --  37  --  40   AST U/L 27  --   --   --   --   --   --  39  --  25   ALK PHOS U/L 42*  --   --   --   --   --   --  25*  --  158*   ALBUMIN g/dL 2 4*  --   --   --   --   --   --  3 4*  --  4 2   TOTAL BILIRUBIN mg/dL 4 73*  --   --   --   --   --   --  2 39*  --  1 25*    < > = values in this interval not displayed       Results from last 7 days   Lab Units 09/23/22  0000 09/22/22  0527 09/21/22  1800 09/21/22  0451 09/20/22  2303   MAGNESIUM mg/dL 2 5 2 5 2 5 1 9 1 8   PHOSPHORUS mg/dL 1  1* 1 8* 3 0 2 2* 1 7*               Results from last 7 days   Lab Units 09/22/22  1802 09/22/22  1154 09/22/22  0420 09/22/22  0015 09/21/22  1800 09/21/22  1205 09/21/22  0632   LACTIC ACID mmol/L 1 7 1 2 1 1 0 8 2 2* 2 1* 3 4*     ABG:  Results from last 7 days   Lab Units 09/23/22  0400   PH ART  7 401   PCO2 ART mm Hg 36 2   PO2 ART mm Hg 54 3*   HCO3 ART mmol/L 22 0   BASE EXC ART mmol/L -2 4   ABG SOURCE  Line, Arterial     VBG:  Results from last 7 days   Lab Units 09/23/22  0400 09/21/22  1206 09/21/22  0712   PH JOSE   --   --  7 411*   PCO2 JOSE mm Hg  --   --  38 6*   PO2 JOSE mm Hg  --   --  38 8   HCO3 JOSE mmol/L  --   --  24 0   BASE EXC JOSE mmol/L  --   --  -0 5   ABG SOURCE  Line, Arterial   < >  --     < > = values in this interval not displayed  Micro  Results from last 7 days   Lab Units 09/20/22  0232 09/20/22  0230   BLOOD CULTURE  No Growth at 72 hrs  No Growth at 72 hrs  Imaging: No new  I have personally reviewed pertinent reports  and I have personally reviewed pertinent films in PACS    Intake and Output  I/O       09/21 0701  09/22 0700 09/22 0701  09/23 0700    I V  (mL/kg) 5725 5 (80 1) 3363 (46 3)    Blood  400    NG/GT 0     IV Piggyback 1950 969 6    TPN  86 7    Total Intake(mL/kg) 7675 5 (107 3) 4819 3 (66 4)    Urine (mL/kg/hr) 1695 (1) 1225 (0 7)    Emesis/NG output 340 25    Drains 1145 800    Blood 200     Total Output 3380 2050    Net +4295 5 +2769 3              UOP: 50 ml/hr     Height and Weights   Height: 5' 8" (172 7 cm)  IBW (Ideal Body Weight): 68 4 kg  Body mass index is 24 34 kg/m²    Weight (last 2 days)     Date/Time Weight    09/23/22 0545 72 6 (160 05)    09/22/22 0540 71 5 (157 63)    09/21/22 0715 63 (139)            Nutrition       Diet Orders   (From admission, onward)             Start     Ordered    09/20/22 1751  Diet NPO  Diet effective now        References:    Nutrtion Support Algorithm Enteral vs  Parenteral   Question Answer Comment Diet Type NPO    RD to adjust diet per protocol? No        09/20/22 6482                Active Medications  Scheduled Meds:  Current Facility-Administered Medications   Medication Dose Route Frequency Provider Last Rate   • Adult TPN (STANDARD BASE/STANDARD ELECTROLYTE)   Intravenous Continuous TPN Willy CONSTANCE De La Rosa 41 6 mL/hr at 09/22/22 2155   • calcium gluconate  1 g Intravenous Once Olvin Sorenson PA-C 1 g (09/23/22 0735)   • chlorhexidine  15 mL Mouth/Throat Q12H [address] Burroughs MD     • heparin (porcine)  5,000 Units Subcutaneous [facility] Paco Wright MD     • HYDROmorphone   Intravenous Continuous Nadene Marten Palladino, DO     • meclizine  25 mg Oral TID PRN Paco Wright MD     • naloxone  0 04 mg Intravenous Q1MIN PRN Paco Wright MD     • ondansetron  4 mg Intravenous Q4H PRN Paco Wright MD     • pantoprazole  40 mg Intravenous Q12H [address] Burroughs MD     • phenol  1 spray Mouth/Throat Q2H PRN Nadene Marten Palladino, DO     • piperacillin-tazobactam  3 375 g Intravenous [facility] Farida Menjivar PA-C Stopped (09/23/22 0420)   • sodium phosphate  30 mmol Intravenous Once Olvin Sorenson PA-C       Continuous Infusions:  Adult TPN (STANDARD BASE/STANDARD ELECTROLYTE), , Last Rate: 41 6 mL/hr at 09/22/22 2155  HYDROmorphone,       PRN Meds:   meclizine, 25 mg, TID PRN  naloxone, 0 04 mg, Q1MIN PRN  ondansetron, 4 mg, Q4H PRN  phenol, 1 spray, Q2H PRN        Allergies   No Known Allergies  ---------------------------------------------------------------------------------------  Advance Directive and Living Will:      Power of :    POLST:    ---------------------------------------------------------------------------------------  Care Time Delivered:   No Critical Care time spent     Olvin Sorenson PA-C      Portions of the record may have been created with voice recognition software    Occasional wrong word or "sound a like" substitutions may have occurred due to the inherent limitations of voice recognition software    Read the chart carefully and recognize, using context, where substitutions have occurred

## 2022-09-23 NOTE — PROGRESS NOTES
Progress Note - General Surgery   Leighton [place] 65 y o  male MRN: [id number]  Unit/Bed#: MICU 13 Encounter: [id number]    Assessment:  65 y o  M with prior history of a subtotal cholecystectomy and prior aorto bi-iliac bypass (2009), who presented with a closed loop small bowel obstruction, s/p ex lap, SBR (jejunum) with primary anastomosis, duodenectomy with duodenoduodenostomy (hand sewn), extensive MARK (>3 hrs), explantation of mesh, completion cholecystectomy, and abthera placement 9/20  Now s/p ex lap, washout, drain placement adjacent to duodenal anastomosis, attempted keofed placement, bridging Phasix mesh placement, abdominal closure 9/21    T max 101 4@ 0800 yesterday  Intermittent tachycardia  BP stable off pressors  On HFNC 50L 85%     UO: 1 2 L  NGT: 25 cc bilious   R abdominal drain: 110 cc serous drainage  LLQ abdominal drain: 690 cc serosanguinous drainage     Plan:  Wean supplemental oxygen as able  Aggressive pulmonary toilet   NPO/NGT, NGT to low continuous suction - keep until POD 5, at which time we will perform an upper GI to evaluate duodenoduodenostomy   Continue PICC/TPN   Maintain DILLON drains, monitor volume and character of output   PRN analgesia - PCA-D, consider APS consult given current complaints this morning  Continue IV abx - Zosyn   Continue Protonix   Trend T bili: 4 37 today from 2 39  DVT ppx   Supportive care per ICU      Subjective/Objective      Subjective: patient was extubated to nasal canula yesterday  He is now on HFNC 50 L 85%  He complains of abdominal pain  States he passed small amount of flatus last night  No BMs yet  Objective:     Blood pressure 114/55, pulse (!) 110, temperature 98 4 °F (36 9 °C), resp  rate 18, height 5' 8" (1 727 m), weight 72 6 kg (160 lb 0 9 oz), SpO2 96 %  ,Body mass index is 24 34 kg/m²        Intake/Output Summary (Last 24 hours) at 9/23/2022 0601  Last data filed at 9/23/2022 0420  Gross per 24 hour   Intake 4919 29 ml   Output 2050 ml Net 2869 29 ml       Invasive Devices  Report    Peripherally Inserted Central Catheter Line  Duration           PICC Line 18/18/09 Right Basilic <1 day          Central Venous Catheter Line  Duration           CVC Central Lines 09/20/22 2 days          Arterial Line  Duration           Arterial Line 09/20/22 Right Radial 2 days          Drain  Duration           NG/OG/Enteral Tube Nasogastric 18 Fr Left nare 2 days    Urethral Catheter Latex 16 Fr  2 days    Closed/Suction Drain Anterior;Right;Lateral RLQ Bulb 19 Fr  1 day    Closed/Suction Drain Left LLQ Bulb 19 Fr  1 day                Physical Exam:   NAD, alert and oriented x3  Normocephalic, atraumatic  NGT in place with bilious drainage   Norm resp effort on HFNC  Regular rate  Abd soft, distended, appropriately tender, incision c/d/i  RLQ DILLON drain with serous drainage  LLQ DILLON drain with serosanguinous drainage   Moscoso in place with clear yellow urine   No calf tenderness or peripheral edema  CN grossly intact   Skin is warm and dry        Lab, Imaging and other studies:  CBC:   Lab Results   Component Value Date    WBC 6 90 09/23/2022    HGB 9 3 (L) 09/23/2022    HCT 29 2 (L) 09/23/2022    MCV 92 09/23/2022     (L) 09/23/2022    MCH 29 4 09/23/2022    MCHC 31 8 09/23/2022    RDW 16 7 (H) 09/23/2022    MPV 10 3 09/23/2022   , CMP:   Lab Results   Component Value Date    SODIUM 144 09/23/2022    K 4 1 09/23/2022     (H) 09/23/2022    CO2 22 09/23/2022    BUN 17 09/23/2022    CREATININE 0 74 09/23/2022    CALCIUM 7 4 (L) 09/23/2022    AST 27 09/23/2022    ALT 25 09/23/2022    ALKPHOS 42 (L) 09/23/2022    EGFR 88 09/23/2022     VTE Pharmacologic Prophylaxis: Heparin  VTE Mechanical Prophylaxis: sequential compression device

## 2022-09-23 NOTE — PHYSICAL THERAPY NOTE
Physical Therapy Cancellation Note    PT orders received chart review completed  PT attempted to see pt for evaluation, pt refusing at this time  Pt reports he did not sleep and would like to rest for a while, PT will check back as able  PT will follow and eval as medically appropriate      Crystal Scales, PT

## 2022-09-23 NOTE — NUTRITION
09/23/22 5559   Biochemical Data,Medical Tests, and Procedures   Biochemical Data/Medical Tests/Procedures Lab values reviewed; Meds reviewed   Recommendations/Interventions   Summary Chart reviewed to monitor labs/lytes in pt started on TPN 9/22  Low phos at 1 1 mg/dL noted, being replaced  Concerns for refeeding syndrome- recommend starting thiamine supplementation  Do not recommend advancing TPN regimen until electrolytes are WNL  Recommendations to Provider 1  Continue with standard TPN for 9/23 TPN order  2  start 100mg thiamine IV TID  3  On 9/24, if K+, Mg and phos are WNL TPN recs for advancement are: 15%amino acids in 633 mL, 20% dextrose in 867 mL, 20% lipid in 275 mL for a total of 1814 kcal and 95g protein   If total bili is >/=5 mg/dL- omit manganese and copper from TPN due to increased risk for toxicity

## 2022-09-23 NOTE — PLAN OF CARE
Problem: Potential for Falls  Goal: Patient will remain free of falls  Description: INTERVENTIONS:  - Educate patient/family on patient safety including physical limitations  - Instruct patient to call for assistance with activity   - Consult OT/PT to assist with strengthening/mobility   - Keep Call bell within reach  - Keep bed low and locked with side rails adjusted as appropriate  - Keep care items and personal belongings within reach  - Initiate and maintain comfort rounds  - Make Fall Risk Sign visible to staff  - Offer Toileting every n/a Hours, in advance of need  - Initiate/Maintain bed alarm  - Obtain necessary fall risk management equipment: bed alarm  - Apply yellow socks and bracelet for high fall risk patients  - Consider moving patient to room near nurses station  Outcome: Progressing     Problem: MOBILITY - ADULT  Goal: Maintain or return to baseline ADL function  Description: INTERVENTIONS:  -  Assess patient's ability to carry out ADLs; assess patient's baseline for ADL function and identify physical deficits which impact ability to perform ADLs (bathing, care of mouth/teeth, toileting, grooming, dressing, etc )  - Assess/evaluate cause of self-care deficits   - Assess range of motion  - Assess patient's mobility; develop plan if impaired  - Assess patient's need for assistive devices and provide as appropriate  - Encourage maximum independence but intervene and supervise when necessary  - Involve family in performance of ADLs  - Assess for home care needs following discharge   - Consider OT consult to assist with ADL evaluation and planning for discharge  - Provide patient education as appropriate  Outcome: Progressing  Goal: Maintains/Returns to pre admission functional level  Description: INTERVENTIONS:  - Perform BMAT or MOVE assessment daily    - Set and communicate daily mobility goal to care team and patient/family/caregiver     - Collaborate with rehabilitation services on mobility goals if consulted  - Perform Range of Motion 3 times a day  - Reposition patient every 2 hours    - Dangle patient 0 times a day  - Stand patient 0 times a day  - Ambulate patient 0 times a day  - Out of bed to chair 0 times a day   - Out of bed for meals 0 times a day  - Out of bed for toileting  - Record patient progress and toleration of activity level   Outcome: Progressing     Problem: SAFETY,RESTRAINT: NV/NON-SELF DESTRUCTIVE BEHAVIOR  Goal: Remains free of harm/injury (restraint for non violent/non self-detsructive behavior)  Description: INTERVENTIONS:  - Instruct patient/family regarding restraint use   - Assess and monitor physiologic and psychological status   - Provide interventions and comfort measures to meet assessed patient needs   - Identify and implement measures to help patient regain control  - Assess readiness for release of restraint   Outcome: Progressing  Goal: Returns to optimal restraint-free functioning  Description: INTERVENTIONS:  - Assess the patient's behavior and symptoms that indicate continued need for restraint  - Identify and implement measures to help patient regain control  - Assess readiness for release of restraint   Outcome: Progressing

## 2022-09-24 NOTE — PROGRESS NOTES
Progress Note - General Surgery   Marisela Alexander 65 y o  male MRN: [id number]  Unit/Bed#: [facility] 13 Encounter: [id number]    Assessment:  65 y o  M with prior history of a subtotal cholecystectomy and prior aorto bi-iliac bypass (2009), who presented with a closed loop small bowel obstruction, s/p ex lap, SBR (jejunum) with primary anastomosis, duodenectomy with duodenoduodenostomy (hand sewn), extensive MARK (>3 hrs), explantation of mesh, completion cholecystectomy, and abthera placement 9/20  Now s/p ex lap, washout, drain placement adjacent to duodenal anastomosis, attempted keofed placement, bridging Phasix mesh placement, abdominal closure 9/21    Off pressors, T max 100 4 @1615, on 8L mid flow    UO: 3 L  NGT: 290 cc bilious   R abdominal drain: 40cc serous drainage  LLQ abdominal drain: 390 cc serosanguinous drainage     WBC 8 99 (6 9)  Hgb 9 1 (9 3)  Bilirbuin 4 85 (4 61)    Plan:  -Wean supplemental oxygen as able  -Aggressive pulmonary toilet   -Consider additional diuresis  -Consider MRCP to evaluate persistent hyperbilirubinemia to evaluate CBD  -Continue NPO/NGT keeping until POD 5 then will get UGI  -Continue PICC/TPN   -Maintain DILLON drains, monitor volume and character of output   -Continue complaints this morning    -Continue IV abx  -Continue Protonix   -Remainder of care per ICU      Subjective/Objective      Subjective: No acute events overnight, complaining of pain in the back of his throat  Denies any nausea or vomiting  No flatus or bowel movements  Denies fevers/chills  Reports his breathing is subjectively better  Objective:     Blood pressure 117/55, pulse 96, temperature 98 5 °F (36 9 °C), temperature source Oral, resp  rate 20, height 5' 8" (1 727 m), weight 72 6 kg (160 lb 0 9 oz), SpO2 91 %  ,Body mass index is 24 34 kg/m²        Intake/Output Summary (Last 24 hours) at 9/24/2022 0733  Last data filed at 9/24/2022 0600  Gross per 24 hour   Intake 1668 4 ml   Output 3740 ml   Net -2071 6 ml       Invasive Devices  Report    Peripherally Inserted Central Catheter Line  Duration           PICC Line 44/72/33 Right Basilic 1 day          Peripheral Intravenous Line  Duration           Peripheral IV 09/23/22 Right;Ventral (anterior) Forearm <1 day          Drain  Duration           NG/OG/Enteral Tube Nasogastric 18 Fr Left nare 3 days    Urethral Catheter Latex 16 Fr  3 days    Closed/Suction Drain Anterior;Right;Lateral RLQ Bulb 19 Fr  2 days    Closed/Suction Drain Left LLQ Bulb 19 Fr  2 days                Physical Exam:   General: NAD  HENT: NCAT MMM  NG in place bilious output  Neck: supple, no JVD  CV: nl rate  Lungs: nl wob  No resp distress   On 8L midflow  ABD: Soft, tender at midline incision, abd distended, tympanic, incision c/d/i  DILLON x2 with SS output  Neuro: AAOx3          Lab, Imaging and other studies:  CBC:   Lab Results   Component Value Date    WBC 8 99 09/24/2022    HGB 9 1 (L) 09/24/2022    HCT 28 4 (L) 09/24/2022    MCV 92 09/24/2022     (L) 09/24/2022    MCH 29 5 09/24/2022    MCHC 32 0 09/24/2022    RDW 16 5 (H) 09/24/2022    MPV 9 4 09/24/2022    NRBC 0 09/24/2022   , CMP:   Lab Results   Component Value Date    SODIUM 145 09/24/2022    K 3 5 09/24/2022     (H) 09/24/2022    CO2 25 09/24/2022    BUN 13 09/24/2022    CREATININE 0 79 09/24/2022    CALCIUM 7 7 (L) 09/24/2022    AST 37 09/24/2022    ALT 30 09/24/2022    ALKPHOS 65 09/24/2022    EGFR 86 09/24/2022     VTE Pharmacologic Prophylaxis: Heparin  VTE Mechanical Prophylaxis: sequential compression device

## 2022-09-24 NOTE — PROGRESS NOTES
Daily Progress Note - Critical Care   Casey Coleman 65 y o  male MRN: [id number]  Unit/Bed#: [facility] 13 Encounter: [id number]        ----------------------------------------------------------------------------------------  HPI/24hr events:  Jennifer Rubio a 65 y  o  male w/ PMHx of CAD s/p PCI in 2000, recent choledocholithiasis s/p ERCP on 7/14 from remnant GB, AAA s/p open Hdqwu-ic-okelx grafting in 2009, HTN, HLD  Prior surgical Hx includes open aortic repair, open cholecystectomy and ventral hernia repair with mesh in place       Pt presented to the ED w 3 days of worsening abdominal pain/N/V where w/u revealed a LA of 6 5, CT findings c/w SBO with fecalization of SB  SICU for post-op management due to high risk of needing multiple surgeries      9/20 Ex lap, SBR with mid jejunal anastomosis, duodenal resection/anastomosis, abthera, remnant cholecystectomy  9/21 abdominal washout, drain placement closure with bridging phasix mesh      24hr events: Receiving TPN and abx, persistent hyperbilirubinemia, no return of bowel function as of yet     Drains:  Anterior/lateral RLQ: 40cc SS in 24h  LLQ: 390cc serous in 24h  NGT: 290cc bilious in 24h  UOP 3  02L    ---------------------------------------------------------------------------------------  SUBJECTIVE  Pain well-controlled, no bowel function as of yet    Review of Systems   Constitutional: Negative for chills and fever  Gastrointestinal: Positive for abdominal pain  Negative for diarrhea, nausea and vomiting  Musculoskeletal: Positive for back pain  Negative for neck stiffness  All other systems reviewed and are negative  Review of systems was reviewed and negative unless stated above in HPI/24-hour events   ---------------------------------------------------------------------------------------  Assessment and Plan:    Neuro:   · Diagnosis: PAD  ?  Plan:   § Delirium precautions, CAM ICU per protocol  § Pain mgmt:  § Dilaudid PCA  § No continuous rate  § Dose: 0 2mg  § Lock-out: 5 min  § 1hr limit: 2 2mg     CV:   · Diagnosis: Shock-distributive/hypovolemic (resolved), new onset A-fib, HFpEF, HLD, HTN  ? Plan:   § Now maintained off vasopressors  § 9/21 TTE: LVEF 65%, G1DD, mild TR, IVC dilated with absent respirophasic changes  § Holding home lopressor 50mg daily, lisinopril, lipitor  § Consider scheduling IV lopressor RTC (5mg q6hr)  § Optimize electrolytes as below  § Given clearance of resuscitative endpoints, TTE findings above, and significant positive fluid balance (2 7L/24hrs, 13 3L/admission) with hypoxia, would trial diuresis as able (40mg IV x1 as patient is not lasix naive)        Pulm:  · Diagnosis: AHRF  ? Plan:   § Liberated from mechanical ventilation on 9/22  § Currently requiring 5L mid flow nc   § Wean as able to maintain SpO2 >90%  § Aggressive pulmonary hygiene with IS/OPEP, OOBTC, PT/OT  § Consideration for diuresis as above     GI:   · Diagnosis: SBO 2/2 adhesions now s/p ex-lap w/ SBR w/ extensive MARK, anastomsoes od duo/duo and jejunojejunal, and drain palcement w/ closure   ? Plan:   § 90 cm of bowel resected, J-J anastomosis, primary Duodenal anastomosis, cholecystectomy, removal of prior ventral hernia repair mesh  § Maintain surgical drains, record output volume and character  § NPO/NGT  § Trend I/Os strictly  § NPO, TPN initiated   • Diagnosis: Hyperbilirubinemia   o Plan: consider MRCP for r/o of biliary ductal injury given recent operation  o Trend serum bilirubin     :   · Diagnosis: JOVANNI, Lactic acidosis (resolved)  ? Plan:   § Baseline appears to be 0 78-1 0  § Trend Cr, currently returned to baseline  § Consideration for diuresis as above     F/E/N:   · Plan:   ? F: none  ? E: Replace  PRN K>4 0, Phos>3 0, Mg>2 0  ? N: TPN        Heme/Onc:   · Diagnosis: Leukocytosis, ABLA  ? Plan:   § S/p 1u PRBC yesterday with good response Hb 9 3 this AM     Endo:   · Diagnosis: No acute issues  ?  Plan:   § Goal BG 140-180     ID:   · Diagnosis: SB perforation  ? Plan:   § Zosyn D3 (total abx D5)  § Trend fever/WBC curve     MSK/Skin:   · Diagnosis: No acute issues  ? Plan:   § Early mobility as able, OOBTC, PT/OT       Patient appropriate for transfer out of the ICU today?: Patient meets criteria for referral to the ICU Follow-up Clinic; referral has been made  Disposition: Transfer to Stepdown Level 2  Code Status: Level 1 - Full Code  ---------------------------------------------------------------------------------------  ICU CORE MEASURES    Prophylaxis   VTE Pharmacologic Prophylaxis: Heparin  VTE Mechanical Prophylaxis: sequential compression device  Stress Ulcer Prophylaxis: Pantoprazole IV     ABCDE Protocol (if indicated)  Plan to perform spontaneous awakening trial today? Not applicable  Plan to perform spontaneous breathing trial today? Not applicable  Obvious barriers to extubation? Not applicable  CAM-ICU: Negative    Invasive Devices Review  Invasive Devices  Report    Peripherally Inserted Central Catheter Line  Duration           PICC Line  Right Basilic 1 day          Peripheral Intravenous Line  Duration           Peripheral IV 22 Right;Ventral (anterior) Forearm <1 day          Drain  Duration           NG/OG/Enteral Tube Nasogastric 18 Fr Left nare 3 days    Urethral Catheter Latex 16 Fr  3 days    Closed/Suction Drain Anterior;Right;Lateral RLQ Bulb 19 Fr  2 days    Closed/Suction Drain Left LLQ Bulb 19 Fr  2 days              Can any invasive devices be discontinued today?  Yes  ---------------------------------------------------------------------------------------  OBJECTIVE    Vitals   Vitals:    22 0300 22 0400 22 0500 22 0600   BP: 141/57 (!) 108/48 137/75 117/55   BP Location:  Right arm     Pulse: 100 88 96 96   Resp: (!) 23 20 (!) 23 20   Temp:       TempSrc:       SpO2: 92% 92% 93% 91%   Weight:       Height:         Temp (24hrs), Av 2 °F (37 3 °C), Min:98 5 °F (36 9 °C), Max:100 4 °F (38 °C)  Current: Temperature: 98 5 °F (36 9 °C)  HR: 96  BP: 120/76  RR: 20  SpO2: 94%    Respiratory:  SpO2: SpO2: 91 %  Nasal Cannula O2 Flow Rate (L/min): 8 L/min    Invasive/non-invasive ventilation settings   Respiratory  Report   Lab Data (Last 4 hours)    None         O2/Vent Data (Last 4 hours)    None                Physical Exam  Constitutional:       General: He is not in acute distress  Appearance: He is normal weight  HENT:      Head: Normocephalic and atraumatic  Mouth/Throat:      Mouth: Mucous membranes are moist    Eyes:      Pupils: Pupils are equal, round, and reactive to light  Cardiovascular:      Rate and Rhythm: Normal rate and regular rhythm  Pulmonary:      Effort: Pulmonary effort is normal  No respiratory distress  Abdominal:      General: There is distension  Palpations: Abdomen is soft  Tenderness: There is abdominal tenderness  There is no guarding or rebound  Musculoskeletal:         General: No swelling, tenderness or deformity  Cervical back: No rigidity  Lymphadenopathy:      Cervical: No cervical adenopathy  Skin:     General: Skin is warm and dry  Capillary Refill: Capillary refill takes 2 to 3 seconds  Coloration: Skin is not jaundiced or pale  Neurological:      Mental Status: He is alert and oriented to person, place, and time  Mental status is at baseline     Psychiatric:         Mood and Affect: Mood normal          Behavior: Behavior normal              Laboratory and Diagnostics:  Results from last 7 days   Lab Units 09/24/22  0505 09/23/22  0410 09/22/22  1154 09/22/22  0723 09/21/22  1818 09/21/22  0451 09/20/22  2301 09/20/22  1943 09/20/22  1358 09/20/22  0230   WBC Thousand/uL 8 99 6 90  --  3 57* 8 30 8 74 4 50 3 25*  --  18 08*   HEMOGLOBIN g/dL 9 1* 9 3* 9 1* 7 2* 8 3* 9 0* 9 5* 8 3*  --  18 0*   I STAT HEMOGLOBIN   --   --   --   --   --   --   --   --    < >  --    HEMATOCRIT % 28 4* 29 2*  --  22 8* 24 9* 27 5* 29 3* 25 8*  --  56 7*   HEMATOCRIT, ISTAT   --   --   --   --   --   --   --   --    < >  --    PLATELETS Thousands/uL 124* 109*  --  75* 103* 128* 119* 141*  --  328   NEUTROS PCT % 79*  --   --   --   --   --   --   --   --  89*   BANDS PCT %  --   --   --   --   --   --   --  19*  --   --    MONOS PCT % 8  --   --   --   --   --   --   --   --  6   MONO PCT %  --  3*  --   --   --   --   --  5  --   --     < > = values in this interval not displayed  Results from last 7 days   Lab Units 09/24/22  0505 09/23/22  1457 09/23/22  0734 09/23/22  0000 09/22/22  1802 09/22/22  1154 09/22/22  0420 09/21/22  1205 09/21/22  0451 09/20/22  1358 09/20/22  0232   SODIUM mmol/L 145 145 146 144 146 147 145   < > 150*   < > 138   POTASSIUM mmol/L 3 5 4 0 3 9 4 1 3 9 4 0 4 2   < > 3 3*   < > 4 0   CHLORIDE mmol/L 117* 117* 119* 118* 116* 117* 116*   < > 115*   < > 104   CO2 mmol/L 25 22 23 22 21 21 23   < > 26   < > 21   CO2, I-STAT   --   --   --   --   --   --   --   --   --    < >  --    ANION GAP mmol/L 3* 6 4 4 9 9 6   < > 9   < > 13   BUN mg/dL 13 13 17 17 17 17 17   < > 23   < > 22   CREATININE mg/dL 0 79 0 84 0 76 0 74 0 73 0 75 0 75   < > 1 15   < > 1 89*   CALCIUM mg/dL 7 7* 7 7* 7 8* 7 4* 7 4* 7 6* 7 4*   < > 8 4   < > 10 5*   GLUCOSE RANDOM mg/dL 122 141* 130 125 73 68 67   < > 95   < > 134   ALT U/L 30  --  24 25  --   --   --   --  37  --  40   AST U/L 37  --  31 27  --   --   --   --  39  --  25   ALK PHOS U/L 65  --  45* 42*  --   --   --   --  25*  --  158*   ALBUMIN g/dL 2 2*  --  2 2* 2 4*  --   --   --   --  3 4*  --  4 2   TOTAL BILIRUBIN mg/dL 4 85*  --  4 61* 4 73*  --   --   --   --  2 39*  --  1 25*    < > = values in this interval not displayed       Results from last 7 days   Lab Units 09/24/22  0505 09/23/22  1457 09/23/22  0000 09/22/22  0527 09/21/22  1800 09/21/22  0451 09/20/22  2303   MAGNESIUM mg/dL 2 2 2 4 2 5 2 5 2 5 1 9 1 8   PHOSPHORUS mg/dL 1 9* 1 6* 1  1* 1 8* 3 0 2 2* 1 7*               Results from last 7 days   Lab Units 09/22/22  1802 09/22/22  1154 09/22/22  0420 09/22/22  0015 09/21/22  1800 09/21/22  1205 09/21/22  0632   LACTIC ACID mmol/L 1 7 1 2 1 1 0 8 2 2* 2 1* 3 4*     ABG:  Results from last 7 days   Lab Units 09/23/22  0400   PH ART  7 401   PCO2 ART mm Hg 36 2   PO2 ART mm Hg 54 3*   HCO3 ART mmol/L 22 0   BASE EXC ART mmol/L -2 4   ABG SOURCE  Line, Arterial     VBG:  Results from last 7 days   Lab Units 09/23/22  0400 09/21/22  1206 09/21/22  0712   PH JOSE   --   --  7 411*   PCO2 JOSE mm Hg  --   --  38 6*   PO2 JOSE mm Hg  --   --  38 8   HCO3 JOSE mmol/L  --   --  24 0   BASE EXC JOSE mmol/L  --   --  -0 5   ABG SOURCE  Line, Arterial   < >  --     < > = values in this interval not displayed  Micro  Results from last 7 days   Lab Units 09/20/22  0232 09/20/22  0230   BLOOD CULTURE  No Growth After 4 Days  No Growth After 4 Days  EKG: n/a  Imaging:  No new images available for review    Intake and Output  I/O       09/22 0701 09/23 0700 09/23 0701  09/24 0700 09/24 0701 09/25 0700    I V  (mL/kg) 3363 (46 3) 60 (0 8)     Blood 400      NG/GT       IV Piggyback 969 6 610      3 998 4     Total Intake(mL/kg) 5068 9 (69 8) 1668 4 (23)     Urine (mL/kg/hr) 1350 (0 8) 3020 (1 7)     Emesis/NG output 50 290     Drains 835 430     Blood       Total Output 2235 3740     Net +2833 9 -2071 6                UOP: 60-70 ml/hr     Height and Weights   Height: 5' 8" (172 7 cm)  IBW (Ideal Body Weight): 68 4 kg  Body mass index is 24 34 kg/m²  Weight (last 2 days)     Date/Time Weight    09/23/22 0545 72 6 (160 05)    09/22/22 0540 71 5 (157 63)            Nutrition       Diet Orders   (From admission, onward)             Start     Ordered    09/20/22 1751  Diet NPO  Diet effective now        References:    Nutrtion Support Algorithm Enteral vs  Parenteral   Question Answer Comment   Diet Type NPO    RD to adjust diet per protocol?  No 09/20/22 1752              TPN currently running       Active Medications  Scheduled Meds:  Current Facility-Administered Medications   Medication Dose Route Frequency Provider Last Rate   • Adult TPN (STANDARD BASE/STANDARD ELECTROLYTE)   Intravenous Continuous TPN Nikole Hull DO 41 6 mL/hr at 09/24/22 0022   • chlorhexidine  15 mL Mouth/Throat Q12H [address] Burroughs MD     • heparin (porcine)  5,000 Units Subcutaneous [facility] Danny Hand MD     • HYDROmorphone   Intravenous Continuous Elian Blender Palladino, DO     • meclizine  25 mg Oral TID PRN Danny Hand MD     • metoprolol  5 mg Intravenous Q6H Nachoshruti Castillo PA-C     • naloxone  0 04 mg Intravenous Q1MIN PRN Danny Hand MD     • ondansetron  4 mg Intravenous Q4H PRN Danny Hand MD     • pantoprazole  40 mg Intravenous Q12H [address] MD     • phenol  1 spray Mouth/Throat Q2H PRN Joenathan Blender Palladino, DO     • piperacillin-tazobactam  3 375 g Intravenous Q8H [facility] 3 375 g (09/24/22 0126)     Continuous Infusions:  Adult TPN (STANDARD BASE/STANDARD ELECTROLYTE), , Last Rate: 41 6 mL/hr at 09/24/22 0022  HYDROmorphone,       PRN Meds:   meclizine, 25 mg, TID PRN  naloxone, 0 04 mg, Q1MIN PRN  ondansetron, 4 mg, Q4H PRN  phenol, 1 spray, Q2H PRN        Allergies   No Known Allergies  ---------------------------------------------------------------------------------------  Advance Directive and Living Will:      Power of :    POLST:    ---------------------------------------------------------------------------------------  Care Time Delivered:   See Yovana Linda MD      Portions of the record may have been created with voice recognition software  Occasional wrong word or "sound a like" substitutions may have occurred due to the inherent limitations of voice recognition software    Read the chart carefully and recognize, using context, where substitutions have occurred

## 2022-09-24 NOTE — PLAN OF CARE
Problem: Potential for Falls  Goal: Patient will remain free of falls  Description: INTERVENTIONS:  - Educate patient/family on patient safety including physical limitations  - Instruct patient to call for assistance with activity   - Consult OT/PT to assist with strengthening/mobility   - Keep Call bell within reach  - Keep bed low and locked with side rails adjusted as appropriate  - Keep care items and personal belongings within reach  - Initiate and maintain comfort rounds  - Make Fall Risk Sign visible to staff  - Offer Toileting every  Hours, in advance of need  - Initiate/Maintain alarm  - Obtain necessary fall risk management equipment:   - Apply yellow socks and bracelet for high fall risk patients  - Consider moving patient to room near nurses station  Outcome: Progressing     Problem: MOBILITY - ADULT  Goal: Maintain or return to baseline ADL function  Description: INTERVENTIONS:  -  Assess patient's ability to carry out ADLs; assess patient's baseline for ADL function and identify physical deficits which impact ability to perform ADLs (bathing, care of mouth/teeth, toileting, grooming, dressing, etc )  - Assess/evaluate cause of self-care deficits   - Assess range of motion  - Assess patient's mobility; develop plan if impaired  - Assess patient's need for assistive devices and provide as appropriate  - Encourage maximum independence but intervene and supervise when necessary  - Involve family in performance of ADLs  - Assess for home care needs following discharge   - Consider OT consult to assist with ADL evaluation and planning for discharge  - Provide patient education as appropriate  Outcome: Progressing  Goal: Maintains/Returns to pre admission functional level  Description: INTERVENTIONS:  - Perform BMAT or MOVE assessment daily    - Set and communicate daily mobility goal to care team and patient/family/caregiver     - Collaborate with rehabilitation services on mobility goals if consulted  - Perform Range of Motion  times a day  - Reposition patient every  hours    - Dangle patient  times a day  - Stand patient  times a day  - Ambulate patient  times a day  - Out of bed to chair  times a day   - Out of bed for meals times a day  - Out of bed for toileting  - Record patient progress and toleration of activity level   Outcome: Progressing     Problem: PAIN - ADULT  Goal: Verbalizes/displays adequate comfort level or baseline comfort level  Description: Interventions:  - Encourage patient to monitor pain and request assistance  - Assess pain using appropriate pain scale  - Administer analgesics based on type and severity of pain and evaluate response  - Implement non-pharmacological measures as appropriate and evaluate response  - Consider cultural and social influences on pain and pain management  - Notify physician/advanced practitioner if interventions unsuccessful or patient reports new pain  Outcome: Progressing     Problem: INFECTION - ADULT  Goal: Absence or prevention of progression during hospitalization  Description: INTERVENTIONS:  - Assess and monitor for signs and symptoms of infection  - Monitor lab/diagnostic results  - Monitor all insertion sites, i e  indwelling lines, tubes, and drains  - Monitor endotracheal if appropriate and nasal secretions for changes in amount and color  - [place] appropriate cooling/warming therapies per order  - Administer medications as ordered  - Instruct and encourage patient and family to use good hand hygiene technique  - Identify and instruct in appropriate isolation precautions for identified infection/condition  Outcome: Progressing  Goal: Absence of fever/infection during neutropenic period  Description: INTERVENTIONS:  - Monitor WBC    Outcome: Progressing

## 2022-09-24 NOTE — PLAN OF CARE
Problem: Potential for Falls  Goal: Patient will remain free of falls  Description: INTERVENTIONS:  - Educate patient/family on patient safety including physical limitations  - Instruct patient to call for assistance with activity   - Consult OT/PT to assist with strengthening/mobility   - Keep Call bell within reach  - Keep bed low and locked with side rails adjusted as appropriate  - Keep care items and personal belongings within reach  - Initiate and maintain comfort rounds  - Make Fall Risk Sign visible to staff  - Offer Toileting every 2 Hours, in advance of need  - Initiate/Maintain alarm  - Obtain necessary fall risk management equipment  - Apply yellow socks and bracelet for high fall risk patients  - Consider moving patient to room near nurses station  Outcome: Progressing     Problem: MOBILITY - ADULT  Goal: Maintain or return to baseline ADL function  Description: INTERVENTIONS:  -  Assess patient's ability to carry out ADLs; assess patient's baseline for ADL function and identify physical deficits which impact ability to perform ADLs (bathing, care of mouth/teeth, toileting, grooming, dressing, etc )  - Assess/evaluate cause of self-care deficits   - Assess range of motion  - Assess patient's mobility; develop plan if impaired  - Assess patient's need for assistive devices and provide as appropriate  - Encourage maximum independence but intervene and supervise when necessary  - Involve family in performance of ADLs  - Assess for home care needs following discharge   - Consider OT consult to assist with ADL evaluation and planning for discharge  - Provide patient education as appropriate  Outcome: Progressing  Goal: Maintains/Returns to pre admission functional level  Description: INTERVENTIONS:  - Perform BMAT or MOVE assessment daily    - Set and communicate daily mobility goal to care team and patient/family/caregiver     - Collaborate with rehabilitation services on mobility goals if consulted  - Perform Range of Motion 6 times a day  - Reposition patient every 2 hours    - Dangle patient  times a day  - Stand patient  times a day  - Ambulate patient  times a day  - Out of bed to chair  times a day   - Out of bed for meals times a day  - Out of bed for toileting  - Record patient progress and toleration of activity level   Outcome: Progressing     Problem: PAIN - ADULT  Goal: Verbalizes/displays adequate comfort level or baseline comfort level  Description: Interventions:  - Encourage patient to monitor pain and request assistance  - Assess pain using appropriate pain scale  - Administer analgesics based on type and severity of pain and evaluate response  - Implement non-pharmacological measures as appropriate and evaluate response  - Consider cultural and social influences on pain and pain management  - Notify physician/advanced practitioner if interventions unsuccessful or patient reports new pain  Outcome: Progressing     Problem: INFECTION - ADULT  Goal: Absence or prevention of progression during hospitalization  Description: INTERVENTIONS:  - Assess and monitor for signs and symptoms of infection  - Monitor lab/diagnostic results  - Monitor all insertion sites, i e  indwelling lines, tubes, and drains  - Monitor endotracheal if appropriate and nasal secretions for changes in amount and color  - [place] appropriate cooling/warming therapies per order  - Administer medications as ordered  - Instruct and encourage patient and family to use good hand hygiene technique  - Identify and instruct in appropriate isolation precautions for identified infection/condition  Outcome: Progressing  Goal: Absence of fever/infection during neutropenic period  Description: INTERVENTIONS:  - Monitor WBC    Outcome: Progressing     Problem: CARDIOVASCULAR - ADULT  Goal: Absence of cardiac dysrhythmias or at baseline rhythm  Description: INTERVENTIONS:  - Continuous cardiac monitoring, vital signs, obtain 12 lead EKG if ordered  - Administer antiarrhythmic and heart rate control medications as ordered  - Monitor electrolytes and administer replacement therapy as ordered  Outcome: Progressing     Problem: Prexisting or High Potential for Compromised Skin Integrity  Goal: Skin integrity is maintained or improved  Description: INTERVENTIONS:  - Identify patients at risk for skin breakdown  - Assess and monitor skin integrity  - Assess and monitor nutrition and hydration status  - Monitor labs   - Assess for incontinence   - Turn and reposition patient  - Assist with mobility/ambulation  - Relieve pressure over bony prominences  - Avoid friction and shearing  - Provide appropriate hygiene as needed including keeping skin clean and dry  - Evaluate need for skin moisturizer/barrier cream  - Collaborate with interdisciplinary team   - Patient/family teaching  - Consider wound care consult   Outcome: Progressing     Problem: DISCHARGE PLANNING  Goal: Discharge to home or other facility with appropriate resources  Description: INTERVENTIONS:  - Identify barriers to discharge w/patient and caregiver  - Arrange for needed discharge resources and transportation as appropriate  - Identify discharge learning needs (meds, wound care, etc )  - Arrange for interpretive services to assist at discharge as needed  - Refer to Case Management Department for coordinating discharge planning if the patient needs post-hospital services based on physician/advanced practitioner order or complex needs related to functional status, cognitive ability, or social support system  Outcome: Progressing     Problem: Knowledge Deficit  Goal: Patient/family/caregiver demonstrates understanding of disease process, treatment plan, medications, and discharge instructions  Description: Complete learning assessment and assess knowledge base    Interventions:  - Provide teaching at level of understanding  - Provide teaching via preferred learning methods  Outcome: Progressing     Problem: Nutrition/Hydration-ADULT  Goal: Nutrient/Hydration intake appropriate for improving, restoring or maintaining nutritional needs  Description: Monitor and assess patient's nutrition/hydration status for malnutrition  Collaborate with interdisciplinary team and initiate plan and interventions as ordered  Monitor patient's weight and dietary intake as ordered or per policy  Utilize nutrition screening tool and intervene as necessary  Determine patient's food preferences and provide high-protein, high-caloric foods as appropriate       INTERVENTIONS:  - Monitor oral intake, urinary output, labs, and treatment plans  - Assess nutrition and hydration status and recommend course of action  - Evaluate amount of meals eaten  - Assist patient with eating if necessary   - Allow adequate time for meals  - Recommend/ encourage appropriate diets, oral nutritional supplements, and vitamin/mineral supplements  - Order, calculate, and assess calorie counts as needed  - Recommend, monitor, and adjust tube feedings and TPN/PPN based on assessed needs  - Assess need for intravenous fluids  - Provide specific nutrition/hydration education as appropriate  - Include patient/family/caregiver in decisions related to nutrition  Outcome: Progressing

## 2022-09-25 NOTE — PROGRESS NOTES
Nutrition    increase to goal TPN when electrolytes stable  lower volume TPN goal:    400 mL D70  625 mL 15AA  275 mL 20% lipids      provides: 1878 Kcal, 94 g protein, 1300 mL, 280 g carbohydrate, carbohydrate load at 2 7 mg/kg/min

## 2022-09-25 NOTE — QUICK NOTE
Quick Note - General Surgery   Miguel Angel Chu 65 y o  male MRN: [id number]  Unit/Bed#: MICU 13 Encounter: [id number]    CT chest/abdomen/pelvis reviewed  Significant bilateral pulmonary infiltrated noted  Moderate amount of free fluid noted in abdomen and pelvis with significant amount of stranding throughout  Small amount of fluid adjacent to duodenal anastomosis but no visualized anastomotic defect and DILLON drain appears in adequate position (and remains with serous/non-bilious drainage)  Largest fluid collection in mid-abdomen does not appear rim-enhancing, nor is any air visualized within collection  No pneumoperitoneum appreciated upon my review of images  Although pneumoperitoneum was noted in formal read, images were discussed with a second radiologist who did not feel that there was any extraluminal air, although there is some motion artifact partially obscuring the area where the transverse colon abuts the anterior-most abdominal wall  Although operative intervention is considered, the risks currently outweigh the benefits given the difficulty of performing any operation within a hostile abdomen and the high likelihood of causing additional harm  Will continue to reassess patient frequently  Recommend broad-spectrum IV abx given CT chest findings         Erickson Arroyo MD   PGY4, General Surgery

## 2022-09-25 NOTE — PROGRESS NOTES
Daily Progress Note - Critical Care   Elissa Nielsen 65 y o  male MRN: [id number]  Unit/Bed#: MICU 13 Encounter: [id number]        ----------------------------------------------------------------------------------------  HPI: Chavo bauman 65 y  o  male w/ PMHx of CAD s/p PCI in 2000, recent choledocholithiasis s/p ERCP on 7/14 from remnant GB, AAA s/p open Oeqic-ml-ifswi grafting in 2009, HTN, HLD  Prior surgical Hx includes open aortic repair, open cholecystectomy and ventral hernia repair with mesh in place       Pt presented to the ED w 3 days of worsening abdominal pain/N/V where w/u revealed a LA of 6 5, CT findings c/w SBO with fecalization of SB  SICU for post-op management due to high risk of needing multiple surgeries      9/20 Ex lap, SBR with mid jejunal anastomosis, duodenal resection/anastomosis, abthera, remnant cholecystectomy  9/21 abdominal washout, drain placement closure with bridging phasix mesh     24hr Events: Diuresed well with 20mg lasix yesterday  Slight increase in O2 requirements from 7L [address] to 10L [address]  Anterior/lateral RLQ: 30cc in 8hr and 145 in 24hr  LLQ: 60cc in 8hr and 285cc in 24hr  NGT: 0cc in 24hr    ---------------------------------------------------------------------------------------  SUBJECTIVE  Reports dry mouth this morning, irriation from     Review of Systems  Review of systems was reviewed and negative unless stated above in HPI/24-hour events   ---------------------------------------------------------------------------------------  Assessment and Plan:    Neuro:   · Diagnosis: PAD  ? Plan:   § Delirium precautions, [facility] ICU per protocol  § Pain mgmt:  § Dilaudid PCA  § No continuous rate  § Dose: 0 2mg  § Lock-out: 5 min  § 1hr limit: 2 2mg     CV:   · Diagnosis: Shock-distributive/hypovolemic (resolved), new onset A-fib, HFpEF, HLD, HTN  ?  Plan:   § Now maintained off vasopressors  § 9/21 TTE: LVEF 65%, G1DD, mild TR, IVC dilated with absent respirophasic changes  § Holding home lopressor 50mg daily, lisinopril, lipitor  § Continue lopressor 5mg IV q6hr  § Optimize electrolytes as below  § Continue PRN diuresis as tolerates to maintain at least even to -500cc/24hrs        Pulm:  · Diagnosis: AHRF  ? Plan:   § Liberated from mechanical ventilation on 9/22  § Currently requiring 10L mid flow nc   § Wean as able to maintain SpO2 >90%  § Aggressive pulmonary hygiene with IS/OPEP, OOBTC, PT/OT  § Consideration for noctural BiPAP though may be limited 2/2 NGT     GI:   · Diagnosis: SBO 2/2 adhesions now s/p ex-lap w/ SBR w/ extensive MARK, anastomsoes od duo/duo and jejunojejunal, and drain palcement w/ closure   ? Plan:   § 90 cm of bowel resected, J-J anastomosis, primary Duodenal anastomosis, cholecystectomy, removal of prior ventral hernia repair mesh  § Maintain surgical drains, record output volume and character  § Trend I/Os strictly  § NPO/NGT, TPN initiated   § Plan for UGI study on 9/26  · Diagnosis: Hyperbilirubinemia   ? Plan: MRCP ordered  ? Trend serum bilirubin     :   · Diagnosis: JOVANNI, Lactic acidosis (resolved)  ? Plan:   § Baseline appears to be 0 78-1 0  § Trend Cr, currently returned to baseline  § Consideration for diuresis as above     F/E/N:   · Plan:   ? F: none  ? E: Replace  PRN K>4 0, Phos>3 0, Mg>2 0  ? N: TPN        Heme/Onc:   · Diagnosis: Leukocytosis (resolved), ABLA (resolved)  ? Plan:   § S/p 1u PRBC on 9/23  § Hgb stable 9 0 this AM     Endo:   · Diagnosis: No acute issues  ? Plan:   § Goal -180     ID:   · Diagnosis: SB perforation  ? Plan:   § Completed total 5 day abx  § Monitoring off abx at this time  § Trend fever/WBC curve     MSK/Skin:   · Diagnosis: No acute issues  ?  Plan:   § Early mobility as able, OOBTC, PT/OT     Patient appropriate for transfer out of the ICU today?: No  Disposition: Continue Stepdown Level 1 level of care   Code Status: Level 1 - Full Code  ---------------------------------------------------------------------------------------  ICU CORE MEASURES    Prophylaxis    VTE Pharmacologic Prophylaxis: Heparin  VTE Mechanical Prophylaxis: sequential compression device  Stress Ulcer Prophylaxis: Famotidine PO    Invasive Devices Review  Invasive Devices  Report    Peripherally Inserted Central Catheter Line  Duration           PICC Line 94 Right Basilic 2 days          Peripheral Intravenous Line  Duration           Peripheral IV 22 Right;Ventral (anterior) Forearm 1 day          Drain  Duration           NG/OG/Enteral Tube Nasogastric 18 Fr Left nare 4 days    Urethral Catheter Latex 16 Fr  4 days    Closed/Suction Drain Anterior;Right;Lateral RLQ Bulb 19 Fr  3 days    Closed/Suction Drain Left LLQ Bulb 19 Fr  3 days              Can any invasive devices be discontinued today? No  ---------------------------------------------------------------------------------------  OBJECTIVE    Vitals   Vitals:    22 0300 22 0400 22 0500 22 0600   BP: 111/51 117/51 (!) 123/46 149/60   BP Location:  Left arm     Pulse: 88 84 84 92   Resp: (!) 23 (!) 23 (!) 25 (!) 28   Temp:       TempSrc:       SpO2: 92% 90% 92% 91%   Weight:       Height:         Temp (24hrs), Av °F (37 2 °C), Min:98 2 °F (36 8 °C), Max:100 5 °F (38 1 °C)  Current: Temperature: 98 8 °F (37 1 °C)    Respiratory:  SpO2: SpO2: 91 %, SpO2 Activity: SpO2 Activity: At Rest, SpO2 Device: O2 Device: Mid flow nasal cannula  Nasal Cannula O2 Flow Rate (L/min): 10 L/min    Invasive/non-invasive ventilation settings   Respiratory  Report   Lab Data (Last 4 hours)    None         O2/Vent Data (Last 4 hours)    None                Physical Exam  Vitals and nursing note reviewed  Constitutional:       General: He is not in acute distress  Appearance: He is well-developed  HENT:      Head: Normocephalic and atraumatic     Eyes:      Conjunctiva/sclera: Conjunctivae normal    Cardiovascular:      Rate and Rhythm: Normal rate and regular rhythm  Heart sounds: No murmur heard  No friction rub  Pulmonary:      Effort: Pulmonary effort is normal  No respiratory distress  Breath sounds: Normal breath sounds  No wheezing, rhonchi or rales  Abdominal:      General: There is distension  Palpations: Abdomen is soft  Tenderness: There is abdominal tenderness (diffuse non-specific)  There is no guarding  Comments: Abdominal dressing in place, C/D/I  Musculoskeletal:      Cervical back: Neck supple  Right lower leg: Edema (trace) present  Left lower leg: Edema (trace) present  Skin:     General: Skin is warm and dry  Neurological:      General: No focal deficit present  Mental Status: He is alert and oriented to person, place, and time  Laboratory and Diagnostics:  Results from last 7 days   Lab Units 09/25/22  0509 09/24/22  0505 09/23/22  0410 09/22/22  1154 09/22/22  0723 09/21/22  1818 09/21/22  0451 09/20/22  2301 09/20/22  1943 09/20/22  1358 09/20/22  0230   WBC Thousand/uL 9 08 8 99 6 90  --  3 57* 8 30 8 74 4 50 3 25*  --  18 08*   HEMOGLOBIN g/dL 9 0* 9 1* 9 3* 9 1* 7 2* 8 3* 9 0* 9 5* 8 3*  --  18 0*   I STAT HEMOGLOBIN   --   --   --   --   --   --   --   --   --    < >  --    HEMATOCRIT % 28 4* 28 4* 29 2*  --  22 8* 24 9* 27 5* 29 3* 25 8*  --  56 7*   HEMATOCRIT, ISTAT   --   --   --   --   --   --   --   --   --    < >  --    PLATELETS Thousands/uL 134* 124* 109*  --  75* 103* 128* 119* 141*  --  328   NEUTROS PCT %  --  79*  --   --   --   --   --   --   --   --  89*   BANDS PCT %  --   --   --   --   --   --   --   --  19*  --   --    MONOS PCT %  --  8  --   --   --   --   --   --   --   --  6   MONO PCT %  --   --  3*  --   --   --   --   --  5  --   --     < > = values in this interval not displayed       Results from last 7 days   Lab Units 09/24/22  0505 09/23/22  1457 09/23/22  0734 09/23/22  0000 09/22/22  1802 09/22/22  1154 09/22/22  0420 09/21/22  1205 09/21/22  0451 09/20/22  1358 09/20/22  0232   SODIUM mmol/L 145 145 146 144 146 147 145   < > 150*   < > 138   POTASSIUM mmol/L 3 5 4 0 3 9 4 1 3 9 4 0 4 2   < > 3 3*   < > 4 0   CHLORIDE mmol/L 117* 117* 119* 118* 116* 117* 116*   < > 115*   < > 104   CO2 mmol/L 25 22 23 22 21 21 23   < > 26   < > 21   CO2, I-STAT   --   --   --   --   --   --   --   --   --    < >  --    ANION GAP mmol/L 3* 6 4 4 9 9 6   < > 9   < > 13   BUN mg/dL 13 13 17 17 17 17 17   < > 23   < > 22   CREATININE mg/dL 0 79 0 84 0 76 0 74 0 73 0 75 0 75   < > 1 15   < > 1 89*   CALCIUM mg/dL 7 7* 7 7* 7 8* 7 4* 7 4* 7 6* 7 4*   < > 8 4   < > 10 5*   GLUCOSE RANDOM mg/dL 122 141* 130 125 73 68 67   < > 95   < > 134   ALT U/L 30  --  24 25  --   --   --   --  37  --  40   AST U/L 37  --  31 27  --   --   --   --  39  --  25   ALK PHOS U/L 65  --  45* 42*  --   --   --   --  25*  --  158*   ALBUMIN g/dL 2 2*  --  2 2* 2 4*  --   --   --   --  3 4*  --  4 2   TOTAL BILIRUBIN mg/dL 4 85*  --  4 61* 4 73*  --   --   --   --  2 39*  --  1 25*    < > = values in this interval not displayed       Results from last 7 days   Lab Units 09/25/22  0509 09/24/22  0505 09/23/22  1457 09/23/22  0000 09/22/22  0527 09/21/22  1800 09/21/22  0451   MAGNESIUM mg/dL 2 6 2 2 2 4 2 5 2 5 2 5 1 9   PHOSPHORUS mg/dL 3 8 1 9* 1 6* 1 1* 1 8* 3 0 2 2*               Results from last 7 days   Lab Units 09/22/22  1802 09/22/22  1154 09/22/22  0420 09/22/22  0015 09/21/22  1800 09/21/22  1205 09/21/22  0632   LACTIC ACID mmol/L 1 7 1 2 1 1 0 8 2 2* 2 1* 3 4*     ABG:  Results from last 7 days   Lab Units 09/23/22  0400   PH ART  7 401   PCO2 ART mm Hg 36 2   PO2 ART mm Hg 54 3*   HCO3 ART mmol/L 22 0   BASE EXC ART mmol/L -2 4   ABG SOURCE  Line, Arterial     VBG:  Results from last 7 days   Lab Units 09/23/22  0400 09/21/22  1206 09/21/22  0712   PH JOSE   --   --  7 411*   PCO2 JOSE mm Hg  --   --  38 6*   PO2 JOSE mm Hg  --   --  38 8   HCO3 JOSE mmol/L  --   --  24 0   BASE EXC JOSE mmol/L  --   --  -0 5   ABG SOURCE  Line, Arterial   < >  --     < > = values in this interval not displayed  Micro  Results from last 7 days   Lab Units 09/20/22  0232 09/20/22  0230   BLOOD CULTURE  No Growth After 4 Days  No Growth After 4 Days  Imaging: No new  I have personally reviewed pertinent reports  and I have personally reviewed pertinent films in PACS    Intake and Output  I/O       09/23 0701 09/24 0700 09/24 0701 09/25 0700    I V  (mL/kg) 60 (0 8) 40 (0 6)    IV Piggyback 610 349     4 914 5    Total Intake(mL/kg) 1668 4 (23) 1303 5 (18)    Urine (mL/kg/hr) 3020 (1 7) 1860 (1 1)    Emesis/NG output 290 0    Drains 430 430    Total Output 3740 2290    Net -2071 6 -986  5              UOP: 1 1 ml/kg/hr     Height and Weights   Height: 5' 8" (172 7 cm)  IBW (Ideal Body Weight): 68 4 kg  Body mass index is 24 34 kg/m²  Weight (last 2 days)     Date/Time Weight    09/23/22 0545 72 6 (160 05)            Nutrition       Diet Orders   (From admission, onward)             Start     Ordered    09/20/22 1751  Diet NPO  Diet effective now        References:    Nutrtion Support Algorithm Enteral vs  Parenteral   Question Answer Comment   Diet Type NPO    RD to adjust diet per protocol?  No        09/20/22 1752              Active Medications  Scheduled Meds:  Current Facility-Administered Medications   Medication Dose Route Frequency Provider Last Rate   • Adult TPN (STANDARD BASE/STANDARD ELECTROLYTE)   Intravenous Continuous TPN Iveth Maldonado MD 41 6 mL/hr at 09/24/22 2159   • chlorhexidine  15 mL Mouth/Throat Q12H [address] Burroughs MD     • heparin (porcine)  5,000 Units Subcutaneous [facility] Iveth Maldonado MD     • HYDROmorphone   Intravenous Continuous Laurey Poser Palladino, DO     • HYDROmorphone  0 5 mg Intravenous Q4H Sam Ely MD     • meclizine  25 mg Oral TID PRN Iveth Maldonado MD     • metoprolol 5 mg Intravenous Q6H Mikael Ledezma PA-C     • naloxone  0 04 mg Intravenous Q1MIN PRN Joya Riley MD     • ondansetron  4 mg Intravenous Q4H PRN Joya Riley MD     • pantoprazole  40 mg Intravenous Q12H [address] Joya Riley MD     • phenol  1 spray Mouth/Throat Q2H PRN Davie Barrs Palladino, DO       Continuous Infusions:  Adult TPN (STANDARD BASE/STANDARD ELECTROLYTE), , Last Rate: 41 6 mL/hr at 09/24/22 2159  HYDROmorphone,       PRN Meds:   meclizine, 25 mg, TID PRN  naloxone, 0 04 mg, Q1MIN PRN  ondansetron, 4 mg, Q4H PRN  phenol, 1 spray, Q2H PRN        Allergies   No Known Allergies  ---------------------------------------------------------------------------------------  Advance Directive and Living Will:      Power of :    POLST:    ---------------------------------------------------------------------------------------  Care Time Delivered:   No Critical Care time spent     Mikael Ledezma PA-C      Portions of the record may have been created with voice recognition software  Occasional wrong word or "sound a like" substitutions may have occurred due to the inherent limitations of voice recognition software    Read the chart carefully and recognize, using context, where substitutions have occurred

## 2022-09-25 NOTE — QUICK NOTE
Pt in respiratory distress, hypoxic with RR 30s  Abdomen very distended and now has peritonitis  Will intubate and take for CT of chest and abdomen  I explained plan to patient and his son      30 min of critical care provided

## 2022-09-25 NOTE — PROCEDURES
Intubation    Date/Time: 9/25/2022 5:05 PM  Performed by: Jojo Palma MD  Authorized by: Oleg Cho MD     Patient location:  Bedside  Consent:     Consent obtained:  Verbal    Consent given by:  Patient    Risks discussed:  Hypoxia  Universal protocol:     Patient identity confirmed:  Verbally with patient  Pre-procedure details:     Patient status:  Awake    Pretreatment medications:  Etomidate    Paralytics:  Succinylcholine  Indications:     Indications for intubation: respiratory distress and airway protection    Procedure details:     Preoxygenation:  Bag valve mask    Intubation method:  Oral    Oral intubation technique:  Direct    Laryngoscope blade: Mac 3    Tube size (mm):  8 0    Tube type:  Cuffed    Number of attempts:  1    Cricoid pressure: no      Tube visualized through cords: yes    Placement assessment:     ETT to lip:  25cm    Breath sounds:  Equal    Placement verification: chest rise    Post-procedure details:     Patient tolerance of procedure:   Tolerated with difficulty          Jojo Palma MD  9/25/2022  5:07 PM

## 2022-09-25 NOTE — RESPIRATORY THERAPY NOTE
RT Ventilator Management Note  Darshana Mcfarland 65 y o  male MRN: [id number]  Unit/Bed#: [facility] 13 Encounter: [id number]      Daily Screen         9/22/2022  1004 9/25/2022  1659          Patient safety screen outcome[de-identified] Passed Failed      Not Ready for Weaning due to[de-identified] -- Underline problem not resolved      Spont breathing trial outcome[de-identified] Passed --      Name of Medical Team Notified[de-identified] Critical Care Team [facility] --      Preparing to extubate/ Notify Nurse: Yes --      Extubation order obtained: Yes --      Consider Cuff Test: Yes --      Patient extubated: Yes --                Physical Exam:          Resp Comments: (P) Pt reintubated due to increase in WOB and RR  Pt had positive color change via capno  Pt 26 at the lip bilateral BS   Pt plced on 840 without problems

## 2022-09-25 NOTE — PLAN OF CARE
Problem: Potential for Falls  Goal: Patient will remain free of falls  Description: INTERVENTIONS:  - Educate patient/family on patient safety including physical limitations  - Instruct patient to call for assistance with activity   - Consult OT/PT to assist with strengthening/mobility   - Keep Call bell within reach  - Keep bed low and locked with side rails adjusted as appropriate  - Keep care items and personal belongings within reach  - Initiate and maintain comfort rounds  - Make Fall Risk Sign visible to staff  - Offer Toileting every  Hours, in advance of need  - Initiate/Maintain alarm  - Obtain necessary fall risk management equipment:   - Apply yellow socks and bracelet for high fall risk patients  - Consider moving patient to room near nurses station  Outcome: Progressing     Problem: MOBILITY - ADULT  Goal: Maintain or return to baseline ADL function  Description: INTERVENTIONS:  -  Assess patient's ability to carry out ADLs; assess patient's baseline for ADL function and identify physical deficits which impact ability to perform ADLs (bathing, care of mouth/teeth, toileting, grooming, dressing, etc )  - Assess/evaluate cause of self-care deficits   - Assess range of motion  - Assess patient's mobility; develop plan if impaired  - Assess patient's need for assistive devices and provide as appropriate  - Encourage maximum independence but intervene and supervise when necessary  - Involve family in performance of ADLs  - Assess for home care needs following discharge   - Consider OT consult to assist with ADL evaluation and planning for discharge  - Provide patient education as appropriate  Outcome: Progressing  Goal: Maintains/Returns to pre admission functional level  Description: INTERVENTIONS:  - Perform BMAT or MOVE assessment daily    - Set and communicate daily mobility goal to care team and patient/family/caregiver     - Collaborate with rehabilitation services on mobility goals if consulted  - Perform Range of Motion  times a day  - Reposition patient every  hours    - Dangle patient  times a day  - Stand patient  times a day  - Ambulate patient  times a day  - Out of bed to chair  times a day   - Out of bed for meals  times a day  - Out of bed for toileting  - Record patient progress and toleration of activity level   Outcome: Progressing     Problem: PAIN - ADULT  Goal: Verbalizes/displays adequate comfort level or baseline comfort level  Description: Interventions:  - Encourage patient to monitor pain and request assistance  - Assess pain using appropriate pain scale  - Administer analgesics based on type and severity of pain and evaluate response  - Implement non-pharmacological measures as appropriate and evaluate response  - Consider cultural and social influences on pain and pain management  - Notify physician/advanced practitioner if interventions unsuccessful or patient reports new pain  Outcome: Progressing     Problem: INFECTION - ADULT  Goal: Absence or prevention of progression during hospitalization  Description: INTERVENTIONS:  - Assess and monitor for signs and symptoms of infection  - Monitor lab/diagnostic results  - Monitor all insertion sites, i e  indwelling lines, tubes, and drains  - Monitor endotracheal if appropriate and nasal secretions for changes in amount and color  - [place] appropriate cooling/warming therapies per order  - Administer medications as ordered  - Instruct and encourage patient and family to use good hand hygiene technique  - Identify and instruct in appropriate isolation precautions for identified infection/condition  Outcome: Progressing  Goal: Absence of fever/infection during neutropenic period  Description: INTERVENTIONS:  - Monitor WBC    Outcome: Progressing     Problem: CARDIOVASCULAR - ADULT  Goal: Absence of cardiac dysrhythmias or at baseline rhythm  Description: INTERVENTIONS:  - Continuous cardiac monitoring, vital signs, obtain 12 lead EKG if ordered  - Administer antiarrhythmic and heart rate control medications as ordered  - Monitor electrolytes and administer replacement therapy as ordered  Outcome: Progressing     Problem: Prexisting or High Potential for Compromised Skin Integrity  Goal: Skin integrity is maintained or improved  Description: INTERVENTIONS:  - Identify patients at risk for skin breakdown  - Assess and monitor skin integrity  - Assess and monitor nutrition and hydration status  - Monitor labs   - Assess for incontinence   - Turn and reposition patient  - Assist with mobility/ambulation  - Relieve pressure over bony prominences  - Avoid friction and shearing  - Provide appropriate hygiene as needed including keeping skin clean and dry  - Evaluate need for skin moisturizer/barrier cream  - Collaborate with interdisciplinary team   - Patient/family teaching  - Consider wound care consult   Outcome: Progressing     Problem: DISCHARGE PLANNING  Goal: Discharge to home or other facility with appropriate resources  Description: INTERVENTIONS:  - Identify barriers to discharge w/patient and caregiver  - Arrange for needed discharge resources and transportation as appropriate  - Identify discharge learning needs (meds, wound care, etc )  - Arrange for interpretive services to assist at discharge as needed  - Refer to Case Management Department for coordinating discharge planning if the patient needs post-hospital services based on physician/advanced practitioner order or complex needs related to functional status, cognitive ability, or social support system  Outcome: Progressing     Problem: Knowledge Deficit  Goal: Patient/family/caregiver demonstrates understanding of disease process, treatment plan, medications, and discharge instructions  Description: Complete learning assessment and assess knowledge base    Interventions:  - Provide teaching at level of understanding  - Provide teaching via preferred learning methods  Outcome: Progressing     Problem: Nutrition/Hydration-ADULT  Goal: Nutrient/Hydration intake appropriate for improving, restoring or maintaining nutritional needs  Description: Monitor and assess patient's nutrition/hydration status for malnutrition  Collaborate with interdisciplinary team and initiate plan and interventions as ordered  Monitor patient's weight and dietary intake as ordered or per policy  Utilize nutrition screening tool and intervene as necessary  Determine patient's food preferences and provide high-protein, high-caloric foods as appropriate       INTERVENTIONS:  - Monitor oral intake, urinary output, labs, and treatment plans  - Assess nutrition and hydration status and recommend course of action  - Evaluate amount of meals eaten  - Assist patient with eating if necessary   - Allow adequate time for meals  - Recommend/ encourage appropriate diets, oral nutritional supplements, and vitamin/mineral supplements  - Order, calculate, and assess calorie counts as needed  - Recommend, monitor, and adjust tube feedings and TPN/PPN based on assessed needs  - Assess need for intravenous fluids  - Provide specific nutrition/hydration education as appropriate  - Include patient/family/caregiver in decisions related to nutrition  Outcome: Progressing

## 2022-09-25 NOTE — PROGRESS NOTES
Progress Note - General Surgery   Lyndsey Howard 65 y o  male MRN: [id number]  [facility]: [id number]    Assessment:  65 y o  M with prior history of a subtotal cholecystectomy and prior aorto bi-iliac bypass (2009), who presented with a closed loop small bowel obstruction, s/p ex lap, SBR (jejunum) with primary anastomosis, duodenectomy with duodenoduodenostomy (hand sewn), extensive MARK (>3 hrs), explantation of mesh, completion cholecystectomy, and abthera placement 9/20  Now s/p ex lap, washout, drain placement adjacent to duodenal anastomosis, attempted keofed placement, bridging Phasix mesh placement, abdominal closure 9/21    T max 100 5 @2100, on 10L mid flow    UO: 1 86L, responded well to lasix during the day  Only received 20 in total, net 10 5L positive  NGT: 0 recorded, bilious  R abdominal drain: 145 cc serous drainage  LLQ abdominal drain: 285cc serosanguinous drainage     WBC 9 (9)  Hgb 9 (9 1)  CMP pending    Plan:  -Wean supplemental oxygen as able  -Would continue diuresis, volume overloaded, 10 5L positive, suspect this is likely contributing to oxygen requirement  -Follow up daily LFTs to follow hyperbilirubinemia, plan on MRCP  -Continue NPO/NGT keeping until POD 5 then will get UGI on 9/26  -Continue PICC/TPN   -Maintain DILLON drains, monitor volume and character of output   -Continue complaints this morning    -Continue Protonix   -Remainder of care per ICU      Subjective/Objective      Subjective:   No acute events overnight  Doing well, reports he is coughing up phlgem  Denies any nausea or vomiting  No flatus or bms  No chest pain  Mild SOB  Pain is controlled    Objective:     Blood pressure 149/60, pulse 92, temperature 98 8 °F (37 1 °C), temperature source Oral, resp  rate (!) 28, height 5' 8" (1 727 m), weight 72 6 kg (160 lb 0 9 oz), SpO2 91 %  ,Body mass index is 24 34 kg/m²        Intake/Output Summary (Last 24 hours) at 9/25/2022 5474  Last data filed at 9/25/2022 0600  Gross per 24 hour   Intake 1303 46 ml   Output 2290 ml   Net -986 54 ml       Invasive Devices  Report    Peripherally Inserted Central Catheter Line  Duration           PICC Line 69/51/95 Right Basilic 2 days          Peripheral Intravenous Line  Duration           Peripheral IV 09/23/22 Right;Ventral (anterior) Forearm 1 day          Drain  Duration           NG/OG/Enteral Tube Nasogastric 18 Fr Left nare 4 days    Urethral Catheter Latex 16 Fr  4 days    Closed/Suction Drain Anterior;Right;Lateral RLQ Bulb 19 Fr  3 days    Closed/Suction Drain Left LLQ Bulb 19 Fr  3 days                Physical Exam:   General: NAD  HENT: NCAT MMM   NG in place bilious output  Neck: supple, no JVD  CV: tachycardic  Lungs: nl wob On 10L midflow  ABD: Soft,appropriately tender moderately distended, tympanic, incision c/d/i  DILLON x2 with SS output  Neuro: AAOx3          Lab, Imaging and other studies:  CBC:   Lab Results   Component Value Date    WBC 9 08 09/25/2022    HGB 9 0 (L) 09/25/2022    HCT 28 4 (L) 09/25/2022    MCV 97 09/25/2022     (L) 09/25/2022    MCH 30 7 09/25/2022    MCHC 31 7 09/25/2022    RDW 16 6 (H) 09/25/2022    MPV 10 5 09/25/2022   , CMP:   No results found for: SODIUM, K, CL, CO2, ANIONGAP, BUN, CREATININE, GLUCOSE, CALCIUM, AST, ALT, ALKPHOS, PROT, BILITOT, EGFR  VTE Pharmacologic Prophylaxis: Heparin  VTE Mechanical Prophylaxis: sequential compression device

## 2022-09-26 ENCOUNTER — ANESTHESIA (INPATIENT)
Dept: PERIOP | Facility: HOSPITAL | Age: 77
DRG: 853 | End: 2022-09-26
Payer: MEDICARE

## 2022-09-26 ENCOUNTER — ANESTHESIA EVENT (INPATIENT)
Dept: PERIOP | Facility: HOSPITAL | Age: 77
DRG: 853 | End: 2022-09-26
Payer: MEDICARE

## 2022-09-26 RX ORDER — ROCURONIUM BROMIDE 10 MG/ML
INJECTION, SOLUTION INTRAVENOUS AS NEEDED
Status: DISCONTINUED | OUTPATIENT
Start: 2022-09-26 | End: 2022-09-26

## 2022-09-26 RX ORDER — SODIUM CHLORIDE 9 MG/ML
INJECTION, SOLUTION INTRAVENOUS CONTINUOUS PRN
Status: DISCONTINUED | OUTPATIENT
Start: 2022-09-26 | End: 2022-09-26

## 2022-09-26 RX ADMIN — SODIUM CHLORIDE: 0.9 INJECTION, SOLUTION INTRAVENOUS at 09:12

## 2022-09-26 RX ADMIN — SODIUM CHLORIDE: 0.9 INJECTION, SOLUTION INTRAVENOUS at 09:20

## 2022-09-26 RX ADMIN — ROCURONIUM BROMIDE 50 MG: 50 INJECTION INTRAVENOUS at 09:18

## 2022-09-26 RX ADMIN — PIPERACILLIN AND TAZOBACTAM 3.38 G: 36; 4.5 INJECTION, POWDER, FOR SOLUTION INTRAVENOUS at 09:53

## 2022-09-26 NOTE — PHYSICAL THERAPY NOTE
PT orders received  Chart reviewed  Pt intubated/sedated and not appropriate for PT will hold  Will continue to follow  Roderick Becerra, PT, DPT     09/26/22 0801   PT Last Visit   PT Visit Date 09/26/22   Note Type   Note type Evaluation; Cancelled Session   Cancel Reasons Intubated/sedated

## 2022-09-26 NOTE — PLAN OF CARE
Problem: Potential for Falls  Goal: Patient will remain free of falls  Description: INTERVENTIONS:  - Educate patient/family on patient safety including physical limitations  - Instruct patient to call for assistance with activity   - Consult OT/PT to assist with strengthening/mobility   - Keep Call bell within reach  - Keep bed low and locked with side rails adjusted as appropriate  - Keep care items and personal belongings within reach  - Initiate and maintain comfort rounds  - Make Fall Risk Sign visible to staff  - Offer Toileting every n/a Hours, in advance of need  - Initiate/Maintain n/a alarm  - Obtain necessary fall risk management equipment: n/a  - Apply yellow socks and bracelet for high fall risk patients  - Consider moving patient to room near nurses station  Outcome: Progressing     Problem: MOBILITY - ADULT  Goal: Maintain or return to baseline ADL function  Description: INTERVENTIONS:  -  Assess patient's ability to carry out ADLs; assess patient's baseline for ADL function and identify physical deficits which impact ability to perform ADLs (bathing, care of mouth/teeth, toileting, grooming, dressing, etc )  - Assess/evaluate cause of self-care deficits   - Assess range of motion  - Assess patient's mobility; develop plan if impaired  - Assess patient's need for assistive devices and provide as appropriate  - Encourage maximum independence but intervene and supervise when necessary  - Involve family in performance of ADLs  - Assess for home care needs following discharge   - Consider OT consult to assist with ADL evaluation and planning for discharge  - Provide patient education as appropriate  Outcome: Progressing  Goal: Maintains/Returns to pre admission functional level  Description: INTERVENTIONS:  - Perform BMAT or MOVE assessment daily    - Set and communicate daily mobility goal to care team and patient/family/caregiver     - Collaborate with rehabilitation services on mobility goals if consulted  - Perform Range of Motion 3 times a day  - Reposition patient every 2 hours    - Dangle patient n/a times a day  - Stand patient n/a times a day  - Ambulate patient n/a times a day  - Out of bed to chair n/a times a day   - Out of bed for meals n/a times a day  - Out of bed for toileting  - Record patient progress and toleration of activity level   Outcome: Progressing     Problem: SAFETY,RESTRAINT: NV/NON-SELF DESTRUCTIVE BEHAVIOR  Goal: Remains free of harm/injury (restraint for non violent/non self-detsructive behavior)  Description: INTERVENTIONS:  - Instruct patient/family regarding restraint use   - Assess and monitor physiologic and psychological status   - Provide interventions and comfort measures to meet assessed patient needs   - Identify and implement measures to help patient regain control  - Assess readiness for release of restraint   Outcome: Progressing  Goal: Returns to optimal restraint-free functioning  Description: INTERVENTIONS:  - Assess the patient's behavior and symptoms that indicate continued need for restraint  - Identify and implement measures to help patient regain control  - Assess readiness for release of restraint   Outcome: Progressing     Problem: PAIN - ADULT  Goal: Verbalizes/displays adequate comfort level or baseline comfort level  Description: Interventions:  - Encourage patient to monitor pain and request assistance  - Assess pain using appropriate pain scale  - Administer analgesics based on type and severity of pain and evaluate response  - Implement non-pharmacological measures as appropriate and evaluate response  - Consider cultural and social influences on pain and pain management  - Notify physician/advanced practitioner if interventions unsuccessful or patient reports new pain  Outcome: Progressing     Problem: INFECTION - ADULT  Goal: Absence or prevention of progression during hospitalization  Description: INTERVENTIONS:  - Assess and monitor for signs and symptoms of infection  - Monitor lab/diagnostic results  - Monitor all insertion sites, i e  indwelling lines, tubes, and drains  - Monitor endotracheal if appropriate and nasal secretions for changes in amount and color  - [place] appropriate cooling/warming therapies per order  - Administer medications as ordered  - Instruct and encourage patient and family to use good hand hygiene technique  - Identify and instruct in appropriate isolation precautions for identified infection/condition  Outcome: Progressing  Goal: Absence of fever/infection during neutropenic period  Description: INTERVENTIONS:  - Monitor WBC    Outcome: Progressing     Problem: DISCHARGE PLANNING  Goal: Discharge to home or other facility with appropriate resources  Description: INTERVENTIONS:  - Identify barriers to discharge w/patient and caregiver  - Arrange for needed discharge resources and transportation as appropriate  - Identify discharge learning needs (meds, wound care, etc )  - Arrange for interpretive services to assist at discharge as needed  - Refer to Case Management Department for coordinating discharge planning if the patient needs post-hospital services based on physician/advanced practitioner order or complex needs related to functional status, cognitive ability, or social support system  Outcome: Progressing     Problem: Knowledge Deficit  Goal: Patient/family/caregiver demonstrates understanding of disease process, treatment plan, medications, and discharge instructions  Description: Complete learning assessment and assess knowledge base    Interventions:  - Provide teaching at level of understanding  - Provide teaching via preferred learning methods  Outcome: Progressing     Problem: CARDIOVASCULAR - ADULT  Goal: Absence of cardiac dysrhythmias or at baseline rhythm  Description: INTERVENTIONS:  - Continuous cardiac monitoring, vital signs, obtain 12 lead EKG if ordered  - Administer antiarrhythmic and heart rate control medications as ordered  - Monitor electrolytes and administer replacement therapy as ordered  Outcome: Progressing     Problem: Prexisting or High Potential for Compromised Skin Integrity  Goal: Skin integrity is maintained or improved  Description: INTERVENTIONS:  - Identify patients at risk for skin breakdown  - Assess and monitor skin integrity  - Assess and monitor nutrition and hydration status  - Monitor labs   - Assess for incontinence   - Turn and reposition patient  - Assist with mobility/ambulation  - Relieve pressure over bony prominences  - Avoid friction and shearing  - Provide appropriate hygiene as needed including keeping skin clean and dry  - Evaluate need for skin moisturizer/barrier cream  - Collaborate with interdisciplinary team   - Patient/family teaching  - Consider wound care consult   Outcome: Progressing     Problem: Nutrition/Hydration-ADULT  Goal: Nutrient/Hydration intake appropriate for improving, restoring or maintaining nutritional needs  Description: Monitor and assess patient's nutrition/hydration status for malnutrition  Collaborate with interdisciplinary team and initiate plan and interventions as ordered  Monitor patient's weight and dietary intake as ordered or per policy  Utilize nutrition screening tool and intervene as necessary  Determine patient's food preferences and provide high-protein, high-caloric foods as appropriate       INTERVENTIONS:  - Monitor oral intake, urinary output, labs, and treatment plans  - Assess nutrition and hydration status and recommend course of action  - Evaluate amount of meals eaten  - Assist patient with eating if necessary   - Allow adequate time for meals  - Recommend/ encourage appropriate diets, oral nutritional supplements, and vitamin/mineral supplements  - Order, calculate, and assess calorie counts as needed  - Recommend, monitor, and adjust tube feedings and TPN/PPN based on assessed needs  - Assess need for intravenous fluids  - Provide specific nutrition/hydration education as appropriate  - Include patient/family/caregiver in decisions related to nutrition  Outcome: Progressing

## 2022-09-26 NOTE — PROGRESS NOTES
Progress Note - General Surgery   Sterling Lucas 65 y o  male MRN: [id number]  Unit/Bed#: [facility] 13 Encounter: [id number]    Assessment:  65 y o  M with prior history of a subtotal cholecystectomy and prior aorto bi-iliac bypass (2009), who presented with a closed loop small bowel obstruction, s/p ex lap, SBR (jejunum) with primary anastomosis, duodenectomy with duodenoduodenostomy (hand sewn), extensive MARK (>3 hrs), explantation of mesh, completion cholecystectomy, and abthera placement 9/20      Now s/p ex lap, washout, drain placement adjacent to duodenal anastomosis, attempted keofed placement, bridging Phasix mesh placement, abdominal closure 9/21, post op course complicated by ARDS    Vent: 14/450/100/6  Last gas 7 286/46 7/73 3/21 7/-4 8 (from 7 380/38 3/129/22 2/-2  7)    WBC 7 31 (from 7 89)  Hb 7 2 (from 8 8)  Cr 0 76 (from 0 85  T bili 3 52 (from 4 87)    9/25 CT PE study with abdomen and pelvis: Diffuse airspace opacities within the lungs which may represent infection, Pneumoperitoneum with moderate amount of free fluid in the abdomen and pelvis surrounding the chain sutures in the mid hemiabdomen  Findings may represent postsurgical changes however postsurgical complication such as infection or leak cannot be ruled out       Plan:  Trend pressor requirement, will require return to the OR for exploratory laparotomy if worsens given findings on CT scan yesterday  Wean vent as able  Consider further diuresis given very positive since admission, may improve respiratory status  Wean pressors as able  Maintain OG tube, monitor output, plan for UGI on POD5 if remains stable  PICC/TPN  Maintain DILLON drains, monitor output  Continue protonix  PRN analgesia/sedation  DVT PPX  Rest of care per ICU    Subjective/Objective     Subjective: Patient with increasing respiratory distress yesterday afternoon requiring intubation, increasing vent settings overnight, now on levophed, follows commands    Objective:    Blood pressure (!) 103/40, pulse 74, temperature 98 9 °F (37 2 °C), temperature source Axillary, resp  rate 19, height 5' 8" (1 727 m), weight 68 kg (149 lb 14 6 oz), SpO2 95 %  ,Body mass index is 22 79 kg/m²        Intake/Output Summary (Last 24 hours) at 9/26/2022 0803  Last data filed at 9/26/2022 0600  Gross per 24 hour   Intake 2673 92 ml   Output 2660 ml   Net 13 92 ml       Invasive Devices  Report    Peripherally Inserted Central Catheter Line  Duration           PICC Line 39/75/66 Right Basilic 3 days          Peripheral Intravenous Line  Duration           Peripheral IV 09/23/22 Right;Ventral (anterior) Forearm 2 days    Peripheral IV 09/25/22 Distal;Left;Ventral (anterior) Forearm <1 day    Peripheral IV 09/25/22 Proximal;Right;Ventral (anterior) Forearm <1 day          Arterial Line  Duration           Arterial Line 09/25/22 Radial <1 day          Drain  Duration           NG/OG/Enteral Tube Nasogastric 18 Fr Left nare 6 days    Urethral Catheter Latex 16 Fr  5 days    Closed/Suction Drain Anterior;Right;Lateral RLQ Bulb 19 Fr  4 days    Closed/Suction Drain Left LLQ Bulb 19 Fr  4 days          Airway  Duration           ETT  Cuffed 8 mm <1 day                Physical Exam:  Gen:    NAD  CV:      warm, well-perfused  Lungs: 14/450/100/6  Abd:     soft, mildly distended, DILLON drains x 2 serosanguinous, Incision with mild blanching erythema at superior aspect  Ext:      no CCE  Neuro: Sedated

## 2022-09-26 NOTE — ANESTHESIA PREPROCEDURE EVALUATION
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)   (+) Unstable angina (HCC)      GI/HEPATIC   (+) Gastroesophageal reflux disease   (+) Gastroesophageal reflux disease with esophagitis   (+) SBO (small bowel obstruction) (HCC)      MUSCULOSKELETAL   (+) Primary osteoarthritis of one hip, left   (+) Primary osteoarthritis of right knee        Physical Exam    Airway      TM Distance: >3 FB  Neck ROM: full     Dental       Cardiovascular  Cardiovascular exam normal    Pulmonary  Pulmonary exam normal     Other Findings        Anesthesia Plan  ASA Score- 4 Emergent    Anesthesia Type- general with ASA Monitors  Additional Monitors:   Airway Plan:     Comment: S/P Exlap yesterday  Now for reop  Still intubated/sedated/on pressors  Continue care as per MICU  To remain intubated for return to MICU  Plan Factors-    Chart reviewed  EKG reviewed  Imaging results reviewed  Existing labs reviewed  Patient summary reviewed  Induction- intravenous and inhalational     Postoperative Plan-     Informed Consent- Anesthetic plan and risks discussed with son  I personally reviewed this patient with the CRNA  Discussed and agreed on the Anesthesia Plan with the CRNA  Dara Wen

## 2022-09-26 NOTE — ANESTHESIA POSTPROCEDURE EVALUATION
Post-Op Assessment Note    CV Status:  Stable  Pain Score: 0    Pain management: adequate     Mental Status:  Sleepy   Hydration Status:  Euvolemic   PONV Controlled:  Controlled   Airway Patency:  Patent      Post Op Vitals Reviewed: Yes      Staff: Anesthesiologist, CRNA         No complications documented      BP  127/65   Temp   97   Pulse 100   Resp   12   SpO2 96

## 2022-09-26 NOTE — RESPIRATORY THERAPY NOTE
RT Ventilator Management Note  Tru Mota 65 y o  male MRN: [id number]  Unit/Bed#: [facility] 13 Encounter: [id number]      Daily Screen         9/25/2022  1659 9/26/2022  0738          Patient safety screen outcome[de-identified] Failed Failed      Not Ready for Weaning due to[de-identified] Underline problem not resolved PEEP > 8cmH2O;Underline problem not resolved                Physical Exam:   Assessment Type: Assess only  General Appearance: Sedated  Respiratory Pattern: Assisted  Chest Assessment: Chest expansion symmetrical  Bilateral Breath Sounds: Clear  Suction: ET Tube  O2 Device: Vent      Resp Comments: (P) new vent orders for bilevel ventilation, pt placed on bilevel, will continue to monitor

## 2022-09-26 NOTE — RESPIRATORY THERAPY NOTE
RT Ventilator Management Note  Lizabeth Arredondo 65 y o  male MRN: [id number]  Unit/Bed#: [facility] 13 Encounter: [id number]      Daily Screen         9/25/2022  1659 9/26/2022  0738          Patient safety screen outcome[de-identified] Failed Failed      Not Ready for Weaning due to[de-identified] Underline problem not resolved PEEP > 8cmH2O;Underline problem not resolved                Physical Exam:   Assessment Type: Assess only  General Appearance: Sedated  Respiratory Pattern: Assisted  Chest Assessment: Chest expansion symmetrical  Bilateral Breath Sounds: Clear      Resp Comments: (P) pt tolerating bilevel vent well, will continue to monitor

## 2022-09-26 NOTE — OCCUPATIONAL THERAPY NOTE
OT CANCEL NOTE    OT orders received  Chart reviewed  Pt is currently intubated/sedated and not appropriate to engage in skilled OT services at this time  Will hold initial OT evaluation  Will continue to follow pt on caseload and see pt when medically stable and as clinically appropriate         09/26/22 0757   OT Last Visit   OT Visit Date 09/26/22   Note Type   Note type Evaluation   Cancel Reasons Intubated/sedated       Osvaldo Vergara MS, OTR/L

## 2022-09-26 NOTE — RESPIRATORY THERAPY NOTE
RT Ventilator Management Note  Linsey Ag 65 y o  male MRN: [id number]  Unit/Bed#: [facility] 13 Encounter: [id number]      Daily Screen         9/25/2022  1659 9/26/2022  0738          Patient safety screen outcome[de-identified] Failed Failed (P)       Not Ready for Weaning due to[de-identified] Underline problem not resolved PEEP > 8cmH2O;Underline problem not resolved (P)                 Physical Exam:   Assessment Type: Assess only  General Appearance: (P) Sedated  Respiratory Pattern: (P) Assisted  Chest Assessment: (P) Chest expansion symmetrical  Bilateral Breath Sounds: (P) Clear  Suction: ET Tube  O2 Device: Vent      Resp Comments: (P) 02 decreased to 50%   order to keep sat >88  Pt has clear bs at this time  Peep level at 10  SBT not indicated

## 2022-09-26 NOTE — OP NOTE
OPERATIVE REPORT  PATIENT NAME: Nohemi Grhaam    :  [id number]  MRN: [id number]  Pt Location: [facility] OR ROOM 02    SURGERY DATE: 2022    Surgeon(s) and Role:     * Cecelia Eli DO - Primary     * Ban Colbert MD - Assisting    Preop Diagnosis:  Small bowel obstruction ([facility] [address] ) [W86 691]    Post-Op Diagnosis Codes:     * Small bowel obstruction ([address] ) [M52 174]    Procedure(s) (LRB):  LAPAROTOMY EXPLORATORY; EXPLANTATION OF ABDOMINAL MESH; WASHOUT (N/A)    Specimen(s):  * No specimens in log *    Estimated Blood Loss:   Minimal    Drains:  Closed/Suction Drain Anterior;Right;Lateral RLQ Bulb 19 Fr  (Active)   Site Description Unable to view 22 1100   Dressing Status Clean; Intact;Dry 22 0800   Drainage Appearance Serous 22 0800   Status To bulb suction 22 0800   Output (mL) 70 mL 22 1100   Number of days: 5       Closed/Suction Drain Left LUQ Bulb 19 Fr  (Active)   Site Description Unable to view 22 1100   Dressing Status Clean;Dry; Intact 22 1100   Drainage Appearance Serosanguineous 22 1100   Status To bulb suction 22 1100   Output (mL) 30 mL 22 1100   Number of days: 0       NG/OG/Enteral Tube Nasogastric 18 Fr Left nare (Active)   Placement Reverification Aspiration 22 0800   Site Assessment Clean;Dry; Intact 22 0800   Status Suction-low intermittent 22 08   Drainage Appearance Bile 22 0800   Intake (mL) 0 mL 22 08   Output (mL) 0 mL 22 0600   Number of days: 6       Urethral Catheter Latex 16 Fr   (Active)   Reasons to continue Urinary Catheter  Accurate I&O assessment in critically ill patients (48 hr  max) 22 040   Goal for Removal Remove after 48 hrs of I/O monitoring 22 0400   Site Assessment Clean;Skin intact 22 0400   Moscoso Care Done 22 0900   Collection Container Standard drainage bag 22 040   Securement Method Securing device (Describe) 22 0400 Output (mL) 300 mL 09/26/22 1100   Number of days: 6       [REMOVED] Closed/Suction Drain Left LLQ Bulb 19 Fr  (Removed)   Site Description Unable to view 09/26/22 0800   Dressing Status Clean;Dry; Intact 09/26/22 0800   Drainage Appearance Serosanguineous; Serous 09/26/22 0800   Status To bulb suction 09/26/22 0800   Output (mL) 30 mL 09/26/22 0800   Number of days: 5       Anesthesia Type:   General    Operative Indications:   Small bowel obstruction ([address] ) [K56 [id number]]    Operative Findings:  Intact duodeno-duodenal anastomosis  Bile tinged serosanguineous collection near small bowel anastomosis without obvious enterotomy  Phasic mesh explanted  Lower part of fascia was closed but most part of fascia was not able to be closed  Skin was closed with interrupted vertical mattress suture    Complications:   None    Procedure and Technique:  The patient was seen in the ICU  The risks, benefits, complications, treatment options, and expected outcomes were discussed with the patient's son  He concurred with the proposed plan, giving informed consent  The patient was taken to Operating Room and the procedure verified  Tthe patient was prepped and draped in the usual sterile fashion  A Time Out was held  Abdomen was opened by cutting the previous suture for mid line closure  The mesh was exposed and this was explanted  We observed extensive interloop adhesion  We gently dissected this adhesion bluntly and exposed the duodeno-duodenal anastomosis  The anastomosis was intact without any bilious collection nearby  Next we dissected interloop adhesion more to explore small bowel anastomosis  The previous repair of small bowel serosal tear was identified, found intact  We encountered moderate amount of bile tinged serosanguineous collection near the small bowel anastomosis  Small bowel anastomosis was inspected carefully and it looked intact and there was no bubble or fluid coming out from the anastomosis with manipulation  Since he was very high risk for anastomotic leak, we decided not to revise this anastomosis  We inspected small bowel as much as possible but was not able to inspect entirely due to dense interloop adhesion  The abdomen was irrigated copiously with warm NS  A 19 Fr cristobal drain was placed near the small bowel anastomosis  The previous 19 Fr brake drain was reposition to behind the duodenal anastomosis  The previous 19 Fr drain on the mesh was removed  The inferior portion of fascia was closed with 1PDS in an interrupted fashion  He had large loss of domain so that we could not close the rest of the fascia  The skin was closed with interrupted mattress suture with 2-0 nylon  A sterile dressing was applied  The patient was then transferred back to the ICU intubated having tolerated the procedure well on 10 mcg/min of levofed        Dr Huseyin Jason was present for the entire procedure    Patient Disposition:  Critical Care Unit        SIGNATURE: [unfilled]  DATE: September 26, 2022  TIME: 11:38 AM

## 2022-09-26 NOTE — PROCEDURES
Arterial Line Insertion    Date/Time: 9/25/2022 8:51 PM  Performed by: Maryjane Franco MD  Authorized by: Maryjane Franco MD     Patient location:  Bedside and ICU  Other Assisting Provider: No    Consent:     Consent obtained:  Emergent situation  Universal protocol:     Patient identity confirmed:  Arm band  Indications:     Indications: multiple ABGs and continuous blood pressure monitoring    Pre-procedure details:     Skin preparation:  Chlorhexidine    Preparation: Patient was prepped and draped in sterile fashion    Anesthesia (see MAR for exact dosages): Anesthesia method:  None  Procedure details:     Location / Tip of Catheter:  Radial    Laterality:  Left    Leon's test performed: yes      Needle gauge:  2 5 Fr    Placement technique:  Ultrasound guided    Ultrasound image availability:  Not saved    Number of attempts:  1    Successful placement: yes      Transducer: waveform confirmed    Post-procedure details:     Post-procedure:  Sterile dressing applied, sutured and secured with tape    CMS:  Normal    Patient tolerance of procedure: Tolerated well, no immediate complications  Comments:        Patient intubated and sedated at time of placement

## 2022-09-26 NOTE — PROGRESS NOTES
General Surgery  Progress Note   Darshana Mcfarland 65 y o  male MRN: [id number]  Unit/Bed#: [facility] 13 Encounter: [id number]    Assessment:  65 y o  M with prior history of a subtotal cholecystectomy and prior aorto bi-iliac bypass (), presented with a closed loop small bowel obstruction, s/p:  · : Ex lap, SBR (jejunum) with primary anastomosis, duodenectomy with duodenoduodenostomy (hand sewn), extensive MARK (>3 hrs), explantation of mesh, completion cholecystectomy, and abthera placement  · : Ex lap, washout, drain placement adjacent to duodenal anastomosis, attempted keofed placement, bridging Phasix mesh placement, abdominal closure  · : Ex lap, washout, explantation of abdominal mesh  Post operative course complicated by ARDS    Febrile to 101 48 yesterday, afebrile this AM  Vent: Bilevel peep high: 30, peep low 0, RR 12, FIO2 90%  Gtt: off levo/vaso since 3:30 AM maintaining maps 58-72    UOP: 1 5 L  OGT: 50 cc bilious  RLQ DILLON 205 cc light serosang   cc light serosang    WBC:  12 88 (11 76)  Hgb:  8 8 (9 3)  Cr:  1 03 (1 06)  T  Bili:  4 72 (3 52)    Plan:  • Continue mechanical ventilation, wean to extubate as able  • NPO/OGT  • Monitor abdominal exam, maintain DILLON drains and monitor output  • Trend pressor requirement  o Off pressors as of 3:30 AM  • Monitor fever/ WBC curve  • Continue PICC/TPN  • DVT ppx: SQH  • Pain/ nausea control PRN  • Care per ICU      Subjective/Objective     Subjective: Patient seen and examined at bedside, in no acute distress  Intubated and sedated  No acute events overnight  Objective:     Vitals:Blood pressure (!) 116/47, pulse 80, temperature 100 4 °F (38 °C), resp  rate (!) 11, height 5' 8" (1 727 m), weight 68 kg (149 lb 14 6 oz), SpO2 99 %  ,Body mass index is 22 79 kg/m²       Temp (24hrs), Av °F (37 8 °C), Min:97 7 °F (36 5 °C), Max:101 48 °F (38 6 °C)  Current: Temperature: 100 4 °F (38 °C)      Intake/Output Summary (Last 24 hours) at 2022 [address] filed at 9/26/2022 1800  Gross per 24 hour   Intake 7098 36 ml   Output 2110 ml   Net 4988 36 ml       Invasive Devices  Report    Peripherally Inserted Central Catheter Line  Duration           PICC Line 82/79/09 Right Basilic 4 days          Peripheral Intravenous Line  Duration           Peripheral IV 09/23/22 Right;Ventral (anterior) Forearm 3 days    Peripheral IV 09/25/22 Distal;Left;Ventral (anterior) Forearm <1 day    Peripheral IV 09/25/22 Proximal;Right;Ventral (anterior) Forearm <1 day          Arterial Line  Duration           Arterial Line 09/26/22 <1 day          Drain  Duration           NG/OG/Enteral Tube Nasogastric 18 Fr Left nare 6 days    Urethral Catheter Latex 16 Fr  6 days    Closed/Suction Drain Anterior;Right;Lateral RLQ Bulb 19 Fr  5 days    Closed/Suction Drain Left LUQ Bulb 19 Fr  <1 day                Physical Exam:  General:  Alert, responds head yes or no to questions  CV: Well perfused, regular rate and rhythm  Lungs: Normal work of breathing, no increased respiratory effort  Abdomen: Soft, distended  Midline incision clean, dry and intact   DILLON drains x 2 with light serosang  Extremities: No edema, clubbing or cyanosis  Skin: Warm, dry    Lab Results:   BMP/CMP:   Lab Results   Component Value Date    SODIUM 147 09/27/2022    K 3 7 09/27/2022     (H) 09/27/2022    CO2 22 09/27/2022    BUN 33 (H) 09/27/2022    CREATININE 1 03 09/27/2022    CALCIUM 7 1 (L) 09/27/2022    AST 30 09/27/2022    ALT 17 09/27/2022    ALKPHOS 69 09/27/2022    EGFR 69 09/27/2022    and CBC:   Lab Results   Component Value Date    WBC 12 88 (H) 09/27/2022    HGB 8 8 (L) 09/27/2022    HCT 28 0 (L) 09/27/2022    MCV 95 09/27/2022     09/27/2022    MCH 29 7 09/27/2022    MCHC 31 4 09/27/2022    RDW 18 1 (H) 09/27/2022    MPV 11 7 09/27/2022     VTE Prophylaxis: Sequential compression device (Venodyne)  and Heparin    Madelin Toledo MD  9/26/2022

## 2022-09-26 NOTE — QUICK NOTE
S: Patient POD #0 from ex-lap, explantation of mesh, partial fascia closure, skin closure    O:  Vitals:    09/26/22 1100   BP:    Pulse: 104   Resp: (!) 24   Temp:    SpO2: 94%     Gen: Sedated, intubated, restrained, ill-appearing  Neuro: GCS 3T E1 V1 M1T, immediately postoperatively on hydromorphone  HEENT: Atraumatic, pupils equal/pinpoint  CV: Tachycardic, regular rhythm  Pulm: Lung sounds significantly diminished  GI: Abdomen softer, distended, ex-lap sites with dressings in place, bilateral lower quadrant drains with serosanguinous drainage  : Moscoso in place with small amount of yellow urine  MSK: Extremities atraumatic    Labs and imaging over the past 24 hours reviewed  A/P  1  POD #0 from ex-lap/washout/skin closure- check stat postop labs, maintain NPO, pain control with hydromorphone infusion  2  Acute blood loss anemia- patient transfused 2u PRBC intraoperatively with appropriate response, would trend q6 for now given pressor needs  3  Acute hypoxic/hypercarbic respiratory failure- continue volume control for now given hypercarbia, attempt to replace arterial line for frequent gases, can consider Flolan  4   Shock- possibly distributive related to numerous inflammatory processes, continue pressors for MAP>65, check stat labs, continue Zosyn for now

## 2022-09-26 NOTE — PROCEDURES
Arterial Line Insertion    Date/Time: 9/26/2022 3:30 PM  Performed by: Jess Nair DO  Authorized by: Jess Nair DO     Patient location:  ICU  Other Assisting Provider: No    Consent:     Consent obtained:  Emergent situation and verbal    Consent given by:  Guardian    Risks discussed:  Bleeding, pain, ischemia, repeat procedure and infection  Universal protocol:     Procedure explained and questions answered to patient or proxy's satisfaction: yes      Relevant documents present and verified: yes      Test results available and properly labeled: yes      Radiology Images displayed and confirmed  If images not available, report reviewed: yes      Required blood products, implants, devices, and special equipment available: yes      Site/side marked: yes      Immediately prior to procedure a time out was called: yes      Patient identity confirmed:  Provided demographic data, hospital-assigned identification number and arm band  Indications:     Indications: hemodynamic monitoring, multiple ABGs and continuous blood pressure monitoring    Pre-procedure details:     Skin preparation:  Chlorhexidine  Anesthesia (see MAR for exact dosages): Anesthesia method:  None  Procedure details:     Laterality:  Right    Leon's test performed: yes      Needle gauge:  22 G    Placement technique:  Seldinger    Number of attempts:  1    Successful placement: yes      Transducer: waveform confirmed    Post-procedure details:     Post-procedure:  Sterile dressing applied and sutured    CMS:  Normal    Patient tolerance of procedure:   Tolerated well, no immediate complications

## 2022-09-26 NOTE — QUICK NOTE
Patient has worsening acidosis and increased pressor requirement  Concern for anastomotic leak  Talked with his son Emily Nicole ([phone]) on the phone and discussed the situation  The son agreed with surgical intervention, including explore laparotomy, possible small bowel resection, and all indicated procedure  Also discussed that patient needs multiple operation and come back to ICU with open abdomen and [facility]

## 2022-09-26 NOTE — RESPIRATORY THERAPY NOTE
RT Ventilator Management Note  Jevon Chambers 65 y o  male MRN: [id number]  Unit/Bed#: [facility] 13 Encounter: [id number]      Daily Screen         9/22/2022  1004 9/25/2022  1006          Patient safety screen outcome[de-identified] Passed Failed      Not Ready for Weaning due to[de-identified] -- Underline problem not resolved      Spont breathing trial outcome[de-identified] Passed --      Name of Medical Team Notified[de-identified] Critical Care Team Stephen VEGA --      Preparing to extubate/ Notify Nurse: Yes --      Extubation order obtained: Yes --      Consider Cuff Test: Yes --      Patient extubated: Yes --                Physical Exam:   Assessment Type: Assess only  General Appearance: Sedated  Respiratory Pattern: Tachypneic, Shallow  Chest Assessment: Chest expansion symmetrical  Bilateral Breath Sounds: Clear  Cough: Productive, Strong  Suction: (P) ET Tube  O2 Device: (P) Vent      Resp Comments: (P) Pt was breath stacking, decreased flow to vent and seemed to help, Pt giving me normal Vt and Ve  No other changes made at this time, will continue to monitor

## 2022-09-26 NOTE — PROGRESS NOTES
Daily Progress Note - Critical Care   Siri Wall 65 y o  male MRN: [id number]  [facility]: [id number]        ----------------------------------------------------------------------------------------  HPI/24hr events: Patient is a 65year old male presenting on 9/20 with a complaint of abdominal pain  He has a past medical history of AAA s/p EVAR in 2009, coronary artery disease s/p stenting, hyperlipidemia, GERD, hypertension, history of subtotal cholecystectomy, and hernia repair  He underwent an exploratory laparotomy with completion of cholecystectomy/small bowel resection with primary anastamosis/resection of duodenum with anastamosis/reduction of hernia/lysis of adhesions and vac placement  [facility]  He returned to the OR on 9/21 for an ex-lap/washout/drain placement/mesh with closure  He had a moderate pressor requirement immediately postoperatively  He was extubated on 9/22 to high flow nasal cannula  He was started on TPN  He was given intermittent diuresis with improvement in his oxygen and pressor requirements  He experienced a rapid decompensation overnight requiring intubation and re-initiation of vasopressors  His ventilator settings have increased overnight and his imaging is notable for increasing opacification bilaterally   He underwent a CT scan following intubation which demonstrated pneumoperitopneum with moderate amount of free fluid in the abdomen and pelvis    ---------------------------------------------------------------------------------------  SUBJECTIVE  Intubated/sedated    Review of Systems   Unable to perform ROS: Intubated       ---------------------------------------------------------------------------------------  Assessment and Plan:    Neuro:   • Diagnosis: Acute encephalopathy  o Plan: Hold sedating medications for now to obtain neuro exam, can continue low dose propofol and dilaudid for ventilatory comfort    CV: • Diagnosis: Shock, coronary artery disease, tricuspid regurgitation  o Plan: Continue vasopressors for SBP>90, consider repeat limited ECHO, consider resuming diuretics    Pulm:  • Diagnosis: Acute hypoxic respiratory failure  o Plan: Consider ARDS, repeat ECHO, continue mechanical ventilation, respiratory protocol     GI:   • Diagnosis: Closed loop bowel obstruction s/p jejunal resection with primary asastomosis, duodenectomy with duodenoduodenostomy with lysis of adhesions drain placement, mesh and closure  o Plan: Continue to follow drain output, appreciate surgery recommendations    :   • Diagnosis: Metabolic acidosis  o Plan: Given one amp of bicarbonate this morning, follow serial labs      F/E/N:   • Plan: Re-order TPN, replete electrolytes as necessary      Heme/Onc:   • Diagnosis: Anemia, thrombocytopenia  o Plan: Follow cell counts closely, consider holding DVT prophylaxis for now    Endo:   o Plan: Follow blood glucose q6, will check TSH wth reflex      ID:   • Diagnosis: SIRS  o Plan: Day 1 of Zosyn, follow culture results/temperature/white count      MSK/Skin:   o Plan: Frequent turning and repositioning    Patient appropriate for transfer out of the ICU today?: No  Disposition: Continue Critical Care   Code Status: Level 1 - Full Code  ---------------------------------------------------------------------------------------  ICU CORE MEASURES    Prophylaxis   VTE Pharmacologic Prophylaxis: Heparin  VTE Mechanical Prophylaxis: sequential compression device  Stress Ulcer Prophylaxis: Pantoprazole IV     ABCDE Protocol (if indicated)  Plan to perform spontaneous awakening trial today? No  Plan to perform spontaneous breathing trial today? No  Obvious barriers to extubation?  Yes  CAM-ICU: unable to assess    Invasive Devices Review  Invasive Devices  Report    Peripherally Inserted Central Catheter Line  Duration           PICC Line 32/11/35 Right Basilic 3 days          Peripheral Intravenous Line Duration           Peripheral IV 22 Right;Ventral (anterior) Forearm 2 days    Peripheral IV 22 Distal;Left;Ventral (anterior) Forearm <1 day    Peripheral IV 22 Proximal;Right;Ventral (anterior) Forearm <1 day          Arterial Line  Duration           Arterial Line 22 Radial <1 day          Drain  Duration           NG/OG/Enteral Tube Nasogastric 18 Fr Left nare 5 days    Urethral Catheter Latex 16 Fr  5 days    Closed/Suction Drain Anterior;Right;Lateral RLQ Bulb 19 Fr  4 days    Closed/Suction Drain Left LLQ Bulb 19 Fr  4 days          Airway  Duration           ETT  Cuffed 8 mm <1 day              Can any invasive devices be discontinued today?  No  ---------------------------------------------------------------------------------------  OBJECTIVE    Vitals   Vitals:    22 0400 22 0500 22 0530 22 0600   BP:    (!) 76/31   BP Location:       Pulse: 94 88 86 86   Resp: 20 21 20 19   Temp: 98 9 °F (37 2 °C)      TempSrc: Axillary      SpO2: 93% 90% 91% (!) 87%   Weight:   68 kg (149 lb 14 6 oz)    Height:         Temp (24hrs), Av 3 °F (36 8 °C), Min:97 7 °F (36 5 °C), Max:99 7 °F (37 6 °C)  Current: Temperature: 98 9 °F (37 2 °C)  HR: 75  BP: 92/27  RR: 30  SpO2: 91%    Respiratory:  SpO2: SpO2: 98 %, SpO2 Activity: SpO2 Activity: At Rest, SpO2 Device: O2 Device: Ventilator  Nasal Cannula O2 Flow Rate (L/min): 10 L/min    Invasive/non-invasive ventilation settings   Respiratory  Report   Lab Data (Last 4 hours)       0408            pH, Arterial       7 289             pCO2, Arterial       46 3             pO2, Arterial       72 3             HCO3, Arterial       21 7             Base Excess, Arterial       -4 8                  O2/Vent Data        022   Most Recent         Vent Mode AC/VC        Resp Rate (BPM) (BPM) 14        Vt (mL) (mL) 450        FIO2 (%) (%) 70        PEEP (cmH2O) (cmH2O) 6        MV 10 9                    Physical Exam  Constitutional:       Appearance: He is ill-appearing  Interventions: He is sedated, intubated and restrained  HENT:      Head: Normocephalic and atraumatic  Mouth/Throat:      Mouth: Mucous membranes are dry  Eyes:      Comments: Pupils equal, pinpoint   Cardiovascular:      Rate and Rhythm: Normal rate and regular rhythm  Pulmonary:      Effort: Pulmonary effort is normal  He is intubated  Breath sounds: Normal breath sounds  Comments: No secretions inline suction  Abdominal:      General: There is distension  Comments: Incision site with dressing in place, no obvious drainage, bilateral lower quadrant drains in place with serousanguinous drainage   Genitourinary:     Comments: Moscoso in place with yellow urine  Musculoskeletal:      Right lower leg: No edema  Left lower leg: No edema  Neurological:      GCS: GCS eye subscore is 1  GCS verbal subscore is 1  GCS motor subscore is 1               Laboratory and Diagnostics:  Results from last 7 days   Lab Units 09/26/22  0000 09/25/22  1615 09/25/22  1603 09/25/22  0509 09/24/22  0505 09/23/22  0410 09/22/22  1154 09/22/22  0723 09/21/22  1818 09/20/22  2301 09/20/22  1943 09/20/22  1358 09/20/22  0230   WBC Thousand/uL 7 31  --  7 89 9 08 8 99 6 90  --  3 57* 8 30   < > 3 25*  --  18 08*   HEMOGLOBIN g/dL 7 2*  --  8 8* 9 0* 9 1* 9 3* 9 1* 7 2* 8 3*   < > 8 3*  --  18 0*   I STAT HEMOGLOBIN g/dl  --  8 2*  --   --   --   --   --   --   --   --   --    < >  --    HEMATOCRIT % 23 6*  --  28 3* 28 4* 28 4* 29 2*  --  22 8* 24 9*   < > 25 8*  --  56 7*   HEMATOCRIT, ISTAT %  --  24*  --   --   --   --   --   --   --   --   --    < >  --    PLATELETS Thousands/uL 127*  --  126* 134* 124* 109*  --  75* 103*   < > 141*  --  328   NEUTROS PCT %  --   --   --   --  79*  --   --   --   --   --   --   --  89*   BANDS PCT %  --   --   --   --   --   --   --   --   --   --  19*  --   --    MONOS PCT %  --   --   --   --  8  --   --   -- --   --   --   --  6   MONO PCT % 3*  --   --   --   --  3*  --   --   --   --  5  --   --     < > = values in this interval not displayed  Results from last 7 days   Lab Units 09/26/22  0000 09/25/22  1615 09/25/22  1603 09/25/22  0640 09/24/22  0505 09/23/22  1457 09/23/22  0734 09/23/22  0000 09/21/22  1205 09/21/22  0451 09/20/22  1358 09/20/22  0232   SODIUM mmol/L 149*  --  146 147 145 145 146 144   < > 150*   < > 138   POTASSIUM mmol/L 3 5  --  3 3* 3 6 3 5 4 0 3 9 4 1   < > 3 3*   < > 4 0   CHLORIDE mmol/L 122*  --  119* 119* 117* 117* 119* 118*   < > 115*   < > 104   CO2 mmol/L 22  --  20* 22 25 22 23 22   < > 26   < > 21   CO2, I-STAT mmol/L  --  20*  --   --   --   --   --   --   --   --    < >  --    ANION GAP mmol/L 5  --  7 6 3* 6 4 4   < > 9   < > 13   BUN mg/dL 23  --  20 18 13 13 17 17   < > 23   < > 22   CREATININE mg/dL 0 76  --  0 85 0 71 0 79 0 84 0 76 0 74   < > 1 15   < > 1 89*   CALCIUM mg/dL 6 6*  --  7 6* 7 7* 7 7* 7 7* 7 8* 7 4*   < > 8 4   < > 10 5*   GLUCOSE RANDOM mg/dL 116  --  129 129 122 141* 130 125   < > 95   < > 134   ALT U/L 22  --   --  30 30  --  24 25  --  37  --  40   AST U/L 30  --   --  39 37  --  31 27  --  39  --  25   ALK PHOS U/L 56  --   --  78 65  --  45* 42*  --  25*  --  158*   ALBUMIN g/dL 2 2*  --   --  2 0* 2 2*  --  2 2* 2 4*  --  3 4*  --  4 2   TOTAL BILIRUBIN mg/dL 3 52*  --   --  4 87* 4 85*  --  4 61* 4 73*  --  2 39*  --  1 25*    < > = values in this interval not displayed       Results from last 7 days   Lab Units 09/26/22  0000 09/25/22  1603 09/25/22  0509 09/24/22  0505 09/23/22  1457 09/23/22  0000 09/22/22  0527   MAGNESIUM mg/dL 2 6 2 2 2 6 2 2 2 4 2 5 2 5   PHOSPHORUS mg/dL 4 0 1 1* 3 8 1 9* 1 6* 1 1* 1 8*               Results from last 7 days   Lab Units 09/25/22  1603 09/22/22  1802 09/22/22  1154 09/22/22  0420 09/22/22  0015 09/21/22  1800 09/21/22  1205   LACTIC ACID mmol/L 1 8 1 7 1 2 1 1 0 8 2 2* 2 1*     ABG:  Results from last 7 days   Lab Units 09/26/22  0408 09/25/22 2050   PH ART  7 289* 7 379   PCO2 ART mm Hg 46 3* 38 5   PO2 ART mm Hg 72 3* 129 6*   HCO3 ART mmol/L 21 7* 22 2   BASE EXC ART mmol/L -4 8 -2 7   ABG SOURCE   --  Radial, Left     VBG:  Results from last 7 days   Lab Units 09/25/22 2050 09/21/22  1206 09/21/22  0712   PH JOSE   --   --  7 411*   PCO2 JOSE mm Hg  --   --  38 6*   PO2 JOSE mm Hg  --   --  38 8   HCO3 JOSE mmol/L  --   --  24 0   BASE EXC JOSE mmol/L  --   --  -0 5   ABG SOURCE  Radial, Left   < >  --     < > = values in this interval not displayed  Micro  Results from last 7 days   Lab Units 09/20/22  0232 09/20/22  0230   BLOOD CULTURE  No Growth After 5 Days  No Growth After 5 Days  EKG: Sinus rhythm  Imaging:  I have personally reviewed pertinent reports  Intake and Output  I/O       09/24 0701 09/25 0700 09/25 0701 09/26 0700    I V  (mL/kg) 40 (0 6) 677 3 (10)    IV Piggyback 349 980 9     7 1015 7    Total Intake(mL/kg) 1386 7 (19 1) 2673 9 (39 3)    Urine (mL/kg/hr) 1860 (1 1) 2225 (1 4)    Emesis/NG output 0 150    Drains 430 285    Total Output 2290 2660    Net -903 3 +13 9              UOP: 50 ml/hr     Height and Weights   Height: 5' 8" (172 7 cm)  IBW (Ideal Body Weight): 68 4 kg  Body mass index is 22 79 kg/m²  Weight (last 2 days)     Date/Time Weight    09/26/22 0530 68 (149 91)            Nutrition       Diet Orders   (From admission, onward)             Start     Ordered    09/20/22 1751  Diet NPO  Diet effective now        References:    Nutrtion Support Algorithm Enteral vs  Parenteral   Question Answer Comment   Diet Type NPO    RD to adjust diet per protocol?  No        09/20/22 1752              Active Medications  Scheduled Meds:  Current Facility-Administered Medications   Medication Dose Route Frequency Provider Last Rate   • Adult TPN (CUSTOM BASE/CUSTOM ELECTROLYTE)   Intravenous Continuous TPN Giovana Jamison MD 75 1 mL/hr at 09/26/22 0100   • chlorhexidine  15 mL Mouth/Throat Q12H [facility] Rod Almodovar PA-C     • heparin (porcine)  5,000 Units Subcutaneous [facility] Mary Anne Santacruz MD     • HYDROmorphone  1 mg/hr Intravenous Continuous Mary Anne Santacruz MD 1 mg/hr (09/26/22 0335)   • HYDROmorphone  0 5 mg Intravenous Q6H Rod Almodovar PA-C     • meclizine  25 mg Oral TID PRN Mary Anne Santacruz MD     • metoprolol  5 mg Intravenous Q6H Adenike Castillo PA-C     • naloxone  0 04 mg Intravenous Q1MIN PRN Mary Anne Santacruz MD     • norepinephrine  1-30 mcg/min Intravenous Titrated Mary Anne Santacruz MD 7 mcg/min (09/26/22 0630)   • ondansetron  4 mg Intravenous Q4H PRN Chely Niño DO     • pantoprazole  40 mg Intravenous Q12H [address] Burrough[address] MD     • phenol  1 spray Mouth/Throat Q2H PRN Marmaritza Cavazos DO     • piperacillin-tazobactam  3 375 g Intravenous Q6H Mary Anne Santacruz MD Stopped (09/26/22 0410)   • potassium phosphate  30 mmol Intravenous Q6H Ge Lewis MD 30 mmol (09/26/22 0140)   • propofol  5-50 mcg/kg/min Intravenous Titrated Mary Anne Santacruz MD 30 mcg/kg/min (09/26/22 0300)   • vasopressin  0 04 Units/min Intravenous Continuous ROOSEVELT Mccarty       Continuous Infusions:  Adult TPN (CUSTOM BASE/CUSTOM ELECTROLYTE), , Last Rate: 75 1 mL/hr at 09/26/22 0100  HYDROmorphone, 1 mg/hr, Last Rate: 1 mg/hr (09/26/22 0335)  norepinephrine, 1-30 mcg/min, Last Rate: 7 mcg/min (09/26/22 0630)  propofol, 5-50 mcg/kg/min, Last Rate: 30 mcg/kg/min (09/26/22 0300)  vasopressin, 0 04 Units/min      PRN Meds:   meclizine, 25 mg, TID PRN  naloxone, 0 04 mg, Q1MIN PRN  ondansetron, 4 mg, Q4H PRN  phenol, 1 spray, Q2H PRN        Allergies   No Known Allergies  ---------------------------------------------------------------------------------------  Advance Directive and Living Will:      Power of Attorney:    POLST:    ---------------------------------------------------------------------------------------  Care Time Delivered:   Upon my evaluation, this patient had a high probability of imminent or life-threatening deterioration due to worsening shock and respiratory failure, which required my direct attention, intervention, and personal management  I have personally provided 60 minutes (0615 to 0715) of critical care time, exclusive of procedures, teaching, family meetings, and any prior time recorded by providers other than myself  ROOSEVELT Kapadia      Portions of the record may have been created with voice recognition software  Occasional wrong word or "sound a like" substitutions may have occurred due to the inherent limitations of voice recognition software    Read the chart carefully and recognize, using context, where substitutions have occurred

## 2022-09-26 NOTE — QUICK NOTE
Post Op Check - General Surgery  Sp Rosales 65 y o  male MRN: [id number]  [facility]: [id number]    Assessment:  65 Year old male POD 0 s/p exploratory laparotomy with washout and explantation of abdominal mesh     Febrile to 101 48  Bilevel: peep high: 30, peep low 0, RR 12, FIO2 90%  Levo 15, vaso 0 04, MAPs 52-62    Plan:  Maintain ETT  NPO/OGT  PICC/TPN  Wean pressors as able  Monitor fever/ WBC  Continue Zosyn  Care per ICU      Subjective/Objective     Subjective: Went to evaluate the patient after arrival to the floor  Remains intubated and sedated  Objective:   Vitals: Blood pressure (!) 116/47, pulse 80, temperature 100 4 °F (38 °C), resp  rate (!) 11, height 5' 8" (1 727 m), weight 68 kg (149 lb 14 6 oz), SpO2 99 %  ,Body mass index is 22 79 kg/m²  I/O       09/24 0701 09/25 0700 09/25 0701 09/26 0700 09/26 0701 09/27 0700    I V  (mL/kg) 40 (0 6) 677 3 (10) 3028 2 (44 5)    Blood   700    IV Piggyback 349 897 5 452 9     7 1015 7 826 1    Total Intake(mL/kg) 1386 7 (19 1) 2590 5 (38 1) 5007 2 (73 6)    Urine (mL/kg/hr) 1860 (1 1) 2225 (1 4) 1045 (1 4)    Emesis/NG output 0 150 50    Drains 430 285 255    Total Output 2290 2660 1350    Net -903 3 -69 5 +3657 2               Physical Exam:  General: Intubated/sedated  CV: Well perfused, regular rate and rhythm  Lungs: Mechanically ventilated   Abdomen: Soft, distended  Incision clean dry and intact   DILLON drain in place  Extremities: No edema, clubbing or cyanosis  Skin: Warm, dry    Imaging and other studies:   CBC with diff:   Lab Results   Component Value Date    WBC 11 76 (H) 09/26/2022    HGB 9 9 (L) 09/26/2022    HCT 30 6 (L) 09/26/2022    MCV 96 09/26/2022     (L) 09/26/2022    MCH 30 2 09/26/2022    MCHC 31 4 09/26/2022    RDW 16 7 (H) 09/26/2022    MPV 11 3 09/26/2022    NRBC 3 (H) 09/26/2022   , BMP/CMP:   Lab Results   Component Value Date    SODIUM 148 (H) 09/26/2022    K 4 2 09/26/2022     (H) 09/26/2022    CO2 19 (L) 09/26/2022    BUN 31 (H) 09/26/2022    CREATININE 1 06 09/26/2022    CALCIUM 7 0 (L) 09/26/2022    AST 30 09/26/2022    ALT 22 09/26/2022    ALKPHOS 56 09/26/2022    EGFR 66 09/26/2022   , Magnesium: No components found for: MAG  VTE Pharmacologic Prophylaxis: Sequential compression device (Venodyne)  and Heparin

## 2022-09-26 NOTE — RESPIRATORY THERAPY NOTE
RT Ventilator Management Note  Karen Manuel 65 y o  male MRN: [id number]  Unit/Bed#: OR POOL Encounter: [id number]      Daily Screen         9/25/2022  1659 9/26/2022  0738          Patient safety screen outcome[de-identified] Failed Failed      Not Ready for Weaning due to[de-identified] Underline problem not resolved PEEP > 8cmH2O;Underline problem not resolved                Physical Exam:   Assessment Type: Assess only  General Appearance: Sedated  Respiratory Pattern: Assisted  Chest Assessment: Chest expansion symmetrical  Bilateral Breath Sounds: Clear  Suction: ET Tube  O2 Device: Vent      Resp Comments: (P) pt transported to OR via 100 % ambubag, and pt returned from OR placed back on vent, will continue to monitor

## 2022-09-27 ENCOUNTER — TELEPHONE (OUTPATIENT)
Dept: RADIOLOGY | Facility: HOSPITAL | Age: 77
End: 2022-09-27

## 2022-09-27 NOTE — PHYSICAL THERAPY NOTE
Physical Therapy Cancellation Note         09/27/22 0827   PT Last Visit   PT Visit Date 09/27/22   Note Type   Note type Evaluation; Cancelled Session   Cancel Reasons Medical status       PT consult received  Chart reviewed  Spoke with RN who reported patient is not medically appropriate for participation in PT at this time  Ongoing open abdomen  PT will continue to follow on caseload and perform initial evaluation as medically appropriate        SCOTT KWON PT, DPT

## 2022-09-27 NOTE — RESPIRATORY THERAPY NOTE
RT Ventilator Management Note  Darshana Mcfarland 65 y o  male MRN: [id number]  Unit/Bed#: [facility] 13 Encounter: [id number]      Daily Screen         9/26/2022  0738 9/27/2022  0756          Patient safety screen outcome[de-identified] Failed Failed (P)       Not Ready for Weaning due to[de-identified] PEEP > 8cmH2O;Underline problem not resolved FiO2 >60%;PEEP > 8cmH2O;Underline problem not resolved; Alternative forms of ventilation (P)                 Physical Exam:   Assessment Type: (P) Assess only  General Appearance: (P) Drowsy  Respiratory Pattern: (P) Assisted  Chest Assessment: (P) Chest expansion symmetrical  Bilateral Breath Sounds: (P) Diminished, Clear  R Breath Sounds: Diminished      Resp Comments: (P) Goal today is to titrate pt off aprv  Sat 99% on 80fio2  Decreased to 70, then 60 with sat 96%  Pt has clear bs  Sx not needed

## 2022-09-27 NOTE — RESPIRATORY THERAPY NOTE
RT Ventilator Management Note  Judd Guevara 65 y o  male MRN: [id number]  Unit/Bed#: [facility] 13 Encounter: [id number]      Daily Screen         9/26/2022  0738 9/27/2022  0756          Patient safety screen outcome[de-identified] Failed Failed      Not Ready for Weaning due to[de-identified] PEEP > 8cmH2O;Underline problem not resolved FiO2 >60%;PEEP > 8cmH2O;Underline problem not resolved; Alternative forms of ventilation                Physical Exam:   Assessment Type: Assess only  General Appearance: Drowsy  Respiratory Pattern: Assisted  Chest Assessment: Chest expansion symmetrical  Bilateral Breath Sounds: Diminished, Clear      Resp Comments: (P) pt appears comfortable on current settings, will continue to monitor

## 2022-09-27 NOTE — PLAN OF CARE
Problem: Potential for Falls  Goal: Patient will remain free of falls  Description: INTERVENTIONS:  - Educate patient/family on patient safety including physical limitations  - Instruct patient to call for assistance with activity   - Consult OT/PT to assist with strengthening/mobility   - Keep Call bell within reach  - Keep bed low and locked with side rails adjusted as appropriate  - Keep care items and personal belongings within reach  - Initiate and maintain comfort rounds  - Make Fall Risk Sign visible to staff  - Offer Toileting every 2 Hours, in advance of need  - Initiate/Maintain bed alarm  - Obtain necessary fall risk management equipment: bed alarm  - Apply yellow socks and bracelet for high fall risk patients  - Consider moving patient to room near nurses station  Outcome: Progressing     Problem: MOBILITY - ADULT  Goal: Maintain or return to baseline ADL function  Description: INTERVENTIONS:  -  Assess patient's ability to carry out ADLs; assess patient's baseline for ADL function and identify physical deficits which impact ability to perform ADLs (bathing, care of mouth/teeth, toileting, grooming, dressing, etc )  - Assess/evaluate cause of self-care deficits   - Assess range of motion  - Assess patient's mobility; develop plan if impaired  - Assess patient's need for assistive devices and provide as appropriate  - Encourage maximum independence but intervene and supervise when necessary  - Involve family in performance of ADLs  - Assess for home care needs following discharge   - Consider OT consult to assist with ADL evaluation and planning for discharge  - Provide patient education as appropriate  Outcome: Progressing  Goal: Maintains/Returns to pre admission functional level  Description: INTERVENTIONS:  - Perform BMAT or MOVE assessment daily    - Set and communicate daily mobility goal to care team and patient/family/caregiver     - Collaborate with rehabilitation services on mobility goals if consulted  - Perform Range of Motion 4 times a day  - Reposition patient every 2 hours    - Dangle patient 0 times a day  - Stand patient 0 times a day  - Ambulate patient 0 times a day  - Out of bed to chair 0 times a day   - Out of bed for meals 0 times a day  - Out of bed for toileting  - Record patient progress and toleration of activity level   Outcome: Progressing     Problem: PAIN - ADULT  Goal: Verbalizes/displays adequate comfort level or baseline comfort level  Description: Interventions:  - Encourage patient to monitor pain and request assistance  - Assess pain using appropriate pain scale  - Administer analgesics based on type and severity of pain and evaluate response  - Implement non-pharmacological measures as appropriate and evaluate response  - Consider cultural and social influences on pain and pain management  - Notify physician/advanced practitioner if interventions unsuccessful or patient reports new pain  Outcome: Progressing     Problem: INFECTION - ADULT  Goal: Absence or prevention of progression during hospitalization  Description: INTERVENTIONS:  - Assess and monitor for signs and symptoms of infection  - Monitor lab/diagnostic results  - Monitor all insertion sites, i e  indwelling lines, tubes, and drains  - Monitor endotracheal if appropriate and nasal secretions for changes in amount and color  - [place] appropriate cooling/warming therapies per order  - Administer medications as ordered  - Instruct and encourage patient and family to use good hand hygiene technique  - Identify and instruct in appropriate isolation precautions for identified infection/condition  Outcome: Progressing  Goal: Absence of fever/infection during neutropenic period  Description: INTERVENTIONS:  - Monitor WBC    Outcome: Progressing     Problem: CARDIOVASCULAR - ADULT  Goal: Absence of cardiac dysrhythmias or at baseline rhythm  Description: INTERVENTIONS:  - Continuous cardiac monitoring, vital signs, obtain 12 lead EKG if ordered  - Administer antiarrhythmic and heart rate control medications as ordered  - Monitor electrolytes and administer replacement therapy as ordered  Outcome: Progressing     Problem: Prexisting or High Potential for Compromised Skin Integrity  Goal: Skin integrity is maintained or improved  Description: INTERVENTIONS:  - Identify patients at risk for skin breakdown  - Assess and monitor skin integrity  - Assess and monitor nutrition and hydration status  - Monitor labs   - Assess for incontinence   - Turn and reposition patient  - Assist with mobility/ambulation  - Relieve pressure over bony prominences  - Avoid friction and shearing  - Provide appropriate hygiene as needed including keeping skin clean and dry  - Evaluate need for skin moisturizer/barrier cream  - Collaborate with interdisciplinary team   - Patient/family teaching  - Consider wound care consult   Outcome: Progressing     Problem: DISCHARGE PLANNING  Goal: Discharge to home or other facility with appropriate resources  Description: INTERVENTIONS:  - Identify barriers to discharge w/patient and caregiver  - Arrange for needed discharge resources and transportation as appropriate  - Identify discharge learning needs (meds, wound care, etc )  - Arrange for interpretive services to assist at discharge as needed  - Refer to Case Management Department for coordinating discharge planning if the patient needs post-hospital services based on physician/advanced practitioner order or complex needs related to functional status, cognitive ability, or social support system  Outcome: Progressing     Problem: Knowledge Deficit  Goal: Patient/family/caregiver demonstrates understanding of disease process, treatment plan, medications, and discharge instructions  Description: Complete learning assessment and assess knowledge base    Interventions:  - Provide teaching at level of understanding  - Provide teaching via preferred learning methods  Outcome: Progressing     Problem: Nutrition/Hydration-ADULT  Goal: Nutrient/Hydration intake appropriate for improving, restoring or maintaining nutritional needs  Description: Monitor and assess patient's nutrition/hydration status for malnutrition  Collaborate with interdisciplinary team and initiate plan and interventions as ordered  Monitor patient's weight and dietary intake as ordered or per policy  Utilize nutrition screening tool and intervene as necessary  Determine patient's food preferences and provide high-protein, high-caloric foods as appropriate       INTERVENTIONS:  - Monitor oral intake, urinary output, labs, and treatment plans  - Assess nutrition and hydration status and recommend course of action  - Evaluate amount of meals eaten  - Assist patient with eating if necessary   - Allow adequate time for meals  - Recommend/ encourage appropriate diets, oral nutritional supplements, and vitamin/mineral supplements  - Order, calculate, and assess calorie counts as needed  - Recommend, monitor, and adjust tube feedings and TPN/PPN based on assessed needs  - Assess need for intravenous fluids  - Provide specific nutrition/hydration education as appropriate  - Include patient/family/caregiver in decisions related to nutrition  Outcome: Progressing     Problem: SAFETY,RESTRAINT: NV/NON-SELF DESTRUCTIVE BEHAVIOR  Goal: Remains free of harm/injury (restraint for non violent/non self-detsructive behavior)  Description: INTERVENTIONS:  - Instruct patient/family regarding restraint use   - Assess and monitor physiologic and psychological status   - Provide interventions and comfort measures to meet assessed patient needs   - Identify and implement measures to help patient regain control  - Assess readiness for release of restraint   Outcome: Progressing  Goal: Returns to optimal restraint-free functioning  Description: INTERVENTIONS:  - Assess the patient's behavior and symptoms that indicate continued need for restraint  - Identify and implement measures to help patient regain control  - Assess readiness for release of restraint   Outcome: Progressing

## 2022-09-27 NOTE — NUTRITION
09/27/22 1312   Biochemical Data,Medical Tests, and Procedures   Biochemical Data/Medical Tests/Procedures Lab values reviewed; Meds reviewed   Recommendations/Interventions   Summary bowel obstruction, POD #1 s/p washout and skin closure s/p jejunal resection with primary anastomosis duodenal-duoddenoduodenostomy with lysis of adhesion, hyperbilirubinemia  Continues on TPN  Current regimen [id number] kcal and 94g protein , meeting 100% of estimated protein -calorie needs  EMR wts vary,   Recommendations to Provider Continue Current TPN regimen of 70% dextrose in 400 mL, 20% lipids in 275 mL, 15% AA in 625 mL

## 2022-09-27 NOTE — PROGRESS NOTES
Daily Progress Note - Critical Care   Carlos Gallego 65 y o  male MRN: [id number]  Unit/Bed#: MICU 13 Encounter: [id number]        ----------------------------------------------------------------------------------------  HPI/24hr events: Patient transitioned to APRV yesterday with good effect, pressor needs decreased, hemoglobin stable after 2u PRBC intraoperatively, febrile overnight    ---------------------------------------------------------------------------------------  SUBJECTIVE  Intubated/sedated    Review of Systems   Unable to perform ROS: Intubated     ---------------------------------------------------------------------------------------  Assessment and Plan:    Neuro:   • Diagnosis: Acute encephalopathy  o Plan: Continue hydromorphone infusion for pain/ventilatory comfort, if blood pressures continue to improve can add Precedex/propofol for decreased narcotic need    CV:   • Diagnosis: Shock, coronary artery disease, tricuspid regurgitation  o Plan: Continue pressors to maintain MAP>65, would consider repeat limited ECHO    Pulm:  • Diagnosis: Acute hypoxic/hypercarbic respiratory failure  o Plan: Attempt to reduce FiO2, continue APRV, respiratory protocol    GI:   • Diagnosis: Bowel obstruction POD #1 from washout and skin closure s/p jejunal resection with primary anastomosis duodenal-duoddenoduodenostomy with lysis of adhesion, hyperbilirubinemia   o Plan: Postoperative care per the surgery service, pending RUQ US, advanced vent settings likely preclude MRI at this time     :   • Diagnosis: Metabolic acidosis- improving  o Plan: Follow q6 ABGs, close intake and output      F/E/N:   • Plan: Re-order TPN and adjust electrolytes, NPO, repeat chemistry/phos this afternoon      Heme/Onc:   • Diagnosis: Acute blood loss anemia s/p 2u PRBC on 9/26, thrombocytopenia- improved  o Plan: Follow blood counts q12, continue heparin for DVT prophylaxis    Endo:   • Diagnosis: Hyperglycemia  o Plan: Begin sliding scale insulin and check A1c    ID:   • Diagnosis: Sepsis of unclear etiology  o Plan: Day 2 of Zosyn, check MRSA swab, follow temperature/white count/cultures    MSK/Skin:   o Plan: Frequent turning and repositioning    Patient appropriate for transfer out of the ICU today?: No  Disposition: Continue Critical Care   Code Status: Level 1 - Full Code  ---------------------------------------------------------------------------------------  ICU CORE MEASURES    Prophylaxis   VTE Pharmacologic Prophylaxis: Heparin  VTE Mechanical Prophylaxis: sequential compression device  Stress Ulcer Prophylaxis: Pantoprazole IV     ABCDE Protocol (if indicated)  Plan to perform spontaneous awakening trial today? No  Plan to perform spontaneous breathing trial today? Yes  Obvious barriers to extubation? Yes  CAM-ICU: unable to assess    Invasive Devices Review  Invasive Devices  Report    Peripherally Inserted Central Catheter Line  Duration           PICC Line 84/47/64 Right Basilic 4 days          Peripheral Intravenous Line  Duration           Peripheral IV 09/23/22 Right;Ventral (anterior) Forearm 3 days    Peripheral IV 09/25/22 Proximal;Right;Ventral (anterior) Forearm 1 day    Peripheral IV 09/27/22 Distal;Left;Ventral (anterior) Forearm <1 day          Arterial Line  Duration           Arterial Line 09/26/22 <1 day          Drain  Duration           NG/OG/Enteral Tube Nasogastric 18 Fr Left nare 6 days    Urethral Catheter Latex 16 Fr  6 days    Closed/Suction Drain Anterior;Right;Lateral RLQ Bulb 19 Fr  5 days    Closed/Suction Drain Left LUQ Bulb 19 Fr  <1 day              Can any invasive devices be discontinued today?  No  ---------------------------------------------------------------------------------------  OBJECTIVE    Vitals   Vitals:    09/27/22 0300 09/27/22 0400 09/27/22 0500 09/27/22 0600   BP:       BP Location:       Pulse: 70 70 96 90   Resp: (!) 11 (!) 11 (!) 11 (!) 11   Temp: 100 04 °F (37 8 °C) 99 68 °F (37 6 °C) 99 68 °F (37 6 °C) 99 68 °F (37 6 °C)   TempSrc:  Esophageal     SpO2: 97% 97% 97% 97%   Weight:    75 9 kg (167 lb 5 3 oz)   Height:         Temp (24hrs), Av 9 °F (37 7 °C), Min:98 96 °F (37 2 °C), Max:101 48 °F (38 6 °C)  Current: Temperature: 99 68 °F (37 6 °C)  HR: 88  BP: 98/36  RR: 22  SpO2: 100%    Respiratory:  SpO2: SpO2: 97 %, SpO2 Activity: SpO2 Activity: At Rest, SpO2 Device: O2 Device: Ventilator  Nasal Cannula O2 Flow Rate (L/min): 10 L/min    Invasive/non-invasive ventilation settings   Respiratory  Report   Lab Data (Last 4 hours)       0415            pH, Arterial       7 371             pCO2, Arterial       37 6             pO2, Arterial       273 4             HCO3, Arterial       21 3             Base Excess, Arterial       -3 6                  O2/Vent Data     None                Physical Exam  Constitutional:       Appearance: He is toxic-appearing  Interventions: He is sedated, intubated and restrained  HENT:      Head: Normocephalic and atraumatic  Mouth/Throat:      Mouth: Mucous membranes are dry  Eyes:      Pupils: Pupils are equal, round, and reactive to light  Cardiovascular:      Rate and Rhythm: Regular rhythm  Tachycardia present  Pulmonary:      Effort: Pulmonary effort is normal  He is intubated  Breath sounds: Decreased breath sounds present  Abdominal:      General: There is distension  Palpations: Abdomen is soft  Tenderness: There is abdominal tenderness  Comments: Large/bulky abdominal dressing in place with binder with no evidence of significant drainage  Bilateral lower quadrant JPs in place with small amount of mostly serous drainage   Genitourinary:     Comments: Moscoso in place with small amount of dark yellow urine  Musculoskeletal:         General: Swelling present  No signs of injury  Right lower leg: Edema present  Left lower leg: Edema present  Skin:     General: Skin is warm and dry     Neurological: Mental Status: He is easily aroused  GCS: GCS eye subscore is 3  GCS verbal subscore is 1  GCS motor subscore is 6  Comments: Exam performed on hydromorphone       Laboratory and Diagnostics:  Results from last 7 days   Lab Units 09/27/22  0547 09/26/22  1915 09/26/22  1546 09/26/22  1147 09/26/22  0803 09/26/22  0000 09/25/22  1615 09/25/22  1603 09/25/22  0509 09/24/22  0505 09/23/22  0410 09/20/22  2301 09/20/22  1943   WBC Thousand/uL 12 88*  --   --  11 76* 8 93 7 31  --  7 89 9 08 8 99 6 90   < > 3 25*   HEMOGLOBIN g/dL 8 8* 9 3* 9 9* 9 6* 6 8* 7 2*  --  8 8* 9 0* 9 1* 9 3*   < > 8 3*   I STAT HEMOGLOBIN g/dl  --   --   --   --   --   --  8 2*  --   --   --   --   --   --    HEMATOCRIT % 28 0* 28 8*  --  30 6* 22 4* 23 6*  --  28 3* 28 4* 28 4* 29 2*   < > 25 8*   HEMATOCRIT, ISTAT %  --   --   --   --   --   --  24*  --   --   --   --   --   --    PLATELETS Thousands/uL 156  --   --  148* 139* 127*  --  126* 134* 124* 109*   < > 141*   NEUTROS PCT %  --   --   --   --   --   --   --   --   --  79*  --   --   --    BANDS PCT %  --   --   --   --   --   --   --   --   --   --   --   --  19*   MONOS PCT %  --   --   --   --   --   --   --   --   --  8  --   --   --    MONO PCT %  --   --   --   --   --  3*  --   --   --   --  3*  --  5    < > = values in this interval not displayed       Results from last 7 days   Lab Units 09/27/22  0425 09/26/22  1640 09/26/22  1147 09/26/22  0640 09/26/22  0000 09/25/22  1615 09/25/22  1603 09/25/22  0640 09/24/22  0505 09/23/22  1457 09/23/22  0734 09/23/22  0000 09/21/22  1205 09/21/22  0451   SODIUM mmol/L 147 148* 149* 148* 149*  --  146 147 145   < > 146 144   < > 150*   POTASSIUM mmol/L 3 7 4 2 4 4 4 1 3 5  --  3 3* 3 6 3 5   < > 3 9 4 1   < > 3 3*   CHLORIDE mmol/L 116* 119* 120* 119* 122*  --  119* 119* 117*   < > 119* 118*   < > 115*   CO2 mmol/L 22 19* 24 23 22  --  20* 22 25   < > 23 22   < > 26   CO2, I-STAT mmol/L  --   --   --   --   --  20*  --   -- --   --   --   --   --   --    ANION GAP mmol/L 9 10 5 6 5  --  7 6 3*   < > 4 4   < > 9   BUN mg/dL 33* 31* 26* 25 23  --  20 18 13   < > 17 17   < > 23   CREATININE mg/dL 1 03 1 06 0 98 1 03 0 76  --  0 85 0 71 0 79   < > 0 76 0 74   < > 1 15   CALCIUM mg/dL 7 1* 7 0* 7 0* 7 7* 6 6*  --  7 6* 7 7* 7 7*   < > 7 8* 7 4*   < > 8 4   GLUCOSE RANDOM mg/dL 197* 172* 164* 123 116  --  129 129 122   < > 130 125   < > 95   ALT U/L 17  --   --   --  22  --   --  30 30  --  24 25  --  37   AST U/L 30  --   --   --  30  --   --  39 37  --  31 27  --  39   ALK PHOS U/L 69  --   --   --  56  --   --  78 65  --  45* 42*  --  25*   ALBUMIN g/dL 2 2*  --   --   --  2 2*  --   --  2 0* 2 2*  --  2 2* 2 4*  --  3 4*   TOTAL BILIRUBIN mg/dL 4 72*  --   --   --  3 52*  --   --  4 87* 4 85*  --  4 61* 4 73*  --  2 39*    < > = values in this interval not displayed       Results from last 7 days   Lab Units 09/27/22  0425 09/26/22  0000 09/25/22  1603 09/25/22  0509 09/24/22  0505 09/23/22  1457 09/23/22  0000   MAGNESIUM mg/dL 2 8* 2 6 2 2 2 6 2 2 2 4 2 5   PHOSPHORUS mg/dL 1 7* 4 0 1 1* 3 8 1 9* 1 6* 1 1*      Results from last 7 days   Lab Units 09/26/22  1147 09/26/22  0803   INR  1 16 1 13   PTT seconds  --  36          Results from last 7 days   Lab Units 09/26/22  1640 09/26/22  1147 09/26/22  0640 09/25/22  1603 09/22/22  1802 09/22/22  1154 09/22/22  0420   LACTIC ACID mmol/L 1 9 1 8 1 0 1 8 1 7 1 2 1 1     ABG:  Results from last 7 days   Lab Units 09/27/22  0415 09/26/22 1915   PH ART  7 371 7 349*   PCO2 ART mm Hg 37 6 35 3*   PO2 ART mm Hg 273 4* 144 8*   HCO3 ART mmol/L 21 3* 19 0*   BASE EXC ART mmol/L -3 6 -6 0   ABG SOURCE   --  Line, Arterial     VBG:  Results from last 7 days   Lab Units 09/26/22 1915 09/26/22  1341 09/26/22  1147   PH JOSE   --   --  7 177*   PCO2 JOSE mm Hg  --   --  62 0*   PO2 JOSE mm Hg  --   --  51 9*   HCO3 JOSE mmol/L  --   --  22 5*   BASE EXC JOSE mmol/L  --   --  -6 3   ABG SOURCE  Line, Arterial   < >  --     < > = values in this interval not displayed  Micro  Results from last 7 days   Lab Units 09/25/22  2150   BLOOD CULTURE  Received in Microbiology Lab  Culture in Progress  Received in Microbiology Lab  Culture in Progress  EKG: Sinus rhythm  Imaging:  I have personally reviewed pertinent reports  and I have personally reviewed pertinent films in PACS    Intake and Output  I/O       09/25 0701 09/26 0700 09/26 0701 09/27 0700    I V  (mL/kg) 677 3 (10) 4321 3 (56 9)    Blood  700    IV Piggyback 897 5 652 9    TPN 1015 7 1653 6    Total Intake(mL/kg) 2590 5 (38 1) 7327 8 (96 5)    Urine (mL/kg/hr) 2225 (1 4) 1540 (0 8)    Emesis/NG output 150 50    Drains 285 395    Total Output 2660 1985    Net -69 5 +5342 8              UOP: 75 ml/hr     Height and Weights   Height: 5' 8" (172 7 cm)  IBW (Ideal Body Weight): 68 4 kg  Body mass index is 25 44 kg/m²  Weight (last 2 days)     Date/Time Weight    09/27/22 0600 75 9 (167 33)    09/26/22 0914 --    Comment rows:    OBSERV: patient intubated, id band verification, SBAR to anes direct transport to or 2 at 09/26/22 0914    09/26/22 0530 68 (149 91)            Nutrition       Diet Orders   (From admission, onward)             Start     Ordered    09/20/22 1751  Diet NPO  Diet effective now        References:    Nutrtion Support Algorithm Enteral vs  Parenteral   Question Answer Comment   Diet Type NPO    RD to adjust diet per protocol?  No        09/20/22 1752              Active Medications  Scheduled Meds:  Current Facility-Administered Medications   Medication Dose Route Frequency Provider Last Rate   • acetaminophen  650 mg Rectal Q6H PRN Jodene Severance, CRNP     • Adult TPN (CUSTOM BASE/CUSTOM ELECTROLYTE)   Intravenous Continuous TPN Jodene Severance, CRNP 55 4 mL/hr at 09/27/22 0220   • chlorhexidine  15 mL Mouth/Throat Q12H [address] Jodene Severance, CRNP     • heparin (porcine)  5,000 Units Subcutaneous [facility] [address] Grzegorz Zaragoza ROOSEVELT [place]     • HYDROmorphone  1 mg/hr Intravenous Continuous Jodene Severance, CRNP 1 mg/hr (09/27/22 0610)   • naloxone  0 04 mg Intravenous Q1MIN PRN Jodene Severance, CRNP     • norepinephrine  1-30 mcg/min Intravenous Titrated Jodene Severance, CRNP 2 mcg/min (09/27/22 0505)   • ondansetron  4 mg Intravenous Q4H PRN Jodene Severance, CRNP     • pantoprazole  40 mg Intravenous Q12H [address] Jodene Severance, CRNP     • piperacillin-tazobactam  3 375 g Intravenous Q6H Jodene Severance, CRNP Stopped (09/27/22 0525)   • potassium phosphate  30 mmol Intravenous Once Mauricio Bassett MD 30 mmol (09/27/22 0406)   • vasopressin  0 04 Units/min Intravenous Continuous Jodene Severance, CRNP Stopped (09/27/22 0400)     Continuous Infusions:  Adult TPN (CUSTOM BASE/CUSTOM ELECTROLYTE), , Last Rate: 55 4 mL/hr at 09/27/22 0220  HYDROmorphone, 1 mg/hr, Last Rate: 1 mg/hr (09/27/22 0610)  norepinephrine, 1-30 mcg/min, Last Rate: 2 mcg/min (09/27/22 0505)  vasopressin, 0 04 Units/min, Last Rate: Stopped (09/27/22 0400)      PRN Meds:   acetaminophen, 650 mg, Q6H PRN  naloxone, 0 04 mg, Q1MIN PRN  ondansetron, 4 mg, Q4H PRN        Allergies   No Known Allergies  ---------------------------------------------------------------------------------------  Advance Directive and Living Will:      Power of :    POLST:    ---------------------------------------------------------------------------------------  Care Time Delivered:   No Critical Care time spent     Jodene Severance, CRNP      Portions of the record may have been created with voice recognition software  Occasional wrong word or "sound a like" substitutions may have occurred due to the inherent limitations of voice recognition software    Read the chart carefully and recognize, using context, where substitutions have occurred

## 2022-09-27 NOTE — PLAN OF CARE
Problem: Potential for Falls  Goal: Patient will remain free of falls  Description: INTERVENTIONS:  - Educate patient/family on patient safety including physical limitations  - Instruct patient to call for assistance with activity   - Consult OT/PT to assist with strengthening/mobility   - Keep Call bell within reach  - Keep bed low and locked with side rails adjusted as appropriate  - Keep care items and personal belongings within reach  - Initiate and maintain comfort rounds  - Make Fall Risk Sign visible to staff  - Offer Toileting every n/a Hours, in advance of need  - Initiate/Maintain bed alarm  - Obtain necessary fall risk management equipment: bed alarm  - Apply yellow socks and bracelet for high fall risk patients  - Consider moving patient to room near nurses station  Outcome: Progressing     Problem: MOBILITY - ADULT  Goal: Maintain or return to baseline ADL function  Description: INTERVENTIONS:  -  Assess patient's ability to carry out ADLs; assess patient's baseline for ADL function and identify physical deficits which impact ability to perform ADLs (bathing, care of mouth/teeth, toileting, grooming, dressing, etc )  - Assess/evaluate cause of self-care deficits   - Assess range of motion  - Assess patient's mobility; develop plan if impaired  - Assess patient's need for assistive devices and provide as appropriate  - Encourage maximum independence but intervene and supervise when necessary  - Involve family in performance of ADLs  - Assess for home care needs following discharge   - Consider OT consult to assist with ADL evaluation and planning for discharge  - Provide patient education as appropriate  Outcome: Progressing  Goal: Maintains/Returns to pre admission functional level  Description: INTERVENTIONS:  - Perform BMAT or MOVE assessment daily    - Set and communicate daily mobility goal to care team and patient/family/caregiver     - Collaborate with rehabilitation services on mobility goals if consulted  - Perform Range of Motion 3 times a day  - Reposition patient every 2 hours    - Dangle patient 0 times a day  - Stand patient 0 times a day  - Ambulate patient 0 times a day  - Out of bed to chair 0 times a day   - Out of bed for meals 0 times a day  - Out of bed for toileting  - Record patient progress and toleration of activity level   Outcome: Progressing     Problem: SAFETY,RESTRAINT: NV/NON-SELF DESTRUCTIVE BEHAVIOR  Goal: Remains free of harm/injury (restraint for non violent/non self-detsructive behavior)  Description: INTERVENTIONS:  - Instruct patient/family regarding restraint use   - Assess and monitor physiologic and psychological status   - Provide interventions and comfort measures to meet assessed patient needs   - Identify and implement measures to help patient regain control  - Assess readiness for release of restraint   Outcome: Progressing  Goal: Returns to optimal restraint-free functioning  Description: INTERVENTIONS:  - Assess the patient's behavior and symptoms that indicate continued need for restraint  - Identify and implement measures to help patient regain control  - Assess readiness for release of restraint   Outcome: Progressing     Problem: PAIN - ADULT  Goal: Verbalizes/displays adequate comfort level or baseline comfort level  Description: Interventions:  - Encourage patient to monitor pain and request assistance  - Assess pain using appropriate pain scale  - Administer analgesics based on type and severity of pain and evaluate response  - Implement non-pharmacological measures as appropriate and evaluate response  - Consider cultural and social influences on pain and pain management  - Notify physician/advanced practitioner if interventions unsuccessful or patient reports new pain  Outcome: Progressing     Problem: INFECTION - ADULT  Goal: Absence or prevention of progression during hospitalization  Description: INTERVENTIONS:  - Assess and monitor for signs and symptoms of infection  - Monitor lab/diagnostic results  - Monitor all insertion sites, i e  indwelling lines, tubes, and drains  - Monitor endotracheal if appropriate and nasal secretions for changes in amount and color  - [place] appropriate cooling/warming therapies per order  - Administer medications as ordered  - Instruct and encourage patient and family to use good hand hygiene technique  - Identify and instruct in appropriate isolation precautions for identified infection/condition  Outcome: Progressing  Goal: Absence of fever/infection during neutropenic period  Description: INTERVENTIONS:  - Monitor WBC    Outcome: Progressing     Problem: DISCHARGE PLANNING  Goal: Discharge to home or other facility with appropriate resources  Description: INTERVENTIONS:  - Identify barriers to discharge w/patient and caregiver  - Arrange for needed discharge resources and transportation as appropriate  - Identify discharge learning needs (meds, wound care, etc )  - Arrange for interpretive services to assist at discharge as needed  - Refer to Case Management Department for coordinating discharge planning if the patient needs post-hospital services based on physician/advanced practitioner order or complex needs related to functional status, cognitive ability, or social support system  Outcome: Progressing     Problem: Knowledge Deficit  Goal: Patient/family/caregiver demonstrates understanding of disease process, treatment plan, medications, and discharge instructions  Description: Complete learning assessment and assess knowledge base    Interventions:  - Provide teaching at level of understanding  - Provide teaching via preferred learning methods  Outcome: Progressing     Problem: CARDIOVASCULAR - ADULT  Goal: Absence of cardiac dysrhythmias or at baseline rhythm  Description: INTERVENTIONS:  - Continuous cardiac monitoring, vital signs, obtain 12 lead EKG if ordered  - Administer antiarrhythmic and heart rate control medications as ordered  - Monitor electrolytes and administer replacement therapy as ordered  Outcome: Progressing     Problem: Prexisting or High Potential for Compromised Skin Integrity  Goal: Skin integrity is maintained or improved  Description: INTERVENTIONS:  - Identify patients at risk for skin breakdown  - Assess and monitor skin integrity  - Assess and monitor nutrition and hydration status  - Monitor labs   - Assess for incontinence   - Turn and reposition patient  - Assist with mobility/ambulation  - Relieve pressure over bony prominences  - Avoid friction and shearing  - Provide appropriate hygiene as needed including keeping skin clean and dry  - Evaluate need for skin moisturizer/barrier cream  - Collaborate with interdisciplinary team   - Patient/family teaching  - Consider wound care consult   Outcome: Progressing     Problem: Nutrition/Hydration-ADULT  Goal: Nutrient/Hydration intake appropriate for improving, restoring or maintaining nutritional needs  Description: Monitor and assess patient's nutrition/hydration status for malnutrition  Collaborate with interdisciplinary team and initiate plan and interventions as ordered  Monitor patient's weight and dietary intake as ordered or per policy  Utilize nutrition screening tool and intervene as necessary  Determine patient's food preferences and provide high-protein, high-caloric foods as appropriate       INTERVENTIONS:  - Monitor oral intake, urinary output, labs, and treatment plans  - Assess nutrition and hydration status and recommend course of action  - Evaluate amount of meals eaten  - Assist patient with eating if necessary   - Allow adequate time for meals  - Recommend/ encourage appropriate diets, oral nutritional supplements, and vitamin/mineral supplements  - Order, calculate, and assess calorie counts as needed  - Recommend, monitor, and adjust tube feedings and TPN/PPN based on assessed needs  - Assess need for intravenous fluids  - Provide specific nutrition/hydration education as appropriate  - Include patient/family/caregiver in decisions related to nutrition  Outcome: Progressing

## 2022-09-28 NOTE — PROGRESS NOTES
Daily Progress Note - Critical Care   Krystina Currie 65 y o  male MRN: [id number]  Unit/Bed#: [facility] 13 Encounter: [id number]        ----------------------------------------------------------------------------------------  HPI/24hr events: Patient with improving pressor and vent requirements yesterday, transitioned to pressure control ventilation yesterday, completed MRCP/RUQ US which are awaiting interpretation    ---------------------------------------------------------------------------------------  SUBJECTIVE  Intubated/sedated    Review of Systems   Unable to perform ROS: Intubated     ---------------------------------------------------------------------------------------  Assessment and Plan:    Neuro:   • Diagnosis: Acute encephalopathy- improving  o Plan: Continue hydromorphone infusion for pain/ventilatory comfort, could attempt to reduce infusion rate with PRN fentanyl, frequent neurologic checks    CV:   • Diagnosis: Shock, coronary artery disease  o Plan: Continue pressors to maintain MAP>65, judicious volume resuscitation      Pulm:  • Diagnosis: Acute hypoxic/hypercarbic respiratory failure  o Plan: Continue pressure control ventilation, attempt to wean FiO2 to 40% prior to reducing pressure support, respiratory protocol    GI:   • Diagnosis: Bowel obstruction POD #2 from washout with skin closuure and s/p jejunal rescition with primary anastomosis, duodenal-duodenoduodenostomy with lysis of adhesions, hyperbilirubinemia  o Plan: Postoperative care per the surgery service, continue IV BID PPI, pending RUQ US/MRCP interpretation    :   • Diagnosis: Hypernatremia  o Plan: Could consider reducing dextrose concentration in TPN for increased free water however was recently concentrated to reduce volume      F/E/N:   • Plan: Re-order TPN after repeating chemistry in early afternoon to adjust electrolytes, NPO      Heme/Onc:   • Diagnosis: Acute blood loss anemia, thrombocytopenia- resolved  o Plan: Follow blood counts daily, continue heparin for DVT prophylaxis    Endo:   • Diagnosis: Hypergylcemia- resolved  o Plan: Can discontinue sliding scale at this time, A1c within normal limits    ID:   • Diagnosis: Sepsis of unclear etiology  o Plan: Day 3 of Zosyn, follow culture results/temperature/white count, if abdominal imaging unrevealing consider discontinuing    MSK/Skin:   o Plan: Frequent turning and repositioning    Patient appropriate for transfer out of the ICU today?: No  Disposition: Continue Critical Care   Code Status: Level 1 - Full Code  ---------------------------------------------------------------------------------------  ICU CORE MEASURES    Prophylaxis   VTE Pharmacologic Prophylaxis: Heparin  VTE Mechanical Prophylaxis: sequential compression device  Stress Ulcer Prophylaxis: Pantoprazole IV     ABCDE Protocol (if indicated)  Plan to perform spontaneous awakening trial today? Yes  Plan to perform spontaneous breathing trial today? No  Obvious barriers to extubation? Yes  CAM-ICU: Unable to assess    Invasive Devices Review  Invasive Devices  Report    Peripherally Inserted Central Catheter Line  Duration           PICC Line 57/98/88 Right Basilic 5 days          Peripheral Intravenous Line  Duration           Peripheral IV 09/25/22 Proximal;Right;Ventral (anterior) Forearm 2 days    Peripheral IV 09/27/22 Distal;Left;Ventral (anterior) Forearm 1 day          Arterial Line  Duration           Arterial Line 09/26/22 1 day          Drain  Duration           NG/OG/Enteral Tube Nasogastric 18 Fr Left nare 7 days    Closed/Suction Drain Anterior;Right;Lateral RLQ Bulb 19 Fr  6 days    Closed/Suction Drain Left LUQ Bulb 19 Fr  1 day    Urethral Catheter Temperature probe 16 Fr  <1 day          Airway  Duration           ETT  Hi-Lo 8 mm 3 days              Can any invasive devices be discontinued today? No  ---------------------------------------------------------------------------------------  OBJECTIVE    Vitals   Vitals:    22 0400 22 0500 22 0530 22 0600   BP:       BP Location:       Pulse: 86 74  66   Resp: (!) 23 (!) 23  (!) 24   Temp: 98 96 °F (37 2 °C) 98 96 °F (37 2 °C)  98 96 °F (37 2 °C)   TempSrc: Bladder      SpO2: 97% 96%  96%   Weight:   74 8 kg (164 lb 14 5 oz)    Height:         Temp (24hrs), Av 6 °F (37 6 °C), Min:98 96 °F (37 2 °C), Max:100 04 °F (37 8 °C)  Current: Temperature: 98 96 °F (37 2 °C)  HR: 70  BP: 105/28  RR: 24  SpO2: 96%    Respiratory:  SpO2: SpO2: 96 %, SpO2 Activity: SpO2 Activity: At Rest, SpO2 Device: O2 Device: Ventilator  Nasal Cannula O2 Flow Rate (L/min): 10 L/min    Invasive/non-invasive ventilation settings   Respiratory  Report   Lab Data (Last 4 hours)       0600            pH, Arterial       7 408             pCO2, Arterial       36 9             pO2, Arterial       136 2             HCO3, Arterial       22 8             Base Excess, Arterial       -1 7                  O2/Vent Data           Most Recent         Vent Mode   AC/PC      Resp Rate (BPM) (BPM)   24      Pressure Control (cmH2O) (cm)   14      Insp Time (sec) (sec)   0 8      FiO2 (%) (%)   60      PEEP (cmH2O) (cmH2O)   10      MV   15                  Physical Exam  Constitutional:       General: He is not in acute distress  Appearance: He is ill-appearing  Interventions: He is sedated, intubated and restrained  HENT:      Head: Normocephalic  Mouth/Throat:      Mouth: Mucous membranes are dry  Eyes:      Pupils: Pupils are equal, round, and reactive to light  Cardiovascular:      Rate and Rhythm: Normal rate and regular rhythm  Pulmonary:      Effort: Pulmonary effort is normal  He is intubated  Breath sounds: Examination of the right-middle field reveals decreased breath sounds   Examination of the left-middle field reveals decreased breath sounds  Examination of the right-lower field reveals decreased breath sounds  Examination of the left-lower field reveals decreased breath sounds  Decreased breath sounds present  Comments: Small amount of white secretions inline suciton  Abdominal:      General: There is distension  Palpations: Abdomen is soft  Tenderness: There is abdominal tenderness  Comments: Large, bulky dressing in place with no obvious drainage  Bilateral lower quadrant JPs in place with serous drainage   Genitourinary:     Comments: Moscoso in place with yellow urine  Musculoskeletal:         General: Swelling (across all extremities) present  No signs of injury  Right lower leg: Edema present  Left lower leg: Edema present  Skin:     General: Skin is warm and dry  Neurological:      GCS: GCS eye subscore is 3  GCS verbal subscore is 1  GCS motor subscore is 6               Laboratory and Diagnostics:  Results from last 7 days   Lab Units 09/28/22  0425 09/27/22  0547 09/26/22  1915 09/26/22  1546 09/26/22  1147 09/26/22  0803 09/26/22  0000 09/25/22  1615 09/25/22  1603 09/25/22  0509 09/24/22  0505 09/23/22  0410   WBC Thousand/uL 11 43* 12 88*  --   --  11 76* 8 93 7 31  --  7 89 9 08 8 99 6 90   HEMOGLOBIN g/dL 8 6* 8 8* 9 3* 9 9* 9 6* 6 8* 7 2*  --  8 8* 9 0* 9 1* 9 3*   I STAT HEMOGLOBIN g/dl  --   --   --   --   --   --   --  8 2*  --   --   --   --    HEMATOCRIT % 27 1* 28 0* 28 8*  --  30 6* 22 4* 23 6*  --  28 3* 28 4* 28 4* 29 2*   HEMATOCRIT, ISTAT %  --   --   --   --   --   --   --  24*  --   --   --   --    PLATELETS Thousands/uL 202 156  --   --  148* 139* 127*  --  126* 134* 124* 109*   NEUTROS PCT %  --   --   --   --   --   --   --   --   --   --  79*  --    MONOS PCT %  --   --   --   --   --   --   --   --   --   --  8  --    MONO PCT %  --   --   --   --   --   --  3*  --   --   --   --  3*     Results from last 7 days   Lab Units 09/28/22  0425 09/27/22  1515 09/27/22  0425 09/26/22  1640 09/26/22  1147 09/26/22  0640 09/26/22  0000 09/25/22  1603 09/25/22  0640 09/24/22  0505 09/23/22  1457 09/23/22  0734 09/23/22  0000   SODIUM mmol/L 150* 148* 147 148* 149* 148* 149*   < > 147 145   < > 146 144   POTASSIUM mmol/L 3 4* 3 7 3 7 4 2 4 4 4 1 3 5   < > 3 6 3 5   < > 3 9 4 1   CHLORIDE mmol/L 119* 117* 116* 119* 120* 119* 122*   < > 119* 117*   < > 119* 118*   CO2 mmol/L 24 23 22 19* 24 23 22   < > 22 25   < > 23 22   CO2, I-STAT   --   --   --   --   --   --   --    < >  --   --   --   --   --    ANION GAP mmol/L 7 8 9 10 5 6 5   < > 6 3*   < > 4 4   BUN mg/dL 34* 39* 33* 31* 26* 25 23   < > 18 13   < > 17 17   CREATININE mg/dL 1 01 1 20 1 03 1 06 0 98 1 03 0 76   < > 0 71 0 79   < > 0 76 0 74   CALCIUM mg/dL 7 5* 7 4* 7 1* 7 0* 7 0* 7 7* 6 6*   < > 7 7* 7 7*   < > 7 8* 7 4*   GLUCOSE RANDOM mg/dL 137 177* 197* 172* 164* 123 116   < > 129 122   < > 130 125   ALT U/L 17  --  17  --   --   --  22  --  30 30  --  24 25   AST U/L 33  --  30  --   --   --  30  --  39 37  --  31 27   ALK PHOS U/L 82  --  69  --   --   --  56  --  78 65  --  45* 42*   ALBUMIN g/dL 1 9*  --  2 2*  --   --   --  2 2*  --  2 0* 2 2*  --  2 2* 2 4*   TOTAL BILIRUBIN mg/dL 3 68*  --  4 72*  --   --   --  3 52*  --  4 87* 4 85*  --  4 61* 4 73*    < > = values in this interval not displayed       Results from last 7 days   Lab Units 09/28/22  0425 09/27/22  1515 09/27/22  0425 09/26/22  0000 09/25/22  1603 09/25/22  0509 09/24/22  0505   MAGNESIUM mg/dL 2 8* 2 8* 2 8* 2 6 2 2 2 6 2 2   PHOSPHORUS mg/dL 1 8* 2 3 1 7* 4 0 1 1* 3 8 1 9*      Results from last 7 days   Lab Units 09/26/22  1147 09/26/22  0803   INR  1 16 1 13   PTT seconds  --  36          Results from last 7 days   Lab Units 09/26/22  1640 09/26/22  1147 09/26/22  0640 09/25/22  1603 09/22/22  1802 09/22/22  1154 09/22/22  0420   LACTIC ACID mmol/L 1 9 1 8 1 0 1 8 1 7 1 2 1 1     ABG:  Results from last 7 days   Lab Units 09/28/22  0600   PH ART  7 408 PCO2 ART mm Hg 36 9   PO2 ART mm Hg 136 2*   HCO3 ART mmol/L 22 8   BASE EXC ART mmol/L -1 7   ABG SOURCE  Line, Arterial     VBG:  Results from last 7 days   Lab Units 09/28/22  0600 09/26/22  1341 09/26/22  1147   PH JOSE   --   --  7 177*   PCO2 JOSE mm Hg  --   --  62 0*   PO2 JOSE mm Hg  --   --  51 9*   HCO3 JOSE mmol/L  --   --  22 5*   BASE EXC JOSE mmol/L  --   --  -6 3   ABG SOURCE  Line, Arterial   < >  --     < > = values in this interval not displayed  Micro  Results from last 7 days   Lab Units 09/25/22  2150   BLOOD CULTURE  No Growth at 24 hrs  No Growth at 24 hrs  EKG: Review of telemetry demonstrates sinus rhythm  Imaging:  I have personally reviewed pertinent reports  and I have personally reviewed pertinent films in PACS    Intake and Output  I/O       09/26 0701 09/27 0700 09/27 0701 09/28 0700    I V  (mL/kg) 4321 3 (56 9) 290 9 (3 9)    Blood 700     NG/GT  0    IV Piggyback 652 9 563 3    TPN 1653 6 1334 9    Total Intake(mL/kg) 7327 8 (96 5) 2189 1 (29 3)    Urine (mL/kg/hr) 1540 (0 8) 1350 (0 8)    Emesis/NG output 50 400    Drains 395 275    Total Output 1985 2025    Net +5342 8 +164 1              UOP: 50 ml/hr     Height and Weights   Height: 5' 8" (172 7 cm)  IBW (Ideal Body Weight): 68 4 kg  Body mass index is 25 07 kg/m²  Weight (last 2 days)     Date/Time Weight    09/28/22 0530 74 8 (164 9)    09/27/22 0600 75 9 (167 33)    09/26/22 0914 --    Comment rows:    OBSERV: patient intubated, id band verification, SBAR to anes direct transport to or 2 at 09/26/22 0914    09/26/22 0530 68 (149 91)            Nutrition       Diet Orders   (From admission, onward)             Start     Ordered    09/20/22 1751  Diet NPO  Diet effective now        References:    Nutrtion Support Algorithm Enteral vs  Parenteral   Question Answer Comment   Diet Type NPO    RD to adjust diet per protocol?  No        09/20/22 0421              Active Medications  Scheduled Meds:  Current Facility-Administered Medications   Medication Dose Route Frequency Provider Last Rate   • acetaminophen  650 mg Rectal Q6H PRN ROOSEVELT Marin     • Adult TPN (CUSTOM BASE/CUSTOM ELECTROLYTE)   Intravenous Continuous TPN ROOSEVELT Marin 55 5 mL/hr at 09/27/22 2310   • chlorhexidine  15 mL Mouth/Throat Q12H [facility] ROOSEVELT Mccarty     • fentanyl citrate (PF)  50 mcg Intravenous Q2H PRN Lashonda Mc DO     • heparin (porcine)  5,000 Units Subcutaneous [facility] ROOSEVELT Marin     • HYDROmorphone  1 mg/hr Intravenous Continuous ROOSEVELT Marin 1 mg/hr (09/27/22 2315)   • insulin lispro  1-6 Units Subcutaneous Q6H [facility] ROOSEVELT Mccarty     • naloxone  0 04 mg Intravenous Q1MIN PRN ROOSEVELT Marin     • norepinephrine  1-30 mcg/min Intravenous Titrated ROOSEVELT Marin 3 mcg/min (09/28/22 0210)   • ondansetron  4 mg Intravenous Q4H PRN ROOSEVELT Marin     • pantoprazole  40 mg Intravenous Q12H [facility] ROOSEVELT Marin     • piperacillin-tazobactam  3 375 g Intravenous Q6H ROOSEVELT Marin Stopped (09/28/22 0645)   • potassium phosphate  30 mmol Intravenous Once ROOSEVELT Marin       Continuous Infusions:  Adult TPN (CUSTOM BASE/CUSTOM ELECTROLYTE), , Last Rate: 55 5 mL/hr at 09/27/22 2310  HYDROmorphone, 1 mg/hr, Last Rate: 1 mg/hr (09/27/22 2315)  norepinephrine, 1-30 mcg/min, Last Rate: 3 mcg/min (09/28/22 0210)      PRN Meds:   acetaminophen, 650 mg, Q6H PRN  fentanyl citrate (PF), 50 mcg, Q2H PRN  naloxone, 0 04 mg, Q1MIN PRN  ondansetron, 4 mg, Q4H PRN        Allergies   No Known Allergies  ---------------------------------------------------------------------------------------  Advance Directive and Living Will:      Power of :    POLST:    ---------------------------------------------------------------------------------------  Care Time Delivered:   No Critical Care time spent     ROOSEVELT Marin      Portions of the record may have been created with voice recognition software  Occasional wrong word or "sound a like" substitutions may have occurred due to the inherent limitations of voice recognition software    Read the chart carefully and recognize, using context, where substitutions have occurred

## 2022-09-28 NOTE — PHYSICAL THERAPY NOTE
PT orders received  Chart reviewed  Pt is intubated/sedated and not appropriate for PT will hold  Will continue to follow  Stoney Sánchez, PT, DPT     09/28/22 1115   PT Last Visit   PT Visit Date 09/28/22   Note Type   Note type Evaluation; Cancelled Session   Cancel Reasons Intubated/sedated

## 2022-09-28 NOTE — RESPIRATORY THERAPY NOTE
RT Ventilator Management Note  Yves Becker 65 y o  male MRN: [id number]  Unit/Bed#: [facility] 13 Encounter: [id number]      Daily Screen         9/26/2022  0738 9/27/2022  0756          Patient safety screen outcome[de-identified] Failed Failed      Not Ready for Weaning due to[de-identified] PEEP > 8cmH2O;Underline problem not resolved FiO2 >60%;PEEP > 8cmH2O;Underline problem not resolved; Alternative forms of ventilation                Physical Exam:   Assessment Type: Assess only  General Appearance: Sleeping  Respiratory Pattern: Assisted  Chest Assessment: Chest expansion symmetrical  Bilateral Breath Sounds: Diminished, Clear  Cough: Productive  Suction: ET Tube  O2 Device: Ventilator      Resp Comments: Pt  remains on AC/PC mode  No changes at this time  Will continue to assess and monitor pt per protocol

## 2022-09-28 NOTE — RESPIRATORY THERAPY NOTE
RT Ventilator Management Note  Reynold Casillas 65 y o  male MRN: [id number]  Unit/Bed#: MICU 13 Encounter: [id number]       09/28/22 0806   Respiratory Assessment   Assessment Type Assess only   General Appearance Awake; Alert   Respiratory Pattern Assisted   Chest Assessment Chest expansion symmetrical   Bilateral Breath Sounds Diminished;Clear   Cough Productive   Suction ET Tube   Resp Comments Morning routine vent check  New vent orders acknowledged and in effect  Pt remains on [facility] PC  resting comfortably  Suction lines flushed and entact  Vent alarms functional and on  No weaning trials likely this AM  Readjusted cuff pressure and tube relocated to opposite side of mouth  Will continue to monitor     O2 Device    AC/PC Settings   Resp Rate (BPM) 24 BPM   Pressure Control (cmH2O) 14 cm H2O   Insp Time (sec) 0 8 sec   FiO2 (%) 50 %   PEEP (cmH2O) 10 cmH2O   Insp Rise Time (%) 0 8 %   Trigger Sensitivity Flow (lpm) 2   Trigger Sensitivity Pressure (cm H2O) 2   Humidification Heater   Heater Temp 98 6 °F (37 °C)   AC/PC ACTUALS   Resp Rate (BPM) 24 BPM   VT (mL) 597 mL   MV 14 7   MAP (cmH2O) 14 7 cmH2O   Peak Pressure (cmH2O) 28 cmH2O   Static Compliance (mL/cmH2O) 59 mL/cmH2O   Plateau Pressure (BBC4H) 20 cmH2O   Heater Temperature (Obs) 98 8 °F (37 1 °C)   AC/PC Alarms    High Peak Pressure (cmH2O) 45 cmH2O   High Resp Rate (BPM) 45 BPM   High MV (L/min) 24 L/min   Low MV (L/min) 4 L/min   High Priscilla VT (mL) 1000 mL   Low Priscilla VT (mL) 250 mL   High Spont VT (mL) 1000 mL   Low Spont VT (mL) 200 mL   AC/PC Apnea Settings   Resp Rate (BMP) 24 BPM   Pressure Control (cmH2O) 14 cmH2O   Inspiratory Time (S) 0 8 S   FiO2 (%) 100 %   Apnea Time (s) 20 S   Maintenance   Alarm (pink) cable attached Yes   Resuscitation bag with peep valve at bedside Yes   Water bag changed No   Circuit changed No   Daily Screen   Patient safety screen outcome: Failed   Not Ready for Weaning due to: PEEP > 8cmH2O;Underline problem not resolved   IHI Ventilator Associated Pneumonia Bundle   Daily Awakening Trials Performed Yes   Daily Assessment of Readiness to Extubate Yes   Head of Bed Elevated HOB 30   ETT  Hi-Lo 8 mm   Placement Date: 09/25/22   Type: Hi-Lo  Tube Size: 8 mm  Location: Oral  Secured at (cm): 24   Secured at (cm) 22   Measured from Lips   Secured Location Left   Repositioned Left to Right   Secured by Commercial tube bliss   Site Condition Dry   Cuff Pressure (cm H2O) 29 cm H2O   HI-LO Suction  Intermittent suction   HI-LO Secretions Scant   HI-LO Intervention Patent       Daily Screen         9/27/2022  0756 9/28/2022  0806          Patient safety screen outcome[de-identified] Failed Failed      Not Ready for Weaning due to[de-identified] FiO2 >60%;PEEP > 8cmH2O;Underline problem not resolved; Alternative forms of ventilation PEEP > 8cmH2O;Underline problem not resolved                Physical Exam:   Assessment Type: Assess only  General Appearance: Awake, Alert  Respiratory Pattern: Assisted  Chest Assessment: Chest expansion symmetrical  Bilateral Breath Sounds: Diminished, Clear  Cough: Productive  Suction: ET Tube  O2 Device:       Resp Comments: Morning routine vent check  New vent orders acknowledged and in effect  Pt remains on [facility] PC  resting comfortably  Suction lines flushed and entact  Vent alarms functional and on  No weaning trials likely this AM  Readjusted cuff pressure and tube relocated to opposite side of mouth  Will continue to monitor

## 2022-09-28 NOTE — RESPIRATORY THERAPY NOTE
RT Ventilator Management Note  Sterling Lucas 65 y o  male MRN: [id number]  Unit/Bed#: [facility] 13 Encounter: [id number]     Vent in [facility]  P/C  Mode     09/28/22 1448   Respiratory Assessment   Resp Comments 2nd vent parameter change this afternoon initited by covering MDs  RN informed and aware  Pt displaying a bit of agitation and RNs contacting MDs for new med orders and pt has none now on s/b  Pt is holding his Vt's steady with a RR of only around 22 to 24 bpm   HR  102,  /50 and RNs aware  Suctioned with no return of any significance  Vent check conducted by [facility] Student  Will continue to monitor     O2 Device  vent   AC/PC Settings   Resp Rate (BPM) 24 BPM   Pressure Control (cmH2O) (S)  12 cm H2O  (Just lowered by MDs prior to this vent check )   Insp Time (sec) 0 8 sec   FiO2 (%) 40 %   PEEP (cmH2O) 10 cmH2O   Insp Rise Time (%) 0 9 %   Trigger Sensitivity Flow (lpm) 2   Trigger Sensitivity Pressure (cm H2O) 2   Humidification Heater   Heater Temp 98 6 °F (37 °C)   AC/PC ACTUALS   Resp Rate (BPM) 28 BPM   VT (mL) 647 mL   MV 16 1   MAP (cmH2O) 16 cmH2O   Peak Pressure (cmH2O) 26 cmH2O   Heater Temperature (Obs) 97 5 °F (36 4 °C)   AC/PC Alarms    High Peak Pressure (cmH2O) 45 cmH2O   High Resp Rate (BPM) 45 BPM   High MV (L/min) (S)  26 5 L/min   Low MV (L/min) 4 L/min   High Priscilla VT (mL) 1000 mL   Low Priscilla VT (mL) 250 mL   High Spont VT (mL) 1000 mL   Low Spont VT (mL) 200 mL   AC/PC Apnea Settings   Pressure Control (cmH2O) 14 cmH2O   Inspiratory Time (S) 0 8 S   FiO2 (%) 100 %   Apnea Time (s) 20 S   Maintenance   Water bag changed No   Circuit changed No   Daily Screen   Patient safety screen outcome: Failed   Not Ready for Weaning due to: PEEP > 8cmH2O   Spont breathing trial % for 30 min No   Spont breathing trial outcome: Failed   Preparing to extubate/ Notify Nurse No (comment)   ETT  Hi-Lo 8 mm   Placement Date: 09/25/22   Type: Hi-Lo  Tube Size: 8 mm Location: Oral  Secured at (cm): 24   Secured at (cm) 22   Measured from Lips   Secured Location Left   Secured by Commercial tube bliss   Site Condition Dry   Cuff Pressure (cm H2O) 29 cm H2O   HI-LO Suction  Intermittent suction   HI-LO Secretions Scant   HI-LO Intervention Flushed       Daily Screen         9/28/2022  1254 9/28/2022  1448          Patient safety screen outcome[de-identified] Failed Failed      Not Ready for Weaning due to[de-identified] PEEP > 8cmH2O PEEP > 8cmH2O      Spont breathing trial % for 30 min: -- No      Spont breathing trial outcome[de-identified] -- Failed      Preparing to extubate/ Notify Nurse: -- No (comment)                Physical Exam:   Assessment Type: Assess only  General Appearance: Awake, Alert  Respiratory Pattern: Assisted  Chest Assessment: Chest expansion symmetrical  Bilateral Breath Sounds: Diminished, Clear  Cough: Productive  Suction: ET Tube  O2 Device:  vent      Resp Comments: 2nd vent parameter change this afternoon initited by covering MDs  RN informed and aware  Pt displaying a bit of agitation and RNs contacting MDs for new med orders and pt has none now on s/b  Pt is holding his Vt's steady with a RR of only around 22 to 24 bpm   HR  102,  /50 and RNs aware  Suctioned with no return of any significance  Vent check conducted by [facility] Student  Will continue to monitor

## 2022-09-28 NOTE — PROGRESS NOTES
Progress Note - Klever Porter 65 y o  male MRN: [id number]    Unit/Bed#: MICU 13 Encounter: [id number]      Assessment:  64 y/o M w closed loop SBO s/p ex lap partial SBR 9/20, bridging phasix mesh 9/21, ex lap w explantation of mesh 9/26  Awake and follow commands, remains intubated, on minimal pressors (levo 3)  Unable to wean off overnight  Uop: 1 3L  NGT: 400 bilious  DILLON L: 170 serous  DILLON R: 105 serous    Labs: 2bc: 12-> 11  Cr: 1 01  t bili: 4 7-> 3 68    Abdomen is tender and distended throughout  Midline intact incision is with minimal serous seepage  Plan:  Wean vent and pressure support trial today  Wean levo  Continue npo/ ngt wait return of bowel function prior to starting tube feeds  Continue tpn for now  Follow up MRCP  Trend bili  Continue iv abx zosyn  Appreciate critical care  Subjective:   No acute events overnight  Awake and follows commands  Denied chest pain  Endorses abd discomfort with exam      Objective:     Vitals: Blood pressure 105/53, pulse 66, temperature 98 96 °F (37 2 °C), resp  rate (!) 24, height 5' 8" (1 727 m), weight 74 8 kg (164 lb 14 5 oz), SpO2 96 %  ,Body mass index is 25 07 kg/m²  Intake/Output Summary (Last 24 hours) at 9/28/2022 0627  Last data filed at 9/28/2022 0600  Gross per 24 hour   Intake 2089 08 ml   Output 2025 ml   Net 64 08 ml       Physical Exam  General: NAD  HEENT: NC/AT  MMM  Cv: RRR  Lungs: normal effort  Ab: Soft, tender  Distended  Ex: no CCE  Neuro: Alert and awake       Scheduled Meds:  Current Facility-Administered Medications   Medication Dose Route Frequency Provider Last Rate   • [facility]amin[facility]  650 [facility]     • Adult TPN (CUSTOM BASE/CUSTOM ELECTROLYTE)   Intravenous Continuous [facility]PN [facility] 55 5 mL/hr at 09/27/22 2310   • chlorhexidine  15 mL Mouth/Throat Q12H [address] ROOSEVELT Mccarty     • fentanyl citrate (PF)  50 mcg Intravenous Q2H PRN Don Melton DO     • heparin (porcine)  5,000 Units Subcutaneous [facility] Monica Knack, CRNP     • HYDROmorphone  1 mg/hr Intravenous Continuous Monica Knack, CRNP 1 mg/hr (09/27/22 2315)   • norepinephrine  1-30 mcg/min Intravenous Titrated Monica Knack, CRNP 3 mcg/min (09/28/22 0210)   • ondansetron  4 mg Intravenous Q4H PRN Monica Knack, CRNP     • pantoprazole  40 mg Intravenous Q12H [address] Monica Knack, CRNP     • piperacillin-tazobactam  3 375 g Intravenous Q6H Monica Knack, CRNP Stopped (09/28/22 0645)   • potassium phosphate  30 mmol Intravenous Once Monica Knack, CRNP 30 mmol (09/28/22 0817)     Continuous Infusions:Adult TPN (CUSTOM BASE/CUSTOM ELECTROLYTE), , Last Rate: 55 5 mL/hr at 09/27/22 2310  HYDROmorphone, 1 mg/hr, Last Rate: 1 mg/hr (09/27/22 2315)  norepinephrine, 1-30 mcg/min, Last Rate: 3 mcg/min (09/28/22 0210)      PRN Meds: •  acetaminophen  •  fentanyl citrate (PF)  •  ondansetron      Invasive Devices  Report    Peripherally Inserted Central Catheter Line  Duration           PICC Line 45/67/10 Right Basilic 5 days          Peripheral Intravenous Line  Duration           Peripheral IV 09/25/22 Proximal;Right;Ventral (anterior) Forearm 2 days    Peripheral IV 09/27/22 Distal;Left;Ventral (anterior) Forearm 1 day          Arterial Line  Duration           Arterial Line 09/26/22 1 day          Drain  Duration           NG/OG/Enteral Tube Nasogastric 18 Fr Left nare 7 days    Closed/Suction Drain Anterior;Right;Lateral RLQ Bulb 19 Fr  6 days    Closed/Suction Drain Left LUQ Bulb 19 Fr  1 day    Urethral Catheter Temperature probe 16 Fr  <1 day          Airway  Duration           ETT  Hi-Lo 8 mm 3 days                Lab, Imaging and other studies: I have personally reviewed pertinent reports      VTE Pharmacologic Prophylaxis: Sequential compression device (Venodyne)   VTE Mechanical Prophylaxis: sequential compression device

## 2022-09-28 NOTE — PLAN OF CARE
Problem: Potential for Falls  Goal: Patient will remain free of falls  Description: INTERVENTIONS:  - Educate patient/family on patient safety including physical limitations  - Instruct patient to call for assistance with activity   - Consult OT/PT to assist with strengthening/mobility   - Keep Call bell within reach  - Keep bed low and locked with side rails adjusted as appropriate  - Keep care items and personal belongings within reach  - Initiate and maintain comfort rounds  - Make Fall Risk Sign visible to staff  - Offer Toileting every n/a Hours, in advance of need  - Initiate/Maintain bed alarm  - Obtain necessary fall risk management equipment: bed alarm  - Apply yellow socks and bracelet for high fall risk patients  - Consider moving patient to room near nurses station  Outcome: Progressing     Problem: MOBILITY - ADULT  Goal: Maintain or return to baseline ADL function  Description: INTERVENTIONS:  -  Assess patient's ability to carry out ADLs; assess patient's baseline for ADL function and identify physical deficits which impact ability to perform ADLs (bathing, care of mouth/teeth, toileting, grooming, dressing, etc )  - Assess/evaluate cause of self-care deficits   - Assess range of motion  - Assess patient's mobility; develop plan if impaired  - Assess patient's need for assistive devices and provide as appropriate  - Encourage maximum independence but intervene and supervise when necessary  - Involve family in performance of ADLs  - Assess for home care needs following discharge   - Consider OT consult to assist with ADL evaluation and planning for discharge  - Provide patient education as appropriate  Outcome: Progressing  Goal: Maintains/Returns to pre admission functional level  Description: INTERVENTIONS:  - Perform BMAT or MOVE assessment daily    - Set and communicate daily mobility goal to care team and patient/family/caregiver     - Collaborate with rehabilitation services on mobility goals if consulted  - Perform Range of Motion 3 times a day  - Reposition patient every 2 hours    - Dangle patient n/a times a day  - Stand patient n/a times a day  - Ambulate patient n/a times a day  - Out of bed to chair n/a times a day   - Out of bed for meals n/a times a day  - Out of bed for toileting  - Record patient progress and toleration of activity level   Outcome: Progressing     Problem: SAFETY,RESTRAINT: NV/NON-SELF DESTRUCTIVE BEHAVIOR  Goal: Remains free of harm/injury (restraint for non violent/non self-detsructive behavior)  Description: INTERVENTIONS:  - Instruct patient/family regarding restraint use   - Assess and monitor physiologic and psychological status   - Provide interventions and comfort measures to meet assessed patient needs   - Identify and implement measures to help patient regain control  - Assess readiness for release of restraint   Outcome: Progressing  Goal: Returns to optimal restraint-free functioning  Description: INTERVENTIONS:  - Assess the patient's behavior and symptoms that indicate continued need for restraint  - Identify and implement measures to help patient regain control  - Assess readiness for release of restraint   Outcome: Progressing     Problem: PAIN - ADULT  Goal: Verbalizes/displays adequate comfort level or baseline comfort level  Description: Interventions:  - Encourage patient to monitor pain and request assistance  - Assess pain using appropriate pain scale  - Administer analgesics based on type and severity of pain and evaluate response  - Implement non-pharmacological measures as appropriate and evaluate response  - Consider cultural and social influences on pain and pain management  - Notify physician/advanced practitioner if interventions unsuccessful or patient reports new pain  Outcome: Progressing     Problem: INFECTION - ADULT  Goal: Absence or prevention of progression during hospitalization  Description: INTERVENTIONS:  - Assess and monitor for signs and symptoms of infection  - Monitor lab/diagnostic results  - Monitor all insertion sites, i e  indwelling lines, tubes, and drains  - Monitor endotracheal if appropriate and nasal secretions for changes in amount and color  - [place] appropriate cooling/warming therapies per order  - Administer medications as ordered  - Instruct and encourage patient and family to use good hand hygiene technique  - Identify and instruct in appropriate isolation precautions for identified infection/condition  Outcome: Progressing  Goal: Absence of fever/infection during neutropenic period  Description: INTERVENTIONS:  - Monitor WBC    Outcome: Progressing     Problem: DISCHARGE PLANNING  Goal: Discharge to home or other facility with appropriate resources  Description: INTERVENTIONS:  - Identify barriers to discharge w/patient and caregiver  - Arrange for needed discharge resources and transportation as appropriate  - Identify discharge learning needs (meds, wound care, etc )  - Arrange for interpretive services to assist at discharge as needed  - Refer to Case Management Department for coordinating discharge planning if the patient needs post-hospital services based on physician/advanced practitioner order or complex needs related to functional status, cognitive ability, or social support system  Outcome: Progressing     Problem: Knowledge Deficit  Goal: Patient/family/caregiver demonstrates understanding of disease process, treatment plan, medications, and discharge instructions  Description: Complete learning assessment and assess knowledge base    Interventions:  - Provide teaching at level of understanding  - Provide teaching via preferred learning methods  Outcome: Progressing     Problem: CARDIOVASCULAR - ADULT  Goal: Absence of cardiac dysrhythmias or at baseline rhythm  Description: INTERVENTIONS:  - Continuous cardiac monitoring, vital signs, obtain 12 lead EKG if ordered  - Administer antiarrhythmic and heart rate control medications as ordered  - Monitor electrolytes and administer replacement therapy as ordered  Outcome: Progressing     Problem: Prexisting or High Potential for Compromised Skin Integrity  Goal: Skin integrity is maintained or improved  Description: INTERVENTIONS:  - Identify patients at risk for skin breakdown  - Assess and monitor skin integrity  - Assess and monitor nutrition and hydration status  - Monitor labs   - Assess for incontinence   - Turn and reposition patient  - Assist with mobility/ambulation  - Relieve pressure over bony prominences  - Avoid friction and shearing  - Provide appropriate hygiene as needed including keeping skin clean and dry  - Evaluate need for skin moisturizer/barrier cream  - Collaborate with interdisciplinary team   - Patient/family teaching  - Consider wound care consult   Outcome: Progressing     Problem: Nutrition/Hydration-ADULT  Goal: Nutrient/Hydration intake appropriate for improving, restoring or maintaining nutritional needs  Description: Monitor and assess patient's nutrition/hydration status for malnutrition  Collaborate with interdisciplinary team and initiate plan and interventions as ordered  Monitor patient's weight and dietary intake as ordered or per policy  Utilize nutrition screening tool and intervene as necessary  Determine patient's food preferences and provide high-protein, high-caloric foods as appropriate       INTERVENTIONS:  - Monitor oral intake, urinary output, labs, and treatment plans  - Assess nutrition and hydration status and recommend course of action  - Evaluate amount of meals eaten  - Assist patient with eating if necessary   - Allow adequate time for meals  - Recommend/ encourage appropriate diets, oral nutritional supplements, and vitamin/mineral supplements  - Order, calculate, and assess calorie counts as needed  - Recommend, monitor, and adjust tube feedings and TPN/PPN based on assessed needs  - Assess need for intravenous fluids  - Provide specific nutrition/hydration education as appropriate  - Include patient/family/caregiver in decisions related to nutrition  Outcome: Progressing

## 2022-09-28 NOTE — OCCUPATIONAL THERAPY NOTE
OT CANCEL NOTE    OT orders received  Chart reviewed  Pt is currently intubated/sedated and not appropriate to engage in skilled OT services at this time  Will hold initial OT evaluation  Will continue to follow pt on caseload and see pt when medically stable and as clinically appropriate  09/28/22 0758   OT Last Visit   OT Visit Date 09/28/22   Note Type   Note type Evaluation; Cancelled Session   Cancel Reasons Intubated/sedated       Alex Cristobal MS, OTR/L

## 2022-09-29 NOTE — PROGRESS NOTES
Daily Progress Note - Critical Care   Sp Rosales 65 y o  male MRN: [id number]  Unit/Bed#: [facility] 13 Encounter: [id number]        ----------------------------------------------------------------------------------------  HPI/24hr events: Patient with slight increase in FiO2 overnight, MRCP without acute findings, tolerating trickle feeds, hydromorphone increased yesterday evening due to patient reporting increased pain    ---------------------------------------------------------------------------------------  SUBJECTIVE  Intubated/sedated    Review of Systems   Unable to perform ROS: Intubated       ---------------------------------------------------------------------------------------  Assessment and Plan:    Neuro:   • Diagnosis: Acute encephalopathy- improving  o Plan: Continue hydromorphone infusion, consider trial of Precedex to reduce narcotic usage, PRN hydromorphone, frequent neurologic checks, discuss with surgery if NGT appropriate for medications    CV:   • Diagnosis: Shock, coronary artery disease  o Plan: Continue norepinephrine for MAP>65, if appropriate for medications through NGT begin aspirin/statin    Pulm:  • Diagnosis: Acute hypoxic/hypercarbic respiratory failure  o Plan:  Will increase pressure support due to increasing hypercarbia, attempt to decrease FiO2, repeat CXR with increased ventilator requirements    GI:   • Diagnosis: Bowel obstruction POD #3 from washout/skin closure and s/p jejunal resection with primary anastamosis, duodenal-duodenoduodenostomy with lysis of adhesions, hyperbilirubinemia  o Plan: Postoperative care per the surgical service, closely follow drain output for change in consistency/color/amount, BID PPI, MRCP/RUQ US not consistent with biliary disease    :   • Diagnosis: Hypernatremia  o Plan: Follow AM BMP, could consider reducing dextrose concentration to increase free water, close intake and output, daily weights, trend serum creatinine    F/E/N:   • Plan: Re-order TPN, discuss enteral nutrition plan with general surgery, replete electrolytes as needed      Heme/Onc:   • Diagnosis: Acute blood loss anemia  o Plan: Hemoglobin stable, follow daily, could consider empiric IV iron once off antibiotics, continue heparin for DVT prophylaxis    Endo:   o Plan: Follow blood sugars as needed      ID:   • Diagnosis: Sepsis of unclear etiology  o Plan: Day 4 of Zosyn, follow culture results/temperature/white count, consider 5 day course    MSK/Skin:   o Plan: Frequent turning and repositioning    Patient appropriate for transfer out of the ICU today?: No  Disposition: Continue Critical Care   Code Status: Level 1 - Full Code  ---------------------------------------------------------------------------------------  ICU CORE MEASURES    Prophylaxis   VTE Pharmacologic Prophylaxis: Heparin  VTE Mechanical Prophylaxis: sequential compression device  Stress Ulcer Prophylaxis: Pantoprazole IV     ABCDE Protocol (if indicated)  Plan to perform spontaneous awakening trial today? Yes  Plan to perform spontaneous breathing trial today? No  Obvious barriers to extubation? Yes  CAM-ICU: Positive    Invasive Devices Review  Invasive Devices  Report    Peripherally Inserted Central Catheter Line  Duration           PICC Line 05/77/41 Right Basilic 6 days          Peripheral Intravenous Line  Duration           Peripheral IV 09/25/22 Proximal;Right;Ventral (anterior) Forearm 3 days          Arterial Line  Duration           Arterial Line 09/26/22 2 days          Drain  Duration           NG/OG/Enteral Tube Nasogastric 18 Fr Left nare 8 days    Closed/Suction Drain Anterior;Right;Lateral RLQ Bulb 19 Fr  7 days    Closed/Suction Drain Left LUQ Bulb 19 Fr  2 days    Urethral Catheter Temperature probe 16 Fr  1 day          Airway  Duration           ETT  Hi-Lo 8 mm 4 days              Can any invasive devices be discontinued today? No  ---------------------------------------------------------------------------------------  OBJECTIVE    Vitals   Vitals:    22 0200 22 0251 22 0300 22 0400   BP:       BP Location:       Pulse: 90  100 (!) 106   Resp: (!) 26  (!) 26 (!) 27   Temp: 100 04 °F (37 8 °C)  100 4 °F (38 °C) 100 04 °F (37 8 °C)   TempSrc: Bladder  Bladder Bladder   SpO2: 92% 91% 97% 94%   Weight:       Height:         Temp (24hrs), Av 5 °F (37 5 °C), Min:98 6 °F (37 °C), Max:100 4 °F (38 °C)  Current: Temperature: 100 04 °F (37 8 °C)  HR: 80  BP: 128/34  RR: 24  SpO2: 97%    Respiratory:  SpO2: SpO2: 97 %, SpO2 Activity: SpO2 Activity: At Rest, SpO2 Device: O2 Device: Ventilator  Nasal Cannula O2 Flow Rate (L/min): 10 L/min    Invasive/non-invasive ventilation settings   Respiratory  Report   Lab Data (Last 4 hours)       0528            pH, Arterial       7 326             pCO2, Arterial       44 8             pO2, Arterial       101 1             HCO3, Arterial       22 9             Base Excess, Arterial       -3 0                  O2/Vent Data           Most Recent         Vent Mode   AC/PC      Resp Rate (BPM) (BPM)   24      Pressure Control (cmH2O) (cm)   12      Insp Time (sec) (sec)   0 8      FiO2 (%) (%)   60      PEEP (cmH2O) (cmH2O)   10      MV   12 7                  Physical Exam  Constitutional:       General: He is not in acute distress  Appearance: He is ill-appearing  Interventions: He is sedated, intubated and restrained  HENT:      Head: Normocephalic and atraumatic  Mouth/Throat:      Mouth: Mucous membranes are dry  Eyes:      Pupils: Pupils are equal, round, and reactive to light  Cardiovascular:      Rate and Rhythm: Normal rate and regular rhythm  Pulses: Normal pulses  Pulmonary:      Effort: Pulmonary effort is normal  He is intubated        Comments: Moderate amount of clear secretions inline suction  Abdominal:      General: There is distension  Palpations: Abdomen is soft  Comments: Left lower quadrant DILLON serous  Right lower quadrant DILLON serosanguinous  Increased drainage from midline incision as evidenced by addition of drainage pads to bulky dressing   Genitourinary:     Comments: Moscoso in place with yellow urine  Musculoskeletal:         General: Swelling (anasarcic through all extremitis) present  Right lower leg: Edema present  Left lower leg: Edema present  Skin:     General: Skin is warm and dry  Neurological:      GCS: GCS eye subscore is 4  GCS verbal subscore is 1  GCS motor subscore is 6  Comments: Exam performed on hydromorphone             Laboratory and Diagnostics:  Results from last 7 days   Lab Units 09/29/22  0526 09/28/22  0425 09/27/22  0547 09/26/22  1915 09/26/22  1546 09/26/22  1147 09/26/22  0803 09/26/22  0000 09/25/22  1615 09/25/22  1603 09/25/22  0509 09/24/22  0505 09/23/22  0410   WBC Thousand/uL 8 85 11 43* 12 88*  --   --  11 76* 8 93 7 31  --  7 89   < > 8 99 6 90   HEMOGLOBIN g/dL 8 3* 8 6* 8 8* 9 3* 9 9* 9 6* 6 8* 7 2*  --  8 8*   < > 9 1* 9 3*   I STAT HEMOGLOBIN   --   --   --   --   --   --   --   --    < >  --   --   --   --    HEMATOCRIT % 27 6* 27 1* 28 0* 28 8*  --  30 6* 22 4* 23 6*  --  28 3*   < > 28 4* 29 2*   HEMATOCRIT, ISTAT   --   --   --   --   --   --   --   --    < >  --   --   --   --    PLATELETS Thousands/uL 221 202 156  --   --  148* 139* 127*  --  126*   < > 124* 109*   NEUTROS PCT %  --   --   --   --   --   --   --   --   --   --   --  79*  --    MONOS PCT %  --   --   --   --   --   --   --   --   --   --   --  8  --    MONO PCT %  --   --   --   --   --   --   --  3*  --   --   --   --  3*    < > = values in this interval not displayed       Results from last 7 days   Lab Units 09/28/22  1143 09/28/22  0425 09/27/22  1515 09/27/22  0425 09/26/22  1640 09/26/22  1147 09/26/22  1159 09/26/22  0000 09/25/22  1603 09/25/22  6465 09/24/22  0505 09/23/22  1457 09/23/22  0734 09/23/22  0000   SODIUM mmol/L 147 150* 148* 147 148* 149* 148* 149*   < > 147 145   < > 146 144   POTASSIUM mmol/L 3 6 3 4* 3 7 3 7 4 2 4 4 4 1 3 5   < > 3 6 3 5   < > 3 9 4 1   CHLORIDE mmol/L 119* 119* 117* 116* 119* 120* 119* 122*   < > 119* 117*   < > 119* 118*   CO2 mmol/L 24 24 23 22 19* 24 23 22   < > 22 25   < > 23 22   CO2, I-STAT   --   --   --   --   --   --   --   --    < >  --   --   --   --   --    ANION GAP mmol/L 4 7 8 9 10 5 6 5   < > 6 3*   < > 4 4   BUN mg/dL 31* 34* 39* 33* 31* 26* 25 23   < > 18 13   < > 17 17   CREATININE mg/dL 0 93 1 01 1 20 1 03 1 06 0 98 1 03 0 76   < > 0 71 0 79   < > 0 76 0 74   CALCIUM mg/dL 7 3* 7 5* 7 4* 7 1* 7 0* 7 0* 7 7* 6 6*   < > 7 7* 7 7*   < > 7 8* 7 4*   GLUCOSE RANDOM mg/dL 132 137 177* 197* 172* 164* 123 116   < > 129 122   < > 130 125   ALT U/L  --  17  --  17  --   --   --  22  --  30 30  --  24 25   AST U/L  --  33  --  30  --   --   --  30  --  39 37  --  31 27   ALK PHOS U/L  --  82  --  69  --   --   --  56  --  78 65  --  45* 42*   ALBUMIN g/dL  --  1 9*  --  2 2*  --   --   --  2 2*  --  2 0* 2 2*  --  2 2* 2 4*   TOTAL BILIRUBIN mg/dL  --  3 68*  --  4 72*  --   --   --  3 52*  --  4 87* 4 85*  --  4 61* 4 73*    < > = values in this interval not displayed       Results from last 7 days   Lab Units 09/29/22  0526 09/28/22  1143 09/28/22  0425 09/27/22  1515 09/27/22  0425 09/26/22  0000 09/25/22  1603 09/25/22  0509   MAGNESIUM mg/dL 2 5  --  2 8* 2 8* 2 8* 2 6 2 2 2 6   PHOSPHORUS mg/dL 2 9 2 9 1 8* 2 3 1 7* 4 0 1 1* 3 8      Results from last 7 days   Lab Units 09/26/22  1147 09/26/22  0803   INR  1 16 1 13   PTT seconds  --  36          Results from last 7 days   Lab Units 09/26/22  1640 09/26/22  1147 09/26/22  0640 09/25/22  1603 09/22/22  1802 09/22/22  1154   LACTIC ACID mmol/L 1 9 1 8 1 0 1 8 1 7 1 2     ABG:  Results from last 7 days   Lab Units 09/29/22  0528   PH ART  7 326*   PCO2 ART mm Hg 44 8*   PO2 ART mm Hg 101 1   HCO3 ART mmol/L 22 9   BASE EXC ART mmol/L -3 0   ABG SOURCE  Line, Arterial     VBG:  Results from last 7 days   Lab Units 09/29/22  0528 09/26/22  1341 09/26/22  1147   PH JOSE   --   --  7 177*   PCO2 JOSE mm Hg  --   --  62 0*   PO2 JOSE mm Hg  --   --  51 9*   HCO3 JOSE mmol/L  --   --  22 5*   BASE EXC JOSE mmol/L  --   --  -6 3   ABG SOURCE  Line, Arterial   < >  --     < > = values in this interval not displayed  Micro  Results from last 7 days   Lab Units 09/27/22  0823 09/25/22  2150   BLOOD CULTURE   --  No Growth at 48 hrs  No Growth at 48 hrs  MRSA CULTURE ONLY  No Methicillin Resistant Staphlyococcus aureus (MRSA) isolated  --        EKG: Sinus rhythm  Imaging:  I have personally reviewed pertinent reports  and I have personally reviewed pertinent films in PACS    Intake and Output  I/O       09/27 0701 09/28 0700 09/28 0701 09/29 0700    I V  (mL/kg) 290 9 (3 9) 173 1 (2 3)    NG/GT 0     IV Piggyback 563 3 550    TPN 1334 9 1221    Feedings  90    Total Intake(mL/kg) 2189 1 (29 3) 2034 1 (27 2)    Urine (mL/kg/hr) 1350 (0 8) 1505 (0 8)    Emesis/NG output 400 150    Drains 275 230    Total Output 2025 1885    Net +164 1 +149 1              UOP: 50 ml/hr     Height and Weights   Height: 5' 8" (172 7 cm)  IBW (Ideal Body Weight): 68 4 kg  Body mass index is 25 07 kg/m²  Weight (last 2 days)     Date/Time Weight    09/28/22 0530 74 8 (164 9)    09/27/22 0600 75 9 (167 33)            Nutrition       Diet Orders   (From admission, onward)             Start     Ordered    09/28/22 1110  Diet Enteral/Parenteral; Tube Feeding No Oral Diet; Jevity 1 2 Paul; Continuous; 10  Diet effective now        Comments: Please do not adjust rate   Thank you   References:    Nutrtion Support Algorithm Enteral vs  Parenteral   Question Answer Comment   Diet Type Enteral/Parenteral    Enteral/Parenteral Tube Feeding No Oral Diet    Tube Feeding Formula: Jevity 1 2 Paul    Bolus/Cyclic/Continuous Continuous    Tube Feeding Goal Rate (mL/hr): 10    RD to adjust diet per protocol? No        09/28/22 1110              TF currently running at 10 ml/hr with a goal of 10 ml/hr   Formula: Jevity 1 2      Active Medications  Scheduled Meds:  Current Facility-Administered Medications   Medication Dose Route Frequency Provider Last Rate   • acetaminophen  650 mg Rectal Q6H PRN ROOSEVELT Pastor     • Adult TPN (CUSTOM BASE/CUSTOM ELECTROLYTE)   Intravenous Continuous TPN ROOSEVELT Pastor 55 5 mL/hr at 09/28/22 2047   • chlorhexidine  15 mL Mouth/Throat Q12H [address] MonicaROOSEVELT Packer     • heparin (porcine)  5,000 Units Subcutaneous [facility] ROOSEVELT Pastor     • HYDROmorphone  1 mg/hr Intravenous Continuous MonicaROOSEVELT Packer 1 mg/hr (09/29/22 0255)   • HYDROmorphone  0 5 mg Intravenous Q1H PRN ROOSEVELT Pastor     • norepinephrine  1-30 mcg/min Intravenous Titrated ROOSEVELT Pastor 2 mcg/min (09/29/22 0430)   • ondansetron  4 mg Intravenous Q4H PRN ROOSEVELT Pastor     • pantoprazole  40 mg Intravenous Q12H [address] ROOSEVELT Pastor     • piperacillin-tazobactam  3 375 g Intravenous Q6H ROOSEVELT Pastor 3 375 g (09/29/22 0532)     Continuous Infusions:  Adult TPN (CUSTOM BASE/CUSTOM ELECTROLYTE), , Last Rate: 55 5 mL/hr at 09/28/22 2047  HYDROmorphone, 1 mg/hr, Last Rate: 1 mg/hr (09/29/22 0255)  norepinephrine, 1-30 mcg/min, Last Rate: 2 mcg/min (09/29/22 0430)      PRN Meds:   acetaminophen, 650 mg, Q6H PRN  HYDROmorphone, 0 5 mg, Q1H PRN  ondansetron, 4 mg, Q4H PRN        Allergies   No Known Allergies  ---------------------------------------------------------------------------------------  Advance Directive and Living Will:      Power of :    POLST:    ---------------------------------------------------------------------------------------  Care Time Delivered:   No Critical Care time spent     ROOSEVELT Pastor      Portions of the record may have been created with voice recognition software  Occasional wrong word or "sound a like" substitutions may have occurred due to the inherent limitations of voice recognition software    Read the chart carefully and recognize, using context, where substitutions have occurred

## 2022-09-29 NOTE — RESPIRATORY THERAPY NOTE
RT Ventilator Management Note  Elissa Nielsen 65 y o  male MRN: [id number]  Unit/Bed#: [facility] 13 Encounter: [id number]      Daily Screen         9/28/2022  1254 9/28/2022  1448          Patient safety screen outcome[de-identified] Failed Failed      Not Ready for Weaning due to[de-identified] PEEP > 8cmH2O PEEP > 8cmH2O      Spont breathing trial % for 30 min: -- No      Spont breathing trial outcome[de-identified] -- Failed      Preparing to extubate/ Notify Nurse: -- No (comment)                Physical Exam:   Assessment Type: Assess only  General Appearance: Awake  Respiratory Pattern: Assisted  Chest Assessment: Chest expansion symmetrical  Bilateral Breath Sounds: Diminished, Clear  Cough: Productive  Suction: ET Tube  O2 Device: Ventilator      Resp Comments: (P) Pt  remains on AC/PC mode  FiO2 increased to 60% due to low PAO2 on ABG  Will continue to monitor pt per protocol

## 2022-09-29 NOTE — PLAN OF CARE
Problem: Potential for Falls  Goal: Patient will remain free of falls  Description: INTERVENTIONS:  - Educate patient/family on patient safety including physical limitations  - Instruct patient to call for assistance with activity   - Consult OT/PT to assist with strengthening/mobility   - Keep Call bell within reach  - Keep bed low and locked with side rails adjusted as appropriate  - Keep care items and personal belongings within reach  - Initiate and maintain comfort rounds  - Make Fall Risk Sign visible to staff  - Offer Toileting every 2 Hours, in advance of need  - Initiate/Maintain alarm  - Obtain necessary fall risk management equipment  - Apply yellow socks and bracelet for high fall risk patients  - Consider moving patient to room near nurses station  Outcome: Progressing     Problem: MOBILITY - ADULT  Goal: Maintain or return to baseline ADL function  Description: INTERVENTIONS:  -  Assess patient's ability to carry out ADLs; assess patient's baseline for ADL function and identify physical deficits which impact ability to perform ADLs (bathing, care of mouth/teeth, toileting, grooming, dressing, etc )  - Assess/evaluate cause of self-care deficits   - Assess range of motion  - Assess patient's mobility; develop plan if impaired  - Assess patient's need for assistive devices and provide as appropriate  - Encourage maximum independence but intervene and supervise when necessary  - Involve family in performance of ADLs  - Assess for home care needs following discharge   - Consider OT consult to assist with ADL evaluation and planning for discharge  - Provide patient education as appropriate  Outcome: Progressing  Goal: Maintains/Returns to pre admission functional level  Description: INTERVENTIONS:  - Perform BMAT or MOVE assessment daily    - Set and communicate daily mobility goal to care team and patient/family/caregiver     - Collaborate with rehabilitation services on mobility goals if consulted  - Perform Range of Motion 6 times a day  - Reposition patient every 2 hours    - Dangle patient 3 times a day  - Stand patient 3 times a day  - Ambulate patient 3 times a day  - Out of bed to chair 3 times a day   - Out of bed for meals 3 times a day  - Out of bed for toileting  - Record patient progress and toleration of activity level   Outcome: Progressing     Problem: PAIN - ADULT  Goal: Verbalizes/displays adequate comfort level or baseline comfort level  Description: Interventions:  - Encourage patient to monitor pain and request assistance  - Assess pain using appropriate pain scale  - Administer analgesics based on type and severity of pain and evaluate response  - Implement non-pharmacological measures as appropriate and evaluate response  - Consider cultural and social influences on pain and pain management  - Notify physician/advanced practitioner if interventions unsuccessful or patient reports new pain  Outcome: Progressing     Problem: INFECTION - ADULT  Goal: Absence or prevention of progression during hospitalization  Description: INTERVENTIONS:  - Assess and monitor for signs and symptoms of infection  - Monitor lab/diagnostic results  - Monitor all insertion sites, i e  indwelling lines, tubes, and drains  - Monitor endotracheal if appropriate and nasal secretions for changes in amount and color  - Guild appropriate cooling/warming therapies per order  - Administer medications as ordered  - Instruct and encourage patient and family to use good hand hygiene technique  - Identify and instruct in appropriate isolation precautions for identified infection/condition  Outcome: Progressing  Goal: Absence of fever/infection during neutropenic period  Description: INTERVENTIONS:  - Monitor WBC    Outcome: Progressing     Problem: CARDIOVASCULAR - ADULT  Goal: Absence of cardiac dysrhythmias or at baseline rhythm  Description: INTERVENTIONS:  - Continuous cardiac monitoring, vital signs, obtain 12 lead EKG if ordered  - Administer antiarrhythmic and heart rate control medications as ordered  - Monitor electrolytes and administer replacement therapy as ordered  Outcome: Progressing     Problem: Prexisting or High Potential for Compromised Skin Integrity  Goal: Skin integrity is maintained or improved  Description: INTERVENTIONS:  - Identify patients at risk for skin breakdown  - Assess and monitor skin integrity  - Assess and monitor nutrition and hydration status  - Monitor labs   - Assess for incontinence   - Turn and reposition patient  - Assist with mobility/ambulation  - Relieve pressure over bony prominences  - Avoid friction and shearing  - Provide appropriate hygiene as needed including keeping skin clean and dry  - Evaluate need for skin moisturizer/barrier cream  - Collaborate with interdisciplinary team   - Patient/family teaching  - Consider wound care consult   Outcome: Progressing     Problem: DISCHARGE PLANNING  Goal: Discharge to home or other facility with appropriate resources  Description: INTERVENTIONS:  - Identify barriers to discharge w/patient and caregiver  - Arrange for needed discharge resources and transportation as appropriate  - Identify discharge learning needs (meds, wound care, etc )  - Arrange for interpretive services to assist at discharge as needed  - Refer to Case Management Department for coordinating discharge planning if the patient needs post-hospital services based on physician/advanced practitioner order or complex needs related to functional status, cognitive ability, or social support system  Outcome: Progressing     Problem: Knowledge Deficit  Goal: Patient/family/caregiver demonstrates understanding of disease process, treatment plan, medications, and discharge instructions  Description: Complete learning assessment and assess knowledge base    Interventions:  - Provide teaching at level of understanding  - Provide teaching via preferred learning methods  Outcome: Progressing     Problem: Nutrition/Hydration-ADULT  Goal: Nutrient/Hydration intake appropriate for improving, restoring or maintaining nutritional needs  Description: Monitor and assess patient's nutrition/hydration status for malnutrition  Collaborate with interdisciplinary team and initiate plan and interventions as ordered  Monitor patient's weight and dietary intake as ordered or per policy  Utilize nutrition screening tool and intervene as necessary  Determine patient's food preferences and provide high-protein, high-caloric foods as appropriate       INTERVENTIONS:  - Monitor oral intake, urinary output, labs, and treatment plans  - Assess nutrition and hydration status and recommend course of action  - Evaluate amount of meals eaten  - Assist patient with eating if necessary   - Allow adequate time for meals  - Recommend/ encourage appropriate diets, oral nutritional supplements, and vitamin/mineral supplements  - Order, calculate, and assess calorie counts as needed  - Recommend, monitor, and adjust tube feedings and TPN/PPN based on assessed needs  - Assess need for intravenous fluids  - Provide specific nutrition/hydration education as appropriate  - Include patient/family/caregiver in decisions related to nutrition  Outcome: Progressing     Problem: SAFETY,RESTRAINT: NV/NON-SELF DESTRUCTIVE BEHAVIOR  Goal: Remains free of harm/injury (restraint for non violent/non self-detsructive behavior)  Description: INTERVENTIONS:  - Instruct patient/family regarding restraint use   - Assess and monitor physiologic and psychological status   - Provide interventions and comfort measures to meet assessed patient needs   - Identify and implement measures to help patient regain control  - Assess readiness for release of restraint   Outcome: Progressing  Goal: Returns to optimal restraint-free functioning  Description: INTERVENTIONS:  - Assess the patient's behavior and symptoms that indicate continued need for restraint  - Identify and implement measures to help patient regain control  - Assess readiness for release of restraint   Outcome: Progressing

## 2022-09-29 NOTE — PROGRESS NOTES
The patient’s standard-infusion Piperacillin-Tazobactam / Zosyn (infused over 30-60 minutes) has been converted to extended-infusion (infused over 4 hours) per [facility] Extended-Infusion Piperacillin-Tazobactam Protocol for Adults as approved by the Pharmacy and Therapeutics Committee (accessible here on MyNET)       The patient met ALL eligible criteria:    Age >= 25years old   Critical Care patient    And did NOT have ANY exclusions:     Emergency Department or Operating Room patient  Drug incompatibilities that could NOT be avoided with timing or separate line administration    The following are reminders for Nursing regarding administration:  Infuse the first dose of Zosyn over 30min as a load (if new start), and then all subsequent doses will be given as an extended-infusion over 4 hours (see dosing below)  Use primary tubing as an intermittent infusion; change out primary tubing every 24 hours   Ensure full dose of the medication is given at the appropriate rate  Most incompatible drugs can be scheduled during times when the Zosyn is not being infused; however, if one requires administration during the same time, a separate site or lumen MUST be used  If access is limited and an incompatible medication urgently needs to be given, the Zosyn extended-infusion can be held for up to 30min (remember to flush line before/after)  Extended-infusion Zosyn does NOT require special timing around hemodialysis (it can even be given simultaneously)  If a patient needs an urgent MRI while Zosyn is infusing and there is not a MRI-compatible pump available for use, finish the infusion over the traditional length (30min) and ask Pharmacy to reschedule the next doses so that they start a few hours earlier  Pharmacy will assist nursing in troubleshooting other administration issues as they arise  Dosing for Piperacillin-Tazobactam  CrCl (mL/min) Traditional Dosing Extended-Infusion Dosing #   CrCl > 40 High-Dose  CrCl > 40 Low-Dose 4 5g Q6H (over 30min)  3 375g IV Q6H (over 30min) 3 375g IV Q8H (over 4hr)*    *1st dose loaded over 30min, then start extended-infusion dosing 4hr later   CrCl 20-40 High-Dose  CrCl 20-40 Low-Dose 3 375g IV Q6H (over 30min)  2 25g IV Q6H (over 30min)    CrCl < 20 High-Dose  CrCl < 20 Low-Dose 2 25g IV Q6H (over 30min)  2 25g IV Q8H (over 30min) 3 375g IV Q12H (over 4hr)*    *1st dose loaded over 30min, then start extended-infusion dosing 6hr later   Hemo/Peritoneal Dialysis High-Dose  Hemo/Peritoneal Dialysis Low-Dose 2 25g IV Q6H (over 30min)  2 25g IV Q8H (over 30min)    CVVH/D High-Dose  CVVH/D Low-Dose 3 375g IV Q6H (over 30min)  2 25 IV Q6H (over 30min) 3 375g IV Q8H (over 4hr)*    *1st dose loaded over 30min, then start extended-infusion dosing 4hr later   # = Use 4 5g dosing (same interval) if morbidly obese (BMI ?40)    Please call the Pharmacy with any questions or concerns    Thank you,    Jamil Resendiz, PharmD, [facility] 6 Pharmacist

## 2022-09-29 NOTE — PROGRESS NOTES
Progress Note - [facility] 65 y o  male MRN: [id number]    [facility]: [id number]      Assessment:  64 y/o M w closed loop SBO s/p ex lap partial SBR 9/20, bridging phasix mesh 9/21, ex lap w explantation of mesh 9/26  Vss  Afebrile  Abdomen soft, moderate distension, mild tender  Incision draining significant serous seepage and macerated  R DILLON 110 serous  L DILLON 120 serous  Uop: 1500    Plan:  Continue trickle tube feeds  Continue tpn  Wean vent  Wean levophed  Monitor midline incision, consider ascites vac to prevent skin breakdown  Appreciate icu level of care    Subjective:   Complaints of abd discomfort on exam  No acute events overnight  Objective:     Vitals: Blood pressure 105/53, pulse 84, temperature 99 68 °F (37 6 °C), temperature source Bladder, resp  rate 21, height 5' 8" (1 727 m), weight 74 8 kg (164 lb 14 5 oz), SpO2 97 %  ,Body mass index is 25 07 kg/m²  Intake/Output Summary (Last 24 hours) at 9/29/2022 0725  Last data filed at 9/29/2022 0533  Gross per 24 hour   Intake 2034 07 ml   Output 1885 ml   Net 149 07 ml       Physical Exam  General: NAD  HEENT: NC/AT  MMM  Cv: RRR  Lungs: normal effort  Ab: Soft, mild tender, mild distended     Ex: no CCE  Neuro: Alert and awake      Invasive Devices  Report    Peripherally Inserted Central Catheter Line  Duration           PICC Line 84/68/79 Right Basilic 6 days          Peripheral Intravenous Line  Duration           Peripheral IV 09/25/22 Proximal;Right;Ventral (anterior) Forearm 3 days          Arterial Line  Duration           Arterial Line 09/26/22 2 days          Drain  Duration           NG/OG/Enteral Tube Nasogastric 18 Fr Left nare 8 days    Closed/Suction Drain Anterior;Right;Lateral RLQ Bulb 19 Fr  7 days    Closed/Suction Drain Left LUQ Bulb 19 Fr  2 days    Urethral Catheter Temperature probe 16 Fr  1 day          Airway  Duration           ETT  Hi-Lo 8 mm 4 days                Lab, Imaging and other studies: I have personally reviewed pertinent reports      VTE Pharmacologic Prophylaxis: Sequential compression device (Venodyne)   VTE Mechanical Prophylaxis: sequential compression device

## 2022-09-29 NOTE — RESPIRATORY THERAPY NOTE
RT Ventilator Management Note  Marisela Alexander 65 y o  male MRN: [id number]  Unit/Bed#: MICU 13 Encounter: [id number]       09/29/22 0858   Respiratory Assessment   Assessment Type Assess only   General Appearance Drowsy   Respiratory Pattern Assisted   Chest Assessment Chest expansion symmetrical   Bilateral Breath Sounds Diminished;Clear   Cough Non-productive   Suction ET Tube   Resp Comments Routine vent check completed this AM by RT student Gadiel Beatty  Pt sleeping appearing to be comfortably ventilating with same vent settings and mode as before  No SBT in order yet this AM   Vent alarms on tested and functional   Will continue to monitor     O2 Device    Vent Information   Vent ID [id number]   Vent type     Vent Mode AC/PC   $ Pulse Oximetry Spot Check Charge Completed   AC/PC Settings   Resp Rate (BPM) 24 BPM   Pressure Control (cmH2O) 12 cm H2O   Insp Time (sec) 0 8 sec   FiO2 (%) 60 %   PEEP (cmH2O) 10 cmH2O   Trigger Sensitivity Flow (lpm) 223   Humidification Heater   Heater Temp 98 6 °F (37 °C)   AC/PC ACTUALS   Resp Rate (BPM) 24 BPM   VT (mL) 500 mL   MV 12 7   MAP (cmH2O) 15 cmH2O   Peak Pressure (cmH2O) 26 cmH2O   Static Compliance (mL/cmH2O) 51 mL/cmH2O   Plateau Pressure (SBJ7U) 20 cmH2O   Heater Temperature (Obs) 98 6 °F (37 °C)   AC/PC Alarms    High Peak Pressure (cmH2O) 45 cmH2O   High Resp Rate (BPM) 45 BPM   High MV (L/min) 24 L/min   Low MV (L/min) 4 L/min   High Priscilla VT (mL) 1000 mL   Low Priscilla VT (mL) 250 mL   High Spont VT (mL) 44286 mL   Low Spont VT (mL) 200 mL   AC/PC Apnea Settings   Resp Rate (BMP) 24 BPM   Pressure Control (cmH2O) 12 cmH2O   Inspiratory Time (S) 0 8 S   FiO2 (%) 100 %   Apnea Time (s) 20 S   Maintenance   Alarm (pink) cable attached Yes   Resuscitation bag with peep valve at bedside Yes   Water bag changed No   Circuit changed No   Daily Screen   Patient safety screen outcome: Failed   Not Ready for Weaning due to: PEEP > 8cmH2O   Preparing to extubate/ Notify Nurse No (comment)   IHI Ventilator Associated Pneumonia Bundle   Head of Bed Elevated HOB 30   ETT  Hi-Lo 8 mm   Placement Date: 09/25/22   Type: Hi-Lo  Tube Size: 8 mm  Location: Oral  Secured at (cm): 24   Secured at (cm) 24   [facility]   Repositioned Center to Left   Secured by Commercial tube bliss   Site Condition Dry   Cuff Pressure (cm H2O) 23 cm H2O   HI-LO Suction  Intermittent suction   HI-LO Secretions Scant   HI-LO Intervention Patent         Daily Screen         9/28/2022  1448 9/29/2022  0858          Patient safety screen outcome[de-identified] Failed Failed      Not Ready for Weaning due to[de-identified] PEEP > 8cmH2O PEEP > 8cmH2O      Spont breathing trial % for 30 min: No --      Spont breathing trial outcome[de-identified] Failed --      Preparing to extubate/ Notify Nurse: No (comment) No (comment)                Physical Exam:   Assessment Type: Assess only  General Appearance: Drowsy  Respiratory Pattern: Assisted  Chest Assessment: Chest expansion symmetrical  Bilateral Breath Sounds: Diminished, Clear  Cough: Non-productive  Suction: ET Tube  O2 Device:       Resp Comments: Routine vent check completed this AM by RT student Aniya Steel  Pt sleeping appearing to be comfortably ventilating with same vent settings and mode as before  No SBT in order yet this AM   Vent alarms on tested and functional   Will continue to monitor

## 2022-09-30 ENCOUNTER — ANESTHESIA EVENT (INPATIENT)
Dept: PERIOP | Facility: HOSPITAL | Age: 77
DRG: 853 | End: 2022-09-30
Payer: MEDICARE

## 2022-09-30 ENCOUNTER — ANESTHESIA (INPATIENT)
Dept: PERIOP | Facility: HOSPITAL | Age: 77
DRG: 853 | End: 2022-09-30
Payer: MEDICARE

## 2022-09-30 RX ORDER — ALBUMIN, HUMAN INJ 5% 5 %
SOLUTION INTRAVENOUS CONTINUOUS PRN
Status: DISCONTINUED | OUTPATIENT
Start: 2022-09-30 | End: 2022-09-30

## 2022-09-30 RX ORDER — SODIUM CHLORIDE 9 MG/ML
INJECTION, SOLUTION INTRAVENOUS CONTINUOUS PRN
Status: DISCONTINUED | OUTPATIENT
Start: 2022-09-30 | End: 2022-09-30

## 2022-09-30 RX ORDER — PROPOFOL 10 MG/ML
INJECTION, EMULSION INTRAVENOUS CONTINUOUS PRN
Status: DISCONTINUED | OUTPATIENT
Start: 2022-09-30 | End: 2022-09-30

## 2022-09-30 RX ORDER — FENTANYL CITRATE 50 UG/ML
INJECTION, SOLUTION INTRAMUSCULAR; INTRAVENOUS AS NEEDED
Status: DISCONTINUED | OUTPATIENT
Start: 2022-09-30 | End: 2022-09-30

## 2022-09-30 RX ORDER — PROPOFOL 10 MG/ML
INJECTION, EMULSION INTRAVENOUS AS NEEDED
Status: DISCONTINUED | OUTPATIENT
Start: 2022-09-30 | End: 2022-09-30

## 2022-09-30 RX ORDER — ROCURONIUM BROMIDE 10 MG/ML
INJECTION, SOLUTION INTRAVENOUS AS NEEDED
Status: DISCONTINUED | OUTPATIENT
Start: 2022-09-30 | End: 2022-09-30

## 2022-09-30 RX ADMIN — SODIUM CHLORIDE: 9 INJECTION, SOLUTION INTRAVENOUS at 11:57

## 2022-09-30 RX ADMIN — FENTANYL CITRATE 50 MCG: 50 INJECTION INTRAMUSCULAR; INTRAVENOUS at 12:36

## 2022-09-30 RX ADMIN — PROPOFOL 30 MG: 10 INJECTION, EMULSION INTRAVENOUS at 11:55

## 2022-09-30 RX ADMIN — FENTANYL CITRATE 50 MCG: 50 INJECTION INTRAMUSCULAR; INTRAVENOUS at 11:55

## 2022-09-30 RX ADMIN — ROCURONIUM BROMIDE 50 MG: 50 INJECTION INTRAVENOUS at 11:55

## 2022-09-30 RX ADMIN — ALBUMIN (HUMAN): 12.5 INJECTION, SOLUTION INTRAVENOUS at 12:32

## 2022-09-30 NOTE — RESPIRATORY THERAPY NOTE
RT Ventilator Management Note  Tru Mota 65 y o  male MRN: [id number]  Unit/Bed#: [facility] 13 Encounter: [id number]      Daily Screen         9/28/2022  1448 9/29/2022  0858          Patient safety screen outcome[de-identified] Failed Failed      Not Ready for Weaning due to[de-identified] PEEP > 8cmH2O PEEP > 8cmH2O      Spont breathing trial % for 30 min: No --      Spont breathing trial outcome[de-identified] Failed --      Preparing to extubate/ Notify Nurse: No (comment) No (comment)                Physical Exam:   Assessment Type: Assess only  General Appearance: Sleeping  Respiratory Pattern: Assisted  Chest Assessment: Chest expansion symmetrical  Bilateral Breath Sounds: Diminished, Coarse  Cough: Productive  Suction: ET Tube  O2 Device: Ventilator      Resp Comments: (P) Pt  remains on AC/PC mode at this time  RR decreased to 20 per resident  Will continue to assess and monitor pt per protocol

## 2022-09-30 NOTE — ANESTHESIA POSTPROCEDURE EVALUATION
Post-Op Assessment Note    CV Status:  Stable  Pain Score: 0    Pain management: adequate     Mental Status:  Awake, sleepy and unresponsive   Hydration Status:  Euvolemic   PONV Controlled:  Controlled   Airway Patency:  Patent   Two or more mitigation strategies used for obstructive sleep apnea   Post Op Vitals Reviewed: Yes      Staff: Anesthesiologist, CRNA   Comments: Patient transported to MICU on Vent and sedation, VSS, report given to ICU Team        No complications documented      BP  106/36   Temp   99   Pulse 92   Resp   20   SpO2   96

## 2022-09-30 NOTE — OP NOTE
OPERATIVE REPORT  PATIENT NAME: Krystina Currie    :    MRN: [id number]  Pt Location: BE OR ROOM 06    SURGERY DATE: 2022    Surgeon(s) and Role:     * Katherine Giles DO - Primary     * Daphne Bedolla MD - Assisting    Preop Diagnosis:  Disruption or dehiscence of closure of skin, initial encounter [T81 31XA]    Post-Op Diagnosis Codes:     * Disruption or dehiscence of closure of skin, initial encounter [T81 31XA]    Procedure(s) (LRB):  ABDOMINAL WALL DEBRIDEMENT, WOUND VAC PLACEMENT (N/A)    Specimen(s):  * No specimens in log *    Estimated Blood Loss:   Minimal    Drains:  Closed/Suction Drain Anterior;Right;Lateral RLQ Bulb 19 Fr  (Active)   Site Description Healing 22 08   Dressing Status New drainage; Changed 22 08   Drainage Appearance Serosanguineous 22 08   Status To bulb suction 22 0800   Output (mL) 10 mL 22 1000   Number of days: 9       Closed/Suction Drain Left LUQ Bulb 19 Fr  (Active)   Site Description Healing 22 08   Dressing Status Clean;Dry; Intact 22 08   Drainage Appearance Serous 22 08   Status To bulb suction 22 0800   Output (mL) 20 mL 22 1000   Number of days: 4       NG/OG/Enteral Tube Nasogastric 18 Fr Left nare (Active)   Placement Reverification Aspiration 22 06   Site Assessment Clean;Dry; Intact 22 06   Status Suction-low intermittent 22 06   Drainage Appearance Bile;Yellow 22 0600   Intake (mL) 0 mL 22 1600   Output (mL) 500 mL 22 0600   Number of days: 10       Urethral Catheter Temperature probe 16 Fr   (Active)   Reasons to continue Urinary Catheter  Accurate I&O assessment in critically ill patients (48 hr  max) 22 0600   Goal for Removal Remove after 48 hrs of I/O monitoring 22 0400   Site Assessment Clean;Skin intact 22 0600   [place] Care Done 22 0900   Collection Container Standard drainage bag 22 0600 Securement Method Securing device (Describe) 09/30/22 0600   Output (mL) 100 mL 09/30/22 1000   Number of days: 2       Anesthesia Type:   General    Operative Indications:  Disruption or dehiscence of closure of skin, initial encounter [T81 31XA]    Operative Findings:  Skin dehiscence in the mid part of the mid line incision  Rest of the incision healed well and sutures were intact  Frozen abdomen and no exposed bowel  [address] placed with white x1 and black x1 sponge     Complications:   None    Procedure and Technique:  The patient was seen in the ICU  The patient was taken to Operating Room and the procedure verified  He was placed on the surgical table in a spine position, and the patient was prepped and draped in the usual sterile fashion  A Time Out was held  There was skin dehiscence in the mid portion of midline  The abdomen seemed frozen without exposed bowel  Bowel was covered with omentum  Due to dense adhesion, opening the entire incision and performing adhesiolysis for bridging mesh placement seemed very high risk for enterotomy or other injury  Since there was no exposed bowel with frozen abdomen, evisceration of bowel would be less likely  Therefore we decided not to place a bridging mesh  We debrided the non-viable skin edge  The size of the defect was 9x4x0 3 cm  [address] was placed with 1 white sponge and 1 black sponge and connected to 175 mmHg    The patient tolerated the procedure well and transferred to ICU remained intubated  Dr Kasi Antonio was present for the entire procedure    Patient Disposition:  Critical Care Unit        SIGNATURE: Edelmira Curtis MD  DATE: September 30, 2022  TIME: 12:26 PM

## 2022-09-30 NOTE — CASE MANAGEMENT
Case Management Discharge Planning Note    Patient name Sterling Lucas  Location MICU 13/MICU 13 MRN [id number]  : 1945 Date 2022       Current Admission Date: 2022  Current Admission Diagnosis:SBO (small bowel obstruction) 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 ([email or web address] ) 2021   • Palpitations    • Diastolic dysfunction    • Surgical wound, non healing 2020   • Status post cholecystectomy 10/04/2020   • Ambulatory dysfunction 10/04/2020   • Unstable angina ([email or web address] ) 2020   • Dyslipidemia 06/10/2020   • Follow up 2020   • Abdominal aortic aneurysm (AAA) without rupture ([place] [address] ) 2020   • Tubular adenoma of colon 2019   • Primary osteoarthritis of one hip, left 2019   • Gastroesophageal reflux disease with esophagitis 2019   • Gastroesophageal reflux disease 2018   • Primary osteoarthritis of right knee 2018   • History of incisional hernia repair 2018   • Serrated adenoma of colon 2018   • Incisional hernia 2018   • CAD (coronary artery disease) 2018   • Hypertension 2018   • Bursitis of left shoulder 2017      LOS (days): 10  Geometric Mean LOS (GMLOS) (days): 10 30  Days to GMLOS:0     OBJECTIVE:  Risk of Unplanned Readmission Score: 20 38         Current admission status: Inpatient   Preferred Pharmacy:   [facility] [id number]  [place] [address] [address] [address]  Phone: [phone] Fax: [phone]    Primary Care Provider: Parisa Armenta MD    Primary Insurance: MEDICARE  Secondary Insurance:     DISCHARGE DETAILS:               Additional Comments: Patient not medically cleared, remains intubated  CM will continue to follow, will likely need rehab at discharge

## 2022-09-30 NOTE — PROGRESS NOTES
Daily Progress Note - Critical Care   Jevon Chambers 65 y o  male MRN: [id number]  Unit/Bed#: MICU 13 Encounter: [id number]        ----------------------------------------------------------------------------------------  HPI/24hr events: Overnight noted to have worsenign pressor requirements  Precedex adjusted with improvement in hypotension   However febrile, Blood cultures obtained    ---------------------------------------------------------------------------------------  SUBJECTIVE  Unable to provide    Review of Systems  Review of systems was unable to be performed secondary to intubation  ---------------------------------------------------------------------------------------  Assessment and Plan:    Neuro:   • Diagnosis: PAD  o Plan:   - Precedex/dilaudid  - Wean as able for goal RASS 0 to -1        CV:   • Diagnosis: Shock-distributive, new onset afib, HFpEF, CAD, HLD, HTN  o Plan:   - Levo 4, wean as able for MAP >65  - ASA/Statin  - Holding home lopressor    Pulm:  • Diagnosis: Acute hypoxic respiratory failure  o Plan:   - Vent day 5   - AC/PC 24/12/40/8  • Wean RR  • Could consider weaning PEEP at some point today however PaO2 this AM only 69  - VAP bundle ordered  - Daily SBT/SAT      GI:   · Diagnosis: SBO 2/2 adhesions now s/p ex-lap with SBR with extensive MARK and anastomsoes od duo/duo and jejunojejunal, and drain palcement w/ closure   o Plan:   § 90 cm of bowel resected, J-J anastomosis, primary Duodenal anastomosis, cholecystectomy, removal of prior ventral hernia repair mesh  - TPN  - Trickle TF per surgical team  • Diagnosis: Hyperbilirubinemia   o Plan:   - MRCP/RUQ US not consistent with biliary disease  - Trend daily      :   o JOVANNI has resolved  o Moscoso replaced 2 days ago, consider voiding trial when extubated      F/E/N:   • Plan: Trickle TF and TPN      Heme/Onc:   • Diagnosis: Acute blood loss anemia  o Plan:   - Stable HgB  - Trend daily      Endo:   o No active issues      ID: • Diagnosis: Febrile  o Plan:   - No leukocytosis however worsening pressor requirements  - Repeat blood cultures overnight  - Remains on Zosyn       MSK/Skin:   Turn q2  Patient appropriate for transfer out of the ICU today?: No  Disposition: Continue Critical Care   Code Status: Level 1 - Full Code  ---------------------------------------------------------------------------------------  ICU CORE MEASURES    Prophylaxis   VTE Pharmacologic Prophylaxis: Heparin  VTE Mechanical Prophylaxis: sequential compression device  Stress Ulcer Prophylaxis: Pantoprazole IV     ABCDE Protocol (if indicated)  Plan to perform spontaneous awakening trial today? Yes  Plan to perform spontaneous breathing trial today? Yes  Obvious barriers to extubation? Yes  CAM-ICU: Negative    Invasive Devices Review  Invasive Devices  Report    Peripherally Inserted Central Catheter Line  Duration           PICC Line  Right Basilic 7 days          Arterial Line  Duration           Arterial Line 22 3 days          Drain  Duration           NG/OG/Enteral Tube Nasogastric 18 Fr Left nare 9 days    Closed/Suction Drain Anterior;Right;Lateral RLQ Bulb 19 Fr  8 days    Closed/Suction Drain Left LUQ Bulb 19 Fr  3 days    Urethral Catheter Temperature probe 16 Fr  2 days          Airway  Duration           ETT  Hi-Lo 8 mm 5 days              Can any invasive devices be discontinued today?  No  ---------------------------------------------------------------------------------------  OBJECTIVE    Vitals   Vitals:    22 0000 22 0100 22 0200 22 0241   BP:       BP Location:       Pulse: 56 (!) 54 58    Resp: (!) 32 (!) 29 (!) 24    Temp: 99 68 °F (37 6 °C) 99 68 °F (37 6 °C) 99 68 °F (37 6 °C)    TempSrc: Bladder Bladder Bladder    SpO2: 94% 95% 95% 97%   Weight:       Height:         Temp (24hrs), Av 4 °F (38 °C), Min:99 68 °F (37 6 °C), Max:100 8 °F (38 2 °C)  Current: Temperature: 99 68 °F (37 6 °C)      Respiratory:  SpO2: SpO2: 97 %  Nasal Cannula O2 Flow Rate (L/min): 10 L/min    Invasive/non-invasive ventilation settings   Respiratory  Report   Lab Data (Last 4 hours)      09/30 0550            pH, Arterial       7 426             pCO2, Arterial       32 9             pO2, Arterial       69 2             HCO3, Arterial       21 2             Base Excess, Arterial       -2 8                  O2/Vent Data           Most Recent         Vent Mode   AC/PC      Resp Rate (BPM) (BPM)   20      Pressure Control (cmH2O) (cm)   12      Insp Time (sec) (sec)   0 9      FiO2 (%) (%)   40      PEEP (cmH2O) (cmH2O)   8      MV   16 4                  Physical Exam  Constitutional:       General: He is not in acute distress  Appearance: He is not ill-appearing or diaphoretic  HENT:      Head: Normocephalic  Mouth/Throat:      Mouth: Mucous membranes are moist    Eyes:      Pupils: Pupils are equal, round, and reactive to light  Cardiovascular:      Rate and Rhythm: Regular rhythm  Bradycardia present  Heart sounds: No murmur heard  No friction rub  No gallop  Pulmonary:      Effort: Pulmonary effort is normal  No respiratory distress  Breath sounds: No wheezing, rhonchi or rales  Chest:      Chest wall: No tenderness  Abdominal:      General: There is distension  Tenderness: There is abdominal tenderness  Skin:     General: Skin is warm and dry  Neurological:      General: No focal deficit present  Mental Status: He is alert  Comments:  Follows 2 step commands briskly              Laboratory and Diagnostics:  Results from last 7 days   Lab Units 09/30/22  0544 09/29/22  0526 09/28/22  0425 09/27/22  0547 09/26/22  1915 09/26/22  1546 09/26/22  1147 09/26/22  0803 09/26/22  0000 09/25/22  0509 09/24/22  0505   WBC Thousand/uL 8 95 8 85 11 43* 12 88*  --   --  11 76* 8 93 7 31   < > 8 99   HEMOGLOBIN g/dL 8 6* 8 3* 8 6* 8 8* 9 3* 9 9* 9 6* 6 8* 7 2*   < > 9 1*   I STAT HEMOGLOBIN   --   --   --   --   --   --   --   --   --    < >  --    HEMATOCRIT % 27 6* 27 6* 27 1* 28 0* 28 8*  --  30 6* 22 4* 23 6*   < > 28 4*   HEMATOCRIT, ISTAT   --   --   --   --   --   --   --   --   --    < >  --    PLATELETS Thousands/uL 259 221 202 156  --   --  148* 139* 127*   < > 124*   NEUTROS PCT %  --   --   --   --   --   --   --   --   --   --  79*   MONOS PCT %  --   --   --   --   --   --   --   --   --   --  8   MONO PCT %  --   --   --   --   --   --   --   --  3*  --   --     < > = values in this interval not displayed       Results from last 7 days   Lab Units 09/29/22  0526 09/28/22  1143 09/28/22  0425 09/27/22  1515 09/27/22  0425 09/26/22  1640 09/26/22  1147 09/26/22  0640 09/26/22  0000 09/25/22  1603 09/25/22  0640 09/24/22  0505 09/23/22  1457 09/23/22  0734   SODIUM mmol/L 146 147 150* 148* 147 148* 149*   < > 149*   < > 147 145   < > 146   POTASSIUM mmol/L 3 7 3 6 3 4* 3 7 3 7 4 2 4 4   < > 3 5   < > 3 6 3 5   < > 3 9   CHLORIDE mmol/L 119* 119* 119* 117* 116* 119* 120*   < > 122*   < > 119* 117*   < > 119*   CO2 mmol/L 23 24 24 23 22 19* 24   < > 22   < > 22 25   < > 23   CO2, I-STAT   --   --   --   --   --   --   --   --   --    < >  --   --   --   --    ANION GAP mmol/L 4 4 7 8 9 10 5   < > 5   < > 6 3*   < > 4   BUN mg/dL 26* 31* 34* 39* 33* 31* 26*   < > 23   < > 18 13   < > 17   CREATININE mg/dL 0 92 0 93 1 01 1 20 1 03 1 06 0 98   < > 0 76   < > 0 71 0 79   < > 0 76   CALCIUM mg/dL 7 3* 7 3* 7 5* 7 4* 7 1* 7 0* 7 0*   < > 6 6*   < > 7 7* 7 7*   < > 7 8*   GLUCOSE RANDOM mg/dL 142* 132 137 177* 197* 172* 164*   < > 116   < > 129 122   < > 130   ALT U/L 22  --  17  --  17  --   --   --  22  --  30 30  --  24   AST U/L 36  --  33  --  30  --   --   --  30  --  39 37  --  31   ALK PHOS U/L 114  --  82  --  69  --   --   --  56  --  78 65  --  45*   ALBUMIN g/dL 1 7*  --  1 9*  --  2 2*  --   --   --  2 2*  --  2 0* 2 2*  --  2 2*   TOTAL BILIRUBIN mg/dL 3 56*  --  3 68* --  4 72*  --   --   --  3 52*  --  4 87* 4 85*  --  4 61*    < > = values in this interval not displayed  Results from last 7 days   Lab Units 09/29/22  0526 09/28/22  1143 09/28/22  0425 09/27/22  1515 09/27/22  0425 09/26/22  0000 09/25/22  1603 09/25/22  0509   MAGNESIUM mg/dL 2 5  --  2 8* 2 8* 2 8* 2 6 2 2 2 6   PHOSPHORUS mg/dL 2 9 2 9 1 8* 2 3 1 7* 4 0 1 1* 3 8      Results from last 7 days   Lab Units 09/26/22  1147 09/26/22  0803   INR  1 16 1 13   PTT seconds  --  36          Results from last 7 days   Lab Units 09/26/22  1640 09/26/22  1147 09/26/22  0640 09/25/22  1603   LACTIC ACID mmol/L 1 9 1 8 1 0 1 8     ABG:  Results from last 7 days   Lab Units 09/30/22  0550   PH ART  7 426   PCO2 ART mm Hg 32 9*   PO2 ART mm Hg 69 2*   HCO3 ART mmol/L 21 2*   BASE EXC ART mmol/L -2 8   ABG SOURCE  Line, Arterial     VBG:  Results from last 7 days   Lab Units 09/30/22  0550 09/26/22  1341 09/26/22  1147   PH JOSE   --   --  7 177*   PCO2 JOSE mm Hg  --   --  62 0*   PO2 JOSE mm Hg  --   --  51 9*   HCO3 JOSE mmol/L  --   --  22 5*   BASE EXC JOSE mmol/L  --   --  -6 3   ABG SOURCE  Line, Arterial   < >  --     < > = values in this interval not displayed  Micro  Results from last 7 days   Lab Units 09/27/22  0823 09/25/22  2150   BLOOD CULTURE   --  No Growth at 72 hrs  No Growth at 72 hrs     MRSA CULTURE ONLY  No Methicillin Resistant Staphlyococcus aureus (MRSA) isolated  --        EKG: no new  Imaging: CXR with persistent pulmonary edema bilaterally I have personally reviewed pertinent films in PACS    Intake and Output  I/O       09/28 0701 09/29 0700 09/29 0701 09/30 0700    I V  (mL/kg) 173 1 (2 3) 326 7 (4 4)    IV Piggyback 550 100    TPN 1221 1220 1    Feedings 90 107    Total Intake(mL/kg) 2034 1 (27 2) 1753 8 (23 4)    Urine (mL/kg/hr) 1505 (0 8) 1940 (1 1)    Emesis/NG output 150 900    Drains 230 150    Total Output 1885 2990    Net +149 1 -1236 2          Unmeasured Emesis Occurrence  3 x          Height and Weights   Height: 5' 8" (172 7 cm)  IBW (Ideal Body Weight): 68 4 kg  Body mass index is 25 07 kg/m²  Weight (last 2 days)     Date/Time Weight    09/28/22 0530 74 8 (164 9)            Nutrition       Diet Orders   (From admission, onward)             Start     Ordered    09/29/22 1729  Diet NPO  Diet effective now        Comments: Please do not adjust rate  Thank you   References:    Nutrtion Support Algorithm Enteral vs  Parenteral   Question Answer Comment   Diet Type NPO    RD to adjust diet per protocol?  Yes        09/29/22 1729                  Active Medications  Scheduled Meds:  Current Facility-Administered Medications   Medication Dose Route Frequency Provider Last Rate   • acetaminophen  650 mg Rectal Q6H PRN ROOSEVELT Alva     • Adult TPN (CUSTOM BASE/CUSTOM ELECTROLYTE)   Intravenous Continuous TPN ROOSEVELT Alva 55 5 mL/hr at 09/29/22 2200   • chlorhexidine  15 mL Mouth/Throat Q12H [address] RUBY AlvaNP     • dexmedetomidine  0 1-1 5 mcg/kg/hr Intravenous Titrated ROOSEVELT Alva 0 8 mcg/kg/hr (09/30/22 0143)   • heparin (porcine)  5,000 Units Subcutaneous [facility] ROOSEVELT Alva     • HYDROmorphone  1 mg/hr Intravenous Continuous ROOSEVELT Alva 1 mg/hr (09/30/22 0535)   • HYDROmorphone  0 5 mg Intravenous Q1H PRN RUBY AlvaNP     • norepinephrine  1-30 mcg/min Intravenous Titrated ROOSEVELT Alva 4 mcg/min (09/29/22 2200)   • ondansetron  4 mg Intravenous Q4H PRN RUBY AlvaNP     • pantoprazole  40 mg Intravenous Q12H [address] RUBY AlvaNP     • piperacillin-tazobactam  3 375 g Intravenous Q8H RUBY AlvaNP 3 375 g (09/30/22 0122)     Continuous Infusions:  Adult TPN (CUSTOM BASE/CUSTOM ELECTROLYTE), , Last Rate: 55 5 mL/hr at 09/29/22 2200  dexmedetomidine, 0 1-1 5 mcg/kg/hr, Last Rate: 0 8 mcg/kg/hr (09/30/22 0143)  HYDROmorphone, 1 mg/hr, Last Rate: 1 mg/hr (09/30/22 0535)  norepinephrine, 1-30 mcg/min, Last Rate: 4 mcg/min (09/29/22 2200)      PRN Meds:   acetaminophen, 650 mg, Q6H PRN  HYDROmorphone, 0 5 mg, Q1H PRN  ondansetron, 4 mg, Q4H PRN        Allergies   No Known Allergies  ---------------------------------------------------------------------------------------  Advance Directive and Living Will:      Power of :    POLST:    ---------------------------------------------------------------------------------------  Care Time Delivered:   No Critical Care time spent     Misael Harrison PA-C      Portions of the record may have been created with voice recognition software  Occasional wrong word or "sound a like" substitutions may have occurred due to the inherent limitations of voice recognition software    Read the chart carefully and recognize, using context, where substitutions have occurred

## 2022-09-30 NOTE — RESPIRATORY THERAPY NOTE
RT Ventilator Management Note  Christjanene Less 65 y o  male MRN: [id number]  Unit/Bed#: [facility] 13 Encounter: [id number]      Daily Screen         9/29/2022  0858 9/30/2022  0836          Patient safety screen outcome[de-identified] Failed Passed      Not Ready for Weaning due to[de-identified] PEEP > 8cmH2O Going on Transport intubated      Preparing to extubate/ Notify Nurse: No (comment) --                Physical Exam:   Assessment Type: (P) Assess only  General Appearance: (P) Alert, Awake  Respiratory Pattern: (P) Assisted  Chest Assessment: (P) Chest expansion symmetrical  Bilateral Breath Sounds: (P) Diminished, Clear  Cough: Productive  Suction: ET Tube  O2 Device: (P) vent      Resp Comments: (P) pt appears comfortable on current settings, no weaning attempted at this time, pt going back to OR, will continue to monitor

## 2022-09-30 NOTE — PROGRESS NOTES
Progress Note - Aarti Gamino 65 y o  male MRN: [id number]    Unit/Bed#: MICU 13 Encounter: [id number]      Assessment:  66 y/o M w closed loop SBO s/p ex lap partial SBR 9/20, bridging phasix mesh 9/21, ex lap w explantation of mesh 9/26      Vss  tmax 100 7  abd soft, moderate distension, mild tenderness  R DILLON: 54  L DILLON: 95  UOP: 1 9    Plan:  Return to OR today for washout of abdomen repair of dehiscence and placement of vicryl mesh  Continue tpn  Continue to hold trickle tube feeds  Wean vent  Wean pressors  Wean sedation wean narcotics  Mobilize  Continue dvt ppx    Subjective:   Awake and follows commands but remains intubated on levophed  Objective:     Vitals: Blood pressure (!) 76/38, pulse 58, temperature 99 68 °F (37 6 °C), temperature source Bladder, resp  rate (!) 24, height 5' 8" (1 727 m), weight 74 8 kg (164 lb 14 5 oz), SpO2 97 %  ,Body mass index is 25 07 kg/m²  Intake/Output Summary (Last 24 hours) at 9/30/2022 0610  Last data filed at 9/30/2022 0600  Gross per 24 hour   Intake 1753 81 ml   Output 2990 ml   Net -1236 19 ml     Physical Exam  General: NAD  HEENT: NC/AT  MMM  Cv: RRR     Lungs: normal effort  Ab: Soft, NT/ND  Ex: no CCE  Neuro: AAOx3    Scheduled Meds:  Current Facility-Administered Medications   Medication Dose Route Frequency Provider Last Rate   • acetaminophen  650 mg Rectal Q6H PRN ROOSEVELT Rogers     • Adult TPN (CUSTOM BASE/CUSTOM ELECTROLYTE)   Intravenous Continuous TPN ROOSEVELT Rogers 55 5 mL/hr at 09/29/22 2200   • chlorhexidine  15 mL Mouth/Throat Q12H [address] ROOSEVELT Rogers     • dexmedetomidine  0 1-1 5 mcg/kg/hr Intravenous Titrated ROOSEVELT Rogers 0 8 mcg/kg/hr (09/30/22 0143)   • heparin (porcine)  5,000 Units Subcutaneous [facility] ROOSEVELT Rogers     • HYDROmorphone  1 mg/hr Intravenous Continuous ROOSEVELT Rogers 1 mg/hr (09/30/22 0535)   • HYDROmorphone  0 5 mg Intravenous Q1H PRN ROOSEVELT Rogers     • norepinephrine  1-30 mcg/min Intravenous Titrated Ambrose Coup, CRNP 4 mcg/min (09/29/22 2200)   • ondansetron  4 mg Intravenous Q4H PRN Ambrose Coup, CRNP     • pantoprazole  40 mg Intravenous Q12H [address] Ambrose Coup, CRNP     • piperacillin-tazobactam  3 375 g Intravenous Q8H Ambrose Coup, CRNP 3 375 g (09/30/22 0122)     Continuous Infusions:Adult TPN (CUSTOM BASE/CUSTOM ELECTROLYTE), , Last Rate: 55 5 mL/hr at 09/29/22 2200  dexmedetomidine, 0 1-1 5 mcg/kg/hr, Last Rate: 0 8 mcg/kg/hr (09/30/22 0143)  HYDROmorphone, 1 mg/hr, Last Rate: 1 mg/hr (09/30/22 0535)  norepinephrine, 1-30 mcg/min, Last Rate: 4 mcg/min (09/29/22 2200)      PRN Meds: •  acetaminophen  •  HYDROmorphone  •  ondansetron      Invasive Devices  Report    Peripherally Inserted Central Catheter Line  Duration           PICC Line 49/92/28 Right Basilic 7 days          Arterial Line  Duration           Arterial Line 09/26/22 3 days          Drain  Duration           NG/OG/Enteral Tube Nasogastric 18 Fr Left nare 9 days    Closed/Suction Drain Anterior;Right;Lateral RLQ Bulb 19 Fr  8 days    Closed/Suction Drain Left LUQ Bulb 19 Fr  3 days    Urethral Catheter Temperature probe 16 Fr  2 days          Airway  Duration           ETT  Hi-Lo 8 mm 5 days                Lab, Imaging and other studies: I have personally reviewed pertinent reports      VTE Pharmacologic Prophylaxis: Sequential compression device (Venodyne)   VTE Mechanical Prophylaxis: sequential compression device

## 2022-09-30 NOTE — ANESTHESIA PREPROCEDURE EVALUATION
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)   (+) Unstable angina (HCC)      GI/HEPATIC   (+) Gastroesophageal reflux disease   (+) Gastroesophageal reflux disease with esophagitis   (+) SBO (small bowel obstruction) (HCC)      MUSCULOSKELETAL   (+) Primary osteoarthritis of one hip, left   (+) Primary osteoarthritis of right knee      Digestive   (+) Choledocholithiasis      Other   (+) Diastolic dysfunction   (+) Dyslipidemia   (+) Surgical wound, non healing      EKG 9/25/2022:  Normal sinus rhythm with marked sinus arrhythmia  Nonspecific ST-t wave changes    TTE 9/21/2022:    Left Ventricle: Left ventricular cavity size is normal  Wall thickness is normal  There is no concentric hypertrophy  The left ventricular ejection fraction is 65%  Systolic function is normal  Wall motion is normal  Diastolic function is mildly abnormal, consistent with grade I (abnormal) relaxation    Left Atrium: The atrium is mildly dilated    Aortic Valve: There is aortic sclerosis    Tricuspid Valve: There is mild regurgitation  The right ventricular systolic pressure is moderately elevated  The estimated right ventricular systolic pressure is 46 10 mmHg    Pericardium: There is a trivial pericardial effusion  There is no echocardiographic evidence of tamponade        Lab Results   Component Value Date    WBC 8 95 09/30/2022    HGB 8 6 (L) 09/30/2022    HCT 27 6 (L) 09/30/2022    MCV 96 09/30/2022     09/30/2022     Lab Results   Component Value Date    SODIUM 147 09/30/2022    K 3 6 09/30/2022     (H) 09/30/2022    CO2 22 09/30/2022    BUN 26 (H) 09/30/2022    CREATININE 1 07 09/30/2022    GLUC 135 09/30/2022    CALCIUM 7 1 (L) 09/30/2022     Lab Results   Component Value Date    INR 1 16 09/26/2022    INR 1 13 09/26/2022    INR 1 09 09/07/2022    PROTIME 15 0 (H) 09/26/2022    PROTIME 14 8 (H) 09/26/2022    PROTIME 14 3 09/07/2022     Lab Results   Component Value Date    HGBA1C 5 3 09/27/2022          Physical Exam    Airway  Comment: 8 0 ETT in situ           Dental       Cardiovascular      Pulmonary      Other Findings        Anesthesia Plan  ASA Score- 4     Anesthesia Type- general with ASA Monitors  Additional Monitors: arterial line  Airway Plan: ETT  Comment: ETT, PICC, arterial line in situ  GETA, blood products PRN  Post op mechanical ventilation in MICU  Chuck Palencia Plan Factors-Exercise tolerance (METS): <4 METS  Chart reviewed  EKG reviewed  Imaging results reviewed  Existing labs reviewed  Patient summary reviewed  Induction- intravenous  Postoperative Plan-     Informed Consent- Anesthetic plan and risks discussed with healthcare power of  and son (consent obtained over telephone from patient's son Mikael Melgoza)  I personally reviewed this patient with the CRNA  Discussed and agreed on the Anesthesia Plan with the ASHLEIGH Palencia

## 2022-10-01 NOTE — RESPIRATORY THERAPY NOTE
RT Ventilator Management Note  Sabine Jose 65 y o  male MRN: [id number]  Unit/Bed#: [facility] 13 Encounter: [id number]      Daily Screen         9/30/2022  0836 10/1/2022  0840          Patient safety screen outcome[de-identified] Passed Passed      Not Ready for Weaning due to[de-identified] Going on Transport intubated --      Spont breathing trial % for 30 min: -- Yes                Physical Exam:   Assessment Type: Assess only  General Appearance: Alert, Awake  Respiratory Pattern: Normal, Spontaneous, Assisted  Chest Assessment: Chest expansion symmetrical  Bilateral Breath Sounds: Clear      Resp Comments: (P) Called for pt self extubated  Pt on NRM  Placed on nasal cannula at 6l/m  Pt in no resp distress  No stridor  Will continue to monitor pt

## 2022-10-01 NOTE — RESPIRATORY THERAPY NOTE
RT Protocol Note  Barry Jack 65 y o  male MRN: [id number]  Unit/Bed#: MICU 13 Encounter: [id number]    Assessment    Principal Problem:    SBO (small bowel obstruction) [facility])      Home Pulmonary Medications:         Past Medical History:   Diagnosis Date    Abdominal aortic aneurysm (AAA) ([facility] [address] )     last assessed nov 6 2015    Abscess of groin     last assessed oct 6 2014    Arthritis     Candidiasis, cutaneous     last assessed march 19 2014    Coronary artery disease     Dyslipidemia     Esophageal ulcer without bleeding     Gastritis     GERD (gastroesophageal reflux disease)     Hiatal hernia     History of transfusion     Hx of cataract surgery, left     last assessed july 21 2014    Hyperlipidemia     Hypertension     Myocardial infarction [facility])     Old myocardial infarction 2000    Recurrent right inguinal hernia     s/p right orchioectomy, mesh removal, and sinus trac removal patient being followed by surgery next appt 4/28/14 patient s/p keflex x 7 days for MSSA Cx repeat wound cx grew MSSA cx repeat wound cx grew MSSA and other armond (mixed) no MRSA identified conitunue close monitoring and local wound care, last assessed april 15 2014    Renal disorder     Wound of skin     in the groin, right     Social History     Socioeconomic History    Marital status:      Spouse name: None    Number of children: None    Years of education: None    Highest education level: None   Occupational History    None   Tobacco Use    Smoking status: Former Smoker    Smokeless tobacco: Never Used    Tobacco comment: quit 20 + years ago   Vaping Use    Vaping Use: Never used   Substance and Sexual Activity    Alcohol use: Not Currently     Comment: occasionally    Drug use: No    Sexual activity: Not Currently   Other Topics Concern    None   Social History Narrative    None     Social Determinants of Health     Financial Resource Strain: Not on file   Food Insecurity: No Food Insecurity    Worried About Running Out of Food in the Last Year: Never true    [address] in the Last Year: Never true   Transportation Needs: No Transportation Needs    Lack of Transportation (Medical): No    Lack of Transportation (Non-Medical): No   Physical Activity: Not on file   Stress: Not on file   Social Connections: Not on file   Intimate Partner Violence: Not on file   Housing Stability: Unknown    Unable to Pay for Housing in the Last Year: No    Number of Places Lived in the Last Year: Not on file    Unstable Housing in the Last Year: No       Subjective         Objective    Physical Exam:   Assessment Type: Assess only  General Appearance: Alert, Awake  Respiratory Pattern: Normal, Spontaneous, Assisted  Chest Assessment: Chest expansion symmetrical  Bilateral Breath Sounds: Clear  Cough: Productive    Vitals:  Blood pressure 133/50, pulse 96, temperature 99 68 °F (37 6 °C), temperature source Bladder, resp  rate (!) 26, height 5' 8" (1 727 m), weight 73 3 kg (161 lb 9 6 oz), SpO2 94 %  Results from last 7 days   Lab Units 10/01/22  1203 09/26/22  0408 09/25/22 2050   [facility] ART  7 408   < > 7 379   PCO2 ART mm Hg 33 7*   < > 38 5   PO2 ART mm Hg 53 1*   < > 129 6*   HCO3 ART mmol/L 20 8*   < > 22 2   BASE EXC ART mmol/L -3 3   < > -2 7   O2 CONTENT ART mL/dL 12 9*   < > 11 4*   O2 HGB, ARTERIAL % 88 2*   < > 97 4*   ABG SOURCE  Line, Arterial   < > Radial, Left   YAQUELIN TEST   --   --  Yes    < > = values in this interval not displayed  Imaging and other studies: I have personally reviewed pertinent reports  Hfnc     Plan    Respiratory Plan: Vent/NIV/HFNC  Airway Clearance Plan: (P) Flutter     Resp Comments: (P) Pt reassessed per airway clearance protocol  Pt given and instructed on use of flutter valve for secretion clearance  Done well with encouragement   Will order TID

## 2022-10-01 NOTE — PROGRESS NOTES
Daily Progress Note - Critical Care   Darshana Mcfarland 65 y o  male MRN: [id number]  Unit/Bed#: MICU 13 Encounter: [id number]    ----------------------------------------------------------------------------------------  HPI/24hr events:  69-year-old male PMH CAD s/p PCI 2000, AAA s/p open aorto bi-iliac grafting 2009, HTN, HLD, choledocholithiasis s/p ERCP and open partial cholecystectomy, ventral hernia repair with mesh  Presented 9/20 with closed-loop small-bowel obstruction s/p ex lap, completion cholecystectomy, duodenal resection and hand-sewn anastomosis and 9/21 bridging mesh placement with subsequent explantation of mesh on 09/26 with abdominal closure of skin  Complicated by partial skin dehiscence now s/p mesh and wound VAC 9/30  Has remained on low-dose vasopressors and intubated on TPN      Infusions:  Levophed 5  Dilaudid 1 5  Precedex 0 4    ---------------------------------------------------------------------------------------  SUBJECTIVE  Unable to answer but nods head no to pain    Review of Systems  Review of systems was unable to be performed secondary to Intubation/sedation  ---------------------------------------------------------------------------------------  Assessment and Plan:    Neuro:   • Diagnosis:  Toxic metabolic encephalopathy  o Plan:  Precedex 0 4  o Dilaudid 1 5 - decrease to 1 today  o RASS goal 0 to -1      CV:   • Diagnosis:  Shock-distributive  o Plan:  Levophed 5, wean for map >65  • Diagnosis:  New onset AFib  o Plan:  Currently normal sinus rhythm  o Holding systemic anticoagulation  • Diagnosis: HFpEF, CAD, HLD, HTN  o Plan:  Holding home aspirin, Lipitor, Lasix 20 mg daily, lisinopril 5 mg daily, metoprolol 50 XL daily      Pulm:  • Diagnosis:  Acute hypoxic respiratory failure  o Plan:  Vent day 6  o AC/PC 20/12/50/8  - Wean FiO2 and PEEP today with improvement in PA O2  o Spo2 goal >92%  o Daily SBT/SAT  o VAP bundle ordered      GI:   · Diagnosis: SBO 2/2 adhesions s/p ex-lap, SBR, extensive MARK, completion cholecystectomy, duo/duo and jejunojejunal anastomosis, mesh placement, wound VAC  o Plan:  90 cm bowel resected, G-J anastomosis, primary duodenal anastomosis, completion cholecystectomy, removal of prior ventral hernia repair mesh and new mesh added with wound VAC in place  o TPN  o Trial trickle tube feeds when cleared by surgery  • Diagnosis:  Hyperbilirubinemia  o Plan:  MRCP/RUQ U/S not consistent with biliary disease  o Currently down trending, continue to follow      :   • Diagnosis:  JOVANNI, resolved  o Plan: Monitor UOP  o Consider d/c centeno today   o Trend BUN/creatinine      • F:  None  • E: Replete electrolytes for goal K >4, Phos >3, Mg >2  • N: TPN, trial trickle tube feeds when cleared by surgery      Heme/Onc:   • Diagnosis:  Acute blood loss anemia  o Plan:  Trend hemoglobin and transfuse as needed  o DVT PPX:  Subcu heparin, SCDs      Endo:   o No active issues      ID:   • Diagnosis:  SIRS/sepsis   o Plan:  Zosyn day 10  o 9/30 blood cultures-negative x24 hours  o 9/25 blood cultures negative x5 days  o 9/20 blood cultures-negative x5 days  o MRSA negative       MSK/Skin:   o Frequent turning and repositioning    Patient appropriate for transfer out of the ICU today?: No  Disposition: Continue Critical Care   Code Status: Level 1 - Full Code  ---------------------------------------------------------------------------------------  ICU CORE MEASURES    Prophylaxis   VTE Pharmacologic Prophylaxis: Heparin  VTE Mechanical Prophylaxis: sequential compression device  Stress Ulcer Prophylaxis: Pantoprazole IV     ABCDE Protocol (if indicated)  Plan to perform spontaneous awakening trial today? Yes  Plan to perform spontaneous breathing trial today? Yes  Obvious barriers to extubation?  Yes  CAM-ICU: Positive    Invasive Devices Review  Invasive Devices  Report    Peripherally Inserted Central Catheter Line  Duration           PICC Line 41/12/71 Right Basilic 8 days Peripheral Intravenous Line  Duration           Peripheral IV 22 Left;Proximal;Upper;Ventral (anterior) Arm 2 days          Arterial Line  Duration           Arterial Line 22 4 days          Drain  Duration           NG/OG/Enteral Tube Nasogastric 18 Fr Left nare 10 days    Closed/Suction Drain Anterior;Right;Lateral RLQ Bulb 19 Fr  9 days    Closed/Suction Drain Left LUQ Bulb 19 Fr  4 days    Urethral Catheter Temperature probe 16 Fr  3 days          Airway  Duration           ETT  Hi-Lo 8 mm 6 days              Can any invasive devices be discontinued today? Yes  ---------------------------------------------------------------------------------------  OBJECTIVE    Vitals   Vitals:    10/01/22 0251 10/01/22 0400 10/01/22 0600 10/01/22 0700   BP:       BP Location:       Pulse:  72 70 84   Resp:  21 22 (!) 23   Temp:  99 32 °F (37 4 °C) 99 68 °F (37 6 °C) 99 68 °F (37 6 °C)   TempSrc:  Bladder  Bladder   SpO2: 99% 97% 96% 97%   Weight:       Height:         Temp (24hrs), Av 2 °F (37 3 °C), Min:98 6 °F (37 °C), Max:100 04 °F (37 8 °C)  Current: Temperature: 99 68 °F (37 6 °C)    Respiratory:  SpO2: SpO2: 97 %, SpO2 Device: O2 Device: Ventilator  Nasal Cannula O2 Flow Rate (L/min): 10 L/min    Invasive/non-invasive ventilation settings   Respiratory  Report   Lab Data (Last 4 hours)      10/01 0553            pH, Arterial       7 379             pCO2, Arterial       41 8             pO2, Arterial       108 5             HCO3, Arterial       24 1             Base Excess, Arterial       -1 0                  O2/Vent Data     None                Physical Exam  Vitals reviewed  Constitutional:       General: He is awake  He is not in acute distress  Appearance: He is ill-appearing  He is not diaphoretic  Interventions: He is sedated, intubated and restrained  Eyes:      Pupils: Pupils are equal, round, and reactive to light  Cardiovascular:      Rate and Rhythm: Normal rate  Heart sounds: Normal heart sounds  Pulmonary:      Effort: Pulmonary effort is normal  No tachypnea  He is intubated  Comments: Rhonchorous breath sounds throughout  Abdominal:      General: There is distension  Tenderness: There is no abdominal tenderness  Comments: Abdomen distended and firm, midline incision with wound VAC proximally, skin closed distally  Nontender to palpation  Two DILLON drains with serous/serosanguineous drainage   Musculoskeletal:      Right lower leg: Edema present  Left lower leg: Edema present  Skin:     General: Skin is warm and dry  Neurological:      Mental Status: He is alert  Comments:  Follows commands in all extremities             Laboratory and Diagnostics:  Results from last 7 days   Lab Units 09/30/22  0544 09/29/22  0526 09/28/22  0425 09/27/22  0547 09/26/22  1915 09/26/22  1546 09/26/22  1147 09/26/22  0803 09/26/22  0000   WBC Thousand/uL 8 95 8 85 11 43* 12 88*  --   --  11 76* 8 93 7 31   HEMOGLOBIN g/dL 8 6* 8 3* 8 6* 8 8* 9 3* 9 9* 9 6* 6 8* 7 2*   HEMATOCRIT % 27 6* 27 6* 27 1* 28 0* 28 8*  --  30 6* 22 4* 23 6*   PLATELETS Thousands/uL 259 221 202 156  --   --  148* 139* 127*   MONO PCT %  --   --   --   --   --   --   --   --  3*     Results from last 7 days   Lab Units 10/01/22  0444 09/30/22  0544 09/29/22  0526 09/28/22  1143 09/28/22  0425 09/27/22  1515 09/27/22  0425 09/26/22  0640 09/26/22  0000 09/25/22  1603 09/25/22  0640   SODIUM mmol/L 145 147 146 147 150* 148* 147   < > 149*   < > 147   POTASSIUM mmol/L 3 9 3 6 3 7 3 6 3 4* 3 7 3 7   < > 3 5   < > 3 6   CHLORIDE mmol/L 118* 119* 119* 119* 119* 117* 116*   < > 122*   < > 119*   CO2 mmol/L 23 22 23 24 24 23 22   < > 22   < > 22   CO2, I-STAT   --   --   --   --   --   --   --   --   --    < >  --    ANION GAP mmol/L 4 6 4 4 7 8 9   < > 5   < > 6   BUN mg/dL 28* 26* 26* 31* 34* 39* 33*   < > 23   < > 18   CREATININE mg/dL 1 02 1 07 0 92 0 93 1 01 1 20 1 03   < > 0 76   < > 0 71 CALCIUM mg/dL 7 3* 7 1* 7 3* 7 3* 7 5* 7 4* 7 1*   < > 6 6*   < > 7 7*   GLUCOSE RANDOM mg/dL 121 135 142* 132 137 177* 197*   < > 116   < > 129   ALT U/L 19 21 22  --  17  --  17  --  22  --  30   AST U/L 27 30 36  --  33  --  30  --  30  --  39   ALK PHOS U/L 103 109 114  --  82  --  69  --  56  --  78   ALBUMIN g/dL 1 7* 1 6* 1 7*  --  1 9*  --  2 2*  --  2 2*  --  2 0*   TOTAL BILIRUBIN mg/dL 3 04* 3 13* 3 56*  --  3 68*  --  4 72*  --  3 52*  --  4 87*    < > = values in this interval not displayed  Results from last 7 days   Lab Units 10/01/22  0444 09/30/22  0544 09/29/22  0526 09/28/22  1143 09/28/22  0425 09/27/22  1515 09/27/22  0425 09/26/22  0000   MAGNESIUM mg/dL 2 5 2 4 2 5  --  2 8* 2 8* 2 8* 2 6   PHOSPHORUS mg/dL 3 2 2 4 2 9 2 9 1 8* 2 3 1 7* 4 0      Results from last 7 days   Lab Units 09/26/22  1147 09/26/22  0803   INR  1 16 1 13   PTT seconds  --  36          Results from last 7 days   Lab Units 09/26/22  1640 09/26/22  1147 09/26/22  0640 09/25/22  1603   LACTIC ACID mmol/L 1 9 1 8 1 0 1 8     ABG:  Results from last 7 days   Lab Units 10/01/22  0553   PH ART  7 379   PCO2 ART mm Hg 41 8   PO2 ART mm Hg 108 5   HCO3 ART mmol/L 24 1   BASE EXC ART mmol/L -1 0   ABG SOURCE  Line, Arterial     VBG:  Results from last 7 days   Lab Units 10/01/22  0553 09/26/22  1341 09/26/22  1147   PH JOSE   --   --  7 177*   PCO2 JOSE mm Hg  --   --  62 0*   PO2 JOSE mm Hg  --   --  51 9*   HCO3 JOSE mmol/L  --   --  22 5*   BASE EXC JOSE mmol/L  --   --  -6 3   ABG SOURCE  Line, Arterial   < >  --     < > = values in this interval not displayed  Micro  Results from last 7 days   Lab Units 09/30/22  0135 09/27/22  0823 09/25/22  8790   BLOOD CULTURE  No Growth at 24 hrs  No Growth at 24 hrs   --  No Growth After 5 Days  No Growth After 5 Days  MRSA CULTURE ONLY   --  No Methicillin Resistant Staphlyococcus aureus (MRSA) isolated  --        Imaging:  I have personally reviewed pertinent reports  Intake and Output  I/O       09/29 0701 09/30 0700 09/30 0701  10/01 0700 10/01 0701  10/02 0700    I V  (mL/kg) 420 7 (5 6) 708 7 (9 5)     NG/GT  0     IV Piggyback 100 250     TPN 1442  1 1448  8     Feedings 107      Total Intake(mL/kg) 2069 8 (27 7) 2407 4 (32 2)     Urine (mL/kg/hr) 2065 (1 2) 1130 (0 6)     Emesis/NG output 900 375     Drains 150 100     Total Output 3115 1605     Net -1045 2 +802 4            Unmeasured Emesis Occurrence 3 x          UOP: 47 ml/hr     Height and Weights   Height: 5' 8" (172 7 cm)  IBW (Ideal Body Weight): 68 4 kg  Body mass index is 25 07 kg/m²  Weight (last 2 days)     Date/Time    09/30/22 1146    Comment rows:    OBSERV: Patient intubated, id band verification, SBAR to anes direct transport to OR 6 at 09/30/22 1146            Nutrition       Diet Orders   (From admission, onward)             Start     Ordered    09/29/22 1729  Diet NPO  Diet effective now        Comments: Please do not adjust rate  Thank you   References:    Nutrtion Support Algorithm Enteral vs  Parenteral   Question Answer Comment   Diet Type NPO    RD to adjust diet per protocol?  Yes        09/29/22 1729                Active Medications  Scheduled Meds:  Current Facility-Administered Medications   Medication Dose Route Frequency Provider Last Rate   • acetaminophen  650 mg Rectal Q6H PRN Annie Estevez PA-C     • Adult TPN (CUSTOM BASE/CUSTOM ELECTROLYTE)   Intravenous Continuous TPN Elva Leiva PA-C 56 3 mL/hr at 09/30/22 2140   • chlorhexidine  15 mL Mouth/Throat Q12H [address]     • dexmedetomidine  0 1-1 5 mcg/kg/hr Intravenous Titrated Elva Leiva PA-C 0 4 mcg/kg/hr (10/01/22 0110)   • heparin (porcine)  5,000 Units Subcutaneous [facility] Elva Isabel [place], [place]     • HYDROmorphone  1 5 mg/hr Intravenous Continuous Elvamerna Leiva PA-C 1 5 mg/hr (09/30/22 2211)   • HYDROmorphone  0 5 mg Intravenous Q1H PRN Annie Estevez PA-C     • norepinephrine  1-30 mcg/min Intravenous Titrated Bea Leiva PA-C 5 mcg/min (09/30/22 2200)   • ondansetron  4 mg Intravenous Q4H PRN Bea Leiva PA-C     • pantoprazole  40 mg Intravenous Q12H [address]     • piperacillin-tazobactam  3 375 g Intravenous Q8H Bea Leiva PA-C 3 375 g (10/01/22 0038)     Continuous Infusions:  Adult TPN (CUSTOM BASE/CUSTOM ELECTROLYTE), , Last Rate: 56 3 mL/hr at 09/30/22 2140  dexmedetomidine, 0 1-1 5 mcg/kg/hr, Last Rate: 0 4 mcg/kg/hr (10/01/22 0110)  HYDROmorphone, 1 5 mg/hr, Last Rate: 1 5 mg/hr (09/30/22 2211)  norepinephrine, 1-30 mcg/min, Last Rate: 5 mcg/min (09/30/22 2200)      PRN Meds:   acetaminophen, 650 mg, Q6H PRN  HYDROmorphone, 0 5 mg, Q1H PRN  ondansetron, 4 mg, Q4H PRN        Allergies   No Known Allergies  ---------------------------------------------------------------------------------------  Advance Directive and Living Will:      Power of :    POLST:    ---------------------------------------------------------------------------------------  Care Time Delivered:   No Critical Care time spent     Tere Meeks PA-C      Portions of the record may have been created with voice recognition software  Occasional wrong word or "sound a like" substitutions may have occurred due to the inherent limitations of voice recognition software    Read the chart carefully and recognize, using context, where substitutions have occurred

## 2022-10-01 NOTE — PROGRESS NOTES
Progress Note - General Surgery   [facility] 65 y o  male MRN: [id number]  [facility]: [id number]    Assessment:  Patient is a 65 y o  male who presented with closed-loop small-bowel obstruction, now status post ex lap partial small-bowel resection on 09/20, bridging Phasix mesh placement on 09/21 and subsequent explantation of mesh on 09/26 with abdominal closure of skin complicated by partial skin dehiscence, now status post ex lap and wound VAC placement on 9/30  Afebrile,  Maps:540-60s on levo of 5; Intubated on: PC: 20/12/8/50 %  NGT:375 cc  UOP:1 1 L  R DILLON drain:30 cc serous  L DILLON drain:70 cc serous  Wound Vac 0 recorded     WBC:8 95 yesterday  HBG:, 8 6 yesterday  Tbili: 3 04 from 3 13 yesterday      Plan:  Wean vent as tolerated  Wean pressors as tolerated to maintain maps greater than 65  Continue PICC TPN  Monitor abdominal exam  Maintain DILLON drains x2  DVT prophylaxis  Analgesia/sedation per ICU  Appreciate ICU level of care      Subjective/Objective     Subjective/Events:  Agitated overnight    Objective:    Blood pressure (!) 97/39, pulse 84, temperature 99 32 °F (37 4 °C), resp  rate 22, height 5' 8" (1 727 m), weight 74 8 kg (164 lb 14 5 oz), SpO2 98 %  ,Body mass index is 25 07 kg/m²        Intake/Output Summary (Last 24 hours) at 9/30/2022 2219  Last data filed at 9/30/2022 1810  Gross per 24 hour   Intake 2161 42 ml   Output 1530 ml   Net 631 42 ml       Invasive Devices  Report    Peripherally Inserted Central Catheter Line  Duration           PICC Line 66/61/95 Right Basilic 8 days          Peripheral Intravenous Line  Duration           Peripheral IV 09/29/22 Left;Proximal;Upper;Ventral (anterior) Arm 1 day          Arterial Line  Duration           Arterial Line 09/26/22 4 days          Drain  Duration           NG/OG/Enteral Tube Nasogastric 18 Fr Left nare 10 days    Closed/Suction Drain Anterior;Right;Lateral RLQ Bulb 19 Fr  9 days    Closed/Suction Drain Left LUQ Bulb 19 Fr  4 days    Urethral Catheter Temperature probe 16 Fr  2 days          Airway  Duration           ETT  Hi-Lo 8 mm 5 days                Physical Exam  Vitals reviewed  Constitutional:       Interventions: He is sedated, intubated and restrained  HENT:      Head: Normocephalic and atraumatic  Cardiovascular:      Rate and Rhythm: Normal rate  Pulmonary:      Effort: He is intubated  Abdominal:      General: There is distension  Comments: Incision intact with wound VAC in place with good seal  DILLON drains x2 in place  tight   Musculoskeletal:      Comments: SCD's in place             Results from last 7 days   Lab Units 09/30/22  0544 09/29/22  0526 09/28/22  0425   WBC Thousand/uL 8 95 8 85 11 43*   HEMOGLOBIN g/dL 8 6* 8 3* 8 6*   HEMATOCRIT % 27 6* 27 6* 27 1*   PLATELETS Thousands/uL 259 221 202     Results from last 7 days   Lab Units 09/30/22  0544 09/29/22  0526 09/28/22  1143 09/26/22  0000 09/25/22  1615   POTASSIUM mmol/L 3 6 3 7 3 6   < >  --    CHLORIDE mmol/L 119* 119* 119*   < >  --    CO2 mmol/L 22 23 24   < >  --    CO2, I-STAT mmol/L  --   --   --   --  20*   BUN mg/dL 26* 26* 31*   < >  --    CREATININE mg/dL 1 07 0 92 0 93   < >  --    GLUCOSE, ISTAT mg/dl  --   --   --   --  130   CALCIUM mg/dL 7 1* 7 3* 7 3*   < >  --     < > = values in this interval not displayed       Results from last 7 days   Lab Units 09/26/22  1147 09/26/22  0803   INR  1 16 1 13   PTT seconds  --  36

## 2022-10-01 NOTE — PROGRESS NOTES
Critical Care Services- Self Extubation Progress Note   Aarti Gamino 65 y o  male MRN: [id number]  Unit/Bed#: MICU 13 Encounter: [id number]    Date occurred: 10/1/2022   Time occurred ([employer]): 8816  Assigned Nurse: Yanelis Griffin RN    Restraints: yes    Intubation Date: 09/25/2022  Vent settings:  Mode: Pressure Support                Pressure Control/Pinsp (cmH2O): 10    FIO2: 40%    PEEP (cmH20): 8    Weaning trial: yes    Continuous medications:   Adult TPN (CUSTOM BASE/CUSTOM ELECTROLYTE), , Last Rate: 56 3 mL/hr at 09/30/22 2140  dexmedetomidine, 0 1-1 5 mcg/kg/hr, Last Rate: 0 4 mcg/kg/hr (10/01/22 0110)  HYDROmorphone, 1 5 mg/hr, Last Rate: 1 5 mg/hr (09/30/22 2211)  norepinephrine, 1-30 mcg/min, Last Rate: 4 mcg/min (10/01/22 0825)      Sedation HOLD: yes    If Yes- Date held: 10/ 01/ 2022   Time held [employer]): 0692    PRN medications:   acetaminophen, 650 mg, Q6H PRN  HYDROmorphone, 0 5 mg, Q1H PRN  ondansetron, 4 mg, Q4H PRN      Last date/ time ([facility]) PRN sedation given: 09/30/2022 2116    RASS goal: 0 Alert and Calm  Last documented RASS: Basilio Agitation Sedation Scale (RASS): Alert and calm    Did patient need reintubation: no  If NO what is their oxygen requirement: HFNC 50L/95%    Was the patient receiving therapy, testing, or procedure at time of extubation: no    Was family notified: yes  Who was notified: SonFlora    Narrative of Events/Additional comments: Patient on SBT, restraints in place  Sedation maintained at rates prior to SBT  When seen early in AM patient was awake, nodding appropriately to questions and following commands on sedation  Per nursing report patient was getting agitated throughout the AM while on SBT  Subsequently self extubated  RN placed patient on NRB and [facility] team alerted immediately  Patient without respiratory distress, no stridor  Was transitioned to [facility] with hypoxia and then placed on HFNC       Julia Cevallos PA-C

## 2022-10-01 NOTE — NURSING NOTE
Pt self-extubated at   RN immediately responded at bedside and placed pt on NRB  The pt was on a pressure support wean at the time of self-extubation  CCsx contacted and present at bedside to assess pt  RT contacted and present at bedside to assess pt  Sedating gtts stopped per [facility]  Pt with strong cough and able to expectorate secretions  Placed on 6 LPM nasal cannula, then changed to 15 LPM midflow d/t spo2 of 83%

## 2022-10-01 NOTE — PLAN OF CARE
Problem: PAIN - ADULT  Goal: Verbalizes/displays adequate comfort level or baseline comfort level  Description: Interventions:  - Encourage patient to monitor pain and request assistance  - Assess pain using appropriate pain scale  - Administer analgesics based on type and severity of pain and evaluate response  - Implement non-pharmacological measures as appropriate and evaluate response  - Consider cultural and social influences on pain and pain management  - Notify physician/advanced practitioner if interventions unsuccessful or patient reports new pain  Outcome: Progressing     Problem: INFECTION - ADULT  Goal: Absence or prevention of progression during hospitalization  Description: INTERVENTIONS:  - Assess and monitor for signs and symptoms of infection  - Monitor lab/diagnostic results  - Monitor all insertion sites, i e  indwelling lines, tubes, and drains  - Monitor endotracheal if appropriate and nasal secretions for changes in amount and color  - [place] appropriate cooling/warming therapies per order  - Administer medications as ordered  - Instruct and encourage patient and family to use good hand hygiene technique  - Identify and instruct in appropriate isolation precautions for identified infection/condition  Outcome: Progressing

## 2022-10-01 NOTE — PLAN OF CARE
Problem: Potential for Falls  Goal: Patient will remain free of falls  Description: INTERVENTIONS:  - Educate patient/family on patient safety including physical limitations  - Instruct patient to call for assistance with activity   - Consult OT/PT to assist with strengthening/mobility   - Keep Call bell within reach  - Keep bed low and locked with side rails adjusted as appropriate  - Keep care items and personal belongings within reach  - Initiate and maintain comfort rounds  - Make Fall Risk Sign visible to staff  - Offer Toileting every  Hours, in advance of need  - Initiate/Maintain alarm  - Obtain necessary fall risk management equipment  - Apply yellow socks and bracelet for high fall risk patients  - Consider moving patient to room near nurses station  Outcome: Progressing     Problem: PAIN - ADULT  Goal: Verbalizes/displays adequate comfort level or baseline comfort level  Description: Interventions:  - Encourage patient to monitor pain and request assistance  - Assess pain using appropriate pain scale  - Administer analgesics based on type and severity of pain and evaluate response  - Implement non-pharmacological measures as appropriate and evaluate response  - Consider cultural and social influences on pain and pain management  - Notify physician/advanced practitioner if interventions unsuccessful or patient reports new pain  Outcome: Progressing     Problem: INFECTION - ADULT  Goal: Absence of fever/infection during neutropenic period  Description: INTERVENTIONS:  - Monitor WBC    Outcome: Progressing     Problem: CARDIOVASCULAR - ADULT  Goal: Absence of cardiac dysrhythmias or at baseline rhythm  Description: INTERVENTIONS:  - Continuous cardiac monitoring, vital signs, obtain 12 lead EKG if ordered  - Administer antiarrhythmic and heart rate control medications as ordered  - Monitor electrolytes and administer replacement therapy as ordered  Outcome: Progressing     Problem: DISCHARGE PLANNING  Goal: Discharge to home or other facility with appropriate resources  Description: INTERVENTIONS:  - Identify barriers to discharge w/patient and caregiver  - Arrange for needed discharge resources and transportation as appropriate  - Identify discharge learning needs (meds, wound care, etc )  - Arrange for interpretive services to assist at discharge as needed  - Refer to Case Management Department for coordinating discharge planning if the patient needs post-hospital services based on physician/advanced practitioner order or complex needs related to functional status, cognitive ability, or social support system  Outcome: Progressing     Problem: Knowledge Deficit  Goal: Patient/family/caregiver demonstrates understanding of disease process, treatment plan, medications, and discharge instructions  Description: Complete learning assessment and assess knowledge base  Interventions:  - Provide teaching at level of understanding  - Provide teaching via preferred learning methods  Outcome: Progressing     Problem: Nutrition/Hydration-ADULT  Goal: Nutrient/Hydration intake appropriate for improving, restoring or maintaining nutritional needs  Description: Monitor and assess patient's nutrition/hydration status for malnutrition  Collaborate with interdisciplinary team and initiate plan and interventions as ordered  Monitor patient's weight and dietary intake as ordered or per policy  Utilize nutrition screening tool and intervene as necessary  Determine patient's food preferences and provide high-protein, high-caloric foods as appropriate       INTERVENTIONS:  - Monitor oral intake, urinary output, labs, and treatment plans  - Assess nutrition and hydration status and recommend course of action  - Evaluate amount of meals eaten  - Assist patient with eating if necessary   - Allow adequate time for meals  - Recommend/ encourage appropriate diets, oral nutritional supplements, and vitamin/mineral supplements  - Order, calculate, and assess calorie counts as needed  - Recommend, monitor, and adjust tube feedings and TPN/PPN based on assessed needs  - Assess need for intravenous fluids  - Provide specific nutrition/hydration education as appropriate  - Include patient/family/caregiver in decisions related to nutrition  Outcome: Progressing     Problem: SAFETY,RESTRAINT: NV/NON-SELF DESTRUCTIVE BEHAVIOR  Goal: Remains free of harm/injury (restraint for non violent/non self-detsructive behavior)  Description: INTERVENTIONS:  - Instruct patient/family regarding restraint use   - Assess and monitor physiologic and psychological status   - Provide interventions and comfort measures to meet assessed patient needs   - Identify and implement measures to help patient regain control  - Assess readiness for release of restraint   Outcome: Progressing  Goal: Returns to optimal restraint-free functioning  Description: INTERVENTIONS:  - Assess the patient's behavior and symptoms that indicate continued need for restraint  - Identify and implement measures to help patient regain control  - Assess readiness for release of restraint   Outcome: Progressing

## 2022-10-02 PROBLEM — G93.41 ENCEPHALOPATHY, METABOLIC: Status: ACTIVE | Noted: 2022-01-01

## 2022-10-02 NOTE — PROGRESS NOTES
General Surgery  Progress Note   Iris Arcos 65 y o  male MRN: [id number]  [facility]: [id number]    Assessment:  24-year-old male with closed loop small bowel obstruction status post exploratory laparotomy, partial small-bowel resection on , status post bridging Phasix mesh placement on  and subsequent explantation of mesh on  with abdominal closure of skin now complicated by partial skin dehiscence, status post wound VAC placement on   Patient self-extubated 10/1    Mildly increased respiratory rate and episodes of tachycardia  Febrile to 100 8 F  HFNC 50 L @ 70%  Off pressor support    UOP: 5 2 L  NG tube: 1 5 L light bilious - nurse does report an episode of blood tinged but resolved  Right lower quadrant DILLON: 30 cc serous  Left upper quadrant DILLON: 110 ccs serous  Wound VAC: 300 cc   No stool output    WBC: 10 08 (8 95)  Hemoglobin: 8 1 (8 6)  Creatinine: 1 05 (1 02)    Plan:  • NPO with NG tube  • PICC/TPN  • Maintain DILLON drains to bulb suction  • Maintain wound VAC  •   VAC changes per General surgery Monday, Wednesday, Friday  • Aspiration precautions  • DVT ppx:  Lovenox  • Pain/ nausea control PRN  • OOB/ ambulation as able  • Incentive Spirometry    Subjective/Objective     Subjective: Patient seen and examined at bedside, in no acute distress  Patient self-extubated removed NG tube x2 yesterday  NG tube replaced last night  Patient requiring soft restraints in order to maintain NG tube  Objective:     Vitals:Blood pressure 133/50, pulse 90, temperature 100 04 °F (37 8 °C), resp  rate (!) 28, height 5' 8" (1 727 m), weight 73 3 kg (161 lb 9 6 oz), SpO2 92 %  ,Body mass index is 24 57 kg/m²       Temp (24hrs), Av 9 °F (37 7 °C), Min:99 3 °F (37 4 °C), Max:100 8 °F (38 2 °C)  Current: Temperature: 100 04 °F (37 8 °C)      Intake/Output Summary (Last 24 hours) at 10/1/2022 1656  Last data filed at 10/1/2022 2143  Gross per 24 hour   Intake 1655 81 ml   Output 6665 ml   Net -5009 19 ml       Invasive Devices  Report    Peripherally Inserted Central Catheter Line  Duration           PICC Line 86/11/27 Right Basilic 9 days          Peripheral Intravenous Line  Duration           Peripheral IV 09/29/22 Left;Proximal;Upper;Ventral (anterior) Arm 2 days          Arterial Line  Duration           Arterial Line 09/26/22 5 days          Drain  Duration           Closed/Suction Drain Anterior;Right;Lateral RLQ Bulb 19 Fr  10 days    Closed/Suction Drain Left LUQ Bulb 19 Fr  5 days    Urethral Catheter Temperature probe 16 Fr  3 days    NG/OG/Enteral Tube Nasogastric Left nare <1 day                Physical Exam:  General: No acute distress, alert and oriented  CV: Well perfused, regular rate and rhythm  Lungs: Normal work of breathing, no increased respiratory effort on HFNC  Abdomen: Soft, distended  midline wound VAC in place to suction    Right lower quadrant/left lower quadrant DILLON drains with serous output  Extremities: No edema, clubbing or cyanosis  Skin: Warm, dry    Lab Results:   BMP/CMP:   Lab Results   Component Value Date    SODIUM 146 10/02/2022    K 3 3 (L) 10/02/2022     (H) 10/02/2022    CO2 24 10/02/2022    BUN 27 (H) 10/02/2022    CREATININE 1 05 10/02/2022    CALCIUM 7 5 (L) 10/02/2022    AST 35 10/02/2022    ALT 20 10/02/2022    ALKPHOS 96 10/02/2022    EGFR 67 10/02/2022    and CBC:   Lab Results   Component Value Date    WBC 10 08 10/02/2022    HGB 8 1 (L) 10/02/2022    HCT 27 1 (L) 10/02/2022    MCV 98 10/02/2022     10/02/2022    MCH 29 1 10/02/2022    MCHC 29 9 (L) 10/02/2022    RDW 17 2 (H) 10/02/2022    MPV 11 1 10/02/2022    NRBC 0 10/02/2022     VTE Prophylaxis: Sequential compression device (Venodyne)  and Enoxaparin (Lovenox)    Heidy Downey  10/1/2022

## 2022-10-02 NOTE — PLAN OF CARE
Problem: Potential for Falls  Goal: Patient will remain free of falls  Description: INTERVENTIONS:  - Educate patient/family on patient safety including physical limitations  - Instruct patient to call for assistance with activity   - Consult OT/PT to assist with strengthening/mobility   - Keep Call bell within reach  - Keep bed low and locked with side rails adjusted as appropriate  - Keep care items and personal belongings within reach  - Initiate and maintain comfort rounds  - Make Fall Risk Sign visible to staff  - Offer Toileting every  Hours, in advance of need  - Initiate/Maintain alarm  - Obtain necessary fall risk management equipment:   - Apply yellow socks and bracelet for high fall risk patients  - Consider moving patient to room near nurses station  Outcome: Progressing     Problem: MOBILITY - ADULT  Goal: Maintain or return to baseline ADL function  Description: INTERVENTIONS:  -  Assess patient's ability to carry out ADLs; assess patient's baseline for ADL function and identify physical deficits which impact ability to perform ADLs (bathing, care of mouth/teeth, toileting, grooming, dressing, etc )  - Assess/evaluate cause of self-care deficits   - Assess range of motion  - Assess patient's mobility; develop plan if impaired  - Assess patient's need for assistive devices and provide as appropriate  - Encourage maximum independence but intervene and supervise when necessary  - Involve family in performance of ADLs  - Assess for home care needs following discharge   - Consider OT consult to assist with ADL evaluation and planning for discharge  - Provide patient education as appropriate  Outcome: Progressing  Goal: Maintains/Returns to pre admission functional level  Description: INTERVENTIONS:  - Perform BMAT or MOVE assessment daily    - Set and communicate daily mobility goal to care team and patient/family/caregiver     - Collaborate with rehabilitation services on mobility goals if consulted  - Perform Range of Motion  times a day  - Reposition patient every  hours    - Dangle patient  times a day  - Stand patient  times a day  - Ambulate patient  times a day  - Out of bed to chair  times a day   - Out of bed for liv times a day  - Out of bed for toileting  - Record patient progress and toleration of activity level   Outcome: Progressing     Problem: PAIN - ADULT  Goal: Verbalizes/displays adequate comfort level or baseline comfort level  Description: Interventions:  - Encourage patient to monitor pain and request assistance  - Assess pain using appropriate pain scale  - Administer analgesics based on type and severity of pain and evaluate response  - Implement non-pharmacological measures as appropriate and evaluate response  - Consider cultural and social influences on pain and pain management  - Notify physician/advanced practitioner if interventions unsuccessful or patient reports new pain  Outcome: Progressing     Problem: INFECTION - ADULT  Goal: Absence or prevention of progression during hospitalization  Description: INTERVENTIONS:  - Assess and monitor for signs and symptoms of infection  - Monitor lab/diagnostic results  - Monitor all insertion sites, i e  indwelling lines, tubes, and drains  - Monitor endotracheal if appropriate and nasal secretions for changes in amount and color  - [place] appropriate cooling/warming therapies per order  - Administer medications as ordered  - Instruct and encourage patient and family to use good hand hygiene technique  - Identify and instruct in appropriate isolation precautions for identified infection/condition  Outcome: Progressing  Goal: Absence of fever/infection during neutropenic period  Description: INTERVENTIONS:  - Monitor WBC    Outcome: Progressing     Problem: CARDIOVASCULAR - ADULT  Goal: Absence of cardiac dysrhythmias or at baseline rhythm  Description: INTERVENTIONS:  - Continuous cardiac monitoring, vital signs, obtain 12 lead EKG if ordered  - Administer antiarrhythmic and heart rate control medications as ordered  - Monitor electrolytes and administer replacement therapy as ordered  Outcome: Progressing     Problem: Prexisting or High Potential for Compromised Skin Integrity  Goal: Skin integrity is maintained or improved  Description: INTERVENTIONS:  - Identify patients at risk for skin breakdown  - Assess and monitor skin integrity  - Assess and monitor nutrition and hydration status  - Monitor labs   - Assess for incontinence   - Turn and reposition patient  - Assist with mobility/ambulation  - Relieve pressure over bony prominences  - Avoid friction and shearing  - Provide appropriate hygiene as needed including keeping skin clean and dry  - Evaluate need for skin moisturizer/barrier cream  - Collaborate with interdisciplinary team   - Patient/family teaching  - Consider wound care consult   Outcome: Progressing     Problem: DISCHARGE PLANNING  Goal: Discharge to home or other facility with appropriate resources  Description: INTERVENTIONS:  - Identify barriers to discharge w/patient and caregiver  - Arrange for needed discharge resources and transportation as appropriate  - Identify discharge learning needs (meds, wound care, etc )  - Arrange for interpretive services to assist at discharge as needed  - Refer to Case Management Department for coordinating discharge planning if the patient needs post-hospital services based on physician/advanced practitioner order or complex needs related to functional status, cognitive ability, or social support system  Outcome: Progressing     Problem: Knowledge Deficit  Goal: Patient/family/caregiver demonstrates understanding of disease process, treatment plan, medications, and discharge instructions  Description: Complete learning assessment and assess knowledge base    Interventions:  - Provide teaching at level of understanding  - Provide teaching via preferred learning methods  Outcome: Progressing     Problem: Nutrition/Hydration-ADULT  Goal: Nutrient/Hydration intake appropriate for improving, restoring or maintaining nutritional needs  Description: Monitor and assess patient's nutrition/hydration status for malnutrition  Collaborate with interdisciplinary team and initiate plan and interventions as ordered  Monitor patient's weight and dietary intake as ordered or per policy  Utilize nutrition screening tool and intervene as necessary  Determine patient's food preferences and provide high-protein, high-caloric foods as appropriate       INTERVENTIONS:  - Monitor oral intake, urinary output, labs, and treatment plans  - Assess nutrition and hydration status and recommend course of action  - Evaluate amount of meals eaten  - Assist patient with eating if necessary   - Allow adequate time for meals  - Recommend/ encourage appropriate diets, oral nutritional supplements, and vitamin/mineral supplements  - Order, calculate, and assess calorie counts as needed  - Recommend, monitor, and adjust tube feedings and TPN/PPN based on assessed needs  - Assess need for intravenous fluids  - Provide specific nutrition/hydration education as appropriate  - Include patient/family/caregiver in decisions related to nutrition  Outcome: Progressing     Problem: SAFETY,RESTRAINT: NV/NON-SELF DESTRUCTIVE BEHAVIOR  Goal: Remains free of harm/injury (restraint for non violent/non self-detsructive behavior)  Description: INTERVENTIONS:  - Instruct patient/family regarding restraint use   - Assess and monitor physiologic and psychological status   - Provide interventions and comfort measures to meet assessed patient needs   - Identify and implement measures to help patient regain control  - Assess readiness for release of restraint   Outcome: Progressing  Goal: Returns to optimal restraint-free functioning  Description: INTERVENTIONS:  - Assess the patient's behavior and symptoms that indicate continued need for restraint  - Identify and implement measures to help patient regain control  - Assess readiness for release of restraint   Outcome: Progressing

## 2022-10-02 NOTE — PROGRESS NOTES
Daily Progress Note - Critical Care   Jevon Chambers 65 y o  male MRN: [id number]  Unit/Bed#: MICU 13 Encounter: [id number]    ----------------------------------------------------------------------------------------  HPI/24hr events: 64 yo M PMH CAD s/p PCI 2000, AAA s/p open aorto bi-iliac grafting 2009, HTN, HLD, choledocholithiasis s/p ERCP and open partial cholecystectomy, ventral hernia repair with mesh  Self extubated yesterday, ultimately required HFNC which has been slowly weaning, currently 50L/70%  Bilious emesis, required replacement of NGT      ---------------------------------------------------------------------------------------  SUBJECTIVE  "I need help"     Review of Systems   Respiratory: Negative  Negative for shortness of breath  Cardiovascular: Negative  Negative for chest pain  Gastrointestinal: Negative for abdominal distention, abdominal pain, nausea and vomiting         + hiccups   Neurological: Negative for light-headedness  Psychiatric/Behavioral: Positive for agitation and confusion  Review of systems was reviewed and negative unless stated above in HPI/24-hour events   ---------------------------------------------------------------------------------------  Assessment and Plan:    Neuro:   · Diagnosis:  Toxic metabolic encephalopathy  ? Oriented to questions but intermittently confused, impulsive and pulling out tubes  ? Limit any mind altering medications   ? Tylenol rectal PRN   ? Dilaudid 0 2 mg PRN mod/severe pain   ? Dilaudid 0 5 mg PRN breakthrough pain - 1 dose/24 hrs   ? Delirium precautions, regulate sleep/wake cycle   ? Consider starting qHS ODT zyprexa         CV:   · Diagnosis:  Shock, resolved  ? Plan:  Levophed off  ? MAP goals >65  · Diagnosis:  New onset AFib  ? Plan:  Currently normal sinus rhythm  ? Holding systemic anticoagulation  · Diagnosis: HFpEF, CAD, HLD, HTN  ?  Plan:  Holding home aspirin, Lipitor, Lasix 20 mg daily, lisinopril 5 mg daily, metoprolol 50 XL daily with strict NPO   ? Can start Labetalol PRN for HTN if needed         Pulm:  · Diagnosis:  Acute hypoxic respiratory failure, ARDS, concern for aspiration,  ? Plan:  Self extubated 10/1  ? HFNC 50L/70%   ? Wean FIO2 for spo2 goal >92%   ? PF ratio 84 this AM - meets criteria for severe ARDS  ? Aggressive pulmonary toileting, airway clearance, NT suction as needed   ? F/u AM CXR   ? Re-dose lasix today      GI:   · Diagnosis: SBO 2/2 adhesions s/p ex-lap, SBR, extensive MARK, completion cholecystectomy, duo/duo and jejunojejunal anastomosis, mesh placement, wound VAC  ? Plan:  90 cm bowel resected, G-J anastomosis, primary duodenal anastomosis, completion cholecystectomy, removal of prior ventral hernia repair mesh and wound vac placement   ? TPN  ? Maintain NGT - 1500 cc/24 hrs   ? Maintain JPs and Vac per surgery    · Diagnosis:  Hyperbilirubinemia  ? Plan:  MRCP/RUQ U/S not consistent with biliary disease  ? Currently down trending, continue to follow        :   · Diagnosis:  JOVANNI, resolved  ? Plan: Monitor UOP  ? Maintain centeno with significant scrotal edema   ? Trend BUN/creatinine        · F:  Continue diuresis   · E: Replete electrolytes for goal K >4, Phos >3, Mg >2  · N: TPN, strict NPO with NGT        Heme/Onc:   · Diagnosis:  ABLA in setting of GIB   ? Plan:  Trend hemoglobin and transfuse as needed  ? DVT PPX:  Subcu heparin, SCDs        Endo:   ? No active issues        ID:   · Diagnosis:  SIRS/sepsis   ? Plan:  Zosyn day 11, total abx day 14 - consider d/c today   ? 9/30 blood cultures-negative x24 hours  ? 9/25 blood cultures negative x5 days  ? 9/20 blood cultures-negative x5 days  ?  MRSA negative         MSK/Skin:   ? Frequent turning and repositioning    Patient appropriate for transfer out of the ICU today?: No  Disposition: Continue Critical Care   Code Status: Level 1 - Full Code  ---------------------------------------------------------------------------------------  ICU CORE MEASURES    Prophylaxis   VTE Pharmacologic Prophylaxis: Enoxaparin (Lovenox)  VTE Mechanical Prophylaxis: sequential compression device  Stress Ulcer Prophylaxis: Pantoprazole IV     ABCDE Protocol (if indicated)  Plan to perform spontaneous awakening trial today? Not applicable  Plan to perform spontaneous breathing trial today? Not applicable  Obvious barriers to extubation? Not applicable  CAM-ICU: Positive    Invasive Devices Review  Invasive Devices  Report    Peripherally Inserted Central Catheter Line  Duration           PICC Line 84/91/76 Right Basilic 9 days          Peripheral Intravenous Line  Duration           Peripheral IV 22 Left;Proximal;Upper;Ventral (anterior) Arm 3 days          Arterial Line  Duration           Arterial Line 22 5 days          Drain  Duration           Closed/Suction Drain Anterior;Right;Lateral RLQ Bulb 19 Fr  10 days    Closed/Suction Drain Left LUQ Bulb 19 Fr  5 days    Urethral Catheter Temperature probe 16 Fr  4 days    NG/OG/Enteral Tube Nasogastric Left nare <1 day              Can any invasive devices be discontinued today?  No  ---------------------------------------------------------------------------------------  OBJECTIVE    Vitals   Vitals:    10/02/22 0400 10/02/22 0556 10/02/22 0600 10/02/22 0700   BP:       BP Location:       Pulse: 94  86 90   Resp: (!) 34  (!) 33 (!) 31   Temp: 99 68 °F (37 6 °C)  99 68 °F (37 6 °C) 99 68 °F (37 6 °C)   TempSrc: Bladder      SpO2: 97%  97% 95%   Weight:  68 3 kg (150 lb 9 2 oz)     Height:         Temp (24hrs), Av °F (37 8 °C), Min:99 3 °F (37 4 °C), Max:100 8 °F (38 2 °C)  Current: Temperature: 99 68 °F (37 6 °C)    Respiratory:  SpO2: SpO2: 95 %, SpO2 Device: O2 Device: High flow nasal cannula  Nasal Cannula O2 Flow Rate (L/min): 10 L/min    Invasive/non-invasive ventilation settings   Respiratory  Report   Lab Data (Last 4 hours)      10/02 0712            pH, Arterial       7 478             pCO2, Arterial       31 8             pO2, Arterial       56 9             HCO3, Arterial       23 0             Base Excess, Arterial       -0 1                  O2/Vent Data     None                Physical Exam  Constitutional:       General: He is awake  He is not in acute distress  Appearance: He is ill-appearing  He is not toxic-appearing or diaphoretic  Interventions: He is restrained  Nasal cannula in place  Eyes:      Pupils: Pupils are equal, round, and reactive to light  Cardiovascular:      Rate and Rhythm: Normal rate and regular rhythm  Heart sounds: Normal heart sounds  Pulmonary:      Effort: Tachypnea present  No respiratory distress  Breath sounds: Decreased breath sounds present  Comments: HFNC 50L/70%  Abdominal:      General: There is distension  Comments: R/L DILLON drains with serousang drainage   NGT with bilious drainage   Midline wound vac with dark drainage    Musculoskeletal:      Right lower leg: No edema  Left lower leg: No edema  Skin:     General: Skin is warm and dry  Neurological:      General: No focal deficit present  Mental Status: He is alert  Comments:  Answers orienting questions appropriately but confused answers to other questions throughout conversation              Laboratory and Diagnostics:  Results from last 7 days   Lab Units 10/02/22  0433 09/30/22  0544 09/29/22  0526 09/28/22  0425 09/27/22  0547 09/26/22  1915 09/26/22  1546 09/26/22  1147 09/26/22  0803 09/26/22  0000   WBC Thousand/uL 10 08 8 95 8 85 11 43* 12 88*  --   --  11 76* 8 93 7 31   HEMOGLOBIN g/dL 8 1* 8 6* 8 3* 8 6* 8 8* 9 3* 9 9* 9 6* 6 8* 7 2*   HEMATOCRIT % 27 1* 27 6* 27 6* 27 1* 28 0* 28 8*  --  30 6* 22 4* 23 6*   PLATELETS Thousands/uL 327 259 221 202 156  --   --  148* 139* 127*   NEUTROS PCT % 83*  --   --   --   --   --   --   --   --   --    MONOS PCT % 5  --   --   --   --   --   --   --   --   --    MONO PCT %  --   --   --   --   --   --   -- --   --  3*     Results from last 7 days   Lab Units 10/02/22  0433 10/01/22  0444 09/30/22  0544 09/29/22  0526 09/28/22  1143 09/28/22  0425 09/27/22  1515 09/27/22  0425 09/26/22  0640 09/26/22  0000   SODIUM mmol/L 146 145 147 146 147 150* 148* 147   < > 149*   POTASSIUM mmol/L 3 3* 3 9 3 6 3 7 3 6 3 4* 3 7 3 7   < > 3 5   CHLORIDE mmol/L 118* 118* 119* 119* 119* 119* 117* 116*   < > 122*   CO2 mmol/L 24 23 22 23 24 24 23 22   < > 22   ANION GAP mmol/L 4 4 6 4 4 7 8 9   < > 5   BUN mg/dL 27* 28* 26* 26* 31* 34* 39* 33*   < > 23   CREATININE mg/dL 1 05 1 02 1 07 0 92 0 93 1 01 1 20 1 03   < > 0 76   CALCIUM mg/dL 7 5* 7 3* 7 1* 7 3* 7 3* 7 5* 7 4* 7 1*   < > 6 6*   GLUCOSE RANDOM mg/dL 122 121 135 142* 132 137 177* 197*   < > 116   ALT U/L 20 19 21 22  --  17  --  17  --  22   AST U/L 35 27 30 36  --  33  --  30  --  30   ALK PHOS U/L 96 103 109 114  --  82  --  69  --  56   ALBUMIN g/dL 1 8* 1 7* 1 6* 1 7*  --  1 9*  --  2 2*  --  2 2*   TOTAL BILIRUBIN mg/dL 2 70* 3 04* 3 13* 3 56*  --  3 68*  --  4 72*  --  3 52*    < > = values in this interval not displayed       Results from last 7 days   Lab Units 10/02/22  0433 10/01/22  0444 09/30/22  0544 09/29/22  0526 09/28/22  1143 09/28/22  0425 09/27/22  1515 09/27/22  0425   MAGNESIUM mg/dL 2 2 2 5 2 4 2 5  --  2 8* 2 8* 2 8*   PHOSPHORUS mg/dL 2 2* 3 2 2 4 2 9 2 9 1 8* 2 3 1 7*      Results from last 7 days   Lab Units 09/26/22  1147 09/26/22  0803   INR  1 16 1 13   PTT seconds  --  36          Results from last 7 days   Lab Units 09/26/22  1640 09/26/22  1147 09/26/22  0640 09/25/22  1603   LACTIC ACID mmol/L 1 9 1 8 1 0 1 8     ABG:  Results from last 7 days   Lab Units 10/02/22  0712   PH ART  7 478*   PCO2 ART mm Hg 31 8*   PO2 ART mm Hg 56 9*   HCO3 ART mmol/L 23 0   BASE EXC ART mmol/L -0 1   ABG SOURCE  Line, Arterial     VBG:  Results from last 7 days   Lab Units 10/02/22  0712 09/26/22  1341 09/26/22  1147   PH JOSE   --   --  7 177*   PCO2 JOSE mm Hg  -- --  62 0*   PO2 JOSE mm Hg  --   --  51 9*   HCO3 JOSE mmol/L  --   --  22 5*   BASE EXC JOSE mmol/L  --   --  -6 3   ABG SOURCE  Line, Arterial   < >  --     < > = values in this interval not displayed  Micro  Results from last 7 days   Lab Units 09/30/22  0135 09/27/22  0823 09/25/22  2150   BLOOD CULTURE  No Growth at 48 hrs  No Growth at 48 hrs  --  No Growth After 5 Days  No Growth After 5 Days  MRSA CULTURE ONLY   --  No Methicillin Resistant Staphlyococcus aureus (MRSA) isolated  --        Imaging: AM CXR pending  I have personally reviewed pertinent reports  Intake and Output  I/O       09/30 0701  10/01 0700 10/01 0701  10/02 0700    I V  (mL/kg) 708 7 (9 7) 55 4 (0 8)    NG/GT 0 0    IV Piggyback 250 130    TPN 1448 8 1343 7    Total Intake(mL/kg) 2407 4 (32 8) 1529 1 (22 4)    Urine (mL/kg/hr) 1130 (0 6) 5265 (3 2)    Emesis/NG output 375 1500    Drains 100 440    Total Output 1605 7205    Net +802 4 -5675 9              Height and Weights   Height: 5' 8" (172 7 cm)  IBW (Ideal Body Weight): 68 4 kg  Body mass index is 22 89 kg/m²  Weight (last 2 days)     Date/Time Weight    10/02/22 0556 68 3 (150 57)    10/01/22 0600 73 3 (161 6)    09/30/22 1146 --    Comment rows:    OBSERV: Patient intubated, id band verification, SBAR to anes direct transport to OR 6 at 09/30/22 1146            Nutrition       Diet Orders   (From admission, onward)             Start     Ordered    09/29/22 1729  Diet NPO  Diet effective now        Comments: Please do not adjust rate  Thank you   References:    Nutrtion Support Algorithm Enteral vs  Parenteral   Question Answer Comment   Diet Type NPO    RD to adjust diet per protocol?  Yes        09/29/22 1729                Active Medications  Scheduled Meds:  Current Facility-Administered Medications   Medication Dose Route Frequency Provider Last Rate   • acetaminophen  650 mg Rectal Q6H PRN Tere Larson PA-C     • Adult TPN (CUSTOM BASE/CUSTOM ELECTROLYTE)   Intravenous Continuous TPN Josh Hernandez PA-C 56 3 mL/hr at 10/01/22 2143   • calcium gluconate  3 g Intravenous Once Josh Hernandez PA-C     • chlorhexidine  15 mL Mouth/Throat Q12H [address]     • enoxaparin  40 mg Subcutaneous Q24H [facility] Mari Flores, [place]     • HYDROmorphone  0 5 mg Intravenous Q4H PRN Josh Hernandez PA-C     • HYDROmorphone  0 2 mg Intravenous Q4H PRN Josh Hernandez PA-C     • magnesium sulfate  2 g Intravenous Once Josh Hernandez PA-C     • ondansetron  4 mg Intravenous Q4H PRN Gerardo Leiva PA-C     • pantoprazole  40 mg Intravenous Q12H [address]     • potassium chloride  40 mEq Intravenous Once Josh Hernandez PA-C 40 mEq (10/02/22 0710)   • potassium phosphate  30 mmol Intravenous Once Josh Heranndez PA-C       Continuous Infusions:  Adult TPN (CUSTOM BASE/CUSTOM ELECTROLYTE), , Last Rate: 56 3 mL/hr at 10/01/22 2143      PRN Meds:   acetaminophen, 650 mg, Q6H PRN  HYDROmorphone, 0 5 mg, Q4H PRN  HYDROmorphone, 0 2 mg, Q4H PRN  ondansetron, 4 mg, Q4H PRN        Allergies   No Known Allergies  ---------------------------------------------------------------------------------------  Advance Directive and Living Will:      Power of :    POLST:    ---------------------------------------------------------------------------------------  Care Time Delivered:   No Critical Care time spent     Josh Hernandez PA-C      Portions of the record may have been created with voice recognition software  Occasional wrong word or "sound a like" substitutions may have occurred due to the inherent limitations of voice recognition software    Read the chart carefully and recognize, using context, where substitutions have occurred

## 2022-10-03 NOTE — QUICK NOTE
Acute Care Surgery  Bedside V A C  Procedure Note     The nurse remained present to confirm the correct dressing counts on removal of the VAC dressing and was debriefed at the completion of the dressing change  Location of wound: Abdomen    Dressings and Foam removed:  1 Black Foam  1 White Foam      VAC dressing application:  The periwound skin was cleaned and dried  2 Adaptic dressings, 1 black foam, 1 white foam were cut to size of the wound and placed into the wound  The dressings were then covered with VAC drape  Additional VAC drape and 1 black foam was used to create a bridge cranially to act as an ascites VAC where fluid was previously pooling along the incision line  The track pad was then placed over the base of black foam  The VAC was then set to 75 mmHg low Continuous suction  The patient tolerated the procedure well and there were no complications  The patient did not require any excisional debridement during today's dressing change  VAC settings:  75 mmHg  Continuous    Additional Notes: The [facility] sticker placed over the dressing per protocol  The next [facility] dressing change will be planned for Wednesday  Willie Felix PA-C was present for the dressing change  This dressing change took greater than 20 minutes to complete      Hemanth Rose  10/3/2022 12:42 PM

## 2022-10-03 NOTE — PROGRESS NOTES
Daily Progress Note - Critical Care   Yordan Garrett 65 y o  male MRN: [id number]  Unit/Bed#: MICU 13 Encounter: [id number]        ----------------------------------------------------------------------------------------  HPI/24hr events: 64 yo M PMH CAD s/p PCI 2000, AAA s/p open aorto bi-iliac grafting 2009, HTN, HLD, choledocholithiasis s/p ERCP and open partial cholecystectomy, ventral hernia repair with mesh  In the past 24 hrs, NG tube remains in place and has removed about 2 L on suction  Pt has been diuresing well  Maintained on TPN  Had increased O2 requirements overnight, remains on 40L High flow and FiO2 was increased to 70% this morning    ---------------------------------------------------------------------------------------  SUBJECTIVE  Pt alert and oriented this morning  He denies any pain this morning  Feeling tired    Review of Systems   Constitutional: Negative for chills and fever  HENT: Negative  Eyes: Negative  Respiratory: Positive for shortness of breath  Negative for wheezing and stridor  Cardiovascular: Negative for chest pain  Gastrointestinal: Negative for abdominal pain, nausea and vomiting  Genitourinary: Positive for dysuria  Negative for difficulty urinating  Musculoskeletal: Negative  Neurological: Negative  Hematological: Negative  Psychiatric/Behavioral: Negative  Review of systems was reviewed and negative unless stated above in HPI/24-hour events   ---------------------------------------------------------------------------------------  Assessment and Plan:    Neuro:   · Diagnosis:  Toxic metabolic encephalopathy  ? Oriented to questions but intermittently confused, impulsive and pulling out tubes  ? Limit any mind altering medications   ? Tylenol rectal PRN   ? Dilaudid 0 2 mg PRN mod/severe pain   ? Dilaudid 0 5 mg PRN breakthrough pain - 1 dose/24 hrs   ?  Delirium precautions, regulate sleep/wake cycle   § Consider starting qHS ODT zyprexa       CV:   · Diagnosis:  Shock, resolved  ? Plan:  Levophed off  ? MAP goals >65  · Diagnosis:  New onset AFib  ? Plan:  Currently normal sinus rhythm  ? Holding systemic anticoagulation  · Diagnosis: HFpEF, CAD, HLD, HTN  ? Plan:  Holding home aspirin, Lipitor, Lasix 20 mg daily, lisinopril 5 mg daily, metoprolol 50 XL daily with strict NPO   ? Can start Labetalol PRN for HTN if needed         Pulm:  · Diagnosis:  Acute hypoxic respiratory failure, ARDS, concern for aspiration,  ? Plan:  Self extubated 10/1  ? HFNC 40L/50%   § Wean FIO2 for spo2 goal >92%   § PF ratio 84 this AM - meets criteria for severe ARDS  ? Aggressive pulmonary toileting, airway clearance, NT suction as needed   ? ABG this morning showed  PaO2 at 59 9    ? Plan for diuresis with 20 mg IV lasix today     GI:   · Diagnosis: SBO 2/2 adhesions s/p ex-lap, SBR, extensive MARK, completion cholecystectomy, duo/duo and jejunojejunal anastomosis, mesh placement, wound VAC  ? Plan:  90 cm bowel resected, G-J anastomosis, primary duodenal anastomosis, completion cholecystectomy, removal of prior ventral hernia repair mesh and wound vac placement   ? TPN  ? Maintain NGT - 00 cc/24 hrs   ? Maintain JPs and Vac per surgery    · Diagnosis:  Hyperbilirubinemia  ? Plan:  MRCP/RUQ U/S not consistent with biliary disease  ? Currently down trending, continue to follow        :   · Diagnosis:  JOVANNI, resolved  ? Plan: Monitor UOP  ? Maintain centeno with significant scrotal edema   ? Trend BUN/creatinine      F:  Continue diuresis   · E: Replete electrolytes for goal K >4, Phos >3, Mg >2  · N: TPN, strict NPO with NGT  Discuss with surgery trickle feeds vs continue TPN        Heme/Onc:   · Diagnosis:  ABLA in setting of GIB   ? Plan:  Trend hemoglobin and transfuse as needed  ? DVT PPX:  Subcu heparin, SCDs     Endo:   ? No active issues      ID:   · Diagnosis:  SIRS/sepsis   ? Plan:  Zosyn was D/C on 9/29    total abx days 14    ? 9/30 blood cultures-negative x24 hours  ?  blood cultures negative x5 days  ?  blood cultures-negative x5 days  ? MRSA negative   ? Temperature of 100 4 this morning  Continue to monitor      MSK/Skin:   ? Frequent turning and repositioning     Patient appropriate for transfer out of the ICU today?: No  Disposition: Continue Critical Care   Code Status: Level 1 - Full Code    Patient appropriate for transfer out of the ICU today?: No  Disposition: Continue Critical Care   Code Status: Level 1 - Full Code  ---------------------------------------------------------------------------------------  ICU CORE MEASURES    Prophylaxis   VTE Pharmacologic Prophylaxis: Enoxaparin (Lovenox)  VTE Mechanical Prophylaxis: sequential compression device  Stress Ulcer Prophylaxis: Pantoprazole IV     Invasive Devices Review  Invasive Devices  Report    Peripherally Inserted Central Catheter Line  Duration           PICC Line  Right Basilic 10 days          Arterial Line  Duration           Arterial Line 22 6 days          Drain  Duration           Closed/Suction Drain Anterior;Right;Lateral RLQ Bulb 19 Fr  11 days    Closed/Suction Drain Left LUQ Bulb 19 Fr  6 days    Urethral Catheter Temperature probe 16 Fr  5 days    NG/OG/Enteral Tube Nasogastric Left nare 1 day              Can any invasive devices be discontinued today?  No  ---------------------------------------------------------------------------------------  OBJECTIVE    Vitals   Vitals:    10/03/22 0200 10/03/22 0339 10/03/22 0400 10/03/22 0600   BP:       BP Location:       Pulse: 72  78 86   Resp: (!) 30  (!) 30 (!) 31   Temp: (!) 100 76 °F (38 2 °C)  100 4 °F (38 °C) 100 4 °F (38 °C)   TempSrc:   Bladder    SpO2: 95% 97% 94% 99%   Weight:    65 3 kg (143 lb 15 4 oz)   Height:         Temp (24hrs), Av 1 °F (37 8 °C), Min:99 68 °F (37 6 °C), Max:100 76 °F (38 2 °C)  Current: Temperature: 100 4 °F (38 °C)  HR: 86  RR: 31  SpO2: 99%    Respiratory:  SpO2: SpO2: 99 %, SpO2 Activity: SpO2 Activity: At Rest, SpO2 Device: O2 Device: High flow nasal cannula, Capnography:    Nasal Cannula O2 Flow Rate (L/min): 10 L/min    Invasive/non-invasive ventilation settings   Respiratory  Report   Lab Data (Last 4 hours)      10/03 0437            pH, Arterial       7 528             pCO2, Arterial       28 7             pO2, Arterial       59 9             HCO3, Arterial       23 3             Base Excess, Arterial       1 2                  O2/Vent Data       10/03 0339   Most Recent        Non-Invasive Ventilation Mode HFNC (High flow)  HFNC (High flow)                  Physical Exam  Constitutional:       Appearance: He is ill-appearing  HENT:      Head: Normocephalic and atraumatic  Mouth/Throat:      Mouth: Mucous membranes are dry  Comments: NG tube in place  Eyes:      Conjunctiva/sclera: Conjunctivae normal       Pupils: Pupils are equal, round, and reactive to light  Cardiovascular:      Rate and Rhythm: Normal rate  Pulses: Normal pulses  Pulmonary:      Breath sounds: No stridor  No wheezing  Comments: Pt tachypnic, but Saturating well on 40L, [email or web address] this morning  Abdominal:      General: A surgical scar is present  There is distension  Tenderness: There is abdominal tenderness in the left upper quadrant  There is no guarding or rebound  Comments: Incision C/D/I, wound vac in place   Musculoskeletal:      Cervical back: Normal range of motion  Skin:     General: Skin is warm and dry  Neurological:      Mental Status: He is alert and oriented to person, place, and time  Motor: No weakness     Psychiatric:         Behavior: Behavior normal              Laboratory and Diagnostics:  Results from last 7 days   Lab Units 10/03/22  0437 10/02/22  0433 09/30/22  0544 09/29/22  0526 09/28/22  0425 09/27/22  0547 09/26/22  1915 09/26/22  1546 09/26/22  1147   WBC Thousand/uL 9 71 10 08 8 95 8 85 11 43* 12 88*  --   --  11 76* HEMOGLOBIN g/dL 8 8* 8 1* 8 6* 8 3* 8 6* 8 8* 9 3*   < > 9 6*   HEMATOCRIT % 28 9* 27 1* 27 6* 27 6* 27 1* 28 0* 28 8*  --  30 6*   PLATELETS Thousands/uL 398* 327 259 221 202 156  --   --  148*   NEUTROS PCT %  --  83*  --   --   --   --   --   --   --    MONOS PCT %  --  5  --   --   --   --   --   --   --     < > = values in this interval not displayed  Results from last 7 days   Lab Units 10/03/22  0437 10/02/22  0433 10/01/22  0444 09/30/22  0544 09/29/22  0526 09/28/22  1143 09/28/22  0425 09/27/22  1515 09/27/22  0425   SODIUM mmol/L 144 146 145 147 146 147 150*   < > 147   POTASSIUM mmol/L 4 0 3 3* 3 9 3 6 3 7 3 6 3 4*   < > 3 7   CHLORIDE mmol/L 118* 118* 118* 119* 119* 119* 119*   < > 116*   CO2 mmol/L 22 24 23 22 23 24 24   < > 22   ANION GAP mmol/L 4 4 4 6 4 4 7   < > 9   BUN mg/dL 31* 27* 28* 26* 26* 31* 34*   < > 33*   CREATININE mg/dL 0 98 1 05 1 02 1 07 0 92 0 93 1 01   < > 1 03   CALCIUM mg/dL 8 2* 7 5* 7 3* 7 1* 7 3* 7 3* 7 5*   < > 7 1*   GLUCOSE RANDOM mg/dL 119 122 121 135 142* 132 137   < > 197*   ALT U/L 23 20 19 21 22  --  17  --  17   AST U/L 38 35 27 30 36  --  33  --  30   ALK PHOS U/L 105 96 103 109 114  --  82  --  69   ALBUMIN g/dL 2 0* 1 8* 1 7* 1 6* 1 7*  --  1 9*  --  2 2*   TOTAL BILIRUBIN mg/dL 2 58* 2 70* 3 04* 3 13* 3 56*  --  3 68*  --  4 72*    < > = values in this interval not displayed       Results from last 7 days   Lab Units 10/03/22  0437 10/02/22  0433 10/01/22  0444 09/30/22  0544 09/29/22  0526 09/28/22  1143 09/28/22  0425 09/27/22  1515   MAGNESIUM mg/dL 2 6 2 2 2 5 2 4 2 5  --  2 8* 2 8*   PHOSPHORUS mg/dL 3 0 2 2* 3 2 2 4 2 9 2 9 1 8* 2 3      Results from last 7 days   Lab Units 09/26/22  1147 09/26/22  0803   INR  1 16 1 13   PTT seconds  --  36          Results from last 7 days   Lab Units 09/26/22  1640 09/26/22  1147   LACTIC ACID mmol/L 1 9 1 8     ABG:  Results from last 7 days   Lab Units 10/03/22  0437   PH ART  7 528*   PCO2 ART mm Hg 28 7*   PO2 ART mm Hg 59 9*   HCO3 ART mmol/L 23 3   BASE EXC ART mmol/L 1 2   ABG SOURCE  Line, Arterial     VBG:  Results from last 7 days   Lab Units 10/03/22  0437 09/26/22  1341 09/26/22  1147   PH JOSE   --   --  7 177*   PCO2 JOSE mm Hg  --   --  62 0*   PO2 JOSE mm Hg  --   --  51 9*   HCO3 JOSE mmol/L  --   --  22 5*   BASE EXC JOSE mmol/L  --   --  -6 3   ABG SOURCE  Line, Arterial   < >  --     < > = values in this interval not displayed  Micro  Results from last 7 days   Lab Units 09/30/22  0135 09/27/22  0823   BLOOD CULTURE  No Growth at 48 hrs  No Growth at 48 hrs  --    MRSA CULTURE ONLY   --  No Methicillin Resistant Staphlyococcus aureus (MRSA) isolated       EKG:   Imaging:  I have personally reviewed pertinent reports  Intake and Output  I/O       10/01 0701  10/02 0700 10/02 0701  10/03 0700    I V  (mL/kg) 55 4 (0 8) 120 (1 8)    NG/GT 0     IV Piggyback 130 500    TPN 1343 7 1369 9    Total Intake(mL/kg) 1529 1 (22 4) 1989 9 (30 5)    Urine (mL/kg/hr) 5265 (3 2) 2615 (1 7)    Emesis/NG output 1500 2350    Drains 440 80    Stool  0    Total Output 7205 5045    Net -5675 9 -3055 1          Unmeasured Stool Occurrence  2 x        UOP: 100 ml/hr     Height and Weights   Height: 5' 8" (172 7 cm)  IBW (Ideal Body Weight): 68 4 kg  Body mass index is 21 89 kg/m²  Weight (last 2 days)     Date/Time Weight    10/03/22 0600 65 3 (143 96)    10/02/22 0556 68 3 (150 57)    10/01/22 0600 73 3 (161 6)            Nutrition       Diet Orders   (From admission, onward)             Start     Ordered    09/29/22 1729  Diet NPO  Diet effective now        Comments: Please do not adjust rate  Thank you   References:    Nutrtion Support Algorithm Enteral vs  Parenteral   Question Answer Comment   Diet Type NPO    RD to adjust diet per protocol?  Yes        09/29/22 1729              TF currently running at     mL/hr         Rate: 56 4 mL/hr56 4 mL/hr            Active Medications  Scheduled Meds:  Current Facility-Administered Medications   Medication Dose Route Frequency Provider Last Rate   • acetaminophen  650 mg Rectal Q6H PRN Joss Lange PA-C     • Adult TPN (CUSTOM BASE/CUSTOM ELECTROLYTE)   Intravenous Continuous TPN Nicholas Parisi PA-C 56 4 mL/hr at 10/02/22 2158   • chlorhexidine  15 mL Mouth/Throat Q12H [address] Kaden Pouch [place], [place]     • enoxaparin  40 mg Subcutaneous Q24H [address] [place], [place]     • HYDROmorphone  0 5 mg Intravenous Q4H PRN Nicholas Parisi PA-C     • HYDROmorphone  0 2 mg Intravenous Q4H PRN Nicholas Parisi PA-C     • labetalol  10 mg Intravenous Q6H PRN Nicholas Parisi PA-C     • OLANZapine  5 mg Oral HS PRN Jakyalessandra Caballero, DO     • ondansetron  4 mg Intravenous Q4H PRN Kaden Hugo Leiva PA-C     • pantoprazole  40 mg Intravenous Q12H [address] Kaden Hugo Leiva PA-C       Continuous Infusions:  Adult TPN (CUSTOM BASE/CUSTOM ELECTROLYTE), , Last Rate: 56 4 mL/hr at 10/02/22 2158      PRN Meds:   acetaminophen, 650 mg, Q6H PRN  HYDROmorphone, 0 5 mg, Q4H PRN  HYDROmorphone, 0 2 mg, Q4H PRN  labetalol, 10 mg, Q6H PRN  OLANZapine, 5 mg, HS PRN  ondansetron, 4 mg, Q4H PRN        Allergies   No Known Allergies  ---------------------------------------------------------------------------------------  Advance Directive and Living Will:      Power of :    POLST:    ---------------------------------------------------------------------------------------  Care Time Delivered:   No Critical Care time spent     Gabino Enter, DO      Portions of the record may have been created with voice recognition software  Occasional wrong word or "sound a like" substitutions may have occurred due to the inherent limitations of voice recognition software    Read the chart carefully and recognize, using context, where substitutions have occurred

## 2022-10-03 NOTE — PLAN OF CARE
Problem: Potential for Falls  Goal: Patient will remain free of falls  Description: INTERVENTIONS:  - Educate patient/family on patient safety including physical limitations  - Instruct patient to call for assistance with activity   - Consult OT/PT to assist with strengthening/mobility   - Keep Call bell within reach  - Keep bed low and locked with side rails adjusted as appropriate  - Keep care items and personal belongings within reach  - Initiate and maintain comfort rounds  - Make Fall Risk Sign visible to staff  - Offer Toileting every  Hours, in advance of need  - Initiate/Maintain alarm  - Obtain necessary fall risk management equipment:   - Apply yellow socks and bracelet for high fall risk patients  - Consider moving patient to room near nurses station  Outcome: Progressing     Problem: MOBILITY - ADULT  Goal: Maintain or return to baseline ADL function  Description: INTERVENTIONS:  -  Assess patient's ability to carry out ADLs; assess patient's baseline for ADL function and identify physical deficits which impact ability to perform ADLs (bathing, care of mouth/teeth, toileting, grooming, dressing, etc )  - Assess/evaluate cause of self-care deficits   - Assess range of motion  - Assess patient's mobility; develop plan if impaired  - Assess patient's need for assistive devices and provide as appropriate  - Encourage maximum independence but intervene and supervise when necessary  - Involve family in performance of ADLs  - Assess for home care needs following discharge   - Consider OT consult to assist with ADL evaluation and planning for discharge  - Provide patient education as appropriate  Outcome: Progressing  Goal: Maintains/Returns to pre admission functional level  Description: INTERVENTIONS:  - Perform BMAT or MOVE assessment daily    - Set and communicate daily mobility goal to care team and patient/family/caregiver     - Collaborate with rehabilitation services on mobility goals if consulted  - Perform Range of Motion  times a day  - Reposition patient every  hours    - Dangle patient  times a day  - Stand patient  times a day  - Ambulate patient  times a day  - Out of bed to chair  times a day   - Out of bed for meal times a day  - Out of bed for toileting  - Record patient progress and toleration of activity level   Outcome: Progressing     Problem: PAIN - ADULT  Goal: Verbalizes/displays adequate comfort level or baseline comfort level  Description: Interventions:  - Encourage patient to monitor pain and request assistance  - Assess pain using appropriate pain scale  - Administer analgesics based on type and severity of pain and evaluate response  - Implement non-pharmacological measures as appropriate and evaluate response  - Consider cultural and social influences on pain and pain management  - Notify physician/advanced practitioner if interventions unsuccessful or patient reports new pain  Outcome: Progressing     Problem: INFECTION - ADULT  Goal: Absence or prevention of progression during hospitalization  Description: INTERVENTIONS:  - Assess and monitor for signs and symptoms of infection  - Monitor lab/diagnostic results  - Monitor all insertion sites, i e  indwelling lines, tubes, and drains  - Monitor endotracheal if appropriate and nasal secretions for changes in amount and color  - [place] appropriate cooling/warming therapies per order  - Administer medications as ordered  - Instruct and encourage patient and family to use good hand hygiene technique  - Identify and instruct in appropriate isolation precautions for identified infection/condition  Outcome: Progressing  Goal: Absence of fever/infection during neutropenic period  Description: INTERVENTIONS:  - Monitor WBC    Outcome: Progressing     Problem: CARDIOVASCULAR - ADULT  Goal: Absence of cardiac dysrhythmias or at baseline rhythm  Description: INTERVENTIONS:  - Continuous cardiac monitoring, vital signs, obtain 12 lead EKG if ordered  - Administer antiarrhythmic and heart rate control medications as ordered  - Monitor electrolytes and administer replacement therapy as ordered  Outcome: Progressing     Problem: Prexisting or High Potential for Compromised Skin Integrity  Goal: Skin integrity is maintained or improved  Description: INTERVENTIONS:  - Identify patients at risk for skin breakdown  - Assess and monitor skin integrity  - Assess and monitor nutrition and hydration status  - Monitor labs   - Assess for incontinence   - Turn and reposition patient  - Assist with mobility/ambulation  - Relieve pressure over bony prominences  - Avoid friction and shearing  - Provide appropriate hygiene as needed including keeping skin clean and dry  - Evaluate need for skin moisturizer/barrier cream  - Collaborate with interdisciplinary team   - Patient/family teaching  - Consider wound care consult   Outcome: Progressing     Problem: DISCHARGE PLANNING  Goal: Discharge to home or other facility with appropriate resources  Description: INTERVENTIONS:  - Identify barriers to discharge w/patient and caregiver  - Arrange for needed discharge resources and transportation as appropriate  - Identify discharge learning needs (meds, wound care, etc )  - Arrange for interpretive services to assist at discharge as needed  - Refer to Case Management Department for coordinating discharge planning if the patient needs post-hospital services based on physician/advanced practitioner order or complex needs related to functional status, cognitive ability, or social support system  Outcome: Progressing     Problem: Knowledge Deficit  Goal: Patient/family/caregiver demonstrates understanding of disease process, treatment plan, medications, and discharge instructions  Description: Complete learning assessment and assess knowledge base    Interventions:  - Provide teaching at level of understanding  - Provide teaching via preferred learning methods  Outcome: Progressing     Problem: Nutrition/Hydration-ADULT  Goal: Nutrient/Hydration intake appropriate for improving, restoring or maintaining nutritional needs  Description: Monitor and assess patient's nutrition/hydration status for malnutrition  Collaborate with interdisciplinary team and initiate plan and interventions as ordered  Monitor patient's weight and dietary intake as ordered or per policy  Utilize nutrition screening tool and intervene as necessary  Determine patient's food preferences and provide high-protein, high-caloric foods as appropriate       INTERVENTIONS:  - Monitor oral intake, urinary output, labs, and treatment plans  - Assess nutrition and hydration status and recommend course of action  - Evaluate amount of meals eaten  - Assist patient with eating if necessary   - Allow adequate time for meals  - Recommend/ encourage appropriate diets, oral nutritional supplements, and vitamin/mineral supplements  - Order, calculate, and assess calorie counts as needed  - Recommend, monitor, and adjust tube feedings and TPN/PPN based on assessed needs  - Assess need for intravenous fluids  - Provide specific nutrition/hydration education as appropriate  - Include patient/family/caregiver in decisions related to nutrition  Outcome: Progressing     Problem: SAFETY,RESTRAINT: NV/NON-SELF DESTRUCTIVE BEHAVIOR  Goal: Remains free of harm/injury (restraint for non violent/non self-detsructive behavior)  Description: INTERVENTIONS:  - Instruct patient/family regarding restraint use   - Assess and monitor physiologic and psychological status   - Provide interventions and comfort measures to meet assessed patient needs   - Identify and implement measures to help patient regain control  - Assess readiness for release of restraint   Outcome: Progressing  Goal: Returns to optimal restraint-free functioning  Description: INTERVENTIONS:  - Assess the patient's behavior and symptoms that indicate continued need for restraint  - Identify and implement measures to help patient regain control  - Assess readiness for release of restraint   Outcome: Progressing

## 2022-10-03 NOTE — RESPIRATORY THERAPY NOTE
Resp care   10/03/22 1420   Respiratory Assessment   Resp Comments weaned fio2 and flow on HFNC   O2 Device HFNC   Non-Invasive Information   O2 Interface Device HFNC prongs   Non-Invasive Ventilation Mode HFNC (High flow)   SpO2 97 %   $ Pulse Oximetry Spot Check Charge Completed   Non-Invasive Settings   FiO2 (%) 40   Flow (lpm) 40   Temperature (Set) 31   Non-Invasive Readings   Skin Intervention Skin intact   Heater Temperature (Obs) 31

## 2022-10-03 NOTE — PROGRESS NOTES
Progress Note - General Surgery   Tru Mota 65 y o  male MRN: [id number]  [facility] Encounter: [id number]    Assessment:  Patient is a 65 y o  male who presented with closed-loop small-bowel obstruction, now status post ex lap partial small-bowel resection on , bridging Phasix mesh placement on  and subsequent explantation of mesh on  with abdominal closure of skin complicated by partial skin dehiscence, now status post ex lap and wound VAC placement on   Self extubated on 10/1    Febrile T-max a 100 7° around midnight to 2:00 a m , RR: 20-40's on HFNC 50/50% saturations in the high 90s  AB 5/28 7/59 9/23 3/1 2  NGT:  2 2 L light bilious  UOP:  2 5 L  R DILLON drain:  0 recorded cc serous in bulb  L DILLON drain:  0 recorded cc serous in bulb  Wound Vac 50 cc recorded     WBC:  9 7 from 10 08 yesterday  HBG:, 8 8 from 8 1 yesterday  Tbili:  2 58 from 2 7  yesterday      Plan:  Wean HFNC  Continue PICC TPN  Monitor abdominal exam  Maintain DILLON drains x2  DVT prophylaxis  Appreciate ICU level of care  Continue broad spectrum abx  Wound vac change today      Subjective/Objective     Subjective/Events:  Remained off pressors on decreasing O2 requirements  Responsive, less confused  Denies nausea or vomiting shortness of breath  Reports passing flatus and having bowel movement    Objective:    Blood pressure 133/50, pulse 86, temperature 100 04 °F (37 8 °C), resp  rate (!) 29, height 5' 8" (1 727 m), weight 68 3 kg (150 lb 9 2 oz), SpO2 98 %  ,Body mass index is 22 89 kg/m²        Intake/Output Summary (Last 24 hours) at 10/2/2022 2155  Last data filed at 10/2/2022 1957  Gross per 24 hour   Intake 1871 74 ml   Output 4825 ml   Net -2953 26 ml       Invasive Devices  Report    Peripherally Inserted Central Catheter Line  Duration           PICC Line / Right Basilic 10 days          Peripheral Intravenous Line  Duration           Peripheral IV 22 Left;Proximal;Upper;Ventral (anterior) Arm 3 days          Arterial Line  Duration           Arterial Line 09/26/22 6 days          Drain  Duration           Closed/Suction Drain Anterior;Right;Lateral RLQ Bulb 19 Fr  11 days    Closed/Suction Drain Left LUQ Bulb 19 Fr  6 days    Urethral Catheter Temperature probe 16 Fr  4 days    NG/OG/Enteral Tube Nasogastric Left nare 1 day                Physical Exam  Vitals reviewed  Constitutional:       General: He is not in acute distress  Appearance: He is not ill-appearing, toxic-appearing or diaphoretic  HENT:      Head: Normocephalic and atraumatic  Eyes:      Extraocular Movements: Extraocular movements intact  Cardiovascular:      Rate and Rhythm: Normal rate  Pulmonary:      Effort: Pulmonary effort is normal  No respiratory distress  Comments: On HFNC  Abdominal:      General: There is distension  Tenderness: There is abdominal tenderness  There is no guarding or rebound  Comments: Incision intact with wound VAC in place with good seal  DILLON drains x2 in place  tight   Skin:     General: Skin is warm and dry  Neurological:      Mental Status: He is alert  He is disoriented               Results from last 7 days   Lab Units 10/02/22  0433 09/30/22  0544 09/29/22  0526   WBC Thousand/uL 10 08 8 95 8 85   HEMOGLOBIN g/dL 8 1* 8 6* 8 3*   HEMATOCRIT % 27 1* 27 6* 27 6*   PLATELETS Thousands/uL 327 259 221     Results from last 7 days   Lab Units 10/02/22  0433 10/01/22  0444 09/30/22  0544   POTASSIUM mmol/L 3 3* 3 9 3 6   CHLORIDE mmol/L 118* 118* 119*   CO2 mmol/L 24 23 22   BUN mg/dL 27* 28* 26*   CREATININE mg/dL 1 05 1 02 1 07   CALCIUM mg/dL 7 5* 7 3* 7 1*     Results from last 7 days   Lab Units 09/26/22  1147 09/26/22  0803   INR  1 16 1 13   PTT seconds  --  36

## 2022-10-04 NOTE — OCCUPATIONAL THERAPY NOTE
Occupational Therapy Evaluation     Patient Name: Carlos Gallego  XIABC'P Date: 10/4/2022  Problem List  Principal Problem:    SBO (small bowel obstruction) ([facility] [address] )  Active Problems:    Hypertension    Acute respiratory failure with hypoxia ([facility] [address] )    Encephalopathy, metabolic    Past Medical History  Past Medical History:   Diagnosis Date    Abdominal aortic aneurysm (AAA)     last assessed nov 6 2015    Abscess of groin     last assessed oct 6 2014    Arthritis     Candidiasis, cutaneous     last assessed march 19 2014    Coronary artery disease     Dyslipidemia     Esophageal ulcer without bleeding     Gastritis     GERD (gastroesophageal reflux disease)     Hiatal hernia     History of transfusion     Hx of cataract surgery, left     last assessed july 21 2014    Hyperlipidemia     Hypertension     Myocardial infarction [facility])     Old myocardial infarction 2000    Recurrent right inguinal hernia     s/p right orchioectomy, mesh removal, and sinus trac removal patient being followed by surgery next appt 4/28/14 patient s/p keflex x 7 days for MSSA Cx repeat wound cx grew MSSA cx repeat wound cx grew MSSA and other armond (mixed) no MRSA identified conitunue close monitoring and local wound care, last assessed april 15 2014    Renal disorder     Wound of skin     in the groin, right     Past Surgical History  Past Surgical History:   Procedure Laterality Date    ABDOMINAL AORTIC ANEURYSM REPAIR  2009    aortobi-iliac, dacron graft    APPENDECTOMY      open    CARDIAC SURGERY      CATARACT EXTRACTION Bilateral     CHOLECYSTECTOMY N/A 9/29/2020    Procedure: CHOLECYSTECTOMY;  Surgeon: Chinedu Wallis MD;  [facility];  Service: General    COLONOSCOPY      CORONARY ANGIOPLASTY WITH STENT PLACEMENT      stent 2    CYSTOSCOPY      diagnostic onset nov 13 2014    EXPLORATORY LAPAROTOMY  12/09/2009    EXPLORATORY LAPAROTOMY W/ BOWEL RESECTION N/A 9/26/2022    Procedure: LAPAROTOMY EXPLORATORY; EXPLANTATION OF ABDOMINAL MESH; Juan Zhao;  Surgeon: Dee Velasquez DO;  [facility];  Service: General    EYE SURGERY      FL INJECTION LEFT HIP (NON ARTHROGRAM)  4/2/2019    HIP SURGERY Right     INGUINAL HERNIA REPAIR Right     unilateral x2    LAPAROTOMY N/A 9/20/2022    Procedure: LAPAROTOMY EXPLORATORY, MESH EXPLANTATION, COMPLETION CHOLECYSTECTOMY, SMALL BOWEL RESECTION WITH PRIMARY ANASTOMOSIS, RESECTION OF DUODENUM WITH DUO-DUODENOSTOMY ANASTOMOSIS, REDUCTION OF INTERNAL HERNIA, CLOSURE OF MESENTERIC DEFECT, EXTENSIVE LYSIS OF ADHESIONS, APPLICATION OF NEGATIVE PRESSURE WOUND THERAPY;  Surgeon: Daron Barnes DO;  [facility];  Service: General    LAPAROTOMY N/A 9/21/2022    Procedure: LAPAROTOMY EXPLORATORY, [address], DRAIN PLACEMENT, ATTEMPTED KEOFEED PLACEMENT AND MESH WITH CLOSURE;  Surgeon: Daron Barnes DO;  [facility];  Service: General    LAPAROTOMY N/A 9/30/2022    Procedure: ABDOMINAL WALL DEBRIDEMENT, WOUND VAC PLACEMENT;  Surgeon: Dee Velasquez DO;  [facility];  Service: General    ORCHIECTOMY Right     NM COLONOSCOPY FLX DX W/COLLJ SPEC WHEN PFRMD N/A 5/22/2018    Procedure: COLONOSCOPY;  Surgeon: Hawa Rodriguez DO;  [facility]: [facility]; Service: Gastroenterology    NM ESOPHAGOGASTRODUODENOSCOPY TRANSORAL DIAGNOSTIC N/A 2/14/2019    Procedure: ESOPHAGOGASTRODUODENOSCOPY (EGD); Surgeon: Mg Gilbert MD;  [facility];   Service: Gastroenterology    NM LAP,DIAGNOSTIC ABDOMEN N/A 9/29/2020    Procedure: LAPAROSCOPIC DIAGNOSTIC,   ;  Surgeon: Slava Michel MD;  [facility];  Service: General    NM REPAIR INCISIONAL HERNIA,REDUCIBLE N/A 2/23/2018    Procedure: lysis of adhesions; INCISIONAL HERNIA REPAIR WITH MESH;  Surgeon: Niels Marinelli MD;  [facility];  Service: General    UPPER GASTROINTESTINAL ENDOSCOPY               10/04/22 1154   OT Last Visit   OT Visit Date 10/04/22   Note Type   Note type Evaluation Restrictions/Precautions   Weight Bearing Precautions Per Order No   Other Precautions Cognitive; Bed Alarm;Multiple lines;Telemetry; Fall Risk;Pain;O2  (NGT, 4L O2, wound vac, 2 JPs)   Pain Assessment   Pain Assessment Tool 0-10   Pain Score No Pain   Home Living   Type of [address] One level  (2 AIDE)   Bathroom Shower/Tub Tub/shower unit   Bathroom Toilet Standard   Bathroom Equipment Grab bars in shower;Commode;Grab bars around toilet   Home Equipment Cane;Long-handled shoehorn   Additional Comments Pt reports living in an apt w/ 2 AIDE   Prior Function   Level of [place] Independent with ADLs and functional mobility   Lives With Alone   ADL Assistance Independent   IADLs Independent   Falls in the last 6 months 0   Vocational Retired   Comments (+)    Lifestyle   Autonomy I w/ ADLS/IADLS, Mod I w/ transfers and functional mobility PTA   Reciprocal Relationships pt lives alone   Service to Others Retired; heavy    Intrinsic Gratification Enjoys TV   Psychosocial   Psychosocial (WDL) WDL   Patient Behaviors/Mood Cooperative   ADL   Eating Assistance Unable to assess   Eating Deficit NPO   Grooming Assistance 4  Minimal Assistance   UB Bathing Assistance 3  Moderate Assistance   LB Bathing Assistance 2  Maximal Assistance   UB Dressing Assistance 3  Moderate Assistance   LB Dressing Assistance 2  Maximal Assistance   Toileting Assistance  2  Maximal Assistance   Functional Assistance Other (Comment)   Functional Deficit   (bed level only)   Bed Mobility   Rolling R 2  Maximal assistance   Additional items Assist x 2; Increased time required;LE management;Verbal cues   Supine to Sit 2  Maximal assistance   Additional items Assist x 2; Increased time required;Verbal cues;LE management   Sit to Supine 2  Maximal assistance   Additional items Assist x 2; Increased time required;Verbal cues;LE management   Additional Comments Pt sat EOB w/ Mod-Max A for sitting balance/trunk control; sits w/ LE's extended and w/ posterior lean  Needs VC/TC for safety/proper technique   Transfers   Sit to Stand Unable to assess   Stand to Sit Unable to assess   Additional Comments Not appropriate @ this time; has poor sitting balance   Functional Mobility   Additional Comments Not appropriate @ this time   Balance   Static Sitting Poor -   Dynamic Sitting Poor -   Static Standing Zero   Activity Tolerance   Activity Tolerance Patient limited by fatigue;Patient limited by pain   Medical Staff Made Aware Freda MAGANA   Nurse Made Aware yes, Shereen   RUKATI Assessment   RUE Assessment X  (generalized weakness)   LUE Assessment   LUE Assessment X  (generalized weakness)   Hand Function   Gross Motor Coordination Functional   Fine Motor Coordination Functional   Sensation   Light Touch No apparent deficits   Cognition   Overall Cognitive Status Impaired   Arousal/Participation Responsive; Cooperative   Attention Attends with cues to redirect   Orientation Level Oriented X4   Memory Decreased recall of precautions   Following Commands Follows one step commands without difficulty   Comments Pt is cooperative; has decreased understanding of deficits/safety awareness   Assessment   Limitation Decreased ADL status; Decreased endurance;Decreased high-level ADLs; Decreased self-care trans;Decreased UE ROM; Decreased UE strength;Decreased Safe judgement during ADL;Decreased cognition   Prognosis Fair   Assessment Pt is a 64 y/o male seen for OT eval s/p adm to [facility] w/ vomiting and abdominal pain in the setting of SBO, s/p ERCP and open partial cholecystectomy, ventral hernia repair with mesh   Pt  has a past medical history of Abdominal aortic aneurysm (AAA), Abscess of groin, Arthritis, Candidiasis, cutaneous, Coronary artery disease, Dyslipidemia, Esophageal ulcer without bleeding, Gastritis, GERD (gastroesophageal reflux disease), Hiatal hernia, History of transfusion, cataract surgery, left, Hyperlipidemia, Hypertension, Myocardial infarction [facility]), Old myocardial infarction (), Recurrent right inguinal hernia, Renal disorder, and Wound of skin  Pt with active OT orders and up with assistance  orders  Pt lives alone in an apt w/ 2 AIDE  Pt was I w/  ADLS and IADLS, drove, & required use of DME PTA including SPC  Pt is currently demonstrating the following occupational deficits: Mod A UB ADLS, Max A LB ADLS, Max A x2 bed mobility, Mod-Max A EOB sitting; unable to assess transfers/functional mobility at this time  These deficits that are impacting pt's baseline areas of occupation are a result of the following impairments: pain, endurance, activity tolerance, functional mobility, forward functional reach, balance, trunk control, functional standing tolerance, unsupportive home environment, decreased I w/ ADLS/IADLS, strength, ROM, cognitive impairments, decreased safety awareness and decreased insight into deficits  The following Occupational Performance Areas to address include: eating, grooming, bathing/shower, toilet hygiene, dressing, medication management, socialization, health maintenance, functional mobility, community mobility, clothing management and household maintenance  Recommend inpatient rehab  upon D/C  Pt to continue to benefit from acute immediate OT services to address the following goals 3-5x/week to  w/in 10-14 days:   Goals   Patient Goals pt be more independent   LTG Time Frame 10-   Long Term Goal #1 see below listed goals   Plan   Treatment Interventions ADL retraining;Functional transfer training; Endurance training;UE strengthening/ROM; Cognitive reorientation;Patient/family training;Equipment evaluation/education; Compensatory technique education;Continued evaluation; Energy conservation; Activityengagement   Goal Expiration Date 10/18/22   OT Frequency 3-5x/wk   Recommendation   OT Discharge Recommendation Post acute rehabilitation services   Additional Comments  The patient's raw score on the AM-PAC Daily Activity inpatient short form is 13, standardized score is 32 03, less than 39 4  Patients at this level are likely to benefit from discharge to post-acute rehabilitation services  Please refer to the recommendation of the Occupational Therapist for safe discharge planning  Additional Comments 2 Pt seen as a co-evaluation due to the patient's co-morbidities, clinically unstable presentation, and present impairments which are a regression from the patient's baseline     AM-PAC Daily Activity Inpatient   Lower Body Dressing 2   Bathing 2   Toileting 2   Upper Body Dressing 2   Grooming 2   Eating 3   Daily Activity Raw Score 13   Daily Activity Standardized Score (Calc for Raw Score >=11) 32 03   AM-PAC Applied Cognition Inpatient   Following a Speech/Presentation 2   Understanding Ordinary Conversation 3   Taking Medications 3   Remembering Where Things Are Placed or Put Away 3   Remembering List of 4-5 Errands 3   Taking Care of Complicated Tasks 2   Applied Cognition Raw Score 16   Applied Cognition Standardized Score 35 03       GOALS    1) Pt will complete rolling left/right in bed with Mod assist, as prerequisite for further engagement in ADLS   2) Pt will complete supine to sit transfer with Mod A using B/L UEs to initiate bed mobility   3) Pt will tolerate sitting at EOB 20 minutes with Min assist and stable vital signs, as prerequisite for further engagement in ADLS   4) Pt will complete grooming task with S assist and increased time to increase independence in functional tasks  5) Pt will increase B/L UE ROM 1/2 MMT to increase functional UB use during ADLS   6) Pt will complete UB ADLS with S and use of AD/DME as needed to increase independence in functional tasks  7) Pt will complete LB ADLS with Min A and use of AD/DME as needed to increase independence in functional tasks  8) Pt will follow 100% simple one step verbal commands and be A/Ox4 consistently t/o use of external environmental cues to increase awareness for functional tasks  9) Pt will demonstrate 100% carryover of E C  techniques t/o fx'l I/ADL/ leisure tasks w/o cues s/p skilled education    ** OTR to assess transfers/functional mobility when appropriate    Alex Rogers MS, OTR/L

## 2022-10-04 NOTE — PROGRESS NOTES
Progress Note - Yves Becker 65 y o  male MRN: [id number]    Unit/Bed#: [facility] 13 Encounter: [id number]      Assessment:  64 y/o M p/w SBO s/p ex lap partial SBR 9/20, phasix mesh 9/21, mesh explantation 9/26, midline dehiscence s/p vac placement  Vss  Now febrile and tmax 102  wbc: 9-> 19 today  NGT: 1200cc  R DILLON: 5 cc serous  L DILLON: 5 cc serous  Vac: 25 cc serous    Plan:  Follow up fever work up  Recommend CT CAP today with oral and IV contrast    Continue npo/ ngt  Continue tpn  Wean oxygen as tolerated  Continue DILLON drains  Maintain incisional vac  dvt ppx    Subjective:   Feels well  Wants to drink fluids  Had a bowel mvt yesterday  Denied chest pain or shortness of breath  Objective:     Vitals: Blood pressure (!) 109/46, pulse 100, temperature (!) 102 2 °F (39 °C), resp  rate (!) 37, height 5' 8" (1 727 m), weight 60 6 kg (133 lb 9 6 oz), SpO2 94 %  ,Body mass index is 20 31 kg/m²  Intake/Output Summary (Last 24 hours) at 10/4/2022 0556  Last data filed at 10/4/2022 0550  Gross per 24 hour   Intake 1451 36 ml   Output 3510 ml   Net -2058 64 ml       Physical Exam  General: NAD  HEENT: NC/AT  MMM  Cv: RRR     Lungs: normal effort  Ab: Soft, NT/ND  Ex: no CCE  Neuro: AAOx3    Scheduled Meds:  Current Facility-Administered Medications   Medication Dose Route Frequency Provider Last Rate   • acetaminophen  650 mg Rectal Q4H PRN Carmen Arias MD     • Adult TPN (CUSTOM BASE/CUSTOM ELECTROLYTE)   Intravenous Continuous TPN Pinky Chang PA-C 56 4 mL/hr at 10/03/22 2115   • chlorhexidine  15 mL Mouth/Throat Q12H [address]     • enoxaparin  40 mg Subcutaneous Q24H [facility] [place], [place]     • HYDROmorphone  0 5 mg Intravenous Q4H PRN Teri Skaggs PA-C     • HYDROmorphone  0 2 mg Intravenous Q4H PRN Teri Skaggs PA-C     • labetalol  10 mg Intravenous Q6H PRN Teri Skaggs PA-C     • OLANZapine  5 mg Oral HS PRN Karan Mendoza, DO     • ondansetron  4 mg Intravenous Q4H PRN Tello Leiva PA-C     • pantoprazole  40 mg Intravenous Q12H [address] [place]       Continuous Infusions:Adult TPN (CUSTOM BASE/CUSTOM ELECTROLYTE), , Last Rate: 56 4 mL/hr at 10/03/22 2115      PRN Meds: •  acetaminophen  •  HYDROmorphone  •  HYDROmorphone  •  labetalol  •  OLANZapine  •  ondansetron      Invasive Devices  Report    Peripherally Inserted Central Catheter Line  Duration           PICC Line 58/69/50 Right Basilic 11 days          Drain  Duration           Closed/Suction Drain Anterior;Right;Lateral RLQ Bulb 19 Fr  12 days    Closed/Suction Drain Left LUQ Bulb 19 Fr  7 days    Urethral Catheter Temperature probe 16 Fr  6 days    NG/OG/Enteral Tube Nasogastric Left nare 2 days                Lab, Imaging and other studies: I have personally reviewed pertinent reports      VTE Pharmacologic Prophylaxis: Sequential compression device (Venodyne)   VTE Mechanical Prophylaxis: sequential compression device

## 2022-10-04 NOTE — PROGRESS NOTES
Daily Progress Note - Critical Care   Nohemi Graham 65 y o  male MRN: [id number]  Unit/Bed#: MICU 13 Encounter: [id number]        ----------------------------------------------------------------------------------------  HPI/24hr events:   64 yo [age] [address] CAD s/p PCI 2000, AAA s/p open aorto bi-iliac grafting 2009, HTN, HLD, choledocholithiasis s/p ERCP and open partial cholecystectomy, ventral hernia repair with mesh  In the past 24 hrs, Campos Snáchez was able to return to 3L O2 nasal cannula from his 40L High flow with 70% FiO2  He had a low grade fever yesterday morning of 100 4 and remained afebrile throughout the afternoon  He developed a fever around 2130 hrs last evening  TMax overnight has been 102 2 F s/p 1 dose PA Tylenol  WBC increased from 9 7 -> 19 61      ---------------------------------------------------------------------------------------  SUBJECTIVE  Pt reports that he is "feeling okay" this morning  Inquiring about cold mouth swabs because he mouth is dry  Continues to have left sided abdominal pain this morning  Had multiple BM yesterday  No other complaints at this time  Review of Systems   Constitutional: Positive for fever  Negative for chills  HENT: Negative  Eyes: Negative  Respiratory: Negative for cough and shortness of breath  Cardiovascular: Negative  Gastrointestinal: Positive for abdominal pain  Negative for constipation, diarrhea, nausea and vomiting  Endocrine: Negative  Genitourinary: Negative  Skin: Negative  Neurological: Negative  Psychiatric/Behavioral: Negative  Review of systems was reviewed and negative unless stated above in HPI/24-hour events   ---------------------------------------------------------------------------------------  Assessment and Plan:     Neuro:   · Diagnosis:  Toxic metabolic encephalopathy  ? Oriented to questions but intermittently confused, impulsive and pulling out tubes  ? Limit any mind altering medications   ?  Tylenol rectal PRN   ? Dilaudid 0 2 mg PRN mod/severe pain   ? Dilaudid 0 5 mg PRN breakthrough pain - 1 dose/24 hrs   ? Delirium precautions, regulate sleep/wake cycle   § Consider starting qHS ODT zyprexa       CV:   · Diagnosis:  Shock, resolved  ? Plan:  Levophed off  ? MAP goals >65  · Diagnosis:  New onset AFib  ? Plan:  Currently normal sinus rhythm  ? Holding systemic anticoagulation  · Diagnosis: HFpEF, CAD, HLD, HTN  ? Plan:  Holding home aspirin, Lipitor, Lasix 20 mg daily, lisinopril 5 mg daily, metoprolol 50 XL daily with strict NPO   ? Can start Labetalol PRN for HTN if needed       Pulm:  · Diagnosis:  Acute hypoxic respiratory failure, ARDS, concern for aspiration,  ? Plan:  Self extubated 10/1  ? Previously on HFNC 40L/70%  Was able to wean to 3L NC on 10/3  § SPO2 goal >92%   ? Aggressive pulmonary toileting, airway clearance, NT suction as needed   ? Pt was diuresed with 20 mg IV lasix x2 on 10/4  UOP 1760cc     GI:   · Diagnosis: SBO 2/2 adhesions s/p ex-lap, SBR, extensive MARK, completion cholecystectomy, duo/duo and jejunojejunal anastomosis, mesh placement, wound VAC  ? Plan:  90 cm bowel resected, G-J anastomosis, primary duodenal anastomosis, completion cholecystectomy, removal of prior ventral hernia repair mesh and wound vac placement   ? TPN  ? Maintain NGT - 1450 cc/24 hrs   ? Maintain JPs and Vac per surgery    · Diagnosis:  Hyperbilirubinemia  ? Plan:  MRCP/RUQ U/S not consistent with biliary disease  ? Currently down trending, continue to follow      :   · Diagnosis:  JOVANNI, resolved  ? Plan: Monitor UOP  ? Maintain centeno with significant scrotal edema   ? Trend BUN/creatinine  ? Pt was diuresed with 20 mg IV lasix x2 on 10/4  UOP 1700cc      F:  Continue diuresis   · E: Replete electrolytes for goal K >4, Phos >3, Mg >2  · N: TPN, strict NPO with NGT     · Pt had multiple BM yesterday  · Plan to discuss with surgery team today trickle feeds vs continue TPN        Heme/Onc:   · Diagnosis:  ABLA in setting of GIB   ? Plan:  Trend hemoglobin and transfuse as needed  ? DVT PPX:  Subcu heparin, SCDs      Endo:   ? No active issues      ID:   · Diagnosis:  SIRS/sepsis   ? Plan:  Zosyn was D/C on 9/29  total abx days 14    ? 9/30 blood cultures-negative x24 hours  ? 9/25 blood cultures negative x5 days  ? 9/20 blood cultures-negative x5 days  ? MRSA negative   ? 10/3: AM Temp 100 4  Morning  ? 10/4 AM TMax 102 2 s/p 1 dose Tylenol VT; Increased WBC from 9 7 -> 19 61    ? 10/4 Plan to start IV Flagyl & IV Cefepime and reculture sputum, blood and urine today  Repeat CXR this AM   ? Continue trend fever/WBC curves  Continue Tylenol PRN for fevers      MSK/Skin:   ? Frequent turning and repositioning     Patient appropriate for transfer out of the ICU today?: No  Disposition: Continue Critical Care   Code Status: Level 1 - Full Code    --------------------------------------------------------------------------------------  ICU CORE MEASURES    Prophylaxis   VTE Pharmacologic Prophylaxis: Enoxaparin (Lovenox)  VTE Mechanical Prophylaxis: sequential compression device  Stress Ulcer Prophylaxis: Pantoprazole IV     Invasive Devices Review  Invasive Devices  Report    Peripherally Inserted Central Catheter Line  Duration           PICC Line 02/69/54 Right Basilic 11 days          Drain  Duration           Closed/Suction Drain Anterior;Right;Lateral RLQ Bulb 19 Fr  12 days    Closed/Suction Drain Left LUQ Bulb 19 Fr  7 days    Urethral Catheter Temperature probe 16 Fr  6 days    NG/OG/Enteral Tube Nasogastric Left nare 2 days              Can any invasive devices be discontinued today?  No  ---------------------------------------------------------------------------------------  OBJECTIVE    Vitals   Vitals:    10/04/22 0622 10/04/22 0700 10/04/22 0725 10/04/22 0800   BP: (!) 110/48 (!) 110/48  (!) 105/49   BP Location:  Left arm  Left arm   Pulse: 104 (!) 106 102    Resp: (!) 34 (!) 33     Temp: (!) 100 76 °F (38 2 °C) 100 4 °F (38 °C)  97 9 °F (36 6 °C)   TempSrc:  Bladder  Oral   SpO2: (!) 78%  93%    Weight:       Height:         Temp (24hrs), Av 5 °F (38 1 °C), Min:97 9 °F (36 6 °C), Max:102 2 °F (39 °C)  Current: Temperature: 97 9 °F (36 6 °C)  HR: 106  BP: 109/46  RR: 37  SpO2: 94% on 3L O2 NC    Respiratory:  SpO2: SpO2: 93 %, SpO2 Activity: SpO2 Activity: At Rest  Nasal Cannula O2 Flow Rate (L/min): 6 L/min    Invasive/non-invasive ventilation settings   Respiratory  Report   Lab Data (Last 4 hours)    None         O2/Vent Data (Last 4 hours)    None                Physical Exam  Constitutional:       Appearance: He is ill-appearing  HENT:      Head: Normocephalic and atraumatic  Nose:      Comments: NG tube and Nasal cannula in place     Mouth/Throat:      Mouth: Mucous membranes are dry  Eyes:      Conjunctiva/sclera: Conjunctivae normal    Cardiovascular:      Rate and Rhythm: Normal rate and regular rhythm  Pulses: Normal pulses  Heart sounds: Normal heart sounds  Pulmonary:      Effort: Pulmonary effort is normal  No respiratory distress  Breath sounds: No wheezing or rhonchi  Abdominal:      General: There is distension  Tenderness: There is abdominal tenderness in the left lower quadrant  Comments: firm  Incision C/D/I  DILLON drains present with minimal serous drainage   Genitourinary:     Comments: Moscoso catheter present  Musculoskeletal:         General: No swelling or tenderness  Cervical back: Normal range of motion  No tenderness  Skin:     General: Skin is warm and dry  Neurological:      General: No focal deficit present  Mental Status: He is alert  Mental status is at baseline  Motor: No weakness     Psychiatric:         Mood and Affect: Mood normal          Judgment: Judgment normal              Laboratory and Diagnostics:  Results from last 7 days   Lab Units 10/04/22  1835 10/03/22  6407 10/02/22  1343 09/30/22  0544 09/29/22  0526 09/28/22  0425   WBC Thousand/uL 19 61* 9 71 10 08 8 95 8 85 11 43*   HEMOGLOBIN g/dL 10 8* 8 8* 8 1* 8 6* 8 3* 8 6*   HEMATOCRIT % 34 3* 28 9* 27 1* 27 6* 27 6* 27 1*   PLATELETS Thousands/uL 549* 398* 327 259 221 202   NEUTROS PCT %  --   --  83*  --   --   --    MONOS PCT %  --   --  5  --   --   --      Results from last 7 days   Lab Units 10/04/22  0445 10/03/22  0437 10/02/22  0433 10/01/22  0444 09/30/22  0544 09/29/22  0526 09/28/22  1143 09/28/22  0425   SODIUM mmol/L 146 144 146 145 147 146 147 150*   POTASSIUM mmol/L 4 2 4 0 3 3* 3 9 3 6 3 7 3 6 3 4*   CHLORIDE mmol/L 117* 118* 118* 118* 119* 119* 119* 119*   CO2 mmol/L 23 22 24 23 22 23 24 24   ANION GAP mmol/L 6 4 4 4 6 4 4 7   BUN mg/dL 41* 31* 27* 28* 26* 26* 31* 34*   CREATININE mg/dL 1 27 0 98 1 05 1 02 1 07 0 92 0 93 1 01   CALCIUM mg/dL 8 6 8 2* 7 5* 7 3* 7 1* 7 3* 7 3* 7 5*   GLUCOSE RANDOM mg/dL 142* 119 122 121 135 142* 132 137   ALT U/L  --  23 20 19 21 22  --  17   AST U/L  --  38 35 27 30 36  --  33   ALK PHOS U/L  --  105 96 103 109 114  --  82   ALBUMIN g/dL  --  2 0* 1 8* 1 7* 1 6* 1 7*  --  1 9*   TOTAL BILIRUBIN mg/dL  --  2 58* 2 70* 3 04* 3 13* 3 56*  --  3 68*     Results from last 7 days   Lab Units 10/04/22  0445 10/03/22  0437 10/02/22  0433 10/01/22  0444 09/30/22  0544 09/29/22  0526 09/28/22  1143 09/28/22  0425   MAGNESIUM mg/dL 2 7* 2 6 2 2 2 5 2 4 2 5  --  2 8*   PHOSPHORUS mg/dL 3 9 3 0 2 2* 3 2 2 4 2 9 2 9 1 8*                   ABG:  Results from last 7 days   Lab Units 10/03/22  0437   PH ART  7 528*   PCO2 ART mm Hg 28 7*   PO2 ART mm Hg 59 9*   HCO3 ART mmol/L 23 3   BASE EXC ART mmol/L 1 2   ABG SOURCE  Line, Arterial     VBG:  Results from last 7 days   Lab Units 10/03/22  0437   ABG SOURCE  Line, Arterial     Results from last 7 days   Lab Units 10/04/22  0445   PROCALCITONIN ng/ml 0 52*       Micro  Results from last 7 days   Lab Units 09/30/22  0135   BLOOD CULTURE  No Growth After 4 Days   No Growth After 4 Days  EKG:   Imaging:  I have personally reviewed pertinent reports  Intake and Output  I/O       10/02 0701  10/03 0700 10/03 0701  10/04 0700    I V  (mL/kg) 120 (1 8)     IV Piggyback 500     TPN 1369 9 1333 9    Total Intake(mL/kg) 1989 9 (30 5) 1333 9 (22)    Urine (mL/kg/hr) 2615 (1 7) 1760 (1 2)    Emesis/NG output 2350 1450    Drains 80 45    Stool 0 0    Total Output 5045 3255    Net -3055 1 -1921 1          Unmeasured Stool Occurrence 2 x 2 x        UOP: 73 ml/hr     Height and Weights   Height: 5' 8" (172 7 cm)  IBW (Ideal Body Weight): 68 4 kg  Body mass index is 20 31 kg/m²  Weight (last 2 days)     Date/Time Weight    10/04/22 0504 60 6 (133 6)    10/03/22 0600 65 3 (143 96)    10/02/22 0556 68 3 (150 57)            Nutrition       Diet Orders   (From admission, onward)             Start     Ordered    09/29/22 1729  Diet NPO  Diet effective now        Comments: Please do not adjust rate  Thank you   References:    Nutrtion Support Algorithm Enteral vs  Parenteral   Question Answer Comment   Diet Type NPO    RD to adjust diet per protocol?  Yes        09/29/22 1729              TF currently running at 56 4 ml/hr       Active Medications  Scheduled Meds:  Current Facility-Administered Medications   Medication Dose Route Frequency Provider Last Rate   • acetaminophen  650 mg Rectal Q4H PRN Caitlin Hay MD     • Adult TPN (CUSTOM BASE/CUSTOM ELECTROLYTE)   Intravenous Continuous TPN Colin Peña PA-C 56 4 mL/hr at 10/03/22 2115   • cefepime  2,000 mg Intravenous Q12H Caitlin Hay MD 2,000 mg (10/04/22 0732)   • chlorhexidine  15 mL Mouth/Throat Q12H [address]     • enoxaparin  40 mg Subcutaneous Q24H [facility] [place] [place]     • HYDROmorphone  0 5 mg Intravenous Q4H PRN Alissa Carroll PA-C     • HYDROmorphone  0 2 mg Intravenous Q4H PRN Alissa Carroll PA-C     • labetalol  10 mg Intravenous Q6H PRN Alissa Carroll PA-C     • metroNIDAZOLE  500 mg Intravenous Q8H Addis Ramirez  mg (10/04/22 6035)   • OLANZapine  5 mg Oral HS PRN Pricilla Gaspar DO     • ondansetron  4 mg Intravenous Q4H PRN Clare Leiva PA-C     • pantoprazole  40 mg Intravenous Q12H [address] GISSELL Leiva       Continuous Infusions:  Adult TPN (CUSTOM BASE/CUSTOM ELECTROLYTE), , Last Rate: 56 4 mL/hr at 10/03/22 2115      PRN Meds:   acetaminophen, 650 mg, Q4H PRN  HYDROmorphone, 0 5 mg, Q4H PRN  HYDROmorphone, 0 2 mg, Q4H PRN  labetalol, 10 mg, Q6H PRN  OLANZapine, 5 mg, HS PRN  ondansetron, 4 mg, Q4H PRN        Allergies   No Known Allergies  ---------------------------------------------------------------------------------------  Advance Directive and Living Will:      Power of :    POLST:    ---------------------------------------------------------------------------------------  Care Time Delivered:   No Critical Care time spent     Diane Morales DO      Portions of the record may have been created with voice recognition software  Occasional wrong word or "sound a like" substitutions may have occurred due to the inherent limitations of voice recognition software    Read the chart carefully and recognize, using context, where substitutions have occurred

## 2022-10-04 NOTE — PHYSICAL THERAPY NOTE
Physical Therapy evaluation note     Patient Name: Miguel Angel Chu    GCCKX'B Date: 10/4/2022     Problem List  Principal Problem:    SBO (small bowel obstruction) ([facility] )  Active Problems:    Hypertension    Acute respiratory failure with hypoxia ([facility] )    Encephalopathy, metabolic       Past Medical History  Past Medical History:   Diagnosis Date    Abdominal aortic aneurysm (AAA)     last assessed nov 6 2015    Abscess of groin     last assessed oct 6 2014    Arthritis     Candidiasis, cutaneous     last assessed march 19 2014    Coronary artery disease     Dyslipidemia     Esophageal ulcer without bleeding     Gastritis     GERD (gastroesophageal reflux disease)     Hiatal hernia     History of transfusion     Hx of cataract surgery, left     last assessed july 21 2014    Hyperlipidemia     Hypertension     Myocardial infarction [facility])     Old myocardial infarction 2000    Recurrent right inguinal hernia     s/p right orchioectomy, mesh removal, and sinus trac removal patient being followed by surgery next appt 4/28/14 patient s/p keflex x 7 days for MSSA Cx repeat wound cx grew MSSA cx repeat wound cx grew MSSA and other armond (mixed) no MRSA identified conitunue close monitoring and local wound care, last assessed april 15 2014    Renal disorder     Wound of skin     in the groin, right        Past Surgical History  Past Surgical History:   Procedure Laterality Date    ABDOMINAL AORTIC ANEURYSM REPAIR  2009    aortobi-iliac, dacron graft    APPENDECTOMY      open    CARDIAC SURGERY      CATARACT EXTRACTION Bilateral     CHOLECYSTECTOMY N/A 9/29/2020    Procedure: CHOLECYSTECTOMY;  Surgeon: Ammy Allen MD;  [facility];  Service: General    COLONOSCOPY      CORONARY ANGIOPLASTY WITH STENT PLACEMENT      stent 2    CYSTOSCOPY      diagnostic onset nov 13 2014    EXPLORATORY LAPAROTOMY  12/09/2009    EXPLORATORY LAPAROTOMY W/ BOWEL RESECTION N/A 9/26/2022    Procedure: LAPAROTOMY EXPLORATORY; EXPLANTATION OF ABDOMINAL MESH; [place];  Surgeon: Rodney Mixon DO;  [facility];  Service: General    EYE SURGERY      FL INJECTION LEFT HIP (NON ARTHROGRAM)  4/2/2019    HIP SURGERY Right     INGUINAL HERNIA REPAIR Right     unilateral x2    LAPAROTOMY N/A 9/20/2022    Procedure: LAPAROTOMY EXPLORATORY, MESH EXPLANTATION, COMPLETION CHOLECYSTECTOMY, SMALL BOWEL RESECTION WITH PRIMARY ANASTOMOSIS, RESECTION OF DUODENUM WITH DUO-DUODENOSTOMY ANASTOMOSIS, REDUCTION OF INTERNAL HERNIA, CLOSURE OF MESENTERIC DEFECT, EXTENSIVE LYSIS OF ADHESIONS, APPLICATION OF NEGATIVE PRESSURE WOUND THERAPY;  Surgeon: Malcolm Sorenson DO;  [facility];  Service: General    LAPAROTOMY N/A 9/21/2022    Procedure: LAPAROTOMY EXPLORATORY, [address], DRAIN PLACEMENT, ATTEMPTED KEOFEED PLACEMENT AND MESH WITH CLOSURE;  Surgeon: Malcolm Sorenson DO;  [facility];  Service: General    LAPAROTOMY N/A 9/30/2022    Procedure: ABDOMINAL WALL DEBRIDEMENT, WOUND VAC PLACEMENT;  Surgeon: Rodney Mixon DO;  [facility];  Service: General    ORCHIECTOMY Right     HI COLONOSCOPY FLX DX W/COLLJ SPEC WHEN PFRMD N/A 5/22/2018    Procedure: COLONOSCOPY;  Surgeon: Nery Baum DO;  [facility]; Service: Gastroenterology    HI ESOPHAGOGASTRODUODENOSCOPY TRANSORAL DIAGNOSTIC N/A 2/14/2019    Procedure: ESOPHAGOGASTRODUODENOSCOPY (EGD); Surgeon: Marelyn Hammans, MD;  [facility];   Service: Gastroenterology    HI LAP,DIAGNOSTIC ABDOMEN N/A 9/29/2020    Procedure: LAPAROSCOPIC DIAGNOSTIC,   ;  Surgeon: Marvin Egan MD;  [facility];  Service: General    HI REPAIR INCISIONAL HERNIA,REDUCIBLE N/A 2/23/2018    Procedure: lysis of adhesions; INCISIONAL HERNIA REPAIR WITH MESH;  Surgeon: Jayla Ariza MD;  [facility];  Service: General    UPPER GASTROINTESTINAL ENDOSCOPY        10/04/22 1158   PT Last Visit   PT Visit Date 10/04/22   Note Type   Note type Evaluation   Pain Assessment   Pain Assessment Tool 0-10   Pain Score No Pain   Restrictions/Precautions   Weight Bearing Precautions Per Order No   Other Precautions Cognitive; Chair Alarm; Bed Alarm;Telemetry; Fall Risk;Pain  (NGT to suction, 4L of O2 via NC, JPx2, wound vac)   Home Living   Type of [address]   Additional Comments Pt lives in an apartment with 2 AIDE alone  Pt was I for ADL's and mobility prior  Pt is retired and drives  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  Prior Function   Level of [place] Independent with ADLs and functional mobility   Lives With Alone   ADL Assistance Independent   IADLs Independent   Falls in the last 6 months 0   Vocational Retired   Comments +    General   Family/Caregiver Present No   Cognition   Overall Cognitive Status Impaired   Attention Attends with cues to redirect   Orientation Level Oriented X4   RLE Assessment   RLE Assessment   (grossly 3/5 observed with mobility, unable to formally test due to increased support required sitting EOB)   LLE Assessment   LLE Assessment   (grossly 3/5 observed with mobility, unable to formally test due to increased support required sitting EOB)   Bed Mobility   Rolling R 2  Maximal assistance   Additional items Assist x 2   Supine to Sit 2  Maximal assistance   Additional items Assist x 2   Sit to Supine 2  Maximal assistance   Additional items Assist x 2   Additional Comments sitting EOB at a max Ax1 increased posterior lean   Transfers   Sit to Stand Unable to assess   Balance   Static Sitting Poor -   Dynamic Sitting Poor -   Static Standing Zero   Activity Tolerance   Activity Tolerance Patient limited by fatigue;Patient limited by pain   Medical Staff Made Aware OT   Nurse Made Aware nurse approved therapy session   Assessment   Prognosis Good   Problem List Decreased strength;Decreased endurance; Impaired balance;Decreased mobility;Pain;Decreased safety awareness Assessment Pt is a 66 yo male admitted to [place] [address] on 9/30/2022 s/p abdominal pain after surgery  Pt had surgery on 9/20 for laparotomy exploratory, completion cholecystectomy, small bowl resection, mesh explantation, midline dehiscence, and vac placement  DX: toxic metabolic encephalopathy, shock, acute hypoxic respiratory failure  Two patient identifiers were used to confirm  Pt lives alone in an apartment with 2 AIDE  Pt was I for ADL's and mobility prior  Pt's impairments include reduced mobility, poor endurance, pain, poor sitting tolerance and balance, high risk of falling  These impairments limit the ability of the patient to perform mobility without increased assistance, return to PLOF and participate in everyday life activities  Pt would benefit from continued skilled therapy while in the hospital to improve overall mobility and work towards a safe d/c  Recommend discharge to rehab  At the end of the session the patient was left in supine position with call bell and phone within reach  The patient's AM-PAC Basic Mobility Inpatient Short Form Raw Score is 6  A Raw score of less than or equal to 16 suggests the patient may benefit from discharge to post-acute rehabilitation services  Please also refer to the recommendation of the Physical Therapist for safe discharge planning  Pt able to sit EOB with increased support due to pain  Will assess further mobility when appropriate   Goals   STG Expiration Date 10/18/22   Short Term Goal #1 STG 1: Pt will perform bed mobility at a min A to safety return to PLOF  STG2: Pt will sit EOB at a min A level for 35-50 minutes to improve sitting tolerance and balance  Will assess further mobility when appropriate   PT Treatment Day 0   Plan   Treatment/Interventions LE strengthening/ROM; Therapeutic exercise; Endurance training;Bed mobility; Equipment eval/education;OT   PT Frequency 3-5x/wk   Recommendation   PT Discharge Recommendation Post acute rehabilitation services AM-PAC Basic Mobility Inpatient   Turning in Bed Without Bedrails 1   Lying on Back to Sitting on Edge of Flat Bed 1   Moving Bed to Chair 1   Standing Up From Chair 1   Walk in Room 1   Climb 3-5 Stairs 1   Basic Mobility Inpatient Raw Score 6   Turning Head Towards Sound 4   Follow Simple Instructions 4   Low Function Basic Mobility Raw Score 14   Low Function Basic Mobility Standardized Score 22 01   Highest Level Of Mobility   JH-HLM Goal 2: Bed activities/Dependent transfer   JH-HLM Achieved 3: Sit at edge of bed   Nessa Campbell, PT, DPT

## 2022-10-04 NOTE — PLAN OF CARE
Problem: PHYSICAL THERAPY ADULT  Goal: Performs mobility at highest level of function for planned discharge setting  See evaluation for individualized goals  Description: Treatment/Interventions: LE strengthening/ROM, Therapeutic exercise, Endurance training, Bed mobility, Equipment eval/education, OT          See flowsheet documentation for full assessment, interventions and recommendations  Note: Prognosis: Good  Problem List: Decreased strength, Decreased endurance, Impaired balance, Decreased mobility, Pain, Decreased safety awareness  Assessment: Pt is a 66 yo male admitted to [place] [address] on 9/30/2022 s/p abdominal pain after surgery  Pt had surgery on 9/20 for laparotomy exploratory, completion cholecystectomy, small bowl resection, mesh explantation, midline dehiscence, and vac placement  DX: toxic metabolic encephalopathy, shock, acute hypoxic respiratory failure  Two patient identifiers were used to confirm  Pt lives alone in an apartment with 2 AIDE  Pt was I for ADL's and mobility prior  Pt's impairments include reduced mobility, poor endurance, pain, poor sitting tolerance and balance, high risk of falling  These impairments limit the ability of the patient to perform mobility without increased assistance, return to PLOF and participate in everyday life activities  Pt would benefit from continued skilled therapy while in the hospital to improve overall mobility and work towards a safe d/c  Recommend discharge to rehab  At the end of the session the patient was left in supine position with call bell and phone within reach  The patient's AM-PAC Basic Mobility Inpatient Short Form Raw Score is 6  A Raw score of less than or equal to 16 suggests the patient may benefit from discharge to post-acute rehabilitation services  Please also refer to the recommendation of the Physical Therapist for safe discharge planning  Pt able to sit EOB with increased support due to pain   Will assess further mobility when appropriate        PT Discharge Recommendation: Post acute rehabilitation services    See flowsheet documentation for full assessment

## 2022-10-04 NOTE — PLAN OF CARE
Problem: INFECTION - ADULT  Goal: Absence or prevention of progression during hospitalization  Description: INTERVENTIONS:  - Assess and monitor for signs and symptoms of infection  - Monitor lab/diagnostic results  - Monitor all insertion sites, i e  indwelling lines, tubes, and drains  - Monitor endotracheal if appropriate and nasal secretions for changes in amount and color  - [place] appropriate cooling/warming therapies per order  - Administer medications as ordered  - Instruct and encourage patient and family to use good hand hygiene technique  - Identify and instruct in appropriate isolation precautions for identified infection/condition  Outcome: Progressing  Goal: Absence of fever/infection during neutropenic period  Description: INTERVENTIONS:  - Monitor WBC    Outcome: Progressing

## 2022-10-04 NOTE — PLAN OF CARE
Problem: OCCUPATIONAL THERAPY ADULT  Goal: Performs self-care activities at highest level of function for planned discharge setting  See evaluation for individualized goals  Description: Treatment Interventions: ADL retraining, Functional transfer training, Endurance training, UE strengthening/ROM, Cognitive reorientation, Patient/family training, Equipment evaluation/education, Compensatory technique education, Continued evaluation, Energy conservation, Activityengagement          See flowsheet documentation for full assessment, interventions and recommendations  Note: Limitation: Decreased ADL status, Decreased endurance, Decreased high-level ADLs, Decreased self-care trans, Decreased UE ROM, Decreased UE strength, Decreased Safe judgement during ADL, Decreased cognition  Prognosis: Fair  Assessment: Pt is a 66 y/o male seen for OT eval s/p adm to [facility] w/ vomiting and abdominal pain in the setting of SBO, s/p ERCP and open partial cholecystectomy, ventral hernia repair with mesh  Pt  has a past medical history of Abdominal aortic aneurysm (AAA), Abscess of groin, Arthritis, Candidiasis, cutaneous, Coronary artery disease, Dyslipidemia, Esophageal ulcer without bleeding, Gastritis, GERD (gastroesophageal reflux disease), Hiatal hernia, History of transfusion, cataract surgery, left, Hyperlipidemia, Hypertension, Myocardial infarction [facility]), Old myocardial infarction (2000), Recurrent right inguinal hernia, Renal disorder, and Wound of skin  Pt with active OT orders and up with assistance  orders  Pt lives alone in an apt w/ 2 AIDE  Pt was I w/  ADLS and IADLS, drove, & required use of DME PTA including SPC  Pt is currently demonstrating the following occupational deficits: Mod A UB ADLS, Max A LB ADLS, Max A x2 bed mobility, Mod-Max A EOB sitting; unable to assess transfers/functional mobility at this time   These deficits that are impacting pt's baseline areas of occupation are a result of the following impairments: pain, endurance, activity tolerance, functional mobility, forward functional reach, balance, trunk control, functional standing tolerance, unsupportive home environment, decreased I w/ ADLS/IADLS, strength, ROM, cognitive impairments, decreased safety awareness and decreased insight into deficits  The following Occupational Performance Areas to address include: eating, grooming, bathing/shower, toilet hygiene, dressing, medication management, socialization, health maintenance, functional mobility, community mobility, clothing management and household maintenance  Recommend inpatient rehab  upon D/C   Pt to continue to benefit from acute immediate OT services to address the following goals 3-5x/week to  w/in 10-14 days:     OT Discharge Recommendation: Post acute rehabilitation services     Han Uribe MS, OTR/L

## 2022-10-04 NOTE — PLAN OF CARE
Problem: Potential for Falls  Goal: Patient will remain free of falls  Description: INTERVENTIONS:  - Educate patient/family on patient safety including physical limitations  - Instruct patient to call for assistance with activity   - Consult OT/PT to assist with strengthening/mobility   - Keep Call bell within reach  - Keep bed low and locked with side rails adjusted as appropriate  - Keep care items and personal belongings within reach  - Initiate and maintain comfort rounds  - Make Fall Risk Sign visible to staff  - Offer Toileting every 2 Hours, in advance of need  - Initiate/Maintain bed alarm  - Obtain necessary fall risk management equipment: bed alarm  - Apply yellow socks and bracelet for high fall risk patients  - Consider moving patient to room near nurses station  10/4/2022 0934 by Patricia Whelan  Outcome: Progressing  10/4/2022 0934 by Patricia Whelan  Outcome: Progressing     Problem: MOBILITY - ADULT  Goal: Maintain or return to baseline ADL function  Description: INTERVENTIONS:  -  Assess patient's ability to carry out ADLs; assess patient's baseline for ADL function and identify physical deficits which impact ability to perform ADLs (bathing, care of mouth/teeth, toileting, grooming, dressing, etc )  - Assess/evaluate cause of self-care deficits   - Assess range of motion  - Assess patient's mobility; develop plan if impaired  - Assess patient's need for assistive devices and provide as appropriate  - Encourage maximum independence but intervene and supervise when necessary  - Involve family in performance of ADLs  - Assess for home care needs following discharge   - Consider OT consult to assist with ADL evaluation and planning for discharge  - Provide patient education as appropriate  10/4/2022 0934 by Patricia Whelan  Outcome: Progressing  10/4/2022 0934 by Patricia Whelan  Outcome: Progressing  Goal: Maintains/Returns to pre admission functional level  Description: INTERVENTIONS:  - Perform BMAT or MOVE assessment daily    - Set and communicate daily mobility goal to care team and patient/family/caregiver  - Collaborate with rehabilitation services on mobility goals if consulted  - Perform Range of Motion 4 times a day  - Reposition patient every 2 hours    - Dangle patient 0 times a day  - Stand patient 0 times a day  - Ambulate patient 0 times a day  - Out of bed to chair 0 times a day   - Out of bed for meals 0 times a day  - Out of bed for toileting  - Record patient progress and toleration of activity level   10/4/2022 0934 by Clotilde Vanessa  Outcome: Progressing  10/4/2022 0934 by Clotilde Vanessa  Outcome: Progressing     Problem: PAIN - ADULT  Goal: Verbalizes/displays adequate comfort level or baseline comfort level  Description: Interventions:  - Encourage patient to monitor pain and request assistance  - Assess pain using appropriate pain scale  - Administer analgesics based on type and severity of pain and evaluate response  - Implement non-pharmacological measures as appropriate and evaluate response  - Consider cultural and social influences on pain and pain management  - Notify physician/advanced practitioner if interventions unsuccessful or patient reports new pain  10/4/2022 0934 by Clotilde Vanessa  Outcome: Progressing  10/4/2022 0934 by Clotilde Vanessa  Outcome: Progressing     Problem: CARDIOVASCULAR - ADULT  Goal: Absence of cardiac dysrhythmias or at baseline rhythm  Description: INTERVENTIONS:  - Continuous cardiac monitoring, vital signs, obtain 12 lead EKG if ordered  - Administer antiarrhythmic and heart rate control medications as ordered  - Monitor electrolytes and administer replacement therapy as ordered  10/4/2022 0934 by Clotilde Vanessa  Outcome: Progressing  10/4/2022 0934 by Clotilde Vanessa  Outcome: Progressing     Problem: Prexisting or High Potential for Compromised Skin Integrity  Goal: Skin integrity is maintained or improved  Description: INTERVENTIONS:  - Identify patients at risk for skin breakdown  - Assess and monitor skin integrity  - Assess and monitor nutrition and hydration status  - Monitor labs   - Assess for incontinence   - Turn and reposition patient  - Assist with mobility/ambulation  - Relieve pressure over bony prominences  - Avoid friction and shearing  - Provide appropriate hygiene as needed including keeping skin clean and dry  - Evaluate need for skin moisturizer/barrier cream  - Collaborate with interdisciplinary team   - Patient/family teaching  - Consider wound care consult   10/4/2022 0934 by Niranjan Cluster  Outcome: Progressing  10/4/2022 0934 by Niranjan Cluster  Outcome: Progressing     Problem: DISCHARGE PLANNING  Goal: Discharge to home or other facility with appropriate resources  Description: INTERVENTIONS:  - Identify barriers to discharge w/patient and caregiver  - Arrange for needed discharge resources and transportation as appropriate  - Identify discharge learning needs (meds, wound care, etc )  - Arrange for interpretive services to assist at discharge as needed  - Refer to Case Management Department for coordinating discharge planning if the patient needs post-hospital services based on physician/advanced practitioner order or complex needs related to functional status, cognitive ability, or social support system  10/4/2022 0934 by Niranjan Casas  Outcome: Progressing  10/4/2022 0934 by Niranjan Cluster  Outcome: Progressing     Problem: Knowledge Deficit  Goal: Patient/family/caregiver demonstrates understanding of disease process, treatment plan, medications, and discharge instructions  Description: Complete learning assessment and assess knowledge base    Interventions:  - Provide teaching at level of understanding  - Provide teaching via preferred learning methods  10/4/2022 0934 by Niranjan Casas  Outcome: Progressing  10/4/2022 0934 by Niranjan Cluster  Outcome: Progressing     Problem: Nutrition/Hydration-ADULT  Goal: Nutrient/Hydration intake appropriate for improving, restoring or maintaining nutritional needs  Description: Monitor and assess patient's nutrition/hydration status for malnutrition  Collaborate with interdisciplinary team and initiate plan and interventions as ordered  Monitor patient's weight and dietary intake as ordered or per policy  Utilize nutrition screening tool and intervene as necessary  Determine patient's food preferences and provide high-protein, high-caloric foods as appropriate       INTERVENTIONS:  - Monitor oral intake, urinary output, labs, and treatment plans  - Assess nutrition and hydration status and recommend course of action  - Evaluate amount of meals eaten  - Assist patient with eating if necessary   - Allow adequate time for meals  - Recommend/ encourage appropriate diets, oral nutritional supplements, and vitamin/mineral supplements  - Order, calculate, and assess calorie counts as needed  - Recommend, monitor, and adjust tube feedings and TPN/PPN based on assessed needs  - Assess need for intravenous fluids  - Provide specific nutrition/hydration education as appropriate  - Include patient/family/caregiver in decisions related to nutrition  10/4/2022 0934 by Pham Wallace  Outcome: Progressing  10/4/2022 0934 by Pham Wallace  Outcome: Progressing     Problem: SAFETY,RESTRAINT: NV/NON-SELF DESTRUCTIVE BEHAVIOR  Goal: Returns to optimal restraint-free functioning  Description: INTERVENTIONS:  - Assess the patient's behavior and symptoms that indicate continued need for restraint  - Identify and implement measures to help patient regain control  - Assess readiness for release of restraint   10/4/2022 0934 by Pham Wallace  Outcome: Progressing  10/4/2022 0934 by Pham Wallace  Outcome: Progressing

## 2022-10-05 NOTE — PROGRESS NOTES
Vancomycin IV Pharmacy-to-Dose Consultation    Wendy Santiago is a 65 y o  male who is currently receiving Vancomycin IV with management by the Pharmacy Consult service  Relevant clinical data and objective history reviewed:  Temp Readings from Last 3 Encounters:   10/05/22 (!) 97 4 °F (36 3 °C)   08/25/22 (!) 87 7 °F (30 9 °C) (Temporal)   07/26/22 98 °F (36 7 °C) (Temporal)     /53 (BP Location: Left arm)   Pulse 94   Temp (!) 97 4 °F (36 3 °C) Comment: oral  Resp (!) 24   Ht 5' 8" (1 727 m)   Wt 59 kg (130 lb 1 1 oz)   SpO2 91%   BMI 19 78 kg/m²     I/O last 3 completed shifts: In: 2898 5 [I V :120; NG/GT:250; IV Piggyback:500; TPN:2028 5]  Out: 7195 [Urine:1820; Emesis/NG output:2575; Drains:18]    Lab Results   Component Value Date/Time    BUN 74 (H) 10/05/2022 05:59 AM    BUN 16 12/10/2015 11:43 AM    WBC 21 02 (H) 10/05/2022 05:59 AM    WBC 8 14 06/18/2015 10:43 AM    HGB 9 7 (L) 10/05/2022 05:59 AM    HGB 12 7 06/18/2015 10:43 AM    HCT 32 9 (L) 10/05/2022 05:59 AM    HCT 38 7 06/18/2015 10:43 AM    MCV 99 (H) 10/05/2022 05:59 AM    MCV 83 06/18/2015 10:43 AM     (H) 10/05/2022 05:59 AM     06/18/2015 10:43 AM     Creatinine   Date Value Ref Range Status   10/05/2022 1 61 (H) 0 60 - 1 30 mg/dL Final     Comment:     Standardized to IDMS reference method   10/04/2022 1 27 0 60 - 1 30 mg/dL Final     Comment:     Standardized to IDMS reference method   12/10/2015 1 08 0 60 - 1 30 mg/dL Final     Comment:     Standardized to IDMS reference method   06/18/2015 0 87 0 60 - 1 30 mg/dL Final     Comment:     Standardized to IDMS reference method         Vancomycin Assessment:  Indication: pneumonia   Status: febrile (100 76), hemodynamically stable not on vasopressors   Micro: Blood culture no growth   Procalcitonin: 2 73  Renal Function: JOVANNI with increase in Scr from 0 98 on 10/3 to 1 61 on 10/5     Days of Therapy: 1  Current Dose: Received x 1 loading dose of 1500 mg (25 mg/kg)  Goal Trough: 15-20  Goal AUC(24h): 400-600      Vancomycin Plan:  New Dosing: Dose by levels due to fluctuating renal function  Level: Obtain random vancomycin level tomorrow with AM labs  Renal Function Monitoring: Creatinine and urine output will be monitored  Pharmacy will continue to follow closely for s/sx of nephrotoxicity, infusion reactions and appropriateness of therapy  BMP and CBC will be ordered per protocol  We will continue to follow the patient's culture results and clinical progress daily        Eden Juarez, PharmD  Pharmacist

## 2022-10-05 NOTE — PROGRESS NOTES
Daily Progress Note - Critical Care   Dara Parisi 65 y o  male MRN: [id number]  Unit/Bed#: MICU 13 Encounter: [id number]        ----------------------------------------------------------------------------------------  HPI/24hr events:   64 yo [address] CAD s/p PCI 2000, AAA s/p open aorto bi-iliac grafting 2009, HTN, HLD, choledocholithiasis s/p ERCP and open partial cholecystectomy, ventral hernia repair with mesh  Self extubated 10/1     overnight room and was febrile  Continued high NG tube output  Worked with PT was able to sit in the bed  CT significant for ground-glass attenuation throughout the lungs possible pneumonia  Multiple intraperitoneal collections suspicious for abscess  febrile overnight, T-max  101 at 4:00 a m    off pressors  3 L nasal cannula     Hemoglobin 9 7 from 10   White count 21 from 20/3   Creatinine 1 6 from 1 2     925 urine  1 9 L NG tube  R DILLON drain:   5 recorded cc minimal in bulb  L DILLON drain:  3 recorded cc minimal in bulb  Wound Vac 0 cc recorded   ---------------------------------------------------------------------------------------  SUBJECTIVE    Some abdominal pain  No nausea vomiting    Review of Systems   Constitutional: Negative for chills and fever  HENT: Negative for ear pain and sore throat  Eyes: Negative for pain and visual disturbance  Respiratory: Negative for cough and shortness of breath  Cardiovascular: Negative for chest pain and palpitations  Gastrointestinal: Positive for abdominal pain  Negative for vomiting  Genitourinary: Negative for dysuria and hematuria  Musculoskeletal: Negative for arthralgias and back pain  Skin: Negative for color change and rash  Neurological: Negative for seizures and syncope  All other systems reviewed and are negative      Review of systems was reviewed and negative unless stated above in HPI/24-hour events ---------------------------------------------------------------------------------------  Assessment and Plan:    Neuro:   · Diagnosis:  Toxic metabolic encephalopathy  ? Oriented to questions but intermittently confused, impulsive and pulling out tubes  ? Limit any mind altering medications   ? Tylenol rectal PRN   ? Dilaudid 0 2 mg PRN mod/severe pain   ? Dilaudid 0 5 mg PRN breakthrough pain - 1 dose/24 hrs   ? Delirium precautions, regulate sleep/wake cycle   § qHS ODT zyprexa   o       CV:   · Diagnosis:  Shock, resolved  ? Plan:  Levophed off  ? MAP goals >65  · Diagnosis:  New onset AFib  ? Plan:  Currently normal sinus rhythm  ? Holding systemic anticoagulation  · Diagnosis: HFpEF, CAD, HLD, HTN  ? Plan:  Holding home aspirin, Lipitor, Lasix 20 mg daily, lisinopril 5 mg daily, metoprolol 50 XL daily with strict NPO   ? Can start Labetalol PRN for HTN if needed       Pulm:  · Diagnosis:  Acute hypoxic respiratory failure, ARDS, concern for aspiration,  ? Plan:  Self extubated 10/1  ? Previously on HFNC 40L/70%  Was able to wean to 3L NC on 10/3  § SPO2 goal >92%   ? Aggressive pulmonary toileting, airway clearance, NT suction as needed   ? Pt was diuresed with 20 mg IV lasix x2 on 10/4  UOP 1760cc  o Holding diuresis given creatinine bump      GI:   · Diagnosis: SBO 2/2 adhesions s/p ex-lap, SBR, extensive MARK, completion cholecystectomy, duo/duo and jejunojejunal anastomosis, mesh placement, wound VAC  ? Plan:  90 cm bowel resected, G-J anastomosis, primary duodenal anastomosis, completion cholecystectomy, removal of prior ventral hernia repair mesh and wound vac placement   ? TPN  ? Maintain NGT - continued high output  ? Maintain JPs and Vac per surgery    · Diagnosis:  Hyperbilirubinemia  ? Plan:  MRCP/RUQ U/S not consistent with biliary disease  ? Currently down trending, continue to follow      :   · Diagnosis:  JOVANNI, resolved  ? Plan: Monitor UOP  ?  Maintain centeno with significant scrotal edema   ? Trend BUN/creatinine  ? Pt was diuresed with 20 mg IV lasix x2 on 10/4  UOP 1700cc  o Holding diuresis now given creatinine bump and adequate urine      F/E/N:   · F:  holding diuresis   · E: Replete electrolytes for goal K >4, Phos >3, Mg >2  · N: TPN, strict NPO with NGT  ? Small BM overnight  ? Continue NPO until more consistent bowel movement      Heme/Onc:   · Diagnosis:  ABLA in setting of GIB   ? Plan:  Trend hemoglobin and transfuse as needed  ? DVT PPX:  Subcu heparin, SCDs       Endo:   ? No active issues  o       ID:   · Diagnosis:  SIRS/sepsis   ? Plan:  Zosyn was D/C on 9/29   total abx days 14    ? 9/30 blood cultures-negative x24 hours  ? 9/25 blood cultures negative x5 days  ? 9/20 blood cultures-negative x5 days  ? MRSA negative   ? 10/3: AM Temp 100 4  Morning  ? 10/4 AM TMax 102 2 s/p 1 dose Tylenol NM; Increased WBC from 9 7 -> 19 61    ? 10/4 Plan to start IV Flagyl & IV Cefepime and reculture sputum, blood and urine today  Repeat CXR this AM   ? Continue trend fever/WBC curves  Continue Tylenol PRN for fevers  o zozyn and vanc restarted, concern for pneumonia  o IR consult for possible drainage abdominal wall abscess      MSK/Skin:   ? Frequent turning and repositioning  o     Patient appropriate for transfer out of the ICU today?: No  Disposition: Continue Critical Care   Code Status: Level 1 - Full Code  ---------------------------------------------------------------------------------------  ICU CORE MEASURES    Prophylaxis   VTE Pharmacologic Prophylaxis: Enoxaparin (Lovenox)  VTE Mechanical Prophylaxis: sequential compression device  Stress Ulcer Prophylaxis: Pantoprazole IV     ABCDE Protocol (if indicated)  Plan to perform spontaneous awakening trial today? Not applicable  Plan to perform spontaneous breathing trial today? Not applicable  Obvious barriers to extubation?  Not applicable  CAM-ICU: Negative    Invasive Devices Review  Invasive Devices  Report    Peripherally Inserted Central Catheter Line  Duration           PICC Line  Right Basilic 12 days          Drain  Duration           Closed/Suction Drain Anterior;Right;Lateral RLQ Bulb 19 Fr  13 days    Closed/Suction Drain Left LUQ Bulb 19 Fr  8 days    Urethral Catheter Temperature probe 16 Fr  7 days    NG/OG/Enteral Tube Nasogastric Left nare 3 days              Can any invasive devices be discontinued today? No  ---------------------------------------------------------------------------------------  OBJECTIVE    Vitals   Vitals:    10/05/22 0201 10/05/22 0301 10/05/22 0401 10/05/22 0601   BP: (!) 105/49 119/50 (!) 96/48 (!) 106/48   Pulse: 96 98 102 98   Resp: (!) 31 (!) 29 (!) 31 (!) 25   Temp: (!) 100 76 °F (38 2 °C) (!) 100 76 °F (38 2 °C) (!) 101 12 °F (38 4 °C) (!) 100 76 °F (38 2 °C)   TempSrc:       SpO2: 92% 90% (!) 89% 92%   Weight:    59 kg (130 lb 1 1 oz)   Height:         Temp (24hrs), Av 1 °F (37 8 °C), Min:97 9 °F (36 6 °C), Max:101 48 °F (38 6 °C)  Current: Temperature: (!) 100 76 °F (38 2 °C)      Respiratory:  SpO2: SpO2: 93 %  Nasal Cannula O2 Flow Rate (L/min): 4 L/min    Invasive/non-invasive ventilation settings   Respiratory  Report   Lab Data (Last 4 hours)    None         O2/Vent Data (Last 4 hours)    None                Physical Exam  Vitals and nursing note reviewed  Constitutional:       Appearance: He is well-developed  HENT:      Head: Normocephalic and atraumatic  Eyes:      Conjunctiva/sclera: Conjunctivae normal    Cardiovascular:      Rate and Rhythm: Normal rate and regular rhythm  Heart sounds: No murmur heard  Pulmonary:      Effort: Pulmonary effort is normal  No respiratory distress  Breath sounds: Normal breath sounds  Abdominal:      Tenderness: There is abdominal tenderness  Comments:  firm   Musculoskeletal:      Cervical back: Neck supple  Skin:     General: Skin is warm and dry  Neurological:      Mental Status: He is alert  Laboratory and Diagnostics:  Results from last 7 days   Lab Units 10/04/22  1744 10/04/22  0445 10/03/22  0437 10/02/22  0433 09/30/22  0544 09/29/22  0526   WBC Thousand/uL 23 01* 19 61* 9 71 10 08 8 95 8 85   HEMOGLOBIN g/dL 10 0* 10 8* 8 8* 8 1* 8 6* 8 3*   HEMATOCRIT % 32 3* 34 3* 28 9* 27 1* 27 6* 27 6*   PLATELETS Thousands/uL 490* 549* 398* 327 259 221   NEUTROS PCT %  --   --   --  83*  --   --    MONOS PCT %  --   --   --  5  --   --      Results from last 7 days   Lab Units 10/04/22  0445 10/03/22  0437 10/02/22  0433 10/01/22  0444 09/30/22  0544 09/29/22  0526 09/28/22  1143   SODIUM mmol/L 146 144 146 145 147 146 147   POTASSIUM mmol/L 4 2 4 0 3 3* 3 9 3 6 3 7 3 6   CHLORIDE mmol/L 117* 118* 118* 118* 119* 119* 119*   CO2 mmol/L 23 22 24 23 22 23 24   ANION GAP mmol/L 6 4 4 4 6 4 4   BUN mg/dL 41* 31* 27* 28* 26* 26* 31*   CREATININE mg/dL 1 27 0 98 1 05 1 02 1 07 0 92 0 93   CALCIUM mg/dL 8 6 8 2* 7 5* 7 3* 7 1* 7 3* 7 3*   GLUCOSE RANDOM mg/dL 142* 119 122 121 135 142* 132   ALT U/L  --  23 20 19 21 22  --    AST U/L  --  38 35 27 30 36  --    ALK PHOS U/L  --  105 96 103 109 114  --    ALBUMIN g/dL  --  2 0* 1 8* 1 7* 1 6* 1 7*  --    TOTAL BILIRUBIN mg/dL  --  2 58* 2 70* 3 04* 3 13* 3 56*  --      Results from last 7 days   Lab Units 10/04/22  0445 10/03/22  0437 10/02/22  0433 10/01/22  0444 09/30/22  0544 09/29/22  0526 09/28/22  1143   MAGNESIUM mg/dL 2 7* 2 6 2 2 2 5 2 4 2 5  --    PHOSPHORUS mg/dL 3 9 3 0 2 2* 3 2 2 4 2 9 2 9                   ABG:  Results from last 7 days   Lab Units 10/03/22  0437   PH ART  7 528*   PCO2 ART mm Hg 28 7*   PO2 ART mm Hg 59 9*   HCO3 ART mmol/L 23 3   BASE EXC ART mmol/L 1 2   ABG SOURCE  Line, Arterial     VBG:  Results from last 7 days   Lab Units 10/03/22  0437   ABG SOURCE  Line, Arterial     Results from last 7 days   Lab Units 10/04/22  0445   PROCALCITONIN ng/ml 0 52*       Micro  Results from last 7 days   Lab Units 10/04/22  0617 09/30/22  0135   BLOOD CULTURE  Received in Microbiology Lab  Culture in Progress  Received in Microbiology Lab  Culture in Progress  No Growth After 4 Days  No Growth After 4 Days  EKG:   Imaging:  I have personally reviewed pertinent films in PACS    Intake and Output  I/O       10/03 0701  10/04 0700 10/04 0701  10/05 0700    I V  (mL/kg)  90 (1 5)    NG/GT  250    IV Piggyback  400    TPN 1333 9 1361 1    Total Intake(mL/kg) 1333 9 (22) 2101 1 (35 6)    Urine (mL/kg/hr) 1760 (1 2) 650 (0 5)    Emesis/NG output 1450 1800    Drains 45 0    Stool 0     Total Output 3255 2450    Net -1921 1 -348  9          Unmeasured Stool Occurrence 2 x     Unmeasured Emesis Occurrence  3 x            Height and Weights   Height: 5' 8" (172 7 cm)  IBW (Ideal Body Weight): 68 4 kg  Body mass index is 19 78 kg/m²  Weight (last 2 days)     Date/Time Weight    10/05/22 0601 59 (130 07)    10/04/22 0504 60 6 (133 6)    10/03/22 0600 65 3 (143 96)            Nutrition       Diet Orders   (From admission, onward)             Start     Ordered    09/29/22 1729  Diet NPO  Diet effective now        Comments: Please do not adjust rate  Thank you   References:    Nutrtion Support Algorithm Enteral vs  Parenteral   Question Answer Comment   Diet Type NPO    RD to adjust diet per protocol?  Yes        09/29/22 1729                    Active Medications  Scheduled Meds:  Current Facility-Administered Medications   Medication Dose Route Frequency Provider Last Rate   • acetaminophen  650 mg Rectal Q4H PRN Jalyn Nunez MD     • Adult TPN (CUSTOM BASE/CUSTOM ELECTROLYTE)   Intravenous Continuous TPN Carroll Hernandez PA-C 56 1 mL/hr at 10/04/22 2040   • cefepime  2,000 mg Intravenous Q12H Jalyn Nunez MD Stopped (10/04/22 1900)   • chlorhexidine  15 mL Mouth/Throat Q12H [facility] Catarino Wright PA-C     • enoxaparin  40 mg Subcutaneous Q24H [facility] [place], [place]     • HYDROmorphone  0 5 mg Intravenous Q4H PRN Adonay Flores Belén Celeste PA-C     • HYDROmorphone  0 2 mg Intravenous Q4H PRN Festus Casas PA-C     • iohexol  30 mL Oral Once in imaging Jocelyne Vidales PA-C     • labetalol  10 mg Intravenous Q6H PRN Festus Casas PA-C     • metroNIDAZOLE  500 mg Intravenous Q8H Db Goldsmith  mg (10/05/22 2780)   • OLANZapine  5 mg Oral HS PRN Carmen Roots, DO     • ondansetron  4 mg Intravenous Q4H PRN Sophie Leiva PA-C     • pantoprazole  40 mg Intravenous Q12H [address] GISSELL Leiva       Continuous Infusions:  Adult TPN (CUSTOM BASE/CUSTOM ELECTROLYTE), , Last Rate: 56 1 mL/hr at 10/04/22 2040      PRN Meds:   acetaminophen, 650 mg, Q4H PRN  HYDROmorphone, 0 5 mg, Q4H PRN  HYDROmorphone, 0 2 mg, Q4H PRN  iohexol, 30 mL, Once in imaging  labetalol, 10 mg, Q6H PRN  OLANZapine, 5 mg, HS PRN  ondansetron, 4 mg, Q4H PRN        Allergies   No Known Allergies  ---------------------------------------------------------------------------------------  Advance Directive and Living Will:      Power of :    POLST:    ---------------------------------------------------------------------------------------  Care Time Delivered:   30 minutes    Jaspal Burks MD      Portions of the record may have been created with voice recognition software  Occasional wrong word or "sound a like" substitutions may have occurred due to the inherent limitations of voice recognition software    Read the chart carefully and recognize, using context, where substitutions have occurred

## 2022-10-05 NOTE — PROGRESS NOTES
Progress Note - General Surgery   Ya Knee 65 y o  male MRN: [id number]  [id number]    Assessment:  Patient is a 65 y o  male who presented with closed-loop small-bowel obstruction, now status post ex lap partial small-bowel resection on 09/20, bridging Phasix mesh placement on 09/21 and subsequent explantation of mesh on 09/26 with abdominal closure of skin complicated by partial skin dehiscence, now status post ex lap and wound VAC placement on 9/30  Self extubated on 10/1      Tmax: 101 4 at 4:00 a m ; RR: 20-30's on 4-6L NC sats in the high 80-90s  NGT:  1 8 L gastric content  UOP:  650 cc  R DILLON drain:  0 recorded cc minimal in bulb  L DILLON drain:  0 recorded cc minimal in bulb  Wound Vac 0 cc recorded     WBC:  23  yesterday  HBG:, 10 yesterday      Plan:  Continue PICC TPN  Monitor abdominal exam  Maintain DILLON drains x2  Monitor wound vac and NGT output  DVT prophylaxis  Appreciate ICU level of care  Cefepime/flagyl  Wound vac change today  PT/OT recommend rehab  Consider IR c/s for possible drainage of intra-abdominal abscess  F/u fungal cx    Subjective/Objective     Subjective/Events:  Remains off pressors  Was febrile yesterday warranting CTAP with following findings:RUL pneumonia;  2 6 x 5 6 cm in the mid epigastric region and 2 with additional collections extending inferiorly along the left anterior abdominal wall measuring 3 6 x 1 7 cm abdominal collections suspicious for abscess  Patient reports nausea vomiting and burping  Abdomen feels full  Fungal culture sent and pending  Restarted on  antibiotics    Objective:    Blood pressure 106/60, pulse 90, temperature 100 04 °F (37 8 °C), resp  rate 21, height 5' 8" (1 727 m), weight 60 6 kg (133 lb 9 6 oz), SpO2 93 %  ,Body mass index is 20 31 kg/m²        Intake/Output Summary (Last 24 hours) at 10/5/2022 0032  Last data filed at 10/4/2022 2301  Gross per 24 hour   Intake 2542 9 ml   Output 2160 ml   Net 382 9 ml Invasive Devices  Report    Peripherally Inserted Central Catheter Line  Duration           PICC Line 05/12/67 Right Basilic 12 days          Drain  Duration           Closed/Suction Drain Anterior;Right;Lateral RLQ Bulb 19 Fr  13 days    Closed/Suction Drain Left LUQ Bulb 19 Fr  8 days    Urethral Catheter Temperature probe 16 Fr  7 days    NG/OG/Enteral Tube Nasogastric Left nare 3 days            re    Physical Exam  Vitals reviewed  Constitutional:       General: He is not in acute distress  Appearance: He is not ill-appearing, toxic-appearing or diaphoretic  HENT:      Head: Normocephalic and atraumatic  Eyes:      Extraocular Movements: Extraocular movements intact  Cardiovascular:      Rate and Rhythm: Normal rate  Pulmonary:      Effort: Pulmonary effort is normal  No respiratory distress  Abdominal:      General: There is distension  Palpations: Abdomen is soft  Tenderness: There is no abdominal tenderness  There is no guarding or rebound  Comments: Incision intact with wound VAC in place with good seal  DILLON drains x2 in place  tight   Skin:     General: Skin is warm and dry  Neurological:      Mental Status: He is alert               Results from last 7 days   Lab Units 10/04/22  1744 10/04/22  0445 10/03/22  0437   WBC Thousand/uL 23 01* 19 61* 9 71   HEMOGLOBIN g/dL 10 0* 10 8* 8 8*   HEMATOCRIT % 32 3* 34 3* 28 9*   PLATELETS Thousands/uL 490* 549* 398*     Results from last 7 days   Lab Units 10/04/22  0445 10/03/22  0437 10/02/22  0433   POTASSIUM mmol/L 4 2 4 0 3 3*   CHLORIDE mmol/L 117* 118* 118*   CO2 mmol/L 23 22 24   BUN mg/dL 41* 31* 27*   CREATININE mg/dL 1 27 0 98 1 05   CALCIUM mg/dL 8 6 8 2* 7 5*

## 2022-10-06 NOTE — PLAN OF CARE
Problem: OCCUPATIONAL THERAPY ADULT  Goal: Performs self-care activities at highest level of function for planned discharge setting  See evaluation for individualized goals  Description: Treatment Interventions: ADL retraining, Functional transfer training, Endurance training, UE strengthening/ROM, Cognitive reorientation, Patient/family training, Equipment evaluation/education, Compensatory technique education, Continued evaluation, Energy conservation, Activityengagement          See flowsheet documentation for full assessment, interventions and recommendations  Outcome: Progressing  Note: Limitation: Decreased ADL status, Decreased endurance, Decreased high-level ADLs, Decreased self-care trans, Decreased UE ROM, Decreased UE strength, Decreased Safe judgement during ADL, Decreased cognition  Prognosis: Fair  Assessment: Patient participated in Skilled OT session 10/6/2022 with interventions consisting of Energy Conservation techniques, deep breathing technique, safety awareness and fall prevention techniques, therapeutic exercise to: increase functional use of BUEs, increase BUE muscle strength ,  therapeutic activities to: increase activity tolerance, increase postural control and increase trunk control   Patient agreeable to OT treatment session, upon arrival patient was found supine in bed  In comparison to previous session, patient with improvements in EOB sitting tolerance   Patient requiring verbal cues for safety, verbal cues for correct technique, verbal cues for pacing thru activity steps and frequent rest periods  Patient continues to be functioning below baseline level, occupational performance remains limited secondary to factors listed above and increased risk for falls and injury  From OT standpoint, recommendation at time of d/c would be Short Term Rehab when medically stable     Patient to benefit from continued Occupational Therapy treatment while in the hospital to address deficits as defined above and maximize level of functional independence with ADLs and functional mobility       OT Discharge Recommendation: Post acute rehabilitation services     Alex Cristobal MS, OTR/L

## 2022-10-06 NOTE — CASE MANAGEMENT
Case Management Discharge Planning Note    Patient name Carlos Gallego  Location MICU 13/MICU 13 MRN [id number]  : 1945 Date 10/6/2022       Current Admission Date: 2022  Current Admission Diagnosis:SBO (small bowel obstruction) 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 ([facility] [address] ) 2021   • Palpitations    • Diastolic dysfunction    • Surgical wound, non healing 2020   • Acute respiratory failure with hypoxia ([facility] [address] ) 10/23/2020   • Status post cholecystectomy 10/04/2020   • Ambulatory dysfunction 10/04/2020   • Unstable angina ([facility] [address] ) 2020   • Dyslipidemia 06/10/2020   • Follow up 2020   • Abdominal aortic aneurysm (AAA) without rupture 2020   • Tubular adenoma of colon 2019   • Primary osteoarthritis of one hip, left 2019   • Gastroesophageal reflux disease with esophagitis 2019   • Gastroesophageal reflux disease 2018   • Primary osteoarthritis of right knee 2018   • History of incisional hernia repair 2018   • Serrated adenoma of colon 2018   • Incisional hernia 2018   • CAD (coronary artery disease) 2018   • Hypertension 2018   • Bursitis of left shoulder 2017      LOS (days): 16  Geometric Mean LOS (GMLOS) (days): 9 80  Days to GMLOS:-6 4     OBJECTIVE:  Risk of Unplanned Readmission Score: 25 69         Current admission status: Inpatient   Preferred Pharmacy:   [facility] [id number]  [place] [address] [address]  [address]  Phone: [phone] Fax: [phone]    Primary Care Provider: Josh Gonzalez MD    Primary Insurance: MEDICARE  Secondary Insurance:     DISCHARGE DETAILS:               Treatment Team Recommendation: Short Term Rehab  Discharge Destination Plan[de-identified] Short Term Rehab            Additional Comments: Met with patient at bedside to discuss current recommendation for STR  Patient agreed to blanket referral to facilities in his area, [address] [address]  CM to follow

## 2022-10-06 NOTE — NUTRITION
10/06/22 0957   Recommendations/Interventions   Summary Pt reports UBW of 145lb PTA  Inidcates wt loss of about 15 lb ( 10 3% BW) x 16 days- severe wt loss  C/o feeling hungry and wants to eat real food  RD explained why pt must remain NPO at this time  Continues with high NGT output and NPO diet order  Recommend increasing dextrose dose in TPN to increase calories and hopefully improve feeling of satiety  Interventions/Recommendations Lab consider order (Specify)  (resume accu checks if dextrose load increased in TPN; check serum TG level)   Recommendations to Provider 1  Recommend adjusting TPN regimen as follows: 70% dextrose in 500 mL (350g dextrose/ GIR= 4 1mg/kg/min), 20% lipid in 275 mL, 15% AA in 625 mL  For a total of 2116 kcal/day and 94g protein

## 2022-10-06 NOTE — PROGRESS NOTES
Daily Progress Note - Critical Care   Carlos Gallego 65 y o  male MRN: [id number]  Unit/Bed#: MICU 13 Encounter: [id number]        ----------------------------------------------------------------------------------------  HPI/24hr events: 66 yo [address] CAD s/p PCI 2000, AAA s/p open aorto bi-iliac grafting 2009, HTN, HLD, choledocholithiasis s/p ERCP and open partial cholecystectomy, ventral hernia repair with mesh  Self extubated 10/1    In past 24 hours, TMax of 100 76  He has remained afebrile since 1300 hrs on 10/5  Continued high NG tube output  Remains on Cefepime/ Vanco for ? PNA and intra-abdominal abscesses  Kavita was D/C'd on 10/5       Afebrile overnight, T-max  100 76 at 1600 hrs  Has been off pressors for several days  Currently on 4 L O2 nasal cannula and saturating well at 94%     This morning, Hemoglobin 9 7 improved from 10  3  WBC count decreased from 21 to 16 1   Creatinine 1 55 from 1 6 yesterday     I/O: 850cc urine/24 hrs, 1 45 L NG tube  R DILLON drain:  minimal serous drainage in bulb  L DILLON drain:   minimal serous drainage in bulb  Wound Vac 0 cc recorded   ---------------------------------------------------------------------------------------  SUBJECTIVE  Reports he is "feeling okay " Continues to have left sided abdominal pain, especially with belching  No other complaints at this time  Review of Systems   Gastrointestinal: Positive for abdominal pain and constipation  Negative for nausea and vomiting  All other systems reviewed and are negative  Review of systems was reviewed and negative unless stated above in HPI/24-hour events   ---------------------------------------------------------------------------------------  Assessment and Plan:    Neuro:   · Diagnosis:  Toxic metabolic encephalopathy  ? Oriented to questions but intermittently confused, impulsive and pulling out tubes  ? Limit any mind altering medications   ? Tylenol rectal PRN   ?  Dilaudid 0 2 mg PRN mod/severe pain - 0 dose/24 hrs   ? Dilaudid 0 5 mg PRN breakthrough pain - 0 dose/24 hrs   ? Delirium precautions, regulate sleep/wake cycle   § qHS ODT zyprexa   § Melatonin nightly      CV:   · Diagnosis:  Shock, resolved  ? Plan:  Levophed off  ? MAP goals >65  · Diagnosis:  New onset AFib  ? Plan:  Currently normal sinus rhythm  ? EKG ordered for this AM showed NSR  ? Holding systemic anticoagulation  ? Resume beta blocker  Metoprolol 12 5 mg BID ordered  · Diagnosis: HFpEF, CAD, HLD, HTN  ? Plan:  Holding home aspirin, Lipitor, Lasix 20 mg daily, lisinopril 5 mg daily, metoprolol 50 XL daily with strict NPO   ? Can start Labetalol PRN for HTN if needed       Pulm:  · Diagnosis:  Acute hypoxic respiratory failure, ARDS, concern for aspiration,  ? Plan:  Self extubated 10/1  ? Previously on HFNC 40L/70%  Currently on  4L NC this AM  § SPO2 goal >92%   ? Aggressive pulmonary toileting, airway clearance, NT suction as needed   ? Pt was diuresed with 20 mg IV lasix x2 on 10/4  UOP 1760cc  ? Holding diuresis given creatinine bump      GI:   · Diagnosis: SBO 2/2 adhesions s/p ex-lap, SBR, extensive MARK, completion cholecystectomy, duo/duo and jejunojejunal anastomosis, mesh placement, wound VAC  ? Plan:  90 cm bowel resected, G-J anastomosis, primary duodenal anastomosis, completion cholecystectomy, removal of prior ventral hernia repair mesh and wound vac placement   ? TPN  ? Maintain NGT - continued high output  Possible NG tube removal today per surgery  ? Maintain JPs and Vac per surgery   ? Repeat CT C/A/P 10/4: showed consolidation in R upper lobe suspicious of PNA  Multiple intraperitoneal collections and several collections within ventral abd wall suspicious for abscess   ? Wound Vac Changes MWF  · Diagnosis:  Hyperbilirubinemia  ? Plan:  MRCP/RUQ U/S not consistent with biliary disease  ? Continue to follow PRN      :   · Diagnosis:  JOVANNI, resolved  ? Plan: Monitor UOP  ?  Maintain centeno with significant scrotal edema   ? Trend BUN/creatinine  ? Pt was diuresed with 20 mg IV lasix x2 on 10/4 with UOP 1700cc  ? Holding diuresis now given creatinine bump and adequate urine      F/E/N:   · F:  holding diuresis   · E: Replete electrolytes for goal K >4, Phos >3, Mg >2  · N: TPN, strict NPO with NGT     ? Continue NPO until more consistent bowel movement  ? No documented BM since 10/3  ? Reglan given 10/5  ? Reorder TPN today per Nutrition updated recs      Heme/Onc:   · Diagnosis:  ABLA in setting of GIB   ? Plan:  Trend hemoglobin and transfuse as needed  ? DVT PPX:  Lovenox, SCDs       Endo:   ? No active issues      ID:   · Diagnosis:  SIRS/sepsis   ? Plan:  Zosyn was D/C on 9/29   total abx days 14    ? 9/30 blood cultures-negative x24 hours  ? 9/25 blood cultures negative x5 days  ? 9/20 blood cultures-negative x5 days  ? MRSA negative   ? 10/3: AM Temp 100 4    ? 10/4 AM TMax 102 2 s/p 1 dose Tylenol CA; Increased WBC from 9 7 -> 19 61    ? 10/4 Plan to start IV Flagyl & IV Cefepime and reculture sputum, blood and urine today  ? Repeat BC negative x2 as of 10/6  MRSA and Fungal cultures in process as of 10/6   ? 10/6: WBC decreased from 21 to 16  Afebrile since 1300 on 10/5  ? Day#3 of Abx: On Cefepime and Vancomycin  Plan to D/C Abx on Day#7  ? Repeat CT C/A/P 10/4: showed consolidation in R upper lobe suspicious of PNA  Multiple intraperitoneal collections and several collections within ventral abd wall suspicious for abscess   ? IR consult for possible drainage abdominal wall abscess  ? Trend Procalcitonin  ? Continue trend fever/WBC curves  Continue Tylenol PRN for fevers      MSK/Skin:   ? Frequent turning and repositioning  ? PT eval on 10/4    Patient approriate to transfer from ICU?  Yes  Disposition: Transfer to Stepdown Level 2  Code Status: Level 1 - Full Code  ---------------------------------------------------------------------------------------  ICU CORE MEASURES    Prophylaxis   VTE Pharmacologic Prophylaxis: Enoxaparin (Lovenox)  VTE Mechanical Prophylaxis: sequential compression device  Stress Ulcer Prophylaxis: Pantoprazole IV     Invasive Devices Review  Invasive Devices  Report    Peripherally Inserted Central Catheter Line  Duration           PICC Line  Right Basilic 13 days          Drain  Duration           Closed/Suction Drain Anterior;Right;Lateral RLQ Bulb 19 Fr  14 days    Closed/Suction Drain Left LUQ Bulb 19 Fr  9 days    Urethral Catheter Temperature probe 16 Fr  8 days    NG/OG/Enteral Tube Nasogastric Left nare 4 days              Can any invasive devices be discontinued today? No  ---------------------------------------------------------------------------------------  OBJECTIVE    Vitals   Vitals:    10/06/22 0515 10/06/22 0600 10/06/22 0615 10/06/22 0731   BP: (!) 104/42  (!) 109/41    Pulse: 88  84    Resp: (!) 24  (!) 26    Temp: 98 6 °F (37 °C)  99 °F (37 2 °C)    TempSrc:       SpO2: 96%  94% 93%   Weight:  58 8 kg (129 lb 10 1 oz)     Height:         Temp (24hrs), Av 5 °F (37 5 °C), Min:97 4 °F (36 3 °C), Max:100 76 °F (38 2 °C)  Current: Temperature: 99 °F (37 2 °C)  HR: 84  BP: 109/41  RR: 26  SpO2: 94%    Respiratory:  SpO2: SpO2: 93 %  Nasal Cannula O2 Flow Rate (L/min): 4 L/min    Invasive/non-invasive ventilation settings   Respiratory  Report   Lab Data (Last 4 hours)    None         O2/Vent Data (Last 4 hours)    None                Physical Exam  Constitutional:       General: He is not in acute distress  HENT:      Head: Normocephalic and atraumatic  Nose:      Comments: NG tube in place     Mouth/Throat:      Mouth: Mucous membranes are dry  Eyes:      Conjunctiva/sclera: Conjunctivae normal    Cardiovascular:      Rate and Rhythm: Normal rate and regular rhythm  Pulses: Normal pulses  Heart sounds: Normal heart sounds  Pulmonary:      Effort: Pulmonary effort is normal  No respiratory distress  Abdominal:      General: There is distension  Tenderness:  There is abdominal tenderness in the left upper quadrant and left lower quadrant  There is no guarding or rebound  Comments: Abdominal binder in place  DILLON drain x2 present with minimal serous drainage  No drainage from wound vac  Genitourinary:     Comments: Moscoso Catheter in place  Musculoskeletal:         General: No swelling or tenderness  Skin:     General: Skin is warm and dry  Neurological:      General: No focal deficit present  Mental Status: He is alert  Mental status is at baseline     Psychiatric:         Mood and Affect: Mood normal        Laboratory and Diagnostics:  Results from last 7 days   Lab Units 10/06/22  0433 10/05/22  0559 10/04/22  1744 10/04/22  0445 10/03/22  0437 10/02/22  0433 09/30/22  0544   WBC Thousand/uL 16 14* 21 02* 23 01* 19 61* 9 71 10 08 8 95   HEMOGLOBIN g/dL 10 3* 9 7* 10 0* 10 8* 8 8* 8 1* 8 6*   HEMATOCRIT % 33 0* 32 9* 32 3* 34 3* 28 9* 27 1* 27 6*   PLATELETS Thousands/uL 466* 492* 490* 549* 398* 327 259   NEUTROS PCT % 85* 88*  --   --   --  83*  --    MONOS PCT % 4 4  --   --   --  5  --      Results from last 7 days   Lab Units 10/06/22  0433 10/05/22  0559 10/04/22  0445 10/03/22  0437 10/02/22  0433 10/01/22  0444 09/30/22  0544   SODIUM mmol/L 145 140 146 144 146 145 147   POTASSIUM mmol/L 3 5 4 3 4 2 4 0 3 3* 3 9 3 6   CHLORIDE mmol/L 118* 111* 117* 118* 118* 118* 119*   CO2 mmol/L 20* 21 23 22 24 23 22   ANION GAP mmol/L 7 8 6 4 4 4 6   BUN mg/dL 93* 74* 41* 31* 27* 28* 26*   CREATININE mg/dL 1 55* 1 61* 1 27 0 98 1 05 1 02 1 07   CALCIUM mg/dL 8 4 8 7 8 6 8 2* 7 5* 7 3* 7 1*   GLUCOSE RANDOM mg/dL 149* 144* 142* 119 122 121 135   ALT U/L  --   --   --  23 20 19 21   AST U/L  --   --   --  38 35 27 30   ALK PHOS U/L  --   --   --  105 96 103 109   ALBUMIN g/dL  --   --   --  2 0* 1 8* 1 7* 1 6*   TOTAL BILIRUBIN mg/dL  --   --   --  2 58* 2 70* 3 04* 3 13*     Results from last 7 days   Lab Units 10/06/22  0433 10/05/22  0559 10/04/22  0445 10/03/22  0437 10/02/22  0433 10/01/22  0444 09/30/22  0544   MAGNESIUM mg/dL 3 2* 3 0* 2 7* 2 6 2 2 2 5 2 4   PHOSPHORUS mg/dL 4 8* 4 6* 3 9 3 0 2 2* 3 2 2 4                   ABG:  Results from last 7 days   Lab Units 10/03/22  0437   PH ART  7 528*   PCO2 ART mm Hg 28 7*   PO2 ART mm Hg 59 9*   HCO3 ART mmol/L 23 3   BASE EXC ART mmol/L 1 2   ABG SOURCE  Line, Arterial     VBG:  Results from last 7 days   Lab Units 10/03/22  0437   ABG SOURCE  Line, Arterial     Results from last 7 days   Lab Units 10/05/22  0559 10/04/22  0445   PROCALCITONIN ng/ml 2 73* 0 52*       Micro  Results from last 7 days   Lab Units 10/04/22  0617 09/30/22  0135   BLOOD CULTURE  No Growth at 48 hrs  No Growth at 48 hrs  No Growth After 5 Days  No Growth After 5 Days  EKG:   Imaging:  I have personally reviewed pertinent reports  Intake and Output  I/O       10/04 0701  10/05 0700 10/05 0701  10/06 0700    I V  (mL/kg) 120 (2) 60 (1)    NG/     IV Piggyback 500 400    TPN 1361 1 1231 8    Total Intake(mL/kg) 2231 1 (37 8) 1691 8 (28 7)    Urine (mL/kg/hr) 925 (0 7) 725 (0 5)    Emesis/NG output 1975 1300    Drains 8 0    Total Output 2908 2025    Net -676 9 -333 2          Unmeasured Emesis Occurrence 3 x         UOP: 30 ml/hr     Height and Weights   Height: 5' 8" (172 7 cm)  IBW (Ideal Body Weight): 68 4 kg  Body mass index is 19 71 kg/m²  Weight (last 2 days)     Date/Time Weight    10/06/22 0600 58 8 (129 63)    10/05/22 0601 59 (130 07)    10/04/22 0504 60 6 (133 6)            Nutrition       Diet Orders   (From admission, onward)             Start     Ordered    09/29/22 1729  Diet NPO  Diet effective now        Comments: Please do not adjust rate  Thank you   References:    Nutrtion Support Algorithm Enteral vs  Parenteral   Question Answer Comment   Diet Type NPO    RD to adjust diet per protocol? Yes        09/29/22 9622              TF currently running at 56 ml/hr  Plan to reorder TPN today      Active Medications  Scheduled Meds:  Current Facility-Administered Medications   Medication Dose Route Frequency Provider Last Rate   • acetaminophen  650 mg Rectal Q4H PRN Tammy Eng MD     • Adult TPN (CUSTOM BASE/CUSTOM ELECTROLYTE)   Intravenous Continuous TPN Harriett Epley, MD 55 9 mL/hr at 10/05/22 2049   • cefepime  2,000 mg Intravenous Q12H Tammy Eng MD 2,000 mg (10/06/22 0746)   • chlorhexidine  15 mL Mouth/Throat Q12H [address]     • enoxaparin  40 mg Subcutaneous Q24H [facility] [place], [place]     • HYDROmorphone  0 5 mg Intravenous Q4H PRN Lottie Russell PA-C     • HYDROmorphone  0 2 mg Intravenous Q4H PRN Lottie Russell PA-C     • iohexol  30 mL Oral Once in imaging [facility] LAKSHMI Vidales PA-C     • ipratropium  0 5 mg Nebulization TID Vu Purdy DO     • labetalol  10 mg Intravenous Q6H PRN Lottie Russell PA-C     • levalbuterol  0 63 mg Nebulization TID Vu Purdy, DO     • melatonin  3 mg Oral HS Raymonde Dancer, MD     • OLANZapine  5 mg Oral HS PRN Baltazar Bae DO     • ondansetron  4 mg Intravenous Q4H PRN Fe Aguila PA-C     • pantoprazole  40 mg Intravenous Q12H [address], [place]     • vancomycin  15 mg/kg Intravenous Daily PRN ROOSEVELT Ruiz       Continuous Infusions:  Adult TPN (CUSTOM BASE/CUSTOM ELECTROLYTE), , Last Rate: 55 9 mL/hr at 10/05/22 2049      PRN Meds:   acetaminophen, 650 mg, Q4H PRN  HYDROmorphone, 0 5 mg, Q4H PRN  HYDROmorphone, 0 2 mg, Q4H PRN  iohexol, 30 mL, Once in imaging  labetalol, 10 mg, Q6H PRN  OLANZapine, 5 mg, HS PRN  ondansetron, 4 mg, Q4H PRN  vancomycin, 15 mg/kg, Daily PRN        Allergies   No Known Allergies  ---------------------------------------------------------------------------------------  Advance Directive and Living Will:      Power of :    POLST:    ---------------------------------------------------------------------------------------  Care Time Delivered:   [facility] time spent     [facility], DO      Portions of the record may have been created with voice recognition software  Occasional wrong word or "sound a like" substitutions may have occurred due to the inherent limitations of voice recognition software    Read the chart carefully and recognize, using context, where substitutions have occurred

## 2022-10-06 NOTE — OCCUPATIONAL THERAPY NOTE
Occupational Therapy Treatment Note      Tru Mota    10/6/2022    Principal Problem:    SBO (small bowel obstruction) (HCC)  Active Problems:    Hypertension    Acute respiratory failure with hypoxia (HCC)    Encephalopathy, metabolic      Past Medical History:   Diagnosis Date    Abdominal aortic aneurysm (AAA)     last assessed nov 6 2015    Abscess of groin     last assessed oct 6 2014    Arthritis     Candidiasis, cutaneous     last assessed march 19 2014    Coronary artery disease     Dyslipidemia     Esophageal ulcer without bleeding     Gastritis     GERD (gastroesophageal reflux disease)     Hiatal hernia     History of transfusion     Hx of cataract surgery, left     last assessed july 21 2014    Hyperlipidemia     Hypertension     Myocardial infarction [facility])     Old myocardial infarction 2000    Recurrent right inguinal hernia     s/p right orchioectomy, mesh removal, and sinus trac removal patient being followed by surgery next appt 4/28/14 patient s/p keflex x 7 days for MSSA Cx repeat wound cx grew MSSA cx repeat wound cx grew MSSA and other armond (mixed) no MRSA identified conitunue close monitoring and local wound care, last assessed april 15 2014    Renal disorder     Wound of skin     in the groin, right       Past Surgical History:   Procedure Laterality Date    ABDOMINAL AORTIC ANEURYSM REPAIR  2009    aortobi-iliac, dacron graft    APPENDECTOMY      open    CARDIAC SURGERY      CATARACT EXTRACTION Bilateral     CHOLECYSTECTOMY N/A 9/29/2020    Procedure: CHOLECYSTECTOMY;  Surgeon: Candelario Gonzalez MD;  [facility];  Service: General    COLONOSCOPY      CORONARY ANGIOPLASTY WITH STENT PLACEMENT      stent 2    CYSTOSCOPY      diagnostic onset nov 13 2014    EXPLORATORY LAPAROTOMY  12/09/2009    EXPLORATORY LAPAROTOMY W/ BOWEL RESECTION N/A 9/26/2022    Procedure: LAPAROTOMY EXPLORATORY; EXPLANTATION OF ABDOMINAL MESH; Markus Keyes;  Surgeon: Dee Hooper DO;  [facility];  Service: General    EYE SURGERY      FL INJECTION LEFT HIP (NON ARTHROGRAM)  4/2/2019    HIP SURGERY Right     INGUINAL HERNIA REPAIR Right     unilateral x2    LAPAROTOMY N/A 9/20/2022    Procedure: LAPAROTOMY EXPLORATORY, MESH EXPLANTATION, COMPLETION CHOLECYSTECTOMY, SMALL BOWEL RESECTION WITH PRIMARY ANASTOMOSIS, RESECTION OF DUODENUM WITH DUO-DUODENOSTOMY ANASTOMOSIS, REDUCTION OF INTERNAL HERNIA, CLOSURE OF MESENTERIC DEFECT, EXTENSIVE LYSIS OF ADHESIONS, APPLICATION OF NEGATIVE PRESSURE WOUND THERAPY;  Surgeon: Jame Torrez DO;  [facility];  Service: General    LAPAROTOMY N/A 9/21/2022    Procedure: LAPAROTOMY EXPLORATORY, [address], DRAIN PLACEMENT, ATTEMPTED KEOFEED PLACEMENT AND MESH WITH CLOSURE;  Surgeon: Jame Torrez DO;  [facility];  Service: General    LAPAROTOMY N/A 9/30/2022    Procedure: ABDOMINAL WALL DEBRIDEMENT, WOUND VAC PLACEMENT;  Surgeon: Casey Kong DO;  [facility];  Service: General    ORCHIECTOMY Right     NC COLONOSCOPY FLX DX W/COLLJ SPEC WHEN PFRMD N/A 5/22/2018    Procedure: COLONOSCOPY;  Surgeon: Regina Carlisle DO;  [facility]; Service: Gastroenterology    NC ESOPHAGOGASTRODUODENOSCOPY TRANSORAL DIAGNOSTIC N/A 2/14/2019    Procedure: ESOPHAGOGASTRODUODENOSCOPY (EGD); Surgeon: Niyah Henderson MD;  [facility];   Service: Gastroenterology    NC LAP,DIAGNOSTIC ABDOMEN N/A 9/29/2020    Procedure: LAPAROSCOPIC DIAGNOSTIC,   ;  Surgeon: Soila Ames MD;  [facility];  Service: General    NC REPAIR INCISIONAL HERNIA,REDUCIBLE N/A 2/23/2018    Procedure: lysis of adhesions; INCISIONAL HERNIA REPAIR WITH MESH;  Surgeon: Nena De Santiago MD;  [facility];  Service: General    UPPER GASTROINTESTINAL ENDOSCOPY          10/06/22 1431   OT Last Visit   OT Visit Date 10/06/22   Note Type   Note Type Treatment   Restrictions/Precautions   Weight Bearing Precautions Per Order No   Other Precautions Cognitive; Bed Alarm;Multiple lines;Telemetry; Fall Risk;Pain;O2  (wound vac, 2 DILLON drains, 4L O2)   Lifestyle   Autonomy I w/ ADLS/IADLS, Mod I w/ transfers and functional mobility PTA   Reciprocal Relationships pt lives alone   Service to Others Retired; heavy    Intrinsic Gratification Enjoys TV   Pain Assessment   Pain Assessment Tool FLACC   Pain Rating: FLACC (Rest) - Face 1   Pain Rating: FLACC (Rest) - Legs 0   Pain Rating: FLACC (Rest) - Activity 0   Pain Rating: FLACC (Rest) - Cry 1   Pain Rating: FLACC (Rest) - Consolability 0   Score: FLACC (Rest) 2   Pain Rating: FLACC (Activity) - Face 1   Pain Rating: FLACC (Activity) - Legs 0   Pain Rating: FLACC (Activity) - Activity 0   Pain Rating: FLACC (Activity) - Cry 1   Pain Rating: FLACC (Activity) - Consolability 0   Score: FLACC (Activity) 2   Bed Mobility   Rolling R 2  Maximal assistance   Additional items Assist x 1; Increased time required;Verbal cues;LE management   Rolling L 2  Maximal assistance   Additional items Assist x 1; Increased time required;Verbal cues;LE management   Supine to Sit 2  Maximal assistance   Additional items Assist x 2;HOB elevated; Increased time required;Verbal cues;LE management   Sit to Supine 2  Maximal assistance   Additional items Assist x 2; Increased time required;Verbal cues;LE management   Additional Comments Pt sat EOB w/ Mod-Max A for sitting balance/trunk control; Pt has difficulty flexing R knee and flexing at hips into upright sitting position  Pt continues to have posterior lean and decreased safety awareness  Sat EOB for approx 5-6 mins and reports being unable to tolerate further EOB/OOB activities     Transfers   Sit to Stand Unable to assess   Stand to Sit Unable to assess   Additional Comments Pt not agreeable at this time   Therapeutic Exercise - ROM   UE-ROM Yes   ROM- Right Upper Extremities   R Shoulder Flexion;AAROM   RUE ROM Comment Pt agreeable to 1 set of 10 shoulder flexion exercises while seated EOB   ROM - Left Upper Extremities    LUE ROM Comment Pt not agreeable to LUE arm exercises 2/2 fearful of being unstable if he releases LUE from bed (despite reassurance therapists would not let pt fall from EOB sitting position)   Cognition   Overall Cognitive Status Impaired   Arousal/Participation Responsive; Cooperative   Attention Attends with cues to redirect   Orientation Level Oriented to person;Oriented to place; Disoriented to time;Oriented to situation   Memory Decreased recall of recent events;Decreased recall of precautions   Following Commands Follows one step commands without difficulty   Comments Pt is cooperative; limited by fear, pain, anxiety  Activity Tolerance   Activity Tolerance Patient limited by fatigue;Patient limited by pain   Medical Staff Made Aware PT, RN   Assessment   Assessment Patient participated in Skilled OT session 10/6/2022 with interventions consisting of Energy Conservation techniques, deep breathing technique, safety awareness and fall prevention techniques, therapeutic exercise to: increase functional use of BUEs, increase BUE muscle strength ,  therapeutic activities to: increase activity tolerance, increase postural control and increase trunk control   Patient agreeable to OT treatment session, upon arrival patient was found supine in bed  In comparison to previous session, patient with improvements in EOB sitting tolerance   Patient requiring verbal cues for safety, verbal cues for correct technique, verbal cues for pacing thru activity steps and frequent rest periods  Patient continues to be functioning below baseline level, occupational performance remains limited secondary to factors listed above and increased risk for falls and injury  From OT standpoint, recommendation at time of d/c would be Short Term Rehab when medically stable     Patient to benefit from continued Occupational Therapy treatment while in the hospital to address deficits as defined above and maximize level of functional independence with ADLs and functional mobility  Plan   Treatment Interventions ADL retraining;Functional transfer training;UE strengthening/ROM; Endurance training;Cognitive reorientation;Patient/family training;Equipment evaluation/education; Compensatory technique education;Continued evaluation; Energy conservation; Activityengagement   Goal Expiration Date 10/18/22   OT Treatment Day 1   OT Frequency 3-5x/wk   Recommendation   OT Discharge Recommendation Post acute rehabilitation services   Additional Comments  The patient's raw score on the AM-PAC Daily Activity inpatient short form is 13, standardized score is 32 03, less than 39 4  Patients at this level are likely to benefit from discharge to post-acute rehabilitation services  Please refer to the recommendation of the Occupational Therapist for safe discharge planning     Additional Comments 2 Pt seen as a co-treatment due to the patient's co-morbidities, clinically unstable presentation, and present impairments which are a regression from the patient's baseline   [facility] Daily Activity Inpatient   Lower Body Dressing 2   Bathing 2   Toileting 2   Upper Body Dressing 2   Grooming 2   Eating 3   Daily Activity Raw Score 13   Daily Activity Standardized Score (Calc for Raw Score >=11) 32 03   AM-PAC Applied Cognition Inpatient   Following a Speech/Presentation 2   Understanding Ordinary Conversation 3   Taking Medications 3   Remembering Where Things Are Placed or Put Away 3   Remembering List of 4-5 Errands 3   Taking Care of Complicated Tasks 2   Applied Cognition Raw Score 16   Applied Cognition Standardized Score 35 03     Hakeem Angulo MS, OTR/L

## 2022-10-06 NOTE — PROGRESS NOTES
Vancomycin IV Pharmacy-to-Dose Consultation    Sherine Galindo is a 65 y o  male who is currently receiving vancomycin IV with management by the Pharmacy Consult service  Assessment/Plan:    The patient's chart was reviewed  There are no signs or symptoms of infusion reactions documented  Based on today's assessment, will continue current vancomycin (Day # 3) dosing of 1000 mg IV daily PRN when random vancomycin level is less than 20 with a plan for random level to be drawn 10/7 with AM labs  We will continue to follow the patient's culture results and clinical progress daily      Thank you,  Michaela Gutierrez, PharmD, [place] [address] Pharmacist  [phone]

## 2022-10-06 NOTE — PLAN OF CARE
Problem: PHYSICAL THERAPY ADULT  Goal: Performs mobility at highest level of function for planned discharge setting  See evaluation for individualized goals  Description: Treatment/Interventions: LE strengthening/ROM, Therapeutic exercise, Endurance training, Bed mobility, Equipment eval/education, OT          See flowsheet documentation for full assessment, interventions and recommendations  Note: Prognosis: Good  Problem List: Decreased strength, Impaired balance, Decreased endurance, Decreased mobility, Decreased cognition, Pain, Decreased safety awareness  Assessment: Pt presents to therapy today with reduced mobility, poor endurance, high risk of falling, pain, poor sitting tolerance and balance, poor safety awareness  These impairments limit the patient by requiring increased assistance for mobility and places him at risk of falling  Pt would benefit from continued skilled therapy while in the hospital to improve overall mobility and work towards a safe d/c  Recommend rehab  At end of session patient was left supine with call bell within reach  Pt not agreeable to stand at this time  The patient's AM-PAC Basic Mobility Inpatient Short Form Raw Score is 6  A Raw score of less than or equal to 16 suggests the patient may benefit from discharge to post-acute rehabilitation services  Please also refer to the recommendation of the Physical Therapist for safe discharge planning  PT Discharge Recommendation: Post acute rehabilitation services    See flowsheet documentation for full assessment

## 2022-10-06 NOTE — QUICK NOTE
Acute Care Surgery  Bedside V A C  Procedure Note    Location of wound: Abdomen    Dressings and Foam removed:  2 Adaptic Dressings  2 Black Foam  1 White Foam      VAC dressing application:  The periwound skin was cleaned and dried  1 Adaptic dressings, 2 black foam, 1 white foam were cut to size of the wound and placed into the wound  The dressings were then covered with VAC drape  The track pad was then placed over the base of black foam  The VAC was then set to 75 mmHg low Continuous suction  The patient tolerated the procedure well and there were no complications  The patient did not require any excisional debridement during today's dressing change  VAC settings:  75 mmHg  Continuous    Wound Images: Additional Notes: The [facility] sticker placed over the dressing per protocol  The next [facility] dressing change will be planned for 10/7/22  This dressing change took greater than 20 minutes to complete      Dayne Nielsenh  10/5/2022 8:25 PM

## 2022-10-06 NOTE — PLAN OF CARE
Problem: Potential for Falls  Goal: Patient will remain free of falls  Description: INTERVENTIONS:  - Educate patient/family on patient safety including physical limitations  - Instruct patient to call for assistance with activity   - Consult OT/PT to assist with strengthening/mobility   - Keep Call bell within reach  - Keep bed low and locked with side rails adjusted as appropriate  - Keep care items and personal belongings within reach  - Initiate and maintain comfort rounds  - Make Fall Risk Sign visible to staff  - Offer Toileting every  Hours, in advance of need  - Initiate/Maintain alarm  - Obtain necessary fall risk management equipment:   - Apply yellow socks and bracelet for high fall risk patients  - Consider moving patient to room near nurses station  Outcome: Progressing     Problem: MOBILITY - ADULT  Goal: Maintain or return to baseline ADL function  Description: INTERVENTIONS:  -  Assess patient's ability to carry out ADLs; assess patient's baseline for ADL function and identify physical deficits which impact ability to perform ADLs (bathing, care of mouth/teeth, toileting, grooming, dressing, etc )  - Assess/evaluate cause of self-care deficits   - Assess range of motion  - Assess patient's mobility; develop plan if impaired  - Assess patient's need for assistive devices and provide as appropriate  - Encourage maximum independence but intervene and supervise when necessary  - Involve family in performance of ADLs  - Assess for home care needs following discharge   - Consider OT consult to assist with ADL evaluation and planning for discharge  - Provide patient education as appropriate  Outcome: Progressing  Goal: Maintains/Returns to pre admission functional level  Description: INTERVENTIONS:  - Perform BMAT or MOVE assessment daily    - Set and communicate daily mobility goal to care team and patient/family/caregiver     - Collaborate with rehabilitation services on mobility goals if consulted  - Perform Range of Motion  times a day  - Reposition patient every  hours    - Dangle patient  times a day  - Stand patient  times a day  - Ambulate patient  times a day  - Out of bed to chair  times a day   - Out of bed for meals  times a day  - Out of bed for toileting  - Record patient progress and toleration of activity level   Outcome: Progressing     Problem: PAIN - ADULT  Goal: Verbalizes/displays adequate comfort level or baseline comfort level  Description: Interventions:  - Encourage patient to monitor pain and request assistance  - Assess pain using appropriate pain scale  - Administer analgesics based on type and severity of pain and evaluate response  - Implement non-pharmacological measures as appropriate and evaluate response  - Consider cultural and social influences on pain and pain management  - Notify physician/advanced practitioner if interventions unsuccessful or patient reports new pain  Outcome: Progressing     Problem: INFECTION - ADULT  Goal: Absence or prevention of progression during hospitalization  Description: INTERVENTIONS:  - Assess and monitor for signs and symptoms of infection  - Monitor lab/diagnostic results  - Monitor all insertion sites, i e  indwelling lines, tubes, and drains  - Monitor endotracheal if appropriate and nasal secretions for changes in amount and color  - [place] appropriate cooling/warming therapies per order  - Administer medications as ordered  - Instruct and encourage patient and family to use good hand hygiene technique  - Identify and instruct in appropriate isolation precautions for identified infection/condition  Outcome: Progressing  Goal: Absence of fever/infection during neutropenic period  Description: INTERVENTIONS:  - Monitor WBC    Outcome: Progressing     Problem: CARDIOVASCULAR - ADULT  Goal: Absence of cardiac dysrhythmias or at baseline rhythm  Description: INTERVENTIONS:  - Continuous cardiac monitoring, vital signs, obtain 12 lead EKG if ordered  - Administer antiarrhythmic and heart rate control medications as ordered  - Monitor electrolytes and administer replacement therapy as ordered  Outcome: Progressing     Problem: Prexisting or High Potential for Compromised Skin Integrity  Goal: Skin integrity is maintained or improved  Description: INTERVENTIONS:  - Identify patients at risk for skin breakdown  - Assess and monitor skin integrity  - Assess and monitor nutrition and hydration status  - Monitor labs   - Assess for incontinence   - Turn and reposition patient  - Assist with mobility/ambulation  - Relieve pressure over bony prominences  - Avoid friction and shearing  - Provide appropriate hygiene as needed including keeping skin clean and dry  - Evaluate need for skin moisturizer/barrier cream  - Collaborate with interdisciplinary team   - Patient/family teaching  - Consider wound care consult   Outcome: Progressing     Problem: DISCHARGE PLANNING  Goal: Discharge to home or other facility with appropriate resources  Description: INTERVENTIONS:  - Identify barriers to discharge w/patient and caregiver  - Arrange for needed discharge resources and transportation as appropriate  - Identify discharge learning needs (meds, wound care, etc )  - Arrange for interpretive services to assist at discharge as needed  - Refer to Case Management Department for coordinating discharge planning if the patient needs post-hospital services based on physician/advanced practitioner order or complex needs related to functional status, cognitive ability, or social support system  Outcome: Progressing     Problem: Knowledge Deficit  Goal: Patient/family/caregiver demonstrates understanding of disease process, treatment plan, medications, and discharge instructions  Description: Complete learning assessment and assess knowledge base    Interventions:  - Provide teaching at level of understanding  - Provide teaching via preferred learning methods  Outcome: Progressing     Problem: Nutrition/Hydration-ADULT  Goal: Nutrient/Hydration intake appropriate for improving, restoring or maintaining nutritional needs  Description: Monitor and assess patient's nutrition/hydration status for malnutrition  Collaborate with interdisciplinary team and initiate plan and interventions as ordered  Monitor patient's weight and dietary intake as ordered or per policy  Utilize nutrition screening tool and intervene as necessary  Determine patient's food preferences and provide high-protein, high-caloric foods as appropriate       INTERVENTIONS:  - Monitor oral intake, urinary output, labs, and treatment plans  - Assess nutrition and hydration status and recommend course of action  - Evaluate amount of meals eaten  - Assist patient with eating if necessary   - Allow adequate time for meals  - Recommend/ encourage appropriate diets, oral nutritional supplements, and vitamin/mineral supplements  - Order, calculate, and assess calorie counts as needed  - Recommend, monitor, and adjust tube feedings and TPN/PPN based on assessed needs  - Assess need for intravenous fluids  - Provide specific nutrition/hydration education as appropriate  - Include patient/family/caregiver in decisions related to nutrition  Outcome: Progressing

## 2022-10-06 NOTE — PROGRESS NOTES
General Surgery  Progress Note   Barry Jack 65 y o  male MRN: [id number]  Unit/Bed#: MICU 13 Encounter: [id number]    Assessment:  78-year-old male small-bowel obstruction, status post ex lap and small bowel resection with Phasix mesh placement, complicated by midline wound dehiscence requiring exploratory laparotomy and VAC placement  Small intra-abdominal abscesses being managed conservatively    Vital signs stable, afebrile  On 4L nasal cannula  NG tube: 1 3 L, brown   Urine output: 725 cc  Right DILLON drain: 0 cc recorded, minimal serous in bulb  Left DILLON drain: 0 cc recorded, minimal serous in bulb  Wound VAC: no recorded output    WBC:  16 14 (21)  Hemoglobin:  10 3 (9 7)  Creatinine:  1 55 (1 61)    Plan:  • Continue NPO/NGT/TPN - suspect high output may be 2/2 patient having significant PO intake   • Monitor VAC/ DILLON outputs  • Cefepime/ Flagyl for intra-abdominal abscesses  • Wean O2 as qable  • VAC changes MWF  • DVT ppx: Lovenox  • Care per ICU    Subjective/Objective     Subjective: Patient seen and examined at bedside, in no acute distress  No acute events overnight  Denies pain  Reports he has been having bowel function  Objective:     Vitals:Blood pressure (!) 121/40, pulse 86, temperature 100 04 °F (37 8 °C), resp  rate (!) 43, height 5' 8" (1 727 m), weight 59 kg (130 lb 1 1 oz), SpO2 93 %  ,Body mass index is 19 78 kg/m²       Temp (24hrs), Av 4 °F (38 °C), Min:97 4 °F (36 3 °C), Max:101 12 °F (38 4 °C)  Current: Temperature: 100 04 °F (37 8 °C)      Intake/Output Summary (Last 24 hours) at 10/5/2022 2101  Last data filed at 10/5/2022 1822  Gross per 24 hour   Intake 1905 01 ml   Output 2378 ml   Net -472 99 ml       Invasive Devices  Report    Peripherally Inserted Central Catheter Line  Duration           PICC Line  Right Basilic 13 days          Drain  Duration           Closed/Suction Drain Anterior;Right;Lateral RLQ Bulb 19 Fr  14 days    Closed/Suction Drain Left LUQ Bulb 19 Fr  9 days    Urethral Catheter Temperature probe 16 Fr  7 days    NG/OG/Enteral Tube Nasogastric Left nare 4 days                Physical Exam:  General: No acute distress, alert and oriented  HEENT: NGT  CV: Well perfused, regular rate and rhythm  Lungs: Normal work of breathing, no increased respiratory effort on 4L NC  Abdomen: Soft, wound vac in place to suction   DILLON drains with minimal serous output  Extremities: No edema, clubbing or cyanosis  Skin: Warm, dry    Lab Results:   BMP/CMP:   Lab Results   Component Value Date    SODIUM 145 10/06/2022    K 3 5 10/06/2022     (H) 10/06/2022    CO2 20 (L) 10/06/2022    BUN 93 (H) 10/06/2022    CREATININE 1 55 (H) 10/06/2022    CALCIUM 8 4 10/06/2022    EGFR 42 10/06/2022    and CBC:   Lab Results   Component Value Date    WBC 16 14 (H) 10/06/2022    HGB 10 3 (L) 10/06/2022    HCT 33 0 (L) 10/06/2022     (H) 10/06/2022     (H) 10/06/2022    MCH 31 2 10/06/2022    MCHC 31 2 (L) 10/06/2022    RDW 17 0 (H) 10/06/2022    MPV 11 0 10/06/2022    NRBC 0 10/06/2022     VTE Prophylaxis: Sequential compression device (Venodyne)  and Enoxaparin (Lovenox)    Celeste Hancockpiotis  10/5/2022

## 2022-10-06 NOTE — PHYSICAL THERAPY NOTE
Physical Therapy treatment note     Patient Name: Aarti Gamino    HMQWY'Y Date: 10/6/2022     Problem List  Principal Problem:    SBO (small bowel obstruction) ([facility] [address] )  Active Problems:    Hypertension    Acute respiratory failure with hypoxia ([facility] [address] )    Encephalopathy, metabolic       Past Medical History  Past Medical History:   Diagnosis Date    Abdominal aortic aneurysm (AAA)     last assessed nov 6 2015    Abscess of groin     last assessed oct 6 2014    Arthritis     Candidiasis, cutaneous     last assessed march 19 2014    Coronary artery disease     Dyslipidemia     Esophageal ulcer without bleeding     Gastritis     GERD (gastroesophageal reflux disease)     Hiatal hernia     History of transfusion     Hx of cataract surgery, left     last assessed july 21 2014    Hyperlipidemia     Hypertension     Myocardial infarction [facility])     Old myocardial infarction 2000    Recurrent right inguinal hernia     s/p right orchioectomy, mesh removal, and sinus trac removal patient being followed by surgery next appt 4/28/14 patient s/p keflex x 7 days for MSSA Cx repeat wound cx grew MSSA cx repeat wound cx grew MSSA and other armond (mixed) no MRSA identified conitunue close monitoring and local wound care, last assessed april 15 2014    Renal disorder     Wound of skin     in the groin, right        Past Surgical History  Past Surgical History:   Procedure Laterality Date    ABDOMINAL AORTIC ANEURYSM REPAIR  2009    aortobi-iliac, dacron graft    APPENDECTOMY      open    CARDIAC SURGERY      CATARACT EXTRACTION Bilateral     CHOLECYSTECTOMY N/A 9/29/2020    Procedure: CHOLECYSTECTOMY;  Surgeon: Talat Walsh MD;  [facility]: [facility];  Service: General    COLONOSCOPY      CORONARY ANGIOPLASTY WITH STENT PLACEMENT      stent 2    CYSTOSCOPY      diagnostic onset nov 13 2014    EXPLORATORY LAPAROTOMY  12/09/2009    EXPLORATORY LAPAROTOMY W/ BOWEL RESECTION N/A 9/26/2022    Procedure: LAPAROTOMY EXPLORATORY; EXPLANTATION OF ABDOMINAL MESH; Alem Garrett;  Surgeon: Maureen Levi DO;  [facility];  Service: General    EYE SURGERY      FL INJECTION LEFT HIP (NON ARTHROGRAM)  4/2/2019    HIP SURGERY Right     INGUINAL HERNIA REPAIR Right     unilateral x2    LAPAROTOMY N/A 9/20/2022    Procedure: LAPAROTOMY EXPLORATORY, MESH EXPLANTATION, COMPLETION CHOLECYSTECTOMY, SMALL BOWEL RESECTION WITH PRIMARY ANASTOMOSIS, RESECTION OF DUODENUM WITH DUO-DUODENOSTOMY ANASTOMOSIS, REDUCTION OF INTERNAL HERNIA, CLOSURE OF MESENTERIC DEFECT, EXTENSIVE LYSIS OF ADHESIONS, APPLICATION OF NEGATIVE PRESSURE WOUND THERAPY;  Surgeon: Michelle Dorado DO;  [facility];  Service: General    LAPAROTOMY N/A 9/21/2022    Procedure: LAPAROTOMY EXPLORATORY, [address], DRAIN PLACEMENT, ATTEMPTED KEOFEED PLACEMENT AND MESH WITH CLOSURE;  Surgeon: Michelle Dorado DO;  [facility];  Service: General    LAPAROTOMY N/A 9/30/2022    Procedure: ABDOMINAL WALL DEBRIDEMENT, WOUND VAC PLACEMENT;  Surgeon: Maureen Levi DO;  [facility];  Service: General    ORCHIECTOMY Right     ME COLONOSCOPY FLX DX W/COLLJ SPEC WHEN PFRMD N/A 5/22/2018    Procedure: COLONOSCOPY;  Surgeon: Sekou Garces DO;  [facility]; Service: Gastroenterology    ME ESOPHAGOGASTRODUODENOSCOPY TRANSORAL DIAGNOSTIC N/A 2/14/2019    Procedure: ESOPHAGOGASTRODUODENOSCOPY (EGD); Surgeon: Nick Garcia MD;  [facility];   Service: Gastroenterology    ME LAP,DIAGNOSTIC ABDOMEN N/A 9/29/2020    Procedure: LAPAROSCOPIC DIAGNOSTIC,   ;  Surgeon: Brandon Ortez MD;  [facility];  Service: General    ME REPAIR INCISIONAL HERNIA,REDUCIBLE N/A 2/23/2018    Procedure: lysis of adhesions; INCISIONAL HERNIA REPAIR WITH MESH;  Surgeon: Sarah Joyner MD;  [facility];  Service: General    UPPER GASTROINTESTINAL ENDOSCOPY        10/06/22 1432   PT Last Visit   PT Visit Date 10/06/22   Note Type   Note Type Treatment   Pain Assessment   Pain Assessment Tool FLACC   Pain Rating: FLACC (Rest) - Face 0   Pain Rating: FLACC (Rest) - Legs 0   Pain Rating: FLACC (Rest) - Activity 0   Pain Rating: FLACC (Rest) - Cry 0   Pain Rating: FLACC (Rest) - Consolability 0   Score: FLACC (Rest) 0   Pain Rating: FLACC (Activity) - Face 1   Pain Rating: FLACC (Activity) - Legs 1   Pain Rating: FLACC (Activity) - Activity 1   Pain Rating: FLACC (Activity) - Cry 0   Pain Rating: FLACC (Activity) - Consolability 0   Score: FLACC (Activity) 3   Restrictions/Precautions   Weight Bearing Precautions Per Order No   Other Precautions Cognitive;Telemetry;Multiple lines; Fall Risk;Pain  (wound vac, centeno, DILLON drain)   General   Chart Reviewed Yes   Family/Caregiver Present No   Cognition   Overall Cognitive Status Impaired   Arousal/Participation Responsive; Cooperative   Attention Attends with cues to redirect   Bed Mobility   Rolling R 2  Maximal assistance   Additional items Assist x 1   Rolling L 2  Maximal assistance   Additional items Assist x 1   Supine to Sit 2  Maximal assistance   Additional items Assist x 2   Sit to Supine 2  Maximal assistance   Additional items Assist x 2   Additional Comments sitting EOB at a mod-max A, increased posterior lean   Transfers   Sit to Stand Unable to assess  (pt not agreeable to try at this time)   Balance   Static Sitting Poor -   Dynamic Sitting Poor -   Static Standing Zero   Endurance Deficit   Endurance Deficit Yes   Activity Tolerance   Activity Tolerance Patient limited by fatigue;Patient limited by pain   Medical Staff Made Aware OT   Nurse Made Aware nurse approved therapy session   Exercises   Balance training  sitting EOB while performing exercises with OT at a mod-max A for 5-8 minutes   Assessment   Prognosis Good   Problem List Decreased strength; Impaired balance;Decreased endurance;Decreased mobility; Decreased cognition;Pain;Decreased safety awareness   Assessment Pt presents to therapy today with reduced mobility, poor endurance, high risk of falling, pain, poor sitting tolerance and balance, poor safety awareness  These impairments limit the patient by requiring increased assistance for mobility and places him at risk of falling  Pt would benefit from continued skilled therapy while in the hospital to improve overall mobility and work towards a safe d/c  Recommend rehab  At end of session patient was left supine with call bell within reach  Pt not agreeable to stand at this time  The patient's AM-PAC Basic Mobility Inpatient Short Form Raw Score is 6  A Raw score of less than or equal to 16 suggests the patient may benefit from discharge to post-acute rehabilitation services  Please also refer to the recommendation of the Physical Therapist for safe discharge planning  Goals   STG Expiration Date 10/18/22   PT Treatment Day 1   Plan   Treatment/Interventions LE strengthening/ROM; Therapeutic exercise; Endurance training;Bed mobility; Equipment eval/education;OT   PT Frequency 3-5x/wk   Recommendation   PT Discharge Recommendation Post acute rehabilitation services   AM-PAC Basic Mobility Inpatient   Turning in Bed Without Bedrails 1   Lying on Back to Sitting on Edge of Flat Bed 1   Moving Bed to Chair 1   Standing Up From Chair 1   Walk in Room 1   Climb 3-5 Stairs 1   Basic Mobility Inpatient Raw Score 6   Turning Head Towards Sound 4   Follow Simple Instructions 4   Low Function Basic Mobility Raw Score 14   Low Function Basic Mobility Standardized Score 22 01   Highest Level Of Mobility   JH-HLM Goal 2: Bed activities/Dependent transfer   JH-HLM Achieved 3: Sit at edge of bed   Denney Lundborg, PT, DPT

## 2022-10-07 PROBLEM — N19 RENAL FAILURE: Status: ACTIVE | Noted: 2022-01-01

## 2022-10-07 NOTE — QUICK NOTE
Called to bedside at 0200 for copious bilious secretions and 2 episodes of vomiting followed by desaturation to the low  80s  Patient was given Zofran IV  Patient has rales throughout with wet cough  Attempted NGT x2 without success  Patient maxed out on midflow with oxygen saturation high 80s  Maintaining his airway  After 2 attempts, called surgical resident to bedside to assist with placing NGT  NGT was successfully placed with immediate output of 250-300ccs of bilious fluid  On re-eval, patient's o2 saturation at 94% on mid flow, HR tachy into the 110s  Appears comfortable now with no further vomiting

## 2022-10-07 NOTE — PROCEDURES
Intubation    Date/Time: 10/7/2022 9:00 AM  Performed by: Janice Sal MD  Authorized by: Janice Sal MD     Patient location:  ED  Other Assisting Provider: Yes (comment) Bertin Nuñez    Consent:     Consent obtained:  Verbal    Consent given by: Son  Pre-procedure details:     Patient status:  Altered mental status    Mallampati score:  4    Pretreatment medications:  Etomidate    Paralytics:  Vecuronium  Indications:     Indications for intubation: respiratory distress, respiratory failure, airway protection and hypoxemia    Procedure details:     Preoxygenation:  Nasal cannula    CPR in progress: no      Intubation method:  Oral    Oral intubation technique:  Direct (Acosta)    Laryngoscope blade: Mac 3    Tube size (mm):  8 0    Tube type:  Cuffed    Number of attempts:  2    Ventilation between attempts: yes      Cricoid pressure: no      Tube visualized through cords: yes    Placement assessment:     ETT to lip:  24    Tube secured with:  ETT bliss    Breath sounds:  Equal    Placement verification: chest rise, condensation, colorimetric ETCO2 device, CXR verification, direct visualization, equal breath sounds, ETCO2 detector and tube exhalation      CXR findings:  ETT in proper place  Post-procedure details:     Patient tolerance of procedure:   Tolerated well, no immediate complications

## 2022-10-07 NOTE — RESPIRATORY THERAPY NOTE
Resp Care   Bedside bronchoscopy performed by Dr Rudy Cleary, pt on 100% pre and during procedure  Pt tolerated procedure well, specimens walked to lab

## 2022-10-07 NOTE — QUICK NOTE
Called to bedside for assistance in placing NG tube  Patient was seen and evaluated  18F NG tube was inserted the left nostril and passed with minimal resistance until bilious output was encountered  NG tube was-suction and immediate output of 250 cc of bilious output was produced  The patient tolerated the procedure without issue

## 2022-10-07 NOTE — OCCUPATIONAL THERAPY NOTE
OT CANCEL NOTE    Pt chart reviewed  Pt is now intubated/sedated and not appropriate to engage in skilled OT services at this time  Will continue to follow pt on caseload and see pt when medically stable and as clinically appropriate         10/07/22 1242   OT Last Visit   OT Visit Date 10/07/22   Note Type   Note Type Cancelled Session   Cancel Reasons Intubated/sedated       Jocelyne Webster MS, OTR/L

## 2022-10-07 NOTE — PROGRESS NOTES
Daily Progress Note - Critical Care   Yordan Garrett 65 y o  male MRN: [id number]  Unit/Bed#: MICU 13 Encounter: [id number]        ----------------------------------------------------------------------------------------  HPI/24hr events:     66 yo [address] [facility] CAD s/p PCI 2000, AAA s/p open aorto bi-iliac grafting 2009, HTN, HLD, choledocholithiasis s/p ERCP and open partial cholecystectomy, ventral hernia repair with mesh  Self extubated 10/1     In past 24 hours, TMax of 101 1 at 2300 hrs  At 0200, pt had copious bilious secretions and multiple episodes of vomiting  NG Tube was replaced at that time and vomiting resolved with IV Zofran  Desaturation to the low 80s on 6-7L O2 and tachycardic  He was maintaining his airway and was placed on midflow O2  NG tube initially removed about 300cc of bilious fluid    Remains on Cefepime/ Vanco for ? PNA and intra-abdominal abscesses       Has been off pressors since 10/1  Currently on 15L midflow O2 nasal cannula  Saturating at 97%  Per RN, has BP of 83/46 at 0630, persistent tachycardia 110-130s overnight  This morning, Hemoglobin 11 3 improved from 10  3  WBC count increased from from 16 1 to 22  Creatinine 2 51 from 1 55 yesterday   from 93  K+ improved to 5 3 after TPN Repletion     I/O: 1000cc urine/24 hrs, NG tube 300cc  R DILLON drain:10 cc serous drainage in bulb  L DILLON drain:  5 cc serous drainage in bulb  Wound Vac 0 cc recorded   ---------------------------------------------------------------------------------------  SUBJECTIVE: Evaluated at [address] is awake and alert this morning and "feeling okay " Reports continued L sided abdominal pain  He is requesting ice chips this morning  He is also requesting that his centeno catheter be removed today  Nausea and vomiting have resolved  Review of Systems   Constitutional: Positive for fever  Negative for chills  Respiratory: Negative  Cardiovascular: Negative      Gastrointestinal: Positive for abdominal distention and constipation  Negative for diarrhea, nausea and vomiting  Review of systems was reviewed and negative unless stated above in HPI/24-hour events   ---------------------------------------------------------------------------------------  Assessment and Plan:  Neuro:   · Diagnosis:  Toxic metabolic encephalopathy  ? Oriented to questions but intermittently confused, impulsive and pulling out tubes  ? Limit any mind altering medications   ? Tylenol rectal PRN   ? Dilaudid 0 2 mg PRN mod/severe pain - 2 dose/24 hrs   ? Dilaudid 0 5 mg PRN breakthrough pain - 0 dose/24 hrs   ? Delirium precautions, regulate sleep/wake cycle   § qHS ODT zyprexa   § Melatonin nightly      CV:   · Diagnosis:  Shock, resolved  ? Plan:  Levophed off since 10/1  ? MAP goals >65  · Diagnosis:  New onset AFib  ? Plan:  Currently normal sinus rhythm  ? EKG ordered for this AM showed NSR  ? Holding systemic anticoagulation  ? Resume beta blocker  Metoprolol 12 5 mg BID ordered  ? 10/6: Received AM dose, PM dose held  · Diagnosis: HFpEF, CAD, HLD, HTN  ? Plan:  Holding home aspirin, Lipitor, Lasix 20 mg daily, lisinopril 5 mg daily, metoprolol 50 XL daily with strict NPO   ? Can start Labetalol PRN for HTN if needed       Pulm:  · Diagnosis:  Acute hypoxic respiratory failure, ARDS, concern for aspiration,  ? Plan:  Self extubated 10/1  ? Previously on HFNC 40L/70%  ? 10/6 was on 4L NC O2  ? Currently on  15L midflow NC this AM  § SPO2 goal >92%   ? Aggressive pulmonary toileting, airway clearance, NT suction as needed   ? Discussion of need for possible re-intubation if respiratory status does not improve  ? 10/4: Pt was diuresed with 20 mg IV lasix x2   ? Holding diuresis given creatinine bump      GI:   · Diagnosis: SBO 2/2 adhesions s/p ex-lap, SBR, extensive MARK, completion cholecystectomy, duo/duo and jejunojejunal anastomosis, mesh placement, wound VAC  ?  Plan: 90 cm bowel resected, G-J anastomosis, primary duodenal anastomosis, completion cholecystectomy, removal of prior ventral hernia repair mesh and wound vac placement   ? TPN  ? NGT replaced on 10/7  300cc bilious fluid after reinsertion  ? Maintain JPs and Vac per surgery   ? Repeat CT C/A/P 10/4: showed consolidation in R upper lobe suspicious of PNA  Multiple intraperitoneal collections and several collections within ventral abd wall suspicious for abscess   ? Wound Vac Changes MWF  · Diagnosis:  Hyperbilirubinemia  ? Plan:  MRCP/RUQ U/S not consistent with biliary disease  ? Continue to follow PRN      :   · Diagnosis:  JOVANNI, resolved  ? Plan: Monitor UOP  ? Maintain centeno with significant scrotal edema   ? Trend BUN/creatinine  Cr this AM 2 65 from 1 55  ? Pt was diuresed with 20 mg IV lasix x2 on 10/4 with UOP 1700cc  ? Holding diuresis now given creatinine bump and adequate urine      F/E/N:   · F:  holding diuresis   · E: Replete electrolytes for goal K >4, Phos >3, Mg >2  · N: TPN, strict NPO with NGT     ? Continue NPO until more consistent bowel movement  ? No documented BM since 10/3  ? 10/7: TPN reordered today per Nutrition updated recs      Heme/Onc:   · Diagnosis:  ABLA in setting of GIB   ? Plan:  Trend hemoglobin and transfuse as needed  ? DVT PPX:  Lovenox, SCDs       Endo:   ? No active issues      ID:   · Diagnosis:  SIRS/sepsis   ? Plan:  Zosyn was D/C on 9/29   total abx days 14    ? 9/30 blood cultures-negative x24 hours  ? 9/25 blood cultures negative x5 days  ? 9/20 blood cultures-negative x5 days  ? MRSA negative   ? 10/3: AM Temp 100 4    ? 10/4 AM TMax 102 2 s/p 1 dose Tylenol AL; Increased WBC from 9 7 -> 19 61    ? 10/4 Plan to start IV Flagyl & IV Cefepime and reculture sputum, blood and urine today  ? Repeat BC negative x2 as of 10/6     ? MRSA not isolated   ? Fungal cultures in process as of 10/6   ? Sputum culture to be collected  ? 10/7: WBC increased to 22  2  from 16 Temp 100 8 @0130  ? Day#4 of Abx: On Cefepime and Vancomycin   Plan to D/C Abx on Day#7  ? Repeat CT C/A/P 10/4: showed consolidation in R upper lobe suspicious of PNA  Multiple intraperitoneal collections and several collections within ventral abd wall suspicious for abscess - continue to monitor  ? Trend Procalcitonin  0 52 -> 2 73 -> 0 67 on 10/6  ? Continue trend fever/WBC curves  Continue Tylenol PRN for fevers      MSK/Skin:   ? Frequent turning and repositioning  ? PT eval on 10/4     Patient appropriate for transfer out of ICU today?" No   Disposition: Continue Critical Care   Code Status: Level 1 - Full Code  ---------------------------------------------------------------------------------------  ICU CORE MEASURES    Prophylaxis   VTE Pharmacologic Prophylaxis: Enoxaparin (Lovenox)  VTE Mechanical Prophylaxis: sequential compression device  Stress Ulcer Prophylaxis: Pantoprazole IV     Invasive Devices Review  Invasive Devices  Report    Peripherally Inserted Central Catheter Line  Duration           PICC Line 55/32/85 Right Basilic 14 days          Drain  Duration           Closed/Suction Drain Anterior;Right;Lateral RLQ Bulb 19 Fr  15 days    Closed/Suction Drain Left LUQ Bulb 19 Fr  10 days    Urethral Catheter Temperature probe 16 Fr  9 days    NG/OG/Enteral Tube Nasogastric 16 Fr Left nare <1 day              Can any invasive devices be discontinued today?  No  ---------------------------------------------------------------------------------------  OBJECTIVE    Vitals   Vitals:    10/07/22 0445 10/07/22 0530 10/07/22 0545 10/07/22 0724   BP: (!) 106/45  106/54    BP Location:       Pulse: (!) 112  (!) 116    Resp: (!) 40  17    Temp: 100 °F (37 8 °C)  99 3 °F (37 4 °C)    TempSrc:       SpO2: 90%   97%   Weight:  57 2 kg (126 lb 1 7 oz)     Height:         Temp (24hrs), Av 7 °F (37 6 °C), Min:98 96 °F (37 2 °C), Max:101 12 °F (38 4 °C)  Current: Temperature: 100 04 °F (37 8 °C)  HR: 115  BP: 83/46  RR: 30    Respiratory:  SpO2: SpO2: 94 %, SpO2 Activity: SpO2 Activity: At Rest, SpO2 Device: O2 Device: Mid flow nasal cannula  Nasal Cannula O2 Flow Rate (L/min): 15 L/min    Invasive/non-invasive ventilation settings   Respiratory  Report   Lab Data (Last 4 hours)    None         O2/Vent Data (Last 4 hours)    None              Physical Exam  Constitutional:       Appearance: He is ill-appearing  HENT:      Head: Normocephalic and atraumatic  Nose:      Comments: NG tube in place     Mouth/Throat:      Mouth: Mucous membranes are dry  Eyes:      Extraocular Movements: Extraocular movements intact  Cardiovascular:      Rate and Rhythm: Regular rhythm  Tachycardia present  Heart sounds: Normal heart sounds  No murmur heard  Pulmonary:      Effort: Tachypnea and accessory muscle usage present  No respiratory distress  Chest:      Chest wall: No tenderness  Abdominal:      General: There is distension  Tenderness: There is abdominal tenderness in the left upper quadrant and left lower quadrant  Comments: Wound vac   DILLON drain x2 in place with serous drainage   Genitourinary:     Comments: Moscoso in place  Musculoskeletal:         General: No tenderness  Skin:     Comments: Mottling of b/l lower extremities   Neurological:      General: No focal deficit present  Mental Status: He is alert  Mental status is at baseline  Motor: No weakness     Psychiatric:         Mood and Affect: Mood normal          Laboratory and Diagnostics:  Results from last 7 days   Lab Units 10/07/22  0500 10/06/22  0433 10/05/22  0559 10/04/22  1744 10/04/22  0445 10/03/22  0437 10/02/22  0433   WBC Thousand/uL 22 25* 16 14* 21 02* 23 01* 19 61* 9 71 10 08   HEMOGLOBIN g/dL 11 3* 10 3* 9 7* 10 0* 10 8* 8 8* 8 1*   HEMATOCRIT % 38 9 33 0* 32 9* 32 3* 34 3* 28 9* 27 1*   PLATELETS Thousands/uL 676* 466* 492* 490* 549* 398* 327   NEUTROS PCT %  --  85* 88*  --   --   --  83*   MONOS PCT %  --  4 4  --   --   --  5     Results from last 7 days   Lab Units 10/06/22  0433 10/05/22  0559 10/04/22  0445 10/03/22  0437 10/02/22  0433 10/01/22  0444   SODIUM mmol/L 145 140 146 144 146 145   POTASSIUM mmol/L 3 5 4 3 4 2 4 0 3 3* 3 9   CHLORIDE mmol/L 118* 111* 117* 118* 118* 118*   CO2 mmol/L 20* 21 23 22 24 23   ANION GAP mmol/L 7 8 6 4 4 4   BUN mg/dL 93* 74* 41* 31* 27* 28*   CREATININE mg/dL 1 55* 1 61* 1 27 0 98 1 05 1 02   CALCIUM mg/dL 8 4 8 7 8 6 8 2* 7 5* 7 3*   GLUCOSE RANDOM mg/dL 149* 144* 142* 119 122 121   ALT U/L  --   --   --  23 20 19   AST U/L  --   --   --  38 35 27   ALK PHOS U/L  --   --   --  105 96 103   ALBUMIN g/dL  --   --   --  2 0* 1 8* 1 7*   TOTAL BILIRUBIN mg/dL  --   --   --  2 58* 2 70* 3 04*     Results from last 7 days   Lab Units 10/06/22  0433 10/05/22  0559 10/04/22  0445 10/03/22  0437 10/02/22  0433 10/01/22  0444   MAGNESIUM mg/dL 3 2* 3 0* 2 7* 2 6 2 2 2 5   PHOSPHORUS mg/dL 4 8* 4 6* 3 9 3 0 2 2* 3 2                   ABG:  Results from last 7 days   Lab Units 10/03/22  0437   PH ART  7 528*   PCO2 ART mm Hg 28 7*   PO2 ART mm Hg 59 9*   HCO3 ART mmol/L 23 3   BASE EXC ART mmol/L 1 2   ABG SOURCE  Line, Arterial     VBG:  Results from last 7 days   Lab Units 10/03/22  0437   ABG SOURCE  Line, Arterial     Results from last 7 days   Lab Units 10/06/22  1131 10/05/22  0559 10/04/22  0445   PROCALCITONIN ng/ml 0 67* 2 73* 0 52*       Micro  Results from last 7 days   Lab Units 10/05/22  1426 10/04/22  0617   BLOOD CULTURE   --  No Growth at 48 hrs  No Growth at 48 hrs  MRSA CULTURE ONLY  No Methicillin Resistant Staphlyococcus aureus (MRSA) isolated  --      EKG:   Imaging:  I have personally reviewed pertinent reports  Intake and Output  I/O       10/05 0701  10/06 0700 10/06 0701  10/07 0700    P  O   720    I V  (mL/kg) 60 (1)     IV Piggyback 400 364 6    TPN 1374 4 1225 2    Total Intake(mL/kg) 1834 4 (31 2) 2309 8 (40 4)    Urine (mL/kg/hr) 850 (0 6) 1010 (0 7)    Emesis/NG output 1450 750    Drains 0 15    Total Output 2300 1775 Net -465 6 +534 8              UOP: 42 ml/hr     Height and Weights   Height: 5' 8" (172 7 cm)  IBW (Ideal Body Weight): 68 4 kg  Body mass index is 19 17 kg/m²  Weight (last 2 days)     Date/Time Weight    10/07/22 0530 57 2 (126 1)    10/06/22 0600 58 8 (129 63)    10/05/22 0601 59 (130 07)            Nutrition       Diet Orders   (From admission, onward)             Start     Ordered    10/06/22 2018  Diet NPO  Diet effective now        Comments: Please do not adjust rate  Thank you   References:    Nutrtion Support Algorithm Enteral vs  Parenteral   Question Answer Comment   Diet Type NPO    RD to adjust diet per protocol? No        10/06/22 2018              TF currently running at 60 7 ml/hr  Regimen 70% dextrose in 500 mL (350g dextrose/ GIR= 4 1mg/kg/min), 20% lipid in 275 mL, 15% AA in 625 mL  For a total of 2116 kcal/day and 94g protein        Active Medications  Scheduled Meds:  Current Facility-Administered Medications   Medication Dose Route Frequency Provider Last Rate   • acetaminophen  650 mg Rectal Q4H PRN Db Goldsmith MD     • Adult TPN (CUSTOM BASE/CUSTOM ELECTROLYTE)   Intravenous Continuous TPN Jovani Mane PA-C 60 7 mL/hr at 10/06/22 2210   • cefepime  1,000 mg Intravenous Q12H Jaspal Burks MD Stopped (10/06/22 2010)   • chlorhexidine  15 mL Mouth/Throat Q12H [address], [place]     • enoxaparin  40 mg Subcutaneous Q24H [address]     • HYDROmorphone  0 5 mg Intravenous Q4H PRN Festus Casas PA-C     • HYDROmorphone  0 2 mg Intravenous Q4H PRN Festus Casas PA-C     • iohexol  30 mL Oral Once in [facility] LAKSHMI Vidales PA-C     • ipratropium  0 5 mg Nebulization TID Jose M Wilder DO     • labetalol  10 mg Intravenous Q6H PRN Festus Casas PA-C     • levalbuterol  0 63 mg Nebulization TID Jose M Cools DO     • melatonin  3 mg Oral HS Allan Lopez MD     • metoprolol tartrate  12 5 mg Oral Q12H [address] Jaspal Burks MD     • OLANZapine  5 mg Oral HS PRN Samara Dunlap DO     • ondansetron  4 mg Intravenous Q4H PRN Parisa Leiva PA-C     • pantoprazole  40 mg Intravenous Q12H [address]     • potassium chloride  20 mEq Intravenous Once Andrea Gr PA-C     • vancomycin  15 mg/kg Intravenous Daily PRN Harshal Estrada MD       Continuous Infusions:  Adult TPN (CUSTOM BASE/CUSTOM ELECTROLYTE), , Last Rate: 60 7 mL/hr at 10/06/22 2210      PRN Meds:   acetaminophen, 650 mg, Q4H PRN  HYDROmorphone, 0 5 mg, Q4H PRN  HYDROmorphone, 0 2 mg, Q4H PRN  iohexol, 30 mL, Once in imaging  labetalol, 10 mg, Q6H PRN  OLANZapine, 5 mg, HS PRN  ondansetron, 4 mg, Q4H PRN  vancomycin, 15 mg/kg, Daily PRN        Allergies   No Known Allergies  ---------------------------------------------------------------------------------------  Advance Directive and Living Will:      Power of :    POLST:    ---------------------------------------------------------------------------------------  Care Time Delivered:   No Critical Care time spent     Dilan Sebastian DO      Portions of the record may have been created with voice recognition software  Occasional wrong word or "sound a like" substitutions may have occurred due to the inherent limitations of voice recognition software    Read the chart carefully and recognize, using context, where substitutions have occurred

## 2022-10-07 NOTE — PROCEDURES
Temporary HD Catheter    Date/Time: 10/7/2022 2:00 PM  Performed by: Isiah Mcnulty MD  Authorized by: Isiah Mcnulty MD     Patient location:  Bedside  Other Assisting Provider: Yes (comment) (Dr Erica Ledbetter)    Consent:     Consent obtained:  Verbal (Byb Dr Les Hartman)    Consent given by: Gerardo  Risks discussed:  Arterial puncture, incorrect placement, nerve damage, bleeding, infection and pneumothorax    Alternatives discussed:  Delayed treatment and no treatment  Universal protocol:     Patient identity confirmed:  Arm band  Pre-procedure details:     Hand hygiene: Hand hygiene performed prior to insertion      Sterile barrier technique: All elements of maximal sterile technique followed      Skin preparation:  ChloraPrep    Skin preparation agent: Skin preparation agent completely dried prior to procedure    Indications:     Central line indications: medications requiring central line and dialysis    Sedation:     Sedation type: Moderate (conscious) sedation  Anesthesia (see MAR for exact dosages): Anesthesia method:  None  Procedure details:     Location:  Right internal jugular    Vessel type: vein      Laterality:  Right    Approach: percutaneous technique used      Patient position:  Flat    Catheter type:  Triple lumen    Catheter size:  12 5 Fr    Catheter length:  16 cm    Landmarks identified: yes      Ultrasound guidance: yes      Ultrasound image availability:  Images available in PACS and video obtained    Sterile ultrasound techniques: Sterile gel and sterile probe covers were used      Number of attempts:  3    Successful placement: yes      Vessel of catheter tip end:  Svc  Post-procedure details:     Post-procedure:  Dressing applied and line sutured    Assessment:  Blood return through all ports and free fluid flow    Post-procedure complications: none      Patient tolerance of procedure:   Tolerated well, no immediate complications

## 2022-10-07 NOTE — PROCEDURES
Arterial Line Insertion    Date/Time: 10/7/2022 9:30 AM  Performed by: Darrick Ortez PA-C  Authorized by: Darrick Ortez PA-C     Patient location:  Bedside  Other Assisting Provider: No    Consent:     Consent obtained:  Emergent situation  Universal protocol:     Patient identity confirmed:  Arm band and anonymous protocol, patient vented/unresponsive  Indications:     Indications: hemodynamic monitoring, multiple ABGs, continuous blood pressure monitoring and frequent labs / infusion    Pre-procedure details:     Skin preparation:  Chlorhexidine    Preparation: Patient was prepped and draped in sterile fashion    Sedation:     Sedation type: Moderate (conscious) sedation (Precedex)  Anesthesia (see MAR for exact dosages): Anesthesia method:  None  Procedure details:     Location / Tip of Catheter:  Femoral    Laterality:  Left    Needle gauge:  20 G    Placement technique:  Seldinger    Number of attempts:  2    Successful placement: yes      Transducer: waveform confirmed    Post-procedure details:     Post-procedure:  Sutured and sterile dressing applied    Patient tolerance of procedure:   Tolerated well, no immediate complications

## 2022-10-07 NOTE — RESPIRATORY THERAPY NOTE
Resp Care   10/07/22 1016   Respiratory Assessment   Resp Comments Per PA, pt ETT advanced 2cm to 26 @ Lip     ETT  Oral   Placement Date/Time: 10/07/22 0845   Type: Oral  Secured at (cm): 26 @ Lip   Secured at (cm) 26   Measured from Lips   Secured Location Right

## 2022-10-07 NOTE — PROGRESS NOTES
Pastoral Care Progress Note    10/7/2022  Patient: Carlos Gallego :   Admission Date & Time: 2022 0149  MRN: [id number] CSN: [id number]      Semaj Garcia, at request of bedside RN attempted to provide support to son of pt  Son was not available and had gone to work  If Pastoral Care can be of service, please call                 Chaplaincy Interventions Utilized:       Relationship Building: Cultivated a relationship of care and support       10/07/22 1200   Clinical Encounter Type   Visited With Patient not available   Routine Visit Introduction   Referral From Nurse   Referral To

## 2022-10-07 NOTE — PROGRESS NOTES
General Surgery  Progress Note   Carlos Gallego 65 y o  male MRN: [id number]  Unit/Bed#: MICU 13 Encounter: [id number]    Assessment:  78-year-old male small-bowel obstruction, status post ex lap and small bowel resection with Phasix mesh placement, complicated by midline wound dehiscence requiring exploratory laparotomy and VAC placement  Small intra-abdominal abscesses being managed conservatively    Febrile T-max a 101 1° at 11:00 p m  persistently tachycardic into the 1 teens to 130s, tachypneic into the 30s and 40s    Urine output:  1 L  Right DILLON drain:  10 cc recorded, serous  Left DILLON drain:  5 cc recorded, serous  Wound VAC: no recorded output    WBC:  22 2 from 16 14 (21)  Hemoglobin:  11 3 from  10 3 (9 7)  Creatinine:  1 55 yesterday ( 61)    Plan:  • Continue NPO/NGT/TPN   • Discuss need for possible re-intubation if pt respiratory status doesn't improve  • Vac change todya  • Monitor VAC/ DILLON outputs  • Cefepime/Vanc   • DVT ppx: Lovenox  • Care per ICU    Subjective/Objective     Subjective:   Events:  Patient tachypneic tachycardic and febrile overnight  Had multiple episodes of nausea and vomiting  NG tube was replaced overnight  Patient now on 15 L mid flow with increased work of breathing  White count this morning is 22 2 from 16  Patient upset with like ice chips and Moscoso removed today  Objective:     Vitals:Blood pressure 111/55, pulse (!) 116, temperature 100 04 °F (37 8 °C), temperature source Bladder, resp  rate (!) 40, height 5' 8" (1 727 m), weight 57 2 kg (126 lb 1 7 oz), SpO2 94 %  ,Body mass index is 19 17 kg/m²       Temp (24hrs), Av 7 °F (37 6 °C), Min:98 96 °F (37 2 °C), Max:101 12 °F (38 4 °C)  Current: Temperature: 100 04 °F (37 8 °C)      Intake/Output Summary (Last 24 hours) at 10/7/2022 0604  Last data filed at 10/7/2022 0400  Gross per 24 hour   Intake 2452 32 ml   Output 2050 ml   Net 402 32 ml       Invasive Devices  Report    Peripherally Inserted Central Catheter Line  Duration           PICC Line 80/76/23 Right Basilic 14 days          Drain  Duration           Closed/Suction Drain Anterior;Right;Lateral RLQ Bulb 19 Fr  15 days    Closed/Suction Drain Left LUQ Bulb 19 Fr  10 days    Urethral Catheter Temperature probe 16 Fr  9 days    NG/OG/Enteral Tube Nasogastric 16 Fr Left nare <1 day                Physical Exam  Vitals reviewed  Constitutional:       General: He is not in acute distress  Appearance: He is not ill-appearing, toxic-appearing or diaphoretic  HENT:      Head: Normocephalic and atraumatic  Eyes:      Extraocular Movements: Extraocular movements intact  Cardiovascular:      Rate and Rhythm: Tachycardia present  Pulmonary:      Effort: Tachypnea and accessory muscle usage present  Abdominal:      General: There is distension  Tenderness: There is no abdominal tenderness  There is no guarding or rebound  Comments: Incision clean, dry and intact with vac intact  darlene drain x2 in place   Skin:     General: Skin is warm and dry  Neurological:      Mental Status: He is alert and oriented to person, place, and time  Mental status is at baseline     Psychiatric:         Mood and Affect: Mood normal            Lab Results:   BMP/CMP:   No results found for: SODIUM, K, CL, CO2, ANIONGAP, BUN, CREATININE, GLUCOSE, CALCIUM, AST, ALT, ALKPHOS, PROT, BILITOT, EGFR and CBC:   Lab Results   Component Value Date    WBC 22 25 (H) 10/07/2022    HGB 11 3 (L) 10/07/2022    HCT 38 9 10/07/2022     (H) 10/07/2022     (H) 10/07/2022    MCH 29 0 10/07/2022    MCHC 29 0 (L) 10/07/2022    RDW 16 5 (H) 10/07/2022    MPV 11 0 10/07/2022     VTE Prophylaxis: Sequential compression device (Venodyne)  and Enoxaparin (Lovenox)    Emily Rich  10/7/2022

## 2022-10-07 NOTE — PROCEDURES
Central Line Insertion    Date/Time: 10/7/2022 5:19 PM  Performed by: Mago Li MD  Authorized by: Mago Li MD     Patient location:  Bedside  Consent:     Consent obtained:  Verbal    Consent given by:  Healthcare agent (son)    Risks discussed:  Arterial puncture, incorrect placement, nerve damage, pneumothorax, infection and bleeding    Alternatives discussed:  No treatment and delayed treatment  Universal protocol:     Patient identity confirmed:  Arm band  Pre-procedure details:     Hand hygiene: Hand hygiene performed prior to insertion      Sterile barrier technique: All elements of maximal sterile technique followed      Skin preparation:  2% chlorhexidine  Indications:     Central line indications: medications requiring central line    Sedation:     Sedation type: Moderate (conscious) sedation  Procedure details:     Location:  Left internal jugular    Vessel type: vein      Laterality:  Left    Approach: percutaneous technique used      Patient position:  Flat    Catheter type:  Triple lumen    Catheter size:  7 Fr    Landmarks identified: yes      Ultrasound guidance: yes      Ultrasound image availability:  Video obtained    Number of attempts:  1  Post-procedure details:     Post-procedure:  Dressing applied and line sutured    Assessment:  Blood return through all ports    Post-procedure complications: none      Patient tolerance of procedure:   Tolerated well, no immediate complications    Observer: Yes    Comments:      cxr pending

## 2022-10-07 NOTE — PROGRESS NOTES
Vancomycin IV Pharmacy-to-Dose Consultation    Aria Padron is a 65 y o  male who is currently receiving Vancomycin IV with management by the Pharmacy Consult service  Assessment/Plan:  The patient was reviewed  Patient is on day 3 of vancomycin therapy  Renal function is unstable (increase in Scr from 1 55 on 10/6 to 3 02 on 10/7)  Patient's random level on 10/07 supratherapeutic at 22 5  Will hold vancomycin dose for today and check random level tomorrow with AM labs  No signs or symptoms of nephrotoxicity and/or infusion reactions were documented in the chart  We will continue to follow the patient’s culture results and clinical progress daily      Jenni Denis, MiriamD

## 2022-10-07 NOTE — PHYSICAL THERAPY NOTE
Physical Therapy Cancellation Note    PT orders received chart review completed  Pt is currently intubated/sedated and not appropriate to participate in skilled PT at this time  PT will follow and treat as medically appropriate       10/07/22 1100   Note Type   Cancel Reasons Intubated/sedated     Author IZZY Romeo

## 2022-10-08 NOTE — PROGRESS NOTES
Critical Care Services- Interval Progress Note   Mairsela Alexander 65 y o  male MRN: [id number]  [facility]: [id number]  Assessment and Plan  Interval Events:      Called to bedside by nursing and informed patient's heart rate had decreased to the 83D, systolics also low, upon arrival to patient's bedside heart rate went to 0, pronounced time of death at 6:39 a m  10/8 as detailed in death note  Significant 24 hour events include intubation yesterday morning upon patient becoming hypoxic and having increased work of breathing requiring transition from nasal cannula to mid flow nasal cannula to high-flow nasal cannula still was hypoxic into the 70s and 80s%, was initiated on vasopressor medication, labs at that time significant for lactic acidosis, rising creatinine, hyperkalemia, acidotic to 7 with hypercarbia and a base excess upwards of -15, patient was given multiple amps of bicarb but despite this pH and base excess and lactate did not improve significantly, remaining 7 1 range, lactate up to 12 9, persistently hyperkalemic  Exam with significant mottling of his skin at this time  Was believed to be in ARDS at this time  Multiple conversations were had with his sons/family throughout the course of the day, son adamantly desired to proceed with full care, expressed understanding of patient's current situation and prognosis, wished to proceed with full care  Right-sided Temp HD catheter was placed at bedside yesterday afternoon 10/7 for initiation of CVVH given worsening kidney function and acidosis, although this temp HD cath was abutting the right PICC and impeding flow/draw thus warranting removal of right PICC and placement of left IJ triple-lumen at bedside yesterday evening, had also had left femoral A-line placed yesterday morning for accurate monitoring of systolic blood pressures        Overnight patient had continued increasing lactic acidosis as high as 21 7 overnight, persistently acidotic and hyperkalemic, maxed out on multiple vasopressor medications epi and levo, also on vaso  He was given multiple amps of sodium bicarb as well as calcium gluconate overnight with transient response is in systolic blood pressures that would again decrease and require max vasopressor therapy, also given IVF and a unit of PRBCs overnight  Acidosis persisted overnight as low as 6 76  Conversations were again had with patient's son/family who decided to transition care is a level 2 at this point overnight around 2:00 a m  10/8  Labs into the morning 10/8 consistent with multi-system organ failure with persistent acidosis, lactic acidosis, hyperkalemia, elevated liver enzymes  Remained with tenuous systolic blood pressures and bradycardia until dropping to 10 and ultimately   Patient's time of death was 6:39 a m  10/8, son at bedside              -------------------------------------------------------------------------------------------------------------------------------------    Laboratory   Results from last 7 days   Lab Units 10/07/22  2335 10/07/22  2107 10/07/22  2012 10/07/22  2008 10/07/22  1805 10/07/22  0948 10/07/22  0931 10/07/22  0827 10/07/22  0500 10/06/22  0433 10/05/22  0559 10/03/22  0437 10/02/22  0433   WBC Thousand/uL 16 58* 18 65*  --  19 81*  --  27 55*  --   --  22 25* 16 14* 21 02*   < > 10 08   HEMOGLOBIN g/dL 6 8* 6 3*  --  6 6*  --  10 2*  --   --  11 3* 10 3* 9 7*   < > 8 1*   I STAT HEMOGLOBIN g/dl  --   --  7 1*  --  8 5*  --  10 9*   < >  --   --   --   --   --    HEMATOCRIT % 25 1* 23 4*  --  25 4*  --  36 0*  --   --  38 9 33 0* 32 9*   < > 27 1*   HEMATOCRIT, ISTAT %  --   --  21*  --  25*  --  32*   < >  --   --   --   --   --    PLATELETS Thousands/uL 173 301  --  336  --  580*  --   --  676* 466* 492*   < > 327   NEUTROS PCT %  --   --   --   --   --   --   --   --   --  85* 88*  --  83*   BANDS PCT %  --   --   --   --   --  22*  --   --  13*  -- --   --   --    MONOS PCT %  --   --   --   --   --   --   --   --   --  4 4  --  5   MONO PCT %  --   --   --   --   --  5  --   --  3*  --   --   --   --     < > = values in this interval not displayed  Results from last 7 days   Lab Units 10/08/22  0455 10/07/22  2335 10/07/22  2107 10/07/22  2012 10/07/22  2008 10/07/22  1805 10/07/22  1756 10/07/22  1157 10/07/22  0948 10/04/22  0445 10/03/22  0437 10/02/22  0433   SODIUM mmol/L 156* 156* 159*  --  152*  --  156* 149* 149*   < > 144 146   POTASSIUM mmol/L 9 5* 6 5* 5 9*  --  6 6*  --  7 4* 5 7* 5 8*   < > 4 0 3 3*   CHLORIDE mmol/L 114* 117* 119*  --  118*  --  119* 117* 118*   < > 118* 118*   CO2 mmol/L 11* 13* 16*  --  12*  --  26 17* 17*   < > 22 24   CO2, I-STAT mmol/L  --   --   --  14*  --  23  --   --   --    < >  --   --    ANION GAP mmol/L 31* 26* 24*  --  22*  --  11 15* 14*   < > 4 4   BUN mg/dL 81* 86* 93*  --  99*  --  92* 102* 112*   < > 31* 27*   CREATININE mg/dL 3 21* 2 88* 3 23*  --  3 29*  --  2 90* 3 02* 2 99*   < > 0 98 1 05   CALCIUM mg/dL 9 3 9 0 13 6*  --  9 7  --  7 2* 8 4 8 3   < > 8 2* 7 5*   GLUCOSE RANDOM mg/dL 97 49* 105  --  188*  --  24* 126 116   < > 119 122   ALT U/L 1,402*  1,363* 546* 404*  --  394*  --   --   --  61  --  23 20   AST U/L 4,507*  4,253* 1,633* 1,208*  --  1,140*  --   --   --  136*  --  38 35   ALK PHOS U/L 208*  205* 121* 120*  --  124*  --   --   --  157*  --  105 96   ALBUMIN g/dL 0 8*  0 8* 0 9* 0 9*  --  1 1*  --   --   --  1 6*  --  2 0* 1 8*   TOTAL BILIRUBIN mg/dL 1 63*  1 69* 1 41* 1 44*  --  1 54*  --   --   --  1 81*  --  2 58* 2 70*    < > = values in this interval not displayed       Results from last 7 days   Lab Units 10/08/22  0455 10/07/22  2107 10/07/22  1756 10/06/22  0433 10/05/22  0559 10/04/22  0445 10/03/22  0437   MAGNESIUM mg/dL 3 1* 3 2* 3 0* 3 2* 3 0* 2 7* 2 6   PHOSPHORUS mg/dL 17 3* 13 7* 10 2* 4 8* 4 6* 3 9 3 0               Results from last 7 days   Lab Units 10/08/22  0455 10/07/22  2335 10/07/22  2107 10/07/22  2008 10/07/22  1756 10/07/22  1157 10/07/22  0948   LACTIC ACID mmol/L 32 2* 21 7* 20 9* 18 1* 12 9* 7 2* 8 4*     ABG:  Results from last 7 days   Lab Units 10/08/22  0455   PH ART  6 757*   PCO2 ART mm Hg 69 7*   PO2 ART mm Hg 65 6*   HCO3 ART mmol/L 9 6*   BASE EXC ART mmol/L -23 8   ABG SOURCE  Line, Arterial     VBG:  Results from last 7 days   Lab Units 10/08/22  0455 10/07/22  0949 10/07/22  0948   PH JOSE   --   --  6 931*   PCO2 JOSE mm Hg  --   --  75 1*   PO2 JOSE mm Hg  --   --  71 3*   HCO3 JOSE mmol/L  --   --  15 4*   BASE EXC JOSE mmol/L  --   --  -17 0   ABG SOURCE  Line, Arterial   < >  --     < > = values in this interval not displayed  Results from last 7 days   Lab Units 10/07/22  2107 10/06/22  1131 10/05/22  0559 10/04/22  0445   PROCALCITONIN ng/ml 13 48* 0 67* 2 73* 0 52*       Micro  Results from last 7 days   Lab Units 10/07/22  1655 10/07/22  1453 10/07/22  1343 10/07/22  1230 10/05/22  1426 10/04/22  0617   BLOOD CULTURE   --  Received in Microbiology Lab  Culture in Progress  Received in Microbiology Lab  Culture in Progress  --   --  No Growth at 72 hrs  No Growth at 72 hrs  GRAM STAIN RESULT  2+ Polys*  4+ Budding yeast*  --   --  Rare Polys*  1+ Budding yeast*  --   --    MRSA CULTURE ONLY   --   --   --   --  No Methicillin Resistant Staphlyococcus aureus (MRSA) isolated  --        Diagnostic Imaging / Data: I have personally reviewed pertinent reports        Medications:  Current Facility-Administered Medications   Medication Dose Route Frequency   • acetaminophen (TYLENOL) rectal suppository 650 mg  650 mg Rectal Q4H PRN   • Adult TPN (CUSTOM BASE/CUSTOM ELECTROLYTE)   Intravenous Continuous TPN   • calcium gluconate 1-0 675 GM/50ML-% in Sodium Chloride IVPB (premix) **ADS Override Pull**       • cefepime (MAXIPIME) 1,000 mg in dextrose 5 % 50 mL IVPB  1,000 mg Intravenous Q12H   • chlorhexidine (PERIDEX) 0 12 % oral rinse 15 mL  15 mL Mouth/Throat Q12H [address]   • enoxaparin (LOVENOX) subcutaneous injection 40 mg  40 mg Subcutaneous Q24H MARIE   • EPINEPHrine 6000 mcg (DOUBLE CONCENTRATION) IV in sodium chloride 0 9% 250 mL  1-10 mcg/min Intravenous Titrated   • fentaNYL 1000 mcg in sodium chloride 0 9% 100mL infusion  100 mcg/hr Intravenous Continuous   • fentanyl citrate (PF) 100 MCG/2ML 50 mcg  50 mcg Intravenous Q2H PRN   • heparin (porcine) 25,000 units in 0 45% NaCl 250 mL infusion (premix)  500 Units/hr Intravenous Continuous   • ipratropium (ATROVENT) 0 02 % inhalation solution 0 5 mg  0 5 mg Nebulization TID   • levalbuterol (XOPENEX) inhalation solution 0 63 mg  0 63 mg Nebulization TID   • LORazepam (ATIVAN) injection 1 mg  1 mg Intravenous Q2H   • melatonin tablet 3 mg  3 mg Oral HS   • metroNIDAZOLE (FLAGYL) IVPB (premix) 500 mg 100 mL  500 mg Intravenous Q8H   • micafungin (MYCAMINE) 100 mg in sodium chloride 0 9 % 100 mL IVPB  100 mg Intravenous Q24H   • norepinephrine (LEVOPHED) 8 mg (DOUBLE CONCENTRATION) IV in sodium chloride 0 9% 250 mL  1-30 mcg/min Intravenous Titrated   • NxStage K 2/Ca 3 dialysis solution (RFP-400) 20,000 mL  20,000 mL Dialysis Continuous   • ondansetron (ZOFRAN) injection 4 mg  4 mg Intravenous Q4H PRN   • pantoprazole (PROTONIX) injection 40 mg  40 mg Intravenous Q12H MARIE   • phenylephrine (PATSY-SYNEPHRINE) 100 mg (DOUBLE CONCENTRATION) IV in sodium chloride 0 9% 250 mL   mcg/min Intravenous Titrated   • sodium bicarbonate 150 mEq in dextrose 5 % 1,000 mL infusion  150 mL/hr Intravenous Continuous   • sodium bicarbonate 8 4 % injection **ADS Override Pull**       • vancomycin (VANCOCIN) IVPB (premix in dextrose) 1,000 mg 200 mL  15 mg/kg Intravenous Daily PRN   • vancomycin (VANCOCIN) IVPB (premix in dextrose) 750 mg 150 mL  15 mg/kg Intravenous Once   • vasopressin (PITRESSIN) 20 Units in sodium chloride 0 9 % 100 mL infusion  0 04 Units/min Intravenous Continuous       SIGNATURE: Froy Nunes Danial Sosa MD    Portions of the record may have been created with voice recognition software  Occasional wrong word or "sound a like" substitutions may have occurred due to the inherent limitations of voice recognition software    Read the chart carefully and recognize, using context, where substitutions have occurred

## 2022-10-08 NOTE — DEATH NOTE
INPATIENT DEATH NOTE  Iris Arcos 65 y o  male MRN: [id number]  Unit/Bed#: [facility] 13 Encounter: [id number]    Date, Time and Cause of Death    Date of Death: 10/8/22  Time of Death:  6:39 AM  Preliminary Cause of Death: [facility] distress [facility]  Entered by: Aimee Connell MD[SD1 1]     Attribution     SD1 1 Joy Frias MD 10/08/22 06:49               PHYSICAL EXAM:  Unresponsive to noxious stimuli, Spontaneous respirations absent, Breath sounds absent, Carotid pulse absent, Heart sounds absent, Pupillary light reflex absent and Corneal blink reflex absent    Medical Examiner notification criteria:  NONE APPLICABLE   Medical Examiner's office notified?:  No, does not meet ME notification criteria   Medical Examiner accepted case?:  No  Name of Medical Examiner: Leanor Hamman         Autopsy Options:  Decision for post-mortem examination not yet made by next of kin      Primary Service Attending Physician notified?:  yes - Attending:  Dr Dione Gomez    Physician/Resident responsible for completing Discharge Summary:  Aimee Connell

## 2022-10-08 NOTE — DISCHARGE SUMMARY
Discharge Summary - Aria Padron 65 y o  male MRN: [id number]    Unit/Bed#: [facility] 13 Encounter: [id number] PCP: Waldemar Villalobos MD    Admission Date:   Admission Orders (From admission, onward)     Ordered        09/20/22 1711  Inpatient Admission  Once                        Admitting Diagnosis: Small bowel obstruction ([facility] ) [K56 609]  Abdominal pain [R10 9]    HPI:  53-year-old male with history of multiple abdominal surgeries including open AAA, ventral hernia repair with mesh, open cholecystectomy, originally presenting to [facility]"" ED on 09/20 reporting abdominal pain with accompanying nausea and vomiting, found to have SBO with fecalization on scan, accompanying lactic acidosis  Patient was taken to the OR on 09/20 for exploratory laparotomy, mesh explantation, completion cholecystectomy, small-bowel resection with primary anastomosis, duodenal resection with duo duodenostomy, reduction of internal hernia, closure of mesenteric defect, extensive lysis of adhesions, ABThera VAC placement, plan was for second-look operation, patient was taken back to the ICU intubated and sedated, required vasopressors throughout hospital day 1 into 2  He was taken back to the OR for second-look on 09/21 for ex lap, abdominal washout, drain placement, closure with mesh, small bowel appeared viable and anastomoses intact intraoperatively  Patient returned to the ICU postoperatively, continued resuscitation, still did require vasopressor medications, was maintained on NPO with NGT  He was initiated on TPN on 09/22 given anticipated prolonged NPO status    On 9/25 patient was noted to be in respiratory distress with hypoxia and tachypneic to the 30s, abdomen noted to be distended with diffuse peritonitis at this time, patient required re-intubation, CT imaging at this time was concerning for bilateral pulmonary infiltrate but nothing seen in the abdomen to warrant operative intervention, patient was continued on broad-spectrum IV antibiotics  Patient did have increasing vasopressor requirements and acidosis following intubation, IVF resuscitation was continued as well as trending endpoints, given minimal improvement there was concern for anastomotic leak, decision was made by General surgery to take the patient back to the OR for 3rd time  He was taken to the OR on 09/26 for ex lap, explantation of abdominal mesh, abdominal washout, duodenal anastomosis noted to be intact, bile tinged collection was noted near anastomoses without obvious enterotomy seen, fascia was partially closed but not able to be fully closed, skin closed, return to the ICU intubated and sedated  9/28 to 9/29 LFTs noted to be elevated, MRCP not revealing of any significant biliary dilation to suggest choledocholithiasis or obstruction, was able to start trickle tube feeds via NGT on 09/29  Noted to have abdominal dehiscence within is midline incision, was taken back to the OR on 09/30 for abdominal wall debridement, washout, ABThera VAC placement  Patient did remove his own NGT postoperatively on 10/01 requiring replacement, was continued on NPO/NGT/TPN at that time with aspiration precautions, continued on antibiotics, ICU care continued  Patient self-extubated on 10/01, was made on high-flow nasal cannula without need for re-intubation  Did have improvement in respiratory status able to be weaned down to 3 L NC on 10/04, however was having fevers and increasing leukocytosis during this time noted on 10/04, CT imaging done at this time concerning for RUL consolidation concerning for pneumonia as well as multiple intra-abdominal collections concerning for abscesses, he was initiated on cefepime for broad-spectrum antibiotics    He had NGT removed on 10/06, advanced to clear liquid diet, however overnight into the morning 10/7 patient had multiple episodes of emesis requiring NGT replacement, also noted to be hypoxic during this time and put on mid flow nasal cannula  Into the morning 10/7 he was noted to have worsening oxygenation requirements and worsening hypotension, as year respiratory acidosis and metabolic acidosis, worsening renal failure, worsening right middle lobe infiltrate on CXR consistent with aspiration given episodes of emesis overnight, required re-intubation due to worsening respiratory distress, also required increase in his basilar pressor requirements, was given bicarbonate therapy with amps of sodium bicarbonate, continue IVF resuscitation, multiple discussions were had with son at this time who wished to continue with full resuscitative measures  Worsening renal failure required initiation of CVVH after placement of temporary HD catheter, he was started on a fungal medication, bronchoscopy was done at bedside  Patient continued to have worsening lactic acidosis overnight 10/7 into early 10/8, hyperkalemic, was maxed out on multiple vasopressor medications, was continued on bicarbonate therapy with multiple amps of sodium bicarb, given calcium gluconate, only had transient responses in systolic blood pressures, given IVF resuscitation as well as PRBCs  Again conversations were had with patient's son who at this time around 2:00 a m  On 10/08 decided to change level of care to level 2   Patient continued to worsen into the morning, showing signs of multi-system organ failure, noted to have significantly mottled skin on exam, became significantly bradycardic and ultimately  at 6:39 a m   On 10/08    Procedures Performed:   Orders Placed This Encounter   Procedures   • Central Line   • Central Line   • Critical Care   • Intubation   • Intubation   • Temporary HD Catheter       Summary of Hospital Course:  See above HPI    Significant Findings, Care, Treatment and Services Provided:  See above HPI    Complications:  See above HPI    Disposition:      Final Diagnosis:  Multi-system organ failure, septic shock    Medical Problems Resolved Problems  Date Reviewed: 9/24/2022   None                 Condition at Time of Death:  Critical    Date, Time and Cause of Death    Date of Death: 10/8/22  Time of Death:  6:39 AM  Preliminary Cause of Death: ARDS (adult respiratory distress syndrome) ([facility] )  Entered by: Michael Hinojosa MD[SD1 1]     Attribution     SD1 1 Melisa Clay MD 10/08/22 06:49          Death Note:    INPATIENT DEATH NOTE  Darshana Mcfarland 65 y o  male MRN: [id number]  [facility]: [id number]    Date, Time and Cause of Death    Date of Death: 10/8/22  Time of Death:  6:39 AM  Preliminary Cause of Death: ARDS (adult respiratory distress syndrome) ([facility] )  Entered by: Michael Hinojosa MD[SD1 1]     Attribution     SD1 1 Melisa Clay MD 10/08/22 06:49               PHYSICAL EXAM:  Unresponsive to noxious stimuli, Spontaneous respirations absent, Breath sounds absent, Carotid pulse absent, Heart sounds absent, Pupillary light reflex absent and Corneal blink reflex absent    Medical Examiner notification criteria:  NONE APPLICABLE   Medical Examiner's office notified?:  No, does not meet ME notification criteria   Medical Examiner accepted case?:  No  Name of Medical Examiner: Desmond Webber         Autopsy Options:  Decision for post-mortem examination not yet made by next of kin      Primary Service Attending Physician notified?:  yes - Attending:  Dr Brinda Vanessa    Physician/Resident responsible for completing Discharge Summary:  Michael Hinojosa

## 2022-10-08 NOTE — PROGRESS NOTES
10/07/22 2100   Clinical Encounter Type   Visited With Family; Health care provider   Crisis Visit Critical Care   Referral From Nurse   Referral To    At request of nurse this  was present to provide support to patient's son, Gaudencio Zhang  Supportive conversation, advised staff and patient's son of  availability for further support  2230 returned to patient's room to find patient's brother, Michelle Martínez, sister, Sammie Whitaker, son, Gaudencio Zhang, and son Maureen Fish and Robert Phoenix  Provided support to family members, allowing them to express their feelings and concerns  Met with Hayley Casanova, surgical resident joined to provide information regarding patient's treatment  Advised all that this  is available for continued support as needed  0400 continued support of family, provided prayers of commendation at request of patient's sister and brother  26 visited with patient's son and staff to provide care and comfort following notification of patients death  May he rest in peace

## 2022-10-08 NOTE — QUICK NOTE
Patient with increasing pressor requirements overnight requiring levo, vaso, epi, deann  Skin on abdomen and thighs significantly mottled  LA 21 7  AB  7/65/6/9 6/BE -23 8    Despite full pressor support, patient's diastolic remains between 39-36  Family at bedside  Discussion had with both brothers informing them that given patient's increasing pressor requirements and significant lactis acidosis, this was unlikely to be survivable  Patient's family expressed understanding overnight and initially wanted to continue full code   Patient's family ultimately decided to make patient level 2 DNR shortly after 2AM     Continue ICU level care as Level 2 DNR

## 2022-10-09 LAB
BACTERIA BLD CULT: NORMAL
BACTERIA BLD CULT: NORMAL
BACTERIA BRONCH AEROBE CULT: ABNORMAL
GRAM STN SPEC: ABNORMAL

## 2022-10-10 ENCOUNTER — TRANSITIONAL CARE MANAGEMENT (OUTPATIENT)
Dept: INTERNAL MEDICINE CLINIC | Facility: CLINIC | Age: 77
End: 2022-10-10

## 2022-10-10 NOTE — CLINICAL RISK MANAGEMENT
Progress Note - Death in  Kia 65 y o  male MRN: [id number]  [facility]: [id number]      Patient  while restrained  with Soft restraint right wrist and Soft restraint left wrist  Death unrelated to use of restraints  This situation was tracked internally  CMS and PA-HARRIS  notification not required

## 2022-10-11 LAB — P JIROVECII AG SPEC QL IF: NORMAL

## 2022-10-12 LAB
BACTERIA BLD CULT: NORMAL
BACTERIA BLD CULT: NORMAL

## 2022-10-12 PROCEDURE — 88305 TISSUE EXAM BY PATHOLOGIST: CPT | Performed by: PATHOLOGY

## 2022-10-12 PROCEDURE — 88112 CYTOPATH CELL ENHANCE TECH: CPT | Performed by: PATHOLOGY

## 2022-10-15 LAB
FUNGUS SPEC CULT: ABNORMAL
FUNGUS SPEC CULT: ABNORMAL

## 2022-10-17 LAB
FUNGAL BLOOD CULTURE: NORMAL
FUNGAL BLOOD CULTURE: NORMAL

## 2022-10-24 LAB
FUNGAL BLOOD CULTURE: NORMAL
FUNGAL BLOOD CULTURE: NORMAL

## 2022-10-31 LAB
FUNGAL BLOOD CULTURE: NORMAL
FUNGAL BLOOD CULTURE: NORMAL

## 2022-11-07 LAB
FUNGAL BLOOD CULTURE: NORMAL
FUNGAL BLOOD CULTURE: NORMAL

## 2022-11-14 LAB
FUNGAL BLOOD CULTURE: NORMAL
FUNGAL BLOOD CULTURE: NORMAL

## 2022-11-21 LAB
FUNGAL BLOOD CULTURE: NORMAL
FUNGAL BLOOD CULTURE: NORMAL

## 2023-03-02 NOTE — PROGRESS NOTES
03/02/18 1030   Pain Assessment   Pain Assessment 0-10   Pain Score 6   Pain Type Surgical pain   Pain Location Abdomen   Pain Orientation Bilateral   Hospital Pain Intervention(s) Repositioned   Response to Interventions tolerated repositioning   Restrictions/Precautions   Precautions Fall Risk;Bed/chair alarms  (DILLON drain)   Transfer Bed/Chair/Wheelchair   Stand Pivot Contact Guard   Sit to Stand (cga/cs)   Stand to Sit Supervision   Supine to Sit Moderate Assist   Sit to Supine Moderate Assist   Findings Pt completed log roll to L side and supine to sit transfer  Without use of grab bar, pt requires assist to boost from sidelying to sit due to abdominal pain  Bed, Chair, Wheelchair Transfer (FIM) 3 - [place] needs to lift, boost or assist to stand OR sit   QI: Toilet Transfer   Assistance Needed Incidental touching   Assistance Provided by [place] No physical assistance   Toilet Transfer CARE Score 4   Toilet Transfer   Surface Assessed Raised Toilet   Transfer Technique Standard   Limitations Noted In Balance; Endurance;LE Strength   Findings Attempted transfer to [facility] with commode height raised  Pt reports he has to squat over toilet at home due to closeness of tub and inabiliyt to bend L knee  With higher surface, pt able to transfer to [facility] level however will need to get measurements in bathroom to determine if LLE positioning will allow room for tub  Toilet Transfer (FIM) 4 - Patient requires steadying assist or light touching   Functional Standing Tolerance   Time Pt able to static stand with unialteral UE support for 5 minutes  WIth dynamic standing balance pt with decreased endurance for 2 minutes     Therapeutic Excerise-Strength   UE Strength Yes   Right Upper Extremity- Strength   R Shoulder Flexion;ABduction   R Elbow Elbow flexion;Elbow extension   R Weight/Reps/Sets 2 sets 10 reps with blue theraband for bicep curl, tricep press, shoulder press, and lateral pull downs to improve UE endurance and strength to engage in fxnl mobiilty and ADL tasks  Left Upper Extremity-Strength   L Shoulder Flexion;ABduction   L Elbow Elbow flexion;Elbow extension   L Weights/Reps/Sets 2 sets 10 reps with blue theraband for bicep curl, tricep press, shoulder press, and lateral pull downs to improve UE endurance and strength to engage in fxnl mobiilty and ADL tasks  Cognition   Overall Cognitive Status WFL   Arousal/Participation Alert; Cooperative   Attention Within functional limits   Orientation Level Oriented X4   Following Commands Follows multistep commands with increased time or repetition   Assessment   Treatment Assessment Pt seen for 60 minute OT treatment with focus on balance, endurance, fxnl mobility, and strenthening  Pt engaged in squats 6 sets 5 reps to improve dynamic standing balance with unilateral leg stance, to simluate pt hovering over toilet  Pt with poor tolerance to maintain squating position due to pain in abdomen and weakness in LLE  Pt only able to do 5 reps before requiring seated rest  Discussed having son take measurements of bathroom at home to determine if placing high commode over toilet will allow for enough leg room between toilet and tub (due to left LLE limited knee flexion)  Pt taught log rolling technique for bed mobility however requires continued training due to decreased UB strength and decreased core strength  Pain impacting pt's tolerance during today's session despite reciieving pain medication  Continue with POC with focus on squats, dynamic balance, IADL tasks, and LB dressing with LHAE  Pt does have girlfriend however they only visit eachother during day and return home at night  Prognosis Good   Problem List Decreased strength;Decreased endurance; Impaired balance;Decreased mobility; Decreased safety awareness;Pain   Barriers to Discharge Decreased caregiver support   Plan   Treatment/Interventions ADL retraining;Functional transfer training;LE strengthening/ROM; Therapeutic exercise; Endurance training;Patient/family training;Equipment eval/education; Compensatory technique education;Gait training;Bed mobility   Progress Progressing toward goals   OT Therapy Minutes   OT Time In 1035   OT Time Out 1135   OT Total Time (minutes) 60   OT Mode of treatment - Individual (minutes) 60   OT Mode of treatment - Concurrent (minutes) 0   OT Mode of treatment - Group (minutes) 0   OT Mode of treatment - Co-treat (minutes) 0   OT Mode of Teatment - Total time(minutes) 60 minutes   Therapy Time missed   Time missed?  No No

## 2024-01-01 NOTE — ANESTHESIA POSTPROCEDURE EVALUATION
Post-Op Assessment Note    CV Status:  Stable  Pain Score: 0    Pain management: adequate     Mental Status:  Alert and awake   Hydration Status:  Euvolemic   PONV Controlled:  Controlled   Airway Patency:  Patent      Post Op Vitals Reviewed: Yes      Staff: Anesthesiologist, CRNA         No complications documented      BP   157/73   Temp (P) 99 4 °F (37 4 °C) (09/29/20 1551)    Pulse (P) 104 (09/29/20 1551)   Resp (P) 16 (09/29/20 1551)    SpO2 (P) 94 % (09/29/20 1551)
[facility]

## 2024-09-13 NOTE — PROGRESS NOTES
03/05/18 0730   Pain Assessment   Pain Assessment No/denies pain   Pain Score No Pain   QI: Oral Hygiene   Assistance Needed Incidental touching   Assistance Provided by [place] No physical assistance   Oral Hygiene CARE Score 4   Grooming   Able To Initiate Tasks;Comb/Brush Hair;Brush/Clean Teeth;Wash/Dry Hands   Limitation Noted In Safety;Strength;Timeliness   Findings Pt completed grooming tasks standing at the sink with use of SPC, requiring CGA for stability and balance while in stance 2* to L lateral lean  Pt demo ability to complete grooming tasks with CGA  Grooming (FIM) 4 - Patient requires steadying assist or light touching   QI: Shower/Bathe Self   Assistance Needed Incidental touching   Assistance Provided by [place] Less than 25%   Shower/Bathe Self CARE Score 3   Bathing   Assessed Bath Style Sponge Bath   Anticipated D/C Bath Style Tub   Able to Gather/Transport No   Able to [employer] Temperature No   Able to Wash/Rinse/Dry (body part) Left Arm;Right Arm;L Upper Leg;R Upper Leg;Chest;Abdomen;Perineal Area; Buttocks   Limitations Noted in Balance; Endurance;Strength; Safety;Timeliness   Positioning Seated;Standing   Adaptive Equipment Other  (Modified LH sponge with reacher and wash cloth)   Findings  Pt completed sponge bath at EOB  Pt demo ability to wash 8/10 parts, requiring A for BLE 2* increased pain with forward bending  Pt completed perineal and buttocks bathing while standing with CGA  Pt reports he has a LH sponge at home, trialed modified  LH sponge with use of a reacher with washcloth  Pt demo ability to wash LB with [address] equipment      Bathing (FIM) 4 - Patient requires steadying assist or light touching   QI: Upper Body Dressing   Assistance Needed Incidental touching   Assistance Provided by [place] Less than 25%   Upper Body Dressing CARE Score 3   QI: Lower Body Dressing   Assistance Needed Incidental touching   Assistance Provided by [place] No physical assistance   Lower Body Dressing CARE Score 4   QI: Putting On/Taking Off Footwear   Assistance Needed Physical assistance   Assistance Provided by [place] 50%-74%   Putting On/Taking Off Footwear CARE Score 2   QI: Picking Up Object   Reason if not Attempted Medical concerns   Picking Up Object CARE Score 88   Dressing/Undressing Clothing   Remove UB Clothes ([place])   Remove LB Clothes Pants;Socks   [place] [address] LB Clothes Pants;Socks; Shoes   Limitations Noted In Balance; Endurance; Safety;Strength;ROM; Timeliness   Adaptive Equipment LH [place]   Positioning Sit Edge Of Bed   Findings Pt participated in dressing while seated at the EOB  Pt demo ability to don pants in standing with CGA 2* decreased balance in standing without use of cane and L lateral lean  Pt demo ability to don socks with the use of sock aide  Pt reports he has sock aide at home  Pt reports he has carpet in his bedroom  To simulate pt bedroom and ensure safety, trialed donning R shoe with  sock on RLE  Trial was unsuccessful 2* safety concerns  Pt required A to don shoes 2* safety concerns  Pt donned button shirt while standing with out the use of SPC, requiring CGA for safety and balance  Recommend pt dons shirt in sitting  Pt seems hesitant to try, due to it is not his usual routine  To trial donning shirt seated in next session      UB Dressing (FIM) 4 - Patient requires steadying assist or light touching   LB Dressing (FIM) 4 - Patient completes 75% of all tasks   QI: Lying to Sitting on Side of Bed   Assistance Needed Incidental touching   Assistance Provided by [place] No physical assistance   Lying to Sitting on Side of Bed CARE Score 4   QI: Sit to Stand   Assistance Needed Incidental touching   Assistance Provided by [place] No physical assistance   Sit to Stand CARE Score 4   QI: Chair/Bed-to-Chair Transfer   Assistance Needed Incidental touching   Assistance Provided by [place] No physical assistance   Chair/Bed-to-Chair Transfer CARE Score 4   Transfer Bed/Chair/Wheelchair   Limitations Noted In Balance; Endurance;Pain Management;UE Strength;LE Strength   Adaptive Equipment Cane   Stand Pivot Contact Guard   Sit to Stand Other  (CGA)   Stand to Sit Other  (CGA)   Supine to Sit Other  (CGA)   Findings Pt completed multiple sit to stands during session requiring CGA for steadying and balance with use of SPC  Bed, Chair, Wheelchair Transfer (FIM) 4 - Patient requires steadying assist or light touching   Cognition   Overall Cognitive Status Impaired   Arousal/Participation Alert; Cooperative   Attention Within functional limits   Orientation Level Oriented X4   Memory Decreased recall of recent events   Following Commands Follows one step commands without difficulty   Comments Pt presented with decreased recall today evidenced by pt requiring therapist to repeat instructions/plan multiple time throughout session  Pt presents with decreased safety awareness 2* poor response to edu on safety during dressing activity  Plan to complete cognitive screen during next session  Assessment   Treatment Assessment Pt participated in skilled OT session with focus on ADLs ( bathing, dressing, and grooming)  Pt tolerated session well  Pt demo ability to participate in bathing with CGA to stand to reach yana and buttocks areas  To simulate home context Trialed use of modified LH sponge with reacher to wash Le  Pt demo abaility to was LE with use of LH sponge  Pt demo ability to thread pants while seated, but requires CGA to stand to don pants over hips  Pt prefers to stand to don UB clothing, recommend pt sits to don UB clothing 2* L lateral lean and decreased balance with out the use of SPC  To trial donning UB clothing while seated next session  Pt hesitant to try due to it is not part of his routine  To simulate home environment, Pt trialed donning shoes while seated on mat at simulated bed height of 23 inches  Pts routine to don shoes appears to be unsafe   Trialed donning L shoe first to increase safety and balance  Pt utilizes bed for proximal support  Pt would benefit from the use of a Rw to increase pts balance and safety while donning foot wear  Pt stated " I don't need one of those " To assess with use of table to simulate home set up during next session  Pt continues to be limited by pain, decreased  standing balance, decreased endurance, decreased activity tolerance, decreased UE strength, and decreased LE strength  Pt would benefit from continued OT services per POC with focus on bathroom environment to assess if commode will fit and cognitive assessment  Prognosis Good   Problem List Decreased strength;Decreased endurance;Decreased mobility; Impaired balance;Pain   Barriers to Discharge Decreased caregiver support   Plan   Treatment/Interventions ADL retraining;Functional transfer training;LE strengthening/ROM; Therapeutic exercise; Endurance training;Cognitive reorientation;Patient/family training   Progress Progressing toward goals   Recommendation   OT Discharge Recommendation Home OT   OT Therapy Minutes   OT Time In 0730   OT Time Out 0900   OT Total Time (minutes) 90   OT Mode of treatment - Individual (minutes) 90   OT Mode of treatment - Concurrent (minutes) 0   OT Mode of treatment - Group (minutes) 0   OT Mode of treatment - Co-treat (minutes) 0   OT Mode of Teatment - Total time(minutes) 90 minutes   Therapy Time missed   Time missed?  No Family

## (undated) DEVICE — TIBURON TRANSVERSE LAPAROTOMY SHEET: Brand: CONVERTORS

## (undated) DEVICE — PLUMEPEN PRO 10FT

## (undated) DEVICE — GLOVE SRG BIOGEL ORTHOPEDIC 7.5

## (undated) DEVICE — GLOVE INDICATOR UNDERGLOVE SZ 6 BLUE

## (undated) DEVICE — MEDI-VAC YANK SUCT HNDL W/TPRD BULBOUS TIP: Brand: CARDINAL HEALTH

## (undated) DEVICE — SUT VICRYL 2-0 CT-1 27 IN J259H

## (undated) DEVICE — BINDER ABDOMINAL 30-45 IN

## (undated) DEVICE — SUT VICRYL 2-0 REEL 54 IN J286G

## (undated) DEVICE — SUT MONOCRYL 4-0 PS-2 18 IN Y496G

## (undated) DEVICE — STRAIGHT CATH RED RUBBER 12FR

## (undated) DEVICE — GLOVE INDICATOR PI UNDERGLOVE SZ 8 BLUE

## (undated) DEVICE — ENSEAL 20 CM SHAFT, LARGE JAW: Brand: ENSEAL X1

## (undated) DEVICE — SCD SEQUENTIAL COMPRESSION COMFORT SLEEVE MEDIUM KNEE LENGTH: Brand: KENDALL SCD

## (undated) DEVICE — DRESSING EXUDERM SATIN HYDROCOLLOID 4 X 4 IN

## (undated) DEVICE — CHLORAPREP HI-LITE 26ML ORANGE

## (undated) DEVICE — TROCAR: Brand: KII FIOS FIRST ENTRY

## (undated) DEVICE — SUT VICRYL 0 REEL 54 IN J287G

## (undated) DEVICE — 3000CC GUARDIAN II: Brand: GUARDIAN

## (undated) DEVICE — DRESSING XEROFORM 5 X 9

## (undated) DEVICE — TRAY FOLEY 16FR URIMETER SURESTEP

## (undated) DEVICE — SUT VICRYL 0 UR-6 27 IN J603H

## (undated) DEVICE — GLOVE SRG BIOGEL 7

## (undated) DEVICE — SUT ETHILON 3-0 FS-1 18 IN 663G

## (undated) DEVICE — ROSEBUD DISSECTORS: Brand: DEROYAL

## (undated) DEVICE — ADHESIVE SKIN HIGH VISCOSITY EXOFIN 1ML

## (undated) DEVICE — PROXIMATE RELOADABLE LINEAR CUTTER WITH SAFETY LOCK-OUT, 75MM: Brand: PROXIMATE

## (undated) DEVICE — SUT VICRYL 3-0 SH 27 IN J416H

## (undated) DEVICE — SPECIMEN CONTAINER STERILE PEEL PACK

## (undated) DEVICE — SUT PDS II 1 CTX 36 IN Z371T

## (undated) DEVICE — SUT SILK 3-0 SH 30 IN K832H

## (undated) DEVICE — BETHLEHEM MAJOR GENERAL PACK: Brand: CARDINAL HEALTH

## (undated) DEVICE — HEAVY DRAINAGE PACK: Brand: CURITY

## (undated) DEVICE — SURGICEL 4 X 8

## (undated) DEVICE — VAC DRESSING SENSATRAC SMALL

## (undated) DEVICE — HEMOCLIP CARTRIDGE MED

## (undated) DEVICE — POOLE SUCTION HANDLE: Brand: CARDINAL HEALTH

## (undated) DEVICE — SPONGE LAP 18 X 18 IN STRL RFD

## (undated) DEVICE — SUT SILK 2-0 SH CR/8 18 IN C012D

## (undated) DEVICE — VAC WHITEFOAM DRESSING LARGE: Brand: V.A.C. WHITEFOAM™

## (undated) DEVICE — PROXIMATE PLUS MD MULTI-DIRECTIONAL RELEASE SKIN STAPLERS CONTAINS 35 STAINLESS STEEL STAPLES APPROXIMATE CLOSED DIMENSIONS: 6.9MM X 3.9MM WIDE: Brand: PROXIMATE

## (undated) DEVICE — JACKSON-PRATT 100CC BULB RESERVOIR: Brand: CARDINAL HEALTH

## (undated) DEVICE — PACK PBDS LAP CHOLE RF

## (undated) DEVICE — ABTHERA OPEN ABDOMEN DRESSING WITH SENSATRAC PAD: Brand: ABTHERA™ SENSAT.R.A.C.™

## (undated) DEVICE — LIGACLIP MCA MULTIPLE CLIP APPLIERS, 20 MEDIUM CLIPS: Brand: LIGACLIP

## (undated) DEVICE — ENDOPATH 5MM CURVED SCISSORS WITH MONOPOLAR CAUTERY: Brand: ENDOPATH

## (undated) DEVICE — INTENDED FOR TISSUE SEPARATION, AND OTHER PROCEDURES THAT REQUIRE A SHARP SURGICAL BLADE TO PUNCTURE OR CUT.: Brand: BARD-PARKER SAFETY BLADES SIZE 15, STERILE

## (undated) DEVICE — GLOVE INDICATOR PI UNDERGLOVE SZ 7.5 BLUE

## (undated) DEVICE — STRL UNIVERSAL MINOR GENERAL: Brand: CARDINAL HEALTH

## (undated) DEVICE — SUT SILK 3-0 SH CR/8 18 IN C013D

## (undated) DEVICE — GLOVE SRG BIOGEL 8

## (undated) DEVICE — SUT PROLENE 0 CT-1 30 IN 8424H

## (undated) DEVICE — BULB SYRINGE,IRRIGATION WITH PROTECTIVE CAP: Brand: DOVER

## (undated) DEVICE — JP PERF DRN SIL FLT 10MM FULL: Brand: CARDINAL HEALTH

## (undated) DEVICE — ALL PURPOSE SPONGES,NONWOVEN, 4 PLY: Brand: CURITY

## (undated) DEVICE — DRESSING MEPILEX AG BORDER 4 X 10 IN

## (undated) DEVICE — DRAPE EQUIPMENT RF WAND

## (undated) DEVICE — SUT ETHILON 2-0 FSLX 30 IN 1674H

## (undated) DEVICE — GAUZE SPONGES,16 PLY: Brand: CURITY

## (undated) DEVICE — 3M™ IOBAN™ 2 ANTIMICROBIAL INCISE DRAPE 6650EZ: Brand: IOBAN™ 2

## (undated) DEVICE — VAC CANISTER 500ML

## (undated) DEVICE — GLOVE SRG BIOGEL 6

## (undated) DEVICE — PROXIMATE RELOADABLE LINEAR STAPLER, 60MM: Brand: PROXIMATE

## (undated) DEVICE — JP CHAN DRN SIL HUBLESS 19FR W/TRO: Brand: CARDINAL HEALTH

## (undated) DEVICE — 5 MM CURVED DISSECTORS WITH MONOPOLAR CAUTERY: Brand: ENDOPATH

## (undated) DEVICE — LIGAMAX 5 MM ENDOSCOPIC MULTIPLE CLIP APPLIER: Brand: LIGAMAX

## (undated) DEVICE — NEEDLE 25G X 1 1/2

## (undated) DEVICE — MEDIUM VISCERA RETAINER: Brand: VISCERA RETAINER, FISH

## (undated) DEVICE — INTENDED FOR TISSUE SEPARATION, AND OTHER PROCEDURES THAT REQUIRE A SHARP SURGICAL BLADE TO PUNCTURE OR CUT.: Brand: BARD-PARKER SAFETY BLADES SIZE 10, STERILE

## (undated) DEVICE — SUT SILK 2-0 18 IN A185H

## (undated) DEVICE — JP CHANNEL DRAIN 19FR, FULL FLUTES: Brand: JACKSON-PRATT

## (undated) DEVICE — INTENDED FOR TISSUE SEPARATION, AND OTHER PROCEDURES THAT REQUIRE A SHARP SURGICAL BLADE TO PUNCTURE OR CUT.: Brand: BARD-PARKER SAFETY BLADES SIZE 11, STERILE

## (undated) DEVICE — VIOLET BRAIDED (POLYGLACTIN 910), SYNTHETIC ABSORBABLE SUTURE: Brand: COATED VICRYL

## (undated) DEVICE — STRL COTTON TIP APPLCTR 6IN PK: Brand: CARDINAL HEALTH

## (undated) DEVICE — SUT VICRYL 0 CT-1 27 IN J260H

## (undated) DEVICE — DRAPE C-ARM X-RAY

## (undated) DEVICE — PROXIMATE LINEAR CUTTER RELOAD, BLUE, 75MM: Brand: PROXIMATE

## (undated) DEVICE — SYRINGE 20ML LL

## (undated) DEVICE — PENCIL ELECTROSURG E-Z CLEAN -0035H

## (undated) DEVICE — ENDOPATH PNEUMONEEDLE INSUFFLATION NEEDLES WITH LUER LOCK CONNECTORS 120MM: Brand: ENDOPATH

## (undated) DEVICE — TUBING SUCTION 5MM X 12 FT

## (undated) DEVICE — SUT SILK 2-0 TIES 144 IN LA55G

## (undated) DEVICE — 3M™ TEGADERM™ TRANSPARENT FILM DRESSING FRAME STYLE, 1627, 4 IN X 10 IN (10 CM X 25 CM), 20/CT 4CT/CASE: Brand: 3M™ TEGADERM™